# Patient Record
Sex: MALE | Race: WHITE | NOT HISPANIC OR LATINO | Employment: OTHER | URBAN - METROPOLITAN AREA
[De-identification: names, ages, dates, MRNs, and addresses within clinical notes are randomized per-mention and may not be internally consistent; named-entity substitution may affect disease eponyms.]

---

## 2021-12-09 ENCOUNTER — TRANSFERRED RECORDS (OUTPATIENT)
Dept: HEALTH INFORMATION MANAGEMENT | Facility: CLINIC | Age: 61
End: 2021-12-09

## 2022-01-28 ENCOUNTER — TRANSFERRED RECORDS (OUTPATIENT)
Dept: HEALTH INFORMATION MANAGEMENT | Facility: CLINIC | Age: 62
End: 2022-01-28
Payer: COMMERCIAL

## 2022-02-04 ENCOUNTER — TRANSFERRED RECORDS (OUTPATIENT)
Dept: HEALTH INFORMATION MANAGEMENT | Facility: OTHER | Age: 62
End: 2022-02-04

## 2022-02-15 ENCOUNTER — TRANSFERRED RECORDS (OUTPATIENT)
Dept: HEALTH INFORMATION MANAGEMENT | Facility: CLINIC | Age: 62
End: 2022-02-15
Payer: COMMERCIAL

## 2022-02-23 ENCOUNTER — MEDICAL CORRESPONDENCE (OUTPATIENT)
Dept: HEALTH INFORMATION MANAGEMENT | Facility: CLINIC | Age: 62
End: 2022-02-23
Payer: COMMERCIAL

## 2022-02-25 ENCOUNTER — TRANSCRIBE ORDERS (OUTPATIENT)
Dept: OTHER | Age: 62
End: 2022-02-25
Payer: COMMERCIAL

## 2022-02-25 DIAGNOSIS — C44.42 SQUAMOUS CELL CARCINOMA OF NECK: Primary | ICD-10-CM

## 2022-03-02 NOTE — TELEPHONE ENCOUNTER
FUTURE VISIT INFORMATION      FUTURE VISIT INFORMATION:    Date: 3/9/22    Time: 1:30PM    Location: [facility]  REFERRAL INFORMATION:    Referring provider:  Richie Rivera MD      Referring providers clinic:  [facility] Surgery      Reason for visit/diagnosis  Squamous cell carcinoma of neck. referred by Richie Rivera MD, recds in epic per pt    RECORDS REQUESTED FROM:       Clinic name Comments Records Status Imaging Status   [facility] Surgery   22 note from Richie Rivera MD   Care everywhere     [facility] FAMILY MEDICINE 21 note from Beni Gallardo MD   Care everywhere     [facility] URGENT CARE 21 note from Dunphy, Tyler J, MD    Care everywhere     [facility] imaging  [place]   2/15/22 US FNAreq 3/2/22  2/4/22 US Head Neck   21 CT NECK    Care everywhere PACS   [facility] CLINICAL LABORATORY  [address] [facility] [id number] [phone] 2/15/22 Lymph Node (Case: [id number])    *Trackin Care everywhere     Path req 3/2/22    Received 3/3/22            3/2/22 2:32PM sent a fax to [facility] for path send out - Amay   3/3/22 3:23PM received path from [facility]

## 2022-03-04 ENCOUNTER — TRANSFERRED RECORDS (OUTPATIENT)
Dept: HEALTH INFORMATION MANAGEMENT | Facility: OTHER | Age: 62
End: 2022-03-04

## 2022-03-07 ENCOUNTER — LAB (OUTPATIENT)
Dept: LAB | Facility: CLINIC | Age: 62
End: 2022-03-07
Payer: COMMERCIAL

## 2022-03-07 DIAGNOSIS — C44.42 SQUAMOUS CELL CARCINOMA, SCALP/NECK: Primary | ICD-10-CM

## 2022-03-07 PROCEDURE — 88321 CONSLTJ&REPRT SLD PREP ELSWR: CPT | Performed by: PATHOLOGY

## 2022-03-07 PROCEDURE — 88342 IMHCHEM/IMCYTCHM 1ST ANTB: CPT | Mod: 26 | Performed by: PATHOLOGY

## 2022-03-07 PROCEDURE — 88360 TUMOR IMMUNOHISTOCHEM/MANUAL: CPT | Mod: 26 | Performed by: PATHOLOGY

## 2022-03-07 PROCEDURE — 88342 IMHCHEM/IMCYTCHM 1ST ANTB: CPT | Mod: TC,XU | Performed by: OTOLARYNGOLOGY

## 2022-03-08 ENCOUNTER — TRANSFERRED RECORDS (OUTPATIENT)
Dept: HEALTH INFORMATION MANAGEMENT | Facility: OTHER | Age: 62
End: 2022-03-08

## 2022-03-09 ENCOUNTER — PRE VISIT (OUTPATIENT)
Dept: OTOLARYNGOLOGY | Facility: CLINIC | Age: 62
End: 2022-03-09

## 2022-03-09 ENCOUNTER — ALLIED HEALTH/NURSE VISIT (OUTPATIENT)
Dept: SPEECH THERAPY | Facility: CLINIC | Age: 62
End: 2022-03-09

## 2022-03-09 ENCOUNTER — OFFICE VISIT (OUTPATIENT)
Dept: OTOLARYNGOLOGY | Facility: CLINIC | Age: 62
End: 2022-03-09
Payer: COMMERCIAL

## 2022-03-09 VITALS
HEIGHT: 70 IN | WEIGHT: 299 LBS | SYSTOLIC BLOOD PRESSURE: 118 MMHG | DIASTOLIC BLOOD PRESSURE: 79 MMHG | TEMPERATURE: 97.2 F | BODY MASS INDEX: 42.8 KG/M2 | HEART RATE: 73 BPM

## 2022-03-09 DIAGNOSIS — C11.9 NASOPHARYNGEAL CANCER (H): Primary | ICD-10-CM

## 2022-03-09 DIAGNOSIS — C44.42 SQUAMOUS CELL CARCINOMA OF NECK: ICD-10-CM

## 2022-03-09 PROCEDURE — 31575 DIAGNOSTIC LARYNGOSCOPY: CPT | Mod: GC | Performed by: OTOLARYNGOLOGY

## 2022-03-09 PROCEDURE — 99203 OFFICE O/P NEW LOW 30 MIN: CPT | Mod: 25 | Performed by: OTOLARYNGOLOGY

## 2022-03-09 RX ORDER — FUROSEMIDE 20 MG
20 TABLET ORAL DAILY
Status: ON HOLD | COMMUNITY
Start: 2021-09-22 | End: 2023-11-07

## 2022-03-09 RX ORDER — ATORVASTATIN CALCIUM 20 MG/1
20 TABLET, FILM COATED ORAL DAILY
COMMUNITY
Start: 2021-09-24

## 2022-03-09 ASSESSMENT — PAIN SCALES - GENERAL: PAINLEVEL: NO PAIN (0)

## 2022-03-09 NOTE — NURSING NOTE
"Chief Complaint   Patient presents with     Consult     squamous cell carcinoma      Blood pressure 118/79, pulse 73, temperature 97.2  F (36.2  C), height 1.778 m (5' 10\"), weight 135.6 kg (299 lb).    Robert Meier LPN    "

## 2022-03-09 NOTE — LETTER
3/9/2022       RE: Jareth Gallardo  [address] [place] [address]     Dear Colleague,    Thank you for referring your patient, Jareth Gallardo, to the [facility] EAR NOSE AND THROAT CLINIC [place] at [facility]. Please see a copy of my visit note below.    Otolaryngology Consult Note  March 9, 2022      CC: Nasopharyngeal Squamous Cell Carcinoma   Referring Provider: Dr. Richard Rivera    HPI: Jareth Gallardo is a 61 year old male with a past medical history of Type II DM. Around thanksgiving he felt bumps in his left neck. A few weeks went by and the mass enlarged.  There was not associated pain but that mass was was firm and associated with facial and neck swelling on the left side.  They went to urgent care and got blood work; which he states was positive for cat scratch fever. He did a course of multi-abx regiment. This did not resolve the issue. He went in to see his PCP where a CT was preformed. He was recommended to see and ENT Clinic in [facility] but his insurance did not cover this so he was unable to go. He was then referred to a general surgeon, Dr. Rivera. Dr. Rivera was able to get the ultrasound guided FNA. Ultrasound guided FNA was performed 2/15/2022 demonstrating p16- SCC.    Nasal congestion mostly on left side. His left nose stays blocked. He has some issues chewing and breathing at same time. 3mo ago began having left sided epistaxis. Bleeding always on left side. Nose bleeds are typically first thing in he morning. No issues with swalloing, no pain with swallowing. No weight loss. No neck or ear pain. He has baseline bilateral tinnitus. His left hearing is normal but has riht hearing loss. No hemoptysis. No changes in voice. No skin cancer. No decrease facial sensation. No changes in facial motion. No pain with eye motion, decreased vision, or restricted vision. No skin lesion or personal history for H&N cancer.     He  works in a [employer] company ([employer] 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     Ultrasound guided FNA was performed 2/15/2022 demonstrating p16- SCC.    PMH: Type II DM, COPD, HLD  Medications: metformin, atorvastin, brollia   Allergies:   PSH: tonsillectomy (6years of age)   SH:  - Manager   FH:  - Father passed from leukemia  - 30yrs 1-4ppd. Quit 10yrs ago  - Social drinker; 4 beers or 2 margittas a month     ROS: 12 point review of systems is negative unless noted in HPI.    PHYSICAL EXAM:  General: sitting in chair, no acute distress  HEAD: normocephalic, atraumatic  Face: symmetrical, CN VII intact bilaterally (HB 1), no swelling, edema, or erythema. Sensation V1-V3 intact and equal bilaterally.   Eyes: EOMI without spontaneous or gaze evoked nystagmus, no pain with EOM, PERRL, clear sclera  Ears: no tragal tenderness, external ear canal open and clear bilaterally, TMs clear bilaterally  Nose: no anterior drainage, bilateral edematous mucosa  Mouth: moist, no ulcers, no jaw or tooth tenderness, tongue midline and symmetric  Oropharynx: tonsils within normal limits, uvula midline, no oropharyngeal erythema  Neck: left sided 2 palpable lymph nodes level II approx 2 x 1cm and level III 1.5 x 0.5 , both nodes firm and non-mobile, trachea midline  Neuro: cranial nerves 2-12 grossly intact  Respiratory: breathing non-labored on RA, no stridor  Skin: no rashes or skin lesions of the face/neck  Psych: pleasant affect  Cardio: extremities warm and well perfused     FIBEROPTIC ENDOSCOPY:  Due to nasopharynx scc, fiberoptic laryngoscopy was indicated. After obtaining verbal consent, the nose was topically decongested and anesthetized. The fiberoptic laryngoscope was passed under endoscopic vision through the right nasal passage. The turbinates and nasal mucosa was inflamed and diffusely edematous. It was very tight to pass the  scope. Middle meatus with polypoid like change. Nasopharynx with diffuse secretions and thickening that does not block right eustachian tube orifice. Scope of left nasal cavity was attempted but nasal mucosa was so edematous it was challenging to pass the scope through therefore we aborted. Poor visualization of structures below nasopharynx due to patient heavy secretion burden.    Imagin2021  CT NECK SOFT TISSUE W IV CONTRAST     HISTORY: Neck mass, nonpulsatile;     COMPARISON: None     FINDINGS: Extensive left-sided neck adenopathy lymph nodes measuring up to 2.4 cm short axis medial to the left sternocleidomastoid muscle just above the level of the hyoid bone. Additional lymph node with stranding around it likely inflamed image 74 series 2 measuring 1.8 cm short axis. There are also some scattered slightly prominent lymph nodes in the right neck as well as the right and left supraclavicular regions.     Probable small cyst in the right thyroid gland measuring 8 mm.     Vascular structures appear normal. The epiglottis is normal no subglottic narrowing. Slight prominence to the adenoids and left palatine tonsil. This is likely reactive.     In the inferior aspects of the maxillary sinuses there is either layering debris, small polyps or retention cysts measuring up to 2 cm. The left mastoid air cells appear clear. There is some mild right mastoid air cell disease.     IMPRESSION:   1. Extensive adenopathy in the left neck just medial to the left sternocleidomastoid muscle. One of the enlarged lymph nodes has stranding around it compatible with inflammation. There are also some slightly prominent lymph nodes in the right neck. Prominence to the adenoids and left palatine tonsil as well.     No abscess or fluid collection seen.     2. Mild right mastoid air cell disease this may be chronic.       3. Mild maxillary sinus debris versus small polyps or retention cysts.     Electronically Signed: Jorge Hammonds  MD 12/9/2021 9:52 AM      3/4/2022  PET CT SKULL BASE TO MID THIGH INITIAL     INDICATION: Squamous cell carcinoma, staging; Region of Interest and Additional Information->Lymph node, cervical, needle core biopsies: metastatic moderately differentiated squamous cell carcinoma.     COMPARISON: CT neck 12/09/2021.     TECHNIQUE: PET CT images obtained from the calvarium to the upper thighs.     INJECTED DOSE: 13.43 millicuries.     BLOOD GLUCOSE LEVEL: 115 mg/dL.     FINDINGS: There is soft tissue exuberant thickening at the nasopharyngeal mucosa and oropharyngeal mucosa with uptake. Nodular soft tissue uptake within the prevertebral soft tissues at the level of the epiglottis as well. Multiple right upper neck lymph node uptake at jugular digastric lymph nodes and within the posterior cervical space. More numerous are lymph node uptake within the left posterior cervical space and at jugular digastric nodes. There is pharyngeal soft tissue uptake eccentric to the left as well just below the epiglottis eccentric to the left and also centrally at the piriform sinuses. Parotid glands demonstrate physiologic uptake. Submandibular glands demonstrate physiologic uptake. No evidence for axillary uptake or mediastinal uptake. Nodular uptake within the left upper lobe with hazy ground-glass density about it is SUV avid. This confluent nodule measures up to 2.6 cm. Additionally, there is some nodular uptake within the posterior aspect of the lingula as well    to a lesser degree. Confluent nodule measures up to 2.3 cm. There is a small fracture line within a right 6th rib anteriorly with focal uptake. No evidence for lung parenchymal uptake on the right.     Liver demonstrates physiologic uptake. Gallstones. Adrenal glands demonstrate physiologic uptake. Spleen demonstrates physiologic uptake. Remainder of the abdomen and pelvis demonstrates physiologic uptake. Definitive pathological osseous uptake is not evident.      IMPRESSION:   1.  Exuberant soft tissue thickening within the nasopharyngeal and oropharyngeal mucosa concerning for nasopharyngeal carcinoma. The soft tissue nodular uptake is evident within the prevertebral soft tissues and about the pharyngeal mucosa at the epiglottis and with extensive neck lymph node uptake.   2.  Uptake within the lung parenchyma concerning for metastatic uptake or primary malignancy. Infectious or inflammatory not excluded.   3.  Uptake within the right 6th rib is likely posttraumatic but could be followed with CT.        Assessment and Plan  Jareth Gallardo is a 61 year old male with a past medical history of HLD, , and type II DM. He is here with newly diagnosed nasopharynx p16- SCC. Unfortunately his PET scan is not uploaded but we have the read that shows mucosal thickening of nasopharynx down to the oropharynx with bilateral avid neck lymph nodes, prevertebral and pharyngeal enchancement. There was also a left upper lung lymph node that was suspicious for metasisis.  This has not been biopsied. He likely has [id number]. We discussed with him primary treatment would be chemoradiation. He already has a radiation consult in [facility]. We discussed with him the option to have his radiation and chemotherapy done here with stay at the [facility]. He is open to the option. We will discuss his case at tumor board Friday and call him with the final recommendations.    - Presentation at tumor board and finalization POC       I, Jesus Boston MD, saw this patient with the resident/fellow and agree with the resident's findings and plan of care as documented in the resident's/fellow's note. I was present with the patient for the entire viewing portion of the endoscopy procedure (including scope insertion and withdrawal) and agree with the interpretation and report as documented by the resident.          Again, thank you for allowing me to participate in the care of your patient.       Sincerely,    Jesus Botson MD

## 2022-03-09 NOTE — PROGRESS NOTES
Otolaryngology Consult Note  March 9, 2022      CC: Nasopharyngeal Squamous Cell Carcinoma   Referring Provider: Dr. Richard Rivera    HPI: Jareth Gallardo is a 61 year old male with a past medical history of Type II DM. Around thanksgiving he felt bumps in his left neck. A few weeks went by and the mass enlarged.  There was not associated pain but that mass was was firm and associated with facial and neck swelling on the left side.  They went to urgent care and got blood work; which he states was positive for cat scratch fever. He did a course of multi-abx regiment. This did not resolve the issue. He went in to see his PCP where a CT was preformed. He was recommended to see and ENT Clinic in [facility] but his insurance did not cover this so he was unable to go. He was then referred to a general surgeon, Dr. Rivera. Dr. Rivera was able to get the ultrasound guided FNA. Ultrasound guided FNA was performed 2/15/2022 demonstrating p16- SCC.    Nasal congestion mostly on left side. His left nose stays blocked. He has some issues chewing and breathing at same time. 3mo ago began having left sided epistaxis. Bleeding always on left side. Nose bleeds are typically first thing in he morning. No issues with swalloing, no pain with swallowing. No weight loss. No neck or ear pain. He has baseline bilateral tinnitus. His left hearing is normal but has riht hearing loss. No hemoptysis. No changes in voice. No skin cancer. No decrease facial sensation. No changes in facial motion. No pain with eye motion, decreased vision, or restricted vision. No skin lesion or personal history for H&N cancer.     He works in a [employer] company ([employer] 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     Ultrasound guided FNA was performed 2/15/2022 demonstrating p16- SCC.    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 Quit 10yrs ago  - Social drinker; 4 beers or 2 margittas a month     ROS: 12 point review of systems is negative unless noted in HPI.    PHYSICAL EXAM:  General: sitting in chair, no acute distress  HEAD: normocephalic, atraumatic  Face: symmetrical, CN VII intact bilaterally (HB 1), no swelling, edema, or erythema. Sensation V1-V3 intact and equal bilaterally.   Eyes: EOMI without spontaneous or gaze evoked nystagmus, no pain with EOM, PERRL, clear sclera  Ears: no tragal tenderness, external ear canal open and clear bilaterally, TMs clear bilaterally  Nose: no anterior drainage, bilateral edematous mucosa  Mouth: moist, no ulcers, no jaw or tooth tenderness, tongue midline and symmetric  Oropharynx: tonsils within normal limits, uvula midline, no oropharyngeal erythema  Neck: left sided 2 palpable lymph nodes level II approx 2 x 1cm and level III 1.5 x 0.5 , both nodes firm and non-mobile, trachea midline  Neuro: cranial nerves 2-12 grossly intact  Respiratory: breathing non-labored on RA, no stridor  Skin: no rashes or skin lesions of the face/neck  Psych: pleasant affect  Cardio: extremities warm and well perfused     FIBEROPTIC ENDOSCOPY:  Due to nasopharynx scc, fiberoptic laryngoscopy was indicated. After obtaining verbal consent, the nose was topically decongested and anesthetized. The fiberoptic laryngoscope was passed under endoscopic vision through the right nasal passage. The turbinates and nasal mucosa was inflamed and diffusely edematous. It was very tight to pass the scope. Middle meatus with polypoid like change. Nasopharynx with diffuse secretions and thickening that does not block right eustachian tube orifice. Scope of left nasal cavity was attempted but nasal mucosa was so edematous it was challenging to pass the scope through therefore we aborted. Poor visualization of  structures below nasopharynx due to patient heavy secretion burden.    Imagin2021  CT NECK SOFT TISSUE W IV CONTRAST     HISTORY: Neck mass, nonpulsatile;     COMPARISON: None     FINDINGS: Extensive left-sided neck adenopathy lymph nodes measuring up to 2.4 cm short axis medial to the left sternocleidomastoid muscle just above the level of the hyoid bone. Additional lymph node with stranding around it likely inflamed image 74 series 2 measuring 1.8 cm short axis. There are also some scattered slightly prominent lymph nodes in the right neck as well as the right and left supraclavicular regions.     Probable small cyst in the right thyroid gland measuring 8 mm.     Vascular structures appear normal. The epiglottis is normal no subglottic narrowing. Slight prominence to the adenoids and left palatine tonsil. This is likely reactive.     In the inferior aspects of the maxillary sinuses there is either layering debris, small polyps or retention cysts measuring up to 2 cm. The left mastoid air cells appear clear. There is some mild right mastoid air cell disease.     IMPRESSION:   1. Extensive adenopathy in the left neck just medial to the left sternocleidomastoid muscle. One of the enlarged lymph nodes has stranding around it compatible with inflammation. There are also some slightly prominent lymph nodes in the right neck. Prominence to the adenoids and left palatine tonsil as well.     No abscess or fluid collection seen.     2. Mild right mastoid air cell disease this may be chronic.       3. Mild maxillary sinus debris versus small polyps or retention cysts.     Electronically Signed: Jorge Hammonds MD 2021 9:52 AM      3/4/2022  PET CT SKULL BASE TO MID THIGH INITIAL     INDICATION: Squamous cell carcinoma, staging; Region of Interest and Additional Information->Lymph node, cervical, needle core biopsies: metastatic moderately differentiated squamous cell carcinoma.     COMPARISON: CT neck 2021.      TECHNIQUE: PET CT images obtained from the calvarium to the upper thighs.     INJECTED DOSE: 13.43 millicuries.     BLOOD GLUCOSE LEVEL: 115 mg/dL.     FINDINGS: There is soft tissue exuberant thickening at the nasopharyngeal mucosa and oropharyngeal mucosa with uptake. Nodular soft tissue uptake within the prevertebral soft tissues at the level of the epiglottis as well. Multiple right upper neck lymph node uptake at jugular digastric lymph nodes and within the posterior cervical space. More numerous are lymph node uptake within the left posterior cervical space and at jugular digastric nodes. There is pharyngeal soft tissue uptake eccentric to the left as well just below the epiglottis eccentric to the left and also centrally at the piriform sinuses. Parotid glands demonstrate physiologic uptake. Submandibular glands demonstrate physiologic uptake. No evidence for axillary uptake or mediastinal uptake. Nodular uptake within the left upper lobe with hazy ground-glass density about it is SUV avid. This confluent nodule measures up to 2.6 cm. Additionally, there is some nodular uptake within the posterior aspect of the lingula as well    to a lesser degree. Confluent nodule measures up to 2.3 cm. There is a small fracture line within a right 6th rib anteriorly with focal uptake. No evidence for lung parenchymal uptake on the right.     Liver demonstrates physiologic uptake. Gallstones. Adrenal glands demonstrate physiologic uptake. Spleen demonstrates physiologic uptake. Remainder of the abdomen and pelvis demonstrates physiologic uptake. Definitive pathological osseous uptake is not evident.     IMPRESSION:   1.  Exuberant soft tissue thickening within the nasopharyngeal and oropharyngeal mucosa concerning for nasopharyngeal carcinoma. The soft tissue nodular uptake is evident within the prevertebral soft tissues and about the pharyngeal mucosa at the epiglottis and with extensive neck lymph node uptake.   2.   Uptake within the lung parenchyma concerning for metastatic uptake or primary malignancy. Infectious or inflammatory not excluded.   3.  Uptake within the right 6th rib is likely posttraumatic but could be followed with CT.        Assessment and Plan  Jareth Gallardo is a 61 year old male with a past medical history of HLD, , and type II DM. He is here with newly diagnosed nasopharynx p16- SCC. Unfortunately his PET scan is not uploaded but we have the read that shows mucosal thickening of nasopharynx down to the oropharynx with bilateral avid neck lymph nodes, prevertebral and pharyngeal enchancement. There was also a left upper lung lymph node that was suspicious for metasisis.  This has not been biopsied. He likely has [id number]. We discussed with him primary treatment would be chemoradiation. He already has a radiation consult in [facility]. We discussed with him the option to have his radiation and chemotherapy done here with stay at the [facility]. He is open to the option. We will discuss his case at tumor board Friday and call him with the final recommendations.    - Presentation at tumor board and finalization POC       I, Jesus Boston MD, saw this patient with the resident/fellow and agree with the resident's findings and plan of care as documented in the resident's/fellow's note. I was present with the patient for the entire viewing portion of the endoscopy procedure (including scope insertion and withdrawal) and agree with the interpretation and report as documented by the resident.

## 2022-03-09 NOTE — PATIENT INSTRUCTIONS
1. We will review at tumor board on Friday and call you with recommendations.   2. Please call the ENT clinic with any questions,concerns, new or worsening symptoms.    -Clinic number is [phone]   - Nel's direct line (Dr. Boston's nurse) [phone]

## 2022-03-09 NOTE — LETTER
Date:March 15, 2022      Patient was self referred, no letter generated. Do not send.        [facility] Health Information

## 2022-03-09 NOTE — PROGRESS NOTES
Jareth Gallardo was seen for allied health care provider visit for introduction of self and SLP services. Provided information on trajectory of care and benefits of SLP services before, during and after radiation therapy. Formal baseline video swallow study evaluation indicated before beginning radiation.. All questions answered within scope of practice for pt and his wife. Provided SLP contact info and encouraged pt to reach out with any questions or concerns. Pt is currently deciding whether to proceed with treatment in [place] or here at the [facility]. Offered to potentially see pt via video visit (pending insurance coverage) for swallowing therapy throughout treatment. Time spent with patient 10 minutes.       DEEPAK Khalil MA (music), CCC-SLP   Speech Language Pathologist  [facility] Trained Vocologist   [facility] [place]  Dept. of Otolaryngology  Department of Rehabilitation Services  [address] [place] [id number]  Email: [email or web address]   Phone: [phone]  Pronouns: she/her/hers

## 2022-03-10 ENCOUNTER — TELEPHONE (OUTPATIENT)
Dept: OTOLARYNGOLOGY | Facility: CLINIC | Age: 62
End: 2022-03-10
Payer: COMMERCIAL

## 2022-03-10 NOTE — TELEPHONE ENCOUNTER
Records Requested  03/10/22    Facility  [facility]    [address] [address]    [phone]     Outcome 3/4/22 PET CT report in care everywhere, called facility, images will be pushed shortly - Amay     8:39AM received images - Amay

## 2022-03-11 ENCOUNTER — TUMOR CONFERENCE (OUTPATIENT)
Dept: ONCOLOGY | Facility: CLINIC | Age: 62
End: 2022-03-11
Payer: COMMERCIAL

## 2022-03-11 NOTE — PROGRESS NOTES
Called and spoke with patient regarding tumor board discussion and recommendations. Discussed with patient that the recommendation is to proceed with induction chemotherapy. Discussed with patient that recommendation to proceed with this treatment here if possible given  The multidisciplinary care needed. Patient indicates that he still has not decided on where he wishes to proceed with treatment as he is working with his insurance. He would like to consider options of here vs [facility] and update writer next week. Discussed the recommendation to proceed with MRI scan as well as additional testing for EBV on pathology. Patient will update writer next week and will will arrange according to where he wishes to proceed.     Patient was encouraged to call with further questions or concerns.     Nel Lau, RN, BSN

## 2022-03-11 NOTE — PROGRESS NOTES
Head & Neck Tumor Conference Note     Status: New   Staff: Dr. Jesus Boston    Tumor Site: Nasopharynx   Tumor Pathology: p16- SCC   Tumor Stage: [id number]  Tumor Treatment: Not established     Reason for Review: Review imaging, path, and POC    Brief History: Jareth Gallardo is a 61 year old male with a past medical history of Type II DM. Around thanksgiving he felt bumps in his left neck. A few weeks went by and the mass enlarged.  There was not associated pain but that mass was was firm and associated with facial and neck swelling on the left side.  They went to urgent care and got blood work; which he states was positive for cat scratch fever. He did a course of multi-abx regiment. This did not resolve the issue. He went in to see his PCP where a CT was preformed. He was recommended to see and ENT Clinic in [facility] but his insurance did not cover this so he was unable to go. He was then referred to a general surgeon, Dr. Rivera. Dr. Rivera was able to get the ultrasound guided FNA. Ultrasound guided FNA was performed 2/15/2022 demonstrating p16- SCC.     Nasal congestion mostly on left side. His left nose stays blocked. He has some issues chewing and breathing at same time. 3mo ago began having left sided epistaxis. Bleeding always on left side. Nose bleeds are typically first thing in he morning. No issues with swalloing, no pain with swallowing. No weight loss. No neck or ear pain. He has baseline bilateral tinnitus. His left hearing is normal but has riht hearing loss. No hemoptysis. No changes in voice. No skin cancer. No decrease facial sensation. No changes in facial motion. No pain with eye motion, decreased vision, or restricted vision. No skin lesion or personal history for H&N cancer.      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 Quit 10yrs ago  - Social drinker; 4 beers or 2 margittas a month     Imaging:   CT NECK SOFT TISSUE W IV CONTRAST  12/9/2021    HISTORY: Neck mass, nonpulsatile;     COMPARISON: None     FINDINGS: Extensive left-sided neck adenopathy lymph nodes measuring up to 2.4 cm short axis medial to the left sternocleidomastoid muscle just above the level of the hyoid bone. Additional lymph node with stranding around it likely inflamed image 74 series 2 measuring 1.8 cm short axis. There are also some scattered slightly prominent lymph nodes in the right neck as well as the right and left supraclavicular regions.     Probable small cyst in the right thyroid gland measuring 8 mm.     Vascular structures appear normal. The epiglottis is normal no subglottic narrowing. Slight prominence to the adenoids and left palatine tonsil. This is likely reactive.     In the inferior aspects of the maxillary sinuses there is either layering debris, small polyps or retention cysts measuring up to 2 cm. The left mastoid air cells appear clear. There is some mild right mastoid air cell disease.     IMPRESSION:   1. Extensive adenopathy in the left neck just medial to the left sternocleidomastoid muscle. One of the enlarged lymph nodes has stranding around it compatible with inflammation. There are also some slightly prominent lymph nodes in the right neck. Prominence to the adenoids and left palatine tonsil as well.     No abscess or fluid collection seen.     2. Mild right mastoid air cell disease this may be chronic.       3. Mild maxillary sinus debris versus small polyps or retention cysts.     Electronically Signed: Jorge Hammonds MD 12/9/2021 9:52 AM          PET CT SKULL BASE TO MID THIGH INITIAL 3/4/2022  COMPARISON: CT neck 12/09/2021.       FINDINGS: There is soft tissue exuberant thickening at the nasopharyngeal mucosa and oropharyngeal mucosa with uptake. Nodular soft tissue uptake within the prevertebral soft tissues at the level of the epiglottis as well. Multiple right upper neck lymph node uptake at jugular digastric lymph nodes and within the posterior cervical space. More numerous are lymph node uptake within the left posterior cervical space and at jugular digastric nodes. There is pharyngeal soft tissue uptake eccentric to the left as well just below the epiglottis eccentric to the left and also centrally at the piriform sinuses. Parotid glands demonstrate physiologic uptake. Submandibular glands demonstrate physiologic uptake. No evidence for axillary uptake or mediastinal uptake. Nodular uptake within the left upper lobe with hazy ground-glass density about it is SUV avid. This confluent nodule measures up to 2.6 cm. Additionally, there is some nodular uptake within the posterior aspect of the lingula as well    to a lesser degree. Confluent nodule measures up to 2.3 cm. There is a small fracture line within a right 6th rib anteriorly with focal uptake. No evidence for lung parenchymal uptake on the right.     Liver demonstrates physiologic uptake. Gallstones. Adrenal glands demonstrate physiologic uptake. Spleen demonstrates physiologic uptake. Remainder of the abdomen and pelvis demonstrates physiologic uptake. Definitive pathological osseous uptake is not evident.     IMPRESSION:   1.  Exuberant soft tissue thickening within the nasopharyngeal and oropharyngeal mucosa concerning for nasopharyngeal carcinoma. The soft tissue nodular uptake is evident within the prevertebral soft tissues and about the pharyngeal mucosa at the epiglottis and with extensive neck lymph node uptake.   2.  Uptake within the lung parenchyma concerning for metastatic uptake or primary malignancy. Infectious or inflammatory not excluded.   3.  Uptake within  the right 6th rib is likely posttraumatic but could be followed with CT.      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    Pathology was reviewed:  - p16- SCC no EBV analysis performed    Imaging was reviewed and demonstrates:  PET/CT 3/4/2022  - Left nasopharynx tumor extending to posterior midline with bilateral  retropharyngeal and cervical nodes, including left level 5 neck  - Right chest wall avidity possibly rib fracture that's healing   - Left upper lung lobe ill defined surrounding ground glass not FDG avid. Looks infectious in nature.      Discussion:   - Induction chemothery followed by chemoradiation. Patients with locoregionally advanced nasopharyngeal carcinoma  treatement with induction chemotherapy added to chemoradiotherapy  improves recurrence-free survival and overall survival, as compared with chemoradiotherapy alone.  - We should obtain an MRI to evaluate margins of mass and.  - Tumor pathology should be evaluated for EBV.       Plan:   - Obtain MRI and EBV analysis from biopsy  - Begin induction chemothery followed by chemoradiation    Anna Acosta MD, PGY-3  Otolaryngology- Head and Neck Surgery    Documentation / Disclaimer Cancer Tumor Board Note  Cancer tumor board recommendations do not override what is determined to be reasonable care and treatment, which is dependent on the circumstances of a patient's case;  the patient's medical, social, and personal concerns; and the clinical judgment of the oncologist [physician].

## 2022-03-14 ENCOUNTER — PATIENT OUTREACH (OUTPATIENT)
Dept: OTOLARYNGOLOGY | Facility: CLINIC | Age: 62
End: 2022-03-14
Payer: COMMERCIAL

## 2022-03-14 DIAGNOSIS — C11.9 NASOPHARYNGEAL CANCER (H): Primary | ICD-10-CM

## 2022-03-14 NOTE — TELEPHONE ENCOUNTER
Received a call back from patient indicating that he has decided to proceed with treatment here at the [facility]. He indicates that he would like to utilize the [facility] [place].     Writer placed referral for medical and radiation oncology as well as MRI. We will attempt to coordinate as many appointments as possible to minimize trips.     Discussed the need for dental evaluation. Patient will see local dentist. Dental information sheet emailed to patient for review and to take with him to his local dentist.     We will call patient with appointment information. Patient verbalized understanding and was encouraged to call with further questions or concerns.       Nel Lau, RN, BSN

## 2022-03-17 ENCOUNTER — PATIENT OUTREACH (OUTPATIENT)
Dept: ONCOLOGY | Facility: CLINIC | Age: 62
End: 2022-03-17
Payer: COMMERCIAL

## 2022-03-18 NOTE — PROGRESS NOTES
I spoke with Jareth on Thursday, 3/17 regarding his medical oncology appt.  Information given to him.  Also gave him my contact info in case he needs anything prior to that appt.    New Patient Oncology Nurse Navigator Note     Referring provider: Dr. Jesus Nowak     Referred to (specialty): med onc    Requested provider (if applicable): Armando Almeida or Olegario     Date Referral Received: 3/17/2022     Evaluation for : Tumor Site: Nasopharynx   Tumor Pathology: p16- SCC   Tumor Stage: [id number]     Clinical History (per Nurse review of records provided):    **BOOK MARKED**  NOTES:    3/9/2022:  Dr. Nowak note  1/28/2022:  gen surg note-[facility]    IMAGING: all done at [facility]--see reports [facility]      Clinical Assessment / Barriers to Care (Per Nurse):   lives near [place], [place]     Records Location (Care Everywhere, Media, etc.): Epic, CE     Records Needed: no     Additional testing needed prior to consult: no

## 2022-03-18 NOTE — TELEPHONE ENCOUNTER
RECORDS STATUS - ALL OTHER DIAGNOSIS      [facility]   DATE RECEIVED: 3/18   NOTES STATUS DETAILS   OFFICE NOTE from referring provider Justin Boston: 3/9/22   OFFICE NOTE from medical oncologist [facility] Dr. Soila Marie: 3/8/22   MEDICATION LIST [facility]    LABS     PATHOLOGY REPORTS [facility] 3/7/22: Path Consult   ANYTHING RELATED TO DIAGNOSIS Epic/ 3/8/22   GENONOMIC TESTING     TYPE: Requested 3/9/22: PDL-1   IMAGING (NEED IMAGES & REPORT)     CT SCANS PACS 12/9/21: [facility]   ULTRASOUND PACS 2/15/22, 2/4/22: [facility]   PET PACS 3/4/22: [facility]

## 2022-03-22 ENCOUNTER — PRE VISIT (OUTPATIENT)
Dept: ONCOLOGY | Facility: CLINIC | Age: 62
End: 2022-03-22
Payer: COMMERCIAL

## 2022-03-22 ENCOUNTER — PATIENT OUTREACH (OUTPATIENT)
Dept: ONCOLOGY | Facility: CLINIC | Age: 62
End: 2022-03-22

## 2022-03-22 ENCOUNTER — ONCOLOGY VISIT (OUTPATIENT)
Dept: ONCOLOGY | Facility: CLINIC | Age: 62
End: 2022-03-22
Attending: OTOLARYNGOLOGY
Payer: COMMERCIAL

## 2022-03-22 VITALS
WEIGHT: 298.6 LBS | DIASTOLIC BLOOD PRESSURE: 90 MMHG | TEMPERATURE: 98.2 F | OXYGEN SATURATION: 94 % | BODY MASS INDEX: 42.75 KG/M2 | HEIGHT: 70 IN | SYSTOLIC BLOOD PRESSURE: 141 MMHG | HEART RATE: 94 BPM

## 2022-03-22 DIAGNOSIS — R80.9 TYPE 2 DIABETES MELLITUS WITH MICROALBUMINURIA, WITHOUT LONG-TERM CURRENT USE OF INSULIN (H): Primary | ICD-10-CM

## 2022-03-22 DIAGNOSIS — H90.8 MIXED CONDUCTIVE AND SENSORINEURAL HEARING LOSS, UNSPECIFIED LATERALITY: ICD-10-CM

## 2022-03-22 DIAGNOSIS — E11.29 TYPE 2 DIABETES MELLITUS WITH MICROALBUMINURIA, WITHOUT LONG-TERM CURRENT USE OF INSULIN (H): Primary | ICD-10-CM

## 2022-03-22 DIAGNOSIS — C11.9 NASOPHARYNGEAL CANCER (H): ICD-10-CM

## 2022-03-22 LAB
CREAT UR-MCNC: 206 MG/DL
PROT UR-MCNC: 0.43 G/L
PROT/CREAT 24H UR: 0.21 G/G CR (ref 0–0.2)

## 2022-03-22 PROCEDURE — G0463 HOSPITAL OUTPT CLINIC VISIT: HCPCS

## 2022-03-22 PROCEDURE — 84156 ASSAY OF PROTEIN URINE: CPT | Performed by: INTERNAL MEDICINE

## 2022-03-22 PROCEDURE — 99417 PROLNG OP E/M EACH 15 MIN: CPT | Performed by: INTERNAL MEDICINE

## 2022-03-22 PROCEDURE — 99205 OFFICE O/P NEW HI 60 MIN: CPT | Performed by: INTERNAL MEDICINE

## 2022-03-22 ASSESSMENT — PAIN SCALES - GENERAL: PAINLEVEL: NO PAIN (0)

## 2022-03-22 NOTE — PROGRESS NOTES
"Met with Jareth and his wife to discuss chemotherapy and resources available at the [facility]. Provided patient with \"My Cancer Folder\".     Reviewed administration, side effects (including myelosuppression, nausea/vomiting, diarrhea/constipation,  mouth sores) and ongoing symptom management by APPs in clinic. Provided phone numbers for triage and after hours care. Highlighted steps to expect when getting chemotherapy (check in, labs, pre- meds, infusion), Discussed that chemo treatment may be delayed a day or two due to blood counts, infusion schedule or patient's need to modify.      Discussed and encouraged [facility] to [facility] to assist in managing appointments and asking future questions of health care team.    Presented the port book with visual of what a port looks like, provided descriptive explanation of placement, access, when to use and ongoing maintenance of port.             Answered all Jareth's questions to his stated satisfaction.         Huma Correa RN                                                                                                                                             "

## 2022-03-22 NOTE — PROGRESS NOTES
[facility]    PATIENT NAME: Jareth Gallardo  MRN # [id number]   DATE OF VISIT: March 22, 2022  YOB: 1960     Referring Provider: Dr. Jesus Boston  Radiation Oncology:  Primary Care Provider: Dr. Beni Gallardo at [facility] in [facility]    CANCER TYPE: Nasopharyngeal carcinoma  STAGE: [id number] (DENAE)  ECOG PS: 1    PD-L1: 20% using  clone llocally   NGS:     SUMMARY  12/11/21 UC for L neck mass, selling  12/9/21 CT neck ([place]). 2.4 cm L neck node medial to SCM just above the hyoid, additional LNs surround, scattered R neck and L supraclavicular LNs  2/15/22 US bx L cervical node. Path: SCC, moderately differentiated,   3/4/22 PET/CT. Nasopharyngeal and oropharyngeal mucosal thickening. 2.6 cm BERNARDO nodular GGO, SUV avid, some additional lingular uptake    ASSESSMENT AND PLAN  Nasopharyngeal carcinoma, EBV pending: We reviewed the diagnosis and imaging results, tumor board recommendation for induction chemo followed by chemoradiation. EBV status will be helpful, or at least knowing non keratinizing vs keratinizing, but either way, it's reasonable to consider cisplatin gemcitabine x 3 cycles followed by chemoradiation most likely with weekly cisplatin. We reviewed the schedule, logistics, and potential toxicities. I would like an audiology exam, TTE, and urine protein/creatinine ratio to help determine whether he's really a realistic candidate for cisplatin. If not, we'll switch to carboplatin. We discussed the differences between cisplatin and carboplatin. I don't think chemorads upfront followed by adjuvant chemo will be a good idea for multiple reasons. Needs a port due to poor venous access as it stands already. We might try to do day 8 gemcitabine in [facility], which is 1 1/2 hours away, whereas we're 3 1/2 hours away. But we'll see how that plays out. Anticipate restaging after 2-3 cycles of induction with CT neck and CT chest/abd vs PET/CT. MRI of the nasopharynx  is pending. Will have 5 cavities filled next week and get trays.     Lung nodules: On the left. I'm trying to get an old CT abdomen pelvis from 2010 to see if they're there. They don't look like mets. He also has a modestly sized, minimally FDG avid 4R node and other small mediastinal nodes. The lung lesions is not easily biopsied but 4R would be. I think it's reasonable to just watch it for now and re-evaluate after induction    Chronic LE swelling: On daily furosemide, which we'd have to hold while on cisplatin, but that could be problematic with the volume needed. Would be another reason to go with carboplatin instead. Await TTE    DM2: Hasn't seen an eye doc for a long time. I asked him to do so locally. Will check urine pr/cr today. A1c earlier this march was 7.2.     Mild peripheral neuropathy: R foot numbness, very mild, from DM2. Monitor carefully for worsening if we go the cisplatin route.     Review of the result(s) of each unique test - CMP, CBC pd, A1c  Independent interpretation of a test performed by another physician/other qualified health care professional (not separately reported) - PET and CT neck     90 minutes spent on the date of the encounter doing chart review, history and exam, documentation and further activities per the note     Dariela Melvin MD  Associate Professor of Medicine  Hematology, Oncology and Transplantation      SHAHANA Templeton is a 60 yo male who presents today for nasopharyngeal carcinoma  Feels good.  Appetite good, no weight loss  Having some nosebleeds, including bleeding from the R nostril, which is newer. Mostly from the L.  No trouble swallowing  Breathing ok  A little sore throat occasionally  Discomfort/sensation that he knows the tumors are there when turning. Notices it when trying to sleep and has to change positions frequently.  Eyes watery and occasionally blurry vision in the last 5 years. No eye exams  Twice daily BMs, soft from metformin  5 cavities - will  "be filled next week 12/30, will  trays  Has chronic leg swelling, intermittently worse, relieved with lasix, elevation, usually better in the AM, exacerbated by standing on concrete.  No upset stomach  Has chronic rhinorrhea when exposed to the wood dust at work    PAST MEDICAL HISTORY  Nasopharyngeal carcinoma as above  DM2  COPD??  Hiatal hernia. Sleeps with his head elevated  LVH on TTE 2009, EF50%, LA Dilation  Dyslipidemia  BARRETT in 2015. Not using CPAP due to developing tooth abscesses when he tried it in 2015  Venous insufficiency in both legs being in an accident involving a mower about 10 years ago, on chronic furosemide. LE US negative 12/15/2009  Cholelithiasis  Hyperplastic colon polyp, due 2020  Tonsillectomy  Baker cyst RLE US 2013    Neuropathy from DM - R foot numbness chronically    Exposed to lots of wood dust in his occupation    CURRENT OUTPATIENT MEDICATIONS  Current Outpatient Medications   Medication Sig Dispense Refill     atorvastatin (LIPITOR) 20 MG tablet Take 20 mg by mouth       fluticasone-vilanterol (BREO ELLIPTA) 100-25 MCG/INH inhaler INHALE 1 PUFF INTO THE LUNGS ONE TIME A DAY. RINSE MOUTH AFTER USE.       furosemide (LASIX) 20 MG tablet Take 40 mg by mouth       metFORMIN (GLUCOPHAGE) 1000 MG tablet TAKE 1 TABLET BY MOUTH ONE TIME A DAY. TAKE WITH FOOD.     aspirin on hold due to epistaxis    ALLERGIES  No Known Allergies     SOCIAL HISTORY: . Used to work as a . Bowls in a league. Former smoker, quit in 2009, about 28 PY    FAMILY HISTORY: Father had leukemia. Mother had CVA. Sister had melanoma. Stepdad had kidney failure    REVIEW OF SYSTEMS  As above in the HPI, o/w complete 12-point ROS was negative.    PHYSICAL EXAM  BP (!) 141/90 (BP Location: Right arm, Patient Position: Sitting, Cuff Size: Adult Large)   Pulse 94   Temp 98.2  F (36.8  C) (Oral)   Ht 1.778 m (5' 10\")   Wt 135.4 kg (298 lb 9.6 oz)   SpO2 94%   BMI 42.84 kg/m    GEN: " NAD  HEENT: EOMI, no icterus, injection or pallor  NECK: easily palpable L cervical adenopathy  LUNGS: clear bilaterally  CV: regular, no murmurs, rubs, or gallops  ABDOMEN: soft, non-tender, non-distended, normal bowel sounds  EXT: warm, trace edema vs tight skin, symmetric bilaterally. No venous stasis changes  NEURO: alert    LABORATORY AND IMAGING STUDIES    Labs were independently reviewed and interpreted by me - from [facility] system 3/8/22  A1c 7.2  Na 140, K 4, CO2 31, BUN 19, Cr 0.81, Ca 9.4, glucose 155, albumin 3.4, alk phos 84, ALT 38, AST 32, bili 0.6  WBC 7.5, hgb 13.4, MCV 83.5, plt 231     Imaging was personally reviewed and interpreted by me - outside PET 3/4/22 and CT neck 12/9/21

## 2022-03-22 NOTE — LETTER
3/22/2022         RE: Jareth Gallardo  [address] [facility] [address] [place] [address]        Dear Colleague,    Thank you for referring your patient, Jareth Gallardo, to the [facility]. Please see a copy of my visit note below.       [facility]    PATIENT NAME: Jareth Gallardo  MRN # [id number]   DATE OF VISIT: March 22, 2022  YOB: 1960     Referring Provider: Dr. Jesus Boston  Radiation Oncology:  Primary Care Provider: Dr. Beni Gallardo at [facility] in [facility]    CANCER TYPE: Nasopharyngeal carcinoma  STAGE: [id number] (DENAE)  ECOG PS: 1    PD-L1: 20% using  clone llocally   NGS:     SUMMARY  12/11/21 UC for L neck mass, selling  12/9/21 CT neck ([place]). 2.4 cm L neck node medial to SCM just above the hyoid, additional LNs surround, scattered R neck and L supraclavicular LNs  2/15/22 US bx L cervical node. Path: SCC, moderately differentiated,   3/4/22 PET/CT. Nasopharyngeal and oropharyngeal mucosal thickening. 2.6 cm BERNARDO nodular GGO, SUV avid, some additional lingular uptake    ASSESSMENT AND PLAN  Nasopharyngeal carcinoma, EBV pending: We reviewed the diagnosis and imaging results, tumor board recommendation for induction chemo followed by chemoradiation. EBV status will be helpful, or at least knowing non keratinizing vs keratinizing, but either way, it's reasonable to consider cisplatin gemcitabine x 3 cycles followed by chemoradiation most likely with weekly cisplatin. We reviewed the schedule, logistics, and potential toxicities. I would like an audiology exam, TTE, and urine protein/creatinine ratio to help determine whether he's really a realistic candidate for cisplatin. If not, we'll switch to carboplatin. We discussed the differences between cisplatin and carboplatin. I don't think chemorads upfront followed by adjuvant chemo will be a good idea for multiple reasons. Needs a port due to poor venous access as it stands already. We  might try to do day 8 gemcitabine in [place], which is 1 1/2 hours away, whereas we're 3 1/2 hours away. But we'll see how that plays out. Anticipate restaging after 2-3 cycles of induction with CT neck and CT chest/abd vs PET/CT. MRI of the nasopharynx is pending. Will have 5 cavities filled next week and get trays.     Lung nodules: On the left. I'm trying to get an old CT abdomen pelvis from 2010 to see if they're there. They don't look like mets. He also has a modestly sized, minimally FDG avid 4R node and other small mediastinal nodes. The lung lesions is not easily biopsied but 4R would be. I think it's reasonable to just watch it for now and re-evaluate after induction    Chronic LE swelling: On daily furosemide, which we'd have to hold while on cisplatin, but that could be problematic with the volume needed. Would be another reason to go with carboplatin instead. Await TTE    DM2: Hasn't seen an eye doc for a long time. I asked him to do so locally. Will check urine pr/cr today. A1c earlier this march was 7.2.     Mild peripheral neuropathy: R foot numbness, very mild, from DM2. Monitor carefully for worsening if we go the cisplatin route.     Review of the result(s) of each unique test - CMP, CBC pd, A1c  Independent interpretation of a test performed by another physician/other qualified health care professional (not separately reported) - PET and CT neck     90 minutes spent on the date of the encounter doing chart review, history and exam, documentation and further activities per the note     Dariela Melvin MD  Associate Professor of Medicine  Hematology, Oncology and Transplantation      SHAHANA Templeton is a 60 yo male who presents today for nasopharyngeal carcinoma  Feels good.  Appetite good, no weight loss  Having some nosebleeds, including bleeding from the R nostril, which is newer. Mostly from the L.  No trouble swallowing  Breathing ok  A little sore throat  occasionally  Discomfort/sensation that he knows the tumors are there when turning. Notices it when trying to sleep and has to change positions frequently.  Eyes watery and occasionally blurry vision in the last 5 years. No eye exams  Twice daily BMs, soft from metformin  5 cavities - will be filled next week 12/30, will  trays  Has chronic leg swelling, intermittently worse, relieved with lasix, elevation, usually better in the AM, exacerbated by standing on concrete.  No upset stomach  Has chronic rhinorrhea when exposed to the wood dust at work    PAST MEDICAL HISTORY  Nasopharyngeal carcinoma as above  DM2  COPD??  Hiatal hernia. Sleeps with his head elevated  LVH on TTE 2009, EF50%, LA Dilation  Dyslipidemia  BARRETT in 2015. Not using CPAP due to developing tooth abscesses when he tried it in 2015  Venous insufficiency in both legs being in an accident involving a mower about 10 years ago, on chronic furosemide. LE US negative 12/15/2009  Cholelithiasis  Hyperplastic colon polyp, due 2020  Tonsillectomy  Baker cyst RLE US 2013    Neuropathy from DM - R foot numbness chronically    Exposed to lots of wood dust in his occupation    CURRENT OUTPATIENT MEDICATIONS  Current Outpatient Medications   Medication Sig Dispense Refill     atorvastatin (LIPITOR) 20 MG tablet Take 20 mg by mouth       fluticasone-vilanterol (BREO ELLIPTA) 100-25 MCG/INH inhaler INHALE 1 PUFF INTO THE LUNGS ONE TIME A DAY. RINSE MOUTH AFTER USE.       furosemide (LASIX) 20 MG tablet Take 40 mg by mouth       metFORMIN (GLUCOPHAGE) 1000 MG tablet TAKE 1 TABLET BY MOUTH ONE TIME A DAY. TAKE WITH FOOD.     aspirin on hold due to epistaxis    ALLERGIES  No Known Allergies     SOCIAL HISTORY: . Used to work as a . Bowls in a league. Former smoker, quit in 2009, about 28 PY    FAMILY HISTORY: Father had leukemia. Mother had CVA. Sister had melanoma. Stepdad had kidney failure    REVIEW OF SYSTEMS  As above in the HPI,  "o/w complete 12-point ROS was negative.    PHYSICAL EXAM  BP (!) 141/90 (BP Location: Right arm, Patient Position: Sitting, Cuff Size: Adult Large)   Pulse 94   Temp 98.2  F (36.8  C) (Oral)   Ht 1.778 m (5' 10\")   Wt 135.4 kg (298 lb 9.6 oz)   SpO2 94%   BMI 42.84 kg/m    GEN: NAD  HEENT: EOMI, no icterus, injection or pallor  NECK: easily palpable L cervical adenopathy  LUNGS: clear bilaterally  CV: regular, no murmurs, rubs, or gallops  ABDOMEN: soft, non-tender, non-distended, normal bowel sounds  EXT: warm, trace edema vs tight skin, symmetric bilaterally. No venous stasis changes  NEURO: alert    LABORATORY AND IMAGING STUDIES    Labs were independently reviewed and interpreted by me - from [facility] system 3/8/22  A1c 7.2  Na 140, K 4, CO2 31, BUN 19, Cr 0.81, Ca 9.4, glucose 155, albumin 3.4, alk phos 84, ALT 38, AST 32, bili 0.6  WBC 7.5, hgb 13.4, MCV 83.5, plt 231     Imaging was personally reviewed and interpreted by me - outside PET 3/4/22 and CT neck 12/9/21         Again, thank you for allowing me to participate in the care of your patient.      Sincerely,    Dariela Melvin MD    "

## 2022-03-22 NOTE — NURSING NOTE
"Oncology Rooming Note    March 22, 2022 1:21 PM   Jareth Gallardo is a 61 year old male who presents for:    Chief Complaint   Patient presents with     Oncology Clinic Visit     Nasopharyngeal cancer      Initial Vitals: BP (!) 141/90 (BP Location: Right arm, Patient Position: Sitting, Cuff Size: Adult Large)   Pulse 94   Temp 98.2  F (36.8  C) (Oral)   Ht 1.778 m (5' 10\")   Wt 135.4 kg (298 lb 9.6 oz)   SpO2 94%   BMI 42.84 kg/m   Estimated body mass index is 42.84 kg/m  as calculated from the following:    Height as of this encounter: 1.778 m (5' 10\").    Weight as of this encounter: 135.4 kg (298 lb 9.6 oz). Body surface area is 2.59 meters squared.  No Pain (0) Comment: Data Unavailable   No LMP for male patient.  Allergies reviewed: Yes  Medications reviewed: Yes    Medications: Medication refills not needed today.  Pharmacy name entered into [facility]MAL[facility]: [id number] [place] [facility] [address] [facility]    Clinical concerns: None     Monty Cadena            "

## 2022-03-23 ENCOUNTER — TELEPHONE (OUTPATIENT)
Dept: ONCOLOGY | Facility: CLINIC | Age: 62
End: 2022-03-23
Payer: COMMERCIAL

## 2022-03-23 DIAGNOSIS — Z11.59 ENCOUNTER FOR SCREENING FOR OTHER VIRAL DISEASES: Primary | ICD-10-CM

## 2022-03-23 NOTE — TELEPHONE ENCOUNTER
FMLA forms received via pt hand off from [employer]      Forms to be completed and put in folder for provider to approve.    Fax #:  NO FAX Provided  Claim:     Florida Livingston MA

## 2022-03-24 LAB
PATH REPORT.ADDENDUM SPEC: ABNORMAL
PATH REPORT.COMMENTS IMP SPEC: ABNORMAL
PATH REPORT.COMMENTS IMP SPEC: ABNORMAL
PATH REPORT.COMMENTS IMP SPEC: YES
PATH REPORT.FINAL DX SPEC: ABNORMAL
PATH REPORT.GROSS SPEC: ABNORMAL
PATH REPORT.MICROSCOPIC SPEC OTHER STN: ABNORMAL
PATH REPORT.RELEVANT HX SPEC: ABNORMAL
PATH REPORT.RELEVANT HX SPEC: ABNORMAL
PATH REPORT.SITE OF ORIGIN SPEC: ABNORMAL

## 2022-03-24 PROCEDURE — 88365 INSITU HYBRIDIZATION (FISH): CPT | Mod: TC | Performed by: OTOLARYNGOLOGY

## 2022-03-24 PROCEDURE — 88365 INSITU HYBRIDIZATION (FISH): CPT | Mod: 26 | Performed by: PATHOLOGY

## 2022-03-24 NOTE — TELEPHONE ENCOUNTER
FMLA forms filled out and put in providers folder for review and signature.      Izabel Mcduffie CMA ([facility])

## 2022-03-25 NOTE — CONFIDENTIAL NOTE
YIN paperwork completed, checked for accuracy, signed and emailed to pt @ [email or web address]. A copy was made, sent to scanning and original mailed to patient at home address.      Izabel Mcduffie CMA

## 2022-03-28 ENCOUNTER — DOCUMENTATION ONLY (OUTPATIENT)
Dept: ONCOLOGY | Facility: CLINIC | Age: 62
End: 2022-03-28
Payer: COMMERCIAL

## 2022-03-28 NOTE — NURSING NOTE
There was a message in the TRIAGE EMAIL to have forms faxed to [phone] from Shelley.(wife)        I have faxed and have confirmation.     Florida Livingston MA

## 2022-03-29 NOTE — PROGRESS NOTES
Attending addendum:   I saw and examined the patient with the resident and agree with the documented plan of care.    Mr. Gallardo is a 61 year old gentleman with a newly diagnosed locally advanced nasopharyngeal cancer. He is scheduled for a MRI later today for completion of his staging workup. His prior PET study, though, does demonstrate fairly extensive adenopathy involving the bilateral retropharyngeals, left levels 2, 3 and 5 and right level 2 along with a small groundglass FDG avid lesion within the left lung. His case has previously been discussed at our interdisciplinary head and neck tumor board with the consensus recommendation to proceed with induction chemotherapy followed by definitive chemoradiation.    I briefly discussed a radiation treatment course consisting of 70 Gy/35 fractions to the primary and leny disease with elective coverage of the at risk mucosal subsites and lymphatic regions. I reviewed the anticipated acute and late-term toxicities associated with therapy of which Mr. Gallardo had several pertinent questions that were answered to his stated satisfaction. I will plan to see him back in clinic in coordination with his post-induction chemotherapy restaging imaging in order to set up his subsequent radiotherapy treatment course.     Mane Razo MD/PhD    Dept of Radiation Oncology  [facility]           Department of Radiation Oncology  [address] [address]  [phone]       Consultation Note    Name: Jareth Gallardo MRN: [id number]   : 1960   Date of Service: 2022 Referring: Dr. Jesus Boston / head and neck surgery     Reason for consultation: Nasopharyngeal carcinoma    History of Present Illness   Mr. Gallardo is a 61 year old man with recently diagnosed nasopharyngeal carcinoma, [id number] (DENAE), presenting for discussion of management options.  He initially was  worked up outside [facility].    He presented to an urgent care on 12/9/2021 with worsening left-sided neck pain and swelling. CT neck demonstrated extensive left-sided adenopathy and prominence of the adenoids and left palatine tonsil. 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 Subsequent EBV testing at [facility] by FISH was positive. Outside PET/CT on 3/4/2022 demonstrated soft tissue thickening within the nasopharyngeal and oropharyngeal mucosa concerning for nasopharyngeal carcinoma. There are was also nodular soft tissue uptake within the prevertebral soft tissues at the level of the epiglottis as well. He was also noted to have bilateral hypermetabolic cervical lymphadenopathy. Additionally, there was FDG avid nodular uptake within the left upper lobe measuring up to 2.6 cm and confluent nodularity measuring 2.3 cm in the lingula as well. There was a small fracture of the right sixth rib with focal FDG uptake.    He was referred to [facility] and was seen by Dr. Boston in head and neck surgery on 3/9/2022. On examination, he was noted to have 2 palpable left level 2 lymph nodes, a palpable left level 3 lymph node, and diffuse secretions and thickening in the nasopharynx. Visualization was poor due to heavy secretions. His case was discussed at multidisciplinary tumor board with the recommendation for induction chemotherapy followed by definitive chemoradiation. He was seen by Dr. Melvin in medical oncology on 3/22/2022, who recommended starting with cisplatin and gemcitabine.    He presents today for a discussion of radiotherapy. On interview, he reports having secretions for most of his life associated with exposures at his job. 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 However, in the last month he has had more  bloody secretions. Additionally, he has had about 4 months of left sided cervical lymphadenopathy, which has not changed recently. He has chronic tinnitus and decreased hearing on the right greater than left. He acute changes in vision, hearing, speech, swallowing, facial movements, or other neurologic deficits.    Past Medical History:    Nasopharyngeal carcinoma as per HPI    Hyperlipidemia    Hypertension    Diabetes mellitus    Chronic sinus issues as per HPI    Hiatal hernia    BARRETT    Cholelithiasis    Past Surgical History:    Removal of cyst near right knee    Tonsillectomy    Chemotherapy History:  None    Radiation History:  None    Pregnant: Not Applicable  Implanted Cardiac Devices: No    Medications:    Aspirin    Atorvastatin     Breo Ellipta inhaler    Furosemide    Metformin    Allergies:    No known drug allergies    Social History:  Tobacco: Former smoker, 20 pack years quit in 2009  Alcohol: Occasional alcohol use  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 Pertinent findings are noted in the HPI.    Physical Exam   ECOG Status: 0    BP (!) 151/114   Pulse 78   Wt 135.2 kg (298 lb)   BMI 42.76 kg/m      Gen: Alert, in NAD  Head: NC/AT  Eyes: PERRL, EOMI, sclera anicteric  Ears: No external auricular lesions  Nose/sinus: Rhinorrhea with some clear secretions  Oral cavity/oropharynx: MMM, no visible oral cavity lesions  Neck: Two firm and fixed 2-3 cm level 2 lymph nodes in the left neck  Neuro: A/Ox3, motor grossly intact, normal gait  Cranial Nerve Exam    I: Not tested  II: Not tested  III/IV/VI: PERRL. EOMI.   V: Mild dullness in left V2 distribution  VII: No facial weakness or asymmetry.   VIII: Hearing is grossly intact and symmetric.  IX/X: Palate elevates symmetrically. Normal  phonation.  XI: Strength is 5/5 in bilateral trapezius musculature.  XII: Tongue protrudes in the midline. No atrophy or fasciculations.      Flexible Fiberoptic Nasopharyngoscopy:  Consent for fiberoptic laryngoscopy was obtained and we confirmed correctness of procedure and identity of patient. Fiberoptic laryngoscopy was indicated due to staging and evaluation of disease. The nose was topically decongested and anesthetized. The fiberoptic laryngoscope was passed through the right naris under endoscopic vision. The turbinates were normal. The inferior and middle meati were clear without purulence, masses, or polyps. Exophytic tumor obscuring left fossa of Rosenmuller with fullness and apparent involvement of the posterior wall of the nasopharynx extending across midline and inferiorly into the soft palate. The epiglottis was sharp and the visualized portion of the vallecula was clear. The larynx was clear with mobile cords. The arytenoids were clear and there was no pooling in the hypopharynx. The scope was then removed and the procedure was terminated without incident.     Imaging/Path/Labs   Imaging:   Outside PET/CT 3/4/2022  IMPRESSION:   1.  Exuberant soft tissue thickening within the nasopharyngeal and oropharyngeal mucosa concerning for nasopharyngeal carcinoma. The soft tissue nodular uptake is evident within the prevertebral soft tissues and about the pharyngeal mucosa at the epiglottis and with extensive neck lymph node uptake.   2.  Uptake within the lung parenchyma concerning for metastatic uptake or primary malignancy. Infectious or inflammatory not excluded.   3.  Uptake within the right 6th rib is likely posttraumatic but could be followed with CT.    Review of outside CT CAP from 2/8/2010 does not appear to demonstrate the lung parenchymal abnormalities seen on recent PET/CT    Path:   Reviewed outside pathology from 2/15/2022 of left neck core needle biopsy demonstrating moderately differentiated  squamous cell carcinoma.  PD-L1 staining 20% positive    Labs:   Reviewed    Assessment    Mr. Gallardo is a 61 year old male with nasopharyngeal carcinoma, cT2 N3 Mx (DENAE), presenting for discussion of management options. PET/CT demonstrates hypermetabolic nodules in the lungs consistent with possible metastatic disease, primary lung cancer, or infectious/inflammatory. Per review and discussion, we will plan to observe these with short interval surveillance imaging given the difficulty in obtaining a biopsy of these lesions.    We discussed his diagnosis and the management of nasopharyngeal carcinoma. We recommended induction chemotherapy followed by definitive chemoradiotherapy. We reviewed the risks, benefits, and logistics of definitive radiotherapy to the nasopharynx and bilateral necks. His questions were answered to his stated satisfaction. Informed consent was obtained, though repeat consent may need to be provided depending on the timing of radiotherapy.    Plan   Induction chemotherapy as per medical oncology, likely 2-3 cycles depending on patient tolerance and response.  We will obtain restaging MRI and CT simulation for radiotherapy planning after induction chemotherapy, tentatively planning 7000 cGy in 35 fractions.    The patient was seen and assessed with staff, Dr. Razo.    Jhonatan Adame MD  PGY-5 Radiation Oncology  Clinic: [phone]  Pager: [phone]

## 2022-03-30 ENCOUNTER — ANESTHESIA EVENT (OUTPATIENT)
Dept: SURGERY | Facility: AMBULATORY SURGERY CENTER | Age: 62
End: 2022-03-30
Payer: COMMERCIAL

## 2022-03-31 ENCOUNTER — OFFICE VISIT (OUTPATIENT)
Dept: AUDIOLOGY | Facility: CLINIC | Age: 62
End: 2022-03-31
Payer: COMMERCIAL

## 2022-03-31 ENCOUNTER — ANESTHESIA (OUTPATIENT)
Dept: SURGERY | Facility: AMBULATORY SURGERY CENTER | Age: 62
End: 2022-03-31
Payer: COMMERCIAL

## 2022-03-31 ENCOUNTER — HOSPITAL ENCOUNTER (OUTPATIENT)
Facility: AMBULATORY SURGERY CENTER | Age: 62
Discharge: HOME | End: 2022-03-31
Attending: RADIOLOGY
Payer: COMMERCIAL

## 2022-03-31 ENCOUNTER — ANCILLARY PROCEDURE (OUTPATIENT)
Dept: RADIOLOGY | Facility: AMBULATORY SURGERY CENTER | Age: 62
End: 2022-03-31
Attending: INTERNAL MEDICINE
Payer: COMMERCIAL

## 2022-03-31 VITALS
WEIGHT: 298 LBS | HEART RATE: 91 BPM | BODY MASS INDEX: 42.66 KG/M2 | RESPIRATION RATE: 20 BRPM | HEIGHT: 70 IN | SYSTOLIC BLOOD PRESSURE: 110 MMHG | DIASTOLIC BLOOD PRESSURE: 75 MMHG | TEMPERATURE: 97 F | OXYGEN SATURATION: 95 %

## 2022-03-31 DIAGNOSIS — C11.9 NASOPHARYNGEAL CANCER (H): ICD-10-CM

## 2022-03-31 DIAGNOSIS — H90.3 SENSORINEURAL HEARING LOSS, BILATERAL: Primary | ICD-10-CM

## 2022-03-31 DIAGNOSIS — H90.8 MIXED CONDUCTIVE AND SENSORINEURAL HEARING LOSS, UNSPECIFIED LATERALITY: ICD-10-CM

## 2022-03-31 LAB
GLUCOSE BLDC GLUCOMTR-MCNC: 132 MG/DL (ref 70–99)
INR PPP: 1.1 (ref 0.85–1.15)
PLATELET # BLD AUTO: 246 10E3/UL (ref 150–450)

## 2022-03-31 PROCEDURE — 36561 INSERT TUNNELED CV CATH: CPT | Mod: RT | Performed by: RADIOLOGY

## 2022-03-31 PROCEDURE — 77001 FLUOROGUIDE FOR VEIN DEVICE: CPT | Mod: 26 | Performed by: RADIOLOGY

## 2022-03-31 PROCEDURE — 92557 COMPREHENSIVE HEARING TEST: CPT | Performed by: AUDIOLOGIST

## 2022-03-31 PROCEDURE — 76937 US GUIDE VASCULAR ACCESS: CPT | Mod: 26 | Performed by: RADIOLOGY

## 2022-03-31 PROCEDURE — 82962 GLUCOSE BLOOD TEST: CPT | Mod: 90 | Performed by: PATHOLOGY

## 2022-03-31 PROCEDURE — 92588 EVOKED AUDITORY TST COMPLETE: CPT | Performed by: AUDIOLOGIST

## 2022-03-31 PROCEDURE — 92550 TYMPANOMETRY & REFLEX THRESH: CPT | Performed by: AUDIOLOGIST

## 2022-03-31 PROCEDURE — 99000 SPECIMEN HANDLING OFFICE-LAB: CPT | Performed by: PATHOLOGY

## 2022-03-31 DEVICE — CATH PORT POWERPORT CLEARVUE SLIM 8FR 5618000: Type: IMPLANTABLE DEVICE | Site: CHEST | Status: FUNCTIONAL

## 2022-03-31 RX ORDER — NICOTINE POLACRILEX 4 MG
15-30 LOZENGE BUCCAL
Status: DISCONTINUED | OUTPATIENT
Start: 2022-03-31 | End: 2022-04-01 | Stop reason: HOSPADM

## 2022-03-31 RX ORDER — LIDOCAINE HYDROCHLORIDE 10 MG/ML
INJECTION, SOLUTION EPIDURAL; INFILTRATION; INTRACAUDAL; PERINEURAL PRN
Status: DISCONTINUED | OUTPATIENT
Start: 2022-03-31 | End: 2022-03-31 | Stop reason: HOSPADM

## 2022-03-31 RX ORDER — ACETAMINOPHEN 325 MG/1
975 TABLET ORAL ONCE
Status: COMPLETED | OUTPATIENT
Start: 2022-03-31 | End: 2022-03-31

## 2022-03-31 RX ORDER — OXYCODONE HYDROCHLORIDE 5 MG/1
5 TABLET ORAL EVERY 4 HOURS PRN
Status: DISCONTINUED | OUTPATIENT
Start: 2022-03-31 | End: 2022-04-01 | Stop reason: HOSPADM

## 2022-03-31 RX ORDER — SODIUM CHLORIDE, SODIUM LACTATE, POTASSIUM CHLORIDE, CALCIUM CHLORIDE 600; 310; 30; 20 MG/100ML; MG/100ML; MG/100ML; MG/100ML
INJECTION, SOLUTION INTRAVENOUS CONTINUOUS
Status: DISCONTINUED | OUTPATIENT
Start: 2022-03-31 | End: 2022-04-01 | Stop reason: HOSPADM

## 2022-03-31 RX ORDER — ONDANSETRON 4 MG/1
4 TABLET, ORALLY DISINTEGRATING ORAL EVERY 30 MIN PRN
Status: DISCONTINUED | OUTPATIENT
Start: 2022-03-31 | End: 2022-04-01 | Stop reason: HOSPADM

## 2022-03-31 RX ORDER — DEXTROSE MONOHYDRATE 25 G/50ML
25-50 INJECTION, SOLUTION INTRAVENOUS
Status: DISCONTINUED | OUTPATIENT
Start: 2022-03-31 | End: 2022-04-01 | Stop reason: HOSPADM

## 2022-03-31 RX ORDER — HEPARIN SODIUM,PORCINE 10 UNIT/ML
5-10 VIAL (ML) INTRAVENOUS EVERY 24 HOURS
Status: DISCONTINUED | OUTPATIENT
Start: 2022-03-31 | End: 2022-04-01 | Stop reason: HOSPADM

## 2022-03-31 RX ORDER — MEPERIDINE HYDROCHLORIDE 25 MG/ML
12.5 INJECTION INTRAMUSCULAR; INTRAVENOUS; SUBCUTANEOUS
Status: DISCONTINUED | OUTPATIENT
Start: 2022-03-31 | End: 2022-04-01 | Stop reason: HOSPADM

## 2022-03-31 RX ORDER — HEPARIN SODIUM,PORCINE 10 UNIT/ML
5-10 VIAL (ML) INTRAVENOUS
Status: DISCONTINUED | OUTPATIENT
Start: 2022-03-31 | End: 2022-04-01 | Stop reason: HOSPADM

## 2022-03-31 RX ORDER — LIDOCAINE HYDROCHLORIDE 20 MG/ML
INJECTION, SOLUTION INFILTRATION; PERINEURAL PRN
Status: DISCONTINUED | OUTPATIENT
Start: 2022-03-31 | End: 2022-03-31

## 2022-03-31 RX ORDER — FENTANYL CITRATE 50 UG/ML
25 INJECTION, SOLUTION INTRAMUSCULAR; INTRAVENOUS EVERY 5 MIN PRN
Status: DISCONTINUED | OUTPATIENT
Start: 2022-03-31 | End: 2022-04-01 | Stop reason: HOSPADM

## 2022-03-31 RX ORDER — PROPOFOL 10 MG/ML
INJECTION, EMULSION INTRAVENOUS CONTINUOUS PRN
Status: DISCONTINUED | OUTPATIENT
Start: 2022-03-31 | End: 2022-03-31

## 2022-03-31 RX ORDER — SODIUM CHLORIDE 9 MG/ML
INJECTION, SOLUTION INTRAVENOUS CONTINUOUS
Status: DISCONTINUED | OUTPATIENT
Start: 2022-03-31 | End: 2022-04-01 | Stop reason: HOSPADM

## 2022-03-31 RX ORDER — HYDROMORPHONE HYDROCHLORIDE 1 MG/ML
0.2 INJECTION, SOLUTION INTRAMUSCULAR; INTRAVENOUS; SUBCUTANEOUS EVERY 5 MIN PRN
Status: DISCONTINUED | OUTPATIENT
Start: 2022-03-31 | End: 2022-04-01 | Stop reason: HOSPADM

## 2022-03-31 RX ORDER — ONDANSETRON 2 MG/ML
4 INJECTION INTRAMUSCULAR; INTRAVENOUS EVERY 30 MIN PRN
Status: DISCONTINUED | OUTPATIENT
Start: 2022-03-31 | End: 2022-04-01 | Stop reason: HOSPADM

## 2022-03-31 RX ORDER — FENTANYL CITRATE 50 UG/ML
25 INJECTION, SOLUTION INTRAMUSCULAR; INTRAVENOUS
Status: DISCONTINUED | OUTPATIENT
Start: 2022-03-31 | End: 2022-04-01 | Stop reason: HOSPADM

## 2022-03-31 RX ORDER — LIDOCAINE 40 MG/G
CREAM TOPICAL
Status: DISCONTINUED | OUTPATIENT
Start: 2022-03-31 | End: 2022-04-01 | Stop reason: HOSPADM

## 2022-03-31 RX ORDER — HEPARIN SODIUM (PORCINE) LOCK FLUSH IV SOLN 100 UNIT/ML 100 UNIT/ML
5-10 SOLUTION INTRAVENOUS
Status: DISCONTINUED | OUTPATIENT
Start: 2022-03-31 | End: 2022-04-01 | Stop reason: HOSPADM

## 2022-03-31 RX ORDER — CEFAZOLIN SODIUM IN 0.9 % NACL 3 G/100 ML
3 INTRAVENOUS SOLUTION, PIGGYBACK (ML) INTRAVENOUS
Status: COMPLETED | OUTPATIENT
Start: 2022-03-31 | End: 2022-03-31

## 2022-03-31 RX ADMIN — PROPOFOL 150 MCG/KG/MIN: 10 INJECTION, EMULSION INTRAVENOUS at 09:01

## 2022-03-31 RX ADMIN — LIDOCAINE HYDROCHLORIDE 70 MG: 20 INJECTION, SOLUTION INFILTRATION; PERINEURAL at 09:00

## 2022-03-31 RX ADMIN — Medication 3 G: at 08:55

## 2022-03-31 RX ADMIN — ACETAMINOPHEN 975 MG: 325 TABLET ORAL at 08:01

## 2022-03-31 RX ADMIN — SODIUM CHLORIDE, SODIUM LACTATE, POTASSIUM CHLORIDE, CALCIUM CHLORIDE: 600; 310; 30; 20 INJECTION, SOLUTION INTRAVENOUS at 08:18

## 2022-03-31 ASSESSMENT — COPD QUESTIONNAIRES
CAT_SEVERITY: MILD
COPD: 1

## 2022-03-31 NOTE — ANESTHESIA CARE TRANSFER NOTE
Patient: Jareth Gallardo    Procedure: Procedure(s):  SINGLE LUMEN POWER PORT INSERTION, VASCULAR ACCESS       Diagnosis: Malignant neoplasm of nasopharyngeal wall (H) [C11.9]  Diagnosis Additional Information: No value filed.    Anesthesia Type:   MAC     Note:    Oropharynx: spontaneously breathing  Level of Consciousness: awake  Oxygen Supplementation: room air    Independent Airway: airway patency satisfactory and stable  Dentition: dentition unchanged  Vital Signs Stable: post-procedure vital signs reviewed and stable    Patient transferred to: Phase II    Handoff Report: Identifed the Patient, Identified the Reponsible Provider, Reviewed the pertinent medical history, Discussed the surgical course, Reviewed Intra-OP anesthesia mangement and issues during anesthesia, Set expectations for post-procedure period and Allowed opportunity for questions and acknowledgement of understanding      Vitals:  Vitals Value Taken Time   BP     Temp 36.4  C (97.6  F) 03/31/22 0941   Pulse     Resp 20 03/31/22 0941   SpO2 96 % 03/31/22 0941       Electronically Signed By: SILVIANO Ceron CRNA  March 31, 2022  9:47 AM

## 2022-03-31 NOTE — ANESTHESIA POSTPROCEDURE EVALUATION
Patient: Jareth Gallardo    Procedure: Procedure(s):  SINGLE LUMEN POWER PORT INSERTION, VASCULAR ACCESS       Anesthesia Type:  MAC    Note:  Disposition: Outpatient   Postop Pain Control: Uneventful            Sign Out: Well controlled pain   PONV: No   Neuro/Psych: Uneventful            Sign Out: Acceptable/Baseline neuro status   Airway/Respiratory: Uneventful            Sign Out: Acceptable/Baseline resp. status   CV/Hemodynamics: Uneventful            Sign Out: Acceptable CV status; No obvious hypovolemia; No obvious fluid overload   Other NRE: NONE   DID A NON-ROUTINE EVENT OCCUR? No           Last vitals:  Vitals Value Taken Time   /75 03/31/22 1005   Temp 36.1  C (97  F) 03/31/22 1005   Pulse     Resp 20 03/31/22 1005   SpO2 95 % 03/31/22 1005       Electronically Signed By: HALEIGH TRAN MD  March 31, 2022  11:29 AM

## 2022-03-31 NOTE — PROGRESS NOTES
AUDIOLOGY REPORT    SUBJECTIVE: Jareth Gallardo is a 61 year old male who was seen in the Audiology Clinic at [facility] for audiologic evaluation, referred by Dariela Melvin M.D. History is significant for nasopharyngeal carcinoma. The patient is scheduled to start chemotherapy treatment, including cisplatin, next week. Today's audiologic evaluation will serve as a baseline. The patient reports bilateral hearing loss and bilateral tinnitus. 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 He denies bilateral pain, bilateral drainage, dizziness, or a history of ear surgeries.     OBJECTIVE:  Abuse Screening:  Do you feel unsafe at home or work/school? No  Do you feel threatened by someone? No  Does anyone try to keep you from having contact with others, or doing things outside of your home? No  Physical signs of abuse present? No     Fall Risk Screening:  Have you fallen two or more times in the past year? No  Have you fallen and had an injury in the past year? No    Otoscopic exam indicated ears are clear of cerumen bilaterally     Pure Tone Thresholds assessed using conventional audiometry with good reliability from 250-8,000 Hz bilaterally using insert earphones and circumaural headphones     RIGHT:  Normal hearing sloping to moderately severe rising to moderate primarily sensorineural hearing loss    LEFT:   Normal hearing sloping to moderate primarily sensorineural hearing loss    High frequency audiometry from 9,000-20,000 Hz was performed and thresholds were poorer than age-matched normative data at all frequencies bilaterally    Distortion product otoacoustic emissions were performed from 1,500-10,000 Hz and were absent bilaterally    Tympanogram:    RIGHT: Normal eardrum mobility    LEFT:   Normal eardrum mobility    Reflexes (reported by stimulus ear):    RIGHT: Ipsilateral is present at normal  levels    RIGHT: Contralateral is present at normal levels    LEFT:   Ipsilateral is present at normal levels    LEFT:   Contralateral is present at normal levels    Speech Reception Threshold:    RIGHT: 15 dB HL    LEFT:   15 dB HL    Word Recognition Score:     RIGHT: 76% at 80 dB HL using NU-6 recorded word list                 96% at 90 dB HL using NU-6 recorded word list    LEFT:   96% at 80 dB HL using NU-6 recorded word list    ASSESSMENT: Bilateral sensorineural hearing loss. Today's audiologic evaluation will serve as a baseline prior to chemotherapy treatment. Today s results were discussed with the patient in detail.     PLAN: The patient was counseled regarding hearing loss and impact on communication. He may consider a trial of hearing aids in the future. A Pocket Talker may be beneficial, especially during medical appointments/treatments. The patient will return for audiologic evaluation based on physician protocol and recommendation.  Please call this clinic with questions regarding these results or recommendations.      Luis Dawson, [facility]  Licensed Audiologist  [place] #[id number]      CC: Dariela Melvin M.D.

## 2022-03-31 NOTE — BRIEF OP NOTE
[facility]    Brief Operative Note    Pre-operative diagnosis: Malignant neoplasm of nasopharyngeal wall (H) [C11.9]  Post-operative diagnosis Same as pre-operative diagnosis    Procedure: Procedure(s):  SINGLE LUMEN POWER PORT INSERTION, VASCULAR ACCESS  Surgeon: Surgeon(s) and Role:     * Evens Aponte MD - Primary  Anesthesia: Monitor Anesthesia Care   Estimated Blood Loss: Minimal    Drains: None  Specimens: * No specimens in log *  Findings:   8F x 25 cm single lumen right internal jugular vein port, with tin at atriocaval junction. Hep locked with 500 units heparin, ready for use..  Complications: None.  Implants:   Implant Name Type Inv. Item Serial No.  Lot No. LRB No. Used Action   CATH PORT POWERPORT CLEARVUE SLIM 8FR [id number] - [id number] Port CATH PORT POWERPORT CLEARVUE SLIM 8FR [id number]   [employer] [id number] Right 1 Implanted

## 2022-03-31 NOTE — ANESTHESIA PREPROCEDURE EVALUATION
Anesthesia Pre-Procedure Evaluation    Patient: Jareth Gallardo   MRN: [id number] : 1960        Procedure : Procedure(s):  SINGLE LUMEN POWER PORT INSERTION, VASCULAR ACCESS          History reviewed. No pertinent past medical history.   History reviewed. No pertinent surgical history.   No Known Allergies   Social History     Tobacco Use     Smoking status: Former Smoker     Packs/day: 4.00     Years: 30.00     Pack years: 120.00     Types: Cigarettes     Quit date:      Years since quitting: 10.2     Smokeless tobacco: Never Used     Tobacco comment: quit smoking 10 years ago   Substance Use Topics     Alcohol use: Never      Wt Readings from Last 1 Encounters:   22 135.2 kg (298 lb)        Anesthesia Evaluation            ROS/MED HX  ENT/Pulmonary:     (+) sleep apnea, mild,  COPD (used his inhaler this morning and feels better than usual),     Neurologic:       Cardiovascular:     (+) Dyslipidemia -----    METS/Exercise Tolerance:     Hematologic:       Musculoskeletal:       GI/Hepatic:     (+) hiatal hernia,     Renal/Genitourinary:       Endo:     (+) type II DM,     Psychiatric/Substance Use:       Infectious Disease:       Malignancy: Comment: Nasopharyngeal cancer  (+) Malignancy,     Other:            Physical Exam    Airway        Mallampati: II   TM distance: > 3 FB   Neck ROM: full   Mouth opening: > 3 cm    Respiratory Devices and Support         Dental         B=Bridge, C=Chipped, L=Loose, M=Missing    Cardiovascular   cardiovascular exam normal          Pulmonary           (+) wheezes           OUTSIDE LABS:  CBC: No results found for: WBC, HGB, HCT, PLT  BMP: No results found for: NA, POTASSIUM, CHLORIDE, CO2, BUN, CR, GLC  COAGS: No results found for: PTT, INR, FIBR  POC: No results found for: BGM, HCG, HCGS  HEPATIC: No results found for: ALBUMIN, PROTTOTAL, ALT, AST, GGT, ALKPHOS, BILITOTAL, BILIDIRECT, KIAH  OTHER: No results found for: PH, LACT, A1C, LAI, PHOS, MAG,  LIPASE, AMYLASE, TSH, T4, T3, CRP, SED    Anesthesia Plan    ASA Status:  3   NPO Status:  NPO Appropriate    Anesthesia Type: MAC.     - Reason for MAC: immobility needed, straight local not clinically adequate, chronic cardiopulmonary disease   Induction: Intravenous.   Maintenance: TIVA.        Consents    Anesthesia Plan(s) and associated risks, benefits, and realistic alternatives discussed. Questions answered and patient/representative(s) expressed understanding.    - Discussed:     - Discussed with:  Patient         Postoperative Care    Pain management: IV analgesics, Multi-modal analgesia.   PONV prophylaxis: Background Propofol Infusion, Dexamethasone or Solumedrol, Ondansetron (or other 5HT-3)     Comments:                HALEIGH TRAN MD

## 2022-03-31 NOTE — DISCHARGE INSTRUCTIONS
A collaboration between [facility] and [facility]  Experts in minimally invasive, targeted treatments performed using imaging guidance    Venous Access Device,  Port Catheter or Tunneled or Non-Tunneled Central Line Placement    Today you had a procedure today to install a venous access device; either a tunneled central vein catheter or a subcutaneous port catheter.    After you go home:  - Drink plenty of fluids.  Generally 6-8 (8 ounce) glasses a day is recommended.  - Resume your regular diet unless otherwise ordered by a medical provider.  - Keep any applied tape/gauze dressings clean and dry.  Change tape/gauze dressings if they get wet or soiled.  - You may shower the following day after procedure, however cover and protect from moisture any tape/gauze dressings.  You may let water hit and run over dried skin glue, but do not scrub.  Pat the area dry after showering.  - Port placement incisions are closed with absorbable suture, meaning they do not need to be removed at a later date, and a topical skin adhesive (skin glue).  This glue will wear off in 7-14 days.  Do not remove before this time.  If 14 days have passed and residual glue is present, you may gently remove it.  - Do not apply gels, lotions, or ointments to the glue site for the first 10 days as this may cause the glue to prematurely soften and fail.  - Do not perform strenuous activities or lift greater than 10 pounds for the next three days.  - If there is bleeding or oozing from the procedure site, apply firm pressure to the area for 5-10 minutes.  If the bleeding continues seek medical advice at the numbers below.  - Mild procedure site discomfort can be treated with an ice pack and over-the-counter pain relievers.        For 24 hours after any sedation used:  - Relax and take it easy.  No strenuous activities.  - Do not drive or operate machines at home or at work.  - No alcohol  consumption.  - Do not make any important or legal decisions.    Call our Interventional Radiology (IR) service if:  - If you start bleeding from the procedure site.  If you do start to bleed from the site, lie down and hold some pressure on the site.  Our radiology provider can help you decide if you need to return to the hospital.  - If you have new or worsening pain related to the procedure.  - If you have concerning swelling at the procedure site.  - If you develop persistent nausea or vomiting.  - If you develop hives or a rash or any unexplained itching.  - If you have a fever of greater than 100.5  F and chills in the first 5 days after procedure.  - Any other concerns related to your procedure.      [facility]  Interventional Radiology (IR)  [address]  [address] [facility] [address]    Contact Number:  [phone]  (IR control desk)  - Monday - Friday 8:00 am - 4:30 pm    After hours for urgent concerns:  [phone]  - After 4:30 pm Monday - Friday, Weekends and Holidays.   - Ask for Interventional Radiology on-call.  Someone is available 24 hours a day.  - [facility] toll free number:  [phone]    Tips for taking pain medications  To get the best pain relief possible, remember these points:    Take pain medications as directed, before pain becomes severe.    Pain medication can upset your stomach: taking it with food may help.    Constipation is a common side effect of pain medication. Drink plenty of  fluids.    Eat foods high in fiber. Take a stool softener if recommended by your doctor or pharmacist.    Do not drink alcohol, drive or operate machinery while taking pain medications.    Ask about other ways to control pain, such as with heat, ice or relaxation.    Tylenol/Acetaminophen Consumption  To help encourage the safe use of acetaminophen, the makers of TYLENOL  have lowered the maximum daily dose for single-ingredient Extra Strength  TYLENOL  (acetaminophen) products sold in the [place] from 8 pills per day (4,000 mg) to 6 pills per day (3,000 mg). The dosing interval has also changed from 2 pills every 4-6 hours to 2 pills every 6 hours.    If you feel your pain relief is insufficient, you may take Tylenol/Acetaminophen in addition to your narcotic pain medication.     Be careful not to exceed 3,000 mg of Tylenol/Acetaminophen in a 24 hour period from all sources.    If you are taking extra strength Tylenol/acetaminophen (500 mg), the maximum dose is 6 tablets in 24 hours.    If you are taking regular strength acetaminophen (325 mg), the maximum dose is 9 tablets in 24 hours.    You last took Tylenol 975mg at 8am. Next available dose at 2pm, then follow bottle instructions.

## 2022-04-01 ENCOUNTER — HOSPITAL ENCOUNTER (OUTPATIENT)
Dept: MRI IMAGING | Facility: CLINIC | Age: 62
Discharge: HOME | End: 2022-04-01
Attending: OTOLARYNGOLOGY | Admitting: OTOLARYNGOLOGY
Payer: COMMERCIAL

## 2022-04-01 ENCOUNTER — HOSPITAL ENCOUNTER (OUTPATIENT)
Dept: CARDIOLOGY | Facility: CLINIC | Age: 62
Discharge: HOME | End: 2022-04-01
Attending: INTERNAL MEDICINE | Admitting: INTERNAL MEDICINE
Payer: COMMERCIAL

## 2022-04-01 ENCOUNTER — OFFICE VISIT (OUTPATIENT)
Dept: RADIATION ONCOLOGY | Facility: CLINIC | Age: 62
End: 2022-04-01
Attending: OTOLARYNGOLOGY
Payer: COMMERCIAL

## 2022-04-01 VITALS
BODY MASS INDEX: 42.76 KG/M2 | DIASTOLIC BLOOD PRESSURE: 114 MMHG | HEART RATE: 78 BPM | WEIGHT: 298 LBS | SYSTOLIC BLOOD PRESSURE: 151 MMHG

## 2022-04-01 DIAGNOSIS — C11.9 NASOPHARYNGEAL CANCER (H): Primary | ICD-10-CM

## 2022-04-01 DIAGNOSIS — C11.9 NASOPHARYNGEAL CANCER (H): ICD-10-CM

## 2022-04-01 LAB — BI-PLANE LVEF ECHO: NORMAL

## 2022-04-01 PROCEDURE — 31231 NASAL ENDOSCOPY DX: CPT | Performed by: RADIOLOGY

## 2022-04-01 PROCEDURE — 31575 DIAGNOSTIC LARYNGOSCOPY: CPT | Performed by: RADIOLOGY

## 2022-04-01 PROCEDURE — G0463 HOSPITAL OUTPT CLINIC VISIT: HCPCS | Mod: 25 | Performed by: RADIOLOGY

## 2022-04-01 PROCEDURE — 70543 MRI ORBT/FAC/NCK W/O &W/DYE: CPT

## 2022-04-01 PROCEDURE — 70543 MRI ORBT/FAC/NCK W/O &W/DYE: CPT | Mod: 26 | Performed by: RADIOLOGY

## 2022-04-01 PROCEDURE — 255N000002 HC RX 255 OP 636: Performed by: INTERNAL MEDICINE

## 2022-04-01 PROCEDURE — 93306 TTE W/DOPPLER COMPLETE: CPT | Mod: 26 | Performed by: INTERNAL MEDICINE

## 2022-04-01 PROCEDURE — 999N000208 ECHOCARDIOGRAM COMPLETE

## 2022-04-01 PROCEDURE — A9585 GADOBUTROL INJECTION: HCPCS | Performed by: OTOLARYNGOLOGY

## 2022-04-01 PROCEDURE — 255N000002 HC RX 255 OP 636: Performed by: OTOLARYNGOLOGY

## 2022-04-01 RX ORDER — ALBUTEROL SULFATE 90 UG/1
1-2 AEROSOL, METERED RESPIRATORY (INHALATION)
Status: CANCELLED
Start: 2022-04-04

## 2022-04-01 RX ORDER — METHYLPREDNISOLONE SODIUM SUCCINATE 125 MG/2ML
125 INJECTION, POWDER, LYOPHILIZED, FOR SOLUTION INTRAMUSCULAR; INTRAVENOUS
Status: CANCELLED
Start: 2022-04-11

## 2022-04-01 RX ORDER — DIPHENHYDRAMINE HYDROCHLORIDE 50 MG/ML
50 INJECTION INTRAMUSCULAR; INTRAVENOUS
Status: CANCELLED
Start: 2022-04-04

## 2022-04-01 RX ORDER — NALOXONE HYDROCHLORIDE 0.4 MG/ML
0.2 INJECTION, SOLUTION INTRAMUSCULAR; INTRAVENOUS; SUBCUTANEOUS
Status: CANCELLED | OUTPATIENT
Start: 2022-04-04

## 2022-04-01 RX ORDER — HEPARIN SODIUM (PORCINE) LOCK FLUSH IV SOLN 100 UNIT/ML 100 UNIT/ML
5 SOLUTION INTRAVENOUS
Status: CANCELLED | OUTPATIENT
Start: 2022-04-11

## 2022-04-01 RX ORDER — LORAZEPAM 2 MG/ML
0.5 INJECTION INTRAMUSCULAR EVERY 4 HOURS PRN
Status: CANCELLED | OUTPATIENT
Start: 2022-04-11

## 2022-04-01 RX ORDER — DIPHENHYDRAMINE HYDROCHLORIDE 50 MG/ML
50 INJECTION INTRAMUSCULAR; INTRAVENOUS
Status: CANCELLED
Start: 2022-04-11

## 2022-04-01 RX ORDER — ALBUTEROL SULFATE 0.83 MG/ML
2.5 SOLUTION RESPIRATORY (INHALATION)
Status: CANCELLED | OUTPATIENT
Start: 2022-04-04

## 2022-04-01 RX ORDER — LORAZEPAM 2 MG/ML
0.5 INJECTION INTRAMUSCULAR EVERY 4 HOURS PRN
Status: CANCELLED | OUTPATIENT
Start: 2022-04-04

## 2022-04-01 RX ORDER — ALBUTEROL SULFATE 90 UG/1
1-2 AEROSOL, METERED RESPIRATORY (INHALATION)
Status: CANCELLED
Start: 2022-04-11

## 2022-04-01 RX ORDER — GADOBUTROL 604.72 MG/ML
10 INJECTION INTRAVENOUS ONCE
Status: COMPLETED | OUTPATIENT
Start: 2022-04-01 | End: 2022-04-01

## 2022-04-01 RX ORDER — MEPERIDINE HYDROCHLORIDE 25 MG/ML
25 INJECTION INTRAMUSCULAR; INTRAVENOUS; SUBCUTANEOUS EVERY 30 MIN PRN
Status: CANCELLED | OUTPATIENT
Start: 2022-04-04

## 2022-04-01 RX ORDER — METHYLPREDNISOLONE SODIUM SUCCINATE 125 MG/2ML
125 INJECTION, POWDER, LYOPHILIZED, FOR SOLUTION INTRAMUSCULAR; INTRAVENOUS
Status: CANCELLED
Start: 2022-04-04

## 2022-04-01 RX ORDER — EPINEPHRINE 1 MG/ML
0.3 INJECTION, SOLUTION INTRAMUSCULAR; SUBCUTANEOUS EVERY 5 MIN PRN
Status: CANCELLED | OUTPATIENT
Start: 2022-04-04

## 2022-04-01 RX ORDER — ONDANSETRON 8 MG/1
8 TABLET, FILM COATED ORAL EVERY 8 HOURS PRN
Qty: 30 TABLET | Refills: 11 | Status: SHIPPED | OUTPATIENT
Start: 2022-04-04 | End: 2022-11-30

## 2022-04-01 RX ORDER — PROCHLORPERAZINE MALEATE 10 MG
10 TABLET ORAL EVERY 6 HOURS PRN
Qty: 30 TABLET | Refills: 5 | Status: SHIPPED | OUTPATIENT
Start: 2022-04-04 | End: 2022-04-26

## 2022-04-01 RX ORDER — HEPARIN SODIUM (PORCINE) LOCK FLUSH IV SOLN 100 UNIT/ML 100 UNIT/ML
5 SOLUTION INTRAVENOUS
Status: CANCELLED | OUTPATIENT
Start: 2022-04-04

## 2022-04-01 RX ORDER — HEPARIN SODIUM,PORCINE 10 UNIT/ML
5 VIAL (ML) INTRAVENOUS
Status: CANCELLED | OUTPATIENT
Start: 2022-04-11

## 2022-04-01 RX ORDER — HEPARIN SODIUM,PORCINE 10 UNIT/ML
5 VIAL (ML) INTRAVENOUS
Status: CANCELLED | OUTPATIENT
Start: 2022-04-04

## 2022-04-01 RX ORDER — EPINEPHRINE 1 MG/ML
0.3 INJECTION, SOLUTION INTRAMUSCULAR; SUBCUTANEOUS EVERY 5 MIN PRN
Status: CANCELLED | OUTPATIENT
Start: 2022-04-11

## 2022-04-01 RX ORDER — ALBUTEROL SULFATE 0.83 MG/ML
2.5 SOLUTION RESPIRATORY (INHALATION)
Status: CANCELLED | OUTPATIENT
Start: 2022-04-11

## 2022-04-01 RX ORDER — NALOXONE HYDROCHLORIDE 0.4 MG/ML
0.2 INJECTION, SOLUTION INTRAMUSCULAR; INTRAVENOUS; SUBCUTANEOUS
Status: CANCELLED | OUTPATIENT
Start: 2022-04-11

## 2022-04-01 RX ORDER — MEPERIDINE HYDROCHLORIDE 25 MG/ML
25 INJECTION INTRAMUSCULAR; INTRAVENOUS; SUBCUTANEOUS EVERY 30 MIN PRN
Status: CANCELLED | OUTPATIENT
Start: 2022-04-11

## 2022-04-01 RX ORDER — DEXAMETHASONE 4 MG/1
4 TABLET ORAL DAILY
Qty: 3 TABLET | Refills: 3 | Status: SHIPPED | OUTPATIENT
Start: 2022-04-06 | End: 2022-04-04

## 2022-04-01 RX ORDER — PALONOSETRON 0.05 MG/ML
0.25 INJECTION, SOLUTION INTRAVENOUS ONCE
Status: CANCELLED
Start: 2022-04-04

## 2022-04-01 RX ADMIN — GADOBUTROL 10 ML: 604.72 INJECTION INTRAVENOUS at 15:51

## 2022-04-01 RX ADMIN — HUMAN ALBUMIN MICROSPHERES AND PERFLUTREN 6 ML: 10; .22 INJECTION, SOLUTION INTRAVENOUS at 09:50

## 2022-04-01 ASSESSMENT — ENCOUNTER SYMPTOMS
COUGH: 1
GASTROINTESTINAL NEGATIVE: 1
NERVOUS/ANXIOUS: 1
EYES NEGATIVE: 1
CARDIOVASCULAR NEGATIVE: 1
HEADACHES: 1
BACK PAIN: 1
NECK PAIN: 1
DEPRESSION: 1

## 2022-04-01 NOTE — LETTER
2022     RE: Jareth Gallardo  [address] [address] [facility] [id number]    Attending addendum:   I saw and examined the patient with the resident and agree with the documented plan of care.    Mr. Gallardo is a 61 year old gentleman with a newly diagnosed locally advanced nasopharyngeal cancer. He is scheduled for a MRI later today for completion of his staging workup. His prior PET study, though, does demonstrate fairly extensive adenopathy involving the bilateral retropharyngeals, left levels 2, 3 and 5 and right level 2 along with a small groundglass FDG avid lesion within the left lung. His case has previously been discussed at our interdisciplinary head and neck tumor board with the consensus recommendation to proceed with induction chemotherapy followed by definitive chemoradiation.    I briefly discussed a radiation treatment course consisting of 70 Gy/35 fractions to the primary and leny disease with elective coverage of the at risk mucosal subsites and lymphatic regions. I reviewed the anticipated acute and late-term toxicities associated with therapy of which Mr. Gallardo had several pertinent questions that were answered to his stated satisfaction. I will plan to see him back in clinic in coordination with his post-induction chemotherapy restaging imaging in order to set up his subsequent radiotherapy treatment course.     Mane Razo MD/PhD    Dept of Radiation Oncology  [facility]           Department of Radiation Oncology  [address] [address] [phone]       Consultation Note    Name: Jareth Gallardo MRN: [id number]   : 1960   Date of Service: 2022 Referring: Dr. Jesus Boston / head and neck surgery     Reason for consultation: Nasopharyngeal carcinoma    History of Present Illness   Mr. Gallardo is a 61 year old man with recently diagnosed nasopharyngeal carcinoma, [id number]  (DENAE), presenting for discussion of management options.  He initially was worked up outside [facility].    He presented to an urgent care on 12/9/2021 with worsening left-sided neck pain and swelling. CT neck demonstrated extensive left-sided adenopathy and prominence of the adenoids and left palatine tonsil. 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 Subsequent EBV testing at [facility] by FISH was positive. Outside PET/CT on 3/4/2022 demonstrated soft tissue thickening within the nasopharyngeal and oropharyngeal mucosa concerning for nasopharyngeal carcinoma. There are was also nodular soft tissue uptake within the prevertebral soft tissues at the level of the epiglottis as well. He was also noted to have bilateral hypermetabolic cervical lymphadenopathy. Additionally, there was FDG avid nodular uptake within the left upper lobe measuring up to 2.6 cm and confluent nodularity measuring 2.3 cm in the lingula as well. There was a small fracture of the right sixth rib with focal FDG uptake.    He was referred to [facility] and was seen by Dr. Boston in head and neck surgery on 3/9/2022. On examination, he was noted to have 2 palpable left level 2 lymph nodes, a palpable left level 3 lymph node, and diffuse secretions and thickening in the nasopharynx. Visualization was poor due to heavy secretions. His case was discussed at multidisciplinary tumor board with the recommendation for induction chemotherapy followed by definitive chemoradiation. He was seen by Dr. Melvin in medical oncology on 3/22/2022, who recommended starting with cisplatin and gemcitabine.    He presents today for a discussion of radiotherapy. On interview, he reports having secretions for most of his life associated with exposures at his job. 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 However, in the last month he has had more bloody secretions. Additionally, he has had about 4 months of left sided cervical lymphadenopathy, which has not changed recently. He has chronic tinnitus and decreased hearing on the right greater than left. He acute changes in vision, hearing, speech, swallowing, facial movements, or other neurologic deficits.    Past Medical History:    Nasopharyngeal carcinoma as per HPI    Hyperlipidemia    Hypertension    Diabetes mellitus    Chronic sinus issues as per HPI    Hiatal hernia    BARRETT    Cholelithiasis    Past Surgical History:    Removal of cyst near right knee    Tonsillectomy    Chemotherapy History:  None    Radiation History:  None    Pregnant: Not Applicable  Implanted Cardiac Devices: No    Medications:    Aspirin    Atorvastatin     Breo Ellipta inhaler    Furosemide    Metformin    Allergies:    No known drug allergies    Social History:  Tobacco: Former smoker, 20 pack years quit in 2009  Alcohol: Occasional alcohol use  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 Pertinent findings are noted in the HPI.    Physical Exam   ECOG Status: 0    BP (!) 151/114   Pulse 78   Wt 135.2 kg (298 lb)   BMI 42.76 kg/m      Gen: Alert, in NAD  Head: NC/AT  Eyes: PERRL, EOMI, sclera anicteric  Ears: No external auricular lesions  Nose/sinus: Rhinorrhea with some clear secretions  Oral cavity/oropharynx: MMM, no visible oral cavity lesions  Neck: Two firm and fixed 2-3 cm level 2 lymph nodes in the left neck  Neuro: A/Ox3, motor grossly intact, normal gait  Cranial Nerve Exam    I: Not tested  II: Not tested  III/IV/VI: PERRL. EOMI.   V: Mild dullness in left V2 distribution  VII: No facial weakness or asymmetry.   VIII: Hearing is grossly intact  and symmetric.  IX/X: Palate elevates symmetrically. Normal phonation.  XI: Strength is 5/5 in bilateral trapezius musculature.  XII: Tongue protrudes in the midline. No atrophy or fasciculations.      Flexible Fiberoptic Nasopharyngoscopy:  Consent for fiberoptic laryngoscopy was obtained and we confirmed correctness of procedure and identity of patient. Fiberoptic laryngoscopy was indicated due to staging and evaluation of disease. The nose was topically decongested and anesthetized. The fiberoptic laryngoscope was passed through the right naris under endoscopic vision. The turbinates were normal. The inferior and middle meati were clear without purulence, masses, or polyps. Exophytic tumor obscuring left fossa of Rosenmuller with fullness and apparent involvement of the posterior wall of the nasopharynx extending across midline and inferiorly into the soft palate. The epiglottis was sharp and the visualized portion of the vallecula was clear. The larynx was clear with mobile cords. The arytenoids were clear and there was no pooling in the hypopharynx. The scope was then removed and the procedure was terminated without incident.     Imaging/Path/Labs   Imaging:   Outside PET/CT 3/4/2022  IMPRESSION:   1.  Exuberant soft tissue thickening within the nasopharyngeal and oropharyngeal mucosa concerning for nasopharyngeal carcinoma. The soft tissue nodular uptake is evident within the prevertebral soft tissues and about the pharyngeal mucosa at the epiglottis and with extensive neck lymph node uptake.   2.  Uptake within the lung parenchyma concerning for metastatic uptake or primary malignancy. Infectious or inflammatory not excluded.   3.  Uptake within the right 6th rib is likely posttraumatic but could be followed with CT.    Review of outside CT CAP from 2/8/2010 does not appear to demonstrate the lung parenchymal abnormalities seen on recent PET/CT    Path:   Reviewed outside pathology from 2/15/2022 of left neck  core needle biopsy demonstrating moderately differentiated squamous cell carcinoma.  PD-L1 staining 20% positive    Labs:   Reviewed    Assessment    Mr. Gallardo is a 61 year old male with nasopharyngeal carcinoma, cT2 N3 Mx (DENAE), presenting for discussion of management options. PET/CT demonstrates hypermetabolic nodules in the lungs consistent with possible metastatic disease, primary lung cancer, or infectious/inflammatory. Per review and discussion, we will plan to observe these with short interval surveillance imaging given the difficulty in obtaining a biopsy of these lesions.    We discussed his diagnosis and the management of nasopharyngeal carcinoma. We recommended induction chemotherapy followed by definitive chemoradiotherapy. We reviewed the risks, benefits, and logistics of definitive radiotherapy to the nasopharynx and bilateral necks. His questions were answered to his stated satisfaction. Informed consent was obtained, though repeat consent may need to be provided depending on the timing of radiotherapy.    Plan   Induction chemotherapy as per medical oncology, likely 2-3 cycles depending on patient tolerance and response.  We will obtain restaging MRI and CT simulation for radiotherapy planning after induction chemotherapy, tentatively planning 7000 cGy in 35 fractions.    The patient was seen and assessed with staff, Dr. Razo.    Jhonatan Adame MD  PGY-5 Radiation Oncology  Clinic: [phone]  Pager: [phone]          INITIAL PATIENT ASSESSMENT    Diagnosis: Cancer    Prior radiation therapy: None    Prior chemotherapy: None    Prior hormonal therapy:No    Pain Eval:  Denies    Psychosocial  Living arrangements: Lives with family  Fall Risk: independent   referral needs: Not needed    Advanced Directive: No  Implantable Cardiac Device? No      Nurse face-to-face time: Level 5:  over 15 min face to face time  HPI      Review of Systems   Constitutional: Positive for  malaise/fatigue.   HENT: Positive for congestion.    Eyes: Negative.    Respiratory: Positive for cough.    Cardiovascular: Negative.    Gastrointestinal: Negative.    Genitourinary: Negative.    Musculoskeletal: Positive for back pain and neck pain.   Skin: Negative.    Neurological: Positive for headaches.   Endo/Heme/Allergies: Negative.    Psychiatric/Behavioral: Positive for depression. The patient is nervous/anxious.        Mane Razo MD

## 2022-04-01 NOTE — PROGRESS NOTES
INITIAL PATIENT ASSESSMENT    Diagnosis: Cancer    Prior radiation therapy: None    Prior chemotherapy: None    Prior hormonal therapy:No    Pain Eval:  Denies    Psychosocial  Living arrangements: Lives with family  Fall Risk: independent   referral needs: Not needed    Advanced Directive: No  Implantable Cardiac Device? No      Nurse face-to-face time: Level 5:  over 15 min face to face time  HPI      Review of Systems   Constitutional: Positive for malaise/fatigue.   HENT: Positive for congestion.    Eyes: Negative.    Respiratory: Positive for cough.    Cardiovascular: Negative.    Gastrointestinal: Negative.    Genitourinary: Negative.    Musculoskeletal: Positive for back pain and neck pain.   Skin: Negative.    Neurological: Positive for headaches.   Endo/Heme/Allergies: Negative.    Psychiatric/Behavioral: Positive for depression. The patient is nervous/anxious.

## 2022-04-01 NOTE — PROGRESS NOTES
[facility]    PATIENT NAME: Jareth Gallardo  MRN # [id number]   DATE OF VISIT: April 4, 2022  YOB: 1960     Referring Provider: Dr. Jesus Boston  Radiation Oncology:  Primary Care Provider: Dr. Beni Gallardo at [facility] in [facility]    CANCER TYPE: Nasopharyngeal carcinoma  STAGE: [id number] (DENAE)  ECOG PS: 1    PD-L1: 20% using  clone llocally   NGS:     SUMMARY  12/11/21 UC for L neck mass, selling  12/9/21 CT neck ([place]). 2.4 cm L neck node medial to SCM just above the hyoid, additional LNs surround, scattered R neck and L supraclavicular LNs  2/15/22 US bx L cervical node. Path: SCC, moderately differentiated,   3/4/22 PET/CT. Nasopharyngeal and oropharyngeal mucosal thickening. 2.6 cm BERNARDO nodular GGO, SUV avid, some additional lingular uptake    SUBJECTIVE  Jareth is a 62 yo male who presents today for nasopharyngeal carcinoma  Anxious to start chemo  Less nasal congestion/rhinnorhea/nose bleeds since stopping work in the last 2 weeks  Mouth breaths, no new breathing concerns  Baseline LION  Stress eating, drinking pepsi again. Also trying to do 64 oz  No swallowing issues  No pain from tumor  Does not routinely check BS at home, wife has glucometer  BID bowel movement  Chronic leg swelling, on lasix.     10 point review of systems otherwise negative    PAST MEDICAL HISTORY  Nasopharyngeal carcinoma as above  DM2  COPD??  Hiatal hernia. Sleeps with his head elevated  LVH on TTE 2009, EF50%, LA Dilation  Dyslipidemia  BARRETT in 2015. Not using CPAP due to developing tooth abscesses when he tried it in 2015  Venous insufficiency in both legs being in an accident involving a mower about 10 years ago, on chronic furosemide. LE US negative 12/15/2009  Cholelithiasis  Hyperplastic colon polyp, due 2020  Tonsillectomy  Baker cyst RLE US 2013    Neuropathy from DM - R foot numbness chronically    Exposed to lots of wood dust in his occupation    CURRENT OUTPATIENT  MEDICATIONS  Current Outpatient Medications   Medication Sig Dispense Refill     aspirin (ASA) 81 MG EC tablet Take 1 tablet (81 mg) by mouth daily       atorvastatin (LIPITOR) 20 MG tablet Take 20 mg by mouth       [START ON 4/6/2022] dexamethasone (DECADRON) 4 MG tablet Take 1 tablet (4 mg) by mouth daily Take for 3 days, starting the day after cisplatin (Day 2). 3 tablet 3     fluticasone-vilanterol (BREO ELLIPTA) 100-25 MCG/INH inhaler INHALE 1 PUFF INTO THE LUNGS ONE TIME A DAY. RINSE MOUTH AFTER USE.       furosemide (LASIX) 20 MG tablet Take 40 mg by mouth       metFORMIN (GLUCOPHAGE) 1000 MG tablet TAKE 1 TABLET BY MOUTH ONE TIME A DAY. TAKE WITH FOOD.       [START ON 4/4/2022] ondansetron (ZOFRAN) 8 MG tablet Take 1 tablet (8 mg) by mouth every 8 hours as needed for nausea 30 tablet 11     [START ON 4/4/2022] prochlorperazine (COMPAZINE) 10 MG tablet Take 1 tablet (10 mg) by mouth every 6 hours as needed (Nausea/Vomiting) 30 tablet 5   aspirin on hold due to epistaxis    ALLERGIES  No Known Allergies     SOCIAL HISTORY: . Used to work as a . Bowls in a league. Former smoker, quit in 2009, about 28 PY    FAMILY HISTORY: Father had leukemia. Mother had CVA. Sister had melanoma. Stepdad had kidney failure    REVIEW OF SYSTEMS  As above in the HPI, o/w complete 12-point ROS was negative.    PHYSICAL EXAM  There were no vitals taken for this visit.  GEN: NAD  HEENT: EOMI, no icterus, injection or pallor  NECK: easily palpable L cervical adenopathy  LUNGS: clear bilaterally  CV: regular, no murmurs, rubs, or gallops  ABDOMEN: soft, non-tender, non-distended, normal bowel sounds  EXT: warm, trace edema vs tight skin, symmetric bilaterally. No venous stasis changes  NEURO: alert    LABORATORY AND IMAGING STUDIES    Most Recent 3 CBC's:Recent Labs   Lab Test 04/04/22  0813 03/31/22  0818   WBC 6.4  --    HGB 13.8  --    MCV 87  --     246     Most Recent 3 BMP's:  Recent Labs   Lab  Test 04/04/22  0813 03/31/22  0813     --    POTASSIUM 4.1  --    CHLORIDE 104  --    CO2 33*  --    BUN 16  --    CR 0.78  --    ANIONGAP 3  --    LAI 8.4*  --    * 132*    Most Recent 2 LFT's:  Recent Labs   Lab Test 04/04/22 0813   AST 28   ALT 36   ALKPHOS 71   BILITOTAL 0.7    Most Recent TSH and T4:No lab results found.  I reviewed the above labs today.       MR SOFT TISSUE NECK W/O & W CONTRAST 4/1/2022 3:55 PM                                                            Impression:    1. No significant change in the asymmetric bilateral nasopharyngeal  mass, greatest on the left, consistent with nasopharyngeal carcinoma.  2. Bilateral retropharyngeal, bilateral level 2, left level 3, and  left level 5 lymph node metastases. Extracapsular leny extension of  disease on the left.     ASSESSMENT AND PLAN  Nasopharyngeal carcinoma, EBV positive: Plan for induction chemo with carbo + gemzar, 2-3 cycles, followed by chemoradiation. Changed from original plan of cisplatin to carboplatin due to hearing loss on audiogram. Reviewed potential SE today including nausea, fatigue, myelosuppression, kidney injury, neuropathy, and LE edema. Encouraged him to reach out with uncontrolled SE.   --RTC next week for day 8 gemzar  --RTC with Jany or AKBAR prior to cycle 2  --Anticipate restaging after 2-3 cycles of induction with CT neck and CT chest/abd vs PET/CT. Need to clarify exact timing/imaging with Dr. Brown pending tolerance of cycle 1.     Lung nodules, left: Re-evaluate after induction    Chronic LE swelling: On daily furosemide. TTE ok. Monitor kidney function while on chemotherapy in light of diuretics     DM2: Eyes exam recently locally, no acute findings. A1c in March was 7.2. asked he set up PCP appt this week to make plan about blood sugar monitoring in light of upcoming chemotherapy with steroids as part of regimen.     Mild peripheral neuropathy: R foot numbness, very mild, from DM2.     50 minutes  spent on the date of the encounter doing chart review, review of test results, interpretation of tests, patient visit, documentation, discussion with other provider(s) and discussion with family     Maryjo Polanco CNP on 4/4/2022 at 10:31 AM

## 2022-04-03 ENCOUNTER — HEALTH MAINTENANCE LETTER (OUTPATIENT)
Age: 62
End: 2022-04-03

## 2022-04-04 ENCOUNTER — ONCOLOGY VISIT (OUTPATIENT)
Dept: ONCOLOGY | Facility: CLINIC | Age: 62
End: 2022-04-04
Attending: NURSE PRACTITIONER
Payer: COMMERCIAL

## 2022-04-04 ENCOUNTER — APPOINTMENT (OUTPATIENT)
Dept: LAB | Facility: CLINIC | Age: 62
End: 2022-04-04
Attending: INTERNAL MEDICINE
Payer: COMMERCIAL

## 2022-04-04 VITALS
TEMPERATURE: 98.1 F | SYSTOLIC BLOOD PRESSURE: 141 MMHG | DIASTOLIC BLOOD PRESSURE: 84 MMHG | OXYGEN SATURATION: 94 % | HEART RATE: 92 BPM | BODY MASS INDEX: 43.55 KG/M2 | RESPIRATION RATE: 18 BRPM | WEIGHT: 303.5 LBS

## 2022-04-04 DIAGNOSIS — C11.9 NASOPHARYNGEAL CANCER (H): Primary | ICD-10-CM

## 2022-04-04 LAB
ALBUMIN SERPL-MCNC: 3.1 G/DL (ref 3.4–5)
ALP SERPL-CCNC: 71 U/L (ref 40–150)
ALT SERPL W P-5'-P-CCNC: 36 U/L (ref 0–70)
ANION GAP SERPL CALCULATED.3IONS-SCNC: 3 MMOL/L (ref 3–14)
AST SERPL W P-5'-P-CCNC: 28 U/L (ref 0–45)
BASOPHILS # BLD AUTO: 0 10E3/UL (ref 0–0.2)
BASOPHILS NFR BLD AUTO: 1 %
BILIRUB SERPL-MCNC: 0.7 MG/DL (ref 0.2–1.3)
BUN SERPL-MCNC: 16 MG/DL (ref 7–30)
CALCIUM SERPL-MCNC: 8.4 MG/DL (ref 8.5–10.1)
CHLORIDE BLD-SCNC: 104 MMOL/L (ref 94–109)
CO2 SERPL-SCNC: 33 MMOL/L (ref 20–32)
CREAT SERPL-MCNC: 0.78 MG/DL (ref 0.66–1.25)
EOSINOPHIL # BLD AUTO: 0.2 10E3/UL (ref 0–0.7)
EOSINOPHIL NFR BLD AUTO: 3 %
ERYTHROCYTE [DISTWIDTH] IN BLOOD BY AUTOMATED COUNT: 14.3 % (ref 10–15)
GFR SERPL CREATININE-BSD FRML MDRD: >90 ML/MIN/1.73M2
GLUCOSE BLD-MCNC: 173 MG/DL (ref 70–99)
HCT VFR BLD AUTO: 45.3 % (ref 40–53)
HGB BLD-MCNC: 13.8 G/DL (ref 13.3–17.7)
IMM GRANULOCYTES # BLD: 0 10E3/UL
IMM GRANULOCYTES NFR BLD: 0 %
LYMPHOCYTES # BLD AUTO: 1.3 10E3/UL (ref 0.8–5.3)
LYMPHOCYTES NFR BLD AUTO: 20 %
MAGNESIUM SERPL-MCNC: 2 MG/DL (ref 1.6–2.3)
MCH RBC QN AUTO: 26.3 PG (ref 26.5–33)
MCHC RBC AUTO-ENTMCNC: 30.5 G/DL (ref 31.5–36.5)
MCV RBC AUTO: 87 FL (ref 78–100)
MONOCYTES # BLD AUTO: 0.8 10E3/UL (ref 0–1.3)
MONOCYTES NFR BLD AUTO: 12 %
NEUTROPHILS # BLD AUTO: 4.2 10E3/UL (ref 1.6–8.3)
NEUTROPHILS NFR BLD AUTO: 64 %
NRBC # BLD AUTO: 0 10E3/UL
NRBC BLD AUTO-RTO: 0 /100
PLATELET # BLD AUTO: 208 10E3/UL (ref 150–450)
POTASSIUM BLD-SCNC: 4.1 MMOL/L (ref 3.4–5.3)
PROT SERPL-MCNC: 7.6 G/DL (ref 6.8–8.8)
RBC # BLD AUTO: 5.24 10E6/UL (ref 4.4–5.9)
SODIUM SERPL-SCNC: 140 MMOL/L (ref 133–144)
WBC # BLD AUTO: 6.4 10E3/UL (ref 4–11)

## 2022-04-04 PROCEDURE — 80053 COMPREHEN METABOLIC PANEL: CPT | Performed by: NURSE PRACTITIONER

## 2022-04-04 PROCEDURE — 258N000003 HC RX IP 258 OP 636: Performed by: INTERNAL MEDICINE

## 2022-04-04 PROCEDURE — 96413 CHEMO IV INFUSION 1 HR: CPT

## 2022-04-04 PROCEDURE — 96367 TX/PROPH/DG ADDL SEQ IV INF: CPT

## 2022-04-04 PROCEDURE — 250N000011 HC RX IP 250 OP 636: Performed by: INTERNAL MEDICINE

## 2022-04-04 PROCEDURE — G0463 HOSPITAL OUTPT CLINIC VISIT: HCPCS

## 2022-04-04 PROCEDURE — 85004 AUTOMATED DIFF WBC COUNT: CPT | Performed by: NURSE PRACTITIONER

## 2022-04-04 PROCEDURE — 99215 OFFICE O/P EST HI 40 MIN: CPT | Performed by: NURSE PRACTITIONER

## 2022-04-04 PROCEDURE — 83735 ASSAY OF MAGNESIUM: CPT | Performed by: NURSE PRACTITIONER

## 2022-04-04 PROCEDURE — 250N000011 HC RX IP 250 OP 636: Performed by: NURSE PRACTITIONER

## 2022-04-04 PROCEDURE — 96417 CHEMO IV INFUS EACH ADDL SEQ: CPT

## 2022-04-04 RX ORDER — HEPARIN SODIUM (PORCINE) LOCK FLUSH IV SOLN 100 UNIT/ML 100 UNIT/ML
5 SOLUTION INTRAVENOUS
Status: DISCONTINUED | OUTPATIENT
Start: 2022-04-04 | End: 2022-04-04 | Stop reason: HOSPADM

## 2022-04-04 RX ORDER — HEPARIN SODIUM (PORCINE) LOCK FLUSH IV SOLN 100 UNIT/ML 100 UNIT/ML
5 SOLUTION INTRAVENOUS ONCE
Status: COMPLETED | OUTPATIENT
Start: 2022-04-04 | End: 2022-04-04

## 2022-04-04 RX ADMIN — GEMCITABINE 2600 MG: 38 INJECTION, SOLUTION INTRAVENOUS at 11:01

## 2022-04-04 RX ADMIN — CARBOPLATIN 750 MG: 10 INJECTION, SOLUTION INTRAVENOUS at 11:36

## 2022-04-04 RX ADMIN — Medication 5 ML: at 08:05

## 2022-04-04 RX ADMIN — Medication 5 ML: at 12:26

## 2022-04-04 RX ADMIN — SODIUM CHLORIDE 250 ML: 9 INJECTION, SOLUTION INTRAVENOUS at 10:09

## 2022-04-04 RX ADMIN — DEXAMETHASONE SODIUM PHOSPHATE: 10 INJECTION, SOLUTION INTRAMUSCULAR; INTRAVENOUS at 10:33

## 2022-04-04 ASSESSMENT — PAIN SCALES - GENERAL: PAINLEVEL: NO PAIN (0)

## 2022-04-04 NOTE — NURSING NOTE
"Oncology Rooming Note    April 4, 2022 8:46 AM   Jareth Gallardo is a 61 year old male who presents for:    Chief Complaint   Patient presents with     Port Draw     Labs drawn via port by RN in lab. VS taken.      Oncology Clinic Visit     Nasopharyngeal cancer (H)     Initial Vitals: BP (!) 141/84 (BP Location: Right arm, Patient Position: Sitting, Cuff Size: Adult Large)   Pulse 92   Temp 98.1  F (36.7  C) (Oral)   Resp 18   Wt 137.7 kg (303 lb 8 oz)   SpO2 94%   BMI 43.55 kg/m   Estimated body mass index is 43.55 kg/m  as calculated from the following:    Height as of 3/31/22: 1.778 m (5' 10\").    Weight as of this encounter: 137.7 kg (303 lb 8 oz). Body surface area is 2.61 meters squared.  No Pain (0) Comment: Data Unavailable   No LMP for male patient.  Allergies reviewed: Yes  Medications reviewed: Yes    Medications: Medication refills not needed today.  Pharmacy name entered into "[employer]:    [id number] [place], [place] - [address] [facility] [place], [place] - [address] [facility] [phone]    Clinical concerns: No major changes reported today, patient is feeling a bit anxious about his next steps in his plan of care-please address.        Martha Butts LPN April 4, 2022 8:47 AM                "

## 2022-04-04 NOTE — PATIENT INSTRUCTIONS
Contact Numbers  [facility]: [phone]    After Hours:  [phone]  Triage: [phone]    Please call the [facility] Triage line if you experience a temperature greater than or equal to 100.5, shaking chills, have uncontrolled nausea, vomiting and/or diarrhea, dizziness, shortness of breath, chest pain, bleeding, unexplained bruising, or if you have any other new/concerning symptoms, questions or concerns.     If it is after hours, weekends, or holidays, please call the main hospital  at  [phone] and ask to speak to the Oncology doctor on call.     If you are having any concerning symptoms or wish to speak to a provider before your next infusion visit, please call your care coordinator or triage to notify them so we can adequately serve you.     If you need a refill on a narcotic prescription or other medication, please call triage before your infusion appointment.         April 2022 Sunday Monday Tuesday Wednesday Thursday Friday Saturday                            1    ECHO COMPLETE   9:15 AM   (60 min.)   [email or web address]   [facility]    NEW  10:00 AM   (90 min.)   Mane Razo MD   [facility] Radiation Oncology    MR SOFT TISSUE NECK WWO   2:45 PM   (45 min.)   [email or web address]   [facility] Imaging 2       3     4    LAB CENTRAL   7:45 AM   (15 min.)   [facility]   [facility]    RETURN   9:15 AM   (45 min.)   Maryjo Polanco CNP   [facility]    ONC INFUSION 4 HR (240 MIN)  10:30 AM   (240 min.)    ONC INFUSION NURSE   [facility] 5     6     7     8     9       10     11     12    VIDEO VISIT RETURN   4:45 PM   (45 min.)   Jany Mtz CNP   [facility] 13     14     15     16       17     18     19     20     21     22     23       24     25     26     27     28     29     30                  May 2022      [facility] Monday Tuesday Wednesday Thursday Friday Saturday   1     2     3     4     5     6     7       8     9     10     11     12     13     14       15     16     17     18     19     20     21       22     23     24     25     26     27     28       29     30     31                                         Lab Results:  Recent Results (from the past 12 hour(s))   Comprehensive metabolic panel    Collection Time: 04/04/22  8:13 AM   Result Value Ref Range    Sodium 140 133 - 144 mmol/L    Potassium 4.1 3.4 - 5.3 mmol/L    Chloride 104 94 - 109 mmol/L    Carbon Dioxide (CO2) 33 (H) 20 - 32 mmol/L    Anion Gap 3 3 - 14 mmol/L    Urea Nitrogen 16 7 - 30 mg/dL    Creatinine 0.78 0.66 - 1.25 mg/dL    Calcium 8.4 (L) 8.5 - 10.1 mg/dL    Glucose 173 (H) 70 - 99 mg/dL    Alkaline Phosphatase 71 40 - 150 U/L    AST 28 0 - 45 U/L    ALT 36 0 - 70 U/L    Protein Total 7.6 6.8 - 8.8 g/dL    Albumin 3.1 (L) 3.4 - 5.0 g/dL    Bilirubin Total 0.7 0.2 - 1.3 mg/dL    GFR Estimate >90 >60 mL/min/1.73m2   Magnesium    Collection Time: 04/04/22  8:13 AM   Result Value Ref Range    Magnesium 2.0 1.6 - 2.3 mg/dL   CBC with platelets and differential    Collection Time: 04/04/22  8:13 AM   Result Value Ref Range    WBC Count 6.4 4.0 - 11.0 10e3/uL    RBC Count 5.24 4.40 - 5.90 10e6/uL    Hemoglobin 13.8 13.3 - 17.7 g/dL    Hematocrit 45.3 40.0 - 53.0 %    MCV 87 78 - 100 fL    MCH 26.3 (L) 26.5 - 33.0 pg    MCHC 30.5 (L) 31.5 - 36.5 g/dL    RDW 14.3 10.0 - 15.0 %    Platelet Count 208 150 - 450 10e3/uL    % Neutrophils 64 %    % Lymphocytes 20 %    % Monocytes 12 %    % Eosinophils 3 %    % Basophils 1 %    % Immature Granulocytes 0 %    NRBCs per 100 WBC 0 <1 /100    Absolute Neutrophils 4.2 1.6 - 8.3 10e3/uL    Absolute Lymphocytes 1.3 0.8 - 5.3 10e3/uL    Absolute Monocytes 0.8 0.0 - 1.3 10e3/uL    Absolute Eosinophils 0.2 0.0 - 0.7 10e3/uL    Absolute Basophils 0.0 0.0 - 0.2 10e3/uL    Absolute Immature  Granulocytes 0.0 <=0.4 10e3/uL    Absolute NRBCs 0.0 10e3/uL

## 2022-04-04 NOTE — NURSING NOTE
Chief Complaint   Patient presents with     Port Draw     Labs drawn via port by RN in lab. VS taken.      Port accessed with 20 gauge 3/4 inch flat needle and labs drawn by RN.  Port flushed with saline and heparin.  Pt tolerated well.  VS taken.  Pt checked in for next appt.    Lisandra Lacy RN

## 2022-04-04 NOTE — PROGRESS NOTES
Infusion Nursing Note:  Jareth Gallardo presents today for C1D1 Gemzar/Carboplatin  Patient seen by provider today: Yes: Maryjo EVANS   present during visit today: Not Applicable.    Note: First time getting chemotherapy today.Chemotherapy teaching, side effects, and schedule reviewed with patient. Pt instructed to call triage (or MD on call if after hours/weekends) with chills/temp >=100.5. Pt stated understanding of plan.      Instruction given to patient as per provider. Verbalized understanding. Appointment is not made yet by the time patient left the facility. Instructed patient if unable to see next few days to call . Verbalized understanding.       Intravenous Access:  Implanted Port.    Treatment Conditions:  Lab Results   Component Value Date    HGB 13.8 04/04/2022    WBC 6.4 04/04/2022    ANEUTAUTO 4.2 04/04/2022     04/04/2022      Lab Results   Component Value Date     04/04/2022    POTASSIUM 4.1 04/04/2022    MAG 2.0 04/04/2022    CR 0.78 04/04/2022    LAI 8.4 (L) 04/04/2022    BILITOTAL 0.7 04/04/2022    ALBUMIN 3.1 (L) 04/04/2022    ALT 36 04/04/2022    AST 28 04/04/2022     Results reviewed, labs MET treatment parameters, ok to proceed with treatment.    TORB: 4/4/21/Dariela Melvin MD/Rand Nicolas RN/ We do not need additional dex on days 2-4. I also modified the fluids     Post Infusion Assessment:  Patient tolerated infusion without incident.  Blood return noted pre and post infusion.  Site patent and intact, free from redness, edema or discomfort.  No evidence of extravasations.  Access discontinued per protocol.       Discharge Plan:   Patient declined prescription refills.  Discharge instructions reviewed with: Patient.  Patient and/or family verbalized understanding of discharge instructions and all questions answered.  Copy of AVS reviewed with patient and/or family.  Patient will return in one week for next appointment.  Patient discharged in stable  condition accompanied by: self.  Departure Mode: Ambulatory.      CAROL VAN, RN

## 2022-04-05 ENCOUNTER — PATIENT OUTREACH (OUTPATIENT)
Dept: CARE COORDINATION | Facility: CLINIC | Age: 62
End: 2022-04-05

## 2022-04-05 NOTE — PROGRESS NOTES
"Oncology Distress Screening Follow-up  Clinical Social Work  [facility]    Identified Concern and Score From Distress Screenin. How concerned are you about feeling anxious or very scared?  8 Abnormal      Date of Distress Screenin22    Data: Jareth is a 61 year old gentleman diagnosed  with Nasopharyngeal cancer. Jareth is followed by Dr. Melvin at the [facility]. Jareth had a provider visit with [facility], where he expressed concern re: feeling anxious/scared.        Intervention: LUIS M reached otu to Jareth today via phone to discuss his concerns further. Jareth shares that he is doing \"okay\" today and reports that he woke up \"sweating\" this morning. He states that he lives at home with his spouse and adult son. He feels they are managing well.     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 He feels much better after leaving work and states he doesn't have any \"bloody noses\" since he stopped working. He is int he process of applying for SSDI. He reports that finances are getting tight and that they are going through his savings \"pretty quickly\". SW discussed applying for the [employer] to help alleviate some of the financial burden he is experiencing. Jareth is agreeable and appreciative. LUIS M completed both apps online. Jareth feels this will be helpful since they travel over 300 miles (round trip) to get to the clinic.     Jareth shares that he will be staying at the [facility] when he does radiation therapy, although this is not scheduled yet. SW encouraged him to reach out to the clinic SW to help with that process when a plan is confirmed.     Jareth reports that he was feeling a bit anxious at his last appointment because he isn't sure what to expect with his appointments and chemotherapy. He also has some anxiety about his cancer getting worse. SW validated these concerns and " "informed him that it is very \"normal\" to have some anxiety throughout this journey. Jareth will update care team if he feels the anxiety becomes overwhelming.    At this time, Jareth denies any additional needs. SW encouraged him to reach out if any additional needs arise.     Education Provided: oncology Clinic SW role/contact info    Follow-up Required: SW will remain available for ongoing resource/support needs.       NURA Grady, [facility]  Adult Oncology Clinic  Phone: [phone]    "

## 2022-04-11 ENCOUNTER — INFUSION THERAPY VISIT (OUTPATIENT)
Dept: ONCOLOGY | Facility: CLINIC | Age: 62
End: 2022-04-11
Attending: NURSE PRACTITIONER
Payer: COMMERCIAL

## 2022-04-11 ENCOUNTER — APPOINTMENT (OUTPATIENT)
Dept: LAB | Facility: CLINIC | Age: 62
End: 2022-04-11
Attending: INTERNAL MEDICINE
Payer: COMMERCIAL

## 2022-04-11 VITALS
WEIGHT: 296 LBS | HEART RATE: 100 BPM | SYSTOLIC BLOOD PRESSURE: 131 MMHG | RESPIRATION RATE: 18 BRPM | DIASTOLIC BLOOD PRESSURE: 86 MMHG | BODY MASS INDEX: 42.47 KG/M2 | TEMPERATURE: 98.2 F | OXYGEN SATURATION: 93 %

## 2022-04-11 DIAGNOSIS — C11.9 NASOPHARYNGEAL CANCER (H): Primary | ICD-10-CM

## 2022-04-11 LAB
ALBUMIN SERPL-MCNC: 3.2 G/DL (ref 3.4–5)
ALP SERPL-CCNC: 78 U/L (ref 40–150)
ALT SERPL W P-5'-P-CCNC: 75 U/L (ref 0–70)
ANION GAP SERPL CALCULATED.3IONS-SCNC: 7 MMOL/L (ref 3–14)
AST SERPL W P-5'-P-CCNC: 49 U/L (ref 0–45)
BASOPHILS # BLD AUTO: 0 10E3/UL (ref 0–0.2)
BASOPHILS NFR BLD AUTO: 0 %
BILIRUB SERPL-MCNC: 0.5 MG/DL (ref 0.2–1.3)
BUN SERPL-MCNC: 14 MG/DL (ref 7–30)
CALCIUM SERPL-MCNC: 8.8 MG/DL (ref 8.5–10.1)
CHLORIDE BLD-SCNC: 100 MMOL/L (ref 94–109)
CO2 SERPL-SCNC: 31 MMOL/L (ref 20–32)
CREAT SERPL-MCNC: 0.62 MG/DL (ref 0.66–1.25)
EOSINOPHIL # BLD AUTO: 0.1 10E3/UL (ref 0–0.7)
EOSINOPHIL NFR BLD AUTO: 1 %
ERYTHROCYTE [DISTWIDTH] IN BLOOD BY AUTOMATED COUNT: 13.6 % (ref 10–15)
GFR SERPL CREATININE-BSD FRML MDRD: >90 ML/MIN/1.73M2
GLUCOSE BLD-MCNC: 130 MG/DL (ref 70–99)
HCT VFR BLD AUTO: 44.2 % (ref 40–53)
HGB BLD-MCNC: 13.8 G/DL (ref 13.3–17.7)
IMM GRANULOCYTES # BLD: 0 10E3/UL
IMM GRANULOCYTES NFR BLD: 0 %
LYMPHOCYTES # BLD AUTO: 1.5 10E3/UL (ref 0.8–5.3)
LYMPHOCYTES NFR BLD AUTO: 33 %
MCH RBC QN AUTO: 26 PG (ref 26.5–33)
MCHC RBC AUTO-ENTMCNC: 31.2 G/DL (ref 31.5–36.5)
MCV RBC AUTO: 83 FL (ref 78–100)
MONOCYTES # BLD AUTO: 0.2 10E3/UL (ref 0–1.3)
MONOCYTES NFR BLD AUTO: 5 %
NEUTROPHILS # BLD AUTO: 2.9 10E3/UL (ref 1.6–8.3)
NEUTROPHILS NFR BLD AUTO: 61 %
NRBC # BLD AUTO: 0 10E3/UL
NRBC BLD AUTO-RTO: 0 /100
PLATELET # BLD AUTO: 163 10E3/UL (ref 150–450)
POTASSIUM BLD-SCNC: 3.5 MMOL/L (ref 3.4–5.3)
PROT SERPL-MCNC: 7.8 G/DL (ref 6.8–8.8)
RBC # BLD AUTO: 5.3 10E6/UL (ref 4.4–5.9)
SODIUM SERPL-SCNC: 138 MMOL/L (ref 133–144)
WBC # BLD AUTO: 4.7 10E3/UL (ref 4–11)

## 2022-04-11 PROCEDURE — 250N000011 HC RX IP 250 OP 636: Performed by: INTERNAL MEDICINE

## 2022-04-11 PROCEDURE — 80053 COMPREHEN METABOLIC PANEL: CPT

## 2022-04-11 PROCEDURE — 96413 CHEMO IV INFUSION 1 HR: CPT

## 2022-04-11 PROCEDURE — 36591 DRAW BLOOD OFF VENOUS DEVICE: CPT

## 2022-04-11 PROCEDURE — 85025 COMPLETE CBC W/AUTO DIFF WBC: CPT | Performed by: INTERNAL MEDICINE

## 2022-04-11 PROCEDURE — 258N000003 HC RX IP 258 OP 636: Performed by: INTERNAL MEDICINE

## 2022-04-11 PROCEDURE — 96367 TX/PROPH/DG ADDL SEQ IV INF: CPT

## 2022-04-11 RX ADMIN — DEXAMETHASONE SODIUM PHOSPHATE 12 MG: 10 INJECTION, SOLUTION INTRAMUSCULAR; INTRAVENOUS at 16:43

## 2022-04-11 RX ADMIN — GEMCITABINE 2600 MG: 38 INJECTION, SOLUTION INTRAVENOUS at 17:10

## 2022-04-11 RX ADMIN — SODIUM CHLORIDE 250 ML: 9 INJECTION, SOLUTION INTRAVENOUS at 16:42

## 2022-04-11 ASSESSMENT — PAIN SCALES - GENERAL: PAINLEVEL: NO PAIN (0)

## 2022-04-11 NOTE — PATIENT INSTRUCTIONS
[facility] Triage and after hours / weekends / holidays:  [phone]    Please call the triage or after hours line if you experience a temperature greater than or equal to 100.4, shaking chills, have uncontrolled nausea, vomiting and/or diarrhea, dizziness, shortness of breath, chest pain, bleeding, unexplained bruising, or if you have any other new/concerning symptoms, questions or concerns.      If you are having any concerning symptoms or wish to speak to a provider before your next infusion visit, please call your care coordinator or triage to notify them so we can adequately serve you.     If you need a refill on a narcotic prescription or other medication, please call before your infusion appointment.                April 2022 Sunday Monday Tuesday Wednesday Thursday Friday Saturday                            1    ECHO COMPLETE   9:15 AM   (60 min.)   [email or web address]   [facility]    NEW  10:00 AM   (90 min.)   Mane Razo MD   [facility] Radiation Oncology    MR SOFT TISSUE NECK WWO   2:45 PM   (45 min.)   [email or web address]   [facility] Imaging 2       3     4    LAB CENTRAL   7:45 AM   (15 min.)   [facility]   [facility]    RETURN   9:15 AM   (45 min.)   Maryjo Polanco CNP   [facility]    ONC INFUSION 4 HR (240 MIN)  10:30 AM   (240 min.)    ONC INFUSION NURSE   [facility] 5     6     7     8     9       10     11    LAB CENTRAL   3:00 PM   (15 min.)    [facility]   [facility]    ONC INFUSION 1 HR (60 MIN)   3:30 PM   (60 min.)    ONC INFUSION NURSE   [facility] 12     13     14     15     16       17     18     19     20     21     22     23       24     25     26    LAB CENTRAL   8:15 AM   (15 min.)   [facility]   [facility]  Clinic    RETURN   8:45 AM   (45 min.)   Maryjo Polanco CNP   [facility]    ONC INFUSION 4 HR (240 MIN)  10:30 AM   (240 min.)   UC ONC INFUSION NURSE   [facility] 27     28     29     30                   May 2022      [facility] Monday Tuesday Wednesday Thursday Friday Saturday   1     2    LAB CENTRAL   9:30 AM   (15 min.)   [facility]   [facility]    ONC INFUSION 1 HR (60 MIN)  10:00 AM   (60 min.)   UC ONC INFUSION NURSE   [facility] 3     4     5     6     7       8     9     10     11     12    VIDEO VISIT RETURN   8:55 AM   (30 min.)   Dariela Melvin MD   [facility] 13     14       15     16    LAB CENTRAL   7:00 AM   (15 min.)   [facility]   [facility]    ONC INFUSION 4 HR (240 MIN)   7:30 AM   (240 min.)   UC ONC INFUSION NURSE   [facility] 17     18     19     20     21       22     23    LAB CENTRAL   1:00 PM   (15 min.)   [facility]   [facility]    ONC INFUSION 1 HR (60 MIN)   1:30 PM   (60 min.)   UC ONC INFUSION NURSE   [facility] 24     25     26     27     28       29     30     31                                            Recent Results (from the past 24 hour(s))   CBC with platelets and differential    Collection Time: 04/11/22  3:08 PM   Result Value Ref Range    WBC Count 4.7 4.0 - 11.0 10e3/uL    RBC Count 5.30 4.40 - 5.90 10e6/uL    Hemoglobin 13.8 13.3 - 17.7 g/dL    Hematocrit 44.2 40.0 - 53.0 %    MCV 83 78 - 100 fL    MCH 26.0 (L) 26.5 - 33.0 pg    MCHC 31.2 (L) 31.5 - 36.5 g/dL    RDW 13.6 10.0 - 15.0 %    Platelet Count 163 150 - 450 10e3/uL    % Neutrophils 61 %    % Lymphocytes 33 %    % Monocytes 5 %    % Eosinophils 1 %    % Basophils 0 %    % Immature Granulocytes 0 %    NRBCs per 100 WBC 0 <1 /100     Absolute Neutrophils 2.9 1.6 - 8.3 10e3/uL    Absolute Lymphocytes 1.5 0.8 - 5.3 10e3/uL    Absolute Monocytes 0.2 0.0 - 1.3 10e3/uL    Absolute Eosinophils 0.1 0.0 - 0.7 10e3/uL    Absolute Basophils 0.0 0.0 - 0.2 10e3/uL    Absolute Immature Granulocytes 0.0 <=0.4 10e3/uL    Absolute NRBCs 0.0 10e3/uL   Comprehensive metabolic panel    Collection Time: 04/11/22  4:01 PM   Result Value Ref Range    Sodium 138 133 - 144 mmol/L    Potassium 3.5 3.4 - 5.3 mmol/L    Chloride 100 94 - 109 mmol/L    Carbon Dioxide (CO2) 31 20 - 32 mmol/L    Anion Gap 7 3 - 14 mmol/L    Urea Nitrogen 14 7 - 30 mg/dL    Creatinine 0.62 (L) 0.66 - 1.25 mg/dL    Calcium 8.8 8.5 - 10.1 mg/dL    Glucose 130 (H) 70 - 99 mg/dL    Alkaline Phosphatase 78 40 - 150 U/L    AST 49 (H) 0 - 45 U/L    ALT 75 (H) 0 - 70 U/L    Protein Total 7.8 6.8 - 8.8 g/dL    Albumin 3.2 (L) 3.4 - 5.0 g/dL    Bilirubin Total 0.5 0.2 - 1.3 mg/dL    GFR Estimate >90 >60 mL/min/1.73m2

## 2022-04-11 NOTE — NURSING NOTE
Chief Complaint   Patient presents with     Labs Only     Venipuncture, vitals checked       Alley Alvarez RN on 4/11/2022 at 2:58 PM

## 2022-04-26 ENCOUNTER — ONCOLOGY VISIT (OUTPATIENT)
Dept: ONCOLOGY | Facility: CLINIC | Age: 62
End: 2022-04-26
Attending: NURSE PRACTITIONER
Payer: COMMERCIAL

## 2022-04-26 ENCOUNTER — APPOINTMENT (OUTPATIENT)
Dept: LAB | Facility: CLINIC | Age: 62
End: 2022-04-26
Attending: INTERNAL MEDICINE
Payer: COMMERCIAL

## 2022-04-26 ENCOUNTER — INFUSION THERAPY VISIT (OUTPATIENT)
Dept: ONCOLOGY | Facility: CLINIC | Age: 62
End: 2022-04-26
Attending: REGISTERED NURSE
Payer: COMMERCIAL

## 2022-04-26 VITALS
OXYGEN SATURATION: 95 % | WEIGHT: 306.2 LBS | DIASTOLIC BLOOD PRESSURE: 91 MMHG | BODY MASS INDEX: 43.94 KG/M2 | TEMPERATURE: 98.3 F | RESPIRATION RATE: 16 BRPM | HEART RATE: 100 BPM | SYSTOLIC BLOOD PRESSURE: 140 MMHG

## 2022-04-26 DIAGNOSIS — C11.9 NASOPHARYNGEAL CANCER (H): Primary | ICD-10-CM

## 2022-04-26 DIAGNOSIS — Z11.59 SCREENING FOR VIRAL DISEASE: ICD-10-CM

## 2022-04-26 DIAGNOSIS — E11.29 TYPE 2 DIABETES MELLITUS WITH MICROALBUMINURIA, WITHOUT LONG-TERM CURRENT USE OF INSULIN (H): ICD-10-CM

## 2022-04-26 DIAGNOSIS — R74.01 TRANSAMINITIS: ICD-10-CM

## 2022-04-26 DIAGNOSIS — R80.9 TYPE 2 DIABETES MELLITUS WITH MICROALBUMINURIA, WITHOUT LONG-TERM CURRENT USE OF INSULIN (H): ICD-10-CM

## 2022-04-26 LAB
ALBUMIN SERPL-MCNC: 3.3 G/DL (ref 3.4–5)
ALP SERPL-CCNC: 91 U/L (ref 40–150)
ALT SERPL W P-5'-P-CCNC: 116 U/L (ref 0–70)
ANION GAP SERPL CALCULATED.3IONS-SCNC: 5 MMOL/L (ref 3–14)
AST SERPL W P-5'-P-CCNC: 60 U/L (ref 0–45)
BASOPHILS # BLD AUTO: 0 10E3/UL (ref 0–0.2)
BASOPHILS NFR BLD AUTO: 0 %
BILIRUB SERPL-MCNC: 0.4 MG/DL (ref 0.2–1.3)
BUN SERPL-MCNC: 12 MG/DL (ref 7–30)
CALCIUM SERPL-MCNC: 9 MG/DL (ref 8.5–10.1)
CHLORIDE BLD-SCNC: 104 MMOL/L (ref 94–109)
CO2 SERPL-SCNC: 33 MMOL/L (ref 20–32)
CREAT SERPL-MCNC: 0.7 MG/DL (ref 0.66–1.25)
EOSINOPHIL # BLD AUTO: 0 10E3/UL (ref 0–0.7)
EOSINOPHIL NFR BLD AUTO: 1 %
ERYTHROCYTE [DISTWIDTH] IN BLOOD BY AUTOMATED COUNT: 14.5 % (ref 10–15)
GFR SERPL CREATININE-BSD FRML MDRD: >90 ML/MIN/1.73M2
GLUCOSE BLD-MCNC: 192 MG/DL (ref 70–99)
HBV CORE AB SERPL QL IA: NONREACTIVE
HBV SURFACE AB SERPL IA-ACNC: 0 M[IU]/ML
HBV SURFACE AG SERPL QL IA: NONREACTIVE
HCT VFR BLD AUTO: 42.3 % (ref 40–53)
HCV AB SERPL QL IA: NONREACTIVE
HGB BLD-MCNC: 13 G/DL (ref 13.3–17.7)
IMM GRANULOCYTES # BLD: 0 10E3/UL
IMM GRANULOCYTES NFR BLD: 1 %
LYMPHOCYTES # BLD AUTO: 1.3 10E3/UL (ref 0.8–5.3)
LYMPHOCYTES NFR BLD AUTO: 29 %
MAGNESIUM SERPL-MCNC: 1.7 MG/DL (ref 1.6–2.3)
MCH RBC QN AUTO: 26.3 PG (ref 26.5–33)
MCHC RBC AUTO-ENTMCNC: 30.7 G/DL (ref 31.5–36.5)
MCV RBC AUTO: 86 FL (ref 78–100)
MONOCYTES # BLD AUTO: 0.6 10E3/UL (ref 0–1.3)
MONOCYTES NFR BLD AUTO: 13 %
NEUTROPHILS # BLD AUTO: 2.6 10E3/UL (ref 1.6–8.3)
NEUTROPHILS NFR BLD AUTO: 56 %
NRBC # BLD AUTO: 0 10E3/UL
NRBC BLD AUTO-RTO: 0 /100
PLATELET # BLD AUTO: 329 10E3/UL (ref 150–450)
POTASSIUM BLD-SCNC: 4 MMOL/L (ref 3.4–5.3)
PROT SERPL-MCNC: 7.7 G/DL (ref 6.8–8.8)
RBC # BLD AUTO: 4.95 10E6/UL (ref 4.4–5.9)
SODIUM SERPL-SCNC: 142 MMOL/L (ref 133–144)
WBC # BLD AUTO: 4.5 10E3/UL (ref 4–11)

## 2022-04-26 PROCEDURE — 80053 COMPREHEN METABOLIC PANEL: CPT | Performed by: NURSE PRACTITIONER

## 2022-04-26 PROCEDURE — 96417 CHEMO IV INFUS EACH ADDL SEQ: CPT

## 2022-04-26 PROCEDURE — 258N000003 HC RX IP 258 OP 636: Performed by: NURSE PRACTITIONER

## 2022-04-26 PROCEDURE — 87340 HEPATITIS B SURFACE AG IA: CPT

## 2022-04-26 PROCEDURE — 250N000011 HC RX IP 250 OP 636: Performed by: NURSE PRACTITIONER

## 2022-04-26 PROCEDURE — 85004 AUTOMATED DIFF WBC COUNT: CPT | Performed by: NURSE PRACTITIONER

## 2022-04-26 PROCEDURE — 86803 HEPATITIS C AB TEST: CPT

## 2022-04-26 PROCEDURE — 86706 HEP B SURFACE ANTIBODY: CPT

## 2022-04-26 PROCEDURE — 96413 CHEMO IV INFUSION 1 HR: CPT

## 2022-04-26 PROCEDURE — 99215 OFFICE O/P EST HI 40 MIN: CPT | Performed by: NURSE PRACTITIONER

## 2022-04-26 PROCEDURE — G0463 HOSPITAL OUTPT CLINIC VISIT: HCPCS

## 2022-04-26 PROCEDURE — 36591 DRAW BLOOD OFF VENOUS DEVICE: CPT | Performed by: NURSE PRACTITIONER

## 2022-04-26 PROCEDURE — 86704 HEP B CORE ANTIBODY TOTAL: CPT

## 2022-04-26 PROCEDURE — 96367 TX/PROPH/DG ADDL SEQ IV INF: CPT

## 2022-04-26 PROCEDURE — 83735 ASSAY OF MAGNESIUM: CPT | Performed by: NURSE PRACTITIONER

## 2022-04-26 RX ORDER — NALOXONE HYDROCHLORIDE 0.4 MG/ML
0.2 INJECTION, SOLUTION INTRAMUSCULAR; INTRAVENOUS; SUBCUTANEOUS
Status: CANCELLED | OUTPATIENT
Start: 2022-05-03

## 2022-04-26 RX ORDER — ALBUTEROL SULFATE 90 UG/1
1-2 AEROSOL, METERED RESPIRATORY (INHALATION)
Status: CANCELLED
Start: 2022-04-26

## 2022-04-26 RX ORDER — ALBUTEROL SULFATE 90 UG/1
1-2 AEROSOL, METERED RESPIRATORY (INHALATION)
Status: CANCELLED
Start: 2022-05-03

## 2022-04-26 RX ORDER — EPINEPHRINE 1 MG/ML
0.3 INJECTION, SOLUTION INTRAMUSCULAR; SUBCUTANEOUS EVERY 5 MIN PRN
Status: CANCELLED | OUTPATIENT
Start: 2022-05-03

## 2022-04-26 RX ORDER — DIPHENHYDRAMINE HYDROCHLORIDE 50 MG/ML
50 INJECTION INTRAMUSCULAR; INTRAVENOUS
Status: CANCELLED
Start: 2022-05-03

## 2022-04-26 RX ORDER — MEPERIDINE HYDROCHLORIDE 25 MG/ML
25 INJECTION INTRAMUSCULAR; INTRAVENOUS; SUBCUTANEOUS EVERY 30 MIN PRN
Status: CANCELLED | OUTPATIENT
Start: 2022-04-26

## 2022-04-26 RX ORDER — ALBUTEROL SULFATE 0.83 MG/ML
2.5 SOLUTION RESPIRATORY (INHALATION)
Status: CANCELLED | OUTPATIENT
Start: 2022-04-26

## 2022-04-26 RX ORDER — HEPARIN SODIUM,PORCINE 10 UNIT/ML
5 VIAL (ML) INTRAVENOUS
Status: CANCELLED | OUTPATIENT
Start: 2022-04-26

## 2022-04-26 RX ORDER — EPINEPHRINE 1 MG/ML
0.3 INJECTION, SOLUTION INTRAMUSCULAR; SUBCUTANEOUS EVERY 5 MIN PRN
Status: CANCELLED | OUTPATIENT
Start: 2022-04-26

## 2022-04-26 RX ORDER — LORAZEPAM 2 MG/ML
0.5 INJECTION INTRAMUSCULAR EVERY 4 HOURS PRN
Status: CANCELLED | OUTPATIENT
Start: 2022-05-03

## 2022-04-26 RX ORDER — MEPERIDINE HYDROCHLORIDE 25 MG/ML
25 INJECTION INTRAMUSCULAR; INTRAVENOUS; SUBCUTANEOUS EVERY 30 MIN PRN
Status: CANCELLED | OUTPATIENT
Start: 2022-05-03

## 2022-04-26 RX ORDER — ALBUTEROL SULFATE 0.83 MG/ML
2.5 SOLUTION RESPIRATORY (INHALATION)
Status: CANCELLED | OUTPATIENT
Start: 2022-05-03

## 2022-04-26 RX ORDER — DIPHENHYDRAMINE HYDROCHLORIDE 50 MG/ML
50 INJECTION INTRAMUSCULAR; INTRAVENOUS
Status: CANCELLED
Start: 2022-04-26

## 2022-04-26 RX ORDER — LORAZEPAM 2 MG/ML
0.5 INJECTION INTRAMUSCULAR EVERY 4 HOURS PRN
Status: CANCELLED | OUTPATIENT
Start: 2022-04-26

## 2022-04-26 RX ORDER — METHYLPREDNISOLONE SODIUM SUCCINATE 125 MG/2ML
125 INJECTION, POWDER, LYOPHILIZED, FOR SOLUTION INTRAMUSCULAR; INTRAVENOUS
Status: CANCELLED
Start: 2022-04-26

## 2022-04-26 RX ORDER — NALOXONE HYDROCHLORIDE 0.4 MG/ML
0.2 INJECTION, SOLUTION INTRAMUSCULAR; INTRAVENOUS; SUBCUTANEOUS
Status: CANCELLED | OUTPATIENT
Start: 2022-04-26

## 2022-04-26 RX ORDER — HEPARIN SODIUM (PORCINE) LOCK FLUSH IV SOLN 100 UNIT/ML 100 UNIT/ML
5 SOLUTION INTRAVENOUS
Status: DISCONTINUED | OUTPATIENT
Start: 2022-04-26 | End: 2022-04-26 | Stop reason: HOSPADM

## 2022-04-26 RX ORDER — HEPARIN SODIUM,PORCINE 10 UNIT/ML
5 VIAL (ML) INTRAVENOUS
Status: CANCELLED | OUTPATIENT
Start: 2022-05-03

## 2022-04-26 RX ORDER — HEPARIN SODIUM (PORCINE) LOCK FLUSH IV SOLN 100 UNIT/ML 100 UNIT/ML
5 SOLUTION INTRAVENOUS
Status: CANCELLED | OUTPATIENT
Start: 2022-04-26

## 2022-04-26 RX ORDER — HEPARIN SODIUM (PORCINE) LOCK FLUSH IV SOLN 100 UNIT/ML 100 UNIT/ML
5 SOLUTION INTRAVENOUS DAILY PRN
Status: DISCONTINUED | OUTPATIENT
Start: 2022-04-26 | End: 2022-04-26 | Stop reason: HOSPADM

## 2022-04-26 RX ORDER — METHYLPREDNISOLONE SODIUM SUCCINATE 125 MG/2ML
125 INJECTION, POWDER, LYOPHILIZED, FOR SOLUTION INTRAMUSCULAR; INTRAVENOUS
Status: CANCELLED
Start: 2022-05-03

## 2022-04-26 RX ORDER — PROCHLORPERAZINE MALEATE 10 MG
10 TABLET ORAL EVERY 6 HOURS PRN
Qty: 30 TABLET | Refills: 5 | Status: SHIPPED | OUTPATIENT
Start: 2022-04-26 | End: 2022-07-25

## 2022-04-26 RX ORDER — HEPARIN SODIUM (PORCINE) LOCK FLUSH IV SOLN 100 UNIT/ML 100 UNIT/ML
5 SOLUTION INTRAVENOUS
Status: CANCELLED | OUTPATIENT
Start: 2022-05-03

## 2022-04-26 RX ORDER — BLOOD-GLUCOSE METER
EACH MISCELLANEOUS
Qty: 1 KIT | Refills: 0 | Status: SHIPPED | OUTPATIENT
Start: 2022-04-26 | End: 2024-05-14

## 2022-04-26 RX ORDER — HEPARIN SODIUM,PORCINE 10 UNIT/ML
5 VIAL (ML) INTRAVENOUS
Status: DISCONTINUED | OUTPATIENT
Start: 2022-04-26 | End: 2022-04-26 | Stop reason: HOSPADM

## 2022-04-26 RX ADMIN — SODIUM CHLORIDE 750 MG: 900 INJECTION, SOLUTION INTRAVENOUS at 12:02

## 2022-04-26 RX ADMIN — Medication 5 ML: at 12:53

## 2022-04-26 RX ADMIN — GEMCITABINE 2600 MG: 38 INJECTION, SOLUTION INTRAVENOUS at 11:29

## 2022-04-26 RX ADMIN — SODIUM CHLORIDE 250 ML: 9 INJECTION, SOLUTION INTRAVENOUS at 10:34

## 2022-04-26 RX ADMIN — DEXAMETHASONE SODIUM PHOSPHATE: 10 INJECTION, SOLUTION INTRAMUSCULAR; INTRAVENOUS at 10:50

## 2022-04-26 RX ADMIN — Medication 5 ML: at 08:19

## 2022-04-26 ASSESSMENT — PAIN SCALES - GENERAL: PAINLEVEL: NO PAIN (0)

## 2022-04-26 NOTE — PROGRESS NOTES
Infusion Nursing Note:  Jareth Gallardo presents today for D1 C2 Gemzar and Carboplatin.    Patient seen by provider today: Yes: Maryjo EVANS   present during visit today: Not Applicable.    Note: CHYNA EVANS / Awa Renee RN: May proceed with tx with increased LFTs      Intravenous Access:  Implanted Port.    Treatment Conditions:  Lab Results   Component Value Date    HGB 13.0 (L) 04/26/2022    WBC 4.5 04/26/2022    ANEUTAUTO 2.6 04/26/2022     04/26/2022      Lab Results   Component Value Date     04/26/2022    POTASSIUM 4.0 04/26/2022    MAG 1.7 04/26/2022    CR 0.70 04/26/2022    LAI 9.0 04/26/2022    BILITOTAL 0.4 04/26/2022    ALBUMIN 3.3 (L) 04/26/2022     (H) 04/26/2022    AST 60 (H) 04/26/2022     Results reviewed, labs MET treatment parameters, ok to proceed with treatment.      Post Infusion Assessment:  Patient tolerated infusion without incident.  Blood return noted pre and post infusion.  Site patent and intact, free from redness, edema or discomfort.  No evidence of extravasations.  Access discontinued per protocol.       Discharge Plan:   Prescription refills given for Compazine and BG Monitor Kit.  Discharge instructions reviewed with: Patient.  Patient and/or family verbalized understanding of discharge instructions and all questions answered.  Copy of AVS reviewed with patient and/or family.  Patient will return 5/2 for next infusion and 5/12 for next MD appointment.  Patient discharged in stable condition accompanied by: self.  Departure Mode: Ambulatory.      Hannah Renee, RN                  HPI      ROS      Physical Exam

## 2022-04-26 NOTE — PATIENT INSTRUCTIONS
[facility] Triage and after hours / weekends / holidays:  [phone]    Please call the triage or after hours line if you experience a temperature greater than or equal to 100.4, shaking chills, have uncontrolled nausea, vomiting and/or diarrhea, dizziness, shortness of breath, chest pain, bleeding, unexplained bruising, or if you have any other new/concerning symptoms, questions or concerns.      If you are having any concerning symptoms or wish to speak to a provider before your next infusion visit, please call your care coordinator or triage to notify them so we can adequately serve you.     If you need a refill on a narcotic prescription or other medication, please call before your infusion appointment.                April 2022 Sunday Monday Tuesday Wednesday Thursday Friday Saturday                            1    ECHO COMPLETE   9:15 AM   (60 min.)   [email or web address]   [facility]    NEW  10:00 AM   (90 min.)   Mane Razo MD   [facility] Radiation Oncology    MR SOFT TISSUE NECK WWO   2:45 PM   (45 min.)   [email or web address]   [facility] Imaging 2       3     4    LAB CENTRAL   7:45 AM   (15 min.)   [facility]   [facility]    RETURN   9:15 AM   (45 min.)   Maryjo Polanco CNP   [facility]    ONC INFUSION 4 HR (240 MIN)  10:30 AM   (240 min.)    ONC INFUSION NURSE   [facility] 5     6     7     8     9       10     11    LAB CENTRAL   3:00 PM   (15 min.)    [facility]   [facility]    ONC INFUSION 1 HR (60 MIN)   3:30 PM   (60 min.)    ONC INFUSION NURSE   [facility] 12     13     14     15     16       17     18     19     20     21     22     23       24     25     26    LAB CENTRAL   8:15 AM   (15 min.)   [facility]   [facility]    RETURN   8:45 AM   (45 min.)   Maryjo Polanco CNP   [facility]    ONC INFUSION 4 HR (240 MIN)  10:30 AM   (240 min.)   [facility] ONC INFUSION NURSE   [facility] 27     28     29     30                   May 2022      [place] Monday Tuesday Wednesday Thursday Friday Saturday   1     2    LAB CENTRAL   9:30 AM   (15 min.)   [facility]   [facility]    ONC INFUSION 1 HR (60 MIN)  10:00 AM   (60 min.)   [facility] ONC INFUSION NURSE   [facility] 3     4     5     6     7       8     9     10     11     12    VIDEO VISIT RETURN   8:55 AM   (30 min.)   Dariela Melvin MD   [facility] 13     14       15     16    LAB CENTRAL   7:00 AM   (15 min.)   [facility]   [facility]    ONC INFUSION 4 HR (240 MIN)   7:30 AM   (240 min.)   [facility] ONC INFUSION NURSE   [facility] 17     18     19     20     21       22     23    LAB CENTRAL   1:00 PM   (15 min.)   [facility]   [facility]    ONC INFUSION 1 HR (60 MIN)   1:30 PM   (60 min.)   [facility] ONC INFUSION NURSE   [facility] 24     25     26     27     28       29     30     31                                           Lab Results:  Recent Results (from the past 12 hour(s))   Comprehensive metabolic panel    Collection Time: 04/26/22  8:29 AM   Result Value Ref Range    Sodium 142 133 - 144 mmol/L    Potassium 4.0 3.4 - 5.3 mmol/L    Chloride 104 94 - 109 mmol/L    Carbon Dioxide (CO2) 33 (H) 20 - 32 mmol/L    Anion Gap 5 3 - 14 mmol/L    Urea Nitrogen 12 7 - 30 mg/dL    Creatinine 0.70 0.66 - 1.25 mg/dL    Calcium 9.0 8.5 - 10.1 mg/dL    Glucose 192 (H) 70 - 99 mg/dL    Alkaline Phosphatase 91 40 - 150 U/L    AST 60 (H) 0 - 45 U/L     (H) 0 - 70 U/L    Protein Total 7.7 6.8 - 8.8 g/dL    Albumin 3.3 (L)  3.4 - 5.0 g/dL    Bilirubin Total 0.4 0.2 - 1.3 mg/dL    GFR Estimate >90 >60 mL/min/1.73m2   Magnesium    Collection Time: 04/26/22  8:29 AM   Result Value Ref Range    Magnesium 1.7 1.6 - 2.3 mg/dL   CBC with platelets and differential    Collection Time: 04/26/22  8:29 AM   Result Value Ref Range    WBC Count 4.5 4.0 - 11.0 10e3/uL    RBC Count 4.95 4.40 - 5.90 10e6/uL    Hemoglobin 13.0 (L) 13.3 - 17.7 g/dL    Hematocrit 42.3 40.0 - 53.0 %    MCV 86 78 - 100 fL    MCH 26.3 (L) 26.5 - 33.0 pg    MCHC 30.7 (L) 31.5 - 36.5 g/dL    RDW 14.5 10.0 - 15.0 %    Platelet Count 329 150 - 450 10e3/uL    % Neutrophils 56 %    % Lymphocytes 29 %    % Monocytes 13 %    % Eosinophils 1 %    % Basophils 0 %    % Immature Granulocytes 1 %    NRBCs per 100 WBC 0 <1 /100    Absolute Neutrophils 2.6 1.6 - 8.3 10e3/uL    Absolute Lymphocytes 1.3 0.8 - 5.3 10e3/uL    Absolute Monocytes 0.6 0.0 - 1.3 10e3/uL    Absolute Eosinophils 0.0 0.0 - 0.7 10e3/uL    Absolute Basophils 0.0 0.0 - 0.2 10e3/uL    Absolute Immature Granulocytes 0.0 <=0.4 10e3/uL    Absolute NRBCs 0.0 10e3/uL

## 2022-04-26 NOTE — NURSING NOTE
Chief Complaint   Patient presents with     Port Draw     Labs drawn via port by RN in lab. VS taken      Port accessed with 20 gauge 3/4 in gripper needle by RN, labs collected, line flushed with saline and heparin.  Vitals taken. Pt checked in for next appointment.    Marianna Wong RN

## 2022-04-26 NOTE — PROGRESS NOTES
"   [facility] CANCER [facility]    PATIENT NAME: Jareth Gallardo  MRN # [id number]   DATE OF VISIT: April 26, 2022  YOB: 1960     Referring Provider: Dr. Jesus Boston  Radiation Oncology:  Primary Care Provider: Dr. Beni Gallardo at [facility] in [facility]    CANCER TYPE: Nasopharyngeal carcinoma  STAGE: [id number] (DENAE)  ECOG PS: 1    PD-L1: 20% using  clone llocally   NGS:     SUMMARY  12/11/21 UC for L neck mass, selling  12/9/21 CT neck ([place]). 2.4 cm L neck node medial to SCM just above the hyoid, additional LNs surround, scattered R neck and L supraclavicular LNs  2/15/22 US bx L cervical node. Path: SCC, moderately differentiated,   3/4/22 PET/CT. Nasopharyngeal and oropharyngeal mucosal thickening. 2.6 cm BERNARDO nodular GGO, SUV avid, some additional lingular uptake  4/4/22 C1D1 carbo/gem    SUBJECTIVE  Jareth is a 60 yo male who presents today for nasopharyngeal carcinoma, follow up after cycle 1  Had some nausea after, aofran gave headache, compazine daily helpful   Having vivid dreams  Had fatigue first week, now better  Less nasal congestion/rhinnorhea/nose bleeds since last visit even but largely since stopping work, continues to improve  Baseline LION, breathing \"feel better than it ever has\"  Eating and drinking well, down to one pepsi per day. Also trying to do 64 oz water  No swallowing issues  Feels like L neck LAD has decreased in size and firmness  Blood sugars BID--> 100's in morning, 200's in evening after dinner  BID bowel movement  Chronic leg swelling, on lasix. Stable    10 point review of systems otherwise negative    PAST MEDICAL HISTORY  Nasopharyngeal carcinoma as above  DM2  COPD??  Hiatal hernia. Sleeps with his head elevated  LVH on TTE 2009, EF50%, LA Dilation  Dyslipidemia  BARRETT in 2015. Not using CPAP due to developing tooth abscesses when he tried it in 2015  Venous insufficiency in both legs being in an accident involving a mower about 10 years ago, on " chronic furosemide. LE US negative 12/15/2009  Cholelithiasis  Hyperplastic colon polyp, due 2020  Tonsillectomy  Baker cyst RLE US 2013    Neuropathy from DM - R foot numbness chronically    Exposed to lots of wood dust in his occupation    CURRENT OUTPATIENT MEDICATIONS  Current Outpatient Medications   Medication Sig Dispense Refill     atorvastatin (LIPITOR) 20 MG tablet Take 20 mg by mouth       fluticasone-vilanterol (BREO ELLIPTA) 100-25 MCG/INH inhaler INHALE 1 PUFF INTO THE LUNGS ONE TIME A DAY. RINSE MOUTH AFTER USE.       furosemide (LASIX) 20 MG tablet Take 40 mg by mouth       metFORMIN (GLUCOPHAGE) 1000 MG tablet TAKE 1 TABLET BY MOUTH ONE TIME A DAY. TAKE WITH FOOD.       ondansetron (ZOFRAN) 8 MG tablet Take 1 tablet (8 mg) by mouth every 8 hours as needed for nausea (Patient not taking: Reported on 4/11/2022) 30 tablet 11     prochlorperazine (COMPAZINE) 10 MG tablet Take 1 tablet (10 mg) by mouth every 6 hours as needed (Nausea/Vomiting) 30 tablet 5   aspirin on hold due to epistaxis    ALLERGIES  No Known Allergies     SOCIAL HISTORY: . Used to work as a . Bowls in a league. Former smoker, quit in 2009, about 28 PY    FAMILY HISTORY: Father had leukemia. Mother had CVA. Sister had melanoma. Stepdad had kidney failure    REVIEW OF SYSTEMS  As above in the HPI, o/w complete 12-point ROS was negative.    PHYSICAL EXAM  BP (!) 140/91   Pulse 100   Temp 98.3  F (36.8  C) (Oral)   Resp 16   Wt 138.9 kg (306 lb 3.2 oz)   SpO2 95%   BMI 43.94 kg/m    GEN: NAD  HEENT: EOMI, no icterus, injection or pallor  NECK: easily palpable L cervical adenopathy  LUNGS: clear bilaterally  CV: regular, no murmurs, rubs, or gallops  ABDOMEN: soft, non-tender, non-distended, normal bowel sounds  EXT: warm, trace edema vs tight skin, symmetric bilaterally. No venous stasis changes  NEURO: alert    LABORATORY AND IMAGING STUDIES    Most Recent 3 CBC's:  Recent Labs   Lab Test 04/26/22  0829  04/11/22  1508 04/04/22  0813   WBC 4.5 4.7 6.4   HGB 13.0* 13.8 13.8   MCV 86 83 87    163 208     Most Recent 3 BMP's:  Recent Labs   Lab Test 04/26/22  0829 04/11/22  1601 04/04/22  0813    138 140   POTASSIUM 4.0 3.5 4.1   CHLORIDE 104 100 104   CO2 33* 31 33*   BUN 12 14 16   CR 0.70 0.62* 0.78   ANIONGAP 5 7 3   LAI 9.0 8.8 8.4*   * 130* 173*    Most Recent 2 LFT's:  Recent Labs   Lab Test 04/26/22  0829 04/11/22  1601   AST 60* 49*   * 75*   ALKPHOS 91 78   BILITOTAL 0.4 0.5    Most Recent TSH and T4:No lab results found.  I reviewed the above labs today.       MR SOFT TISSUE NECK W/O & W CONTRAST 4/1/2022 3:55 PM                                                            Impression:    1. No significant change in the asymmetric bilateral nasopharyngeal  mass, greatest on the left, consistent with nasopharyngeal carcinoma.  2. Bilateral retropharyngeal, bilateral level 2, left level 3, and  left level 5 lymph node metastases. Extracapsular leny extension of  disease on the left.     ASSESSMENT AND PLAN  Nasopharyngeal carcinoma, EBV positive: Plan for induction chemo with carbo + gemzar, 2-3 cycles, followed by chemoradiation. Changed from original plan of cisplatin to carboplatin due to hearing loss on audiogram. Tolerated cycle 1 well, some fatigue and nausea. And correlating transaminitis Proceed with C2 today.   --RTC next week for day 8 gemzar  --Anticipate restaging after 2-3 cycles of induction with CT neck and CT chest/abd vs PET/CT. Need to clarify exact timing/imaging with Dr. Brown given she has visit with him prior to C3.    OJ: zofran gave him headache. Compazine effective    Lung nodules, left: Re-evaluate after induction    Chronic LE swelling: On daily furosemide. TTE ok. Monitor kidney function while on chemotherapy in light of diuretics--stable today    DM2: Eyes exam recently locally, no acute findings. A1c in March was 7.2. has not yet set up PCP appt to  discuss blood sugar monitoring in light of upcoming treatment which includes steroids. Reminded him to set up appt soon  -rx for glucose monitor    Mild peripheral neuropathy: R foot numbness, very mild, from DM2.     50 minutes spent on the date of the encounter doing chart review, review of test results, interpretation of tests, patient visit, documentation and discussion with other provider(s)     Maryjo Polanco CNP on 4/26/2022 at 9:55 AM

## 2022-04-26 NOTE — NURSING NOTE
"Oncology Rooming Note    April 26, 2022 8:43 AM   Jareth Gallardo is a 61 year old male who presents for:    Chief Complaint   Patient presents with     Port Draw     Labs drawn via port by RN in lab. VS taken      Oncology Clinic Visit     Nasopharyngeal cancer     Initial Vitals: BP (!) 140/91   Pulse 100   Temp 98.3  F (36.8  C) (Oral)   Resp 16   Wt 138.9 kg (306 lb 3.2 oz)   SpO2 95%   BMI 43.94 kg/m   Estimated body mass index is 43.94 kg/m  as calculated from the following:    Height as of 3/31/22: 1.778 m (5' 10\").    Weight as of this encounter: 138.9 kg (306 lb 3.2 oz). Body surface area is 2.62 meters squared.  No Pain (0) Comment: Data Unavailable   No LMP for male patient.  Allergies reviewed: Yes  Medications reviewed: Yes    Medications: MEDICATION REFILLS NEEDED TODAY. Provider was notified.       Compazine       Pharmacy name entered into [employer]:    [id number] - [place], [place] - [address] [facility] - [place], [place] - [phone]    Clinical concerns: blood sugar tracking sheet     Having very vivid dreams over last couple weeks after starting chemo      Juan Pablo Scales            "

## 2022-05-02 ENCOUNTER — INFUSION THERAPY VISIT (OUTPATIENT)
Dept: ONCOLOGY | Facility: CLINIC | Age: 62
End: 2022-05-02
Attending: INTERNAL MEDICINE
Payer: COMMERCIAL

## 2022-05-02 ENCOUNTER — APPOINTMENT (OUTPATIENT)
Dept: LAB | Facility: CLINIC | Age: 62
End: 2022-05-02
Attending: INTERNAL MEDICINE
Payer: COMMERCIAL

## 2022-05-02 VITALS
WEIGHT: 303.4 LBS | OXYGEN SATURATION: 92 % | HEART RATE: 94 BPM | SYSTOLIC BLOOD PRESSURE: 132 MMHG | DIASTOLIC BLOOD PRESSURE: 85 MMHG | BODY MASS INDEX: 43.53 KG/M2 | RESPIRATION RATE: 14 BRPM | TEMPERATURE: 98.7 F

## 2022-05-02 DIAGNOSIS — C11.9 NASOPHARYNGEAL CANCER (H): Primary | ICD-10-CM

## 2022-05-02 LAB
ALBUMIN SERPL-MCNC: 3.2 G/DL (ref 3.4–5)
ALP SERPL-CCNC: 91 U/L (ref 40–150)
ALT SERPL W P-5'-P-CCNC: 181 U/L (ref 0–70)
ANION GAP SERPL CALCULATED.3IONS-SCNC: 6 MMOL/L (ref 3–14)
AST SERPL W P-5'-P-CCNC: 111 U/L (ref 0–45)
BASOPHILS # BLD AUTO: 0 10E3/UL (ref 0–0.2)
BASOPHILS NFR BLD AUTO: 1 %
BILIRUB SERPL-MCNC: 0.6 MG/DL (ref 0.2–1.3)
BUN SERPL-MCNC: 16 MG/DL (ref 7–30)
CALCIUM SERPL-MCNC: 8.7 MG/DL (ref 8.5–10.1)
CHLORIDE BLD-SCNC: 102 MMOL/L (ref 94–109)
CO2 SERPL-SCNC: 32 MMOL/L (ref 20–32)
CREAT SERPL-MCNC: 0.63 MG/DL (ref 0.66–1.25)
EOSINOPHIL # BLD AUTO: 0 10E3/UL (ref 0–0.7)
EOSINOPHIL NFR BLD AUTO: 1 %
ERYTHROCYTE [DISTWIDTH] IN BLOOD BY AUTOMATED COUNT: 13.9 % (ref 10–15)
GFR SERPL CREATININE-BSD FRML MDRD: >90 ML/MIN/1.73M2
GLUCOSE BLD-MCNC: 153 MG/DL (ref 70–99)
HCT VFR BLD AUTO: 38.5 % (ref 40–53)
HGB BLD-MCNC: 12.1 G/DL (ref 13.3–17.7)
IMM GRANULOCYTES # BLD: 0 10E3/UL
IMM GRANULOCYTES NFR BLD: 1 %
LYMPHOCYTES # BLD AUTO: 1 10E3/UL (ref 0.8–5.3)
LYMPHOCYTES NFR BLD AUTO: 45 %
MCH RBC QN AUTO: 26.5 PG (ref 26.5–33)
MCHC RBC AUTO-ENTMCNC: 31.4 G/DL (ref 31.5–36.5)
MCV RBC AUTO: 84 FL (ref 78–100)
MONOCYTES # BLD AUTO: 0.2 10E3/UL (ref 0–1.3)
MONOCYTES NFR BLD AUTO: 7 %
NEUTROPHILS # BLD AUTO: 1 10E3/UL (ref 1.6–8.3)
NEUTROPHILS NFR BLD AUTO: 45 %
NRBC # BLD AUTO: 0 10E3/UL
NRBC BLD AUTO-RTO: 0 /100
PLATELET # BLD AUTO: 299 10E3/UL (ref 150–450)
POTASSIUM BLD-SCNC: 4 MMOL/L (ref 3.4–5.3)
PROT SERPL-MCNC: 7.2 G/DL (ref 6.8–8.8)
RBC # BLD AUTO: 4.57 10E6/UL (ref 4.4–5.9)
SODIUM SERPL-SCNC: 140 MMOL/L (ref 133–144)
WBC # BLD AUTO: 2.2 10E3/UL (ref 4–11)

## 2022-05-02 PROCEDURE — 96413 CHEMO IV INFUSION 1 HR: CPT

## 2022-05-02 PROCEDURE — 250N000011 HC RX IP 250 OP 636: Performed by: INTERNAL MEDICINE

## 2022-05-02 PROCEDURE — 96375 TX/PRO/DX INJ NEW DRUG ADDON: CPT

## 2022-05-02 PROCEDURE — 96377 APPLICATON ON-BODY INJECTOR: CPT | Mod: 59

## 2022-05-02 PROCEDURE — 85025 COMPLETE CBC W/AUTO DIFF WBC: CPT | Performed by: NURSE PRACTITIONER

## 2022-05-02 PROCEDURE — 250N000011 HC RX IP 250 OP 636: Performed by: NURSE PRACTITIONER

## 2022-05-02 PROCEDURE — 258N000003 HC RX IP 258 OP 636: Performed by: NURSE PRACTITIONER

## 2022-05-02 PROCEDURE — 80053 COMPREHEN METABOLIC PANEL: CPT

## 2022-05-02 PROCEDURE — 36591 DRAW BLOOD OFF VENOUS DEVICE: CPT

## 2022-05-02 RX ORDER — HEPARIN SODIUM (PORCINE) LOCK FLUSH IV SOLN 100 UNIT/ML 100 UNIT/ML
5 SOLUTION INTRAVENOUS
Status: DISCONTINUED | OUTPATIENT
Start: 2022-05-02 | End: 2022-05-02 | Stop reason: HOSPADM

## 2022-05-02 RX ORDER — HEPARIN SODIUM (PORCINE) LOCK FLUSH IV SOLN 100 UNIT/ML 100 UNIT/ML
5 SOLUTION INTRAVENOUS DAILY PRN
Status: DISCONTINUED | OUTPATIENT
Start: 2022-05-02 | End: 2022-05-02 | Stop reason: HOSPADM

## 2022-05-02 RX ADMIN — SODIUM CHLORIDE 250 ML: 9 INJECTION, SOLUTION INTRAVENOUS at 11:13

## 2022-05-02 RX ADMIN — GEMCITABINE 2600 MG: 38 INJECTION, SOLUTION INTRAVENOUS at 12:26

## 2022-05-02 RX ADMIN — Medication 5 ML: at 10:01

## 2022-05-02 RX ADMIN — Medication 5 ML: at 13:13

## 2022-05-02 RX ADMIN — DEXAMETHASONE SODIUM PHOSPHATE 12 MG: 10 INJECTION INTRAMUSCULAR; INTRAVENOUS at 11:37

## 2022-05-02 ASSESSMENT — PAIN SCALES - GENERAL: PAINLEVEL: NO PAIN (0)

## 2022-05-02 NOTE — PATIENT INSTRUCTIONS
[facility] Triage Nurse Line: [phone]    Call triage nurse with chills and/or temperature greater than or equal to 100.4, uncontrolled nausea/vomiting, diarrhea, constipation, dizziness, shortness of breath, chest pain, bleeding, unexplained bruising, or any new/concerning symptoms, questions/concerns.     If you are having any concerning symptoms or wish to speak to a provider before your next infusion visit, please call your care coordinator or triage to notify them so we can adequately serve you.        Lab Results:  Recent Results (from the past 12 hour(s))   Comprehensive metabolic panel    Collection Time: 05/02/22 10:07 AM   Result Value Ref Range    Sodium 140 133 - 144 mmol/L    Potassium 4.0 3.4 - 5.3 mmol/L    Chloride 102 94 - 109 mmol/L    Carbon Dioxide (CO2) 32 20 - 32 mmol/L    Anion Gap 6 3 - 14 mmol/L    Urea Nitrogen 16 7 - 30 mg/dL    Creatinine 0.63 (L) 0.66 - 1.25 mg/dL    Calcium 8.7 8.5 - 10.1 mg/dL    Glucose 153 (H) 70 - 99 mg/dL    Alkaline Phosphatase 91 40 - 150 U/L     (H) 0 - 45 U/L     (H) 0 - 70 U/L    Protein Total 7.2 6.8 - 8.8 g/dL    Albumin 3.2 (L) 3.4 - 5.0 g/dL    Bilirubin Total 0.6 0.2 - 1.3 mg/dL    GFR Estimate >90 >60 mL/min/1.73m2   CBC with platelets and differential    Collection Time: 05/02/22 10:07 AM   Result Value Ref Range    WBC Count 2.2 (L) 4.0 - 11.0 10e3/uL    RBC Count 4.57 4.40 - 5.90 10e6/uL    Hemoglobin 12.1 (L) 13.3 - 17.7 g/dL    Hematocrit 38.5 (L) 40.0 - 53.0 %    MCV 84 78 - 100 fL    MCH 26.5 26.5 - 33.0 pg    MCHC 31.4 (L) 31.5 - 36.5 g/dL    RDW 13.9 10.0 - 15.0 %    Platelet Count 299 150 - 450 10e3/uL    % Neutrophils 45 %    % Lymphocytes 45 %    % Monocytes 7 %    % Eosinophils 1 %    % Basophils 1 %    % Immature Granulocytes 1 %    NRBCs per 100 WBC 0 <1 /100    Absolute Neutrophils 1.0 (L) 1.6 - 8.3 10e3/uL    Absolute Lymphocytes 1.0 0.8 - 5.3 10e3/uL    Absolute Monocytes 0.2 0.0 - 1.3 10e3/uL    Absolute  Eosinophils 0.0 0.0 - 0.7 10e3/uL    Absolute Basophils 0.0 0.0 - 0.2 10e3/uL    Absolute Immature Granulocytes 0.0 <=0.4 10e3/uL    Absolute NRBCs 0.0 10e3/uL

## 2022-05-02 NOTE — PROGRESS NOTES
Infusion Nursing Note:  Jareth Gallardo presents today for Cycle 2 Day 8 gemcitabine.    Patient seen by provider today: No   present during visit today: Not Applicable.    Note: Cristobal presents today feeling well. Denies pain or nausea/vomiting. Denies fevers/chills. Denies SOB, cough, chest pain, or dizziness/lightheadedness. Denies constipation/diarrhea. Denies urinary issues. Denies neuropathy. Offers no new concerns at this appointment.    CHYNA Polanco CNP/Negrita Green, ZULEIKA 1100  Ok to proceed with ANC and LFTs today  Add on neulasta-onpro (not approved by insurance today; Maryjo to add on neupogen injection orders for 05/03)      Intravenous Access:  Implanted Port.    Treatment Conditions:     Latest Reference Range & Units 05/02/22 10:07   Sodium 133 - 144 mmol/L 140   Potassium 3.4 - 5.3 mmol/L 4.0   Chloride 94 - 109 mmol/L 102   Carbon Dioxide 20 - 32 mmol/L 32   Urea Nitrogen 7 - 30 mg/dL 16   Creatinine 0.66 - 1.25 mg/dL 0.63 (L)   GFR Estimate >60 mL/min/1.73m2 >90 [1]   Calcium 8.5 - 10.1 mg/dL 8.7   Anion Gap 3 - 14 mmol/L 6   Albumin 3.4 - 5.0 g/dL 3.2 (L)   Protein Total 6.8 - 8.8 g/dL 7.2   Bilirubin Total 0.2 - 1.3 mg/dL 0.6   Alkaline Phosphatase 40 - 150 U/L 91   ALT 0 - 70 U/L 181 (H)   AST 0 - 45 U/L 111 (H)   Glucose 70 - 99 mg/dL 153 (H)   WBC 4.0 - 11.0 10e3/uL 2.2 (L)   Hemoglobin 13.3 - 17.7 g/dL 12.1 (L)   Hematocrit 40.0 - 53.0 % 38.5 (L)   Platelet Count 150 - 450 10e3/uL 299   RBC Count 4.40 - 5.90 10e6/uL 4.57   MCV 78 - 100 fL 84   MCH 26.5 - 33.0 pg 26.5   MCHC 31.5 - 36.5 g/dL 31.4 (L)   RDW 10.0 - 15.0 % 13.9   % Neutrophils % 45   % Lymphocytes % 45   % Monocytes % 7   % Eosinophils % 1   % Basophils % 1   Absolute Basophils 0.0 - 0.2 10e3/uL 0.0   Absolute Eosinophils 0.0 - 0.7 10e3/uL 0.0   Absolute Immature Granulocytes <=0.4 10e3/uL 0.0   Absolute Lymphocytes 0.8 - 5.3 10e3/uL 1.0   Absolute Monocytes 0.0 - 1.3 10e3/uL 0.2   % Immature Granulocytes % 1    Absolute Neutrophils 1.6 - 8.3 10e3/uL 1.0 (L)   Absolute NRBCs 10e3/uL 0.0   NRBCs per 100 WBC <1 /100 0     Results reviewed, labs did NOT meet treatment parameters: ANC <1.2. See TORB above.      Post Infusion Assessment:  Patient tolerated infusion without incident.  Blood return noted pre and post infusion.  Site patent and intact, free from redness, edema or discomfort.  No evidence of extravasations.  Access discontinued per protocol.       Discharge Plan:   Patient declined prescription refills.  Discharge instructions reviewed with: Patient.  Patient and/or family verbalized understanding of discharge instructions and all questions answered.  AVS to patient via [employer].  Patient will return tomorrow for next infusion appointment.   Patient discharged in stable condition accompanied by: self.  Departure Mode: Ambulatory.      Negrita Green RN

## 2022-05-02 NOTE — NURSING NOTE
Chief Complaint   Patient presents with     Blood Draw     Labs drawn via port by RN in lab. VS taken.      Labs drawn via Port accessed using 20g flat needle. Line flushed and Heparin locked. Vital signs taken. Checked into next appointment.     Maryjo Pang RN

## 2022-05-02 NOTE — ADDENDUM NOTE
Addended by: KATHY NEWBY on: 5/2/2022 11:27 AM     Modules accepted: Orders     Spoke to her at length.  Recommended to take Benadryl p.r.n. for symptoms of insomnia.  Recommended for liquid to soft diet.  Recommended take multivitamin with B complex.

## 2022-05-03 ENCOUNTER — INFUSION THERAPY VISIT (OUTPATIENT)
Dept: ONCOLOGY | Facility: CLINIC | Age: 62
End: 2022-05-03
Attending: INTERNAL MEDICINE
Payer: COMMERCIAL

## 2022-05-03 VITALS
HEART RATE: 90 BPM | SYSTOLIC BLOOD PRESSURE: 153 MMHG | RESPIRATION RATE: 16 BRPM | TEMPERATURE: 98.2 F | DIASTOLIC BLOOD PRESSURE: 96 MMHG | OXYGEN SATURATION: 98 %

## 2022-05-03 DIAGNOSIS — C11.9 NASOPHARYNGEAL CANCER (H): Primary | ICD-10-CM

## 2022-05-03 PROCEDURE — 96372 THER/PROPH/DIAG INJ SC/IM: CPT | Performed by: NURSE PRACTITIONER

## 2022-05-03 PROCEDURE — 250N000011 HC RX IP 250 OP 636: Performed by: NURSE PRACTITIONER

## 2022-05-03 RX ADMIN — PEGFILGRASTIM 6 MG: 6 INJECTION SUBCUTANEOUS at 14:56

## 2022-05-03 NOTE — PATIENT INSTRUCTIONS
[facility] Triage and after hours / weekends / holidays:  [phone]    Please call the triage or after hours line if you experience a temperature greater than or equal to 100.4, shaking chills, have uncontrolled nausea, vomiting and/or diarrhea, dizziness, shortness of breath, chest pain, bleeding, unexplained bruising, or if you have any other new/concerning symptoms, questions or concerns.      If you are having any concerning symptoms or wish to speak to a provider before your next infusion visit, please call your care coordinator or triage to notify them so we can adequately serve you.     If you need a refill on a narcotic prescription or other medication, please call before your infusion appointment.             May 2022      [facility] Monday Tuesday Wednesday Thursday Friday Saturday   1     2    LAB CENTRAL   9:30 AM   (15 min.)   [facility]   [facility]    ONC INFUSION 1 HR (60 MIN)  10:00 AM   (60 min.)    ONC INFUSION NURSE   [facility] 3    ONC INFUSION 0.5 HR (30 MIN)   3:00 PM   (30 min.)    ONC INFUSION NURSE   [facility] 4     5     6     7       8     9     10     11     12    VIDEO VISIT RETURN   8:55 AM   (30 min.)   Dariela Melvin MD   [facility] 13     14       15     16    LAB CENTRAL   7:00 AM   (15 min.)   [facility]   [facility]    ONC INFUSION 4 HR (240 MIN)   7:30 AM   (240 min.)   [facility] ONC INFUSION NURSE   [facility] 17     18     19     20     21       22     23    LAB CENTRAL   1:00 PM   (15 min.)   [facility]   [facility]    ONC INFUSION 1 HR (60 MIN)   1:30 PM   (60 min.)    ONC INFUSION NURSE   [facility] 24     25     26     27     28       29     30     31                                       June 2022 Sunday Monday  Tuesday Wednesday Thursday Friday Saturday                  1     2     3     4       5     6     7     8     9     10     11       12     13     14     15     16     17     18       19     20     21     22     23     24     25       26     27     28     29     30                                 Lab Results:  No results found for this or any previous visit (from the past 12 hour(s)).

## 2022-05-03 NOTE — PROGRESS NOTES
Confirmed the plan with Maryjo Polanco CNP as per note from 5/3/2022, pt is to receive Neupogen today.  TORB. 5/3/2022. 1134. Maryjo Polanco CNP. Luis Correa RN. Proceed with Neulasta as ordered today. To be determined whether pt receives medication next cycle. Therefore no future Neulasta ordered yet.     Infusion Nursing Note:  Jareth Gallardo presents today for Neulasta.    Patient seen by provider today: No   present during visit today: Not Applicable.    Note: Patient presents to infusion feeling ok. Patient states characteristic nausea with no vomiting that usually lasts 3 days after chemo. Patient states nausea is manageable, declining further interventions while in infusion. Patient denies acute discomfort and states no acute complaints or concerns needing to be addressed today. Specifically, patient denies s/s of infection such as fever, sore throat, cough, chest pain, shortness of breath, or changes in taste/smell. Neulasta mechanism of action and possible side effects reviewed. Pt voices understanding to take 10mg Claritin starting today and continue for 5-6 days post injection to help decrease bone/joint discomfort.       Intravenous Access:  No Intravenous access/labs at this visit.    Treatment Conditions:  Not Applicable.      Post Infusion Assessment:  Patient tolerated 1 subcutaneous injection of Neulasta via RLQ of abdomen without incident.       Discharge Plan:   Patient declined prescription refills.  Discharge instructions reviewed with: Patient.  Patient and/or family verbalized understanding of discharge instructions and all questions answered.  AVS to patient via [employer].  Patient will return 5/16/2022 for next appointment.   Patient discharged in stable condition accompanied by: self.  Departure Mode: Ambulatory.  Face to Face time: 0 minutes.      Lusi Correa RN

## 2022-05-11 NOTE — PROGRESS NOTES
[facility] CANCER [facility]    PATIENT NAME: Jareth Gallardo  MRN # [id number]   DATE OF VISIT: May 12, 2022  YOB: 1960     Referring Provider: Dr. Jesus Boston  Radiation Oncology: Dr. Mane Razo  Primary Care Provider: Dr. Beni Gallardo at [facility] in [facility]    CANCER TYPE: Nasopharyngeal carcinoma, p16 negative, SAM positive  STAGE: [id number] (DENAE)  ECOG PS: 1    PD-L1: 20% using  clone locally; TPS 70% at [facility]   NGS:     SUMMARY  12/11/21 UC for L neck mass, selling  12/9/21 CT neck ([place]). 2.4 cm L neck node medial to SCM just above the hyoid, additional LNs surround, scattered R neck and L supraclavicular LNs  2/15/22 US bx L cervical node. Path: SCC, moderately differentiated,   3/4/22 PET/CT. Nasopharyngeal and oropharyngeal mucosal thickening. 2.6 cm BERNARDO nodular GGO, SUV avid, some additional lingular uptake  4/4/22 C1 carboplatin gemcitabine. Not a candidate for cisplatin due to hearing loss on audiogram.  4/26/22 C2 carboplatin gemcitabine. Neulasta 5/3 after C2D8, ANC 1000     ASSESSMENT AND PLAN  Nasopharyngeal carcinoma, SAM +julia: Tolerating chemo acceptably well. Nodes are smaller subjectively. Will complete 3 cycles and restage with MRI and PET/CT. Briefly outlined chemoradiation. I'd like to consider weekly cisplatin with that if possible rather than carboplatin based therapy, but will discuss with Jareth one more time prior to finalizing a decision.    Chemo-nausea: 3 days after day 1 chemo. Compazine once daily - asked him to up to TID scheduled. Ondansetron causing HA so will avoid aloxi.     Chemo-induced neutropenia: Will add provider visit prior to D8 gemcitabine to determine if he really needs neulasta this next cycle, considering it's the last induction cycle.     Transaminitis: G1. Possibly from gemcitabine with underlying radiographic evidence of hepatosteatosis.No other offending meds. Continue to monitor, may need to hold gemcitabine  if worsens further. Hepatitis serologies negative.     Lung nodules: On the left, didn't look like metes, also had modestly sized, minimally FDG avid 4R node and other small mediastinal nodes on staging PET. Decided to monitor for the time being during induction and re-evaluate on the upcoming PET/CT.    Chronic LE swelling: On daily furosemide, no changes with chemo, TTE 4/1/22 technically difficult with limited information but EF 53%, mild RVD, inferolateral/posterior akinesis which was not noted on 2009 TTE in Care Everywhere.     DM2, steroid-induced hyperglycemia: Discussed that it's worthwhile getting the BGs under better control potentially with insulin for the short term given the prolonged treatment course required. Has an appt with his PCP tomorrow. Eye exam pending but blurry vision resolved.     Mild peripheral neuropathy: R foot numbness, very mild, from DM2. Monitor carefully for worsening if we go the cisplatin route.     Work: Estimate that he'll be recovered enough from treatment overall to work in the middle of Sept at the earliest to the middle of Oct more reasonably. Needs letter for employer    40 minutes spent on the date of the encounter doing chart review, history and exam, documentation and further activities per the note     Dariela Melvin MD  Associate Professor of Medicine  Hematology, Oncology and Transplantation      SUBJECTIVE  Jareth returns for follow up of nasopharyngeal carcinoma prior to C3 carboplatin gemcitabine.     Neulasta - had significant joint soreness, had to go back to bed, lasted a day, chills  Retaining fluid a little bit in the arms, some blotchiness after chemo, wonders if it's due to steroids. Improved after a couple of days   No leg swelling  Energy ok  Appetite ok  No nosebleeds. Stuffiness and runniness also stopped. Can breath through his nose now at night.   Can feel the LNs in his neck getting smaller.   Ringing in the ears intermittently worse.   Some nausea  around chemo, some food aversion, keeping diet pretty bland  BGs as high as 300-400 with dexamethasone pre-med  Will see PCP tomorrow, will plan to discuss BGs  Employer wondering when he can go back to work  No other new issues    PAST MEDICAL HISTORY  Nasopharyngeal carcinoma as above  DM2  COPD??  Hiatal hernia. Sleeps with his head elevated  LVH on TTE 2009, EF50%, LA Dilation  Dyslipidemia  BARRETT in 2015. Not using CPAP due to developing tooth abscesses when he tried it in 2015  Venous insufficiency in both legs being in an accident involving a mower about 10 years ago, on chronic furosemide. LE US negative 12/15/2009  Cholelithiasis  Hyperplastic colon polyp, due 2020  Tonsillectomy  Baker cyst RLE US 2013    Neuropathy from DM - R foot numbness chronically    Exposed to lots of wood dust in his occupation    CURRENT OUTPATIENT MEDICATIONS  Current Outpatient Medications   Medication Sig Dispense Refill     atorvastatin (LIPITOR) 20 MG tablet Take 20 mg by mouth       blood glucose (NO BRAND SPECIFIED) lancets standard Use to test blood sugar 2 times daily or as directed. 90 lancet 1     blood glucose (NO BRAND SPECIFIED) test strip Use to test blood sugar 3 times daily or as directed. 90 strip 0     blood glucose monitoring (ACCU-CHEK RANDELL PLUS) meter device kit Use to test blood sugar 2 times daily or as directed. 1 kit 0     fluticasone-vilanterol (BREO ELLIPTA) 100-25 MCG/INH inhaler INHALE 1 PUFF INTO THE LUNGS ONE TIME A DAY. RINSE MOUTH AFTER USE.       furosemide (LASIX) 20 MG tablet Take 40 mg by mouth       metFORMIN (GLUCOPHAGE) 1000 MG tablet TAKE 1 TABLET BY MOUTH ONE TIME A DAY. TAKE WITH FOOD.       ondansetron (ZOFRAN) 8 MG tablet Take 1 tablet (8 mg) by mouth every 8 hours as needed for nausea (Patient not taking: No sig reported) 30 tablet 11     prochlorperazine (COMPAZINE) 10 MG tablet Take 1 tablet (10 mg) by mouth every 6 hours as needed (Nausea/Vomiting) 30 tablet 5     ALLERGIES  No  Known Allergies     REVIEW OF SYSTEMS  As above in the HPI, o/w complete 12-point ROS was negative.    PHYSICAL EXAM  There were no vitals taken for this visit.    GEN: NAD  HEENT: EOMI, no icterus, injection or pallor. Oropharynx is clear.  NECK: no cervical or supraclavicular lymphadenopathy  LUNGS: clear bilaterally  CV: regular, no murmurs, rubs, or gallops  ABDOMEN: soft, non-tender, non-distended, normal bowel sounds  EXT: warm, well perfused, no edema  NEURO: alert  SKIN: no rashes    Remainder of physical exam deferred due to public health emergency and limitations of video visit.    LABORATORY AND IMAGING STUDIES   04/26/22 08:29 05/02/22 10:07   Sodium 142 140   Potassium 4.0 4.0   Chloride 104 102   Carbon Dioxide 33 (H) 32   Urea Nitrogen 12 16   Creatinine 0.70 0.63 (L)   GFR Estimate >90 [1] >90 [2]   Calcium 9.0 8.7   Anion Gap 5 6   Magnesium 1.7    Albumin 3.3 (L) 3.2 (L)   Protein Total 7.7 7.2   Bilirubin Total 0.4 0.6   Alkaline Phosphatase 91 91    (H) 181 (H)   AST 60 (H) 111 (H)   Glucose 192 (H) 153 (H)   WBC 4.5 2.2 (L)   Hemoglobin 13.0 (L) 12.1 (L)   Hematocrit 42.3 38.5 (L)   Platelet Count 329 299   RBC Count 4.95 4.57   MCV 86 84   MCH 26.3 (L) 26.5   MCHC 30.7 (L) 31.4 (L)   RDW 14.5 13.9   % Neutrophils 56 45   % Lymphocytes 29 45   % Monocytes 13 7   % Eosinophils 1 1   % Basophils 0 1   Absolute Basophils 0.0 0.0   Absolute Eosinophils 0.0 0.0   Absolute Immature Granulocytes 0.0 0.0   Absolute Lymphocytes 1.3 1.0   Absolute Monocytes 0.6 0.2   % Immature Granulocytes 1 1   Absolute Neutrophils 2.6 1.0 (L)   Absolute NRBCs 0.0 0.0   NRBCs per 100 WBC 0 0   Hep B Surface Agn Nonreactive    Hepatitis B Core Soila Nonreactive    Hepatitis B Surface Antibody 0.00 [3]    Hepatitis C Antibody Nonreactive      Labs were independently reviewed and interpreted by me       Jareth is a 61 year old who is being evaluated via a billable video visit.      How would you like to obtain your  RAYNA? Brian  If the video visit is dropped, the invitation should be resent by: Send to e-mail at: [email or web address]  Will anyone else be joining your video visit? Janny Gusman     Video Start Time: 9:17 AM    Video-Visit Details  Type of service:  Video Visit  Video End Time: 9:39 PM  Originating Location (pt. Location): Home  Distant Location (provider location):  [facility] CANCER [facility]   Platform used for Video Visit: Ilana

## 2022-05-12 ENCOUNTER — VIRTUAL VISIT (OUTPATIENT)
Dept: ONCOLOGY | Facility: CLINIC | Age: 62
End: 2022-05-12
Attending: INTERNAL MEDICINE
Payer: COMMERCIAL

## 2022-05-12 DIAGNOSIS — C11.9 NASOPHARYNGEAL CANCER (H): Primary | ICD-10-CM

## 2022-05-12 PROCEDURE — 99215 OFFICE O/P EST HI 40 MIN: CPT | Mod: 95 | Performed by: INTERNAL MEDICINE

## 2022-05-12 RX ORDER — LORAZEPAM 2 MG/ML
0.5 INJECTION INTRAMUSCULAR EVERY 4 HOURS PRN
Status: CANCELLED | OUTPATIENT
Start: 2022-05-17

## 2022-05-12 RX ORDER — DIPHENHYDRAMINE HYDROCHLORIDE 50 MG/ML
50 INJECTION INTRAMUSCULAR; INTRAVENOUS
Status: CANCELLED
Start: 2022-05-17

## 2022-05-12 RX ORDER — MEPERIDINE HYDROCHLORIDE 25 MG/ML
25 INJECTION INTRAMUSCULAR; INTRAVENOUS; SUBCUTANEOUS EVERY 30 MIN PRN
Status: CANCELLED | OUTPATIENT
Start: 2022-05-17

## 2022-05-12 RX ORDER — ALBUTEROL SULFATE 0.83 MG/ML
2.5 SOLUTION RESPIRATORY (INHALATION)
Status: CANCELLED | OUTPATIENT
Start: 2022-05-17

## 2022-05-12 RX ORDER — NALOXONE HYDROCHLORIDE 0.4 MG/ML
0.2 INJECTION, SOLUTION INTRAMUSCULAR; INTRAVENOUS; SUBCUTANEOUS
Status: CANCELLED | OUTPATIENT
Start: 2022-05-17

## 2022-05-12 RX ORDER — EPINEPHRINE 1 MG/ML
0.3 INJECTION, SOLUTION INTRAMUSCULAR; SUBCUTANEOUS EVERY 5 MIN PRN
Status: CANCELLED | OUTPATIENT
Start: 2022-05-17

## 2022-05-12 RX ORDER — HEPARIN SODIUM (PORCINE) LOCK FLUSH IV SOLN 100 UNIT/ML 100 UNIT/ML
5 SOLUTION INTRAVENOUS
Status: CANCELLED | OUTPATIENT
Start: 2022-05-17

## 2022-05-12 RX ORDER — METHYLPREDNISOLONE SODIUM SUCCINATE 125 MG/2ML
125 INJECTION, POWDER, LYOPHILIZED, FOR SOLUTION INTRAMUSCULAR; INTRAVENOUS
Status: CANCELLED
Start: 2022-05-17

## 2022-05-12 RX ORDER — HEPARIN SODIUM,PORCINE 10 UNIT/ML
5 VIAL (ML) INTRAVENOUS
Status: CANCELLED | OUTPATIENT
Start: 2022-05-17

## 2022-05-12 RX ORDER — ALBUTEROL SULFATE 90 UG/1
1-2 AEROSOL, METERED RESPIRATORY (INHALATION)
Status: CANCELLED
Start: 2022-05-17

## 2022-05-12 NOTE — LETTER
5/12/2022         RE: Jareth Gallardo  [id number] [place] [address]        Dear Colleague,    Thank you for referring your patient, Jareth Gallardo, to the [facility] CANCER [facility]. Please see a copy of my visit note below.       [facility] CANCER [facility]    PATIENT NAME: Jareth Gallardo  MRN # [id number]   DATE OF VISIT: May 12, 2022  YOB: 1960     Referring Provider: Dr. Jesus Boston  Radiation Oncology: Dr. Mane Razo  Primary Care Provider: Dr. Beni Gallardo at [facility] in [facility]    CANCER TYPE: Nasopharyngeal carcinoma, p16 negative, SAM positive  STAGE: [id number] (DENAE)  ECOG PS: 1    PD-L1: 20% using  clone locally; TPS 70% at [facility]   NGS:     SUMMARY  12/11/21 UC for L neck mass, selling  12/9/21 CT neck ([place]). 2.4 cm L neck node medial to SCM just above the hyoid, additional LNs surround, scattered R neck and L supraclavicular LNs  2/15/22 US bx L cervical node. Path: SCC, moderately differentiated,   3/4/22 PET/CT. Nasopharyngeal and oropharyngeal mucosal thickening. 2.6 cm BERNARDO nodular GGO, SUV avid, some additional lingular uptake  4/4/22 C1 carboplatin gemcitabine. Not a candidate for cisplatin due to hearing loss on audiogram.  4/26/22 C2 carboplatin gemcitabine. Neulasta 5/3 after C2D8, ANC 1000     ASSESSMENT AND PLAN  Nasopharyngeal carcinoma, SAM +julia: Tolerating chemo acceptably well. Nodes are smaller subjectively. Will complete 3 cycles and restage with MRI and PET/CT. Briefly outlined chemoradiation. I'd like to consider weekly cisplatin with that if possible rather than carboplatin based therapy, but will discuss with Jareth one more time prior to finalizing a decision.    Chemo-nausea: 3 days after day 1 chemo. Compazine once daily - asked him to up to TID scheduled. Ondansetron causing HA so will avoid aloxi.     Chemo-induced neutropenia: Will add provider visit prior to D8 gemcitabine to determine if he  really needs neulasta this next cycle, considering it's the last induction cycle.     Transaminitis: G1. Possibly from gemcitabine with underlying radiographic evidence of hepatosteatosis.No other offending meds. Continue to monitor, may need to hold gemcitabine if worsens further. Hepatitis serologies negative.     Lung nodules: On the left, didn't look like metes, also had modestly sized, minimally FDG avid 4R node and other small mediastinal nodes on staging PET. Decided to monitor for the time being during induction and re-evaluate on the upcoming PET/CT.    Chronic LE swelling: On daily furosemide, no changes with chemo, TTE 4/1/22 technically difficult with limited information but EF 53%, mild RVD, inferolateral/posterior akinesis which was not noted on 2009 TTE in Care Everywhere.     DM2, steroid-induced hyperglycemia: Discussed that it's worthwhile getting the BGs under better control potentially with insulin for the short term given the prolonged treatment course required. Has an appt with his PCP tomorrow. Eye exam pending but blurry vision resolved.     Mild peripheral neuropathy: R foot numbness, very mild, from DM2. Monitor carefully for worsening if we go the cisplatin route.     Work: Estimate that he'll be recovered enough from treatment overall to work in the middle of Sept at the earliest to the middle of Oct more reasonably. Needs letter for employer    40 minutes spent on the date of the encounter doing chart review, history and exam, documentation and further activities per the note     Dariela Melvin MD  Associate Professor of Medicine  Hematology, Oncology and Transplantation      SHAHANA Templeton returns for follow up of nasopharyngeal carcinoma prior to C3 carboplatin gemcitabine.     Neulasta - had significant joint soreness, had to go back to bed, lasted a day, chills  Retaining fluid a little bit in the arms, some blotchiness after chemo, wonders if it's due to steroids. Improved  after a couple of days   No leg swelling  Energy ok  Appetite ok  No nosebleeds. Stuffiness and runniness also stopped. Can breath through his nose now at night.   Can feel the LNs in his neck getting smaller.   Ringing in the ears intermittently worse.   Some nausea around chemo, some food aversion, keeping diet pretty bland  BGs as high as 300-400 with dexamethasone pre-med  Will see PCP tomorrow, will plan to discuss BGs  Employer wondering when he can go back to work  No other new issues    PAST MEDICAL HISTORY  Nasopharyngeal carcinoma as above  DM2  COPD??  Hiatal hernia. Sleeps with his head elevated  LVH on TTE 2009, EF50%, LA Dilation  Dyslipidemia  BARRETT in 2015. Not using CPAP due to developing tooth abscesses when he tried it in 2015  Venous insufficiency in both legs being in an accident involving a mower about 10 years ago, on chronic furosemide. LE US negative 12/15/2009  Cholelithiasis  Hyperplastic colon polyp, due 2020  Tonsillectomy  Baker cyst RLE US 2013    Neuropathy from DM - R foot numbness chronically    Exposed to lots of wood dust in his occupation    CURRENT OUTPATIENT MEDICATIONS  Current Outpatient Medications   Medication Sig Dispense Refill     atorvastatin (LIPITOR) 20 MG tablet Take 20 mg by mouth       blood glucose (NO BRAND SPECIFIED) lancets standard Use to test blood sugar 2 times daily or as directed. 90 lancet 1     blood glucose (NO BRAND SPECIFIED) test strip Use to test blood sugar 3 times daily or as directed. 90 strip 0     blood glucose monitoring (ACCU-CHEK RANDELL PLUS) meter device kit Use to test blood sugar 2 times daily or as directed. 1 kit 0     fluticasone-vilanterol (BREO ELLIPTA) 100-25 MCG/INH inhaler INHALE 1 PUFF INTO THE LUNGS ONE TIME A DAY. RINSE MOUTH AFTER USE.       furosemide (LASIX) 20 MG tablet Take 40 mg by mouth       metFORMIN (GLUCOPHAGE) 1000 MG tablet TAKE 1 TABLET BY MOUTH ONE TIME A DAY. TAKE WITH FOOD.       ondansetron (ZOFRAN) 8 MG tablet  Take 1 tablet (8 mg) by mouth every 8 hours as needed for nausea (Patient not taking: No sig reported) 30 tablet 11     prochlorperazine (COMPAZINE) 10 MG tablet Take 1 tablet (10 mg) by mouth every 6 hours as needed (Nausea/Vomiting) 30 tablet 5     ALLERGIES  No Known Allergies     REVIEW OF SYSTEMS  As above in the HPI, o/w complete 12-point ROS was negative.    PHYSICAL EXAM  There were no vitals taken for this visit.    GEN: NAD  HEENT: EOMI, no icterus, injection or pallor. Oropharynx is clear.  NECK: no cervical or supraclavicular lymphadenopathy  LUNGS: clear bilaterally  CV: regular, no murmurs, rubs, or gallops  ABDOMEN: soft, non-tender, non-distended, normal bowel sounds  EXT: warm, well perfused, no edema  NEURO: alert  SKIN: no rashes    Remainder of physical exam deferred due to public health emergency and limitations of video visit.    LABORATORY AND IMAGING STUDIES   04/26/22 08:29 05/02/22 10:07   Sodium 142 140   Potassium 4.0 4.0   Chloride 104 102   Carbon Dioxide 33 (H) 32   Urea Nitrogen 12 16   Creatinine 0.70 0.63 (L)   GFR Estimate >90 [1] >90 [2]   Calcium 9.0 8.7   Anion Gap 5 6   Magnesium 1.7    Albumin 3.3 (L) 3.2 (L)   Protein Total 7.7 7.2   Bilirubin Total 0.4 0.6   Alkaline Phosphatase 91 91    (H) 181 (H)   AST 60 (H) 111 (H)   Glucose 192 (H) 153 (H)   WBC 4.5 2.2 (L)   Hemoglobin 13.0 (L) 12.1 (L)   Hematocrit 42.3 38.5 (L)   Platelet Count 329 299   RBC Count 4.95 4.57   MCV 86 84   MCH 26.3 (L) 26.5   MCHC 30.7 (L) 31.4 (L)   RDW 14.5 13.9   % Neutrophils 56 45   % Lymphocytes 29 45   % Monocytes 13 7   % Eosinophils 1 1   % Basophils 0 1   Absolute Basophils 0.0 0.0   Absolute Eosinophils 0.0 0.0   Absolute Immature Granulocytes 0.0 0.0   Absolute Lymphocytes 1.3 1.0   Absolute Monocytes 0.6 0.2   % Immature Granulocytes 1 1   Absolute Neutrophils 2.6 1.0 (L)   Absolute NRBCs 0.0 0.0   NRBCs per 100 WBC 0 0   Hep B Surface Agn Nonreactive    Hepatitis B Core Soila  Nonreactive    Hepatitis B Surface Antibody 0.00 [3]    Hepatitis C Antibody Nonreactive      Labs were independently reviewed and interpreted by me       Sincerely,    Dariela Melvin MD

## 2022-05-12 NOTE — NURSING NOTE
Patient confirms medications and allergies are accurate via patients echeck in completion, and or denies any changes since last reviewed/verified.     Justin Gusman, Virtual Facilitator

## 2022-05-12 NOTE — LETTER
May 12, 2022      RE: Jareth Gallardo  [id number]  [place] [address]     To Whom It May Concern,     Jareth is undergoing cancer treatment that will continue through Aug 2022. I anticipate that he will not be ready for work until at least mid September at the earliest, more likely mid-October 2022. We will continue to refine this estimate as time goes on - it may be slightly longer or shorter depending on recovery. Please feel free to let me know if you have additional questions. My nurse, Herminia Wu, can be reached at the number above.       Sincerely,          Dariela Melvin M.D.  Associate Professor of Medicine  [facility]

## 2022-05-16 ENCOUNTER — APPOINTMENT (OUTPATIENT)
Dept: LAB | Facility: CLINIC | Age: 62
End: 2022-05-16
Attending: INTERNAL MEDICINE
Payer: COMMERCIAL

## 2022-05-16 ENCOUNTER — INFUSION THERAPY VISIT (OUTPATIENT)
Dept: ONCOLOGY | Facility: CLINIC | Age: 62
End: 2022-05-16
Attending: NURSE PRACTITIONER
Payer: COMMERCIAL

## 2022-05-16 VITALS
HEART RATE: 104 BPM | SYSTOLIC BLOOD PRESSURE: 139 MMHG | DIASTOLIC BLOOD PRESSURE: 85 MMHG | OXYGEN SATURATION: 99 % | TEMPERATURE: 97.9 F | RESPIRATION RATE: 16 BRPM | BODY MASS INDEX: 44.29 KG/M2 | WEIGHT: 308.7 LBS

## 2022-05-16 DIAGNOSIS — C11.9 NASOPHARYNGEAL CANCER (H): Primary | ICD-10-CM

## 2022-05-16 LAB
ALBUMIN SERPL-MCNC: 3.2 G/DL (ref 3.4–5)
ALP SERPL-CCNC: 122 U/L (ref 40–150)
ALT SERPL W P-5'-P-CCNC: 113 U/L (ref 0–70)
ANION GAP SERPL CALCULATED.3IONS-SCNC: 7 MMOL/L (ref 3–14)
AST SERPL W P-5'-P-CCNC: 81 U/L (ref 0–45)
BASOPHILS # BLD MANUAL: 0 10E3/UL (ref 0–0.2)
BASOPHILS NFR BLD MANUAL: 0 %
BILIRUB SERPL-MCNC: 0.4 MG/DL (ref 0.2–1.3)
BUN SERPL-MCNC: 17 MG/DL (ref 7–30)
CALCIUM SERPL-MCNC: 8.9 MG/DL (ref 8.5–10.1)
CHLORIDE BLD-SCNC: 103 MMOL/L (ref 94–109)
CO2 SERPL-SCNC: 32 MMOL/L (ref 20–32)
CREAT SERPL-MCNC: 0.76 MG/DL (ref 0.66–1.25)
EOSINOPHIL # BLD MANUAL: 0 10E3/UL (ref 0–0.7)
EOSINOPHIL NFR BLD MANUAL: 0 %
ERYTHROCYTE [DISTWIDTH] IN BLOOD BY AUTOMATED COUNT: 16.2 % (ref 10–15)
GFR SERPL CREATININE-BSD FRML MDRD: >90 ML/MIN/1.73M2
GLUCOSE BLD-MCNC: 248 MG/DL (ref 70–99)
HCT VFR BLD AUTO: 33.8 % (ref 40–53)
HGB BLD-MCNC: 10.4 G/DL (ref 13.3–17.7)
LYMPHOCYTES # BLD MANUAL: 0.9 10E3/UL (ref 0.8–5.3)
LYMPHOCYTES NFR BLD MANUAL: 10 %
MAGNESIUM SERPL-MCNC: 1.5 MG/DL (ref 1.6–2.3)
MCH RBC QN AUTO: 26.7 PG (ref 26.5–33)
MCHC RBC AUTO-ENTMCNC: 30.8 G/DL (ref 31.5–36.5)
MCV RBC AUTO: 87 FL (ref 78–100)
MONOCYTES # BLD MANUAL: 0.7 10E3/UL (ref 0–1.3)
MONOCYTES NFR BLD MANUAL: 8 %
MYELOCYTES # BLD MANUAL: 0.3 10E3/UL
MYELOCYTES NFR BLD MANUAL: 3 %
NEUTROPHILS # BLD MANUAL: 7 10E3/UL (ref 1.6–8.3)
NEUTROPHILS NFR BLD MANUAL: 79 %
PLAT MORPH BLD: ABNORMAL
PLATELET # BLD AUTO: 244 10E3/UL (ref 150–450)
POTASSIUM BLD-SCNC: 3.9 MMOL/L (ref 3.4–5.3)
PROT SERPL-MCNC: 7.3 G/DL (ref 6.8–8.8)
RBC # BLD AUTO: 3.9 10E6/UL (ref 4.4–5.9)
RBC MORPH BLD: ABNORMAL
SODIUM SERPL-SCNC: 142 MMOL/L (ref 133–144)
WBC # BLD AUTO: 8.8 10E3/UL (ref 4–11)

## 2022-05-16 PROCEDURE — 96367 TX/PROPH/DG ADDL SEQ IV INF: CPT

## 2022-05-16 PROCEDURE — 258N000003 HC RX IP 258 OP 636: Performed by: INTERNAL MEDICINE

## 2022-05-16 PROCEDURE — 96417 CHEMO IV INFUS EACH ADDL SEQ: CPT

## 2022-05-16 PROCEDURE — 96413 CHEMO IV INFUSION 1 HR: CPT

## 2022-05-16 PROCEDURE — 250N000011 HC RX IP 250 OP 636: Performed by: NURSE PRACTITIONER

## 2022-05-16 PROCEDURE — 250N000011 HC RX IP 250 OP 636: Performed by: INTERNAL MEDICINE

## 2022-05-16 PROCEDURE — 85027 COMPLETE CBC AUTOMATED: CPT | Performed by: INTERNAL MEDICINE

## 2022-05-16 PROCEDURE — 80053 COMPREHEN METABOLIC PANEL: CPT | Performed by: INTERNAL MEDICINE

## 2022-05-16 PROCEDURE — 85007 BL SMEAR W/DIFF WBC COUNT: CPT | Performed by: INTERNAL MEDICINE

## 2022-05-16 PROCEDURE — 83735 ASSAY OF MAGNESIUM: CPT | Performed by: INTERNAL MEDICINE

## 2022-05-16 RX ORDER — HEPARIN SODIUM,PORCINE 10 UNIT/ML
5 VIAL (ML) INTRAVENOUS
Status: DISCONTINUED | OUTPATIENT
Start: 2022-05-16 | End: 2022-05-16 | Stop reason: HOSPADM

## 2022-05-16 RX ORDER — HEPARIN SODIUM (PORCINE) LOCK FLUSH IV SOLN 100 UNIT/ML 100 UNIT/ML
5 SOLUTION INTRAVENOUS
Status: DISCONTINUED | OUTPATIENT
Start: 2022-05-16 | End: 2022-05-16 | Stop reason: HOSPADM

## 2022-05-16 RX ORDER — MAGNESIUM SULFATE HEPTAHYDRATE 40 MG/ML
2 INJECTION, SOLUTION INTRAVENOUS ONCE
Status: COMPLETED | OUTPATIENT
Start: 2022-05-16 | End: 2022-05-16

## 2022-05-16 RX ORDER — HEPARIN SODIUM (PORCINE) LOCK FLUSH IV SOLN 100 UNIT/ML 100 UNIT/ML
500 SOLUTION INTRAVENOUS ONCE
Status: COMPLETED | OUTPATIENT
Start: 2022-05-16 | End: 2022-05-16

## 2022-05-16 RX ADMIN — Medication 500 UNITS: at 07:15

## 2022-05-16 RX ADMIN — DEXAMETHASONE SODIUM PHOSPHATE: 10 INJECTION, SOLUTION INTRAMUSCULAR; INTRAVENOUS at 08:29

## 2022-05-16 RX ADMIN — GEMCITABINE 2600 MG: 38 INJECTION, SOLUTION INTRAVENOUS at 09:03

## 2022-05-16 RX ADMIN — MAGNESIUM SULFATE HEPTAHYDRATE 2 G: 40 INJECTION, SOLUTION INTRAVENOUS at 08:58

## 2022-05-16 RX ADMIN — CARBOPLATIN 750 MG: 10 INJECTION, SOLUTION INTRAVENOUS at 09:42

## 2022-05-16 RX ADMIN — Medication 5 ML: at 10:31

## 2022-05-16 RX ADMIN — SODIUM CHLORIDE 250 ML: 9 INJECTION, SOLUTION INTRAVENOUS at 08:14

## 2022-05-16 ASSESSMENT — PAIN SCALES - GENERAL: PAINLEVEL: NO PAIN (0)

## 2022-05-16 NOTE — NURSING NOTE
"Chief Complaint   Patient presents with     Port Draw     Labs drawn via port by RN in lab.  VS taken       Port accessed with 20 gauge 3/4\" gripper neelde by RN, labs collected, line flushed with saline and heparin.  Vitals taken. Pt checked in for appointment(s).    Tona Grossman RN    "

## 2022-05-16 NOTE — PROGRESS NOTES
Infusion Nursing Note:  Jareth Gallardo presents today for Cycle 3 Day 1 Gemzar and Carboplatin.    Patient seen by provider today: No   present during visit today: Not Applicable.    Note: Patient presents to infusion today doing well. Denies any fevers, chills, chest pains, nausea, diarrhea or constipation. Ongoing SOB with exertion that is unchanged. Offers no new changes or concerns since visit with Dr. Melvin on 5/12.    ALT and AST slightly elevated today. Magnesium low at 1.5. Glucose also elevated at 248 today, patient states he saw PCP regarding this last Friday and is planning on picking up/starting injections today. Dr. Melvin notified of the above.    TORB @ 0855 Dr. Melvin/ Eugenia Russo RN:  - OK to proceed with treatment today with elevated ALT/AST  - Replace Magnesium per protocol    2g IV Magnesium given per electrolyte replacement protocol.     Intravenous Access:  Implanted Port.    Treatment Conditions:  Lab Results   Component Value Date    HGB 10.4 (L) 05/16/2022    WBC 8.8 05/16/2022    ANEU 7.0 05/16/2022    ANEUTAUTO 1.0 (L) 05/02/2022     05/16/2022      Lab Results   Component Value Date     05/16/2022    POTASSIUM 3.9 05/16/2022    MAG 1.5 (L) 05/16/2022    CR 0.76 05/16/2022    LAI 8.9 05/16/2022    BILITOTAL 0.4 05/16/2022    ALBUMIN 3.2 (L) 05/16/2022     (H) 05/16/2022    AST 81 (H) 05/16/2022     Results reviewed, labs MET treatment parameters, ok to proceed with treatment.    Post Infusion Assessment:  Patient tolerated infusion without incident.  Blood return noted pre and post infusion.  Site patent and intact, free from redness, edema or discomfort.  No evidence of extravasations.  Access discontinued per protocol.     Discharge Plan:   Patient declined prescription refills.  Discharge instructions reviewed with: Patient.  Patient and/or family verbalized understanding of discharge instructions and all questions answered.  Copy of AVS  reviewed with patient and/or family.  Patient will return 5/23 for next appointment.  Patient discharged in stable condition accompanied by: self.  Departure Mode: Ambulatory.      Eugenia Russo RN

## 2022-05-16 NOTE — PATIENT INSTRUCTIONS
Contact Numbers  [facility]: [phone] (for symptom and scheduling needs)    Please call the [facility] Triage line if you experience a temperature greater than or equal to 100.4, shaking chills, have uncontrolled nausea, vomiting and/or diarrhea, dizziness, shortness of breath, chest pain, bleeding, unexplained bruising, or if you have any other new/concerning symptoms, questions or concerns.     If you are having any concerning symptoms or wish to speak to a provider before your next infusion visit, please call your care coordinator or triage to notify them so we can adequately serve you.     If you need a refill on a narcotic prescription or other medication, please call triage before your infusion appointment.           May 2022      Albert Monday Tuesday Wednesday Thursday Friday Saturday   1     2    LAB CENTRAL   9:30 AM   (15 min.)   [facility] LAB DRAW   [facility]    ONC INFUSION 1 HR (60 MIN)  10:00 AM   (60 min.)    ONC INFUSION NURSE   [facility] 3    ONC INFUSION 0.5 HR (30 MIN)   3:00 PM   (30 min.)    ONC INFUSION NURSE   [facility] 4     5     6     7       8     9     10     11     12    VIDEO VISIT RETURN   8:55 AM   (30 min.)   Dariela Melvin MD   [facility] 13     14       15     16    LAB CENTRAL   7:00 AM   (15 min.)   [facility] LAB DRAW   [facility]    ONC INFUSION 4 HR (240 MIN)   7:30 AM   (240 min.)    ONC INFUSION NURSE   [facility] 17     18     19     20     21       22     23    LAB CENTRAL   2:00 PM   (15 min.)   [facility] LAB DRAW   [facility]    RETURN   2:30 PM   (45 min.)   Jany Mtz CNP   [facility]    ONC INFUSION 1 HR (60 MIN)   3:30 PM   (60 min.)    ONC INFUSION NURSE   [facility] 24     25      26     27     28       29     30     31 June 2022 Sunday Monday Tuesday Wednesday Thursday Friday Saturday                  1     2     3     4       5     6     7     8     9     10     11       12     13     14     15     16     17     18       19     20     21     22     23     24     25       26     27     28     29     30                                 Lab Results:  Recent Results (from the past 12 hour(s))   Comprehensive metabolic panel    Collection Time: 05/16/22  7:14 AM   Result Value Ref Range    Sodium 142 133 - 144 mmol/L    Potassium 3.9 3.4 - 5.3 mmol/L    Chloride 103 94 - 109 mmol/L    Carbon Dioxide (CO2) 32 20 - 32 mmol/L    Anion Gap 7 3 - 14 mmol/L    Urea Nitrogen 17 7 - 30 mg/dL    Creatinine 0.76 0.66 - 1.25 mg/dL    Calcium 8.9 8.5 - 10.1 mg/dL    Glucose 248 (H) 70 - 99 mg/dL    Alkaline Phosphatase 122 40 - 150 U/L    AST 81 (H) 0 - 45 U/L     (H) 0 - 70 U/L    Protein Total 7.3 6.8 - 8.8 g/dL    Albumin 3.2 (L) 3.4 - 5.0 g/dL    Bilirubin Total 0.4 0.2 - 1.3 mg/dL    GFR Estimate >90 >60 mL/min/1.73m2   Magnesium    Collection Time: 05/16/22  7:14 AM   Result Value Ref Range    Magnesium 1.5 (L) 1.6 - 2.3 mg/dL   CBC with platelets and differential    Collection Time: 05/16/22  7:14 AM   Result Value Ref Range    WBC Count 8.8 4.0 - 11.0 10e3/uL    RBC Count 3.90 (L) 4.40 - 5.90 10e6/uL    Hemoglobin 10.4 (L) 13.3 - 17.7 g/dL    Hematocrit 33.8 (L) 40.0 - 53.0 %    MCV 87 78 - 100 fL    MCH 26.7 26.5 - 33.0 pg    MCHC 30.8 (L) 31.5 - 36.5 g/dL    RDW 16.2 (H) 10.0 - 15.0 %    Platelet Count 244 150 - 450 10e3/uL   Manual Differential    Collection Time: 05/16/22  7:14 AM   Result Value Ref Range    % Neutrophils 79 %    % Lymphocytes 10 %    % Monocytes 8 %    % Eosinophils 0 %    % Basophils 0 %    % Myelocytes 3 %    Absolute Neutrophils 7.0 1.6 - 8.3 10e3/uL    Absolute Lymphocytes 0.9 0.8 - 5.3 10e3/uL    Absolute Monocytes 0.7 0.0 -  1.3 10e3/uL    Absolute Eosinophils 0.0 0.0 - 0.7 10e3/uL    Absolute Basophils 0.0 0.0 - 0.2 10e3/uL    Absolute Myelocytes 0.3 (H) <=0.0 10e3/uL    RBC Morphology Confirmed RBC Indices     Platelet Assessment  Automated Count Confirmed. Platelet morphology is normal.     Automated Count Confirmed. Platelet morphology is normal.

## 2022-05-18 DIAGNOSIS — C11.9 NASOPHARYNGEAL CANCER (H): Primary | ICD-10-CM

## 2022-05-20 NOTE — PROGRESS NOTES
[facility] CANCER [facility]    PATIENT NAME: Jareth Gallardo  MRN # [id number]   DATE OF VISIT: May 23, 2022  YOB: 1960     Referring Provider: Dr. Jesus Boston  Radiation Oncology: Dr. Mane Razo  Primary Care Provider: Dr. Beni Gallardo at [facility] in [facility]    CANCER TYPE: Nasopharyngeal carcinoma, p16 negative, SAM positive  STAGE: [id number] (DENAE)  ECOG PS: 1    PD-L1: 20% using  clone locally; TPS 70% at [facility]   NGS:     SUMMARY  12/11/21 UC for L neck mass, selling  12/9/21 CT neck (Protection). 2.4 cm L neck node medial to SCM just above the hyoid, additional LNs surround, scattered R neck and L supraclavicular LNs  2/15/22 US bx L cervical node. Path: SCC, moderately differentiated,   3/4/22 PET/CT. Nasopharyngeal and oropharyngeal mucosal thickening. 2.6 cm BERNARDO nodular GGO, SUV avid, some additional lingular uptake  4/4/22 C1 carboplatin gemcitabine. Not a candidate for cisplatin due to hearing loss on audiogram.  4/26/22 C2 carboplatin gemcitabine. Neulasta 5/3 after C2D8, ANC 1000     ASSESSMENT AND PLAN  Nasopharyngeal carcinoma, SAM +julia: Tolerating chemo acceptably well. Will complete 3 cycles and restage with MRI and PET/CT. He has discussed chemoradiation with Dr. Melvin and Dr. Razo, exact plan will be discussed in further detail next week following PET. Proceed with cycle 3 day gemzar today.    Chemo-nausea: 3 days after day 1 chemo. Continue compazine TID schedule. Ondansetron causing HA so will avoid aloxi.     Chemo-induced neutropenia: WBC 3.4 today, neutrophil WNL. Will proceed with gemzar without additional neulasta.    Transaminitis: Up since last week but still grade 1, will proceed with final gemcitabine dose. Possibly from gemcitabine with underlying radiographic evidence of hepatosteatosis. No other offending meds. Hepatitis serologies negative.     Lung nodules: On the left, didn't look like mets, also had modestly sized, minimally FDG  avid 4R node and other small mediastinal nodes on staging PET. Have been monitoring during induction with plan to  re-evaluate on the upcoming PET/CT.    Chronic LE swelling: On daily furosemide, no changes with chemo, TTE 4/1/22 technically difficult with limited information but EF 53%, mild RVD, inferolateral/posterior akinesis which was not noted on 2009 TTE in Care Everywhere.     DM2, steroid-induced hyperglycemia: Met with diabetic educator and PCP recently, started on Victoza last week. Is waiting for insurance approval for sliding scale insulin as well as other diabetic supplies and ER metformin. Fasting sugars have improved and he reports stable levels in the lower 100s at home for the past week.     Mild peripheral neuropathy: R foot numbness, very mild, from DM2. Monitor carefully for worsening if he proceed with cisplatin moving forward.    Work: Estimate that he'll be recovered enough from treatment overall to work in the middle of Sept at the earliest to the middle of Oct more reasonably. Needs letter for employer    60 minutes spent on the date of the encounter doing chart review, review of test results, interpretation of tests, patient visit, documentation and discussion with family     Jany Mtz CNP  [facility]  [address] [place] [id number]  [phone]    SUBJECTIVE  Started Victoza last week. Diabetic educator after chemo.Sliding scale insulin. Waiting for insurance approval. Wanted to switch metformin to extended release.    Jareth is seen today for consideration of C3D8 Gemzar.  He started Victoza last week following a meeting with the diabetic educator.  He is awaiting insurance approval so he can start sliding scale insulin as well as extended release metformin.  He continues to tolerate treatment rather well with some increased fatigue.  He experiences nausea primarily during the 3 days following his treatments but feels this is well controlled with his  antiemetics.  He and his wife are interested in receiving information on the [employer] for when he begins chemoradiation.    PAST MEDICAL HISTORY  Nasopharyngeal carcinoma as above  DM2  COPD??  Hiatal hernia. Sleeps with his head elevated  LVH on TTE 2009, EF50%, LA Dilation  Dyslipidemia  BARRETT in 2015. Not using CPAP due to developing tooth abscesses when he tried it in 2015  Venous insufficiency in both legs being in an accident involving a mower about 10 years ago, on chronic furosemide. 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 90 lancet 1     blood glucose (NO BRAND SPECIFIED) test strip Use to test blood sugar 3 times daily or as directed. 90 strip 0     blood glucose monitoring (ACCU-CHEK RANDELL PLUS) meter device kit Use to test blood sugar 2 times daily or as directed. 1 kit 0     fluticasone-vilanterol (BREO ELLIPTA) 100-25 MCG/INH inhaler INHALE 1 PUFF INTO THE LUNGS ONE TIME A DAY. RINSE MOUTH AFTER USE.       furosemide (LASIX) 20 MG tablet Take 40 mg by mouth       metFORMIN (GLUCOPHAGE XR) 500 MG 24 hr tablet Take 2 tabs daily. Swallow tablet whole; do not crush, divide or chew.       metFORMIN (GLUCOPHAGE) 1000 MG tablet TAKE 1 TABLET BY MOUTH ONE TIME A DAY. TAKE WITH FOOD.       ondansetron (ZOFRAN) 8 MG tablet Take 1 tablet (8 mg) by mouth every 8 hours as needed for nausea 30 tablet 11     prochlorperazine (COMPAZINE) 10 MG tablet Take 1 tablet (10 mg) by mouth every 6 hours as needed  (Nausea/Vomiting) 30 tablet 5     VICTOZA PEN 18 MG/3ML soln Inject 1.2 mg Subcutaneous daily       ALLERGIES  No Known Allergies     REVIEW OF SYSTEMS  As above in the HPI, o/w complete 12-point ROS was negative.    PHYSICAL EXAM  /89   Pulse 94   Temp 97.9  F (36.6  C) (Oral)   Resp 16   Wt 137 kg (302 lb)   SpO2 96%   BMI 43.33 kg/m      General: Well-appearing male in no acute distress.  Eyes: EOMI, PERRL. No scleral icterus.  Cardiovascular: RRR No murmurs, gallops, or rubs. No peripheral edema.  Respiratory: CTA bilaterally. No wheezes or crackles.  Gastrointestinal: BS +. Abdomen soft, non-tender.   Neurologic: Cranial nerves II through XII are grossly intact.  Skin: No rashes, petechiae, or bruising noted on exposed skin.      LABORATORY AND IMAGING STUDIES   Latest Reference Range & Units 05/23/22 14:04   Sodium 133 - 144 mmol/L 140   Potassium 3.4 - 5.3 mmol/L 3.6   Chloride 94 - 109 mmol/L 102   Carbon Dioxide 20 - 32 mmol/L 32   Urea Nitrogen 7 - 30 mg/dL 16   Creatinine 0.66 - 1.25 mg/dL 0.70   GFR Estimate >60 mL/min/1.73m2 >90 [1]   Calcium 8.5 - 10.1 mg/dL 8.9   Anion Gap 3 - 14 mmol/L 6   Albumin 3.4 - 5.0 g/dL 3.4   Protein Total 6.8 - 8.8 g/dL 7.5   Bilirubin Total 0.2 - 1.3 mg/dL 0.6   Alkaline Phosphatase 40 - 150 U/L 96   ALT 0 - 70 U/L 197 (H)   AST 0 - 45 U/L 117 (H)   Glucose 70 - 99 mg/dL 129 (H)   WBC 4.0 - 11.0 10e3/uL 3.4 (L)   Hemoglobin 13.3 - 17.7 g/dL 9.6 (L)   Hematocrit 40.0 - 53.0 % 30.6 (L)   Platelet Count 150 - 450 10e3/uL 253   RBC Count 4.40 - 5.90 10e6/uL 3.59 (L)   MCV 78 - 100 fL 85   MCH 26.5 - 33.0 pg 26.7   MCHC 31.5 - 36.5 g/dL 31.4 (L)   RDW 10.0 - 15.0 % 15.7 (H)   % Neutrophils % 62   % Lymphocytes % 26   % Monocytes % 11   % Eosinophils % 0   % Basophils % 1   Absolute Basophils 0.0 - 0.2 10e3/uL 0.0   Absolute Eosinophils 0.0 - 0.7 10e3/uL 0.0   Absolute Immature Granulocytes <=0.4 10e3/uL 0.0   Absolute Lymphocytes 0.8 - 5.3 10e3/uL 0.9   Absolute  Monocytes 0.0 - 1.3 10e3/uL 0.4   % Immature Granulocytes % 0   Absolute Neutrophils 1.6 - 8.3 10e3/uL 2.1   Absolute NRBCs 10e3/uL 0.0   NRBCs per 100 WBC <1 /100 0     Labs were independently reviewed and interpreted by me

## 2022-05-23 ENCOUNTER — INFUSION THERAPY VISIT (OUTPATIENT)
Dept: ONCOLOGY | Facility: CLINIC | Age: 62
End: 2022-05-23
Attending: INTERNAL MEDICINE
Payer: COMMERCIAL

## 2022-05-23 ENCOUNTER — APPOINTMENT (OUTPATIENT)
Dept: LAB | Facility: CLINIC | Age: 62
End: 2022-05-23
Attending: INTERNAL MEDICINE
Payer: COMMERCIAL

## 2022-05-23 ENCOUNTER — ONCOLOGY VISIT (OUTPATIENT)
Dept: ONCOLOGY | Facility: CLINIC | Age: 62
End: 2022-05-23
Attending: REGISTERED NURSE
Payer: COMMERCIAL

## 2022-05-23 VITALS
WEIGHT: 302 LBS | RESPIRATION RATE: 16 BRPM | TEMPERATURE: 97.9 F | OXYGEN SATURATION: 96 % | HEART RATE: 94 BPM | DIASTOLIC BLOOD PRESSURE: 89 MMHG | BODY MASS INDEX: 43.33 KG/M2 | SYSTOLIC BLOOD PRESSURE: 137 MMHG

## 2022-05-23 VITALS
HEART RATE: 98 BPM | DIASTOLIC BLOOD PRESSURE: 104 MMHG | RESPIRATION RATE: 24 BRPM | OXYGEN SATURATION: 94 % | SYSTOLIC BLOOD PRESSURE: 135 MMHG

## 2022-05-23 DIAGNOSIS — R74.01 TRANSAMINITIS: ICD-10-CM

## 2022-05-23 DIAGNOSIS — C11.9 NASOPHARYNGEAL CANCER (H): Primary | ICD-10-CM

## 2022-05-23 DIAGNOSIS — T45.1X5A CHEMOTHERAPY-INDUCED NAUSEA: ICD-10-CM

## 2022-05-23 DIAGNOSIS — R11.0 CHEMOTHERAPY-INDUCED NAUSEA: ICD-10-CM

## 2022-05-23 LAB
ALBUMIN SERPL-MCNC: 3.4 G/DL (ref 3.4–5)
ALP SERPL-CCNC: 96 U/L (ref 40–150)
ALT SERPL W P-5'-P-CCNC: 197 U/L (ref 0–70)
ANION GAP SERPL CALCULATED.3IONS-SCNC: 6 MMOL/L (ref 3–14)
AST SERPL W P-5'-P-CCNC: 117 U/L (ref 0–45)
BASOPHILS # BLD AUTO: 0 10E3/UL (ref 0–0.2)
BASOPHILS NFR BLD AUTO: 1 %
BILIRUB SERPL-MCNC: 0.6 MG/DL (ref 0.2–1.3)
BUN SERPL-MCNC: 16 MG/DL (ref 7–30)
CALCIUM SERPL-MCNC: 8.9 MG/DL (ref 8.5–10.1)
CHLORIDE BLD-SCNC: 102 MMOL/L (ref 94–109)
CO2 SERPL-SCNC: 32 MMOL/L (ref 20–32)
CREAT SERPL-MCNC: 0.7 MG/DL (ref 0.66–1.25)
EOSINOPHIL # BLD AUTO: 0 10E3/UL (ref 0–0.7)
EOSINOPHIL NFR BLD AUTO: 0 %
ERYTHROCYTE [DISTWIDTH] IN BLOOD BY AUTOMATED COUNT: 15.7 % (ref 10–15)
GFR SERPL CREATININE-BSD FRML MDRD: >90 ML/MIN/1.73M2
GLUCOSE BLD-MCNC: 129 MG/DL (ref 70–99)
HCT VFR BLD AUTO: 30.6 % (ref 40–53)
HGB BLD-MCNC: 9.6 G/DL (ref 13.3–17.7)
IMM GRANULOCYTES # BLD: 0 10E3/UL
IMM GRANULOCYTES NFR BLD: 0 %
LYMPHOCYTES # BLD AUTO: 0.9 10E3/UL (ref 0.8–5.3)
LYMPHOCYTES NFR BLD AUTO: 26 %
MCH RBC QN AUTO: 26.7 PG (ref 26.5–33)
MCHC RBC AUTO-ENTMCNC: 31.4 G/DL (ref 31.5–36.5)
MCV RBC AUTO: 85 FL (ref 78–100)
MONOCYTES # BLD AUTO: 0.4 10E3/UL (ref 0–1.3)
MONOCYTES NFR BLD AUTO: 11 %
NEUTROPHILS # BLD AUTO: 2.1 10E3/UL (ref 1.6–8.3)
NEUTROPHILS NFR BLD AUTO: 62 %
NRBC # BLD AUTO: 0 10E3/UL
NRBC BLD AUTO-RTO: 0 /100
PLATELET # BLD AUTO: 253 10E3/UL (ref 150–450)
POTASSIUM BLD-SCNC: 3.6 MMOL/L (ref 3.4–5.3)
PROT SERPL-MCNC: 7.5 G/DL (ref 6.8–8.8)
RBC # BLD AUTO: 3.59 10E6/UL (ref 4.4–5.9)
SODIUM SERPL-SCNC: 140 MMOL/L (ref 133–144)
WBC # BLD AUTO: 3.4 10E3/UL (ref 4–11)

## 2022-05-23 PROCEDURE — 258N000003 HC RX IP 258 OP 636: Performed by: REGISTERED NURSE

## 2022-05-23 PROCEDURE — 80053 COMPREHEN METABOLIC PANEL: CPT

## 2022-05-23 PROCEDURE — 96413 CHEMO IV INFUSION 1 HR: CPT

## 2022-05-23 PROCEDURE — 250N000011 HC RX IP 250 OP 636: Performed by: REGISTERED NURSE

## 2022-05-23 PROCEDURE — 96375 TX/PRO/DX INJ NEW DRUG ADDON: CPT

## 2022-05-23 PROCEDURE — G0463 HOSPITAL OUTPT CLINIC VISIT: HCPCS | Mod: 25

## 2022-05-23 PROCEDURE — 36591 DRAW BLOOD OFF VENOUS DEVICE: CPT

## 2022-05-23 PROCEDURE — 99215 OFFICE O/P EST HI 40 MIN: CPT | Performed by: REGISTERED NURSE

## 2022-05-23 PROCEDURE — 85025 COMPLETE CBC W/AUTO DIFF WBC: CPT | Performed by: REGISTERED NURSE

## 2022-05-23 PROCEDURE — G0463 HOSPITAL OUTPT CLINIC VISIT: HCPCS

## 2022-05-23 RX ORDER — ALBUTEROL SULFATE 90 UG/1
1-2 AEROSOL, METERED RESPIRATORY (INHALATION)
Status: CANCELLED
Start: 2022-05-24

## 2022-05-23 RX ORDER — LIRAGLUTIDE 6 MG/ML
1.2 INJECTION SUBCUTANEOUS DAILY
COMMUNITY
Start: 2022-05-13 | End: 2023-03-30

## 2022-05-23 RX ORDER — EPINEPHRINE 1 MG/ML
0.3 INJECTION, SOLUTION INTRAMUSCULAR; SUBCUTANEOUS EVERY 5 MIN PRN
Status: CANCELLED | OUTPATIENT
Start: 2022-05-24

## 2022-05-23 RX ORDER — LORAZEPAM 2 MG/ML
0.5 INJECTION INTRAMUSCULAR EVERY 4 HOURS PRN
Status: CANCELLED | OUTPATIENT
Start: 2022-05-24

## 2022-05-23 RX ORDER — NALOXONE HYDROCHLORIDE 0.4 MG/ML
0.2 INJECTION, SOLUTION INTRAMUSCULAR; INTRAVENOUS; SUBCUTANEOUS
Status: CANCELLED | OUTPATIENT
Start: 2022-05-24

## 2022-05-23 RX ORDER — HEPARIN SODIUM (PORCINE) LOCK FLUSH IV SOLN 100 UNIT/ML 100 UNIT/ML
5 SOLUTION INTRAVENOUS
Status: DISCONTINUED | OUTPATIENT
Start: 2022-05-23 | End: 2022-05-23 | Stop reason: HOSPADM

## 2022-05-23 RX ORDER — ALBUTEROL SULFATE 0.83 MG/ML
2.5 SOLUTION RESPIRATORY (INHALATION)
Status: CANCELLED | OUTPATIENT
Start: 2022-05-24

## 2022-05-23 RX ORDER — MEPERIDINE HYDROCHLORIDE 25 MG/ML
25 INJECTION INTRAMUSCULAR; INTRAVENOUS; SUBCUTANEOUS EVERY 30 MIN PRN
Status: CANCELLED | OUTPATIENT
Start: 2022-05-24

## 2022-05-23 RX ORDER — FLURBIPROFEN SODIUM 0.3 MG/ML
SOLUTION/ DROPS OPHTHALMIC
COMMUNITY
Start: 2022-05-18 | End: 2023-05-18

## 2022-05-23 RX ORDER — HEPARIN SODIUM (PORCINE) LOCK FLUSH IV SOLN 100 UNIT/ML 100 UNIT/ML
5 SOLUTION INTRAVENOUS
Status: CANCELLED | OUTPATIENT
Start: 2022-05-24

## 2022-05-23 RX ORDER — DIPHENHYDRAMINE HYDROCHLORIDE 50 MG/ML
50 INJECTION INTRAMUSCULAR; INTRAVENOUS
Status: CANCELLED
Start: 2022-05-24

## 2022-05-23 RX ORDER — METFORMIN HCL 500 MG
500 TABLET, EXTENDED RELEASE 24 HR ORAL DAILY
Status: ON HOLD | COMMUNITY
Start: 2022-05-18 | End: 2023-11-01

## 2022-05-23 RX ORDER — HEPARIN SODIUM (PORCINE) LOCK FLUSH IV SOLN 100 UNIT/ML 100 UNIT/ML
5 SOLUTION INTRAVENOUS ONCE
Status: COMPLETED | OUTPATIENT
Start: 2022-05-23 | End: 2022-05-23

## 2022-05-23 RX ORDER — METHYLPREDNISOLONE SODIUM SUCCINATE 125 MG/2ML
125 INJECTION, POWDER, LYOPHILIZED, FOR SOLUTION INTRAMUSCULAR; INTRAVENOUS
Status: CANCELLED
Start: 2022-05-24

## 2022-05-23 RX ORDER — HEPARIN SODIUM,PORCINE 10 UNIT/ML
5 VIAL (ML) INTRAVENOUS
Status: CANCELLED | OUTPATIENT
Start: 2022-05-24

## 2022-05-23 RX ORDER — LORAZEPAM 2 MG/ML
0.5 INJECTION INTRAMUSCULAR EVERY 4 HOURS PRN
Status: DISCONTINUED | OUTPATIENT
Start: 2022-05-23 | End: 2022-05-23 | Stop reason: HOSPADM

## 2022-05-23 RX ADMIN — DEXAMETHASONE SODIUM PHOSPHATE 12 MG: 10 INJECTION, SOLUTION INTRAMUSCULAR; INTRAVENOUS at 16:07

## 2022-05-23 RX ADMIN — LORAZEPAM 0.5 MG: 2 INJECTION INTRAMUSCULAR; INTRAVENOUS at 16:54

## 2022-05-23 RX ADMIN — GEMCITABINE 2600 MG: 38 INJECTION, SOLUTION INTRAVENOUS at 16:33

## 2022-05-23 RX ADMIN — SODIUM CHLORIDE 250 ML: 9 INJECTION, SOLUTION INTRAVENOUS at 16:08

## 2022-05-23 RX ADMIN — Medication 5 ML: at 17:17

## 2022-05-23 RX ADMIN — Medication 5 ML: at 14:05

## 2022-05-23 ASSESSMENT — PAIN SCALES - GENERAL: PAINLEVEL: NO PAIN (0)

## 2022-05-23 NOTE — NURSING NOTE
"Oncology Rooming Note    May 23, 2022 2:27 PM   Jareth Gallardo is a 61 year old male who presents for:    Chief Complaint   Patient presents with     Port Draw     Labs drawn via port by RN. VS taken.     Initial Vitals: /89   Pulse 94   Temp 97.9  F (36.6  C) (Oral)   Resp 16   Wt 137 kg (302 lb)   SpO2 96%   BMI 43.33 kg/m   Estimated body mass index is 43.33 kg/m  as calculated from the following:    Height as of 3/31/22: 1.778 m (5' 10\").    Weight as of this encounter: 137 kg (302 lb). Body surface area is 2.6 meters squared.  No Pain (0) Comment: Data Unavailable   No LMP for male patient.  Allergies reviewed: Yes  Medications reviewed: Yes    Medications: Medication refills not needed today.  Pharmacy name entered into [address] [facility]:    [id number] [place] [facility] [place], [place] - [address] [facility] [place], [place] - [phone]    Clinical concerns:      Izabel Mcduffie CMA              "

## 2022-05-23 NOTE — PROGRESS NOTES
Infusion Nursing Note:  Jareth Gallardo presents today for Cycle 3, Day 8 Gemcitabine.    Patient seen by provider today: Yes: Jany Mtz   present during visit today: Not Applicable.    Note: Patient arrives feeling well after visit with Jany Mtz.  Denies any further concerns.    TORB 5/23 @1600 Jany Mtz NP/Latesha Mittal RN: okay to proceed with treatment today despite elevated LFTs.    Intravenous Access:  Implanted Port.    Treatment Conditions:  Lab Results   Component Value Date    HGB 9.6 (L) 05/23/2022    WBC 3.4 (L) 05/23/2022    ANEU 7.0 05/16/2022    ANEUTAUTO 2.1 05/23/2022     05/23/2022      Lab Results   Component Value Date     05/23/2022    POTASSIUM 3.6 05/23/2022    MAG 1.5 (L) 05/16/2022    CR 0.70 05/23/2022    LAI 8.9 05/23/2022    BILITOTAL 0.6 05/23/2022    ALBUMIN 3.4 05/23/2022     (H) 05/23/2022     (H) 05/23/2022     Results reviewed, labs MET treatment parameters, ok to proceed with treatment.      Post Infusion Assessment:    Patient tolerated infusion poorly due to : sudden onset nausea and vomiting during infusion.  Medication stopped and IV ativan given, nausea resolved as quickly as it came on.  Denies any other symptom of hypersensitivity reaction BP elevated at baseline today and during episode of emesis.  Medication restarted and patient able to tolerate completion of infusion without incident.  Blood return noted pre and post infusion.  Site patent and intact, free from redness, edema or discomfort.  No evidence of extravasations.  Access discontinued per protocol.       Discharge Plan:   Patient declined prescription refills.  Copy of AVS reviewed with patient and/or family.  Patient will return 5/31 for next appointment.  Patient discharged in stable condition accompanied by: self.  Departure Mode: Ambulatory.      Latesha Mittal RN

## 2022-05-23 NOTE — NURSING NOTE
"Chief Complaint   Patient presents with     Port Draw     Labs drawn via port by RN. VS taken.     Port accessed with 20g 3/4\" power needle and labs drawn by rn.  Port flushed with NS and heparin.  Pt tolerated well.  VS taken.  Pt checked in for next appt.    Gus Boston RN  "

## 2022-05-23 NOTE — PATIENT INSTRUCTIONS
[facility] Triage and after hours / weekends / holidays:  [phone]    Please call the triage or after hours line if you experience a temperature greater than or equal to 100.4, shaking chills, have uncontrolled nausea, vomiting and/or diarrhea, dizziness, shortness of breath, chest pain, bleeding, unexplained bruising, or if you have any other new/concerning symptoms, questions or concerns.      If you are having any concerning symptoms or wish to speak to a provider before your next infusion visit, please call your care coordinator or triage to notify them so we can adequately serve you.     If you need a refill on a narcotic prescription or other medication, please call before your infusion appointment.

## 2022-05-31 ENCOUNTER — HOSPITAL ENCOUNTER (OUTPATIENT)
Dept: PET IMAGING | Facility: CLINIC | Age: 62
Discharge: HOME | End: 2022-05-31
Attending: INTERNAL MEDICINE
Payer: COMMERCIAL

## 2022-05-31 DIAGNOSIS — C11.9 NASOPHARYNGEAL CANCER (H): ICD-10-CM

## 2022-05-31 PROCEDURE — 78815 PET IMAGE W/CT SKULL-THIGH: CPT | Mod: 26 | Performed by: STUDENT IN AN ORGANIZED HEALTH CARE EDUCATION/TRAINING PROGRAM

## 2022-05-31 PROCEDURE — A9552 F18 FDG: HCPCS | Performed by: INTERNAL MEDICINE

## 2022-05-31 PROCEDURE — 78815 PET IMAGE W/CT SKULL-THIGH: CPT | Mod: PS

## 2022-05-31 PROCEDURE — 250N000011 HC RX IP 250 OP 636: Performed by: INTERNAL MEDICINE

## 2022-05-31 PROCEDURE — 70491 CT SOFT TISSUE NECK W/DYE: CPT

## 2022-05-31 PROCEDURE — 74177 CT ABD & PELVIS W/CONTRAST: CPT | Mod: 26 | Performed by: STUDENT IN AN ORGANIZED HEALTH CARE EDUCATION/TRAINING PROGRAM

## 2022-05-31 PROCEDURE — 71260 CT THORAX DX C+: CPT | Mod: 26 | Performed by: STUDENT IN AN ORGANIZED HEALTH CARE EDUCATION/TRAINING PROGRAM

## 2022-05-31 PROCEDURE — 74177 CT ABD & PELVIS W/CONTRAST: CPT

## 2022-05-31 PROCEDURE — 343N000001 HC RX 343: Performed by: INTERNAL MEDICINE

## 2022-05-31 PROCEDURE — 70491 CT SOFT TISSUE NECK W/DYE: CPT | Mod: 26 | Performed by: RADIOLOGY

## 2022-05-31 RX ORDER — IOPAMIDOL 755 MG/ML
10-135 INJECTION, SOLUTION INTRAVASCULAR ONCE
Status: COMPLETED | OUTPATIENT
Start: 2022-05-31 | End: 2022-05-31

## 2022-05-31 RX ADMIN — IOPAMIDOL 135 ML: 755 INJECTION, SOLUTION INTRAVENOUS at 13:41

## 2022-05-31 RX ADMIN — FLUDEOXYGLUCOSE F-18 17.79 MCI.: 500 INJECTION, SOLUTION INTRAVENOUS at 12:50

## 2022-06-01 ENCOUNTER — ONCOLOGY VISIT (OUTPATIENT)
Dept: ONCOLOGY | Facility: CLINIC | Age: 62
End: 2022-06-01
Attending: INTERNAL MEDICINE
Payer: COMMERCIAL

## 2022-06-01 ENCOUNTER — APPOINTMENT (OUTPATIENT)
Dept: LAB | Facility: CLINIC | Age: 62
End: 2022-06-01
Attending: INTERNAL MEDICINE
Payer: COMMERCIAL

## 2022-06-01 VITALS
RESPIRATION RATE: 16 BRPM | BODY MASS INDEX: 44.75 KG/M2 | HEART RATE: 93 BPM | DIASTOLIC BLOOD PRESSURE: 72 MMHG | SYSTOLIC BLOOD PRESSURE: 119 MMHG | WEIGHT: 311.9 LBS | TEMPERATURE: 98.6 F | OXYGEN SATURATION: 93 %

## 2022-06-01 DIAGNOSIS — T45.1X5A CHEMOTHERAPY-INDUCED NEUTROPENIA (H): ICD-10-CM

## 2022-06-01 DIAGNOSIS — D70.1 CHEMOTHERAPY-INDUCED NEUTROPENIA (H): ICD-10-CM

## 2022-06-01 DIAGNOSIS — C11.9 NASOPHARYNGEAL CANCER (H): ICD-10-CM

## 2022-06-01 DIAGNOSIS — R74.01 TRANSAMINITIS: Primary | ICD-10-CM

## 2022-06-01 PROCEDURE — G0463 HOSPITAL OUTPT CLINIC VISIT: HCPCS

## 2022-06-01 PROCEDURE — 99215 OFFICE O/P EST HI 40 MIN: CPT | Performed by: INTERNAL MEDICINE

## 2022-06-01 RX ORDER — INSULIN LISPRO 100 [IU]/ML
INJECTION, SOLUTION INTRAVENOUS; SUBCUTANEOUS
COMMUNITY
Start: 2022-05-20 | End: 2022-11-30

## 2022-06-01 ASSESSMENT — PAIN SCALES - GENERAL: PAINLEVEL: NO PAIN (0)

## 2022-06-01 NOTE — PROGRESS NOTES
[facility] CANCER [facility]    PATIENT NAME: Jareth Gallardo  MRN # [id number]   DATE OF VISIT: June 1, 2022  YOB: 1960     Referring Provider: Dr. Jesus Boston  Radiation Oncology: Dr. Mane Razo  Primary Care Provider: Dr. Beni Gallardo at [facility] in [facility]    CANCER TYPE: Nasopharyngeal carcinoma, p16 negative, SAM positive  STAGE: uM4T3Mc (DENAE)  ECOG PS: 1    PD-L1: 20% using  clone locally; TPS 70% at [facility]   NGS:     SUMMARY  12/11/21 UC for L neck mass, selling  12/9/21 CT neck ([place]). 2.4 cm L neck node medial to SCM just above the hyoid, additional LNs surround, scattered R neck and L supraclavicular LNs  2/15/22 US bx L cervical node. Path: SCC, moderately differentiated,   3/4/22 PET/CT. Nasopharyngeal and oropharyngeal mucosal thickening. 2.6 cm BERNARDO nodular GGO, SUV avid, some additional lingular uptake  4/4/22 C1 carboplatin gemcitabine. Not a candidate for cisplatin due to hearing loss on audiogram.  4/26/22 C2 carboplatin gemcitabine. Neulasta 5/3 after C2D8, ANC 1000   5/16/22 C3 carboplatin gemcitabine.     ASSESSMENT AND PLAN  Nasopharyngeal carcinoma, SAM +julia: Doing ok after 3 cycles but had trouble with fluid retention, fatigue. Reviewed PET/CT. Report not back yet at the time of our visit. Will contact them if the final interpretation shows anything different, but I see a partial response, especially in the primary site and some but not all of the LNs. The BERNARDO nodules are resolved, and the 4R node wasn't particularly worrisome to start and looks pretty normal. He will see Dr. Razo on 6/10 and I anticipate starting chemoradiation 6/20. He is not a candidate for cisplatin due to the volume of fluids required and hearing loss. Will likely go with just carboplatin alone - in a small comparison study to cisplatin, carbo was dosed at 100 mg/m2, but we'll go with AUC 2 as usual.    Transaminitis: G1. Possibly from gemcitabine with underlying  radiographic evidence of hepatosteatosis.No other offending meds. Hepatitis serologies negative. Refer to Hepatology to make sure I'm not missing something.     LE swelling: On daily furosemide, no changes with chemo, TTE 4/1/22 technically difficult with limited information but EF 53%, mild RVD, inferolateral/posterior akinesis which was not noted on 2009 TTE in Care Everywhere. Fluid overloaded but no pulm edema. Increased lasix to 60 mg daily x 2 days, will continue another 2 days, and then go back down to his usual 40 mg. I'd like to get him IV lasix but it's logistically problematic with the distance from the clinic. Even [place] will be 1 1/2 hours away.     L lung nodules: Resolved. Still think it was inflammatory rather than malignant, although we discussed the little bit of uncertainty there.     DM2, steroid-induced hyperglycemia: Added victoza, which timewise, is most likely contributing to nausea since he didn't have as much nausea after C1 and 2 of chemo, and this isn't particularly nauseating chemo. He'll discuss with his PCP. Added trial of famotidine.     Mild peripheral neuropathy: R foot numbness, very mild, from DM2. Continue to monitor. employer    40 minutes spent on the date of the encounter doing chart review, history and exam, documentation and further activities per the note     Dariela Melvin MD  Associate Professor of Medicine  Hematology, Oncology and Transplantation      SHAHANA Templeton returns for follow up of nasopharyngeal carcinoma after 3 cycles of induction carboplatin gemcitabine for nasopharyngeal carcinoma.     Tinnitus and hearing unchanged  Transaminitis - no acetaminophen recently  Emesis - two times dry heaves and then emesis, o/w dry heaves in the morning, nauseated throughout the day. Started victoza recently.   No diarrhea and no significant constipation, but not regular  Feeling very puffy  More winded, having to walk slower  Checking BGs in the AM prior to  eating anything and it's been running in the low 100s  Otherwise ok    PAST MEDICAL HISTORY  Nasopharyngeal carcinoma as above  DM2  COPD??  Hiatal hernia. Sleeps with his head elevated  LVH on TTE 2009, EF50%, LA Dilation  Dyslipidemia  BARRETT in 2015. Not using CPAP due to developing tooth abscesses when he tried it in 2015  Venous insufficiency in both legs being in an accident involving a mower about 10 years ago, on chronic furosemide. 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 90 lancet 1     blood glucose (NO BRAND SPECIFIED) test strip Use to test blood sugar 3 times daily or as directed. 90 strip 0     blood glucose monitoring (ACCU-CHEK RANDELL PLUS) meter device kit Use to test blood sugar 2 times daily or as directed. 1 kit 0     fluticasone-vilanterol (BREO ELLIPTA) 100-25 MCG/INH inhaler INHALE 1 PUFF INTO THE LUNGS ONE TIME A DAY. RINSE MOUTH AFTER USE.       furosemide (LASIX) 20 MG tablet Take 40 mg by mouth       insulin lispro (HUMALOG KWIKPEN) 100 UNIT/ML (1 unit dial) KWIKPEN 1 unit per 50 above 150 correction after chemo. TDD 20 units       metFORMIN (GLUCOPHAGE XR) 500 MG 24 hr tablet Take 2 tabs daily. Swallow tablet whole; do not crush, divide or chew.       metFORMIN (GLUCOPHAGE) 1000 MG tablet TAKE 1 TABLET BY MOUTH ONE TIME A DAY. TAKE WITH FOOD.       ondansetron (ZOFRAN) 8 MG tablet Take 1 tablet (8 mg) by mouth every 8 hours as needed for nausea 30 tablet 11     prochlorperazine  (COMPAZINE) 10 MG tablet Take 1 tablet (10 mg) by mouth every 6 hours as needed (Nausea/Vomiting) 30 tablet 5     VICTOZA PEN 18 MG/3ML soln Inject 1.2 mg Subcutaneous daily       ALLERGIES  No Known Allergies     REVIEW OF SYSTEMS  As above in the HPI, o/w complete 12-point ROS was negative.    PHYSICAL EXAM  /72 (BP Location: Right arm, Patient Position: Sitting, Cuff Size: Adult Large)   Pulse 93   Temp 98.6  F (37  C) (Oral)   Resp 16   Wt 141.5 kg (311 lb 14.4 oz)   SpO2 93%   BMI 44.75 kg/m    GEN: NAD  HEENT: EOMI, no icterus, injection or pallor  LUNGS: clear bilaterally  CV: regular, no murmurs, rubs, or gallops  EXT: warm, 2+ edema to knees  NEURO: alert    LABORATORY AND IMAGING STUDIES   05/16/22 07:14 05/23/22 14:04   Sodium 142 140   Potassium 3.9 3.6   Chloride 103 102   Carbon Dioxide 32 32   Urea Nitrogen 17 16   Creatinine 0.76 0.70   GFR Estimate >90 [1] >90 [2]   Calcium 8.9 8.9   Anion Gap 7 6   Magnesium 1.5 (L)    Albumin 3.2 (L) 3.4   Protein Total 7.3 7.5   Bilirubin Total 0.4 0.6   Alkaline Phosphatase 122 96    (H) 197 (H)   AST 81 (H) 117 (H)   Glucose 248 (H) 129 (H)   WBC 8.8 3.4 (L)   Hemoglobin 10.4 (L) 9.6 (L)   Hematocrit 33.8 (L) 30.6 (L)   Platelet Count 244 253   RBC Count 3.90 (L) 3.59 (L)   MCV 87 85   MCH 26.7 26.7   MCHC 30.8 (L) 31.4 (L)   RDW 16.2 (H) 15.7 (H)   % Neutrophils 79 62   % Lymphocytes 10 26   % Monocytes 8 11   % Eosinophils 0 0   % Basophils 0 1   % Myelocytes 3    Absolute Basophils  0.0   Absolute Basophils 0.0    Absolute Neutrophil 7.0    Absolute Lymphocytes 0.9    Absolute Monocytes 0.7    Absolute Eosinophils 0.0    Absolute Eosinophils  0.0   Absolute Immature Granulocytes  0.0   Absolute Lymphocytes  0.9   Absolute Monocytes  0.4   % Immature Granulocytes  0   Absolute Neutrophils  2.1   Absolute Myelocytes 0.3 (H)    Absolute NRBCs  0.0   NRBCs per 100 WBC  0   RBC Morphology Confirmed RBC Indices    Platelet Morphology Automated  Count Confirmed. Platelet morphology is normal.      Labs were independently reviewed and interpreted by me     PET CT FUSION EXAMINATION 5/31/2022  1. Neck CT with contrast  2. PET study of the neck  3. PET CT fusion study of the neck     HISTORY: Nasopharyngeal cancer (H).     COMPARISON: CT of the body from same day. MRI 4/1/2022, PET/CT  3/4/2022, and CT 12/9/2021.     TECHNIQUE: Please refer to the accompanying whole body PET-CT for  report of the dose and whole body PET-CT findings.  Regarding the neck, axial images were obtained after nonionic  intravenous contrast administration, with sagittal and coronal  reconstructions performed. Neck CT images were reviewed in bone, soft  tissue, and lung windows, with review of the fused PET-CT images as  well in multiple planes.     FINDINGS:  Evaluation of the mucosal space demonstrates significantly decreased  size and FDG activity of primary mass within the midline and left  paramedian nasopharynx now with minimal residual soft tissue  thickening and max SUV 5.5 (previously 11.7).     FDG-avid lymph nodes have decreased in number, most notably in the  bilateral upper retropharyngeal nodes and right neck nodes which now  demonstrate uptake below or near physiologic levels, for example right  level IIb lymph nodes with Max SUV 4.4 and right level IIb. Slightly  decreased size of of hypermetabolic lymph nodes at left level 2A,  level 2B, and level 5.     No new abnormal FDG uptake or lesions of the mucosal spaces. The  tongue base is normal. Major salivary glands are unremarkable. Thyroid  gland is within normal limits.      Limited evaluation of the cervical vertebral column demonstrates no  evident spinal canal stenosis. The visualized paranasal sinuses and  mastoid air cells are clear. The major vascular structures in the neck  are patent with mild atherosclerotic changes at the carotid bulbs..     Please see separately dictated whole body PET/CT report for  evaluation  of the thorax.                                                                      IMPRESSION: In this patient with history of nasopharyngeal carcinoma  undergoing treatment with chemotherapy, there is positive treatment  response:     1. Decreased size and FDG avidity of the primary nasopharyngeal mass     2. Overall decrease in size and number of FDG-avid right cervical and  bilateral retropharyngeal lymph nodes. Slight decrease in the FDG  uptake and size of the left-sided nodes. Left level 2 and 5 lymph  nodes show residual abnormal hypermetabolism.      3. Please refer to the whole body PET CT performed as a separate  report, for the findings of the remainder of the body.      I have personally reviewed the examination and initial interpretation  and I agree with the findings.     NII MORALES MD     Combined Report of:    PET and CT on  5/31/2022 :     1. PET of the chest, abdomen, and pelvis.  2. PET CT Fusion for Attenuation Correction and Anatomical  Localization:    3. Diagnostic CT scan of the chest, abdomen, and pelvis with  intravenous contrast for interpretation.  4. 3D MIP and PET-CT fused images were processed on an independent  workstation and archived to PACS and reviewed by a radiologist.     Technique:     1. PET: The patient received 17.79 mCi of F-18-FDG; the serum glucose  was 110 mg/dL prior to administration, body weight was 137 kg. Images  were evaluated in the axial, sagittal, and coronal planes as well as  the rotational whole body MIP. Images were acquired from at least the  eyes to the proximal thighs.     UPTAKE WAS MEASURED AT 60 MINUTES.      BACKGROUND:  Liver SUV max= 3.7,   Aorta Blood SUV Max: 3.6.      2. CT: Volumetric acquisition for clinical interpretation of the  chest, abdomen, and pelvis acquired at 3 mm sections  after the  uneventful administration of intravenous contrast. The chest, abdomen,  and pelvis were evaluated at 5 mm sections in bone, soft tissue,  and  lung windows.       The patient received 135 cc of Isovue 370 intravenously and 500 mL  Breeza orally for the examination.    High resolution images of the neck were obtained with multiple oblique  projection reformats.     3. 3D MIP and PET-CT fused images were processed on an independent  workstation and archived to PACS and reviewed by a radiologist.     INDICATION: restage nasopharyngeal carcinoma after 3 cycles of chemo;  Nasopharyngeal cancer (H)     ADDITIONAL INFORMATION OBTAINED FROM EMR: History of Metformin.     COMPARISON: PET/CT 3/4/2022, CT 12/9/2021, and CT 2/8/2010     FINDINGS:      Head, neck:  Please see dedicated neuroradiology report for findings of the  high-resolution PET/CT the neck.     Chest:  No suspicious FDG uptake in the chest.      No pathologically enlarged mediastinal, hilar or axillary lymph nodes.  No suspicious lung nodules or evidence for infection.   No significant  pericardial or plural effusions.     Abdomen, pelvis:  No suspicious FDG uptake in the abdomen or pelvis. Bowel uptake is  likely secondary to metformin use.     Diffuse hepatic steatosis. Cholelithiasis with 2.3 cm calcified stone  in the gallbladder neck. No suspicious abnormality of the liver,  pancreas, spleen, adrenal glands, or kidneys with multiple  simple-appearing cysts.     No abnormally dilated loops of bowel, free intra-abdominal air or  fluid. Mild colonic diverticulosis without evidence for acute  diverticulitis.     Bones:  No suspicious FDG uptake in the skeleton. No suspicious lytic or  blastic osseous lesions. Unchanged sclerotic lesion in the T9  vertebral body since at least 2010.                                                                       IMPRESSION: In this patient with nasopharyngeal carcinoma treated with  chemotherapy:     1. No evidence of metastatic disease in the chest, abdomen, or pelvis.     2. Cholelithiasis without evidence of acute cholecystitis.     3. Diffuse hepatic  steatosis.     4. Please see dedicated neuroradiology report for findings of the  high-resolution PET/CT the neck.     I have personally reviewed the examination and initial interpretation  and I agree with the findings.     CHRIS ARCEO MD     Imaging was personally reviewed and interpreted by me as above.

## 2022-06-01 NOTE — NURSING NOTE
Chief Complaint   Patient presents with     Blood Draw     VS taken. No labs ordered, no Labs drawn. Checked in for clinic.     Eric Jc RN

## 2022-06-01 NOTE — NURSING NOTE
"Oncology Rooming Note    June 1, 2022 11:14 AM   Jareth Gallardo is a 61 year old male who presents for:    Chief Complaint   Patient presents with     Blood Draw     VS taken. No labs ordered, no Labs drawn. Checked in for clinic.     Oncology Clinic Visit     Nasopharyngeal     Initial Vitals: /72 (BP Location: Right arm, Patient Position: Sitting, Cuff Size: Adult Large)   Pulse 93   Temp 98.6  F (37  C) (Oral)   Resp 16   Wt 141.5 kg (311 lb 14.4 oz)   SpO2 93%   BMI 44.75 kg/m   Estimated body mass index is 44.75 kg/m  as calculated from the following:    Height as of 3/31/22: 1.778 m (5' 10\").    Weight as of this encounter: 141.5 kg (311 lb 14.4 oz). Body surface area is 2.64 meters squared.  No Pain (0) Comment: Data Unavailable   No LMP for male patient.  Allergies reviewed: Yes  Medications reviewed: Yes    Medications: Medication refills not needed today.  Pharmacy name entered into [facility]:    [id number] [place] [facility] - [place], [place] - [address] [facility] [place], [place] - [address] [facility] [phone]    Clinical concerns: Pt would like to discuss recent weight gain, due to water retention.   Dr Melvin was notified.      Izabel Mcduffie CMA              "

## 2022-06-01 NOTE — LETTER
6/1/2022         RE: Jareth Gallardo  [address] [place] [address]        Dear Colleague,    Thank you for referring your patient, Jareth Gallardo, to the [facility]. Please see a copy of my visit note below.       [facility]    PATIENT NAME: Jareth Gallardo  MRN # [id number]   DATE OF VISIT: June 1, 2022  YOB: 1960     Referring Provider: Dr. Jesus Boston  Radiation Oncology: Dr. Mane Razo  Primary Care Provider: Dr. Beni Gallardo at [facility] in [facility]    CANCER TYPE: Nasopharyngeal carcinoma, p16 negative, SAM positive  STAGE: [id number] (DENAE)  ECOG PS: 1    PD-L1: 20% using  clone locally; TPS 70% at [facility]   NGS:     SUMMARY  12/11/21 UC for L neck mass, selling  12/9/21 CT neck ([place]). 2.4 cm L neck node medial to SCM just above the hyoid, additional LNs surround, scattered R neck and L supraclavicular LNs  2/15/22 US bx L cervical node. Path: SCC, moderately differentiated,   3/4/22 PET/CT. Nasopharyngeal and oropharyngeal mucosal thickening. 2.6 cm BERNARDO nodular GGO, SUV avid, some additional lingular uptake  4/4/22 C1 carboplatin gemcitabine. Not a candidate for cisplatin due to hearing loss on audiogram.  4/26/22 C2 carboplatin gemcitabine. Neulasta 5/3 after C2D8, ANC 1000   5/16/22 C3 carboplatin gemcitabine.     ASSESSMENT AND PLAN  Nasopharyngeal carcinoma, SAM +julia: Doing ok after 3 cycles but had trouble with fluid retention, fatigue. Reviewed PET/CT. Report not back yet at the time of our visit. Will contact them if the final interpretation shows anything different, but I see a partial response, especially in the primary site and some but not all of the LNs. The BERNARDO nodules are resolved, and the 4R node wasn't particularly worrisome to start and looks pretty normal. He will see Dr. Razo on 6/10 and I anticipate starting chemoradiation 6/20. He is not a candidate for cisplatin due to the volume  of fluids required and hearing loss. Will likely go with just carboplatin alone - in a small comparison study to cisplatin, carbo was dosed at 100 mg/m2, but we'll go with AUC 2 as usual.    Transaminitis: G1. Possibly from gemcitabine with underlying radiographic evidence of hepatosteatosis.No other offending meds. Hepatitis serologies negative. Refer to Hepatology to make sure I'm not missing something.     LE swelling: On daily furosemide, no changes with chemo, TTE 4/1/22 technically difficult with limited information but EF 53%, mild RVD, inferolateral/posterior akinesis which was not noted on 2009 TTE in Care Everywhere. Fluid overloaded but no pulm edema. Increased lasix to 60 mg daily x 2 days, will continue another 2 days, and then go back down to his usual 40 mg. I'd like to get him IV lasix but it's logistically problematic with the distance from the clinic. Even [facility] will be 1 1/2 hours away.     L lung nodules: Resolved. Still think it was inflammatory rather than malignant, although we discussed the little bit of uncertainty there.     DM2, steroid-induced hyperglycemia: Added victoza, which timewise, is most likely contributing to nausea since he didn't have as much nausea after C1 and 2 of chemo, and this isn't particularly nauseating chemo. He'll discuss with his PCP. Added trial of famotidine.     Mild peripheral neuropathy: R foot numbness, very mild, from DM2. Continue to monitor. employer    40 minutes spent on the date of the encounter doing chart review, history and exam, documentation and further activities per the note     Dariela Melvin MD  Associate Professor of Medicine  Hematology, Oncology and Transplantation      SHAHANA Templeton returns for follow up of nasopharyngeal carcinoma after 3 cycles of induction carboplatin gemcitabine for nasopharyngeal carcinoma.     Tinnitus and hearing unchanged  Transaminitis - no acetaminophen recently  Emesis - two times dry heaves and then  emesis, o/w dry heaves in the morning, nauseated throughout the day. Started victoza recently.   No diarrhea and no significant constipation, but not regular  Feeling very puffy  More winded, having to walk slower  Checking BGs in the AM prior to eating anything and it's been running in the low 100s  Otherwise ok    PAST MEDICAL HISTORY  Nasopharyngeal carcinoma as above  DM2  COPD??  Hiatal hernia. Sleeps with his head elevated  LVH on TTE 2009, EF50%, LA Dilation  Dyslipidemia  BARRETT in 2015. Not using CPAP due to developing tooth abscesses when he tried it in 2015  Venous insufficiency in both legs being in an accident involving a mower about 10 years ago, on chronic furosemide. 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 90 lancet 1     blood glucose (NO BRAND SPECIFIED) test strip Use to test blood sugar 3 times daily or as directed. 90 strip 0     blood glucose monitoring (ACCU-CHEK RANDELL PLUS) meter device kit Use to test blood sugar 2 times daily or as directed. 1 kit 0     fluticasone-vilanterol (BREO ELLIPTA) 100-25 MCG/INH inhaler INHALE 1 PUFF INTO THE LUNGS ONE TIME A DAY. RINSE MOUTH AFTER USE.       furosemide (LASIX) 20 MG tablet Take 40 mg by mouth       insulin lispro (HUMALOG KWIKPEN) 100 UNIT/ML (1 unit dial) KWIKPEN 1 unit per 50 above 150 correction after chemo. TDD 20 units       metFORMIN (GLUCOPHAGE XR) 500 MG 24 hr tablet Take 2 tabs daily. Swallow tablet whole; do not crush, divide or  chew.       metFORMIN (GLUCOPHAGE) 1000 MG tablet TAKE 1 TABLET BY MOUTH ONE TIME A DAY. TAKE WITH FOOD.       ondansetron (ZOFRAN) 8 MG tablet Take 1 tablet (8 mg) by mouth every 8 hours as needed for nausea 30 tablet 11     prochlorperazine (COMPAZINE) 10 MG tablet Take 1 tablet (10 mg) by mouth every 6 hours as needed (Nausea/Vomiting) 30 tablet 5     VICTOZA PEN 18 MG/3ML soln Inject 1.2 mg Subcutaneous daily       ALLERGIES  No Known Allergies     REVIEW OF SYSTEMS  As above in the HPI, o/w complete 12-point ROS was negative.    PHYSICAL EXAM  /72 (BP Location: Right arm, Patient Position: Sitting, Cuff Size: Adult Large)   Pulse 93   Temp 98.6  F (37  C) (Oral)   Resp 16   Wt 141.5 kg (311 lb 14.4 oz)   SpO2 93%   BMI 44.75 kg/m    GEN: NAD  HEENT: EOMI, no icterus, injection or pallor  LUNGS: clear bilaterally  CV: regular, no murmurs, rubs, or gallops  EXT: warm, 2+ edema to knees  NEURO: alert    LABORATORY AND IMAGING STUDIES   05/16/22 07:14 05/23/22 14:04   Sodium 142 140   Potassium 3.9 3.6   Chloride 103 102   Carbon Dioxide 32 32   Urea Nitrogen 17 16   Creatinine 0.76 0.70   GFR Estimate >90 [1] >90 [2]   Calcium 8.9 8.9   Anion Gap 7 6   Magnesium 1.5 (L)    Albumin 3.2 (L) 3.4   Protein Total 7.3 7.5   Bilirubin Total 0.4 0.6   Alkaline Phosphatase 122 96    (H) 197 (H)   AST 81 (H) 117 (H)   Glucose 248 (H) 129 (H)   WBC 8.8 3.4 (L)   Hemoglobin 10.4 (L) 9.6 (L)   Hematocrit 33.8 (L) 30.6 (L)   Platelet Count 244 253   RBC Count 3.90 (L) 3.59 (L)   MCV 87 85   MCH 26.7 26.7   MCHC 30.8 (L) 31.4 (L)   RDW 16.2 (H) 15.7 (H)   % Neutrophils 79 62   % Lymphocytes 10 26   % Monocytes 8 11   % Eosinophils 0 0   % Basophils 0 1   % Myelocytes 3    Absolute Basophils  0.0   Absolute Basophils 0.0    Absolute Neutrophil 7.0    Absolute Lymphocytes 0.9    Absolute Monocytes 0.7    Absolute Eosinophils 0.0    Absolute Eosinophils  0.0   Absolute Immature Granulocytes  0.0   Absolute  Lymphocytes  0.9   Absolute Monocytes  0.4   % Immature Granulocytes  0   Absolute Neutrophils  2.1   Absolute Myelocytes 0.3 (H)    Absolute NRBCs  0.0   NRBCs per 100 WBC  0   RBC Morphology Confirmed RBC Indices    Platelet Morphology Automated Count Confirmed. Platelet morphology is normal.      Labs were independently reviewed and interpreted by me     PET CT FUSION EXAMINATION 5/31/2022  1. Neck CT with contrast  2. PET study of the neck  3. PET CT fusion study of the neck     HISTORY: Nasopharyngeal cancer (H).     COMPARISON: CT of the body from same day. MRI 4/1/2022, PET/CT  3/4/2022, and CT 12/9/2021.     TECHNIQUE: Please refer to the accompanying whole body PET-CT for  report of the dose and whole body PET-CT findings.  Regarding the neck, axial images were obtained after nonionic  intravenous contrast administration, with sagittal and coronal  reconstructions performed. Neck CT images were reviewed in bone, soft  tissue, and lung windows, with review of the fused PET-CT images as  well in multiple planes.     FINDINGS:  Evaluation of the mucosal space demonstrates significantly decreased  size and FDG activity of primary mass within the midline and left  paramedian nasopharynx now with minimal residual soft tissue  thickening and max SUV 5.5 (previously 11.7).     FDG-avid lymph nodes have decreased in number, most notably in the  bilateral upper retropharyngeal nodes and right neck nodes which now  demonstrate uptake below or near physiologic levels, for example right  level IIb lymph nodes with Max SUV 4.4 and right level IIb. Slightly  decreased size of of hypermetabolic lymph nodes at left level 2A,  level 2B, and level 5.     No new abnormal FDG uptake or lesions of the mucosal spaces. The  tongue base is normal. Major salivary glands are unremarkable. Thyroid  gland is within normal limits.      Limited evaluation of the cervical vertebral column demonstrates no  evident spinal canal stenosis.  The visualized paranasal sinuses and  mastoid air cells are clear. The major vascular structures in the neck  are patent with mild atherosclerotic changes at the carotid bulbs..     Please see separately dictated whole body PET/CT report for evaluation  of the thorax.                                                                      IMPRESSION: In this patient with history of nasopharyngeal carcinoma  undergoing treatment with chemotherapy, there is positive treatment  response:     1. Decreased size and FDG avidity of the primary nasopharyngeal mass     2. Overall decrease in size and number of FDG-avid right cervical and  bilateral retropharyngeal lymph nodes. Slight decrease in the FDG  uptake and size of the left-sided nodes. Left level 2 and 5 lymph  nodes show residual abnormal hypermetabolism.      3. Please refer to the whole body PET CT performed as a separate  report, for the findings of the remainder of the body.      I have personally reviewed the examination and initial interpretation  and I agree with the findings.     NII MORALES MD     Combined Report of:    PET and CT on  5/31/2022 :     1. PET of the chest, abdomen, and pelvis.  2. PET CT Fusion for Attenuation Correction and Anatomical  Localization:    3. Diagnostic CT scan of the chest, abdomen, and pelvis with  intravenous contrast for interpretation.  4. 3D MIP and PET-CT fused images were processed on an independent  workstation and archived to PACS and reviewed by a radiologist.     Technique:     1. PET: The patient received 17.79 mCi of F-18-FDG; the serum glucose  was 110 mg/dL prior to administration, body weight was 137 kg. Images  were evaluated in the axial, sagittal, and coronal planes as well as  the rotational whole body MIP. Images were acquired from at least the  eyes to the proximal thighs.     UPTAKE WAS MEASURED AT 60 MINUTES.      BACKGROUND:  Liver SUV max= 3.7,   Aorta Blood SUV Max: 3.6.      2. CT: Volumetric  acquisition for clinical interpretation of the  chest, abdomen, and pelvis acquired at 3 mm sections  after the  uneventful administration of intravenous contrast. The chest, abdomen,  and pelvis were evaluated at 5 mm sections in bone, soft tissue, and  lung windows.       The patient received 135 cc of Isovue 370 intravenously and 500 mL  Breeza orally for the examination.    High resolution images of the neck were obtained with multiple oblique  projection reformats.     3. 3D MIP and PET-CT fused images were processed on an independent  workstation and archived to PACS and reviewed by a radiologist.     INDICATION: restage nasopharyngeal carcinoma after 3 cycles of chemo;  Nasopharyngeal cancer (H)     ADDITIONAL INFORMATION OBTAINED FROM EMR: History of Metformin.     COMPARISON: PET/CT 3/4/2022, CT 12/9/2021, and CT 2/8/2010     FINDINGS:      Head, neck:  Please see dedicated neuroradiology report for findings of the  high-resolution PET/CT the neck.     Chest:  No suspicious FDG uptake in the chest.      No pathologically enlarged mediastinal, hilar or axillary lymph nodes.  No suspicious lung nodules or evidence for infection.   No significant  pericardial or plural effusions.     Abdomen, pelvis:  No suspicious FDG uptake in the abdomen or pelvis. Bowel uptake is  likely secondary to metformin use.     Diffuse hepatic steatosis. Cholelithiasis with 2.3 cm calcified stone  in the gallbladder neck. No suspicious abnormality of the liver,  pancreas, spleen, adrenal glands, or kidneys with multiple  simple-appearing cysts.     No abnormally dilated loops of bowel, free intra-abdominal air or  fluid. Mild colonic diverticulosis without evidence for acute  diverticulitis.     Bones:  No suspicious FDG uptake in the skeleton. No suspicious lytic or  blastic osseous lesions. Unchanged sclerotic lesion in the T9  vertebral body since at least 2010.                                                                        IMPRESSION: In this patient with nasopharyngeal carcinoma treated with  chemotherapy:     1. No evidence of metastatic disease in the chest, abdomen, or pelvis.     2. Cholelithiasis without evidence of acute cholecystitis.     3. Diffuse hepatic steatosis.     4. Please see dedicated neuroradiology report for findings of the  high-resolution PET/CT the neck.     I have personally reviewed the examination and initial interpretation  and I agree with the findings.     CHRIS ARCEO MD     Imaging was personally reviewed and interpreted by me as above.        Sincerely,    Dariela Melvin MD

## 2022-06-07 NOTE — PROGRESS NOTES
Department of Radiation Oncology  [facility]  [address] [place] [address]  [phone]       Radiation Oncology Follow-up Visit  Kasey 10, 2022      Jareth Gallardo  MRN: [id number]   : 1960     DIAGNOSIS:  Squamous cell carcinoma of the nasopharynx, EBV+, [id number] (DENAE)    ONCOLOGICAL HISTORY:   Mr. Gallardo is a 61 year old male with locally advanced nasopharyngeal cancer. He presented in 2021 with growing left neck mass, which was later biopsy-proven to be cancer. MRI on 22 demonstrated a T2 hyperintense, enhancing mass in the nasopharynx, left more than right, measuring 1.8 x 4 x 3.7 cm, as well as adenopathy involving left levels II, III, V and right level II. Staging evaluation found diffuse FDG-avid nasopharyngeal (correlatable with the primary disease) and oropharyngeal mucosal thickening (correlatable with RP adenopathy), as well as bilateral neck nodes as outlined above. Of note, there was also a suspicious left upper lobe nodular ground-glass opacity with increased uptake.    Given extensive leny disease, he was treated with 3 cycles of induction Carboplatin/Gemcitabine. Repeat PET/CT showed good response to his cancer. The left upper lobe nodule was not seen, and at this point is favored to be reactive/inflammatory. He was recommended then to proceed with concurrent chemoradiation with carboplatin.        SUBJECTIVE:  ***    PHYSICAL EXAM:  Weight: *** kg  BP: ***  Pulse: ***  Temp: ***     Physical Exam     LABS AND IMAGING:  ***    IMPRESSION:   Mr. Gallardo is a 61 year old male with a ***    PLAN:   ***

## 2022-06-10 ENCOUNTER — OFFICE VISIT (OUTPATIENT)
Dept: RADIATION ONCOLOGY | Facility: CLINIC | Age: 62
End: 2022-06-10
Attending: RADIOLOGY
Payer: COMMERCIAL

## 2022-06-10 ENCOUNTER — HOSPITAL ENCOUNTER (OUTPATIENT)
Dept: MRI IMAGING | Facility: CLINIC | Age: 62
Discharge: HOME | End: 2022-06-10
Attending: RADIOLOGY | Admitting: RADIOLOGY
Payer: COMMERCIAL

## 2022-06-10 DIAGNOSIS — R13.10 DYSPHAGIA: ICD-10-CM

## 2022-06-10 DIAGNOSIS — C11.9 NASOPHARYNGEAL CANCER (H): Primary | ICD-10-CM

## 2022-06-10 DIAGNOSIS — C11.9 NASOPHARYNGEAL CANCER (H): ICD-10-CM

## 2022-06-10 PROCEDURE — 255N000002 HC RX 255 OP 636: Performed by: RADIOLOGY

## 2022-06-10 PROCEDURE — 77334 RADIATION TREATMENT AID(S): CPT | Performed by: RADIOLOGY

## 2022-06-10 PROCEDURE — A9585 GADOBUTROL INJECTION: HCPCS | Performed by: RADIOLOGY

## 2022-06-10 PROCEDURE — 77334 RADIATION TREATMENT AID(S): CPT | Mod: 26 | Performed by: RADIOLOGY

## 2022-06-10 PROCEDURE — 70543 MRI ORBT/FAC/NCK W/O &W/DYE: CPT | Mod: 26 | Performed by: RADIOLOGY

## 2022-06-10 PROCEDURE — 77263 THER RADIOLOGY TX PLNG CPLX: CPT | Performed by: RADIOLOGY

## 2022-06-10 PROCEDURE — 70543 MRI ORBT/FAC/NCK W/O &W/DYE: CPT

## 2022-06-10 RX ORDER — GADOBUTROL 604.72 MG/ML
10 INJECTION INTRAVENOUS ONCE
Status: COMPLETED | OUTPATIENT
Start: 2022-06-10 | End: 2022-06-10

## 2022-06-10 RX ADMIN — GADOBUTROL 10 ML: 604.72 INJECTION INTRAVENOUS at 16:15

## 2022-06-10 NOTE — Clinical Note
6/10/2022         RE: Jareth Gallardo  [address] [facility] [address]        Dear Colleague,    Thank you for referring your patient, Jareth Gallardo, to the [facility] RADIATION ONCOLOGY. Please see a copy of my visit note below.       Department of Radiation Oncology  [facility]  [address] [place] [address]  [phone]       Radiation Oncology Follow-up Visit  Kasey 10, 2022      Jareth Gallardo  MRN: [id number]   : 1960     DIAGNOSIS:  Squamous cell carcinoma of the nasopharynx, EBV+, [id number] (DENAE)    ONCOLOGICAL HISTORY:   Mr. Gallardo is a 61 year old male with locally advanced nasopharyngeal cancer. He presented in 2021 with growing left neck mass, which was later biopsy-proven to be cancer. MRI on 22 demonstrated a T2 hyperintense, enhancing mass in the nasopharynx, left more than right, measuring 1.8 x 4 x 3.7 cm, as well as adenopathy involving left levels II, III, V and right level II. Staging evaluation found diffuse FDG-avid nasopharyngeal (correlatable with the primary disease) and oropharyngeal mucosal thickening (correlatable with RP adenopathy), as well as bilateral neck nodes as outlined above. Of note, there was also a suspicious left upper lobe nodular ground-glass opacity with increased uptake.    Given extensive leny disease, he was treated with 3 cycles of induction Carboplatin/Gemcitabine. Repeat PET/CT showed good response to his cancer. The left upper lobe nodule was not seen, and at this point is favored to be reactive/inflammatory. He was recommended then to proceed with concurrent chemoradiation with carboplatin.        SUBJECTIVE:  ***    PHYSICAL EXAM:  Weight: *** kg  BP: ***  Pulse: ***  Temp: ***     Physical Exam     LABS AND IMAGING:  ***    IMPRESSION:   Mr. Gallardo is a 61 year old male with a ***    PLAN:   ***      Radiation Therapy Patient Education    Person involved with teaching:  Patient and Wife    Patient educational needs for self management of treatment-related side effects assessment completed.  EPIC Patient Ed tab contains Patient Learning Assessment    Education Materials Given  Radiation Therapy for Head and Neck    Educational Topics Discussed  Side effects expected, Pain management, Skin care, Nutrition and weight loss and When to call MD/RN    Response To Teaching  Verbalizes understanding    GYN Only  Vaginal Dilator-given and educated: N/A    Referrals sent: Dental, Speech and Swallowing and Nutrition    Chemotherapy?  Yes: Notified medical oncology of 06/22/22 start date            Again, thank you for allowing me to participate in the care of your patient.        Sincerely,        Mane Razo MD

## 2022-06-10 NOTE — LETTER
6/10/2022         RE: Jareth Gallardo  [address] [address] [facility] [address]      Radiation Therapy Patient Education    Person involved with teaching: Patient and Wife    Patient educational needs for self management of treatment-related side effects assessment completed.  [facility] Patient Ed tab contains Patient Learning Assessment    Education Materials Given  Radiation Therapy for Head and Neck    Educational Topics Discussed  Side effects expected, Pain management, Skin care, Nutrition and weight loss and When to call MD/RN    Response To Teaching  Verbalizes understanding    GYN Only  Vaginal Dilator-given and educated: N/A    Referrals sent: Dental, Speech and Swallowing and Nutrition    Chemotherapy?  Yes: Notified medical oncology of 06/22/22 start date          Mr. 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 For complete details on his initial presentation and diagnosis, please see my consultation note from 4/1/2022. Since that visit, he underwent neoadjuvant chemotherapy with 3 cycles of carboplatin/gemcitabine from 4/4/2022 - 5/16/2022. He is now referred to radiation oncology clinic for consideration of definitive chemoradiotherapy.    Mr. Gallardo had a restaging PET performed on 5/31/2022 which demonstrated decreased size of the nasopharyngeal tumor with mild FDG uptake (SUV max 5.5, previously 11.7) along with a decrease in size and number of the involved cervical lymphatics. There continued to be no evidence of distant metastatic disease.    The risk/benefits of head and neck radiotherapy were discussed with Mr. Gallardo at length. I specifically proposed a treatment course consisting of 70 Gy/35 fractions delivered to the primary tumor and involved cervical lymphatics with elective coverage of the bilateral low neck. Mr. Gallardo was in agreement with this plan and signed informed consent to that effect.     Mr. Gallardo underwent a CT  simulation session for radiotherapy planning purposes. I will tentatively have him return to clinic the week of 6/20/2022 to commence therapy. In the interim, we will have him follow-up with our oncology dietitian and speech-language pathology team for ongoing cares during treatment. He has already undergone pre-radiotherapy dental clearance.     Mane Razo MD/PhD    Dept of Radiation Oncology  [facility]

## 2022-06-10 NOTE — PROGRESS NOTES
Radiation Therapy Patient Education    Person involved with teaching: Patient and Wife    Patient educational needs for self management of treatment-related side effects assessment completed.  [facility] Patient Ed tab contains Patient Learning Assessment    Education Materials Given  Radiation Therapy for Head and Neck    Educational Topics Discussed  Side effects expected, Pain management, Skin care, Nutrition and weight loss and When to call MD/RN    Response To Teaching  Verbalizes understanding    GYN Only  Vaginal Dilator-given and educated: N/A    Referrals sent: Dental, Speech and Swallowing and Nutrition    Chemotherapy?  Yes: Notified medical oncology of 06/22/22 start date

## 2022-06-12 NOTE — PROGRESS NOTES
A CT simulation was performed under my direction. Please see the [employer] documentation for complete details regarding this procedure.    Mane Razo MD/PhD    Dept of Radiation Oncology  [facility]

## 2022-06-13 NOTE — PROGRESS NOTES
Mr. 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 For complete details on his initial presentation and diagnosis, please see my consultation note from 4/1/2022. Since that visit, he underwent neoadjuvant chemotherapy with 3 cycles of carboplatin/gemcitabine from 4/4/2022 - 5/16/2022. He is now referred to radiation oncology clinic for consideration of definitive chemoradiotherapy.    Mr. Gallardo had a restaging PET performed on 5/31/2022 which demonstrated decreased size of the nasopharyngeal tumor with mild FDG uptake (SUV max 5.5, previously 11.7) along with a decrease in size and number of the involved cervical lymphatics. There continued to be no evidence of distant metastatic disease.    The risk/benefits of head and neck radiotherapy were discussed with Mr. Gallardo at length. I specifically proposed a treatment course consisting of 70 Gy/35 fractions delivered to the primary tumor and involved cervical lymphatics with elective coverage of the bilateral low neck. Mr. Gallardo was in agreement with this plan and signed informed consent to that effect.     Mr. Gallardo underwent a CT simulation session for radiotherapy planning purposes. I will tentatively have him return to clinic the week of 6/20/2022 to commence therapy. In the interim, we will have him follow-up with our oncology dietitian and speech-language pathology team for ongoing cares during treatment. He has already undergone pre-radiotherapy dental clearance.     Mane Razo MD/PhD    Dept of Radiation Oncology  [facility]

## 2022-06-14 ENCOUNTER — PATIENT OUTREACH (OUTPATIENT)
Dept: CARE COORDINATION | Facility: CLINIC | Age: 62
End: 2022-06-14
Payer: COMMERCIAL

## 2022-06-14 ENCOUNTER — PATIENT OUTREACH (OUTPATIENT)
Dept: ONCOLOGY | Facility: CLINIC | Age: 62
End: 2022-06-14
Payer: COMMERCIAL

## 2022-06-14 NOTE — PROGRESS NOTES
[facility]: Cancer Care Short Note                                    Discussion with Patient:                                                      Incoming Call:           Outgoing Call:     Sent patient educational materials on Carboplatin via [email or web address].  Called patient to discuss.  Teaching set up for Monday 6/21 at 9 am.  Reviewed schedule with patient.  Also requested [employer] accommodations for 7 weeks.      Patient to follow up as scheduled at next appt      Herminia Wu MBA, MSN, RN, ONC  RN Care Coordinator  [facility]

## 2022-06-14 NOTE — PROGRESS NOTES
Per Patient's request,  completed and securely emailed [employer] request for lodging dates 6/21/2022 - 8/12/2022. [employer] will contact Patient for confirmation of reservation.  will continue to provide support as needed.    Terri Cameron [facility]  Outpatient Specialty Clinics  Direct Phone: [phone]

## 2022-06-20 ENCOUNTER — PATIENT OUTREACH (OUTPATIENT)
Dept: ONCOLOGY | Facility: CLINIC | Age: 62
End: 2022-06-20
Payer: COMMERCIAL

## 2022-06-21 ENCOUNTER — APPOINTMENT (OUTPATIENT)
Dept: LAB | Facility: CLINIC | Age: 62
End: 2022-06-21
Attending: NURSE PRACTITIONER
Payer: OTHER MISCELLANEOUS

## 2022-06-21 ENCOUNTER — ONCOLOGY VISIT (OUTPATIENT)
Dept: ONCOLOGY | Facility: CLINIC | Age: 62
End: 2022-06-21
Attending: NURSE PRACTITIONER
Payer: OTHER MISCELLANEOUS

## 2022-06-21 VITALS
HEIGHT: 70 IN | DIASTOLIC BLOOD PRESSURE: 78 MMHG | HEART RATE: 95 BPM | BODY MASS INDEX: 43.78 KG/M2 | RESPIRATION RATE: 20 BRPM | OXYGEN SATURATION: 95 % | WEIGHT: 305.8 LBS | TEMPERATURE: 98 F | SYSTOLIC BLOOD PRESSURE: 127 MMHG

## 2022-06-21 DIAGNOSIS — C11.9 NASOPHARYNGEAL CANCER (H): Primary | ICD-10-CM

## 2022-06-21 LAB
ALBUMIN SERPL-MCNC: 3.2 G/DL (ref 3.4–5)
ALP SERPL-CCNC: 89 U/L (ref 40–150)
ALT SERPL W P-5'-P-CCNC: 60 U/L (ref 0–70)
ANION GAP SERPL CALCULATED.3IONS-SCNC: 7 MMOL/L (ref 3–14)
AST SERPL W P-5'-P-CCNC: 52 U/L (ref 0–45)
BASOPHILS # BLD AUTO: 0 10E3/UL (ref 0–0.2)
BASOPHILS NFR BLD AUTO: 1 %
BILIRUB SERPL-MCNC: 0.6 MG/DL (ref 0.2–1.3)
BUN SERPL-MCNC: 10 MG/DL (ref 7–30)
CALCIUM SERPL-MCNC: 8.6 MG/DL (ref 8.5–10.1)
CHLORIDE BLD-SCNC: 104 MMOL/L (ref 94–109)
CO2 SERPL-SCNC: 32 MMOL/L (ref 20–32)
CREAT SERPL-MCNC: 0.78 MG/DL (ref 0.66–1.25)
EOSINOPHIL # BLD AUTO: 0.1 10E3/UL (ref 0–0.7)
EOSINOPHIL NFR BLD AUTO: 2 %
ERYTHROCYTE [DISTWIDTH] IN BLOOD BY AUTOMATED COUNT: 17.1 % (ref 10–15)
GFR SERPL CREATININE-BSD FRML MDRD: >90 ML/MIN/1.73M2
GLUCOSE BLD-MCNC: 181 MG/DL (ref 70–99)
HCT VFR BLD AUTO: 36.4 % (ref 40–53)
HGB BLD-MCNC: 11.1 G/DL (ref 13.3–17.7)
IMM GRANULOCYTES # BLD: 0 10E3/UL
IMM GRANULOCYTES NFR BLD: 0 %
LYMPHOCYTES # BLD AUTO: 1 10E3/UL (ref 0.8–5.3)
LYMPHOCYTES NFR BLD AUTO: 18 %
MAGNESIUM SERPL-MCNC: 1.8 MG/DL (ref 1.6–2.3)
MCH RBC QN AUTO: 27.1 PG (ref 26.5–33)
MCHC RBC AUTO-ENTMCNC: 30.5 G/DL (ref 31.5–36.5)
MCV RBC AUTO: 89 FL (ref 78–100)
MONOCYTES # BLD AUTO: 0.8 10E3/UL (ref 0–1.3)
MONOCYTES NFR BLD AUTO: 13 %
NEUTROPHILS # BLD AUTO: 3.8 10E3/UL (ref 1.6–8.3)
NEUTROPHILS NFR BLD AUTO: 66 %
NRBC # BLD AUTO: 0 10E3/UL
NRBC BLD AUTO-RTO: 0 /100
PLATELET # BLD AUTO: 233 10E3/UL (ref 150–450)
POTASSIUM BLD-SCNC: 3.4 MMOL/L (ref 3.4–5.3)
PROT SERPL-MCNC: 7.4 G/DL (ref 6.8–8.8)
RBC # BLD AUTO: 4.09 10E6/UL (ref 4.4–5.9)
SODIUM SERPL-SCNC: 143 MMOL/L (ref 133–144)
WBC # BLD AUTO: 5.7 10E3/UL (ref 4–11)

## 2022-06-21 PROCEDURE — 83735 ASSAY OF MAGNESIUM: CPT | Performed by: NURSE PRACTITIONER

## 2022-06-21 PROCEDURE — 36591 DRAW BLOOD OFF VENOUS DEVICE: CPT | Performed by: NURSE PRACTITIONER

## 2022-06-21 PROCEDURE — 250N000011 HC RX IP 250 OP 636: Performed by: NURSE PRACTITIONER

## 2022-06-21 PROCEDURE — 85025 COMPLETE CBC W/AUTO DIFF WBC: CPT | Performed by: NURSE PRACTITIONER

## 2022-06-21 PROCEDURE — G0463 HOSPITAL OUTPT CLINIC VISIT: HCPCS

## 2022-06-21 PROCEDURE — 80053 COMPREHEN METABOLIC PANEL: CPT | Performed by: NURSE PRACTITIONER

## 2022-06-21 PROCEDURE — 99215 OFFICE O/P EST HI 40 MIN: CPT | Performed by: NURSE PRACTITIONER

## 2022-06-21 RX ORDER — HEPARIN SODIUM (PORCINE) LOCK FLUSH IV SOLN 100 UNIT/ML 100 UNIT/ML
5 SOLUTION INTRAVENOUS ONCE
Status: COMPLETED | OUTPATIENT
Start: 2022-06-21 | End: 2022-06-21

## 2022-06-21 RX ADMIN — SODIUM CHLORIDE, PRESERVATIVE FREE 5 ML: 5 INJECTION INTRAVENOUS at 16:45

## 2022-06-21 ASSESSMENT — PAIN SCALES - GENERAL: PAINLEVEL: NO PAIN (0)

## 2022-06-21 NOTE — NURSING NOTE
"Oncology Rooming Note    June 21, 2022 4:55 PM   Jareth Gallardo is a 61 year old male who presents for:    Chief Complaint   Patient presents with     Port Draw     Labs drawn by RN via port vitals taken.     Oncology Clinic Visit     [facility]URN - NASOPHARYNGEAL CANCER     Initial Vitals: /78   Pulse 95   Temp 98  F (36.7  C)   Resp 20   Ht 1.778 m (5' 10\")   Wt 138.7 kg (305 lb 12.8 oz)   SpO2 95%   BMI 43.88 kg/m   Estimated body mass index is 43.88 kg/m  as calculated from the following:    Height as of this encounter: 1.778 m (5' 10\").    Weight as of this encounter: 138.7 kg (305 lb 12.8 oz). Body surface area is 2.62 meters squared.  No Pain (0) Comment: Data Unavailable   No LMP for male patient.  Allergies reviewed: Yes  Medications reviewed: Yes    Medications: Medication refills not needed today.  Pharmacy name entered into [employer]:    [id number] - [place], [place] - [address] [facility] [place], [place] -  [facility] [id number]    Clinical concerns: No new concerns. Thomas was notified.      Damir Dasilva LPN            "

## 2022-06-21 NOTE — PROGRESS NOTES
Reason for Visit: seen in f/u of nasopharyngeal carcinoma    Oncology HPI:     Mr. Gallardo is a 61 year old man who presents for evaluation prior to starting chemoradiation. He was previously treated with 3 cycles of carboplatin/gemcitabine with a NJ on PET/CT.  Cisplatin was not used due to hearing loss on audiogram.  He is to start concurrent chemoradiation with weekly carboplatin tomorrow.    SUMMARY  12/11/21                   UC for L neck mass, selling  12/9/21                     CT neck ([place]). 2.4 cm L neck node medial to SCM just above the hyoid, additional LNs surround, scattered R neck and L supraclavicular LNs  2/15/22                     US bx L cervical node. Path: SCC, moderately differentiated,   3/4/22                       PET/CT. Nasopharyngeal and oropharyngeal mucosal thickening. 2.6 cm BERNARDO nodular GGO, SUV avid, some additional lingular uptake  4/4/22                       C1 carboplatin gemcitabine. Not a candidate for cisplatin due to hearing loss on audiogram.  4/26/22                     C2 carboplatin gemcitabine. Neulasta 5/3 after C2D8, ANC 1000   5/16/22                     C3 carboplatin gemcitabine.   5/31/22: PET with decreased size of nasopharyngeal tumor w/ decreased FDG uptake and decrease in size and number of involved cervical lymph nodes  6/22/22: initiate definitive chemoradiation with weekly carboplatin    Interval history: Cristobal is here with his wife. Feels ready to start chemoradiation tomorrow. Neck mass has shrunk considerably since he started chemo. No neck/throat pain. Swallowing ok. No fevers/chills. No cough or congestion or chest pain. Bowels are intermittently loose then constipated. Urination wnl. Has a hx of leg edema from a prior compression injury in the thighs. No change to baseline edema. Has diabetes, glucoses in the low 100 or below range. He is staying at the [facility]. Walking back and forth to his appointments at the clinic and hospital.    PAST  MEDICAL HISTORY  Nasopharyngeal carcinoma as above  DM2  COPD??  Hiatal hernia. Sleeps with his head elevated  LVH on TTE 2009, EF50%, LA Dilation  Dyslipidemia  BARRETT in 2015. Not using CPAP due to developing tooth abscesses when he tried it in 2015  Venous insufficiency in both legs being in an accident involving a mower about 10 years ago, on chronic furosemide. LE US negative 12/15/2009  Cholelithiasis  Hyperplastic colon polyp, due 2020  Tonsillectomy  Baker cyst RLE US 2013     Neuropathy from DM - R foot numbness chronically     Exposed to lots of wood dust in his occupation       Current Outpatient Medications   Medication Sig Dispense Refill     atorvastatin (LIPITOR) 20 MG tablet Take 20 mg by mouth       B-D U/F insulin pen needle USE ONE TIME A DAY TO INJECT VICTOZA       blood glucose (NO BRAND SPECIFIED) lancets standard Use to test blood sugar 2 times daily or as directed. 90 lancet 1     blood glucose (NO BRAND SPECIFIED) test strip Use to test blood sugar 3 times daily or as directed. 90 strip 0     blood glucose monitoring (ACCU-CHEK RANDELL PLUS) meter device kit Use to test blood sugar 2 times daily or as directed. 1 kit 0     fluticasone-vilanterol (BREO ELLIPTA) 100-25 MCG/INH inhaler INHALE 1 PUFF INTO THE LUNGS ONE TIME A DAY. RINSE MOUTH AFTER USE.       furosemide (LASIX) 20 MG tablet Take 40 mg by mouth       insulin lispro (HUMALOG KWIKPEN) 100 UNIT/ML (1 unit dial) KWIKPEN 1 unit per 50 above 150 correction after chemo. TDD 20 units       metFORMIN (GLUCOPHAGE XR) 500 MG 24 hr tablet Take 2 tabs daily. Swallow tablet whole; do not crush, divide or chew.       metFORMIN (GLUCOPHAGE) 1000 MG tablet TAKE 1 TABLET BY MOUTH ONE TIME A DAY. TAKE WITH FOOD.       ondansetron (ZOFRAN) 8 MG tablet Take 1 tablet (8 mg) by mouth every 8 hours as needed for nausea 30 tablet 11     prochlorperazine (COMPAZINE) 10 MG tablet Take 1 tablet (10 mg) by mouth every 6 hours as needed (Nausea/Vomiting) 30 tablet  "5     VICTOZA PEN 18 MG/3ML soln Inject 1.2 mg Subcutaneous daily          No Known Allergies      Exam: alert, appears in NAD.  Blood pressure 127/78, pulse 95, temperature 98  F (36.7  C), resp. rate 20, height 1.778 m (5' 10\"), weight 138.7 kg (305 lb 12.8 oz), SpO2 95 %.  Wt Readings from Last 4 Encounters:   06/01/22 141.5 kg (311 lb 14.4 oz)   05/23/22 137 kg (302 lb)   05/16/22 140 kg (308 lb 11.2 oz)   05/02/22 137.6 kg (303 lb 6.4 oz)   Oropharynx is moist and without lesion. L neck cervical nodes are firm and measure  Lungs:CTA. Heart: RRR, no murmur or rub. Abdomen: soft, nontender, BS active, no masses or organomegaly.  Extremities: warm, trace to 1+ BLE edema bilaterally.  Speech is clear. CN wnl. Gait/station wnl.       Labs:    Latest Reference Range & Units 06/21/22 16:43   Sodium 133 - 144 mmol/L 143   Potassium 3.4 - 5.3 mmol/L 3.4   Chloride 94 - 109 mmol/L 104   Carbon Dioxide 20 - 32 mmol/L 32   Urea Nitrogen 7 - 30 mg/dL 10   Creatinine 0.66 - 1.25 mg/dL 0.78   GFR Estimate >60 mL/min/1.73m2 >90   Calcium 8.5 - 10.1 mg/dL 8.6   Anion Gap 3 - 14 mmol/L 7   Magnesium 1.6 - 2.3 mg/dL 1.8   Albumin 3.4 - 5.0 g/dL 3.2 (L)   Protein Total 6.8 - 8.8 g/dL 7.4   Bilirubin Total 0.2 - 1.3 mg/dL 0.6   Alkaline Phosphatase 40 - 150 U/L 89   ALT 0 - 70 U/L 60   AST 0 - 45 U/L 52 (H)   Glucose 70 - 99 mg/dL 181 (H)   WBC 4.0 - 11.0 10e3/uL 5.7   Hemoglobin 13.3 - 17.7 g/dL 11.1 (L)   Hematocrit 40.0 - 53.0 % 36.4 (L)   Platelet Count 150 - 450 10e3/uL 233   RBC Count 4.40 - 5.90 10e6/uL 4.09 (L)   MCV 78 - 100 fL 89   MCH 26.5 - 33.0 pg 27.1   MCHC 31.5 - 36.5 g/dL 30.5 (L)   RDW 10.0 - 15.0 % 17.1 (H)   % Neutrophils % 66   % Lymphocytes % 18   % Monocytes % 13   % Eosinophils % 2   % Basophils % 1   Absolute Basophils 0.0 - 0.2 10e3/uL 0.0   Absolute Eosinophils 0.0 - 0.7 10e3/uL 0.1   Absolute Immature Granulocytes <=0.4 10e3/uL 0.0   Absolute Lymphocytes 0.8 - 5.3 10e3/uL 1.0   Absolute Monocytes 0.0 - " 1.3 10e3/uL 0.8   % Immature Granulocytes % 0   Absolute Neutrophils 1.6 - 8.3 10e3/uL 3.8   Absolute NRBCs 10e3/uL 0.0   NRBCs per 100 WBC <1 /100 0   (L): Data is abnormally low  (H): Data is abnormally high        Impression/plan:   Nasopharyngeal carcinoma, SAM +julia:   With MT to 3 cycles of Carbo/Baker  -reviewed plan to initiate chemoradiation with weekly carboplatin  -reviewed anticipated side effects including potential for nausea, vomiting, fatigue, cytopenias. Reviewed anticipated progression of radiation side effects.   -anticipate he should tolerate the carboplatin well as he did tolerate the combination treatment well.  -will need to monitor for fluid overload as he had issues with that before  -will have weekly LEONEL visits and f/u with Dr. Melvin mid-treatment, post treatment PET/CT anticipated at 12 weeks      Transaminitis: G1.  -possibly from gemcitabine, likely some decrease of steatosis contributing  -LFTs improving today. Monitor. Has a visit with hepatology pending.     BLE edema: reports a hx of compression injury to the thighs in the past that contributed to chronic edema  -On daily furosemide, no changes with chemo, TTE 4/1/22 technically difficult with limited information but EF 53%, mild RVD, inferolateral/posterior akinesis which was not noted on 2009 TTE in Care Everywhere. Fluid overloaded but no pulm edema.   -monitor fluid overload . Continue furosemide 40 mg daily.      L lung nodules: Resolved.   -thought to be inflammatory. Will pay attention to this on follow-up imaging.     DM2, steroid-induced hyperglycemia:  -managing ok right now. Forgot his lispro at home. Reviewed potential for hyperglycemia after his weekly chemo dose as he is given dexamethasone.  -remains on metformin, victoza    Bowel irregularity  vacillates between diarrhea and constipation  -likely metforming and victoza are contributing. monitor     Mild peripheral neuropathy: R foot numbness, very mild, from DM2.    -stable    45 minutes spent on the date of the encounter doing chart review, review of test results, interpretation of tests, patient visit, documentation and discussion with family

## 2022-06-21 NOTE — PROGRESS NOTES
[facility]: Cancer Care Plan of Care Education Note                                    Discussion with Patient:                                                    Chem[place]ach done today.  Patient is starting new regimen of Carbo today.  Patient reviewed educational materials sent via [employer].  Reviewed all side effects, discussed neutropenia/infection prevention in detail.  Patient knows when to call [facility]CC for symptom management/toxicities.  Patient trying to be very positive, but states he did not expect this diagnosis.  We talked a fair amount about coping.  Patient and wife will be staying at [facility] during chemorads.             Assessment:                                                      Assessment completed with:: Patient    Current living arrangement  I live in a private home with spouse    Plan of Care Education   Yearly learning assessment completed?: Yes (see Education tab)  Diagnosis:: Head and neck cancer  Does patient understand diagnosis?: Yes  Tx plan/regimen:: Carboplatin  Does patient understand treatment plan/regimen?: Yes  Preparing for treatment:: Reviewed treatment preparation information with patient (vascular access, day of chemo, visitor policy, what to bring, etc.)  Vascular access:: Port  Side effect education:: Diarrhea/Constipation;Endocrine therapy (myalgias, arthralgias, hot flashes, vaginal dryness, mood disorder, and thinning of the bones);Fatigue;Hair loss;Infection;Lab value monitoring (anemia, neutropenia, thrombocytopenia);Mouth sores;Nausea/Vomiting;Neuropathy  Supportive services:: [facility]  Transportation means:: Accessible car  Safety/self care at home reviewed with patient:: Yes  Informal Support system:: Family  Coping - concerns/fears reviewed with patient:: Yes  Plan of Care:: Treatment schedule  When to call provider:: Bleeding;Increased shortness of breath;New/worsening pain;Shaking chills;Temperature >100.4F;Uncontrolled diarrhea/constipation;Uncontrolled  nausea/vomiting  Reasons for deferring treatment reviewed with patient:: Yes    Evaluation of Learning  Patient Education Provided: Yes  Readiness:: Acceptance  Method:: Booklet/Handout  Response:: Verbalizes understanding      Intervention/Education provided during outreach:                                                           Patient to follow up as scheduled at next appt    Signature:  Herminia Wu RN

## 2022-06-21 NOTE — NURSING NOTE
Chief Complaint   Patient presents with     Port Draw     Labs drawn by RN via port vitals taken.     Port accessed with 20 gauge 3/4 gripper needle by RN, labs collected, line flushed with saline and heparin.  Vitals taken. Pt checked in for appointment(s).    Mary Cole RN

## 2022-06-21 NOTE — LETTER
6/21/2022         RE: Jareth Gallardo  [address] [place] [address]        Dear Colleague,    Thank you for referring your patient, Jareth Gallardo, to the [facility]. Please see a copy of my visit note below.    Reason for Visit: seen in f/u of nasopharyngeal carcinoma    Oncology HPI:     Mr. Gallardo is a 61 year old man who presents for evaluation prior to starting chemoradiation. He was previously treated with 3 cycles of carboplatin/gemcitabine with a [place] on PET/CT.  Cisplatin was not used due to hearing loss on audiogram.  He is to start concurrent chemoradiation with weekly carboplatin tomorrow.    SUMMARY  12/11/21                   UC for L neck mass, selling  12/9/21                     CT neck ([place]). 2.4 cm L neck node medial to SCM just above the hyoid, additional LNs surround, scattered R neck and L supraclavicular LNs  2/15/22                     US bx L cervical node. Path: SCC, moderately differentiated,   3/4/22                       PET/CT. Nasopharyngeal and oropharyngeal mucosal thickening. 2.6 cm BERNARDO nodular GGO, SUV avid, some additional lingular uptake  4/4/22                       C1 carboplatin gemcitabine. Not a candidate for cisplatin due to hearing loss on audiogram.  4/26/22                     C2 carboplatin gemcitabine. Neulasta 5/3 after C2D8, ANC 1000   5/16/22                     C3 carboplatin gemcitabine.   5/31/22: PET with decreased size of nasopharyngeal tumor w/ decreased FDG uptake and decrease in size and number of involved cervical lymph nodes  6/22/22: initiate definitive chemoradiation with weekly carboplatin    Interval history: Cristobal is here with his wife. Feels ready to start chemoradiation tomorrow. Neck mass has shrunk considerably since he started chemo. No neck/throat pain. Swallowing ok. No fevers/chills. No cough or congestion or chest pain. Bowels are intermittently loose then constipated. Urination wnl. Has a hx  of leg edema from a prior compression injury in the thighs. No change to baseline edema. Has diabetes, glucoses in the low 100 or below range. He is staying at the [facility]. Walking back and forth to his appointments at the clinic and hospital.    PAST MEDICAL HISTORY  Nasopharyngeal carcinoma as above  DM2  COPD??  Hiatal hernia. Sleeps with his head elevated  LVH on TTE 2009, EF50%, LA Dilation  Dyslipidemia  BARRETT in 2015. Not using CPAP due to developing tooth abscesses when he tried it in 2015  Venous insufficiency in both legs being in an accident involving a mower about 10 years ago, on chronic furosemide. LE US negative 12/15/2009  Cholelithiasis  Hyperplastic colon polyp, due 2020  Tonsillectomy  Baker cyst RLE US 2013     Neuropathy from DM - R foot numbness chronically     Exposed to lots of wood dust in his occupation       Current Outpatient Medications   Medication Sig Dispense Refill     atorvastatin (LIPITOR) 20 MG tablet Take 20 mg by mouth       B-D U/F insulin pen needle USE ONE TIME A DAY TO INJECT VICTOZA       blood glucose (NO BRAND SPECIFIED) lancets standard Use to test blood sugar 2 times daily or as directed. 90 lancet 1     blood glucose (NO BRAND SPECIFIED) test strip Use to test blood sugar 3 times daily or as directed. 90 strip 0     blood glucose monitoring (ACCU-CHEK RANDELL PLUS) meter device kit Use to test blood sugar 2 times daily or as directed. 1 kit 0     fluticasone-vilanterol (BREO ELLIPTA) 100-25 MCG/INH inhaler INHALE 1 PUFF INTO THE LUNGS ONE TIME A DAY. RINSE MOUTH AFTER USE.       furosemide (LASIX) 20 MG tablet Take 40 mg by mouth       insulin lispro (HUMALOG KWIKPEN) 100 UNIT/ML (1 unit dial) KWIKPEN 1 unit per 50 above 150 correction after chemo. TDD 20 units       metFORMIN (GLUCOPHAGE XR) 500 MG 24 hr tablet Take 2 tabs daily. Swallow tablet whole; do not crush, divide or chew.       metFORMIN (GLUCOPHAGE) 1000 MG tablet TAKE 1 TABLET BY MOUTH ONE TIME A DAY. TAKE  "WITH FOOD.       ondansetron (ZOFRAN) 8 MG tablet Take 1 tablet (8 mg) by mouth every 8 hours as needed for nausea 30 tablet 11     prochlorperazine (COMPAZINE) 10 MG tablet Take 1 tablet (10 mg) by mouth every 6 hours as needed (Nausea/Vomiting) 30 tablet 5     VICTOZA PEN 18 MG/3ML soln Inject 1.2 mg Subcutaneous daily          No Known Allergies      Exam: alert, appears in NAD.  Blood pressure 127/78, pulse 95, temperature 98  F (36.7  C), resp. rate 20, height 1.778 m (5' 10\"), weight 138.7 kg (305 lb 12.8 oz), SpO2 95 %.  Wt Readings from Last 4 Encounters:   06/01/22 141.5 kg (311 lb 14.4 oz)   05/23/22 137 kg (302 lb)   05/16/22 140 kg (308 lb 11.2 oz)   05/02/22 137.6 kg (303 lb 6.4 oz)   Oropharynx is moist and without lesion. L neck cervical nodes are firm and measure  Lungs:CTA. Heart: RRR, no murmur or rub. Abdomen: soft, nontender, BS active, no masses or organomegaly.  Extremities: warm, trace to 1+ BLE edema bilaterally.  Speech is clear. CN wnl. Gait/station wnl.       Labs:    Latest Reference Range & Units 06/21/22 16:43   Sodium 133 - 144 mmol/L 143   Potassium 3.4 - 5.3 mmol/L 3.4   Chloride 94 - 109 mmol/L 104   Carbon Dioxide 20 - 32 mmol/L 32   Urea Nitrogen 7 - 30 mg/dL 10   Creatinine 0.66 - 1.25 mg/dL 0.78   GFR Estimate >60 mL/min/1.73m2 >90   Calcium 8.5 - 10.1 mg/dL 8.6   Anion Gap 3 - 14 mmol/L 7   Magnesium 1.6 - 2.3 mg/dL 1.8   Albumin 3.4 - 5.0 g/dL 3.2 (L)   Protein Total 6.8 - 8.8 g/dL 7.4   Bilirubin Total 0.2 - 1.3 mg/dL 0.6   Alkaline Phosphatase 40 - 150 U/L 89   ALT 0 - 70 U/L 60   AST 0 - 45 U/L 52 (H)   Glucose 70 - 99 mg/dL 181 (H)   WBC 4.0 - 11.0 10e3/uL 5.7   Hemoglobin 13.3 - 17.7 g/dL 11.1 (L)   Hematocrit 40.0 - 53.0 % 36.4 (L)   Platelet Count 150 - 450 10e3/uL 233   RBC Count 4.40 - 5.90 10e6/uL 4.09 (L)   MCV 78 - 100 fL 89   MCH 26.5 - 33.0 pg 27.1   MCHC 31.5 - 36.5 g/dL 30.5 (L)   RDW 10.0 - 15.0 % 17.1 (H)   % Neutrophils % 66   % Lymphocytes % 18   % " Monocytes % 13   % Eosinophils % 2   % Basophils % 1   Absolute Basophils 0.0 - 0.2 10e3/uL 0.0   Absolute Eosinophils 0.0 - 0.7 10e3/uL 0.1   Absolute Immature Granulocytes <=0.4 10e3/uL 0.0   Absolute Lymphocytes 0.8 - 5.3 10e3/uL 1.0   Absolute Monocytes 0.0 - 1.3 10e3/uL 0.8   % Immature Granulocytes % 0   Absolute Neutrophils 1.6 - 8.3 10e3/uL 3.8   Absolute NRBCs 10e3/uL 0.0   NRBCs per 100 WBC <1 /100 0   (L): Data is abnormally low  (H): Data is abnormally high        Impression/plan:   Nasopharyngeal carcinoma, SAM +julia:   With MT to 3 cycles of Carbo/[place]  -reviewed plan to initiate chemoradiation with weekly carboplatin  -reviewed anticipated side effects including potential for nausea, vomiting, fatigue, cytopenias. Reviewed anticipated progression of radiation side effects.   -anticipate he should tolerate the carboplatin well as he did tolerate the combination treatment well.  -will need to monitor for fluid overload as he had issues with that before  -will have weekly LEONEL visits and f/u with Dr. Melvin mid-treatment, post treatment PET/CT anticipated at 12 weeks      Transaminitis: G1.  -possibly from gemcitabine, likely some decrease of steatosis contributing  -LFTs improving today. Monitor. Has a visit with hepatology pending.     BLE edema: reports a hx of compression injury to the thighs in the past that contributed to chronic edema  -On daily furosemide, no changes with chemo, TTE 4/1/22 technically difficult with limited information but EF 53%, mild RVD, inferolateral/posterior akinesis which was not noted on 2009 TTE in Care Everywhere. Fluid overloaded but no pulm edema.   -monitor fluid overload . Continue furosemide 40 mg daily.      L lung nodules: Resolved.   -thought to be inflammatory. Will pay attention to this on follow-up imaging.     DM2, steroid-induced hyperglycemia:  -managing ok right now. Forgot his lispro at home. Reviewed potential for hyperglycemia after his weekly chemo  dose as he is given dexamethasone.  -remains on metformin, victoza    Bowel irregularity  vacillates between diarrhea and constipation  -likely metforming and victoza are contributing. monitor     Mild peripheral neuropathy: R foot numbness, very mild, from DM2.   -stable    45 minutes spent on the date of the encounter doing chart review, review of test results, interpretation of tests, patient visit, documentation and discussion with family       Sincerely,    SILVIANO Lewis CNP

## 2022-06-22 ENCOUNTER — APPOINTMENT (OUTPATIENT)
Dept: RADIATION ONCOLOGY | Facility: CLINIC | Age: 62
End: 2022-06-22
Attending: RADIOLOGY
Payer: COMMERCIAL

## 2022-06-22 ENCOUNTER — INFUSION THERAPY VISIT (OUTPATIENT)
Dept: ONCOLOGY | Facility: CLINIC | Age: 62
End: 2022-06-22
Attending: INTERNAL MEDICINE
Payer: COMMERCIAL

## 2022-06-22 ENCOUNTER — THERAPY VISIT (OUTPATIENT)
Dept: SPEECH THERAPY | Facility: CLINIC | Age: 62
End: 2022-06-22
Attending: RADIOLOGY
Payer: COMMERCIAL

## 2022-06-22 ENCOUNTER — ANCILLARY PROCEDURE (OUTPATIENT)
Dept: GENERAL RADIOLOGY | Facility: CLINIC | Age: 62
End: 2022-06-22
Attending: RADIOLOGY
Payer: COMMERCIAL

## 2022-06-22 VITALS
HEART RATE: 97 BPM | OXYGEN SATURATION: 96 % | RESPIRATION RATE: 12 BRPM | SYSTOLIC BLOOD PRESSURE: 107 MMHG | TEMPERATURE: 98.5 F | DIASTOLIC BLOOD PRESSURE: 74 MMHG

## 2022-06-22 DIAGNOSIS — T45.1X5A CHEMOTHERAPY-INDUCED NEUTROPENIA (H): Primary | ICD-10-CM

## 2022-06-22 DIAGNOSIS — C11.9 NASOPHARYNGEAL CANCER (H): ICD-10-CM

## 2022-06-22 DIAGNOSIS — R13.12 OROPHARYNGEAL DYSPHAGIA: Primary | ICD-10-CM

## 2022-06-22 DIAGNOSIS — D70.1 CHEMOTHERAPY-INDUCED NEUTROPENIA (H): Primary | ICD-10-CM

## 2022-06-22 PROCEDURE — 92526 ORAL FUNCTION THERAPY: CPT | Mod: GN | Performed by: SPEECH-LANGUAGE PATHOLOGIST

## 2022-06-22 PROCEDURE — 77014 PR CT GUIDE FOR PLACEMENT RADIATION THERAPY FIELDS: CPT | Mod: 26 | Performed by: RADIOLOGY

## 2022-06-22 PROCEDURE — 258N000003 HC RX IP 258 OP 636: Performed by: INTERNAL MEDICINE

## 2022-06-22 PROCEDURE — 77333 RADIATION TREATMENT AID(S): CPT | Mod: 26 | Performed by: RADIOLOGY

## 2022-06-22 PROCEDURE — 92611 MOTION FLUOROSCOPY/SWALLOW: CPT | Mod: GN | Performed by: SPEECH-LANGUAGE PATHOLOGIST

## 2022-06-22 PROCEDURE — 77386 HC IMRT TREATMENT DELIVERY, COMPLEX: CPT | Performed by: RADIOLOGY

## 2022-06-22 PROCEDURE — 74230 X-RAY XM SWLNG FUNCJ C+: CPT | Mod: GC | Performed by: RADIOLOGY

## 2022-06-22 PROCEDURE — 96413 CHEMO IV INFUSION 1 HR: CPT

## 2022-06-22 PROCEDURE — 92610 EVALUATE SWALLOWING FUNCTION: CPT | Mod: GN | Performed by: SPEECH-LANGUAGE PATHOLOGIST

## 2022-06-22 PROCEDURE — 250N000011 HC RX IP 250 OP 636: Performed by: INTERNAL MEDICINE

## 2022-06-22 PROCEDURE — 96375 TX/PRO/DX INJ NEW DRUG ADDON: CPT

## 2022-06-22 PROCEDURE — 77333 RADIATION TREATMENT AID(S): CPT | Performed by: RADIOLOGY

## 2022-06-22 RX ORDER — MEPERIDINE HYDROCHLORIDE 25 MG/ML
25 INJECTION INTRAMUSCULAR; INTRAVENOUS; SUBCUTANEOUS EVERY 30 MIN PRN
Status: CANCELLED | OUTPATIENT
Start: 2022-06-29

## 2022-06-22 RX ORDER — EPINEPHRINE 1 MG/ML
0.3 INJECTION, SOLUTION INTRAMUSCULAR; SUBCUTANEOUS EVERY 5 MIN PRN
Status: CANCELLED | OUTPATIENT
Start: 2022-07-20

## 2022-06-22 RX ORDER — ALBUTEROL SULFATE 0.83 MG/ML
2.5 SOLUTION RESPIRATORY (INHALATION)
Status: CANCELLED | OUTPATIENT
Start: 2022-06-29

## 2022-06-22 RX ORDER — HEPARIN SODIUM (PORCINE) LOCK FLUSH IV SOLN 100 UNIT/ML 100 UNIT/ML
5 SOLUTION INTRAVENOUS
Status: CANCELLED | OUTPATIENT
Start: 2022-07-27

## 2022-06-22 RX ORDER — MEPERIDINE HYDROCHLORIDE 25 MG/ML
25 INJECTION INTRAMUSCULAR; INTRAVENOUS; SUBCUTANEOUS EVERY 30 MIN PRN
Status: CANCELLED | OUTPATIENT
Start: 2022-07-27

## 2022-06-22 RX ORDER — ALBUTEROL SULFATE 90 UG/1
1-2 AEROSOL, METERED RESPIRATORY (INHALATION)
Status: CANCELLED
Start: 2022-06-29

## 2022-06-22 RX ORDER — NALOXONE HYDROCHLORIDE 0.4 MG/ML
0.2 INJECTION, SOLUTION INTRAMUSCULAR; INTRAVENOUS; SUBCUTANEOUS
Status: CANCELLED | OUTPATIENT
Start: 2022-06-29

## 2022-06-22 RX ORDER — HEPARIN SODIUM,PORCINE 10 UNIT/ML
5 VIAL (ML) INTRAVENOUS
Status: CANCELLED | OUTPATIENT
Start: 2022-06-22

## 2022-06-22 RX ORDER — HEPARIN SODIUM (PORCINE) LOCK FLUSH IV SOLN 100 UNIT/ML 100 UNIT/ML
5 SOLUTION INTRAVENOUS
Status: CANCELLED | OUTPATIENT
Start: 2022-06-22

## 2022-06-22 RX ORDER — LORAZEPAM 2 MG/ML
0.5 INJECTION INTRAMUSCULAR EVERY 4 HOURS PRN
Status: CANCELLED | OUTPATIENT
Start: 2022-06-22

## 2022-06-22 RX ORDER — EPINEPHRINE 1 MG/ML
0.3 INJECTION, SOLUTION INTRAMUSCULAR; SUBCUTANEOUS EVERY 5 MIN PRN
Status: CANCELLED | OUTPATIENT
Start: 2022-07-06

## 2022-06-22 RX ORDER — HEPARIN SODIUM (PORCINE) LOCK FLUSH IV SOLN 100 UNIT/ML 100 UNIT/ML
5 SOLUTION INTRAVENOUS
Status: CANCELLED | OUTPATIENT
Start: 2022-07-20

## 2022-06-22 RX ORDER — ALBUTEROL SULFATE 90 UG/1
1-2 AEROSOL, METERED RESPIRATORY (INHALATION)
Status: CANCELLED
Start: 2022-07-13

## 2022-06-22 RX ORDER — DIPHENHYDRAMINE HYDROCHLORIDE 50 MG/ML
50 INJECTION INTRAMUSCULAR; INTRAVENOUS
Status: CANCELLED
Start: 2022-07-27

## 2022-06-22 RX ORDER — ALBUTEROL SULFATE 0.83 MG/ML
2.5 SOLUTION RESPIRATORY (INHALATION)
Status: CANCELLED | OUTPATIENT
Start: 2022-07-20

## 2022-06-22 RX ORDER — ALBUTEROL SULFATE 90 UG/1
1-2 AEROSOL, METERED RESPIRATORY (INHALATION)
Status: CANCELLED
Start: 2022-06-22

## 2022-06-22 RX ORDER — DIPHENHYDRAMINE HYDROCHLORIDE 50 MG/ML
50 INJECTION INTRAMUSCULAR; INTRAVENOUS
Status: CANCELLED
Start: 2022-07-20

## 2022-06-22 RX ORDER — DIPHENHYDRAMINE HYDROCHLORIDE 50 MG/ML
50 INJECTION INTRAMUSCULAR; INTRAVENOUS
Status: CANCELLED
Start: 2022-06-22

## 2022-06-22 RX ORDER — ALBUTEROL SULFATE 90 UG/1
1-2 AEROSOL, METERED RESPIRATORY (INHALATION)
Status: CANCELLED
Start: 2022-07-20

## 2022-06-22 RX ORDER — DIPHENHYDRAMINE HYDROCHLORIDE 50 MG/ML
50 INJECTION INTRAMUSCULAR; INTRAVENOUS
Status: CANCELLED
Start: 2022-07-13

## 2022-06-22 RX ORDER — MEPERIDINE HYDROCHLORIDE 25 MG/ML
25 INJECTION INTRAMUSCULAR; INTRAVENOUS; SUBCUTANEOUS EVERY 30 MIN PRN
Status: CANCELLED | OUTPATIENT
Start: 2022-07-06

## 2022-06-22 RX ORDER — HEPARIN SODIUM,PORCINE 10 UNIT/ML
5 VIAL (ML) INTRAVENOUS
Status: DISCONTINUED | OUTPATIENT
Start: 2022-06-22 | End: 2022-06-22 | Stop reason: HOSPADM

## 2022-06-22 RX ORDER — EPINEPHRINE 1 MG/ML
0.3 INJECTION, SOLUTION INTRAMUSCULAR; SUBCUTANEOUS EVERY 5 MIN PRN
Status: CANCELLED | OUTPATIENT
Start: 2022-07-13

## 2022-06-22 RX ORDER — NALOXONE HYDROCHLORIDE 0.4 MG/ML
0.2 INJECTION, SOLUTION INTRAMUSCULAR; INTRAVENOUS; SUBCUTANEOUS
Status: CANCELLED | OUTPATIENT
Start: 2022-07-20

## 2022-06-22 RX ORDER — EPINEPHRINE 1 MG/ML
0.3 INJECTION, SOLUTION INTRAMUSCULAR; SUBCUTANEOUS EVERY 5 MIN PRN
Status: CANCELLED | OUTPATIENT
Start: 2022-06-29

## 2022-06-22 RX ORDER — ALBUTEROL SULFATE 0.83 MG/ML
2.5 SOLUTION RESPIRATORY (INHALATION)
Status: CANCELLED | OUTPATIENT
Start: 2022-07-27

## 2022-06-22 RX ORDER — ALBUTEROL SULFATE 90 UG/1
1-2 AEROSOL, METERED RESPIRATORY (INHALATION)
Status: CANCELLED
Start: 2022-07-27

## 2022-06-22 RX ORDER — DIPHENHYDRAMINE HYDROCHLORIDE 50 MG/ML
50 INJECTION INTRAMUSCULAR; INTRAVENOUS
Status: CANCELLED
Start: 2022-07-06

## 2022-06-22 RX ORDER — HEPARIN SODIUM (PORCINE) LOCK FLUSH IV SOLN 100 UNIT/ML 100 UNIT/ML
5 SOLUTION INTRAVENOUS
Status: CANCELLED | OUTPATIENT
Start: 2022-07-13

## 2022-06-22 RX ORDER — NALOXONE HYDROCHLORIDE 0.4 MG/ML
0.2 INJECTION, SOLUTION INTRAMUSCULAR; INTRAVENOUS; SUBCUTANEOUS
Status: CANCELLED | OUTPATIENT
Start: 2022-06-22

## 2022-06-22 RX ORDER — EPINEPHRINE 1 MG/ML
0.3 INJECTION, SOLUTION INTRAMUSCULAR; SUBCUTANEOUS EVERY 5 MIN PRN
Status: CANCELLED | OUTPATIENT
Start: 2022-07-27

## 2022-06-22 RX ORDER — METHYLPREDNISOLONE SODIUM SUCCINATE 125 MG/2ML
125 INJECTION, POWDER, LYOPHILIZED, FOR SOLUTION INTRAMUSCULAR; INTRAVENOUS
Status: CANCELLED
Start: 2022-07-06

## 2022-06-22 RX ORDER — DIPHENHYDRAMINE HYDROCHLORIDE 50 MG/ML
50 INJECTION INTRAMUSCULAR; INTRAVENOUS
Status: CANCELLED
Start: 2022-06-29

## 2022-06-22 RX ORDER — MEPERIDINE HYDROCHLORIDE 25 MG/ML
25 INJECTION INTRAMUSCULAR; INTRAVENOUS; SUBCUTANEOUS EVERY 30 MIN PRN
Status: CANCELLED | OUTPATIENT
Start: 2022-06-22

## 2022-06-22 RX ORDER — HEPARIN SODIUM (PORCINE) LOCK FLUSH IV SOLN 100 UNIT/ML 100 UNIT/ML
5 SOLUTION INTRAVENOUS
Status: DISCONTINUED | OUTPATIENT
Start: 2022-06-22 | End: 2022-06-22 | Stop reason: HOSPADM

## 2022-06-22 RX ORDER — LORAZEPAM 2 MG/ML
0.5 INJECTION INTRAMUSCULAR EVERY 4 HOURS PRN
Status: CANCELLED | OUTPATIENT
Start: 2022-06-29

## 2022-06-22 RX ORDER — BARIUM SULFATE 400 MG/ML
25 SUSPENSION ORAL ONCE
Status: COMPLETED | OUTPATIENT
Start: 2022-06-22 | End: 2022-06-22

## 2022-06-22 RX ORDER — HEPARIN SODIUM,PORCINE 10 UNIT/ML
5 VIAL (ML) INTRAVENOUS
Status: CANCELLED | OUTPATIENT
Start: 2022-07-13

## 2022-06-22 RX ORDER — ALBUTEROL SULFATE 90 UG/1
1-2 AEROSOL, METERED RESPIRATORY (INHALATION)
Status: CANCELLED
Start: 2022-07-06

## 2022-06-22 RX ORDER — METHYLPREDNISOLONE SODIUM SUCCINATE 125 MG/2ML
125 INJECTION, POWDER, LYOPHILIZED, FOR SOLUTION INTRAMUSCULAR; INTRAVENOUS
Status: CANCELLED
Start: 2022-07-27

## 2022-06-22 RX ORDER — HEPARIN SODIUM,PORCINE 10 UNIT/ML
5 VIAL (ML) INTRAVENOUS
Status: CANCELLED | OUTPATIENT
Start: 2022-07-27

## 2022-06-22 RX ORDER — MEPERIDINE HYDROCHLORIDE 25 MG/ML
25 INJECTION INTRAMUSCULAR; INTRAVENOUS; SUBCUTANEOUS EVERY 30 MIN PRN
Status: CANCELLED | OUTPATIENT
Start: 2022-07-20

## 2022-06-22 RX ORDER — EPINEPHRINE 1 MG/ML
0.3 INJECTION, SOLUTION INTRAMUSCULAR; SUBCUTANEOUS EVERY 5 MIN PRN
Status: CANCELLED | OUTPATIENT
Start: 2022-06-22

## 2022-06-22 RX ORDER — NALOXONE HYDROCHLORIDE 0.4 MG/ML
0.2 INJECTION, SOLUTION INTRAMUSCULAR; INTRAVENOUS; SUBCUTANEOUS
Status: CANCELLED | OUTPATIENT
Start: 2022-07-13

## 2022-06-22 RX ORDER — HEPARIN SODIUM,PORCINE 10 UNIT/ML
5 VIAL (ML) INTRAVENOUS
Status: CANCELLED | OUTPATIENT
Start: 2022-07-20

## 2022-06-22 RX ORDER — LORAZEPAM 2 MG/ML
0.5 INJECTION INTRAMUSCULAR EVERY 4 HOURS PRN
Status: CANCELLED | OUTPATIENT
Start: 2022-07-27

## 2022-06-22 RX ORDER — METHYLPREDNISOLONE SODIUM SUCCINATE 125 MG/2ML
125 INJECTION, POWDER, LYOPHILIZED, FOR SOLUTION INTRAMUSCULAR; INTRAVENOUS
Status: CANCELLED
Start: 2022-07-20

## 2022-06-22 RX ORDER — HEPARIN SODIUM,PORCINE 10 UNIT/ML
5 VIAL (ML) INTRAVENOUS
Status: CANCELLED | OUTPATIENT
Start: 2022-06-29

## 2022-06-22 RX ORDER — NALOXONE HYDROCHLORIDE 0.4 MG/ML
0.2 INJECTION, SOLUTION INTRAMUSCULAR; INTRAVENOUS; SUBCUTANEOUS
Status: CANCELLED | OUTPATIENT
Start: 2022-07-06

## 2022-06-22 RX ORDER — METHYLPREDNISOLONE SODIUM SUCCINATE 125 MG/2ML
125 INJECTION, POWDER, LYOPHILIZED, FOR SOLUTION INTRAMUSCULAR; INTRAVENOUS
Status: CANCELLED
Start: 2022-06-22

## 2022-06-22 RX ORDER — HEPARIN SODIUM (PORCINE) LOCK FLUSH IV SOLN 100 UNIT/ML 100 UNIT/ML
5 SOLUTION INTRAVENOUS
Status: CANCELLED | OUTPATIENT
Start: 2022-07-06

## 2022-06-22 RX ORDER — LORAZEPAM 2 MG/ML
0.5 INJECTION INTRAMUSCULAR EVERY 4 HOURS PRN
Status: CANCELLED | OUTPATIENT
Start: 2022-07-20

## 2022-06-22 RX ORDER — ALBUTEROL SULFATE 0.83 MG/ML
2.5 SOLUTION RESPIRATORY (INHALATION)
Status: CANCELLED | OUTPATIENT
Start: 2022-06-22

## 2022-06-22 RX ORDER — NALOXONE HYDROCHLORIDE 0.4 MG/ML
0.2 INJECTION, SOLUTION INTRAMUSCULAR; INTRAVENOUS; SUBCUTANEOUS
Status: CANCELLED | OUTPATIENT
Start: 2022-07-27

## 2022-06-22 RX ORDER — HEPARIN SODIUM (PORCINE) LOCK FLUSH IV SOLN 100 UNIT/ML 100 UNIT/ML
5 SOLUTION INTRAVENOUS
Status: CANCELLED | OUTPATIENT
Start: 2022-06-29

## 2022-06-22 RX ORDER — ALBUTEROL SULFATE 0.83 MG/ML
2.5 SOLUTION RESPIRATORY (INHALATION)
Status: CANCELLED | OUTPATIENT
Start: 2022-07-13

## 2022-06-22 RX ORDER — LORAZEPAM 2 MG/ML
0.5 INJECTION INTRAMUSCULAR EVERY 4 HOURS PRN
Status: CANCELLED | OUTPATIENT
Start: 2022-07-13

## 2022-06-22 RX ORDER — METHYLPREDNISOLONE SODIUM SUCCINATE 125 MG/2ML
125 INJECTION, POWDER, LYOPHILIZED, FOR SOLUTION INTRAMUSCULAR; INTRAVENOUS
Status: CANCELLED
Start: 2022-07-13

## 2022-06-22 RX ORDER — ALBUTEROL SULFATE 0.83 MG/ML
2.5 SOLUTION RESPIRATORY (INHALATION)
Status: CANCELLED | OUTPATIENT
Start: 2022-07-06

## 2022-06-22 RX ORDER — HEPARIN SODIUM,PORCINE 10 UNIT/ML
5 VIAL (ML) INTRAVENOUS
Status: CANCELLED | OUTPATIENT
Start: 2022-07-06

## 2022-06-22 RX ORDER — LORAZEPAM 2 MG/ML
0.5 INJECTION INTRAMUSCULAR EVERY 4 HOURS PRN
Status: CANCELLED | OUTPATIENT
Start: 2022-07-06

## 2022-06-22 RX ORDER — MEPERIDINE HYDROCHLORIDE 25 MG/ML
25 INJECTION INTRAMUSCULAR; INTRAVENOUS; SUBCUTANEOUS EVERY 30 MIN PRN
Status: CANCELLED | OUTPATIENT
Start: 2022-07-13

## 2022-06-22 RX ORDER — METHYLPREDNISOLONE SODIUM SUCCINATE 125 MG/2ML
125 INJECTION, POWDER, LYOPHILIZED, FOR SOLUTION INTRAMUSCULAR; INTRAVENOUS
Status: CANCELLED
Start: 2022-06-29

## 2022-06-22 RX ADMIN — FAMOTIDINE 20 MG: 10 INJECTION, SOLUTION INTRAVENOUS at 07:59

## 2022-06-22 RX ADMIN — DEXAMETHASONE SODIUM PHOSPHATE: 10 INJECTION, SOLUTION INTRAMUSCULAR; INTRAVENOUS at 07:37

## 2022-06-22 RX ADMIN — SODIUM CHLORIDE 250 ML: 9 INJECTION, SOLUTION INTRAVENOUS at 07:29

## 2022-06-22 RX ADMIN — CARBOPLATIN 300 MG: 10 INJECTION, SOLUTION INTRAVENOUS at 08:01

## 2022-06-22 RX ADMIN — BARIUM SULFATE 25 ML: 400 SUSPENSION ORAL at 14:56

## 2022-06-22 RX ADMIN — Medication 5 ML: at 08:04

## 2022-06-22 ASSESSMENT — PAIN SCALES - GENERAL: PAINLEVEL: NO PAIN (0)

## 2022-06-22 NOTE — PROGRESS NOTES
Speech-Language Pathology Department   EVALUATION  [facility]  VIDEO SWALLOW STUDY RESULTS      06/22/22 1700       Present No   General Information   Type Of Visit Initial   Start Of Care Date 06/22/22   Referring Physician Mane Razo MD   Orders Evaluate And Treat   Orders Comment Video swallow study   Medical Diagnosis Dysphagia; nasopharyngeal carcinoma   Onset Of Illness/injury Or Date Of Surgery 06/01/22   Precautions/limitations No Known Precautions/limitations   Hearing Previously documented as a loss   Pertinent History of Current Problem/OT: Additional Occupational Profile Info Mr. 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 He underwent neoadjuvant chemotherapy with 3 cycles of carboplatin/gemcitabine from 4/4/2022 - 5/16/2022. Pt started xRT this date and presents for baseline video swallow study.  Pt initially denied difficulty with swallow and is eating a regular texture diet and thin liquids; pt then later reported that sometimes items such as peas or corn will get stuck in his throat.  Pt denies coughing/through clearing with intake.  Pt reported that he has a hernia and has his bed tilted upward and sleeps on 3 pillows to promp himself up.  He notes that he does fall off his pillows at times and does have backflow through the esophagus that exits both his mouth and nose.  He also notes that he has gotten food in his lungs due to this backflow/hernia.   Respiratory Status Room air   Prior Level Of Function Swallowing   Prior Level Of Function Comment Regular texutres and thin liquids   Patient Role/employment History Employed   Living Environment Other, Comments  (Staying at [facility])   General Observations Pt highly pleasant and cooperative.   Patient/family Goals Goals not formally stated at the time of evaluation but well informed about rationale for procedure.    Clinical Swallow Evaluation   Oral Musculature generally intact  (nasopharyngeal cancer)   Structural Abnormalities none present   Dentition present and adequate   Mucosal Quality good   Mandibular Strength and Mobility intact   Oral Labial Strength and Mobility WFL   Lingual Strength and Mobility WFL   Velar Elevation intact   Laryngeal Function Cough;Throat clear;Swallow;Voicing initiated   Oral Musculature Comments WFL   Additional Documentation Yes   Additional evaluation(s) completed today Yes   Rationale for completing additional evaluation View pharyngeal phase, r/o aspiration   Clinical Swallow Eval: Thin Liquid Texture Trial   Mode of Presentation, Thin Liquids cup;self-fed   Volume of Liquid or Food Presented single sips x3   Oral Phase of Swallow WFL   Pharyngeal Phase of Swallow intact   Diagnostic Statement No overt s/s of aspiration.   VFSS Evaluation   VFSS Additional Documentation Yes   VFSS Eval: Radiology   Radiologist [facility] Radiology Resident   Views Taken left lateral;A/P   Physical Location of Procedure [facility] Radiology #2   VFSS Eval: Thin Liquid Texture Trial   Mode of Presentation, Thin Liquid cup;self-fed   Order of Presentation 1, 2, 9, 10   Preparatory Phase WFL   Oral Phase, Thin Liquid Premature pharyngeal entry   Pharyngeal Phase, Thin Liquid Delayed swallow reflex   Rosenbek's Penetration Aspiration Scale: Thin Liquid Trial Results 2 - contrast enters airway, remains above the vocal cords, no residue remains (penetration)   Successful Strategies Trialed During Procedure, Thin Liquid chin tuck   Diagnostic Statement Premature pharyngeal entry with delayed pharyngeal swallow response.  Persistent penetration, deep with increased amounts.  No aspiration.  Chin tuck eliminates penetration.   VFSS Eval: Mildly Thick Liquids    Mode of Presentation cup;self-fed   Order of Presentation 3,4, 12 (AP)   Preparatory Phase WFL   Oral Phase Premature pharyngeal  entry   Pharyngeal Phase Delayed swallow reflex   Rosenbek's Penetration Aspiration Scale 2 - contrast enters airway, remains above the vocal cords, no residue remains (penetration)   Diagnostic Statement Swallow symmetrical in AP view.   VFSS Eval: Puree Solid Texture Trial   Mode of Presentation, Puree spoon;self-fed   Order of Presentation 5, 6, 13 (AP)   Preparatory Phase WFL   Oral Phase, Puree Residue in oral cavity;other (see comments)  (Minimal)   Pharyngeal Phase, Puree WFL   Rosenbek's Penetration Aspiration Scale: Puree Food Trial Results 1 - no aspiration, contrast does not enter airway   Diagnostic Statement No penetration or aspiration.  Trace amounts of oral residue remaining.   VFSS Eval: Regular Texture Trial (Solid)   Mode of Presentation self-fed   Order of Presentation 7, 8, 11 (barium tablet)   Preparatory Phase WFL   Oral Phase WFL   Pharyngeal Phase WFL   Rosenbek's Penetration Aspiration Scale 1 - no aspiration, contrast does not enter airway   Diagnostic Statement No penetration or aspiration.   Swallow Compensations   Swallow Compensations Reduce amounts;Pacing;Chin tuck   Educational Assessment   Barriers to Learning No barriers   Esophageal Phase of Swallow   Patient reports or presents with symptoms of esophageal dysphagia Yes   Esophageal sweep performed during today s vidofluoroscopic exam  Please refer to radiologist's report for details   General Therapy Interventions   Planned Therapy Interventions Dysphagia Treatment   Dysphagia treatment Oropharyngeal exercise training;Instruction of safe swallow strategies;Compensatory strategies for swallowing   Swallow Eval: Clinical Impressions   Skilled Criteria for Therapy Intervention Skilled criteria met.  Treatment indicated.   Dysphagia Outcome Severity Scale (CATE) Level 5 - CATE   Treatment Diagnosis Mild oropharyngeal dysphagia   Diet texture recommendations Regular diet;Thin liquids (level 0)   Recommended Feeding/Eating Techniques  small sips/bites;maintain upright posture during/after eating for 30 mins;alternate between small bites and sips of food/liquid;tuck chin during every swallow   Rehab Potential good, to achieve stated therapy goals   Therapy Frequency other (see comments)  (Every other week)   Predicted Duration of Therapy Intervention (days/wks) Up to 12 weeks   Anticipated Discharge Disposition home w/ outpatient services   Risks and Benefits of Treatment have been explained. Yes   Patient, family and/or staff in agreement with Plan of Care Yes   Clinical Impression Comments Pt presents with mild oropharyngeal dysphagia.  Oral mech exam was unremarkable.  Oral phase marked for premature pharyngeal entry and trace oral residue.  Pharyngeal phase delayed with persistent penetration on thin and nectar thick liquids.  There was no aspiration.  Chin tuck strategy eliminated the penetration.  There was no residue.  At this time, pt remains appropriate for regular textures and thin liquids.  Pt should take small bites/sips, pace self, and tuck chin with liquid consistencies.  Dysfunction expected to increase in the setting of xRT.  Pt will participate in skilled dysphagia therapy while undergoing xRT.  Pt was educated on results, recommendations, and impact of xRT on swallowing.  He was also trained on oropharyngeal strengthening exercises.   Swallow Goals   SLP Swallow Goals 1;2   Swallow Goal 1   Goal Identifier Eat   Goal Description With independent use of compensatory strategies, pt will tolerate soft solids and thin liquids without overt s/s of aspiration or adverse lung events.   Target Date 09/20/22   Swallow Goal 2   Goal Identifier Exercise   Goal Description Pt will independently complete oropharyngeal strengthen exercises 3-5x/day as recommended by treating therapist.   Target Date 09/20/22   Total Session Time   SLP Eval: oral/pharyngeal swallow function, clinical minutes (49364) 15   SLP Eval: VideoFluoroscopic Swallow  function Minutes (86392) 20   Total Evaluation Time 35   Therapy Certification   Certification date from 06/22/22   Certification date to 09/20/22   Medical Diagnosis Dysphagia   Certification I certify the need for these services furnished under this plan of treatment and while under my care.  (Physician co-signature of this document indicates review and certification of the therapy plan).     Thank you for the referral of Jareth Gallardo.  If you have any questions about this report, please contact me using the information below.        Renée Mims MS CCC-SLP  Speech Language Pathologist   Clinical Specialist Level 3    [facility] [place]  Dept. of Otolaryngology  Department of Rehabilitation services  [address] [place] [id number]    Email: [email or web address]  Voicemail: [phone]  Gender Pronouns: she/her  Employed by [employer]

## 2022-06-22 NOTE — PROGRESS NOTES
Infusion Nursing Note:  Jareth Gallardo presents today for C1D1 Carboplatin.    Patient seen by provider 6/21: Yes: Laura Guzman DNP   present during visit today: Not Applicable.    Note: Pt saw provider 6/21, no changes overnight.      Intravenous Access:  Peripheral IV placed.    Treatment Conditions:  Lab Results   Component Value Date    HGB 11.1 (L) 06/21/2022    WBC 5.7 06/21/2022    ANEU 7.0 05/16/2022    ANEUTAUTO 3.8 06/21/2022     06/21/2022      Lab Results   Component Value Date     06/21/2022    POTASSIUM 3.4 06/21/2022    MAG 1.8 06/21/2022    CR 0.78 06/21/2022    LAI 8.6 06/21/2022    BILITOTAL 0.6 06/21/2022    ALBUMIN 3.2 (L) 06/21/2022    ALT 60 06/21/2022    AST 52 (H) 06/21/2022     Results reviewed, labs MET treatment parameters, ok to proceed with treatment.      Post Infusion Assessment:  Patient tolerated infusion without incident.  Blood return noted pre and post infusion.  Site patent and intact, free from redness, edema or discomfort.  No evidence of extravasations.  Access discontinued per protocol.       Discharge Plan:   Patient declined prescription refills.  Discharge instructions reviewed with: Patient.  Patient and/or family verbalized understanding of discharge instructions and all questions answered.  AVS to patient via [employer].  Patient will return 6/28 for next appointment.   Patient discharged in stable condition accompanied by: self.  Departure Mode: Ambulatory.    Shelley Lauren RN

## 2022-06-22 NOTE — PROGRESS NOTES
June 28, 2022    Reason for Visit: seen in f/u of nasopharyngeal carcinoma    Oncology HPI:     Mr. Gallardo is a 61 year old man who presents for evaluation prior to starting chemoradiation. He was previously treated with 3 cycles of carboplatin/gemcitabine with a DE on PET/CT.  Cisplatin was not used due to hearing loss on audiogram.  He is to start concurrent chemoradiation with weekly carboplatin tomorrow.    SUMMARY  12/11/21                   UC for L neck mass, selling  12/9/21                     CT neck ([place]). 2.4 cm L neck node medial to SCM just above the hyoid, additional LNs surround, scattered R neck and L supraclavicular LNs  2/15/22                     US bx L cervical node. Path: SCC, moderately differentiated,   3/4/22                       PET/CT. Nasopharyngeal and oropharyngeal mucosal thickening. 2.6 cm BERNARDO nodular GGO, SUV avid, some additional lingular uptake  4/4/22                       C1 carboplatin gemcitabine. Not a candidate for cisplatin due to hearing loss on audiogram.  4/26/22                     C2 carboplatin gemcitabine. Neulasta 5/3 after C2D8, ANC 1000   5/16/22                     C3 carboplatin gemcitabine.   5/31/22: PET with decreased size of nasopharyngeal tumor w/ decreased FDG uptake and decrease in size and number of involved cervical lymph nodes  6/22/22: initiate definitive chemoradiation with weekly carboplatin    Interval history:   Jareth is feeling pretty good today  Has been walking more (to and from radiation)  The increase of lasix last week helped his LE edema  He continues to have sporadic nausea/emesis--3x this last week  Did add famotidine   Lost some weight this last week, he feels primarily from lasix  No pain or limitations with swallowing  Dry mouth and thick secretions  Blood tinged secretions the last couple days  Breathing feels good--better with more activity  Sugars stable, 100-120's---168 highest  Urine dark at times  Constipation,  increases water intake to help  No worsening neuropathy     10 point review of systems otherwise negative    PAST MEDICAL HISTORY  Nasopharyngeal carcinoma as above  DM2  COPD??  Hiatal hernia. Sleeps with his head elevated  LVH on TTE 2009, EF50%, LA Dilation  Dyslipidemia  BARRETT in 2015. Not using CPAP due to developing tooth abscesses when he tried it in 2015  Venous insufficiency in both legs being in an accident involving a mower about 10 years ago, on chronic furosemide. LE US negative 12/15/2009  Cholelithiasis  Hyperplastic colon polyp, due 2020  Tonsillectomy  Baker cyst RLE US 2013     Neuropathy from DM - R foot numbness chronically     Exposed to lots of wood dust in his occupation       Current Outpatient Medications   Medication Sig Dispense Refill     atorvastatin (LIPITOR) 20 MG tablet Take 20 mg by mouth       B-D U/F insulin pen needle USE ONE TIME A DAY TO INJECT VICTOZA       blood glucose (NO BRAND SPECIFIED) lancets standard Use to test blood sugar 2 times daily or as directed. 90 lancet 1     blood glucose (NO BRAND SPECIFIED) test strip Use to test blood sugar 3 times daily or as directed. 90 strip 0     blood glucose monitoring (ACCU-CHEK RANDELL PLUS) meter device kit Use to test blood sugar 2 times daily or as directed. 1 kit 0     fluticasone-vilanterol (BREO ELLIPTA) 100-25 MCG/INH inhaler INHALE 1 PUFF INTO THE LUNGS ONE TIME A DAY. RINSE MOUTH AFTER USE.       furosemide (LASIX) 20 MG tablet Take 40 mg by mouth       insulin lispro (HUMALOG KWIKPEN) 100 UNIT/ML (1 unit dial) KWIKPEN 1 unit per 50 above 150 correction after chemo. TDD 20 units       metFORMIN (GLUCOPHAGE XR) 500 MG 24 hr tablet Take 2 tabs daily. Swallow tablet whole; do not crush, divide or chew.       metFORMIN (GLUCOPHAGE) 1000 MG tablet TAKE 1 TABLET BY MOUTH ONE TIME A DAY. TAKE WITH FOOD.       ondansetron (ZOFRAN) 8 MG tablet Take 1 tablet (8 mg) by mouth every 8 hours as needed for nausea 30 tablet 11      prochlorperazine (COMPAZINE) 10 MG tablet Take 1 tablet (10 mg) by mouth every 6 hours as needed (Nausea/Vomiting) 30 tablet 5     VICTOZA PEN 18 MG/3ML soln Inject 1.2 mg Subcutaneous daily          No Known Allergies    Exam:  Blood pressure (!) 146/84, pulse 111, temperature 98.4  F (36.9  C), temperature source Oral, resp. rate 16, weight 134.6 kg (296 lb 12.8 oz), SpO2 95 %.  Wt Readings from Last 4 Encounters:   06/21/22 138.7 kg (305 lb 12.8 oz)   06/01/22 141.5 kg (311 lb 14.4 oz)   05/23/22 137 kg (302 lb)   05/16/22 140 kg (308 lb 11.2 oz)     General: No acute distress  HEENT: Sclera anicteric. Oral mucosa pink and moist.  No mucositis or thrush. Thick secreitions  Heart: Regular, rate, and rhythm  Lungs: Clear to ascultation bilaterally  Abdomen: Positive bowel sounds. Soft, non-distended, non-tender.   Extremities: trace extremity edema  Neuro: Cranial nerves grossly intct  Rash: none  Vascular access: port    Labs:   Most Recent 3 CBC's:  Recent Labs   Lab Test 06/28/22  0642 06/21/22  1643 05/23/22  1404   WBC 6.6 5.7 3.4*   HGB 12.0* 11.1* 9.6*   MCV 90 89 85    233 253   ANEUTAUTO 4.8 3.8 2.1     Most Recent 3 BMP's:  Recent Labs   Lab Test 06/28/22  0642 06/21/22  1643 05/23/22  1404    143 140   POTASSIUM 3.6 3.4 3.6   CHLORIDE 102 104 102   CO2 33* 32 32   BUN 16 10 16   CR 0.69 0.78 0.70   ANIONGAP 5 7 6   LAI 8.7 8.6 8.9   * 181* 129*   PROTTOTAL 7.5 7.4 7.5   ALBUMIN 3.2* 3.2* 3.4    Most Recent 3 LFT's:  Recent Labs   Lab Test 06/28/22  0642 06/21/22  1643 05/23/22  1404   AST 52* 52* 117*   ALT 60 60 197*   ALKPHOS 88 89 96   BILITOTAL 1.2 0.6 0.6    Most Recent 2 TSH and T4:No lab results found.    I reviewed the above labs today.    Imaging: n/a    Impression/plan:   Nasopharyngeal carcinoma, SAM +julia:   With AK to 3 cycles of Carbo/[place]  -began chemoradiation with weekly carboplatin 6/22/22  -tolerating carbo well, labs and assessment ok for week 2  -will need to  monitor for fluid overload as he had issues with that before  -will have weekly LEONEL visits and f/u with Dr. Melvin mid-treatment, post treatment PET/CT anticipated at 12 weeks     Transaminitis: G1.  -possibly from gemcitabine, likely some decrease of steatosis contributing  -LFTs improving today. Monitor. Has a visit with hepatology in a couple weeks     BLE edema: reports a hx of compression injury to the thighs in the past that contributed to chronic edema  -On daily furosemide, no changes with chemo, TTE 4/1/22 technically difficult with limited information but EF 53%, mild RVD, inferolateral/posterior akinesis which was not noted on 2009 TTE in Care Everywhere. Fluid overloaded but no pulm edema.   -monitor fluid overload . Continue furosemide 40 mg daily.     Nausea/vomiting  Pattern not consistent with chemo induced. Added famotidine, discussed increasing as needed     L lung nodules: Resolved.   -thought to be inflammatory. Will pay attention to this on follow-up imaging.     DM2, steroid-induced hyperglycemia:  -managing ok right now. Sugars stable  -remains on metformin, victoza     Bowel irregularity  vacillates between diarrhea and constipation  -likely metforming and victoza are contributing. monitor     Mild peripheral neuropathy: R foot numbness, very mild, from DM2.   -stable    50 minutes spent on the date of the encounter doing chart review, review of test results, interpretation of tests, patient visit, documentation and discussion with other provider(s)     Maryjo Polanco CNP on 6/28/2022 at 7:33 AM

## 2022-06-23 ENCOUNTER — APPOINTMENT (OUTPATIENT)
Dept: RADIATION ONCOLOGY | Facility: CLINIC | Age: 62
End: 2022-06-23
Attending: RADIOLOGY
Payer: COMMERCIAL

## 2022-06-23 DIAGNOSIS — C11.9 NASOPHARYNGEAL CANCER (H): Primary | ICD-10-CM

## 2022-06-23 PROCEDURE — 77386 HC IMRT TREATMENT DELIVERY, COMPLEX: CPT | Performed by: RADIOLOGY

## 2022-06-23 NOTE — PROGRESS NOTES
RADIATION ONCOLOGY WEEKLY ON TREATMENT VISIT   Encounter Date: 2022    Patient Name: Jareth Gallardo  MRN: [id number]  : 1960     Disease and Stage: cT2 N2 M0 nasopharyngeal carcinoma  Treatment Site: Nasopharynx and bilateral neck  Current Dose/Planned Total Dose: 400 / 7000 cGy  Daily Fraction Size: 200 cGy/day, 5 times/week  Concurrent Chemotherapy: Yes  Drug and Frequency: Carboplatin weekly    Treatment Summary:    22: Initiation of radiotherapy. First infusion of weekly carboplatin.    22: Weekly RT visit. Current dose of 400 cGy. Tolerating treatment well.     ED visits/Hospitalizations:  None    Missed Treatments:  None    Subjective: Mr. Gallardo presents to clinic today for his weekly on-treatment visit. He has tolerated initiation of radiotherapy very well and has no pressing concerns or complaints on examination. He is eating regular diet without difficulty and specifically denies any odynophagia or dysphagia. His remaining ROS are likewise negative.    ROS:   Constitutional  Pain (0-10): 1 (mouth), 1 (throat), 1 (skin)   Fatigue: None    CNS  Headaches: None    ENT  Mucositis: None    Cardiopulmonary  Dyspnea: None    GI  Nausea/vomiting: None    Nutrition  PEG: No  Diet: Regular    Integumentary  Dermatitis: None    Objective:   Current weight: 138.8 kg    BP: 119/73 (sitting), 119/76 (standing)  Pulse: 100 (sitting), 108 (standing)    General: Healthy-appearing 61-year-old gentleman seated comfortably in a chair in no acute distress  HEENT: NC/AT. EOMI. No rhinorrhea or epistaxis. Moist mucous membranes. No mucositis or thrush.  Pulmonary: No wheezing, stridor or respiratory distress  Skin: Normal color and turgor. No erythema.    Treatment-related toxicities (CTCAE v5.0):  None    Assessment:    Mr. Gallardo is a 61 year old male with a cT2 N2 M0 nasopharyngeal carcinoma. He is status post 3 cycles of induction carboplatin/gemcitabine with a good partial response to  therapy. He is now receiving concurrent chemoradiotherapy with curative intent. He is tolerating treatment well with no acute radiation-induced toxicities.    Plan:   cT2 N2 M0 nasopharyngeal carcinoma:    Continue chemoradiotherapy    Pain management:    Acetaminophen as needed for mild to moderate pain    Fluids/Electrolytes/Nutrition:    Continue diet as tolerated    Dermatitis:    Twice daily Aquaphor application to the bilateral neck    Mosaiq chart and setup information reviewed  IGRT images reviewed    Medication list reviewed    Mane Razo MD/PhD    Dept of Radiation Oncology  [facility]

## 2022-06-24 ENCOUNTER — APPOINTMENT (OUTPATIENT)
Dept: RADIATION ONCOLOGY | Facility: CLINIC | Age: 62
End: 2022-06-24
Attending: RADIOLOGY
Payer: COMMERCIAL

## 2022-06-24 PROCEDURE — 77386 HC IMRT TREATMENT DELIVERY, COMPLEX: CPT | Performed by: RADIOLOGY

## 2022-06-24 PROCEDURE — 77014 PR CT GUIDE FOR PLACEMENT RADIATION THERAPY FIELDS: CPT | Mod: 26 | Performed by: RADIOLOGY

## 2022-06-27 ENCOUNTER — APPOINTMENT (OUTPATIENT)
Dept: RADIATION ONCOLOGY | Facility: CLINIC | Age: 62
End: 2022-06-27
Attending: RADIOLOGY
Payer: COMMERCIAL

## 2022-06-27 PROCEDURE — 77014 PR CT GUIDE FOR PLACEMENT RADIATION THERAPY FIELDS: CPT | Mod: 26 | Performed by: RADIOLOGY

## 2022-06-27 PROCEDURE — 77386 HC IMRT TREATMENT DELIVERY, COMPLEX: CPT | Performed by: RADIOLOGY

## 2022-06-28 ENCOUNTER — ONCOLOGY VISIT (OUTPATIENT)
Dept: ONCOLOGY | Facility: CLINIC | Age: 62
End: 2022-06-28
Attending: NURSE PRACTITIONER
Payer: COMMERCIAL

## 2022-06-28 ENCOUNTER — THERAPY VISIT (OUTPATIENT)
Dept: SPEECH THERAPY | Facility: CLINIC | Age: 62
End: 2022-06-28
Payer: COMMERCIAL

## 2022-06-28 ENCOUNTER — APPOINTMENT (OUTPATIENT)
Dept: RADIATION ONCOLOGY | Facility: CLINIC | Age: 62
End: 2022-06-28
Attending: RADIOLOGY
Payer: COMMERCIAL

## 2022-06-28 ENCOUNTER — APPOINTMENT (OUTPATIENT)
Dept: LAB | Facility: CLINIC | Age: 62
End: 2022-06-28
Attending: NURSE PRACTITIONER
Payer: COMMERCIAL

## 2022-06-28 VITALS
DIASTOLIC BLOOD PRESSURE: 84 MMHG | SYSTOLIC BLOOD PRESSURE: 146 MMHG | OXYGEN SATURATION: 95 % | BODY MASS INDEX: 42.59 KG/M2 | WEIGHT: 296.8 LBS | RESPIRATION RATE: 16 BRPM | TEMPERATURE: 98.4 F | HEART RATE: 111 BPM

## 2022-06-28 DIAGNOSIS — T45.1X5A CHEMOTHERAPY-INDUCED NEUTROPENIA (H): ICD-10-CM

## 2022-06-28 DIAGNOSIS — C11.9 NASOPHARYNGEAL CANCER (H): Primary | ICD-10-CM

## 2022-06-28 DIAGNOSIS — R79.89 ELEVATED LFTS: Primary | ICD-10-CM

## 2022-06-28 DIAGNOSIS — D70.1 CHEMOTHERAPY-INDUCED NEUTROPENIA (H): ICD-10-CM

## 2022-06-28 DIAGNOSIS — R13.12 OROPHARYNGEAL DYSPHAGIA: Primary | ICD-10-CM

## 2022-06-28 DIAGNOSIS — C11.9 NASOPHARYNGEAL CANCER (H): ICD-10-CM

## 2022-06-28 LAB
ALBUMIN SERPL-MCNC: 3.2 G/DL (ref 3.4–5)
ALP SERPL-CCNC: 88 U/L (ref 40–150)
ALT SERPL W P-5'-P-CCNC: 60 U/L (ref 0–70)
ANION GAP SERPL CALCULATED.3IONS-SCNC: 5 MMOL/L (ref 3–14)
AST SERPL W P-5'-P-CCNC: 52 U/L (ref 0–45)
BASOPHILS # BLD AUTO: 0 10E3/UL (ref 0–0.2)
BASOPHILS NFR BLD AUTO: 0 %
BILIRUB SERPL-MCNC: 1.2 MG/DL (ref 0.2–1.3)
BUN SERPL-MCNC: 16 MG/DL (ref 7–30)
CALCIUM SERPL-MCNC: 8.7 MG/DL (ref 8.5–10.1)
CHLORIDE BLD-SCNC: 102 MMOL/L (ref 94–109)
CO2 SERPL-SCNC: 33 MMOL/L (ref 20–32)
CREAT SERPL-MCNC: 0.69 MG/DL (ref 0.66–1.25)
EOSINOPHIL # BLD AUTO: 0.2 10E3/UL (ref 0–0.7)
EOSINOPHIL NFR BLD AUTO: 3 %
ERYTHROCYTE [DISTWIDTH] IN BLOOD BY AUTOMATED COUNT: 16.5 % (ref 10–15)
GFR SERPL CREATININE-BSD FRML MDRD: >90 ML/MIN/1.73M2
GLUCOSE BLD-MCNC: 120 MG/DL (ref 70–99)
HCT VFR BLD AUTO: 39 % (ref 40–53)
HGB BLD-MCNC: 12 G/DL (ref 13.3–17.7)
IMM GRANULOCYTES # BLD: 0 10E3/UL
IMM GRANULOCYTES NFR BLD: 0 %
LYMPHOCYTES # BLD AUTO: 0.9 10E3/UL (ref 0.8–5.3)
LYMPHOCYTES NFR BLD AUTO: 13 %
MAGNESIUM SERPL-MCNC: 1.8 MG/DL (ref 1.6–2.3)
MCH RBC QN AUTO: 27.6 PG (ref 26.5–33)
MCHC RBC AUTO-ENTMCNC: 30.8 G/DL (ref 31.5–36.5)
MCV RBC AUTO: 90 FL (ref 78–100)
MONOCYTES # BLD AUTO: 0.7 10E3/UL (ref 0–1.3)
MONOCYTES NFR BLD AUTO: 11 %
NEUTROPHILS # BLD AUTO: 4.8 10E3/UL (ref 1.6–8.3)
NEUTROPHILS NFR BLD AUTO: 73 %
NRBC # BLD AUTO: 0 10E3/UL
NRBC BLD AUTO-RTO: 0 /100
PLATELET # BLD AUTO: 172 10E3/UL (ref 150–450)
POTASSIUM BLD-SCNC: 3.6 MMOL/L (ref 3.4–5.3)
PROT SERPL-MCNC: 7.5 G/DL (ref 6.8–8.8)
RBC # BLD AUTO: 4.35 10E6/UL (ref 4.4–5.9)
SODIUM SERPL-SCNC: 140 MMOL/L (ref 133–144)
WBC # BLD AUTO: 6.6 10E3/UL (ref 4–11)

## 2022-06-28 PROCEDURE — 82040 ASSAY OF SERUM ALBUMIN: CPT

## 2022-06-28 PROCEDURE — 92526 ORAL FUNCTION THERAPY: CPT | Mod: GN | Performed by: SPEECH-LANGUAGE PATHOLOGIST

## 2022-06-28 PROCEDURE — 250N000011 HC RX IP 250 OP 636: Performed by: INTERNAL MEDICINE

## 2022-06-28 PROCEDURE — 258N000003 HC RX IP 258 OP 636: Performed by: INTERNAL MEDICINE

## 2022-06-28 PROCEDURE — 80053 COMPREHEN METABOLIC PANEL: CPT

## 2022-06-28 PROCEDURE — 77386 HC IMRT TREATMENT DELIVERY, COMPLEX: CPT | Performed by: RADIOLOGY

## 2022-06-28 PROCEDURE — 77336 RADIATION PHYSICS CONSULT: CPT | Performed by: RADIOLOGY

## 2022-06-28 PROCEDURE — 83735 ASSAY OF MAGNESIUM: CPT

## 2022-06-28 PROCEDURE — 250N000011 HC RX IP 250 OP 636: Performed by: NURSE PRACTITIONER

## 2022-06-28 PROCEDURE — 96413 CHEMO IV INFUSION 1 HR: CPT

## 2022-06-28 PROCEDURE — G0463 HOSPITAL OUTPT CLINIC VISIT: HCPCS

## 2022-06-28 PROCEDURE — 77427 RADIATION TX MANAGEMENT X5: CPT | Performed by: RADIOLOGY

## 2022-06-28 PROCEDURE — 96367 TX/PROPH/DG ADDL SEQ IV INF: CPT

## 2022-06-28 PROCEDURE — 85025 COMPLETE CBC W/AUTO DIFF WBC: CPT | Performed by: INTERNAL MEDICINE

## 2022-06-28 PROCEDURE — 36591 DRAW BLOOD OFF VENOUS DEVICE: CPT

## 2022-06-28 PROCEDURE — 99215 OFFICE O/P EST HI 40 MIN: CPT | Performed by: NURSE PRACTITIONER

## 2022-06-28 RX ORDER — HEPARIN SODIUM (PORCINE) LOCK FLUSH IV SOLN 100 UNIT/ML 100 UNIT/ML
500 SOLUTION INTRAVENOUS ONCE
Status: COMPLETED | OUTPATIENT
Start: 2022-06-28 | End: 2022-06-28

## 2022-06-28 RX ORDER — HEPARIN SODIUM (PORCINE) LOCK FLUSH IV SOLN 100 UNIT/ML 100 UNIT/ML
5 SOLUTION INTRAVENOUS
Status: DISCONTINUED | OUTPATIENT
Start: 2022-06-28 | End: 2022-06-28 | Stop reason: HOSPADM

## 2022-06-28 RX ADMIN — Medication 5 ML: at 09:23

## 2022-06-28 RX ADMIN — SODIUM CHLORIDE 250 ML: 9 INJECTION, SOLUTION INTRAVENOUS at 07:43

## 2022-06-28 RX ADMIN — DEXAMETHASONE SODIUM PHOSPHATE: 10 INJECTION, SOLUTION INTRAMUSCULAR; INTRAVENOUS at 08:09

## 2022-06-28 RX ADMIN — CARBOPLATIN 300 MG: 10 INJECTION, SOLUTION INTRAVENOUS at 08:49

## 2022-06-28 RX ADMIN — Medication 500 UNITS: at 06:45

## 2022-06-28 ASSESSMENT — PAIN SCALES - GENERAL: PAINLEVEL: NO PAIN (0)

## 2022-06-28 NOTE — NURSING NOTE
"Chief Complaint   Patient presents with     Port Draw     Labs drawn via port by RN in lab.  VS taken       Port accessed with 20 gauge 3/4\" gripper needle by RN, labs collected, line flushed with saline and heparin.  Vitals taken. Pt checked in for appointment(s).    Tona Grossman RN    "

## 2022-06-28 NOTE — PROGRESS NOTES
Infusion Nursing Note:  Jareth Gallardo presents today for cycle 1 day 8 carboplatin.    Patient seen by provider today: Yes: Maryjo Polanco CNP   present during visit today: Not Applicable.    Note:   Patient has no questions or concerns after his appointment with Maryjo Polanco.    Intravenous Access:  Implanted Port.    Treatment Conditions:  Lab Results   Component Value Date    HGB 12.0 (L) 06/28/2022    WBC 6.6 06/28/2022    ANEU 7.0 05/16/2022    ANEUTAUTO 4.8 06/28/2022     06/28/2022      Lab Results   Component Value Date     06/28/2022    POTASSIUM 3.6 06/28/2022    MAG 1.8 06/28/2022    CR 0.69 06/28/2022    LAI 8.7 06/28/2022    BILITOTAL 1.2 06/28/2022    ALBUMIN 3.2 (L) 06/28/2022    ALT 60 06/28/2022    AST 52 (H) 06/28/2022     Results reviewed, labs MET treatment parameters, ok to proceed with treatment.    Post Infusion Assessment:  Patient tolerated infusion without incident.  Blood return noted pre and post infusion.  Site patent and intact, free from redness, edema or discomfort.  No evidence of extravasations.  Access discontinued per protocol.     Discharge Plan:   Patient declined prescription refills.  Discharge instructions reviewed with: Patient.  Patient and/or family verbalized understanding of discharge instructions and all questions answered.  AVS to patient via [employer].  Patient will return 7/5 for next appointment.   Patient discharged in stable condition accompanied by: self.  Departure Mode: Ambulatory.      Carmel Quintana RN

## 2022-06-28 NOTE — PATIENT INSTRUCTIONS
[facility] Triage and after hours / weekends / holidays:  [phone]    Please call the triage or after hours line if you experience a temperature greater than or equal to 100.5, shaking chills, have uncontrolled nausea, vomiting and/or diarrhea, dizziness, shortness of breath, chest pain, bleeding, unexplained bruising, or if you have any other new/concerning symptoms, questions or concerns.      If you are having any concerning symptoms or wish to speak to a provider before your next infusion visit, please call your care coordinator or triage to notify them so we can adequately serve you.     If you need a refill on a narcotic prescription or other medication, please call before your infusion appointment.        June 2022 Sunday Monday Tuesday Wednesday Thursday Friday Saturday                  1    LAB CENTRAL  10:45 AM   (15 min.)   [facility] LAB DRAW   [facility]    RETURN  11:15 AM   (30 min.)   Dariela Melvin MD   [facility] 2     3     4       5     6     7     8     9     10    RETURN   1:30 PM   (15 min.)   Mane Razo MD   [facility]    CT SIM   2:00 PM   (60 min.)   Mane Razo MD   [facility]    MR SINUS FACE WWO CONTRAST   2:30 PM   (45 min.)   [email or web address]   [facility] 11       12     13     14    [place] PLANNING BILLING ONLY   7:00 AM   (30 min.)   Mane Razo MD   [facility] 15     16     17     18       19     20     21    [place] PLANNING BILLING ONLY   1:30 PM   (30 min.)   Mane Razo MD   [facility]    LAB CENTRAL   4:30 PM   (15 min.)   [facility]   [facility]    RETURN   4:45 PM   (45 min.)   Laura Guzman APRN CNP   [facility] 22    ONC INFUSION 2 HR (120 MIN)   7:00 AM    (120 min.)   [facility] ONC INFUSION NURSE   [facility]    TREATMENT  12:30 PM   (30 min.)   [facility] RAD ONC [facility]    VIDEO SWALLOW STUDY   2:45 PM   (60 min.)   Renée Mims SLP   [facility]    XR VIDEO SWALLOW W SLP OR OT   2:45 PM   (60 min.)   [id number]   [facility] [place] 23    TREATMENT  11:00 AM   (15 min.)   [facility] RAD ONC [facility]    OTV  11:15 AM   (15 min.)   Mane Razo MD   [facility] 24    TREATMENT   9:15 AM   (15 min.)   [facility] RAD ONC [facility] 25       26     27    TREATMENT  12:45 PM   (15 min.)   [facility] RAD ONC [facility] 28    LAB CENTRAL   6:15 AM   (15 min.)   [facility] LAB DRAW   [facility]    RETURN   6:45 AM   (45 min.)   Maryjo Polanco CNP   [facility]    ONC INFUSION 2 HR (120 MIN)   8:00 AM   (120 min.)   [facility] ONC INFUSION NURSE   [facility]    SWALLOW TREATMENT   8:30 AM   (30 min.)   Haily Hare SLP   [facility]    TREATMENT   1:45 PM   (15 min.)   [facility] RAD ONC [facility] 29    TREATMENT   1:45 PM   (15 min.)   [facility] RAD ONC [facility] 30    TREATMENT   1:45 PM   (15 min.)   [facility] RAD ONC [facility]    UMP NUTRITION VISIT   2:00 PM   (30 min.)   Deb Schultz RD   [facility]    OTV   2:00 PM   (15 min.)   Cooper Shankar MD   [facility]                         July 2022 Sunday Monday Tuesday Wednesday Thursday Friday Saturday                             1    LAB CENTRAL  10:15 AM   (15 min.)   [facility]RAW   [facility]    RETURN  10:30 AM   (45 min.)   Jany Mtz CNP   [facility]    TREATMENT   1:45 PM   (15 min.)   [facility] RAD ONC VARIAN   [facility] 2       3     4     5    LAB CENTRAL  10:15 AM   (15 min.)   [facility] DRAW   [facility]    ONC INFUSION 2 HR (120 MIN)  10:30 AM   (120 min.)   [facility] ONC INFUSION NURSE   [facility]    SWALLOW TREATMENT  11:00 AM   (30 min.)   Trinity Winters, SLP   [facility]    TREATMENT   1:45 PM   (15 min.)   [facility] RAD ONC [facility] 6    TREATMENT   1:45 PM   (15 min.)   [facility] RAD ONC [facility] 7    LAB  10:00 AM   (15 min.)   [facility] LAB   [facility] [place]    NEW LIVER  10:45 AM   (30 min.)   Marianna Cm MD   [facility] [place]    TREATMENT   1:45 PM   (15 min.)   [facility] RAD ONC [facility]    OTV   2:00 PM   (15 min.)   Cooper Shankar MD   [facility] 8    TREATMENT   1:45 PM   (15 min.)   [facility] RAD ONC VARIAN   [facility] 9       10     11    TREATMENT   1:45 PM   (15 min.)   [facility] RAD ONC [facility] 12    LAB CENTRAL   8:30 AM   (15 min.)   [facility]RAW   [facility]    RETURN   8:45 AM   (45 min.)   Maryjo Polanco CNP   [facility]    ONC INFUSION 2 HR (120 MIN)  10:30 AM   (120 min.)   [facility] ONC INFUSION NURSE   [facility]    SWALLOW TREATMENT  11:30 AM   (30 min.)   Trinity Winters, SLP   [facility]    TREATMENT   1:45 PM   (15 min.)   [facility] RAD ONC VARIAN   [facility] 13    RETURN  12:30 PM   (30 min.)   Dariela Melvin MD   [facility]    TREATMENT   1:45 PM   (15 min.)   [facility] RAD ONC [facility] 14    TREATMENT   1:45 PM   (15 min.)   [facility] RAD ONC [facility]    OTV   2:00 PM   (15 min.)   Mane Razo MD   [facility] 15    TREATMENT   1:45 PM   (15 min.)   [facility] RAD ONC [facility] 16       17     18    LAB CENTRAL   8:45 AM   (15 min.)   [facility] DRAW   [facility]    RETURN   9:00 AM   (45 min.)   Jany Mtz CNP   [facility]    ONC INFUSION 2 HR (120 MIN)  10:00 AM   (120 min.)   [facility] ONC INFUSION NURSE   [facility]    SWALLOW TREATMENT  11:00 AM   (30 min.)   Haily Hare SLP   [facility]    TREATMENT   1:45 PM   (15 min.)   [facility] RAD ONC [facility] 19    TREATMENT   1:45 PM   (15 min.)   [facility] RAD ONC [facility] 20    TREATMENT   1:45 PM   (15 min.)   [facility] RAD ONC [facility] 21    TREATMENT   1:45 PM   (15 min.)   [facility] RAD ONC [facility]    OTV   2:00 PM   (15 min.)   Cooper Shankar MD   [facility] 22    TREATMENT   1:45 PM   (15 min.)   [facility] RAD ONC [facility] 23       24     25    LAB CENTRAL   6:45 AM   (15 min.)   [facility] DRAW   [facility]    RETURN   7:00 AM   (45 min.)   Jany Mtz CNP   [facility]    ONC INFUSION 2 HR  (120 MIN)   8:00 AM   (120 min.)   [facility] ONC INFUSION NURSE   [facility]    SWALLOW TREATMENT   9:00 AM   (30 min.)   Haily Hare SLP   [facility]    TREATMENT   1:45 PM   (15 min.)   [facility] RAD ONC VARIAN   [facility] 26    TREATMENT   1:45 PM   (15 min.)   [facility] RAD ONC [facility] 27    TREATMENT   1:45 PM   (15 min.)   [facility] RAD ONC [facility]    RETURN   2:15 PM   (30 min.)   Dariela Melvin MD   [facility] 28    TREATMENT   1:45 PM   (15 min.)   [facility] ON[facility]    OTV   2:00 PM   (15 min.)   Mane Razo MD   [facility] 29    TREATMENT   1:45 PM   (15 min.)   [facility] RAD ONC [facility] 30       31                                                      Recent Results (from the past 24 hour(s))   Comprehensive metabolic panel    Collection Time: 06/28/22  6:42 AM   Result Value Ref Range    Sodium 140 133 - 144 mmol/L    Potassium 3.6 3.4 - 5.3 mmol/L    Chloride 102 94 - 109 mmol/L    Carbon Dioxide (CO2) 33 (H) 20 - 32 mmol/L    Anion Gap 5 3 - 14 mmol/L    Urea Nitrogen 16 7 - 30 mg/dL    Creatinine 0.69 0.66 - 1.25 mg/dL    Calcium 8.7 8.5 - 10.1 mg/dL    Glucose 120 (H) 70 - 99 mg/dL    Alkaline Phosphatase 88 40 - 150 U/L    AST 52 (H) 0 - 45 U/L    ALT 60 0 - 70 U/L    Protein Total 7.5 6.8 - 8.8 g/dL    Albumin 3.2 (L) 3.4 - 5.0 g/dL    Bilirubin Total 1.2 0.2 - 1.3 mg/dL    GFR Estimate >90 >60 mL/min/1.73m2   CBC with platelets and differential    Collection Time: 06/28/22  6:42 AM   Result Value Ref Range    WBC Count 6.6 4.0 - 11.0 10e3/uL    RBC Count 4.35 (L) 4.40 - 5.90 10e6/uL    Hemoglobin 12.0 (L) 13.3 - 17.7 g/dL    Hematocrit 39.0 (L) 40.0 - 53.0 %    MCV  90 78 - 100 fL    MCH 27.6 26.5 - 33.0 pg    MCHC 30.8 (L) 31.5 - 36.5 g/dL    RDW 16.5 (H) 10.0 - 15.0 %    Platelet Count 172 150 - 450 10e3/uL    % Neutrophils 73 %    % Lymphocytes 13 %    % Monocytes 11 %    % Eosinophils 3 %    % Basophils 0 %    % Immature Granulocytes 0 %    NRBCs per 100 WBC 0 <1 /100    Absolute Neutrophils 4.8 1.6 - 8.3 10e3/uL    Absolute Lymphocytes 0.9 0.8 - 5.3 10e3/uL    Absolute Monocytes 0.7 0.0 - 1.3 10e3/uL    Absolute Eosinophils 0.2 0.0 - 0.7 10e3/uL    Absolute Basophils 0.0 0.0 - 0.2 10e3/uL    Absolute Immature Granulocytes 0.0 <=0.4 10e3/uL    Absolute NRBCs 0.0 10e3/uL   Magnesium    Collection Time: 06/28/22  6:42 AM   Result Value Ref Range    Magnesium 1.8 1.6 - 2.3 mg/dL

## 2022-06-28 NOTE — NURSING NOTE
"Oncology Rooming Note    June 28, 2022 6:49 AM   Jareth Gallardo is a 61 year old male who presents for:    Chief Complaint   Patient presents with     Port Draw     Labs drawn via port by RN in lab.  VS taken     Oncology Clinic Visit     Nasopharyngeal cancer      Initial Vitals: BP (!) 146/84   Pulse 111   Temp 98.4  F (36.9  C) (Oral)   Resp 16   Wt 134.6 kg (296 lb 12.8 oz)   SpO2 95%   BMI 42.59 kg/m   Estimated body mass index is 42.59 kg/m  as calculated from the following:    Height as of 6/21/22: 1.778 m (5' 10\").    Weight as of this encounter: 134.6 kg (296 lb 12.8 oz). Body surface area is 2.58 meters squared.  No Pain (0) Comment: Data Unavailable   No LMP for male patient.  Allergies reviewed: Yes  Medications reviewed: Yes    Medications: Medication refills not needed today.  Pharmacy name entered into [facility]:    [id number] [place] [facility] [place] [place] - [address] [address] [facility] [place], [place] - [id number]    Clinical concerns: Pt has noticed blood in saliva since Saturday. Maryjo BATES was notified.       Marilynn Saravia, [facility]            "

## 2022-06-29 ENCOUNTER — APPOINTMENT (OUTPATIENT)
Dept: RADIATION ONCOLOGY | Facility: CLINIC | Age: 62
End: 2022-06-29
Attending: RADIOLOGY
Payer: COMMERCIAL

## 2022-06-29 PROCEDURE — 77386 HC IMRT TREATMENT DELIVERY, COMPLEX: CPT | Performed by: RADIOLOGY

## 2022-06-29 PROCEDURE — 77014 PR CT GUIDE FOR PLACEMENT RADIATION THERAPY FIELDS: CPT | Mod: 26 | Performed by: RADIOLOGY

## 2022-06-30 ENCOUNTER — ALLIED HEALTH/NURSE VISIT (OUTPATIENT)
Dept: RADIATION ONCOLOGY | Facility: CLINIC | Age: 62
End: 2022-06-30
Attending: RADIOLOGY
Payer: COMMERCIAL

## 2022-06-30 VITALS
DIASTOLIC BLOOD PRESSURE: 76 MMHG | HEART RATE: 102 BPM | BODY MASS INDEX: 42.47 KG/M2 | SYSTOLIC BLOOD PRESSURE: 142 MMHG | WEIGHT: 296 LBS

## 2022-06-30 DIAGNOSIS — C11.9 NASOPHARYNGEAL CANCER (H): Primary | ICD-10-CM

## 2022-06-30 DIAGNOSIS — D70.1 CHEMOTHERAPY-INDUCED NEUTROPENIA (H): ICD-10-CM

## 2022-06-30 DIAGNOSIS — T45.1X5A CHEMOTHERAPY-INDUCED NEUTROPENIA (H): ICD-10-CM

## 2022-06-30 DIAGNOSIS — E11.9 DIABETES MELLITUS (H): ICD-10-CM

## 2022-06-30 DIAGNOSIS — C11.9 NASOPHARYNGEAL CANCER (H): ICD-10-CM

## 2022-06-30 DIAGNOSIS — E66.01 MORBID OBESITY (H): ICD-10-CM

## 2022-06-30 PROCEDURE — 77386 HC IMRT TREATMENT DELIVERY, COMPLEX: CPT | Performed by: RADIOLOGY

## 2022-06-30 PROCEDURE — 97802 MEDICAL NUTRITION INDIV IN: CPT | Performed by: DIETITIAN, REGISTERED

## 2022-06-30 NOTE — PROGRESS NOTES
Oncology Progress Note  June 28, 2022    Reason for Visit: seen in f/u of nasopharyngeal carcinoma    Oncology HPI:     Mr. Gallardo is a 61 year old man who presents for evaluation prior to starting chemoradiation. He was previously treated with 3 cycles of carboplatin/gemcitabine with a [place] on PET/CT.  Cisplatin was not used due to hearing loss on audiogram. He began concurrent chemoradiation with weekly carboplatin 6/22/22.    SUMMARY  12/11/21                   [facility] for L neck mass, selling  12/9/21                     CT neck ([place]). 2.4 cm L neck node medial to SCM just above the hyoid, additional LNs surround, scattered R neck and L supraclavicular LNs  2/15/22                     US bx L cervical node. Path: SCC, moderately differentiated,   3/4/22                       PET/CT. Nasopharyngeal and oropharyngeal mucosal thickening. 2.6 cm BERNARDO nodular GGO, SUV avid, some additional lingular uptake  4/4/22                       C1 carboplatin gemcitabine. Not a candidate for cisplatin due to hearing loss on audiogram.  4/26/22                     C2 carboplatin gemcitabine. Neulasta 5/3 after C2D8, ANC 1000   5/16/22                     C3 carboplatin gemcitabine.   5/31/22: PET with decreased size of nasopharyngeal tumor w/ decreased FDG uptake and decrease in size and number of involved cervical lymph nodes  6/22/22: initiate definitive chemoradiation with weekly carboplatin    Interval history:   Jareth continues to experience daily acid reflux which triggers morning vomiting most days. He is taking famotidine daily. He has tried to increase this to twice a day a few times but did not feel it made a difference. He is taking antiemetics, sometimes in the evening and sometimes first thing in the morning, in an effort to reduce his reflux and vomiting which doesn't seem to be helping. After episodes of emesis he does feel relief. He is able to eat and drink regularly. He notes a metallic taste with foods. His  bowels continue to be rather sporadic, more toward the direction of constipation. He does not with to take stool softeners. His edema has improved and he feels that he is at his baseline. His mouth is dry at night. He plans to start using a humidifier. Mouth and throat pain are minimal. Routinely uses salt/soda rinses and biotene.      10 point review of systems otherwise negative    PAST MEDICAL HISTORY  Nasopharyngeal carcinoma as above  DM2  COPD??  Hiatal hernia. Sleeps with his head elevated  LVH on TTE 2009, EF50%, LA Dilation  Dyslipidemia  BARRETT in 2015. Not using CPAP due to developing tooth abscesses when he tried it in 2015  Venous insufficiency in both legs being in an accident involving a mower about 10 years ago, on chronic furosemide. LE US negative 12/15/2009  Cholelithiasis  Hyperplastic colon polyp, due 2020  Tonsillectomy  Baker cyst RLE US 2013     Neuropathy from DM - R foot numbness chronically     Exposed to lots of wood dust in his occupation       Current Outpatient Medications   Medication Sig Dispense Refill     omeprazole (PRILOSEC) 20 MG DR capsule Take 1 capsule (20 mg) by mouth daily 30 capsule 1     atorvastatin (LIPITOR) 20 MG tablet Take 20 mg by mouth       B-D U/F insulin pen needle USE ONE TIME A DAY TO INJECT VICTOZA       blood glucose (NO BRAND SPECIFIED) lancets standard Use to test blood sugar 2 times daily or as directed. 90 lancet 1     blood glucose (NO BRAND SPECIFIED) test strip Use to test blood sugar 3 times daily or as directed. 90 strip 0     blood glucose monitoring (ACCU-CHEK RANDELL PLUS) meter device kit Use to test blood sugar 2 times daily or as directed. 1 kit 0     fluticasone-vilanterol (BREO ELLIPTA) 100-25 MCG/INH inhaler INHALE 1 PUFF INTO THE LUNGS ONE TIME A DAY. RINSE MOUTH AFTER USE.       furosemide (LASIX) 20 MG tablet Take 40 mg by mouth       insulin lispro (HUMALOG KWIKPEN) 100 UNIT/ML (1 unit dial) KWIKPEN 1 unit per 50 above 150 correction after  chemo. TDD 20 units (Patient not taking: No sig reported)       metFORMIN (GLUCOPHAGE XR) 500 MG 24 hr tablet Take 2 tabs daily. Swallow tablet whole; do not crush, divide or chew.       metFORMIN (GLUCOPHAGE) 1000 MG tablet TAKE 1 TABLET BY MOUTH ONE TIME A DAY. TAKE WITH FOOD. (Patient not taking: No sig reported)       ondansetron (ZOFRAN) 8 MG tablet Take 1 tablet (8 mg) by mouth every 8 hours as needed for nausea 30 tablet 11     prochlorperazine (COMPAZINE) 10 MG tablet Take 1 tablet (10 mg) by mouth every 6 hours as needed (Nausea/Vomiting) 30 tablet 5     VICTOZA PEN 18 MG/3ML soln Inject 1.2 mg Subcutaneous daily          No Known Allergies    Exam:    Blood pressure 119/78, pulse 98, temperature 98.6  F (37  C), temperature source Oral, resp. rate 18, weight 133.2 kg (293 lb 9.6 oz), SpO2 95 %.  Wt Readings from Last 4 Encounters:   07/01/22 133.2 kg (293 lb 9.6 oz)   06/30/22 134.3 kg (296 lb)   06/28/22 134.6 kg (296 lb 12.8 oz)   06/21/22 138.7 kg (305 lb 12.8 oz)     General: Pleasant male, NAD. Accompanied by wife Shelley.  HEENT: Sclera anicteric. Oral mucosa pink and dry.  No mucositis or thrush. Thick secreitions  Heart: Regular, rate, and rhythm  Lungs: Clear to ascultation bilaterally. Audible gastric regurgitation with belching.  Abdomen: Positive bowel sounds. Soft, non-distended, non-tender.   Extremities: trace bilateral extremity edema  Neuro: Cranial nerves grossly intact  Rash: none  Vascular access: port    Labs:   Most Recent 3 CBC's:  Recent Labs   Lab Test 07/01/22  1037 06/28/22  0642 06/21/22  1643   WBC 7.0 6.6 5.7   HGB 12.1* 12.0* 11.1*   MCV 89 90 89    172 233   ANEUTAUTO 5.4 4.8 3.8     Most Recent 3 BMP's:  Recent Labs   Lab Test 07/01/22  1037 06/28/22  0642 06/21/22  1643    140 143   POTASSIUM 3.6 3.6 3.4   CHLORIDE 100 102 104   CO2 33* 33* 32   BUN 14 16 10   CR 0.69 0.69 0.78   ANIONGAP 7 5 7   LAI 8.8 8.7 8.6   * 120* 181*   PROTTOTAL 7.7 7.5 7.4    ALBUMIN 3.2* 3.2* 3.2*    Most Recent 3 LFT's:  Recent Labs   Lab Test 07/01/22  1037 06/28/22  0642 06/21/22  1643   AST 56* 52* 52*   ALT 69 60 60   ALKPHOS 83 88 89   BILITOTAL 0.9 1.2 0.6    Most Recent 2 TSH and T4:No lab results found.    I reviewed the above labs today.    Imaging: n/a    Impression/plan:   Nasopharyngeal carcinoma, SAM +julia:   With [place] to 3 cycles of Carbo/Warri[place]ine  -began chemoradiation with weekly carboplatin 6/22/22  -tolerating carbo well, labs and assessment ok for week 3 on 7/5/22 as scheduled  -will need to monitor for fluid overload as he had issues with that before  -will have weekly LEONEL visits and f/u with Dr. Melvin mid-treatment, post treatment PET/CT anticipated at 12 weeks     Transaminitis: G1.  -possibly from gemcitabine, likely some decrease of steatosis contributing  -LFTs improving/stable. Monitor. Has a visit with hepatology on 7/7/22.     BLE edema: reports a hx of compression injury to the thighs in the past that contributed to chronic edema  -On daily furosemide, no changes with chemo, TTE 4/1/22 technically difficult with limited information but EF 53%, mild RVD, inferolateral/posterior akinesis which was not noted on 2009 TTE in Care Everywhere. Fluid overloaded but no pulm edema.   -Currently stable. Continue furosemide 40 mg daily.     Nausea/vomiting, acid reflux  Primary concern at this time. Pattern not consistent with OJ. Suspect hiatal hernia playing a role. Symptoms began at the end of induction treatment and persisting despite famotine use and dose increase. Will add prilosec 20 mg daily. Advised he take this in the evening.       L lung nodules: Resolved.   -thought to be inflammatory. Will pay attention to this on follow-up imaging.     DM2, steroid-induced hyperglycemia:  -managing ok right now. Sugars stable in the 120-130 range  -remains on metformin, victoza     Bowel irregularity  -vacillates between diarrhea and constipation  -likely metforming and  victoza are contributing. monitor     Mild peripheral neuropathy: R foot numbness, very mild, from DM2.   -stable    40 minutes spent on the date of the encounter doing chart review, review of test results, interpretation of tests, patient visit, documentation and discussion with family     Jany Mtz CNP  [facility] [address] [id number] [phone]

## 2022-06-30 NOTE — TELEPHONE ENCOUNTER
RECORDS RECEIVED FROM: Internal   Appt Date: 07.07.2022   NOTES STATUS DETAILS   OFFICE NOTE from referring provider Internal 06.01.2022 Dariela Melvin MD   OFFICE NOTES from other specialists     DISCHARGE SUMMARY from hospital     MEDICATION LIST Internal    LIVER BIOSPY (IF APPLICABLE)      PATHOLOGY REPORTS      IMAGING     ENDOSCOPY (IF AVAILABLE)     COLONOSCOPY (IF AVAILABLE)     ULTRASOUND LIVER     CT OF ABDOMEN     MRI OF LIVER     FIBROSCAN, US ELASTOGRAPHY, FIBROSIS SCAN, MR ELASTOGRAPHY     LABS     HEPATIC PANEL (LIVER PANEL)     BASIC METABOLIC PANEL     COMPLETE METABOLIC PANEL Internal 06.28.2022   COMPLETE BLOOD COUNT (CBC) Internal 06.28.2022   INTERNATIONAL NORMALIZED RATIO (INR) Internal 03.31.2022   HEPATITIS C ANTIBODY Internal 04.26.2022   HEPATITIS C VIRAL LOAD/PCR     HEPATITIS C GENOTYPE     HEPATITIS B SURFACE ANTIGEN Internal 04.26.2022   HEPATITIS B SURFACE ANTIBODY Internal 04.26.2022   HEPATITIS B DNA QUANT LEVEL     HEPATITIS B CORE ANTIBODY Internal 04.26.2022

## 2022-06-30 NOTE — PROGRESS NOTES
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    Current Medications    Current Outpatient Medications:      atorvastatin (LIPITOR) 20 MG tablet, Take 20 mg by mouth, Disp: , Rfl:      B-D U/F insulin pen needle, USE ONE TIME A DAY TO INJECT VICTOZA, Disp: , Rfl:      blood glucose (NO BRAND SPECIFIED) lancets standard, Use to test blood sugar 2 times daily or as directed., Disp: 90 lancet, Rfl: 1     blood glucose (NO BRAND SPECIFIED) test strip, Use to test blood sugar 3 times daily or as directed., Disp: 90 strip, Rfl: 0     blood glucose monitoring (ACCU-CHEK RANDELL PLUS) meter device kit, Use to test blood sugar 2 times daily or as directed., Disp: 1 kit, Rfl: 0     fluticasone-vilanterol (BREO ELLIPTA) 100-25 MCG/INH inhaler, INHALE 1 PUFF INTO THE LUNGS ONE TIME A DAY. RINSE MOUTH AFTER USE., Disp: , Rfl:      furosemide (LASIX) 20 MG tablet, Take 40 mg by mouth, Disp: , Rfl:      insulin lispro (HUMALOG KWIKPEN) 100 UNIT/ML (1 unit dial) KWIKPEN, 1 unit per 50 above 150 correction after chemo. TDD 20 units (Patient not taking: Reported on 6/28/2022), Disp: , Rfl:      metFORMIN (GLUCOPHAGE XR) 500 MG 24 hr tablet, Take 2 tabs daily. Swallow tablet whole; do not crush, divide or chew., Disp: , Rfl:      metFORMIN (GLUCOPHAGE) 1000 MG tablet, TAKE 1 TABLET BY MOUTH ONE TIME A DAY. TAKE WITH FOOD. (Patient not taking: Reported on 6/28/2022), Disp: , Rfl:      ondansetron (ZOFRAN) 8 MG tablet, Take 1 tablet (8 mg) by mouth every 8 hours as needed for nausea, Disp: 30 tablet, Rfl: 11     prochlorperazine  "(COMPAZINE) 10 MG tablet, Take 1 tablet (10 mg) by mouth every 6 hours as needed (Nausea/Vomiting), Disp: 30 tablet, Rfl: 5     VICTOZA PEN 18 MG/3ML soln, Inject 1.2 mg Subcutaneous daily, Disp: , Rfl:     Medications have been reviewed.    Pertinent lab results:  No results found for: CHOLESTEROL, TRIGLYCERIDE, HDL, LDL, HGBA1C, PREALB  Urea Nitrogen   Date Value Ref Range Status   06/28/2022 16 7 - 30 mg/dL Final     Potassium   Date Value Ref Range Status   06/28/2022 3.6 3.4 - 5.3 mmol/L Final         Labs have been reviewed and noted.    Treatment Plan:  Oncology History   Nasopharyngeal cancer (H)   3/22/2022 Initial Diagnosis    Nasopharyngeal cancer (H)     4/4/2022 - 5/23/2022 Chemotherapy    OP ONC  nasopharyngeal carcinoma - Gemcitabine / CARBOplatin induction  Plan Provider: Dariela Melvin MD  Treatment goal: Curative  Line of treatment: [No plan line of treatment]     6/22/2022 -  Chemotherapy    OP ONC Head and Neck Cancer - CARBOplatin WEEKLY + Radiation  Plan Provider: Dariela Melvin MD  Treatment goal: Curative  Line of treatment: [No plan line of treatment]       Treatment plan has been reviewed.    Food and Nutrition Related History  Oncology treatment side effects: None at this time; starting to get xerostomia in throat  Factors effecting nutritional intake: Intermittent N/V ~3 x/week    Cristobal denies barriers to eating at this time.   He has been focused on getting a lot of hydration due to dry mouth/throat.    He tends to eat 2 larger meals/day, brunch and dinner.      Diet Recall  Breakfast/Lunch Sausage, cheese, egg croissant, boiled egg, coffee cake or berries, 16 oz Gatorade zero   Dinner 2 pulled pork sandwiches w/ cheese, potato salad   Snacks Brownie, cookie, fruit   Beverages >10-12 cups water/Gatorade daily        ANTHROPOMETRICS  Height: 70\"  Weight: 296 lbs/134kg  BMI: 42  Weight Status:  Obesity Grade III BMI >40  IBW: 166 lbs (178%)  Weight History: Cristobal reports that some of his " weight loss is due to water weight( from venous insufficiency - LL edema) from previous accident  Wt Readings from Last 8 Encounters:   06/28/22 134.6 kg (296 lb 12.8 oz)   06/21/22 138.7 kg (305 lb 12.8 oz)   06/01/22 141.5 kg (311 lb 14.4 oz)   05/23/22 137 kg (302 lb)   05/16/22 140 kg (308 lb 11.2 oz)   05/02/22 137.6 kg (303 lb 6.4 oz)   04/26/22 138.9 kg (306 lb 3.2 oz)   04/11/22 134.3 kg (296 lb)     Dosing Weight: 103kg    MALNUTRITION:  % Weight Loss:  > 5% in 1 month (severe malnutrition)  % Intake:  Decreased intake does not meet criteria for malnutrition   Subcutaneous Fat Loss:  None observed  Muscle Loss:  None observed  Fluid Retention:  None noted    Malnutrition Diagnosis: Patient does not meet two of the above criteria necessary for diagnosing malnutrition    ASSESSED NUTRITION NEEDS:  Estimated Energy Needs: 1063-4902 kcals (25-30 Kcal/Kg)  Justification: maintenance  Estimated Protein Needs: 125 grams protein (1.2 g pro/Kg)  Justification: increased needs with cancer therapy  Estimated Fluid Needs: >3100  mL   Justification: increased needs with chemotherapy      Nutrition Diagnosis:  Predicted suboptimal nutrient intake related to cancer treatment to head/neck region     Intervention/Recommendations:  Provided written & verbal education:     - Reviewed nutrition and hydration needs.   Advised pt to aim for at least 3100kcal and 125g protein daily.   Advised pt to aim for 12 cups non-caffeine containing beverages (water/electrolytes) daily.    - Encouraged to focus on 5-6 small, frequent meals.   - Discussed strategies to help fortify meals and snacks with calories and protein.   - Encouraged to have a protein source with each meal and snack.    - Reviewed oral nutrition supplement options ([employer] milk etc).   - Encouraged utilizing these ONS in home made shakes/smoothies to prevent flavor fatigue.      Provided pt with corresponding education materials/handouts on:  High  Calorie/High Protein Recipe booklet, Tips to Increase the Calories in Your Diet and Sources of Protein    Goals:  1. Aim for at least 3000 calories, 125g protein and 12 cups water/electrolyte fluids daily  2. Weight stability    Monitor and Evaluation:  Patient has RD contact information for further nutrition questions/concerns. Follow-up scheduled for 3 weeks.     Deb Maloney RD, LD  [facility]  [phone]

## 2022-06-30 NOTE — LETTER
2022         RE: Jareth Gallardo  [address] [place] [address]        Dear Colleague,    Thank you for referring your patient, Jareth Gallardo, to the [facility] RADIATION ONCOLOGY. Please see a copy of my visit note below.    RADIATION ONCOLOGY WEEKLY ON TREATMENT VISIT   Encounter Date: 2022    Patient Name: Jareth Gallardo  MRN: [id number]  : 1960     Disease and Stage: cT2 N2 M0 nasopharyngeal carcinoma  Treatment Site: Nasopharynx and bilateral neck  Current Dose/Planned Total Dose: 1400 / 7000 cGy  Daily Fraction Size: 200 cGy/day, 5 times/week  Concurrent Chemotherapy: Yes  Drug and Frequency: Carboplatin weekly    Treatment Summary:    22: Initiation of radiotherapy. First infusion of weekly carboplatin.    22: Weekly RT visit. Current dose of 400 cGy. Tolerating treatment well.    22: Second infusion of weekly carboplatin.     22: Weekly RT visit. Current dose of 1400 cGy. Tolerating treatment well.    ED visits/Hospitalizations:  None    Missed Treatments:  None    Subjective: Mr. Gallardo presents to clinic today for his weekly on-treatment visit. He has tolerated initiation of radiotherapy very well and has no pressing concerns or complaints on examination. He is eating regular diet without difficulty and specifically denies any odynophagia or dysphagia. His remaining ROS are likewise negative.    ROS:   Constitutional  Pain (0-10): 1 (mouth), 1 (throat), 1 (skin)   Fatigue: None    CNS  Headaches: None    ENT  Mucositis: None    Cardiopulmonary  Dyspnea: None    GI  Nausea/vomiting: None    Nutrition  PEG: No  Diet: Regular    Integumentary  Dermatitis: None    Objective:   Current weight: 138.8 kg    BP: 142/76 (sitting), 138/80 (standing)  Pulse: 102 (sitting), 99 (standing)    General: Healthy-appearing 61-year-old gentleman seated comfortably in a chair in no acute distress  HEENT: NC/AT. EOMI. No rhinorrhea or epistaxis.  Moist mucous membranes. No mucositis or thrush.  Pulmonary: No wheezing, stridor or respiratory distress  Skin: Normal color and turgor. No erythema.    Treatment-related toxicities (CTCAE v5.0):  None    Assessment:    Mr. Gallardo is a 61 year old male with a cT2 N2 M0 nasopharyngeal carcinoma. He is status post 3 cycles of induction carboplatin/gemcitabine with a good partial response to therapy. He is now receiving concurrent chemoradiotherapy with curative intent. He is tolerating treatment well with no acute radiation-induced toxicities.    Plan:   cT2 N2 M0 nasopharyngeal carcinoma:    Continue chemoradiotherapy    Pain management:    Acetaminophen as needed for mild to moderate pain    Fluids/Electrolytes/Nutrition:    Continue diet as tolerated    Dermatitis:    Twice daily Aquaphor application to the bilateral neck    Mosaiq chart and setup information reviewed  IGRT images reviewed    Medication list reviewed    The patient was seen and discussed with staff, Dr. Shankar.    Fadumo Benjamin MD  Resident, PGY-4  Department of Radiation Oncology  [facility]    I saw the patient with the resident.  I agree with the resident's note and plan of care.      REGINALD Shankar M.D.  Department of Radiation Oncology  [facility]

## 2022-06-30 NOTE — PROGRESS NOTES
RADIATION ONCOLOGY WEEKLY ON TREATMENT VISIT   Encounter Date: 2022    Patient Name: Jareth Gallardo  MRN: [id number]  : 1960     Disease and Stage: cT2 N2 M0 nasopharyngeal carcinoma  Treatment Site: Nasopharynx and bilateral neck  Current Dose/Planned Total Dose: 1400 / 7000 cGy  Daily Fraction Size: 200 cGy/day, 5 times/week  Concurrent Chemotherapy: Yes  Drug and Frequency: Carboplatin weekly    Treatment Summary:    22: Initiation of radiotherapy. First infusion of weekly carboplatin.    22: Weekly RT visit. Current dose of 400 cGy. Tolerating treatment well.    22: Second infusion of weekly carboplatin.     22: Weekly RT visit. Current dose of 1400 cGy. Tolerating treatment well.    ED visits/Hospitalizations:  None    Missed Treatments:  None    Subjective: Mr. Gallardo presents to clinic today for his weekly on-treatment visit. He has tolerated initiation of radiotherapy very well and has no pressing concerns or complaints on examination. He is eating regular diet without difficulty and specifically denies any odynophagia or dysphagia. His remaining ROS are likewise negative.    ROS:   Constitutional  Pain (0-10): 1 (mouth), 1 (throat), 1 (skin)   Fatigue: None    CNS  Headaches: None    ENT  Mucositis: None    Cardiopulmonary  Dyspnea: None    GI  Nausea/vomiting: None    Nutrition  PEG: No  Diet: Regular    Integumentary  Dermatitis: None    Objective:   Current weight: 138.8 kg    BP: 142/76 (sitting), 138/80 (standing)  Pulse: 102 (sitting), 99 (standing)    General: Healthy-appearing 61-year-old gentleman seated comfortably in a chair in no acute distress  HEENT: NC/AT. EOMI. No rhinorrhea or epistaxis. Moist mucous membranes. No mucositis or thrush.  Pulmonary: No wheezing, stridor or respiratory distress  Skin: Normal color and turgor. No erythema.    Treatment-related toxicities (CTCAE v5.0):  None    Assessment:    Mr. Gallardo is a 61 year old male with a  cT2 N2 M0 nasopharyngeal carcinoma. He is status post 3 cycles of induction carboplatin/gemcitabine with a good partial response to therapy. He is now receiving concurrent chemoradiotherapy with curative intent. He is tolerating treatment well with no acute radiation-induced toxicities.    Plan:   cT2 N2 M0 nasopharyngeal carcinoma:    Continue chemoradiotherapy    Pain management:    Acetaminophen as needed for mild to moderate pain    Fluids/Electrolytes/Nutrition:    Continue diet as tolerated    Dermatitis:    Twice daily Aquaphor application to the bilateral neck    Mosaiq chart and setup information reviewed  IGRT images reviewed    Medication list reviewed    The patient was seen and discussed with staff, Dr. Shankar.    Fadumo Benjamin MD  Resident, PGY-4  Department of Radiation Oncology  [facility]    I saw the patient with the resident.  I agree with the resident's note and plan of care.      REGINALD Shankar M.D.  Department of Radiation Oncology  [facility]

## 2022-07-01 ENCOUNTER — ONCOLOGY VISIT (OUTPATIENT)
Dept: ONCOLOGY | Facility: CLINIC | Age: 62
End: 2022-07-01
Attending: REGISTERED NURSE
Payer: COMMERCIAL

## 2022-07-01 ENCOUNTER — APPOINTMENT (OUTPATIENT)
Dept: LAB | Facility: CLINIC | Age: 62
End: 2022-07-01
Attending: REGISTERED NURSE
Payer: COMMERCIAL

## 2022-07-01 ENCOUNTER — APPOINTMENT (OUTPATIENT)
Dept: RADIATION ONCOLOGY | Facility: CLINIC | Age: 62
End: 2022-07-01
Attending: RADIOLOGY
Payer: COMMERCIAL

## 2022-07-01 VITALS
RESPIRATION RATE: 18 BRPM | DIASTOLIC BLOOD PRESSURE: 78 MMHG | SYSTOLIC BLOOD PRESSURE: 119 MMHG | TEMPERATURE: 98.6 F | BODY MASS INDEX: 42.13 KG/M2 | HEART RATE: 98 BPM | WEIGHT: 293.6 LBS | OXYGEN SATURATION: 95 %

## 2022-07-01 DIAGNOSIS — K21.9 GASTROESOPHAGEAL REFLUX DISEASE WITHOUT ESOPHAGITIS: ICD-10-CM

## 2022-07-01 DIAGNOSIS — R11.2 NAUSEA AND VOMITING, INTRACTABILITY OF VOMITING NOT SPECIFIED, UNSPECIFIED VOMITING TYPE: ICD-10-CM

## 2022-07-01 DIAGNOSIS — R60.0 BILATERAL LOWER EXTREMITY EDEMA: ICD-10-CM

## 2022-07-01 DIAGNOSIS — C11.9 NASOPHARYNGEAL CANCER (H): Primary | ICD-10-CM

## 2022-07-01 DIAGNOSIS — R79.89 ELEVATED LFTS: ICD-10-CM

## 2022-07-01 LAB
ALBUMIN SERPL-MCNC: 3.2 G/DL (ref 3.4–5)
ALP SERPL-CCNC: 83 U/L (ref 40–150)
ALT SERPL W P-5'-P-CCNC: 69 U/L (ref 0–70)
ANION GAP SERPL CALCULATED.3IONS-SCNC: 7 MMOL/L (ref 3–14)
AST SERPL W P-5'-P-CCNC: 56 U/L (ref 0–45)
BASOPHILS # BLD AUTO: 0 10E3/UL (ref 0–0.2)
BASOPHILS NFR BLD AUTO: 0 %
BILIRUB DIRECT SERPL-MCNC: 0.2 MG/DL (ref 0–0.2)
BILIRUB SERPL-MCNC: 0.9 MG/DL (ref 0.2–1.3)
BUN SERPL-MCNC: 14 MG/DL (ref 7–30)
CALCIUM SERPL-MCNC: 8.8 MG/DL (ref 8.5–10.1)
CHLORIDE BLD-SCNC: 100 MMOL/L (ref 94–109)
CO2 SERPL-SCNC: 33 MMOL/L (ref 20–32)
CREAT SERPL-MCNC: 0.69 MG/DL (ref 0.66–1.25)
EOSINOPHIL # BLD AUTO: 0.1 10E3/UL (ref 0–0.7)
EOSINOPHIL NFR BLD AUTO: 1 %
ERYTHROCYTE [DISTWIDTH] IN BLOOD BY AUTOMATED COUNT: 15.9 % (ref 10–15)
GFR SERPL CREATININE-BSD FRML MDRD: >90 ML/MIN/1.73M2
GLUCOSE BLD-MCNC: 142 MG/DL (ref 70–99)
HCT VFR BLD AUTO: 39.3 % (ref 40–53)
HGB BLD-MCNC: 12.1 G/DL (ref 13.3–17.7)
IMM GRANULOCYTES # BLD: 0 10E3/UL
IMM GRANULOCYTES NFR BLD: 0 %
INR PPP: 1.07 (ref 0.85–1.15)
LYMPHOCYTES # BLD AUTO: 0.7 10E3/UL (ref 0.8–5.3)
LYMPHOCYTES NFR BLD AUTO: 10 %
MCH RBC QN AUTO: 27.5 PG (ref 26.5–33)
MCHC RBC AUTO-ENTMCNC: 30.8 G/DL (ref 31.5–36.5)
MCV RBC AUTO: 89 FL (ref 78–100)
MONOCYTES # BLD AUTO: 0.8 10E3/UL (ref 0–1.3)
MONOCYTES NFR BLD AUTO: 11 %
NEUTROPHILS # BLD AUTO: 5.4 10E3/UL (ref 1.6–8.3)
NEUTROPHILS NFR BLD AUTO: 78 %
NRBC # BLD AUTO: 0 10E3/UL
NRBC BLD AUTO-RTO: 0 /100
PLATELET # BLD AUTO: 182 10E3/UL (ref 150–450)
POTASSIUM BLD-SCNC: 3.6 MMOL/L (ref 3.4–5.3)
PROT SERPL-MCNC: 7.7 G/DL (ref 6.8–8.8)
RBC # BLD AUTO: 4.4 10E6/UL (ref 4.4–5.9)
SODIUM SERPL-SCNC: 140 MMOL/L (ref 133–144)
WBC # BLD AUTO: 7 10E3/UL (ref 4–11)

## 2022-07-01 PROCEDURE — 36591 DRAW BLOOD OFF VENOUS DEVICE: CPT | Performed by: REGISTERED NURSE

## 2022-07-01 PROCEDURE — 99215 OFFICE O/P EST HI 40 MIN: CPT | Performed by: REGISTERED NURSE

## 2022-07-01 PROCEDURE — 250N000011 HC RX IP 250 OP 636: Performed by: REGISTERED NURSE

## 2022-07-01 PROCEDURE — G0463 HOSPITAL OUTPT CLINIC VISIT: HCPCS

## 2022-07-01 PROCEDURE — 82248 BILIRUBIN DIRECT: CPT | Performed by: REGISTERED NURSE

## 2022-07-01 PROCEDURE — 77386 HC IMRT TREATMENT DELIVERY, COMPLEX: CPT | Performed by: RADIOLOGY

## 2022-07-01 PROCEDURE — 85610 PROTHROMBIN TIME: CPT | Performed by: REGISTERED NURSE

## 2022-07-01 PROCEDURE — 80053 COMPREHEN METABOLIC PANEL: CPT

## 2022-07-01 PROCEDURE — 85025 COMPLETE CBC W/AUTO DIFF WBC: CPT | Performed by: INTERNAL MEDICINE

## 2022-07-01 RX ORDER — HEPARIN SODIUM (PORCINE) LOCK FLUSH IV SOLN 100 UNIT/ML 100 UNIT/ML
5 SOLUTION INTRAVENOUS ONCE
Status: COMPLETED | OUTPATIENT
Start: 2022-07-01 | End: 2022-07-01

## 2022-07-01 RX ADMIN — Medication 5 ML: at 10:31

## 2022-07-01 ASSESSMENT — PAIN SCALES - GENERAL: PAINLEVEL: NO PAIN (0)

## 2022-07-01 NOTE — NURSING NOTE
"Oncology Rooming Note    July 1, 2022 10:44 AM   Jareth Gallardo is a 61 year old male who presents for:    Chief Complaint   Patient presents with     Port Draw     Labs drawn via port by RN in lab. VS taken.      Oncology Clinic Visit     Nasopharyngeal Cancer     Initial Vitals: /78 (BP Location: Right arm, Patient Position: Sitting, Cuff Size: Adult Large)   Pulse 98   Temp 98.6  F (37  C) (Oral)   Resp 18   Wt 133.2 kg (293 lb 9.6 oz)   SpO2 95%   BMI 42.13 kg/m   Estimated body mass index is 42.13 kg/m  as calculated from the following:    Height as of 6/21/22: 1.778 m (5' 10\").    Weight as of this encounter: 133.2 kg (293 lb 9.6 oz). Body surface area is 2.56 meters squared.  No Pain (0) Comment: Data Unavailable   No LMP for male patient.  Allergies reviewed: Yes  Medications reviewed: Yes    Medications: Medication refills not needed today.  Pharmacy name entered into [employer]:    [id number] - [place], [place] - [address] [facility] [place], [place] - [phone]    Clinical concerns: acid reflux and pretty constant for past two days.       Juan Pablo Scales            "

## 2022-07-01 NOTE — LETTER
7/1/2022         RE: Jareth Gallardo  [address] [place] [address]        Dear Colleague,    Thank you for referring your patient, Jareth Gallardo, to the [facility]. Please see a copy of my visit note below.    Oncology Progress Note  June 28, 2022    Reason for Visit: seen in f/u of nasopharyngeal carcinoma    Oncology HPI:     Mr. Gallardo is a 61 year old man who presents for evaluation prior to starting chemoradiation. He was previously treated with 3 cycles of carboplatin/gemcitabine with a CA on PET/CT.  Cisplatin was not used due to hearing loss on audiogram. He began concurrent chemoradiation with weekly carboplatin 6/22/22.    SUMMARY  12/11/21                   UC for L neck mass, selling  12/9/21                     CT neck ([place]). 2.4 cm L neck node medial to SCM just above the hyoid, additional LNs surround, scattered R neck and L supraclavicular LNs  2/15/22                     US bx L cervical node. Path: SCC, moderately differentiated,   3/4/22                       PET/CT. Nasopharyngeal and oropharyngeal mucosal thickening. 2.6 cm BERNARDO nodular GGO, SUV avid, some additional lingular uptake  4/4/22                       C1 carboplatin gemcitabine. Not a candidate for cisplatin due to hearing loss on audiogram.  4/26/22                     C2 carboplatin gemcitabine. Neulasta 5/3 after C2D8, ANC 1000   5/16/22                     C3 carboplatin gemcitabine.   5/31/22: PET with decreased size of nasopharyngeal tumor w/ decreased FDG uptake and decrease in size and number of involved cervical lymph nodes  6/22/22: initiate definitive chemoradiation with weekly carboplatin    Interval history:   Jareth continues to experience daily acid reflux which triggers morning vomiting most days. He is taking famotidine daily. He has tried to increase this to twice a day a few times but did not feel it made a difference. He is taking antiemetics, sometimes in the  evening and sometimes first thing in the morning, in an effort to reduce his reflux and vomiting which doesn't seem to be helping. After episodes of emesis he does feel relief. He is able to eat and drink regularly. He notes a metallic taste with foods. His bowels continue to be rather sporadic, more toward the direction of constipation. He does not with to take stool softeners. His edema has improved and he feels that he is at his baseline. His mouth is dry at night. He plans to start using a humidifier. Mouth and throat pain are minimal. Routinely uses salt/soda rinses and biotene.      10 point review of systems otherwise negative    PAST MEDICAL HISTORY  Nasopharyngeal carcinoma as above  DM2  COPD??  Hiatal hernia. Sleeps with his head elevated  LVH on TTE 2009, EF50%, LA Dilation  Dyslipidemia  BARRETT in 2015. Not using CPAP due to developing tooth abscesses when he tried it in 2015  Venous insufficiency in both legs being in an accident involving a mower about 10 years ago, on chronic furosemide. LE US negative 12/15/2009  Cholelithiasis  Hyperplastic colon polyp, due 2020  Tonsillectomy  Baker cyst RLE US 2013     Neuropathy from DM - R foot numbness chronically     Exposed to lots of wood dust in his occupation       Current Outpatient Medications   Medication Sig Dispense Refill     omeprazole (PRILOSEC) 20 MG DR capsule Take 1 capsule (20 mg) by mouth daily 30 capsule 1     atorvastatin (LIPITOR) 20 MG tablet Take 20 mg by mouth       B-D U/F insulin pen needle USE ONE TIME A DAY TO INJECT VICTOZA       blood glucose (NO BRAND SPECIFIED) lancets standard Use to test blood sugar 2 times daily or as directed. 90 lancet 1     blood glucose (NO BRAND SPECIFIED) test strip Use to test blood sugar 3 times daily or as directed. 90 strip 0     blood glucose monitoring (ACCU-CHEK RANDELL PLUS) meter device kit Use to test blood sugar 2 times daily or as directed. 1 kit 0     fluticasone-vilanterol (BREO ELLIPTA) 100-25  MCG/INH inhaler INHALE 1 PUFF INTO THE LUNGS ONE TIME A DAY. RINSE MOUTH AFTER USE.       furosemide (LASIX) 20 MG tablet Take 40 mg by mouth       insulin lispro (HUMALOG KWIKPEN) 100 UNIT/ML (1 unit dial) KWIKPEN 1 unit per 50 above 150 correction after chemo. TDD 20 units (Patient not taking: No sig reported)       metFORMIN (GLUCOPHAGE XR) 500 MG 24 hr tablet Take 2 tabs daily. Swallow tablet whole; do not crush, divide or chew.       metFORMIN (GLUCOPHAGE) 1000 MG tablet TAKE 1 TABLET BY MOUTH ONE TIME A DAY. TAKE WITH FOOD. (Patient not taking: No sig reported)       ondansetron (ZOFRAN) 8 MG tablet Take 1 tablet (8 mg) by mouth every 8 hours as needed for nausea 30 tablet 11     prochlorperazine (COMPAZINE) 10 MG tablet Take 1 tablet (10 mg) by mouth every 6 hours as needed (Nausea/Vomiting) 30 tablet 5     VICTOZA PEN 18 MG/3ML soln Inject 1.2 mg Subcutaneous daily          No Known Allergies    Exam:    Blood pressure 119/78, pulse 98, temperature 98.6  F (37  C), temperature source Oral, resp. rate 18, weight 133.2 kg (293 lb 9.6 oz), SpO2 95 %.  Wt Readings from Last 4 Encounters:   07/01/22 133.2 kg (293 lb 9.6 oz)   06/30/22 134.3 kg (296 lb)   06/28/22 134.6 kg (296 lb 12.8 oz)   06/21/22 138.7 kg (305 lb 12.8 oz)     General: Pleasant male, NAD. Accompanied by wife Shelley.  HEENT: Sclera anicteric. Oral mucosa pink and dry.  No mucositis or thrush. Thick secreitions  Heart: Regular, rate, and rhythm  Lungs: Clear to ascultation bilaterally. Audible gastric regurgitation with belching.  Abdomen: Positive bowel sounds. Soft, non-distended, non-tender.   Extremities: trace bilateral extremity edema  Neuro: Cranial nerves grossly intact  Rash: none  Vascular access: port    Labs:   Most Recent 3 CBC's:  Recent Labs   Lab Test 07/01/22  1037 06/28/22  0642 06/21/22  1643   WBC 7.0 6.6 5.7   HGB 12.1* 12.0* 11.1*   MCV 89 90 89    172 233   ANEUTAUTO 5.4 4.8 3.8     Most Recent 3 BMP's:  Recent Labs    Lab Test 07/01/22  1037 06/28/22  0642 06/21/22  1643    140 143   POTASSIUM 3.6 3.6 3.4   CHLORIDE 100 102 104   CO2 33* 33* 32   BUN 14 16 10   CR 0.69 0.69 0.78   ANIONGAP 7 5 7   LAI 8.8 8.7 8.6   * 120* 181*   PROTTOTAL 7.7 7.5 7.4   ALBUMIN 3.2* 3.2* 3.2*    Most Recent 3 LFT's:  Recent Labs   Lab Test 07/01/22  1037 06/28/22  0642 06/21/22  1643   AST 56* 52* 52*   ALT 69 60 60   ALKPHOS 83 88 89   BILITOTAL 0.9 1.2 0.6    Most Recent 2 TSH and T4:No lab results found.    I reviewed the above labs today.    Imaging: n/a    Impression/plan:   Nasopharyngeal carcinoma, SAM +julia:   With IN to 3 cycles of Carbo/[place]  -began chemoradiation with weekly carboplatin 6/22/22  -tolerating carbo well, labs and assessment ok for week 3 on 7/5/22 as scheduled  -will need to monitor for fluid overload as he had issues with that before  -will have weekly LEONEL visits and f/u with Dr. Melvin mid-treatment, post treatment PET/CT anticipated at 12 weeks     Transaminitis: G1.  -possibly from gemcitabine, likely some decrease of steatosis contributing  -LFTs improving/stable. Monitor. Has a visit with hepatology on 7/7/22.     BLE edema: reports a hx of compression injury to the thighs in the past that contributed to chronic edema  -On daily furosemide, no changes with chemo, TTE 4/1/22 technically difficult with limited information but EF 53%, mild RVD, inferolateral/posterior akinesis which was not noted on 2009 TTE in Care Everywhere. Fluid overloaded but no pulm edema.   -Currently stable. Continue furosemide 40 mg daily.     Nausea/vomiting, acid reflux  Primary concern at this time. Pattern not consistent with OJ. Suspect hiatal hernia playing a role. Symptoms began at the end of induction treatment and persisting despite famotine use and dose increase. Will add prilosec 20 mg daily. Advised he take this in the evening.       L lung nodules: Resolved.   -thought to be inflammatory. Will pay attention to  this on follow-up imaging.     DM2, steroid-induced hyperglycemia:  -managing ok right now. Sugars stable in the 120-130 range  -remains on metformin, victoza     Bowel irregularity  -vacillates between diarrhea and constipation  -likely metforming and victoza are contributing. monitor     Mild peripheral neuropathy: R foot numbness, very mild, from DM2.   -stable    40 minutes spent on the date of the encounter doing chart review, review of test results, interpretation of tests, patient visit, documentation and discussion with family       Jany Mtz CNP  [facility] [address] [address]  [phone]

## 2022-07-01 NOTE — NURSING NOTE
Chief Complaint   Patient presents with     Port Draw     Labs drawn via port by RN in lab. VS taken.      Port accessed with 20 gauge 3/4 inch flat needle and labs drawn by RN.  Port flushed with saline and heparin. Port then de-accessed.  Pt tolerated well.  VS taken.  Pt checked in for next appt.    Lisandra Lacy RN

## 2022-07-05 ENCOUNTER — APPOINTMENT (OUTPATIENT)
Dept: RADIATION ONCOLOGY | Facility: CLINIC | Age: 62
End: 2022-07-05
Attending: RADIOLOGY
Payer: COMMERCIAL

## 2022-07-05 ENCOUNTER — INFUSION THERAPY VISIT (OUTPATIENT)
Dept: ONCOLOGY | Facility: CLINIC | Age: 62
End: 2022-07-05
Attending: INTERNAL MEDICINE
Payer: COMMERCIAL

## 2022-07-05 ENCOUNTER — APPOINTMENT (OUTPATIENT)
Dept: LAB | Facility: CLINIC | Age: 62
End: 2022-07-05
Attending: INTERNAL MEDICINE
Payer: COMMERCIAL

## 2022-07-05 ENCOUNTER — THERAPY VISIT (OUTPATIENT)
Dept: SPEECH THERAPY | Facility: CLINIC | Age: 62
End: 2022-07-05
Payer: COMMERCIAL

## 2022-07-05 VITALS
SYSTOLIC BLOOD PRESSURE: 123 MMHG | OXYGEN SATURATION: 95 % | TEMPERATURE: 98.7 F | WEIGHT: 294.2 LBS | DIASTOLIC BLOOD PRESSURE: 80 MMHG | RESPIRATION RATE: 16 BRPM | HEART RATE: 107 BPM | BODY MASS INDEX: 42.21 KG/M2

## 2022-07-05 DIAGNOSIS — R13.12 OROPHARYNGEAL DYSPHAGIA: Primary | ICD-10-CM

## 2022-07-05 DIAGNOSIS — D70.1 CHEMOTHERAPY-INDUCED NEUTROPENIA (H): ICD-10-CM

## 2022-07-05 DIAGNOSIS — C11.9 NASOPHARYNGEAL CANCER (H): ICD-10-CM

## 2022-07-05 DIAGNOSIS — C11.9 NASOPHARYNGEAL CANCER (H): Primary | ICD-10-CM

## 2022-07-05 DIAGNOSIS — T45.1X5A CHEMOTHERAPY-INDUCED NEUTROPENIA (H): ICD-10-CM

## 2022-07-05 LAB
ALBUMIN SERPL-MCNC: 3.1 G/DL (ref 3.4–5)
ALP SERPL-CCNC: 85 U/L (ref 40–150)
ALT SERPL W P-5'-P-CCNC: 43 U/L (ref 0–70)
ANION GAP SERPL CALCULATED.3IONS-SCNC: 9 MMOL/L (ref 3–14)
AST SERPL W P-5'-P-CCNC: 31 U/L (ref 0–45)
BASOPHILS # BLD AUTO: 0 10E3/UL (ref 0–0.2)
BASOPHILS NFR BLD AUTO: 0 %
BILIRUB SERPL-MCNC: 0.7 MG/DL (ref 0.2–1.3)
BUN SERPL-MCNC: 16 MG/DL (ref 7–30)
CALCIUM SERPL-MCNC: 8.6 MG/DL (ref 8.5–10.1)
CHLORIDE BLD-SCNC: 101 MMOL/L (ref 94–109)
CO2 SERPL-SCNC: 32 MMOL/L (ref 20–32)
CREAT SERPL-MCNC: 0.68 MG/DL (ref 0.66–1.25)
EOSINOPHIL # BLD AUTO: 0.1 10E3/UL (ref 0–0.7)
EOSINOPHIL NFR BLD AUTO: 1 %
ERYTHROCYTE [DISTWIDTH] IN BLOOD BY AUTOMATED COUNT: 16.3 % (ref 10–15)
GFR SERPL CREATININE-BSD FRML MDRD: >90 ML/MIN/1.73M2
GLUCOSE BLD-MCNC: 113 MG/DL (ref 70–99)
HCT VFR BLD AUTO: 38.2 % (ref 40–53)
HGB BLD-MCNC: 12 G/DL (ref 13.3–17.7)
HOLD SPECIMEN: NORMAL
IMM GRANULOCYTES # BLD: 0 10E3/UL
IMM GRANULOCYTES NFR BLD: 0 %
LYMPHOCYTES # BLD AUTO: 0.8 10E3/UL (ref 0.8–5.3)
LYMPHOCYTES NFR BLD AUTO: 8 %
MCH RBC QN AUTO: 28.2 PG (ref 26.5–33)
MCHC RBC AUTO-ENTMCNC: 31.4 G/DL (ref 31.5–36.5)
MCV RBC AUTO: 90 FL (ref 78–100)
MONOCYTES # BLD AUTO: 0.9 10E3/UL (ref 0–1.3)
MONOCYTES NFR BLD AUTO: 10 %
NEUTROPHILS # BLD AUTO: 8 10E3/UL (ref 1.6–8.3)
NEUTROPHILS NFR BLD AUTO: 81 %
NRBC # BLD AUTO: 0 10E3/UL
NRBC BLD AUTO-RTO: 0 /100
PLATELET # BLD AUTO: 173 10E3/UL (ref 150–450)
POTASSIUM BLD-SCNC: 3.8 MMOL/L (ref 3.4–5.3)
PROT SERPL-MCNC: 7.7 G/DL (ref 6.8–8.8)
RBC # BLD AUTO: 4.26 10E6/UL (ref 4.4–5.9)
SODIUM SERPL-SCNC: 142 MMOL/L (ref 133–144)
WBC # BLD AUTO: 9.8 10E3/UL (ref 4–11)

## 2022-07-05 PROCEDURE — 258N000003 HC RX IP 258 OP 636: Performed by: INTERNAL MEDICINE

## 2022-07-05 PROCEDURE — 85025 COMPLETE CBC W/AUTO DIFF WBC: CPT

## 2022-07-05 PROCEDURE — 92526 ORAL FUNCTION THERAPY: CPT | Mod: GN | Performed by: SPEECH-LANGUAGE PATHOLOGIST

## 2022-07-05 PROCEDURE — 96367 TX/PROPH/DG ADDL SEQ IV INF: CPT

## 2022-07-05 PROCEDURE — 36591 DRAW BLOOD OFF VENOUS DEVICE: CPT

## 2022-07-05 PROCEDURE — 77386 HC IMRT TREATMENT DELIVERY, COMPLEX: CPT | Performed by: RADIOLOGY

## 2022-07-05 PROCEDURE — 250N000011 HC RX IP 250 OP 636: Performed by: INTERNAL MEDICINE

## 2022-07-05 PROCEDURE — 96413 CHEMO IV INFUSION 1 HR: CPT

## 2022-07-05 PROCEDURE — 82435 ASSAY OF BLOOD CHLORIDE: CPT

## 2022-07-05 RX ORDER — HEPARIN SODIUM (PORCINE) LOCK FLUSH IV SOLN 100 UNIT/ML 100 UNIT/ML
500 SOLUTION INTRAVENOUS ONCE
Status: COMPLETED | OUTPATIENT
Start: 2022-07-05 | End: 2022-07-05

## 2022-07-05 RX ORDER — HEPARIN SODIUM (PORCINE) LOCK FLUSH IV SOLN 100 UNIT/ML 100 UNIT/ML
5 SOLUTION INTRAVENOUS
Status: DISCONTINUED | OUTPATIENT
Start: 2022-07-05 | End: 2022-07-05 | Stop reason: HOSPADM

## 2022-07-05 RX ADMIN — Medication 5 ML: at 12:43

## 2022-07-05 RX ADMIN — CARBOPLATIN 300 MG: 10 INJECTION, SOLUTION INTRAVENOUS at 12:09

## 2022-07-05 RX ADMIN — DEXAMETHASONE SODIUM PHOSPHATE: 10 INJECTION, SOLUTION INTRAMUSCULAR; INTRAVENOUS at 11:42

## 2022-07-05 RX ADMIN — SODIUM CHLORIDE 250 ML: 9 INJECTION, SOLUTION INTRAVENOUS at 11:42

## 2022-07-05 RX ADMIN — Medication 500 UNITS: at 10:38

## 2022-07-05 ASSESSMENT — PAIN SCALES - GENERAL: PAINLEVEL: NO PAIN (0)

## 2022-07-05 NOTE — PATIENT INSTRUCTIONS
[facility] Triage and after hours / weekends / holidays:  [phone]    Please call the triage or after hours line if you experience a temperature greater than or equal to 100.4, shaking chills, have uncontrolled nausea, vomiting and/or diarrhea, dizziness, shortness of breath, chest pain, bleeding, unexplained bruising, or if you have any other new/concerning symptoms, questions or concerns.      If you are having any concerning symptoms or wish to speak to a provider before your next infusion visit, please call your care coordinator or triage to notify them so we can adequately serve you.     If you need a refill on a narcotic prescription or other medication, please call before your infusion appointment.                July 2022 Sunday Monday Tuesday Wednesday Thursday Friday Saturday                            1    LAB CENTRAL  10:15 AM   (15 min.)   [facility] LAB DRAW   [facility]    RETURN  10:30 AM   (45 min.)   Jany Mtz CNP   [facility]    TREATMENT   1:45 PM   (15 min.)   [facility] RAD ONC [facility] 2       3     4     5    LAB CENTRAL  10:15 AM   (15 min.)   [facility] LAB DRAW   [facility]    ONC INFUSION 2 HR (120 MIN)  10:30 AM   (120 min.)    ONC INFUSION NURSE   [facility]    SWALLOW TREATMENT  11:00 AM   (30 min.)   Trinity Winters, SLP   [facility]    TREATMENT   1:45 PM   (15 min.)   [facility] RAD ONC [facility] 6    TREATMENT   1:45 PM   (15 min.)   [facility] RAD ONC [facility] 7    LAB  10:00 AM   (15 min.)    LAB   [facility]    NEW LIVER  10:45 AM   (30 min.)   Marianna Cm MD   [facility] [place]    TREATMENT   1:45 PM   (15 min.)   [facility]  RAD ONC VARIAN   [facility]    OTV   2:00 PM   (15 min.)   Aniya Astorga MD   [facility] Radiation Oncology 8    TREATMENT   9:15 AM   (15 min.)   [facility] RAD ONC VARIAN   [facility] 9       10     11    TREATMENT   1:45 PM   (15 min.)   [facility] RAD ONC VARIAN   [facility] 12    LAB CENTRAL   8:30 AM   (15 min.)   [facility] LAB DRAW   [facility]    RETURN   8:45 AM   (45 min.)   Maryjo Polanco CNP   [facility]    ONC INFUSION 2 HR (120 MIN)  10:30 AM   (120 min.)   [facility] ONC INFUSION NURSE   [facility]    SWALLOW TREATMENT  11:30 AM   (30 min.)   Trinity Winters, SLP   [facility]    TREATMENT   1:45 PM   (15 min.)   [facility] RAD ONC [facility] 13    RETURN  12:30 PM   (30 min.)   Dariela Melvin MD   [facility]    TREATMENT   1:45 PM   (15 min.)   [facility] RAD ONC [facility] 14    TREATMENT   1:45 PM   (15 min.)   [facility] RAD ONC VARIAN   [facility]    OTV   2:00 PM   (15 min.)   Mane Razo MD   [facility] 15    TREATMENT   9:30 AM   (15 min.)   [facility] RAD ONC [facility] 16       17     18    LAB CENTRAL   8:45 AM   (15 min.)   [facility] LAB DRAW   [facility]    RETURN   9:00 AM   (45 min.)   Jany Mtz CNP   [facility]    ONC INFUSION 2 HR (120 MIN)  10:00 AM   (120 min.)   [facility] ONC INFUSION NURSE   [facility]    SWALLOW TREATMENT  11:00 AM   (30 min.)   Haily Hare, SLP   [facility]    TREATMENT   1:45 PM   (15 min.)   [facility] RAD  ONC VARIAN   [facility] Radiation Oncology 19    TREATMENT   1:45 PM   (15 min.)   [facility] RAD ONC VARIAN   [facility] Radiation Oncology 20    TREATMENT   1:45 PM   (15 min.)   [facility] RAD ONC [facility] 21    TREATMENT   1:45 PM   (15 min.)   [facility] RAD ONC [facility]    [facility] NUTRITION VISIT   2:00 PM   (15 min.)   Deb Schultz RD   [facility]    OTV   2:00 PM   (15 min.)   Cooper Shankar MD   [facility] Radiation Oncology 22    TREATMENT   1:45 PM   (15 min.)   [facility] RAD ONC [facility] 23       24     25    LAB CENTRAL   6:45 AM   (15 min.)    [facility] LAB DRAW   [facility]    RETURN   7:00 AM   (45 min.)   Jany Mtz CNP   [facility]    ONC INFUSION 2 HR (120 MIN)   8:00 AM   (120 min.)   [facility] ONC INFUSION NURSE   [facility]    SWALLOW TREATMENT   9:00 AM   (30 min.)   Haily Hare SLP   [facility]    TREATMENT   1:45 PM   (15 min.)   [facility] RAD ONC [facility] 26    TREATMENT   1:45 PM   (15 min.)   [facility] RAD ONC [facility] 27    TREATMENT   1:45 PM   (15 min.)   [facility] RAD ONC [facility]    RETURN   2:15 PM   (30 min.)   Dariela Melvin MD   [facility] 28    TREATMENT   1:45 PM   (15 min.)   [facility] RAD ONC [facility]    OTV   2:00 PM   (15 min.)   Mane Razo MD   [facility] Radiation Oncology 29    TREATMENT   1:45 PM   (15 min.)   [facility] RAD ONC [facility] 30       31 August 2022       Sunday Monday Tuesday Wednesday Thursday Friday Saturday        1    TREATMENT   1:45 PM   (15 min.)   [facility] RAD ONC VARIAN   [facility] 2    TREATMENT   1:45 PM   (15 min.)   [facility] RAD ONC VARIAN   [facility] 3    TREATMENT   1:45 PM   (15 min.)   [facility] RAD ONC VARIAN   [facility] Rad[facility] On[facility] 4  Happy Birthday!    TREATMENT   1:45 PM   (15 min.)   [facility] RAD ONC [facility] Rad[facility] On[facility]    OTV   2:00 PM   (15 min.)   Mane Razo MD   [facility] Radiation On[facility] 5    TREATMENT   1:45 PM   (15 min.)   [facility] RAD ONC [facility] Radiation [facility] 6       7     8    TREATMENT   1:45 PM   (15 min.)   [facility] RAD ONC [facility] Radiation Oncology 9    TREATMENT   1:45 PM   (15 min.)   [facility] RAD ONC [facility] Radiation [facility] 10    TREATMENT   1:45 PM   (15 min.)   [facility] RAD ONC [facility] Radiation Oncology 11     12     13       14     15     16     17     18     19     20       21     22     23     24     25     26     27       28     29     30     31                                       Recent Results (from the past 24 hour(s))   Comprehensive metabolic panel    Collection Time: 07/05/22 10:36 AM   Result Value Ref Range    Sodium 142 133 - 144 mmol/L    Potassium 3.8 3.4 - 5.3 mmol/L    Chloride 101 94 - 109 mmol/L    Carbon Dioxide (CO2) 32 20 - 32 mmol/L    Anion Gap 9 3 - 14 mmol/L    Urea Nitrogen 16 7 - 30 mg/dL    Creatinine 0.68 0.66 - 1.25 mg/dL    Calcium 8.6 8.5 - 10.1 mg/dL    Glucose 113 (H) 70 - 99 mg/dL    Alkaline Phosphatase 85 40 - 150 U/L    AST 31 0 - 45 U/L    ALT 43 0 - 70 U/L    Protein Total 7.7 6.8 - 8.8 g/dL    Albumin 3.1 (L) 3.4 - 5.0 g/dL    Bilirubin Total 0.7 0.2 - 1.3 mg/dL    GFR Estimate >90 >60 mL/min/1.73m2   Extra Purple Top Tube    Collection Time: 07/05/22 10:36 AM   Result  Value Ref Range    Hold Specimen [facility]    CBC with platelets and differential    Collection Time: 07/05/22 10:36 AM   Result Value Ref Range    WBC Count 9.8 4.0 - 11.0 10e3/uL    RBC Count 4.26 (L) 4.40 - 5.90 10e6/uL    Hemoglobin 12.0 (L) 13.3 - 17.7 g/dL    Hematocrit 38.2 (L) 40.0 - 53.0 %    MCV 90 78 - 100 fL    MCH 28.2 26.5 - 33.0 pg    MCHC 31.4 (L) 31.5 - 36.5 g/dL    RDW 16.3 (H) 10.0 - 15.0 %    Platelet Count 173 150 - 450 10e3/uL    % Neutrophils 81 %    % Lymphocytes 8 %    % Monocytes 10 %    % Eosinophils 1 %    % Basophils 0 %    % Immature Granulocytes 0 %    NRBCs per 100 WBC 0 <1 /100    Absolute Neutrophils 8.0 1.6 - 8.3 10e3/uL    Absolute Lymphocytes 0.8 0.8 - 5.3 10e3/uL    Absolute Monocytes 0.9 0.0 - 1.3 10e3/uL    Absolute Eosinophils 0.1 0.0 - 0.7 10e3/uL    Absolute Basophils 0.0 0.0 - 0.2 10e3/uL    Absolute Immature Granulocytes 0.0 <=0.4 10e3/uL    Absolute NRBCs 0.0 10e3/uL

## 2022-07-05 NOTE — PROGRESS NOTES
Hepatology CLINIC VISIT    CC/REFERRING MD:  Dariela Melvin  REASON FOR CONSULTATION: Abnormal Liver Studies    ASSESSMENT/PLAN:  60 y/o M with PMHx of nasopharyngeal carcinoma, type II diabetes c/b neuropathy, COPD, dyslipidemia and BARRETT (not using CPAP) who presents for abnormal liver studies in the setting of carboplatin/gemcitabine treatment.    #. Abnormal Liver Studies, RESOLVED  #. Hepatic Steatosis, NAFLD  Patient developed a hepatocellular pattern of liver injury first on 4/11/22 following his first cycle of carboplatin/gemcitabine + he has been receiving steroids. His liver studies have normalized.     CT Abdomen 5/2022 with diffuse steatosis in the setting of steroids along with BMI > 40m, suggestive of underlying NAFLD/metabolic syndrome (type II diabetes, dyslipdiemia, etc). Normal platelets, suggests against portal HTN. INR wnl - good synthetic function. Gemcitabine is associated with liver injury in 30-90% of individuals on cyclic therapy, generally mild to moderate injury; suspect this was the culprit. 4/26/22: Hep B s Ab 0, Hep B s Ag non-reactive, Hep B c Ab non-reactive; Hep C Ab negative. Denies any OTC medications.    PLAN:  -- Given liver studies have normalized, no further workup indicated.  -- Recommend weight loss, goal 5-10% weight reduction for improvement in hepatic steatosis   * Continue statin given metabolic syndrome, type II diabetes and NAFLD   * Continue diabetes regimen, including metformin   -- Recommend abstaining from alcohol in the setting of diffuse hepatic steatosis  -- Hep B non-immune, would benefit from vaccination per PCP.     Colon Cancer Screening:  - Colonoscopy 2010: Only with hyperplastic polyps. Biopsies without microscopic colitis. Repeat CRC screening due when appropriate per heme/onc.     Return to clinic: PRN    Discussed with hepatology attending, Dr. Louis who agrees with the above assessment and plan.    Marianna Cm MD  Gastroenterology and Hepatology  Fellow  Pager: [phone]      HPI:   Currently,  - He is doing well. He is currently undergoing radiation therapy for his nasopharyngeal carcinoma, which has caused throat soreness, dry eyes, issues with phlegm and a metallic taste. Certain foods have been bothersome since starting radiation therapy. He is on a 3k calorie diet per day. He has been loosing a bit of weight lately; down 7 lbs in the last 1-2 weeks  - His weight was 212 lbs when he graduated [employer], 224 when he finished the [employer] in 1986 and he has been in the mid 270-280 for many years. His max weight is mid 320s.   - Denies any jaundice, confusion, hematochezia or melena. Issues with venous stasis and trace leg swelling in the setting of a bilateral leg injury  - Denies fevers, chills, chest pain or shortness of breath    PERTINENT PAST MEDICAL HISTORY:  Past Medical History:   Diagnosis Date     COPD (chronic obstructive pulmonary disease) (H)      Dyslipidemia      Nasopharyngeal carcinoma (H)      BARRETT (obstructive sleep apnea)      Type 2 diabetes mellitus with diabetic nephropathy (H)        PREVIOUS SURGERIES:  Past Surgical History:   Procedure Laterality Date     INSERT PORT VASCULAR ACCESS Right 03/31/2022    Procedure: SINGLE LUMEN POWER PORT INSERTION, VASCULAR ACCESS;  Surgeon: Evens Aponte MD;  Location: [facility] OR     IR CHEST PORT PLACEMENT > 5 YRS OF AGE  03/31/2022     TONSILLECTOMY     - No abdominal surgeries    ROS:  10 point ROS was conducted. Pertinent positives and negatives as above.    ALLERGIES:   No Known Allergies    PERTINENT MEDICATIONS:  Outpatient Encounter Medications as of 7/7/2022   Medication Sig Dispense Refill     atorvastatin (LIPITOR) 20 MG tablet Take 20 mg by mouth       B-D U/F insulin pen needle USE ONE TIME A DAY TO INJECT VICTOZA       blood glucose (NO BRAND SPECIFIED) lancets standard Use to test blood sugar 2 times daily or as directed. 90 lancet 1     blood glucose (NO BRAND SPECIFIED) test  strip Use to test blood sugar 3 times daily or as directed. 90 strip 0     blood glucose monitoring (ACCU-CHEK RANDELL PLUS) meter device kit Use to test blood sugar 2 times daily or as directed. 1 kit 0     fluticasone-vilanterol (BREO ELLIPTA) 100-25 MCG/INH inhaler INHALE 1 PUFF INTO THE LUNGS ONE TIME A DAY. RINSE MOUTH AFTER USE.       furosemide (LASIX) 20 MG tablet Take 40 mg by mouth       metFORMIN (GLUCOPHAGE XR) 500 MG 24 hr tablet Take 2 tabs daily. Swallow tablet whole; do not crush, divide or chew.       omeprazole (PRILOSEC) 20 MG DR capsule Take 1 capsule (20 mg) by mouth daily 30 capsule 1     ondansetron (ZOFRAN) 8 MG tablet Take 1 tablet (8 mg) by mouth every 8 hours as needed for nausea 30 tablet 11     prochlorperazine (COMPAZINE) 10 MG tablet Take 1 tablet (10 mg) by mouth every 6 hours as needed (Nausea/Vomiting) 30 tablet 5     VICTOZA PEN 18 MG/3ML soln Inject 1.2 mg Subcutaneous daily       insulin lispro (HUMALOG KWIKPEN) 100 UNIT/ML (1 unit dial) KWIKPEN 1 unit per 50 above 150 correction after chemo. TDD 20 units (Patient not taking: No sig reported)       metFORMIN (GLUCOPHAGE) 1000 MG tablet TAKE 1 TABLET BY MOUTH ONE TIME A DAY. TAKE WITH FOOD. (Patient not taking: No sig reported)       [] 0.9% sodium chloride BOLUS        [] CARBOplatin 300 mg in sodium chloride 0.9 % 305 mL infusion        [] heparin 100 UNIT/ML injection 500 Units        [] ondansetron (ZOFRAN) 8 mg, dexamethasone (DECADRON) 12 mg in sodium chloride 0.9 % 60 mL intermittent infusion        [] saline flush for lab use ONLY        No facility-administered encounter medications on file as of 2022.   - Denies OTC meds    SOCIAL HISTORY:  Social History     Socioeconomic History     Marital status:      Spouse name: Not on file     Number of children: Not on file     Years of education: Not on file     Highest education level: Not on file   Occupational History     Not on  file   Tobacco Use     Smoking status: Former Smoker     Packs/day: 4.00     Years: 30.00     Pack years: 120.00     Types: Cigarettes     Quit date: 2012     Years since quitting: 10.5     Smokeless tobacco: Never Used     Tobacco comment: quit smoking 10 years ago   Substance and Sexual Activity     Alcohol use: Never     Drug use: Never     Sexual activity: Not on file   Other Topics Concern     Not on file   Social History Narrative     Not on file     Social Determinants of Health     Financial Resource Strain: Not on file   Food Insecurity: Not on file   Transportation Needs: Not on file   Physical Activity: Not on file   Stress: Not on file   Social Connections: Not on file   Intimate Partner Violence: Not on file   Housing Stability: Not on file   - Tobacco: Former use, 30 years of 1-4ppd, quit > 10 years ago  - Alcohol: rare alcohol, quit for several months  -     FAMILY HISTORY:  Family History   Problem Relation Age of Onset     Cerebrovascular Disease Mother      Leukemia Father      Melanoma Sister    - No family history of colon cancer    PHYSICAL EXAMINATION:  Vitals reviewed: /84   Pulse 97   Temp 99  F (37.2  C) (Oral)   Wt 131.4 kg (289 lb 11.2 oz)   SpO2 91%   BMI 41.57 kg/m    Wt:   Wt Readings from Last 2 Encounters:   07/07/22 131.4 kg (289 lb 11.2 oz)   07/05/22 133.4 kg (294 lb 3.2 oz)      Constitutional: Cooperative, pleasant, no acute distress  Eyes: Conjunctiva anicteric  HEENT: Oropharynx dry with erythema in posterior OP.   CV: No edema  Respiratory: Unlabored breathing  Lymph: No axillary, submandibular, supraclavicular or inguinal lymphadenopathy  Abd: Obese. Nontender. Abdominal striae present   Skin: No jaundice. No spider angiomata.   Psych: Normal affect  Neurologic: A&Ox3.    PERTINENT STUDIES:  BMP  Recent Labs   Lab Test 07/05/22  1036 07/01/22  1037 06/28/22  0642 06/21/22  1643    140 140 143   POTASSIUM 3.8 3.6 3.6 3.4   CHLORIDE 101 100 102 104   LAI  8.6 8.8 8.7 8.6   CO2 32 33* 33* 32   BUN 16 14 16 10   CR 0.68 0.69 0.69 0.78   * 142* 120* 181*     CBC  Recent Labs   Lab Test 07/05/22  1036 07/01/22  1037 06/28/22  0642 06/21/22  1643   WBC 9.8 7.0 6.6 5.7   RBC 4.26* 4.40 4.35* 4.09*   HGB 12.0* 12.1* 12.0* 11.1*   HCT 38.2* 39.3* 39.0* 36.4*   MCV 90 89 90 89   MCH 28.2 27.5 27.6 27.1   MCHC 31.4* 30.8* 30.8* 30.5*   RDW 16.3* 15.9* 16.5* 17.1*    182 172 233     Liver Enzymes   Recent Labs   Lab Test 07/05/22  1036   PROTTOTAL 7.7   ALBUMIN 3.1*   BILITOTAL 0.7   ALKPHOS 85   AST 31   ALT 43      INR   INR   Date Value Ref Range Status   07/01/2022 1.07 0.85 - 1.15 Final      ENDOSCOPY:  Colonoscopy 2010:  The random right colon biopsies showed normal colonic mucosa without evidence for microscopic colitis as the cause for his loose stools.  Rectal polyps were hyperplastic only.   10 year   surveillance interval would be appropriate.  The patient will be notified of these results via letter.     IMAGING/STUDIES:  CT 5/31/22  Head, neck:  Please see dedicated neuroradiology report for findings of the  high-resolution PET/CT the neck.     Chest:  No suspicious FDG uptake in the chest.      No pathologically enlarged mediastinal, hilar or axillary lymph nodes.  No suspicious lung nodules or evidence for infection.   No significant  pericardial or plural effusions.     Abdomen, pelvis:  No suspicious FDG uptake in the abdomen or pelvis. Bowel uptake is  likely secondary to metformin use.     Diffuse hepatic steatosis. Cholelithiasis with 2.3 cm calcified stone  in the gallbladder neck. No suspicious abnormality of the liver,  pancreas, spleen, adrenal glands, or kidneys with multiple  simple-appearing cysts.     No abnormally dilated loops of bowel, free intra-abdominal air or  fluid. Mild colonic diverticulosis without evidence for acute  diverticulitis.     Bones:  No suspicious FDG uptake in the skeleton. No suspicious lytic or  blastic osseous  lesions. Unchanged sclerotic lesion in the T9  vertebral body since at least 2010.                                                                       IMPRESSION: In this patient with nasopharyngeal carcinoma treated with  chemotherapy:     1. No evidence of metastatic disease in the chest, abdomen, or pelvis.     2. Cholelithiasis without evidence of acute cholecystitis.     3. Diffuse hepatic steatosis.     4. Please see dedicated neuroradiology report for findings of the  high-resolution PET/CT the neck.

## 2022-07-05 NOTE — PROGRESS NOTES
Infusion Nursing Note:  Jareth Gallardo presents today for Cycle 1 Day 15 Carboplatin (dose #6).    Patient seen by provider today: No    Note: Patient presents to the infusion center today denying any new symptoms or concerns. Ongoing throat pain which he denies the need for any further interventions for. Appetite is improving as he is learning how to swallow again. No fevers, chills or chest pain.     Intravenous Access:  Implanted Port.    Treatment Conditions:   Latest Reference Range & Units 07/05/22 10:36   Sodium 133 - 144 mmol/L 142   Potassium 3.4 - 5.3 mmol/L 3.8   Chloride 94 - 109 mmol/L 101   Carbon Dioxide 20 - 32 mmol/L 32   Urea Nitrogen 7 - 30 mg/dL 16   Creatinine 0.66 - 1.25 mg/dL 0.68   GFR Estimate >60 mL/min/1.73m2 >90   Calcium 8.5 - 10.1 mg/dL 8.6   Anion Gap 3 - 14 mmol/L 9   Albumin 3.4 - 5.0 g/dL 3.1 (L)   Protein Total 6.8 - 8.8 g/dL 7.7   Alkaline Phosphatase 40 - 150 U/L 85   ALT 0 - 70 U/L 43   AST 0 - 45 U/L 31   Bilirubin Total 0.2 - 1.3 mg/dL 0.7   Glucose 70 - 99 mg/dL 113 (H)   WBC 4.0 - 11.0 10e3/uL 9.8   Hemoglobin 13.3 - 17.7 g/dL 12.0 (L)   Hematocrit 40.0 - 53.0 % 38.2 (L)   Platelet Count 150 - 450 10e3/uL 173   RBC Count 4.40 - 5.90 10e6/uL 4.26 (L)   MCV 78 - 100 fL 90   MCH 26.5 - 33.0 pg 28.2   MCHC 31.5 - 36.5 g/dL 31.4 (L)   RDW 10.0 - 15.0 % 16.3 (H)   % Neutrophils % 81   % Lymphocytes % 8   % Monocytes % 10   % Eosinophils % 1   % Basophils % 0   Absolute Basophils 0.0 - 0.2 10e3/uL 0.0   Absolute Eosinophils 0.0 - 0.7 10e3/uL 0.1   Absolute Immature Granulocytes <=0.4 10e3/uL 0.0   Absolute Lymphocytes 0.8 - 5.3 10e3/uL 0.8   Absolute Monocytes 0.0 - 1.3 10e3/uL 0.9   % Immature Granulocytes % 0   Absolute Neutrophils 1.6 - 8.3 10e3/uL 8.0   Absolute NRBCs 10e3/uL 0.0   NRBCs per 100 WBC <1 /100 0     Results reviewed, labs MET treatment parameters, ok to proceed with treatment.    Post Infusion Assessment:  Patient tolerated infusion without incident.  Blood  return noted pre and post infusion.  No evidence of extravasations.  Access discontinued per protocol.     Discharge Plan:   Patient declined prescription refills.  Discharge instructions reviewed with: Patient.  Patient and/or family verbalized understanding of discharge instructions and all questions answered.  AVS to patient via [employer].  Patient will return 7/12 for next appointment.   Patient discharged in stable condition accompanied by: self.  Departure Mode: Ambulatory.      Snow Lowry RN

## 2022-07-06 ENCOUNTER — APPOINTMENT (OUTPATIENT)
Dept: RADIATION ONCOLOGY | Facility: CLINIC | Age: 62
End: 2022-07-06
Attending: RADIOLOGY
Payer: COMMERCIAL

## 2022-07-06 PROCEDURE — 77386 HC IMRT TREATMENT DELIVERY, COMPLEX: CPT | Performed by: RADIOLOGY

## 2022-07-06 PROCEDURE — 77336 RADIATION PHYSICS CONSULT: CPT | Performed by: RADIOLOGY

## 2022-07-06 PROCEDURE — 77014 PR CT GUIDE FOR PLACEMENT RADIATION THERAPY FIELDS: CPT | Mod: 26 | Performed by: RADIOLOGY

## 2022-07-06 PROCEDURE — 77427 RADIATION TX MANAGEMENT X5: CPT | Mod: GC | Performed by: RADIOLOGY

## 2022-07-07 ENCOUNTER — OFFICE VISIT (OUTPATIENT)
Dept: GASTROENTEROLOGY | Facility: CLINIC | Age: 62
End: 2022-07-07
Attending: INTERNAL MEDICINE
Payer: COMMERCIAL

## 2022-07-07 ENCOUNTER — PRE VISIT (OUTPATIENT)
Dept: GASTROENTEROLOGY | Facility: CLINIC | Age: 62
End: 2022-07-07

## 2022-07-07 ENCOUNTER — APPOINTMENT (OUTPATIENT)
Dept: RADIATION ONCOLOGY | Facility: CLINIC | Age: 62
End: 2022-07-07
Attending: RADIOLOGY
Payer: COMMERCIAL

## 2022-07-07 VITALS
DIASTOLIC BLOOD PRESSURE: 84 MMHG | BODY MASS INDEX: 41.57 KG/M2 | OXYGEN SATURATION: 91 % | SYSTOLIC BLOOD PRESSURE: 126 MMHG | WEIGHT: 289.7 LBS | TEMPERATURE: 99 F | HEART RATE: 97 BPM

## 2022-07-07 VITALS
SYSTOLIC BLOOD PRESSURE: 119 MMHG | WEIGHT: 288 LBS | BODY MASS INDEX: 41.32 KG/M2 | HEART RATE: 88 BPM | DIASTOLIC BLOOD PRESSURE: 81 MMHG

## 2022-07-07 DIAGNOSIS — R13.10 ODYNOPHAGIA: ICD-10-CM

## 2022-07-07 DIAGNOSIS — R79.89 ABNORMAL LIVER FUNCTION TESTS: Primary | ICD-10-CM

## 2022-07-07 DIAGNOSIS — C11.9 NASOPHARYNGEAL CANCER (H): Primary | ICD-10-CM

## 2022-07-07 DIAGNOSIS — C11.9 NASOPHARYNGEAL CANCER (H): ICD-10-CM

## 2022-07-07 PROCEDURE — 99204 OFFICE O/P NEW MOD 45 MIN: CPT | Mod: GC | Performed by: INTERNAL MEDICINE

## 2022-07-07 PROCEDURE — 77386 HC IMRT TREATMENT DELIVERY, COMPLEX: CPT | Performed by: STUDENT IN AN ORGANIZED HEALTH CARE EDUCATION/TRAINING PROGRAM

## 2022-07-07 RX ORDER — LIDOCAINE HYDROCHLORIDE 20 MG/ML
15 SOLUTION OROPHARYNGEAL EVERY 6 HOURS PRN
Qty: 400 ML | Refills: 1 | Status: SHIPPED | OUTPATIENT
Start: 2022-07-07 | End: 2022-08-06

## 2022-07-07 ASSESSMENT — PAIN SCALES - GENERAL: PAINLEVEL: NO PAIN (0)

## 2022-07-07 NOTE — LETTER
7/7/2022         RE: Jareth Gallardo  [id number] [facility] [address] [place] [address]        Dear Colleague,    Thank you for referring your patient, Jareth Gallardo, to the [facility] HEPATOLOGY CLINIC [place]. Please see a copy of my visit note below.    Hepatology CLINIC VISIT    CC/REFERRING MD:  Dariela Melvin  REASON FOR CONSULTATION: Abnormal Liver Studies    ASSESSMENT/PLAN:  62 y/o M with PMHx of nasopharyngeal carcinoma, type II diabetes c/b neuropathy, COPD, dyslipidemia and BARRETT (not using CPAP) who presents for abnormal liver studies in the setting of carboplatin/gemcitabine treatment.    #. Abnormal Liver Studies, RESOLVED  #. Hepatic Steatosis, NAFLD  Patient developed a hepatocellular pattern of liver injury first on 4/11/22 following his first cycle of carboplatin/gemcitabine + he has been receiving steroids. His liver studies have normalized.     CT Abdomen 5/2022 with diffuse steatosis in the setting of steroids along with BMI > 40m, suggestive of underlying NAFLD/metabolic syndrome (type II diabetes, dyslipdiemia, etc). Normal platelets, suggests against portal HTN. INR wnl - good synthetic function. Gemcitabine is associated with liver injury in 30-90% of individuals on cyclic therapy, generally mild to moderate injury; suspect this was the culprit. 4/26/22: Hep B s Ab 0, Hep B s Ag non-reactive, Hep B c Ab non-reactive; Hep C Ab negative. Denies any OTC medications.    PLAN:  -- Given liver studies have normalized, no further workup indicated.  -- Recommend weight loss, goal 5-10% weight reduction for improvement in hepatic steatosis   * Continue statin given metabolic syndrome, type II diabetes and NAFLD   * Continue diabetes regimen, including metformin   -- Recommend abstaining from alcohol in the setting of diffuse hepatic steatosis  -- Hep B non-immune, would benefit from vaccination per PCP.     Colon Cancer Screening:  - Colonoscopy 2010: Only with hyperplastic polyps. Biopsies  without microscopic colitis. Repeat CRC screening due when appropriate per heme/onc.     Return to clinic: PRN    Discussed with hepatology attending, Dr. Louis who agrees with the above assessment and plan.    Marianna Cm MD  Gastroenterology and Hepatology Fellow  Pager: [phone]      HPI:   Currently,  - He is doing well. He is currently undergoing radiation therapy for his nasopharyngeal carcinoma, which has caused throat soreness, dry eyes, issues with phlegm and a metallic taste. Certain foods have been bothersome since starting radiation therapy. He is on a 3k calorie diet per day. 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 His max weight is mid 320s.   - Denies any jaundice, confusion, hematochezia or melena. Issues with venous stasis and trace leg swelling in the setting of a bilateral leg injury  - Denies fevers, chills, chest pain or shortness of breath    PERTINENT PAST MEDICAL HISTORY:  Past Medical History:   Diagnosis Date     COPD (chronic obstructive pulmonary disease) (H)      Dyslipidemia      Nasopharyngeal carcinoma (H)      BARRETT (obstructive sleep apnea)      Type 2 diabetes mellitus with diabetic nephropathy (H)        PREVIOUS SURGERIES:  Past Surgical History:   Procedure Laterality Date     INSERT PORT VASCULAR ACCESS Right 03/31/2022    Procedure: SINGLE LUMEN POWER PORT INSERTION, VASCULAR ACCESS;  Surgeon: Evens Aponte MD;  Location: [facility] OR     IR CHEST PORT PLACEMENT > 5 YRS OF AGE  03/31/2022     TONSILLECTOMY     - No abdominal surgeries    ROS:  10 point ROS was conducted. Pertinent positives and negatives as above.    ALLERGIES:   No Known Allergies    PERTINENT MEDICATIONS:  Outpatient Encounter Medications as of 7/7/2022   Medication Sig Dispense Refill     atorvastatin (LIPITOR) 20 MG tablet  Take 20 mg by mouth       B-D U/F insulin pen needle USE ONE TIME A DAY TO INJECT VICTOZA       blood glucose (NO BRAND SPECIFIED) lancets standard Use to test blood sugar 2 times daily or as directed. 90 lancet 1     blood glucose (NO BRAND SPECIFIED) test strip Use to test blood sugar 3 times daily or as directed. 90 strip 0     blood glucose monitoring (ACCU-CHEK RANDELL PLUS) meter device kit Use to test blood sugar 2 times daily or as directed. 1 kit 0     fluticasone-vilanterol (BREO ELLIPTA) 100-25 MCG/INH inhaler INHALE 1 PUFF INTO THE LUNGS ONE TIME A DAY. RINSE MOUTH AFTER USE.       furosemide (LASIX) 20 MG tablet Take 40 mg by mouth       metFORMIN (GLUCOPHAGE XR) 500 MG 24 hr tablet Take 2 tabs daily. Swallow tablet whole; do not crush, divide or chew.       omeprazole (PRILOSEC) 20 MG DR capsule Take 1 capsule (20 mg) by mouth daily 30 capsule 1     ondansetron (ZOFRAN) 8 MG tablet Take 1 tablet (8 mg) by mouth every 8 hours as needed for nausea 30 tablet 11     prochlorperazine (COMPAZINE) 10 MG tablet Take 1 tablet (10 mg) by mouth every 6 hours as needed (Nausea/Vomiting) 30 tablet 5     VICTOZA PEN 18 MG/3ML soln Inject 1.2 mg Subcutaneous daily       insulin lispro (HUMALOG KWIKPEN) 100 UNIT/ML (1 unit dial) KWIKPEN 1 unit per 50 above 150 correction after chemo. TDD 20 units (Patient not taking: No sig reported)       metFORMIN (GLUCOPHAGE) 1000 MG tablet TAKE 1 TABLET BY MOUTH ONE TIME A DAY. TAKE WITH FOOD. (Patient not taking: No sig reported)       [] 0.9% sodium chloride BOLUS        [] CARBOplatin 300 mg in sodium chloride 0.9 % 305 mL infusion        [] heparin 100 UNIT/ML injection 500 Units        [] ondansetron (ZOFRAN) 8 mg, dexamethasone (DECADRON) 12 mg in sodium chloride 0.9 % 60 mL intermittent infusion        [] saline flush for lab use ONLY        No facility-administered encounter medications on file as of 2022.   - Denies OTC meds    SOCIAL  HISTORY:  Social History     Socioeconomic History     Marital status:      Spouse name: Not on file     Number of children: Not on file     Years of education: Not on file     Highest education level: Not on file   Occupational History     Not on file   Tobacco Use     Smoking status: Former Smoker     Packs/day: 4.00     Years: 30.00     Pack years: 120.00     Types: Cigarettes     Quit date: 2012     Years since quitting: 10.5     Smokeless tobacco: Never Used     Tobacco comment: quit smoking 10 years ago   Substance and Sexual Activity     Alcohol use: Never     Drug use: Never     Sexual activity: Not on file   Other Topics Concern     Not on file   Social History Narrative     Not on file     Social Determinants of Health     Financial Resource Strain: Not on file   Food Insecurity: Not on file   Transportation Needs: Not on file   Physical Activity: Not on file   Stress: Not on file   Social Connections: Not on file   Intimate Partner Violence: Not on file   Housing Stability: Not on file   - Tobacco: Former use, 30 years of 1-4ppd, quit > 10 years ago  - Alcohol: rare alcohol, quit for several months  -     FAMILY HISTORY:  Family History   Problem Relation Age of Onset     Cerebrovascular Disease Mother      Leukemia Father      Melanoma Sister    - No family history of colon cancer    PHYSICAL EXAMINATION:  Vitals reviewed: /84   Pulse 97   Temp 99  F (37.2  C) (Oral)   Wt 131.4 kg (289 lb 11.2 oz)   SpO2 91%   BMI 41.57 kg/m    Wt:   Wt Readings from Last 2 Encounters:   07/07/22 131.4 kg (289 lb 11.2 oz)   07/05/22 133.4 kg (294 lb 3.2 oz)      Constitutional: Cooperative, pleasant, no acute distress  Eyes: Conjunctiva anicteric  HEENT: Oropharynx dry with erythema in posterior OP.   CV: No edema  Respiratory: Unlabored breathing  Lymph: No axillary, submandibular, supraclavicular or inguinal lymphadenopathy  Abd: Obese. Nontender. Abdominal striae present   Skin: No jaundice.  No spider angiomata.   Psych: Normal affect  Neurologic: A&Ox3.    PERTINENT STUDIES:  BMP  Recent Labs   Lab Test 07/05/22  1036 07/01/22  1037 06/28/22  0642 06/21/22  1643    140 140 143   POTASSIUM 3.8 3.6 3.6 3.4   CHLORIDE 101 100 102 104   LAI 8.6 8.8 8.7 8.6   CO2 32 33* 33* 32   BUN 16 14 16 10   CR 0.68 0.69 0.69 0.78   * 142* 120* 181*     CBC  Recent Labs   Lab Test 07/05/22  1036 07/01/22  1037 06/28/22  0642 06/21/22  1643   WBC 9.8 7.0 6.6 5.7   RBC 4.26* 4.40 4.35* 4.09*   HGB 12.0* 12.1* 12.0* 11.1*   HCT 38.2* 39.3* 39.0* 36.4*   MCV 90 89 90 89   MCH 28.2 27.5 27.6 27.1   MCHC 31.4* 30.8* 30.8* 30.5*   RDW 16.3* 15.9* 16.5* 17.1*    182 172 233     Liver Enzymes   Recent Labs   Lab Test 07/05/22  1036   PROTTOTAL 7.7   ALBUMIN 3.1*   BILITOTAL 0.7   ALKPHOS 85   AST 31   ALT 43      INR   INR   Date Value Ref Range Status   07/01/2022 1.07 0.85 - 1.15 Final      ENDOSCOPY:  Colonoscopy 2010:  The random right colon biopsies showed normal colonic mucosa without evidence for microscopic colitis as the cause for his loose stools.  Rectal polyps were hyperplastic only.   10 year   surveillance interval would be appropriate.  The patient will be notified of these results via letter.     IMAGING/STUDIES:  CT 5/31/22  Head, neck:  Please see dedicated neuroradiology report for findings of the  high-resolution PET/CT the neck.     Chest:  No suspicious FDG uptake in the chest.      No pathologically enlarged mediastinal, hilar or axillary lymph nodes.  No suspicious lung nodules or evidence for infection.   No significant  pericardial or plural effusions.     Abdomen, pelvis:  No suspicious FDG uptake in the abdomen or pelvis. Bowel uptake is  likely secondary to metformin use.     Diffuse hepatic steatosis. Cholelithiasis with 2.3 cm calcified stone  in the gallbladder neck. No suspicious abnormality of the liver,  pancreas, spleen, adrenal glands, or kidneys with  multiple  simple-appearing cysts.     No abnormally dilated loops of bowel, free intra-abdominal air or  fluid. Mild colonic diverticulosis without evidence for acute  diverticulitis.     Bones:  No suspicious FDG uptake in the skeleton. No suspicious lytic or  blastic osseous lesions. Unchanged sclerotic lesion in the T9  vertebral body since at least 2010.                                                                       IMPRESSION: In this patient with nasopharyngeal carcinoma treated with  chemotherapy:     1. No evidence of metastatic disease in the chest, abdomen, or pelvis.     2. Cholelithiasis without evidence of acute cholecystitis.     3. Diffuse hepatic steatosis.     4. Please see dedicated neuroradiology report for findings of the  high-resolution PET/CT the neck.    Attestation signed by Erci Louis MD at 7/27/2022  2:24 PM:  The patient was seen and examined.  The above assessment and plan was developed jointly with the fellow.      Thank you very much for allowing me to participate in the care of this patient.  If you have any questions regarding my recommendations, please do not hesitate to contact me.         Eric Louis MD      Professor of Medicine  [employer]      Executive Medical Director, Solid Organ Transplant Program  [facility]

## 2022-07-07 NOTE — LETTER
2022         RE: Jareth Gallardo  [address] [facility] [place] [address]        Dear Colleague,    Thank you for referring your patient, Jareth Gallardo, to the [facility] RADIATION ONCOLOGY. Please see a copy of my visit note below.    RADIATION ONCOLOGY WEEKLY ON TREATMENT VISIT   Encounter Date: 2022    Patient Name: Jareth Gallardo  MRN: [id number]  : 1960     Disease and Stage: cT2 N2 M0 nasopharyngeal carcinoma  Treatment Site: Nasopharynx and bilateral neck  Current Dose/Planned Total Dose: 2200 / 7000 cGy  Daily Fraction Size: 200 cGy/day, 5 times/week  Concurrent Chemotherapy: Yes  Drug and Frequency: Carboplatin weekly    Treatment Summary:    22: Initiation of radiotherapy. First infusion of weekly carboplatin.    22: Weekly RT visit. Current dose of 400 cGy. Tolerating treatment well.    22: Second infusion of weekly carboplatin.     22: Weekly RT visit. Current dose of 1400 cGy. Tolerating treatment well.    22: Weekly RT visit.  Current dose of 2200 cGy.  Tolerating treatment well with mild to moderate odynophagia. Weight loss of 3.6 kg in the last week.     ED visits/Hospitalizations:  None    Missed Treatments:  2022: Between fractions 8 and 9 due to the  holiday.    Subjective: Mr. Gallardo presents to clinic today for his weekly on-treatment visit. He has tolerated initiation of radiotherapy very well and has no pressing concerns or complaints on examination. He is eating regular diet now noting that bread is troublesome. Acid reflux is now doing better on the famotidine daily and is not experiencing vomiting. He has noted increased thickened secretions and dry mouth. He has been using salt and soda rinses approximately once daily. He does have biotene mouthwash that he has been using. He does note that his oral liquid intake has been lower.  His weight is down 3.6 kg from the previous week. He attributes this to  increased activity and being out in the heat. His remaining ROS are likewise negative.    ROS:   Constitutional  Pain (0-10): 1 (mouth), 1 (throat), 1 (skin)   Fatigue: None    CNS  Headaches: None    ENT  Mucositis: None    Cardiopulmonary  Dyspnea: None    GI  Nausea/vomiting: None    Nutrition  PEG: No  Diet: Regular    Integumentary  Dermatitis: None    Objective:   Current weight: 130.9 kg  Last week's weight: 134.5 kg  Starting weight: 138.8 kg    BP: 142/76 (sitting), 138/80 (standing)  Pulse: 102 (sitting), 99 (standing)    General: Healthy-appearing 61-year-old gentleman seated comfortably in a chair in no acute distress  HEENT: NC/AT. EOMI. No rhinorrhea or epistaxis. Moist mucous membranes. No mucositis or thrush.  Pulmonary: No wheezing, stridor or respiratory distress  Skin: Normal color and turgor. No erythema.    Treatment-related toxicities (CTCAE v5.0):  Dermatitis: Grade 1  Mucositis: Grade 1  Xerostomia: Grade 1    Assessment:    Mr. Gallardo is a 61 year old male with a cT2 N2 M0 nasopharyngeal carcinoma. He is status post 3 cycles of induction carboplatin/gemcitabine with a good partial response to therapy. He is now receiving concurrent chemoradiotherapy with curative intent. He is tolerating treatment now noting increased thickened secretions.     Plan:   cT2 N2 M0 nasopharyngeal carcinoma:    Continue chemoradiotherapy    Pain management:    Acetaminophen as needed for mild to moderate pain    Start viscous lidocaine as needed for odynophagia    Fluids/Electrolytes/Nutrition:    Continue diet as tolerated    Increase oral hydration    Dermatitis:    Twice daily Aquaphor application to the bilateral neck    Mosaiq chart and setup information reviewed  IGRT images reviewed    Medication list reviewed    The patient was seen with my attending, Dr. Astorga, who agrees with the above assessment and plan.    Bertha Giron MD PGY4  Department of Radiation Oncology  [phone] [facility]  [phone]  Pager    A medical resident participated in the care of this patient and in the preparation of this note. I have verified and edited this note. I personally performed key elements of the physical exam and medical decision making for this patient.  I agree with the assessment and plan of care as documented in the note above.    Aniya Astorga MD, PhD     Department of Radiation Oncology  [facility]         Again, thank you for allowing me to participate in the care of your patient.        Sincerely,        Aniya Astorga MD

## 2022-07-07 NOTE — PROGRESS NOTES
RADIATION ONCOLOGY WEEKLY ON TREATMENT VISIT   Encounter Date: 2022    Patient Name: Jareth Gallardo  MRN: [id number]  : 1960     Disease and Stage: cT2 N2 M0 nasopharyngeal carcinoma  Treatment Site: Nasopharynx and bilateral neck  Current Dose/Planned Total Dose: 2200 / 7000 cGy  Daily Fraction Size: 200 cGy/day, 5 times/week  Concurrent Chemotherapy: Yes  Drug and Frequency: Carboplatin weekly    Treatment Summary:    22: Initiation of radiotherapy. First infusion of weekly carboplatin.    22: Weekly RT visit. Current dose of 400 cGy. Tolerating treatment well.    22: Second infusion of weekly carboplatin.     22: Weekly RT visit. Current dose of 1400 cGy. Tolerating treatment well.    22: Weekly RT visit.  Current dose of 2200 cGy.  Tolerating treatment well with mild to moderate odynophagia. Weight loss of 3.6 kg in the last week.     ED visits/Hospitalizations:  None    Missed Treatments:  2022: Between fractions 8 and 9 due to the  holiday.    Subjective: Mr. Gallardo presents to clinic today for his weekly on-treatment visit. He has tolerated initiation of radiotherapy very well and has no pressing concerns or complaints on examination. He is eating regular diet now noting that bread is troublesome. Acid reflux is now doing better on the famotidine daily and is not experiencing vomiting. He has noted increased thickened secretions and dry mouth. He has been using salt and soda rinses approximately once daily. He does have biotene mouthwash that he has been using. He does note that his oral liquid intake has been lower.  His weight is down 3.6 kg from the previous week. He attributes this to increased activity and being out in the heat. His remaining ROS are likewise negative.    ROS:   Constitutional  Pain (0-10): 1 (mouth), 1 (throat), 1 (skin)   Fatigue: None    CNS  Headaches: None    ENT  Mucositis: None    Cardiopulmonary  Dyspnea:  None    GI  Nausea/vomiting: None    Nutrition  PEG: No  Diet: Regular    Integumentary  Dermatitis: None    Objective:   Current weight: 130.9 kg  Last week's weight: 134.5 kg  Starting weight: 138.8 kg    BP: 142/76 (sitting), 138/80 (standing)  Pulse: 102 (sitting), 99 (standing)    General: Healthy-appearing 61-year-old gentleman seated comfortably in a chair in no acute distress  HEENT: NC/AT. EOMI. No rhinorrhea or epistaxis. Moist mucous membranes. No mucositis or thrush.  Pulmonary: No wheezing, stridor or respiratory distress  Skin: Normal color and turgor. No erythema.    Treatment-related toxicities (CTCAE v5.0):  Dermatitis: Grade 1  Mucositis: Grade 1  Xerostomia: Grade 1    Assessment:    Mr. Gallardo is a 61 year old male with a cT2 N2 M0 nasopharyngeal carcinoma. He is status post 3 cycles of induction carboplatin/gemcitabine with a good partial response to therapy. He is now receiving concurrent chemoradiotherapy with curative intent. He is tolerating treatment now noting increased thickened secretions.     Plan:   cT2 N2 M0 nasopharyngeal carcinoma:    Continue chemoradiotherapy    Pain management:    Acetaminophen as needed for mild to moderate pain    Start viscous lidocaine as needed for odynophagia    Fluids/Electrolytes/Nutrition:    Continue diet as tolerated    Increase oral hydration    Dermatitis:    Twice daily Aquaphor application to the bilateral neck    Mosaiq chart and setup information reviewed  IGRT images reviewed    Medication list reviewed    The patient was seen with my attending, Dr. Astorga, who agrees with the above assessment and plan.    Bertha Giron MD PGY4  Department of Radiation Oncology  [phone] Clinic  [phone] Pager    A medical resident participated in the care of this patient and in the preparation of this note. I have verified and edited this note. I personally performed key elements of the physical exam and medical decision making for this patient.  I  agree with the assessment and plan of care as documented in the note above.    Aniya Astorga MD, PhD     Department of Radiation Oncology  [facility]

## 2022-07-07 NOTE — LETTER
Date:July 11, 2022      Patient was self referred, no letter generated. Do not send.        [facility] Health Information

## 2022-07-08 ENCOUNTER — APPOINTMENT (OUTPATIENT)
Dept: RADIATION ONCOLOGY | Facility: CLINIC | Age: 62
End: 2022-07-08
Attending: RADIOLOGY
Payer: COMMERCIAL

## 2022-07-08 PROCEDURE — 77386 HC IMRT TREATMENT DELIVERY, COMPLEX: CPT | Performed by: RADIOLOGY

## 2022-07-08 PROCEDURE — 77014 PR CT GUIDE FOR PLACEMENT RADIATION THERAPY FIELDS: CPT | Mod: 26 | Performed by: RADIOLOGY

## 2022-07-11 ENCOUNTER — APPOINTMENT (OUTPATIENT)
Dept: RADIATION ONCOLOGY | Facility: CLINIC | Age: 62
End: 2022-07-11
Attending: RADIOLOGY
Payer: COMMERCIAL

## 2022-07-11 PROCEDURE — 77014 PR CT GUIDE FOR PLACEMENT RADIATION THERAPY FIELDS: CPT | Mod: 26 | Performed by: RADIOLOGY

## 2022-07-11 PROCEDURE — 77386 HC IMRT TREATMENT DELIVERY, COMPLEX: CPT | Performed by: RADIOLOGY

## 2022-07-12 ENCOUNTER — THERAPY VISIT (OUTPATIENT)
Dept: SPEECH THERAPY | Facility: CLINIC | Age: 62
End: 2022-07-12
Payer: COMMERCIAL

## 2022-07-12 ENCOUNTER — APPOINTMENT (OUTPATIENT)
Dept: RADIATION ONCOLOGY | Facility: CLINIC | Age: 62
End: 2022-07-12
Attending: RADIOLOGY
Payer: COMMERCIAL

## 2022-07-12 ENCOUNTER — INFUSION THERAPY VISIT (OUTPATIENT)
Dept: ONCOLOGY | Facility: CLINIC | Age: 62
End: 2022-07-12
Attending: NURSE PRACTITIONER
Payer: COMMERCIAL

## 2022-07-12 ENCOUNTER — APPOINTMENT (OUTPATIENT)
Dept: LAB | Facility: CLINIC | Age: 62
End: 2022-07-12
Attending: NURSE PRACTITIONER
Payer: COMMERCIAL

## 2022-07-12 VITALS
DIASTOLIC BLOOD PRESSURE: 79 MMHG | WEIGHT: 290.5 LBS | RESPIRATION RATE: 16 BRPM | OXYGEN SATURATION: 96 % | BODY MASS INDEX: 41.68 KG/M2 | HEART RATE: 100 BPM | SYSTOLIC BLOOD PRESSURE: 120 MMHG | TEMPERATURE: 98.7 F

## 2022-07-12 DIAGNOSIS — C11.9 NASOPHARYNGEAL CANCER (H): ICD-10-CM

## 2022-07-12 DIAGNOSIS — T45.1X5A CHEMOTHERAPY-INDUCED NEUTROPENIA (H): Primary | ICD-10-CM

## 2022-07-12 DIAGNOSIS — D70.1 CHEMOTHERAPY-INDUCED NEUTROPENIA (H): Primary | ICD-10-CM

## 2022-07-12 DIAGNOSIS — C11.9 NASOPHARYNGEAL CANCER (H): Primary | ICD-10-CM

## 2022-07-12 DIAGNOSIS — R13.12 OROPHARYNGEAL DYSPHAGIA: Primary | ICD-10-CM

## 2022-07-12 LAB
ALBUMIN SERPL-MCNC: 3.1 G/DL (ref 3.4–5)
ALP SERPL-CCNC: 78 U/L (ref 40–150)
ALT SERPL W P-5'-P-CCNC: 51 U/L (ref 0–70)
ANION GAP SERPL CALCULATED.3IONS-SCNC: 7 MMOL/L (ref 3–14)
AST SERPL W P-5'-P-CCNC: 42 U/L (ref 0–45)
BASOPHILS # BLD AUTO: 0 10E3/UL (ref 0–0.2)
BASOPHILS NFR BLD AUTO: 0 %
BILIRUB SERPL-MCNC: 0.6 MG/DL (ref 0.2–1.3)
BUN SERPL-MCNC: 13 MG/DL (ref 7–30)
CALCIUM SERPL-MCNC: 8.8 MG/DL (ref 8.5–10.1)
CHLORIDE BLD-SCNC: 102 MMOL/L (ref 94–109)
CO2 SERPL-SCNC: 32 MMOL/L (ref 20–32)
CREAT SERPL-MCNC: 0.7 MG/DL (ref 0.66–1.25)
EOSINOPHIL # BLD AUTO: 0 10E3/UL (ref 0–0.7)
EOSINOPHIL NFR BLD AUTO: 1 %
ERYTHROCYTE [DISTWIDTH] IN BLOOD BY AUTOMATED COUNT: 15.9 % (ref 10–15)
GFR SERPL CREATININE-BSD FRML MDRD: >90 ML/MIN/1.73M2
GLUCOSE BLD-MCNC: 147 MG/DL (ref 70–99)
HCT VFR BLD AUTO: 39.2 % (ref 40–53)
HGB BLD-MCNC: 12 G/DL (ref 13.3–17.7)
IMM GRANULOCYTES # BLD: 0 10E3/UL
IMM GRANULOCYTES NFR BLD: 1 %
LYMPHOCYTES # BLD AUTO: 0.5 10E3/UL (ref 0.8–5.3)
LYMPHOCYTES NFR BLD AUTO: 14 %
MCH RBC QN AUTO: 27.7 PG (ref 26.5–33)
MCHC RBC AUTO-ENTMCNC: 30.6 G/DL (ref 31.5–36.5)
MCV RBC AUTO: 91 FL (ref 78–100)
MONOCYTES # BLD AUTO: 0.5 10E3/UL (ref 0–1.3)
MONOCYTES NFR BLD AUTO: 12 %
NEUTROPHILS # BLD AUTO: 2.8 10E3/UL (ref 1.6–8.3)
NEUTROPHILS NFR BLD AUTO: 72 %
NRBC # BLD AUTO: 0 10E3/UL
NRBC BLD AUTO-RTO: 0 /100
PLATELET # BLD AUTO: 179 10E3/UL (ref 150–450)
POTASSIUM BLD-SCNC: 3.4 MMOL/L (ref 3.4–5.3)
PROT SERPL-MCNC: 7.6 G/DL (ref 6.8–8.8)
RBC # BLD AUTO: 4.33 10E6/UL (ref 4.4–5.9)
SODIUM SERPL-SCNC: 141 MMOL/L (ref 133–144)
WBC # BLD AUTO: 3.9 10E3/UL (ref 4–11)

## 2022-07-12 PROCEDURE — G0463 HOSPITAL OUTPT CLINIC VISIT: HCPCS

## 2022-07-12 PROCEDURE — 80053 COMPREHEN METABOLIC PANEL: CPT

## 2022-07-12 PROCEDURE — 36591 DRAW BLOOD OFF VENOUS DEVICE: CPT

## 2022-07-12 PROCEDURE — 85048 AUTOMATED LEUKOCYTE COUNT: CPT | Performed by: INTERNAL MEDICINE

## 2022-07-12 PROCEDURE — 96367 TX/PROPH/DG ADDL SEQ IV INF: CPT

## 2022-07-12 PROCEDURE — 96413 CHEMO IV INFUSION 1 HR: CPT

## 2022-07-12 PROCEDURE — 258N000003 HC RX IP 258 OP 636: Performed by: INTERNAL MEDICINE

## 2022-07-12 PROCEDURE — 99215 OFFICE O/P EST HI 40 MIN: CPT | Performed by: NURSE PRACTITIONER

## 2022-07-12 PROCEDURE — 250N000011 HC RX IP 250 OP 636: Performed by: NURSE PRACTITIONER

## 2022-07-12 PROCEDURE — 92526 ORAL FUNCTION THERAPY: CPT | Mod: GN | Performed by: SPEECH-LANGUAGE PATHOLOGIST

## 2022-07-12 PROCEDURE — 250N000011 HC RX IP 250 OP 636: Performed by: INTERNAL MEDICINE

## 2022-07-12 PROCEDURE — 77386 HC IMRT TREATMENT DELIVERY, COMPLEX: CPT | Performed by: RADIOLOGY

## 2022-07-12 RX ORDER — HEPARIN SODIUM (PORCINE) LOCK FLUSH IV SOLN 100 UNIT/ML 100 UNIT/ML
5 SOLUTION INTRAVENOUS
Status: DISCONTINUED | OUTPATIENT
Start: 2022-07-12 | End: 2022-07-12 | Stop reason: HOSPADM

## 2022-07-12 RX ORDER — HEPARIN SODIUM (PORCINE) LOCK FLUSH IV SOLN 100 UNIT/ML 100 UNIT/ML
5 SOLUTION INTRAVENOUS ONCE
Status: COMPLETED | OUTPATIENT
Start: 2022-07-12 | End: 2022-07-12

## 2022-07-12 RX ADMIN — Medication 5 ML: at 08:51

## 2022-07-12 RX ADMIN — Medication 5 ML: at 11:27

## 2022-07-12 RX ADMIN — DEXAMETHASONE SODIUM PHOSPHATE: 10 INJECTION, SOLUTION INTRAMUSCULAR; INTRAVENOUS at 09:47

## 2022-07-12 RX ADMIN — CARBOPLATIN 300 MG: 10 INJECTION, SOLUTION INTRAVENOUS at 10:53

## 2022-07-12 RX ADMIN — SODIUM CHLORIDE 250 ML: 9 INJECTION, SOLUTION INTRAVENOUS at 09:47

## 2022-07-12 ASSESSMENT — PAIN SCALES - GENERAL: PAINLEVEL: NO PAIN (0)

## 2022-07-12 NOTE — PATIENT INSTRUCTIONS
[facility] Triage and after hours / weekends / holidays:  [phone]    Please call the triage or after hours line if you experience a temperature greater than or equal to 100.4, shaking chills, have uncontrolled nausea, vomiting and/or diarrhea, dizziness, shortness of breath, chest pain, bleeding, unexplained bruising, or if you have any other new/concerning symptoms, questions or concerns.      If you are having any concerning symptoms or wish to speak to a provider before your next infusion visit, please call your care coordinator or triage to notify them so we can adequately serve you.     If you need a refill on a narcotic prescription or other medication, please call before your infusion appointment.

## 2022-07-12 NOTE — NURSING NOTE
"Chief Complaint   Patient presents with     Port Draw     Labs drawn via port by RN. VS taken.     Oncology Clinic Visit     Nasopharyngeal cancer     Port accessed with 20g 3/4\" power needle and labs drawn by rn.  Port flushed with NS and heparin.  Pt tolerated well.  VS taken.  Pt checked in for next appt.    Gus Boston RN  "

## 2022-07-12 NOTE — NURSING NOTE
"Oncology Rooming Note    July 12, 2022 8:55 AM   Jareth Gallardo is a 61 year old male who presents for:    Chief Complaint   Patient presents with     Port Draw     Labs drawn via port by RN. VS taken.     Oncology Clinic Visit     Nasopharyngeal cancer     Initial Vitals: /79   Pulse 100   Temp 98.7  F (37.1  C) (Oral)   Resp 16   Wt 131.8 kg (290 lb 8 oz)   SpO2 96%   BMI 41.68 kg/m   Estimated body mass index is 41.68 kg/m  as calculated from the following:    Height as of 6/21/22: 1.778 m (5' 10\").    Weight as of this encounter: 131.8 kg (290 lb 8 oz). Body surface area is 2.55 meters squared.  No Pain (0) Comment: Data Unavailable   No LMP for male patient.  Allergies reviewed: Yes  Medications reviewed: Yes    Medications: Medication refills not needed today.  Pharmacy name entered into [employer]:    [id number] [place] [facility] - [place], [place] - [address] [facility] - [place], [place] - [id number]    Clinical concerns: none       Juan Pablo Scales            "

## 2022-07-12 NOTE — PROGRESS NOTES
Oncology Progress Note  July 12, 2022    Reason for Visit: seen in f/u of nasopharyngeal carcinoma    Oncology HPI:     Mr. Gallardo is a 61 year old man who presents for evaluation prior to starting chemoradiation. He was previously treated with 3 cycles of carboplatin/gemcitabine with a NH on PET/CT.  Cisplatin was not used due to hearing loss on audiogram. He began concurrent chemoradiation with weekly carboplatin 6/22/22.    SUMMARY  12/11/21                   [facility] for L neck mass, selling  12/9/21                     CT neck ([place]). 2.4 cm L neck node medial to SCM just above the hyoid, additional LNs surround, scattered R neck and L supraclavicular LNs  2/15/22                     US bx L cervical node. Path: SCC, moderately differentiated,   3/4/22                       PET/CT. Nasopharyngeal and oropharyngeal mucosal thickening. 2.6 cm BERNARDO nodular GGO, SUV avid, some additional lingular uptake  4/4/22                       C1 carboplatin gemcitabine. Not a candidate for cisplatin due to hearing loss on audiogram.  4/26/22                     C2 carboplatin gemcitabine. Neulasta 5/3 after C2D8, ANC 1000   5/16/22                     C3 carboplatin gemcitabine.   5/31/22: PET with decreased size of nasopharyngeal tumor w/ decreased FDG uptake and decrease in size and number of involved cervical lymph nodes  6/22/22: initiate definitive chemoradiation with weekly carboplatin    Interval history:   Jareth is feeling OK this week. Addition of omeprazole was helpful and has eliminated morning reflux/vomiting and hiccups. He had a good weekend and ate well without difficulty. Not frequently doing s/s rinses, uses biotene. Has taste changes, nothing tastes good. Blood sugars have been 120-130 in the morning. No LE edema. No dizziness/LH. Some mouth/throat pain, tried 650 mg tylenol and did not find this helpful. Has lidocaine but has not used yet. Bowels continue to be sporadic but overall are stable.      10  point review of systems otherwise negative    PAST MEDICAL HISTORY  Nasopharyngeal carcinoma as above  DM2  COPD??  Hiatal hernia. Sleeps with his head elevated  LVH on TTE 2009, EF50%, LA Dilation  Dyslipidemia  BARRETT in 2015. Not using CPAP due to developing tooth abscesses when he tried it in 2015  Venous insufficiency in both legs being in an accident involving a mower about 10 years ago, on chronic furosemide. LE US negative 12/15/2009  Cholelithiasis  Hyperplastic colon polyp, due 2020  Tonsillectomy  Baker cyst RLE US 2013     Neuropathy from DM - R foot numbness chronically     Exposed to lots of wood dust in his occupation       Current Outpatient Medications   Medication Sig Dispense Refill     atorvastatin (LIPITOR) 20 MG tablet Take 20 mg by mouth       B-D U/F insulin pen needle USE ONE TIME A DAY TO INJECT VICTOZA       fluticasone-vilanterol (BREO ELLIPTA) 100-25 MCG/INH inhaler INHALE 1 PUFF INTO THE LUNGS ONE TIME A DAY. RINSE MOUTH AFTER USE.       furosemide (LASIX) 20 MG tablet Take 40 mg by mouth       insulin lispro (HUMALOG KWIKPEN) 100 UNIT/ML (1 unit dial) KWIKPEN 1 unit per 50 above 150 correction after chemo. TDD 20 units       lidocaine, viscous, (XYLOCAINE) 2 % solution Swish and swallow 15 mLs in mouth every 6 hours as needed for moderate pain ; Max 8 doses/24 hour period. 400 mL 1     metFORMIN (GLUCOPHAGE XR) 500 MG 24 hr tablet Take 2 tabs daily. Swallow tablet whole; do not crush, divide or chew.       metFORMIN (GLUCOPHAGE) 1000 MG tablet TAKE 1 TABLET BY MOUTH ONE TIME A DAY. TAKE WITH FOOD.       omeprazole (PRILOSEC) 20 MG DR capsule Take 1 capsule (20 mg) by mouth daily 30 capsule 1     ondansetron (ZOFRAN) 8 MG tablet Take 1 tablet (8 mg) by mouth every 8 hours as needed for nausea 30 tablet 11     prochlorperazine (COMPAZINE) 10 MG tablet Take 1 tablet (10 mg) by mouth every 6 hours as needed (Nausea/Vomiting) 30 tablet 5     VICTOZA PEN 18 MG/3ML soln Inject 1.2 mg  Subcutaneous daily       blood glucose (NO BRAND SPECIFIED) lancets standard Use to test blood sugar 2 times daily or as directed. 90 lancet 1     blood glucose (NO BRAND SPECIFIED) test strip Use to test blood sugar 3 times daily or as directed. 90 strip 0     blood glucose monitoring (ACCU-CHEK RANDELL PLUS) meter device kit Use to test blood sugar 2 times daily or as directed. 1 kit 0        No Known Allergies    Exam:    Blood pressure 120/79, pulse 100, temperature 98.7  F (37.1  C), temperature source Oral, resp. rate 16, weight 131.8 kg (290 lb 8 oz), SpO2 96 %.  Wt Readings from Last 4 Encounters:   07/12/22 131.8 kg (290 lb 8 oz)   07/07/22 130.6 kg (288 lb)   07/07/22 131.4 kg (289 lb 11.2 oz)   07/05/22 133.4 kg (294 lb 3.2 oz)     General: Pleasant male, NAD. Accompanied by wife Shelley.  HEENT: Sclera anicteric. Oral mucosa pink and dry.  No mucositis or thrush. Thick secreitions  Heart: Regular, rate, and rhythm  Lungs: Clear to ascultation bilaterally.   Abdomen: Positive bowel sounds. Soft, non-distended, non-tender.   Extremities: trace bilateral extremity edema  Neuro: Cranial nerves grossly intact  Rash: none  Vascular access: port    Labs:   Most Recent 3 CBC's:  Recent Labs   Lab Test 07/12/22  0849 07/05/22  1036 07/01/22  1037   WBC 3.9* 9.8 7.0   HGB 12.0* 12.0* 12.1*   MCV 91 90 89    173 182   ANEUTAUTO 2.8 8.0 5.4     Most Recent 3 BMP's:  Recent Labs   Lab Test 07/05/22  1036 07/01/22  1037 06/28/22  0642    140 140   POTASSIUM 3.8 3.6 3.6   CHLORIDE 101 100 102   CO2 32 33* 33*   BUN 16 14 16   CR 0.68 0.69 0.69   ANIONGAP 9 7 5   LAI 8.6 8.8 8.7   * 142* 120*   PROTTOTAL 7.7 7.7 7.5   ALBUMIN 3.1* 3.2* 3.2*    Most Recent 3 LFT's:  Recent Labs   Lab Test 07/05/22  1036 07/01/22  1037 06/28/22  0642   AST 31 56* 52*   ALT 43 69 60   ALKPHOS 85 83 88   BILITOTAL 0.7 0.9 1.2    Most Recent 2 TSH and T4:No lab results found.    I reviewed the above labs today.    Imaging:  n/a    Impression/plan:   Nasopharyngeal carcinoma, SAM +julia:   With [facility] to 3 cycles of Carbo/[place]  -began chemoradiation with weekly carboplatin 6/22/22  -tolerating carbo well, labs and assessment ok for week 4   -will need to monitor for fluid overload as he had issues with that before  -will have weekly LEONEL visits and f/u with Dr. Melvin mid-treatment, post treatment PET/CT anticipated at 12 weeks     Transaminitis: G1.  -possibly from gemcitabine, likely some decrease of steatosis contributing  -LFTs normalized. Monitor. reviewed hepatology from 7/7/22.     BLE edema: reports a hx of compression injury to the thighs in the past that contributed to chronic edema  - TTE 4/1/22 technically difficult with limited information but EF 53%, mild RVD, inferolateral/posterior akinesis which was not noted on 2009 TTE in Care Everywhere.   -Currently stable. Continue furosemide 40 mg daily.     Nausea/vomiting, acid reflux  Much better with addition of omeprazole 20 mg, continue      L lung nodules: Resolved.   -thought to be inflammatory. Will pay attention to this on follow-up imaging.     DM2, steroid-induced hyperglycemia:  -managing ok right now. Sugars stable in the 120-130 range  -remains on metformin, victoza     Bowel irregularity  -fluctuates between diarrhea and constipation  -likely metforming and victoza are contributing. monitor     Mild peripheral neuropathy: R foot numbness, very mild, from DM2.   -stable    50 minutes spent on the date of the encounter doing chart review, review of test results, interpretation of tests, patient visit, documentation and discussion with other provider(s)     Maryjo Polanco CNP on 7/12/2022 at 9:22 AM

## 2022-07-12 NOTE — PROGRESS NOTES
Infusion Nursing Note:  Jareth Gallardo presents today for C1D22 Carboplatin (dose #7).    Patient seen by provider today: Yes: Maryjo Polanco CNP prior to infusion   present during visit today: Not Applicable.    Note: Jareth denied any questions or concerns following his visit with the provider prior to infusion.    Pt has follow up appointment with Dr. Melvin on 7/27 that he is unable to attend. IB message sent to scheduling and RNCC to reschedule appt for him. Pt aware to check MYCHART for changes in appointment schedule.    Intravenous Access:  Implanted Port.    Treatment Conditions:   Latest Reference Range & Units 07/12/22 08:49   Sodium 133 - 144 mmol/L 141   Potassium 3.4 - 5.3 mmol/L 3.4   Chloride 94 - 109 mmol/L 102   Carbon Dioxide 20 - 32 mmol/L 32   Urea Nitrogen 7 - 30 mg/dL 13   Creatinine 0.66 - 1.25 mg/dL 0.70   GFR Estimate >60 mL/min/1.73m2 >90   Calcium 8.5 - 10.1 mg/dL 8.8   Anion Gap 3 - 14 mmol/L 7   Albumin 3.4 - 5.0 g/dL 3.1 (L)   Protein Total 6.8 - 8.8 g/dL 7.6   Alkaline Phosphatase 40 - 150 U/L 78   ALT 0 - 70 U/L 51   AST 0 - 45 U/L 42   Bilirubin Total 0.2 - 1.3 mg/dL 0.6   Glucose 70 - 99 mg/dL 147 (H)   WBC 4.0 - 11.0 10e3/uL 3.9 (L)   Hemoglobin 13.3 - 17.7 g/dL 12.0 (L)   Hematocrit 40.0 - 53.0 % 39.2 (L)   Platelet Count 150 - 450 10e3/uL 179   RBC Count 4.40 - 5.90 10e6/uL 4.33 (L)   MCV 78 - 100 fL 91   MCH 26.5 - 33.0 pg 27.7   MCHC 31.5 - 36.5 g/dL 30.6 (L)   RDW 10.0 - 15.0 % 15.9 (H)   % Neutrophils % 72   % Lymphocytes % 14   % Monocytes % 12   % Eosinophils % 1   % Basophils % 0   Absolute Basophils 0.0 - 0.2 10e3/uL 0.0   Absolute Eosinophils 0.0 - 0.7 10e3/uL 0.0   Absolute Immature Granulocytes <=0.4 10e3/uL 0.0   Absolute Lymphocytes 0.8 - 5.3 10e3/uL 0.5 (L)   Absolute Monocytes 0.0 - 1.3 10e3/uL 0.5   % Immature Granulocytes % 1   Absolute Neutrophils 1.6 - 8.3 10e3/uL 2.8   Absolute NRBCs 10e3/uL 0.0   NRBCs per 100 WBC <1 /100 0     Results  reviewed, labs MET treatment parameters, ok to proceed with treatment.    Post Infusion Assessment:  Patient tolerated infusion without incident.  Blood return noted pre and post infusion.  Site patent and intact, free from redness, edema or discomfort.  No evidence of extravasations.  Access discontinued per protocol.     Discharge Plan:   Patient declined prescription refills.  Discharge instructions reviewed with: Patient.  Patient and/or family verbalized understanding of discharge instructions and all questions answered.  AVS to patient via [employer].  Patient will return 7/18 for next appointment.   Patient discharged in stable condition accompanied by: self.  Departure Mode: Ambulatory.  Face to Face time: 0.      Marianna Wong RN

## 2022-07-13 ENCOUNTER — APPOINTMENT (OUTPATIENT)
Dept: RADIATION ONCOLOGY | Facility: CLINIC | Age: 62
End: 2022-07-13
Attending: RADIOLOGY
Payer: COMMERCIAL

## 2022-07-13 PROCEDURE — 77427 RADIATION TX MANAGEMENT X5: CPT | Mod: GC | Performed by: STUDENT IN AN ORGANIZED HEALTH CARE EDUCATION/TRAINING PROGRAM

## 2022-07-13 PROCEDURE — 77014 PR CT GUIDE FOR PLACEMENT RADIATION THERAPY FIELDS: CPT | Mod: 26 | Performed by: RADIOLOGY

## 2022-07-13 PROCEDURE — 77336 RADIATION PHYSICS CONSULT: CPT | Performed by: RADIOLOGY

## 2022-07-13 PROCEDURE — 77386 HC IMRT TREATMENT DELIVERY, COMPLEX: CPT | Performed by: RADIOLOGY

## 2022-07-13 NOTE — PROGRESS NOTES
RADIATION ONCOLOGY WEEKLY ON TREATMENT VISIT   Encounter Date: 2022    Patient Name: Jareth Gallardo  MRN: [id number]  : 1960     Disease and Stage: cT2 N2 M0 nasopharyngeal carcinoma  Treatment Site: Nasopharynx and bilateral neck  Current Dose/Planned Total Dose: 3200 / 7000 cGy  Daily Fraction Size: 200 cGy/day, 5 times/week  Concurrent Chemotherapy: Yes  Drug and Frequency: Carboplatin weekly    Treatment Summary:    22: Initiation of radiotherapy. First infusion of weekly carboplatin.    22: Weekly RT visit. Current dose of 400 cGy. Tolerating treatment well.     22: Second infusion of weekly carboplatin    22: Weekly RT visit. Current dose of 1400 cGy. Tolerating treatment well.    22: Third infusion of weekly carboplatin    22: Weekly RT visit. Current dose of 2200 cGy. Moderate odynophagia. 3.5 kg weight loss over the past week.    22: Fourth infusion of weekly carboplatin    22: Weekly RT visit. Current dose of 3200 cGy. Mild odynophagia. Stable weight.    ED visits/Hospitalizations:  None    Missed Treatments:  1. 2022: 1-day break between fractions 8-9 secondary to the  holiday    Subjective: Mr. Gallardo presents to clinic today for his weekly on-treatment visit. He describes mild oropharyngeal pain that he rates as a 3/10 in severity. This is not particularly bothersome to him and he is not currently requiring any pain medications for his symptoms. He is eating a regular diet without difficulty and his weight has remained stable over the past week. His remaining ROS are positive for mild nausea without vomiting following chemotherapy which self-resolves along with mild to moderate dermatitis of the bilateral neck.    ROS:   Constitutional  Pain (0-10): 3 (mouth), 3 (throat), 1 (skin)   Fatigue: Mild    CNS  Headaches: None    ENT  Mucositis: Mild    Cardiopulmonary  Dyspnea: None    GI  Nausea/vomiting: Mild nausea without  vomiting    Nutrition  PEG: No  Diet: Soft    Integumentary  Dermatitis: Mild to moderate    Objective:   Current weight: 130.9 kg  Last week's weight: 130.9 kg  Starting weight: 138.8 kg    BP: 128/91 (sitting), 118/78 (standing)  Pulse: 93 (sitting), 90 (standing)     General: Healthy-appearing 61-year-old gentleman seated comfortably in an examination chair in no acute distress  HEENT: NC/AT. Mild conjunctival injection of the right eye. No tearing. No rhinorrhea or epistaxis. Mildly dry mucous membranes with mildly thickened secretions. Mild erythema of the posterior soft palate and oropharyngeal wall. No thrush.  Pulmonary: No wheezing, stridor or respiratory distress  Skin: Mild to moderate erythema of the bilateral neck. No desquamation.    Treatment-related toxicities (CTCAE v5.0):  Mucositis: Grade 1  Dermatitis: Grade 1  Dry eye: Grade 1    Assessment:    Mr. Gallardo is a 61 year old male with a cT2 N2 M0 nasopharyngeal carcinoma. He is status post 3 cycles of induction carboplatin/gemcitabine with a good partial response to therapy. He is now receiving concurrent chemoradiotherapy with curative intent. He is tolerating treatment reasonably well with fairly mild radiation-induced mucositis and dermatitis.    Plan:   cT2 N2 M0 nasopharyngeal carcinoma:    Continue chemoradiotherapy    Pain management:    Continue acetaminophen and viscous lidocaine as needed for mild to moderate pain    Fluids/Electrolytes/Nutrition:    Continue diet as tolerated    Dermatitis:    Twice daily Aquaphor application to the bilateral neck    Mosaiq chart and setup information reviewed  IGRT images reviewed    Medication list reviewed    Mane Razo MD/PhD    Dept of Radiation Oncology  [facility]

## 2022-07-14 ENCOUNTER — OFFICE VISIT (OUTPATIENT)
Dept: RADIATION ONCOLOGY | Facility: CLINIC | Age: 62
End: 2022-07-14
Attending: RADIOLOGY
Payer: COMMERCIAL

## 2022-07-14 VITALS
WEIGHT: 288.6 LBS | DIASTOLIC BLOOD PRESSURE: 91 MMHG | HEART RATE: 93 BPM | SYSTOLIC BLOOD PRESSURE: 128 MMHG | BODY MASS INDEX: 41.41 KG/M2

## 2022-07-14 DIAGNOSIS — C11.9 NASOPHARYNGEAL CANCER (H): Primary | ICD-10-CM

## 2022-07-14 PROCEDURE — 77386 HC IMRT TREATMENT DELIVERY, COMPLEX: CPT | Performed by: RADIOLOGY

## 2022-07-15 ENCOUNTER — APPOINTMENT (OUTPATIENT)
Dept: RADIATION ONCOLOGY | Facility: CLINIC | Age: 62
End: 2022-07-15
Attending: RADIOLOGY
Payer: COMMERCIAL

## 2022-07-15 PROCEDURE — 77014 PR CT GUIDE FOR PLACEMENT RADIATION THERAPY FIELDS: CPT | Mod: 26 | Performed by: RADIOLOGY

## 2022-07-15 PROCEDURE — 77386 HC IMRT TREATMENT DELIVERY, COMPLEX: CPT | Performed by: RADIOLOGY

## 2022-07-18 ENCOUNTER — THERAPY VISIT (OUTPATIENT)
Dept: SPEECH THERAPY | Facility: CLINIC | Age: 62
End: 2022-07-18
Payer: COMMERCIAL

## 2022-07-18 ENCOUNTER — INFUSION THERAPY VISIT (OUTPATIENT)
Dept: ONCOLOGY | Facility: CLINIC | Age: 62
End: 2022-07-18
Attending: REGISTERED NURSE
Payer: COMMERCIAL

## 2022-07-18 ENCOUNTER — APPOINTMENT (OUTPATIENT)
Dept: RADIATION ONCOLOGY | Facility: CLINIC | Age: 62
End: 2022-07-18
Attending: RADIOLOGY
Payer: COMMERCIAL

## 2022-07-18 ENCOUNTER — APPOINTMENT (OUTPATIENT)
Dept: LAB | Facility: CLINIC | Age: 62
End: 2022-07-18
Attending: REGISTERED NURSE
Payer: COMMERCIAL

## 2022-07-18 VITALS
OXYGEN SATURATION: 94 % | DIASTOLIC BLOOD PRESSURE: 79 MMHG | WEIGHT: 290.1 LBS | HEART RATE: 88 BPM | SYSTOLIC BLOOD PRESSURE: 117 MMHG | TEMPERATURE: 98 F | BODY MASS INDEX: 41.63 KG/M2 | RESPIRATION RATE: 16 BRPM

## 2022-07-18 DIAGNOSIS — R11.0 CHEMOTHERAPY-INDUCED NAUSEA: ICD-10-CM

## 2022-07-18 DIAGNOSIS — R60.0 BILATERAL LOWER EXTREMITY EDEMA: ICD-10-CM

## 2022-07-18 DIAGNOSIS — T45.1X5A CHEMOTHERAPY-INDUCED NAUSEA: ICD-10-CM

## 2022-07-18 DIAGNOSIS — T45.1X5A CHEMOTHERAPY-INDUCED NEUTROPENIA (H): ICD-10-CM

## 2022-07-18 DIAGNOSIS — D70.1 CHEMOTHERAPY-INDUCED NEUTROPENIA (H): ICD-10-CM

## 2022-07-18 DIAGNOSIS — C11.9 NASOPHARYNGEAL CANCER (H): Primary | ICD-10-CM

## 2022-07-18 DIAGNOSIS — R13.12 OROPHARYNGEAL DYSPHAGIA: Primary | ICD-10-CM

## 2022-07-18 DIAGNOSIS — R79.89 ELEVATED LFTS: ICD-10-CM

## 2022-07-18 DIAGNOSIS — C11.9 NASOPHARYNGEAL CANCER (H): ICD-10-CM

## 2022-07-18 DIAGNOSIS — K21.9 GASTROESOPHAGEAL REFLUX DISEASE WITHOUT ESOPHAGITIS: ICD-10-CM

## 2022-07-18 LAB
ALBUMIN SERPL-MCNC: 3 G/DL (ref 3.4–5)
ALP SERPL-CCNC: 78 U/L (ref 40–150)
ALT SERPL W P-5'-P-CCNC: 57 U/L (ref 0–70)
ANION GAP SERPL CALCULATED.3IONS-SCNC: 2 MMOL/L (ref 3–14)
AST SERPL W P-5'-P-CCNC: 50 U/L (ref 0–45)
BASOPHILS # BLD AUTO: 0 10E3/UL (ref 0–0.2)
BASOPHILS NFR BLD AUTO: 0 %
BILIRUB SERPL-MCNC: 0.5 MG/DL (ref 0.2–1.3)
BUN SERPL-MCNC: 13 MG/DL (ref 7–30)
CALCIUM SERPL-MCNC: 8.8 MG/DL (ref 8.5–10.1)
CHLORIDE BLD-SCNC: 107 MMOL/L (ref 94–109)
CO2 SERPL-SCNC: 30 MMOL/L (ref 20–32)
CREAT SERPL-MCNC: 0.64 MG/DL (ref 0.66–1.25)
EOSINOPHIL # BLD AUTO: 0 10E3/UL (ref 0–0.7)
EOSINOPHIL NFR BLD AUTO: 1 %
ERYTHROCYTE [DISTWIDTH] IN BLOOD BY AUTOMATED COUNT: 15.9 % (ref 10–15)
GFR SERPL CREATININE-BSD FRML MDRD: >90 ML/MIN/1.73M2
GLUCOSE BLD-MCNC: 143 MG/DL (ref 70–99)
HCT VFR BLD AUTO: 38.7 % (ref 40–53)
HGB BLD-MCNC: 12.1 G/DL (ref 13.3–17.7)
IMM GRANULOCYTES # BLD: 0 10E3/UL
IMM GRANULOCYTES NFR BLD: 0 %
LYMPHOCYTES # BLD AUTO: 0.5 10E3/UL (ref 0.8–5.3)
LYMPHOCYTES NFR BLD AUTO: 10 %
MCH RBC QN AUTO: 28 PG (ref 26.5–33)
MCHC RBC AUTO-ENTMCNC: 31.3 G/DL (ref 31.5–36.5)
MCV RBC AUTO: 90 FL (ref 78–100)
MONOCYTES # BLD AUTO: 0.5 10E3/UL (ref 0–1.3)
MONOCYTES NFR BLD AUTO: 12 %
NEUTROPHILS # BLD AUTO: 3.4 10E3/UL (ref 1.6–8.3)
NEUTROPHILS NFR BLD AUTO: 77 %
NRBC # BLD AUTO: 0 10E3/UL
NRBC BLD AUTO-RTO: 0 /100
PLATELET # BLD AUTO: 125 10E3/UL (ref 150–450)
POTASSIUM BLD-SCNC: 3.9 MMOL/L (ref 3.4–5.3)
PROT SERPL-MCNC: 7.3 G/DL (ref 6.8–8.8)
RBC # BLD AUTO: 4.32 10E6/UL (ref 4.4–5.9)
SODIUM SERPL-SCNC: 139 MMOL/L (ref 133–144)
WBC # BLD AUTO: 4.4 10E3/UL (ref 4–11)

## 2022-07-18 PROCEDURE — 250N000011 HC RX IP 250 OP 636: Performed by: INTERNAL MEDICINE

## 2022-07-18 PROCEDURE — 92526 ORAL FUNCTION THERAPY: CPT | Mod: GN | Performed by: SPEECH-LANGUAGE PATHOLOGIST

## 2022-07-18 PROCEDURE — 258N000003 HC RX IP 258 OP 636: Performed by: INTERNAL MEDICINE

## 2022-07-18 PROCEDURE — G0463 HOSPITAL OUTPT CLINIC VISIT: HCPCS

## 2022-07-18 PROCEDURE — 77386 HC IMRT TREATMENT DELIVERY, COMPLEX: CPT | Performed by: RADIOLOGY

## 2022-07-18 PROCEDURE — 36591 DRAW BLOOD OFF VENOUS DEVICE: CPT

## 2022-07-18 PROCEDURE — 99214 OFFICE O/P EST MOD 30 MIN: CPT | Performed by: REGISTERED NURSE

## 2022-07-18 PROCEDURE — 96367 TX/PROPH/DG ADDL SEQ IV INF: CPT

## 2022-07-18 PROCEDURE — 96413 CHEMO IV INFUSION 1 HR: CPT

## 2022-07-18 PROCEDURE — 80053 COMPREHEN METABOLIC PANEL: CPT

## 2022-07-18 PROCEDURE — 250N000011 HC RX IP 250 OP 636: Performed by: REGISTERED NURSE

## 2022-07-18 PROCEDURE — 85025 COMPLETE CBC W/AUTO DIFF WBC: CPT | Performed by: INTERNAL MEDICINE

## 2022-07-18 RX ORDER — HEPARIN SODIUM (PORCINE) LOCK FLUSH IV SOLN 100 UNIT/ML 100 UNIT/ML
5 SOLUTION INTRAVENOUS ONCE
Status: COMPLETED | OUTPATIENT
Start: 2022-07-18 | End: 2022-07-18

## 2022-07-18 RX ORDER — HEPARIN SODIUM (PORCINE) LOCK FLUSH IV SOLN 100 UNIT/ML 100 UNIT/ML
5 SOLUTION INTRAVENOUS
Status: DISCONTINUED | OUTPATIENT
Start: 2022-07-18 | End: 2022-07-18 | Stop reason: HOSPADM

## 2022-07-18 RX ADMIN — Medication 5 ML: at 09:07

## 2022-07-18 RX ADMIN — SODIUM CHLORIDE 250 ML: 9 INJECTION, SOLUTION INTRAVENOUS at 10:31

## 2022-07-18 RX ADMIN — CARBOPLATIN 300 MG: 10 INJECTION, SOLUTION INTRAVENOUS at 11:01

## 2022-07-18 RX ADMIN — DEXAMETHASONE SODIUM PHOSPHATE: 10 INJECTION, SOLUTION INTRAMUSCULAR; INTRAVENOUS at 10:31

## 2022-07-18 RX ADMIN — Medication 5 ML: at 11:32

## 2022-07-18 ASSESSMENT — PAIN SCALES - GENERAL: PAINLEVEL: NO PAIN (0)

## 2022-07-18 NOTE — PATIENT INSTRUCTIONS
[facility] Triage and after hours / weekends / holidays:  [phone]    Please call the triage or after hours line if you experience a temperature greater than or equal to 100.4, shaking chills, have uncontrolled nausea, vomiting and/or diarrhea, dizziness, shortness of breath, chest pain, bleeding, unexplained bruising, or if you have any other new/concerning symptoms, questions or concerns.      If you are having any concerning symptoms or wish to speak to a provider before your next infusion visit, please call your care coordinator or triage to notify them so we can adequately serve you.     If you need a refill on a narcotic prescription or other medication, please call before your infusion appointment.                July 2022 Sunday Monday Tuesday Wednesday Thursday Friday Saturday                            1    LAB CENTRAL  10:15 AM   (15 min.)   [facility] LAB DRAW   [facility]    RETURN  10:30 AM   (45 min.)   Jany Mtz CNP   [facility]    TREATMENT   1:45 PM   (15 min.)   [facility] ONC [facility] Radiation Oncology 2       3     4     5    LAB CENTRAL  10:15 AM   (15 min.)   [facility] DRAW   [facility]    ONC INFUSION 2 HR (120 MIN)  10:30 AM   (120 min.)    ONC INFUSION NURSE   [facility]    SWALLOW TREATMENT  11:00 AM   (30 min.)   Trintiy Winters, SLP   [facility]    TREATMENT   1:45 PM   (15 min.)   [facility] RAD ONC [facility] Radiation Oncology 6    TREATMENT   1:45 PM   (15 min.)   [facility] ONC [facility] Radiation Oncology 7    NEW LIVER  10:45 AM   (30 min.)   Marianna Cm MD   [facility] [place]    TREATMENT   1:45 PM   (15 min.)   [facility] ONC [facility] Radiation Oncology    OTV   2:00  PM   (15 min.)   Aniya Astorga MD   [facility] Radiation Oncology 8    TREATMENT   9:15 AM   (15 min.)   [facility] RAD ONC [facility] Radiation Oncology 9       10     11    TREATMENT   1:45 PM   (15 min.)   [facility] ONC [facility] Radiation Oncology 12    LAB CENTRAL   8:30 AM   (15 min.)   [facility] LAB DRAW   [facility]    RETURN   8:45 AM   (45 min.)   Maryjo Polanco CNP   [facility]    ONC INFUSION 2 HR (120 MIN)  10:30 AM   (120 min.)   [facility] INFUSION NURSE   [facility]    SWALLOW TREATMENT  11:30 AM   (30 min.)   Trinity Winters, SLP   [facility]    TREATMENT   1:45 PM   (15 min.)   [facility] RAD ONC [facility] Radiation Oncology 13    TREATMENT   1:45 PM   (15 min.)   [facility] ONC [facility] Radiation Oncology 14    TREATMENT   1:45 PM   (15 min.)   [facility] ONC [facility] Radiation On[facility]    OTV   2:00 PM   (15 min.)   Mane Razo MD   [facility] Radiation Oncology 15    TREATMENT   9:30 AM   (15 min.)   [facility] ONC [facility] Radiation Oncology 16       17     18    LAB CENTRAL   8:45 AM   (15 min.)   [facility] DRAW   [facility]    RETURN   9:00 AM   (45 min.)   Jany Mtz CNP   [facility]    ONC INFUSION 2 HR (120 MIN)  10:00 AM   (120 min.)   [facility] ONC INFUSION NURSE   [facility]    SWALLOW TREATMENT  11:00 AM   (30 min.)   Haily Hare, SLP   [facility]    TREATMENT   1:45 PM   (15 min.)   [facility] ONC [facility] Radiation Oncology 19    TREATMENT   1:45 PM   (15 min.)   [facility] ONC [facility] Radiation Oncology  20    TREATMENT   1:45 PM   (15 min.)   [facility] RAD ONC [facility] Radiation Oncology 21    TREATMENT   1:45 PM   (15 min.)   [facility] RAD ONC [facility] Radiation Oncology    [facility] NUTRITION VISIT   2:00 PM   (15 min.)   Deb Schultz RD   [facility]iation On[facility]    OTV   2:00 PM   (15 min.)   Cooper Shankar MD   [facility] Radiation Oncology 22    TREATMENT   1:45 PM   (15 min.)   [facility] RAD ONC [facility] Radiation Oncology 23       24     25    LAB CENTRAL   6:45 AM   (15 min.)    [facility] DRAW   [facility]    RETURN   7:00 AM   (45 min.)   Jany Mtz CNP   [facility]    ONC INFUSION 2 HR (120 MIN)   8:00 AM   (120 min.)   [facility] ONC INFUSION NURSE   [facility]    SWALLOW TREATMENT   9:00 AM   (30 min.)   Haily Hare SLP   [facility]    TREATMENT   1:45 PM   (15 min.)   [facility] RAD ONC [facility] Radiation Oncology 26    TREATMENT   1:45 PM   (15 min.)   [facility] RAD ONC [facility] Radiation Oncology 27     28    VIDEO VISIT RETURN   8:55 AM   (30 min.)   Dariela Melvin MD   [facility]    TREATMENT   1:45 PM   (15 min.)   [facility] RAD ONC [facility] Radiation Oncology    OTV   2:00 PM   (15 min.)   Mane Razo MD   [facility] Radiation Oncology 29    TREATMENT   1:45 PM   (15 min.)   [facility] RAD ONC [facility] Radiation Oncology 30       31 August 2022 Sunday Monday Tuesday Wednesday Thursday Friday Saturday        1    TREATMENT   1:45 PM   (15 min.)   [facility] RAD ONC [facility] Radiation Oncology 2    TREATMENT   1:45 PM   (15 min.)   [facility] RAD ONC [facility]  [facility] Radiation Oncology 3    TREATMENT   1:45 PM   (15 min.)   [facility] RAD ONC VARIAN   [facility] Radiation Oncology 4  Happy Birth[facility]!    TREATMENT   1:45 PM   (15 min.)   [facility] RAD ONC VARIAN   [facility] Radiation Oncology    OTV   2:00 PM   (15 min.)   Mane Razo MD   [facility] Radiation Oncology 5    TREATMENT   1:45 PM   (15 min.)   [facility]C [facility] Rad[facility] 6       7     8    TREATMENT   1:45 PM   (15 min.)   [facility] RAD ONC VARIAN   [facility] Radiation Oncology 9    TREATMENT   1:45 PM   (15 min.)   [facility] RAD ONC VARIAN   [facility] Radiation Oncology 10    TREATMENT   1:45 PM   (15 min.)   [facility] Rad[facility] 11    TREATMENT   1:45 PM   (15 min.)   [facility] Rad[facility] Oncology 12     13       14     15     16     17     18     19     20       21     22     23     24     25     26     27       28     29     30     31                                       Recent Results (from the past 24 hour(s))   Comprehensive metabolic panel    Collection Time: 07/18/22  9:13 AM   Result Value Ref Range    Sodium 139 133 - 144 mmol/L    Potassium 3.9 3.4 - 5.3 mmol/L    Chloride 107 94 - 109 mmol/L    Carbon Dioxide (CO2) 30 20 - 32 mmol/L    Anion Gap 2 (L) 3 - 14 mmol/L    Urea Nitrogen 13 7 - 30 mg/dL    Creatinine 0.64 (L) 0.66 - 1.25 mg/dL    Calcium 8.8 8.5 - 10.1 mg/dL    Glucose 143 (H) 70 - 99 mg/dL    Alkaline Phosphatase 78 40 - 150 U/L    AST 50 (H) 0 - 45 U/L    ALT 57 0 - 70 U/L    Protein Total 7.3 6.8 - 8.8 g/dL    Albumin 3.0 (L) 3.4 - 5.0 g/dL    Bilirubin Total 0.5 0.2 - 1.3 mg/dL    GFR Estimate >90 >60 mL/min/1.73m2   CBC with platelets and differential    Collection Time: 07/18/22  9:13 AM   Result Value Ref Range    WBC Count 4.4 4.0 - 11.0 10e3/uL    RBC Count 4.32 (L) 4.40 - 5.90 10e6/uL    Hemoglobin 12.1 (L) 13.3 -  17.7 g/dL    Hematocrit 38.7 (L) 40.0 - 53.0 %    MCV 90 78 - 100 fL    MCH 28.0 26.5 - 33.0 pg    MCHC 31.3 (L) 31.5 - 36.5 g/dL    RDW 15.9 (H) 10.0 - 15.0 %    Platelet Count 125 (L) 150 - 450 10e3/uL    % Neutrophils 77 %    % Lymphocytes 10 %    % Monocytes 12 %    % Eosinophils 1 %    % Basophils 0 %    % Immature Granulocytes 0 %    NRBCs per 100 WBC 0 <1 /100    Absolute Neutrophils 3.4 1.6 - 8.3 10e3/uL    Absolute Lymphocytes 0.5 (L) 0.8 - 5.3 10e3/uL    Absolute Monocytes 0.5 0.0 - 1.3 10e3/uL    Absolute Eosinophils 0.0 0.0 - 0.7 10e3/uL    Absolute Basophils 0.0 0.0 - 0.2 10e3/uL    Absolute Immature Granulocytes 0.0 <=0.4 10e3/uL    Absolute NRBCs 0.0 10e3/uL

## 2022-07-18 NOTE — PROGRESS NOTES
Oncology Progress Note  July 18, 2022    Reason for Visit: seen in f/u of nasopharyngeal carcinoma    Oncology HPI:     Mr. Gallardo is a 61 year old man who presents for evaluation prior to starting chemoradiation. He was previously treated with 3 cycles of carboplatin/gemcitabine with a GA on PET/CT.  Cisplatin was not used due to hearing loss on audiogram. He began concurrent chemoradiation with weekly carboplatin 6/22/22.    SUMMARY  12/11/21                   UC for L neck mass, selling  12/9/21                     CT neck ([facility]). 2.4 cm L neck node medial to SCM just above the hyoid, additional LNs surround, scattered R neck and L supraclavicular LNs  2/15/22                     US bx L cervical node. Path: SCC, moderately differentiated,   3/4/22                       PET/CT. Nasopharyngeal and oropharyngeal mucosal thickening. 2.6 cm BERNARDO nodular GGO, SUV avid, some additional lingular uptake  4/4/22                       C1 carboplatin gemcitabine. Not a candidate for cisplatin due to hearing loss on audiogram.  4/26/22                     C2 carboplatin gemcitabine. Neulasta 5/3 after C2D8, ANC 1000   5/16/22                     C3 carboplatin gemcitabine.   5/31/22: PET with decreased size of nasopharyngeal tumor w/ decreased FDG uptake and decrease in size and number of involved cervical lymph nodes  6/22/22: initiate definitive chemoradiation with weekly carboplatin    Interval history:   Jareth continues to tolerate chemoradiation well. He has minimal pain. Does not require Tylenol at this point. Continues to use a combination of Biotene, salt/soda rinses and cold water rinses. He is eating well. All foods are tasting the same although he had a cheeseburger this weekend and was able to taste everything. He continues to drink water and Gatorade frequently which is helping his thick secretions. His reflux symptoms have improved with omeprazole. He does usually have an episode of nausea and vomiting  in the morning only 2 days after each carboplatin infusion which is the only time he requires antiemetics.      10 point review of systems otherwise negative    PAST MEDICAL HISTORY  Nasopharyngeal carcinoma as above  DM2  COPD??  Hiatal hernia. Sleeps with his head elevated  LVH on TTE 2009, EF50%, LA Dilation  Dyslipidemia  BARRETT in 2015. Not using CPAP due to developing tooth abscesses when he tried it in 2015  Venous insufficiency in both legs being in an accident involving a mower about 10 years ago, on chronic furosemide. LE US negative 12/15/2009  Cholelithiasis  Hyperplastic colon polyp, due 2020  Tonsillectomy  Baker cyst RLE US 2013     Neuropathy from DM - R foot numbness chronically     Exposed to lots of wood dust in his occupation       Current Outpatient Medications   Medication Sig Dispense Refill     atorvastatin (LIPITOR) 20 MG tablet Take 20 mg by mouth       B-D U/F insulin pen needle USE ONE TIME A DAY TO INJECT VICTOZA       blood glucose (NO BRAND SPECIFIED) lancets standard Use to test blood sugar 2 times daily or as directed. 90 lancet 1     blood glucose (NO BRAND SPECIFIED) test strip Use to test blood sugar 3 times daily or as directed. 90 strip 0     blood glucose monitoring (ACCU-CHEK RANDELL PLUS) meter device kit Use to test blood sugar 2 times daily or as directed. 1 kit 0     fluticasone-vilanterol (BREO ELLIPTA) 100-25 MCG/INH inhaler INHALE 1 PUFF INTO THE LUNGS ONE TIME A DAY. RINSE MOUTH AFTER USE.       furosemide (LASIX) 20 MG tablet Take 40 mg by mouth       insulin lispro (HUMALOG KWIKPEN) 100 UNIT/ML (1 unit dial) KWIKPEN 1 unit per 50 above 150 correction after chemo. TDD 20 units       lidocaine, viscous, (XYLOCAINE) 2 % solution Swish and swallow 15 mLs in mouth every 6 hours as needed for moderate pain ; Max 8 doses/24 hour period. 400 mL 1     metFORMIN (GLUCOPHAGE XR) 500 MG 24 hr tablet Take 2 tabs daily. Swallow tablet whole; do not crush, divide or chew.        metFORMIN (GLUCOPHAGE) 1000 MG tablet TAKE 1 TABLET BY MOUTH ONE TIME A DAY. TAKE WITH FOOD.       omeprazole (PRILOSEC) 20 MG DR capsule Take 1 capsule (20 mg) by mouth daily 30 capsule 1     ondansetron (ZOFRAN) 8 MG tablet Take 1 tablet (8 mg) by mouth every 8 hours as needed for nausea 30 tablet 11     prochlorperazine (COMPAZINE) 10 MG tablet Take 1 tablet (10 mg) by mouth every 6 hours as needed (Nausea/Vomiting) 30 tablet 5     VICTOZA PEN 18 MG/3ML soln Inject 1.2 mg Subcutaneous daily          No Known Allergies    Exam:    Blood pressure 117/79, pulse 88, temperature 98  F (36.7  C), temperature source Oral, resp. rate 16, weight 131.6 kg (290 lb 1.6 oz), SpO2 94 %.  Wt Readings from Last 4 Encounters:   07/18/22 131.6 kg (290 lb 1.6 oz)   07/14/22 130.9 kg (288 lb 9.6 oz)   07/12/22 131.8 kg (290 lb 8 oz)   07/07/22 130.6 kg (288 lb)     General: Pleasant male, NAD.   HEENT: Sclera anicteric. Oral mucosa dry. Confluent erythema to posterior oropharynx.  Heart: Regular, rate, and rhythm. No murmur.  Lungs: Clear to ascultation bilaterally.   Abdomen: Positive bowel sounds. Soft, non-distended, non-tender.   Extremities: trace bilateral extremity edema  Neuro: Cranial nerves grossly intact  Skin: mild radiation dermatitis to bilateral neck  Vascular access: port    Labs:   Most Recent 3 CBC's:  Recent Labs   Lab Test 07/18/22  0913 07/12/22  0849 07/05/22  1036   WBC 4.4 3.9* 9.8   HGB 12.1* 12.0* 12.0*   MCV 90 91 90   * 179 173   ANEUTAUTO 3.4 2.8 8.0     Most Recent 3 BMP's:  Recent Labs   Lab Test 07/18/22  0913 07/12/22  0849 07/05/22  1036    141 142   POTASSIUM 3.9 3.4 3.8   CHLORIDE 107 102 101   CO2 30 32 32   BUN 13 13 16   CR 0.64* 0.70 0.68   ANIONGAP 2* 7 9   LAI 8.8 8.8 8.6   * 147* 113*   PROTTOTAL 7.3 7.6 7.7   ALBUMIN 3.0* 3.1* 3.1*    Most Recent 3 LFT's:  Recent Labs   Lab Test 07/18/22  0913 07/12/22  0849 07/05/22  1036   AST 50* 42 31   ALT 57 51 43   ALKPHOS 78 78  85   BILITOTAL 0.5 0.6 0.7    Most Recent 2 TSH and T4:No lab results found.    I reviewed the above labs today.    Imaging: n/a    Impression/plan:   Nasopharyngeal carcinoma, SAM +julia:   With DE to 3 cycles of Carbo/[place]  -began chemoradiation with weekly carboplatin 6/22/22  -tolerating carbo well, labs and assessment ok for week 5  -will need to monitor for fluid overload as he had issues with that before  -will have weekly LEONEL visits and f/u with Dr. Melvin mid-treatment, post treatment PET/CT anticipated at 12 weeks     Transaminitis: G1.  -possibly from gemcitabine, likely some decrease of steatosis contributing  -Mild increase in AST today. Monitor. reviewed hepatology from 7/7/22.     BLE edema: reports a hx of compression injury to the thighs in the past that contributed to chronic edema  - TTE 4/1/22 technically difficult with limited information but EF 53%, mild RVD, inferolateral/posterior akinesis which was not noted on 2009 TTE in Care Everywhere.   -Currently stable. Continue furosemide 40 mg daily.     Nausea/vomiting, acid reflux  Much better with addition of omeprazole 20 mg, continue      L lung nodules: Resolved.   -thought to be inflammatory. Will pay attention to this on follow-up imaging.     DM2, steroid-induced hyperglycemia:  -managing ok right now. Sugars stable in the 120-130 range  -remains on metformin, victoza     Bowel irregularity  -fluctuates between diarrhea and constipation  -able to control constipation by increasing fluid intake  -likely metforming and victoza are contributing. monitor     Mild peripheral neuropathy: R foot numbness, very mild, from DM2.   -stable    31 minutes spent on the date of the encounter doing chart review, review of test results, interpretation of tests, patient visit and documentation     Jany Mtz, CNP  [facility]  [address] [place] [id number] [phone]

## 2022-07-18 NOTE — PROGRESS NOTES
Infusion Nursing Note:  Jareth Gallardo presents today for Cycle 1 Day 29 Carboplatin (dose #8).    Patient seen by provider today: Yes: Jany Mtz CNP    Note: Patient presents to the infusion center today after his provider appt.    Intravenous Access:  Implanted Port.    Treatment Conditions:   Latest Reference Range & Units 07/18/22 09:13   Sodium 133 - 144 mmol/L 139   Potassium 3.4 - 5.3 mmol/L 3.9   Chloride 94 - 109 mmol/L 107   Carbon Dioxide 20 - 32 mmol/L 30   Urea Nitrogen 7 - 30 mg/dL 13   Creatinine 0.66 - 1.25 mg/dL 0.64 (L)   GFR Estimate >60 mL/min/1.73m2 >90   Calcium 8.5 - 10.1 mg/dL 8.8   Anion Gap 3 - 14 mmol/L 2 (L)   Albumin 3.4 - 5.0 g/dL 3.0 (L)   Protein Total 6.8 - 8.8 g/dL 7.3   Alkaline Phosphatase 40 - 150 U/L 78   ALT 0 - 70 U/L 57   AST 0 - 45 U/L 50 (H)   Bilirubin Total 0.2 - 1.3 mg/dL 0.5   Glucose 70 - 99 mg/dL 143 (H)   WBC 4.0 - 11.0 10e3/uL 4.4   Hemoglobin 13.3 - 17.7 g/dL 12.1 (L)   Hematocrit 40.0 - 53.0 % 38.7 (L)   Platelet Count 150 - 450 10e3/uL 125 (L)   RBC Count 4.40 - 5.90 10e6/uL 4.32 (L)   MCV 78 - 100 fL 90   MCH 26.5 - 33.0 pg 28.0   MCHC 31.5 - 36.5 g/dL 31.3 (L)   RDW 10.0 - 15.0 % 15.9 (H)   % Neutrophils % 77   % Lymphocytes % 10   % Monocytes % 12   % Eosinophils % 1   % Basophils % 0   Absolute Basophils 0.0 - 0.2 10e3/uL 0.0   Absolute Eosinophils 0.0 - 0.7 10e3/uL 0.0   Absolute Immature Granulocytes <=0.4 10e3/uL 0.0   Absolute Lymphocytes 0.8 - 5.3 10e3/uL 0.5 (L)   Absolute Monocytes 0.0 - 1.3 10e3/uL 0.5   % Immature Granulocytes % 0   Absolute Neutrophils 1.6 - 8.3 10e3/uL 3.4   Absolute NRBCs 10e3/uL 0.0   NRBCs per 100 WBC <1 /100 0     Results reviewed, labs MET treatment parameters, ok to proceed with treatment.    Post Infusion Assessment:  Patient tolerated infusion without incident.  Blood return noted pre and post infusion.  No evidence of extravasations.  Access discontinued per protocol.     Discharge Plan:   Patient declined  prescription refills.  Discharge instructions reviewed with: Patient.  Patient and/or family verbalized understanding of discharge instructions and all questions answered.  AVS to patient via [employer].  Patient will return 7/25 for next appointment.   Patient discharged in stable condition accompanied by: self.  Departure Mode: Ambulatory.      Snow Lowry RN

## 2022-07-18 NOTE — NURSING NOTE
"Oncology Rooming Note    July 18, 2022 9:18 AM   Jareth Gallardo is a 61 year old male who presents for:    Chief Complaint   Patient presents with     Port Draw     Labs drawn via port by RN. Vitals taken.     Oncology Clinic Visit     Rtn for nasopharyngeal cancer     Initial Vitals: /79 (BP Location: Right arm, Patient Position: Sitting, Cuff Size: Adult Large)   Pulse 88   Temp 98  F (36.7  C) (Oral)   Resp 16   Wt 131.6 kg (290 lb 1.6 oz)   SpO2 94%   BMI 41.63 kg/m   Estimated body mass index is 41.63 kg/m  as calculated from the following:    Height as of 6/21/22: 1.778 m (5' 10\").    Weight as of this encounter: 131.6 kg (290 lb 1.6 oz). Body surface area is 2.55 meters squared.  No Pain (0) Comment: Data Unavailable   No LMP for male patient.  Allergies reviewed: Yes  Medications reviewed: Yes    Medications: Medication refills not needed today.  Pharmacy name entered into [facility]:    [id number] [place] [facility] [place], [place] - [address] [facility] [place], [place] - [phone]    Clinical concerns: none       Anahy Siu, EMT            "

## 2022-07-18 NOTE — NURSING NOTE
"Chief Complaint   Patient presents with     Port Draw     Labs drawn via port by RN. Vitals taken.     Labs drawn via port by RN. Port accessed with 20G 3/4\" gripper needle. Flushed with NS and heparin. Pt tolerated well. Vitals taken. Pt checked in for next appointment.    Marlena Morgan RN  "

## 2022-07-19 ENCOUNTER — APPOINTMENT (OUTPATIENT)
Dept: RADIATION ONCOLOGY | Facility: CLINIC | Age: 62
End: 2022-07-19
Attending: RADIOLOGY
Payer: COMMERCIAL

## 2022-07-19 PROCEDURE — 77386 HC IMRT TREATMENT DELIVERY, COMPLEX: CPT | Performed by: RADIOLOGY

## 2022-07-19 PROCEDURE — 77014 PR CT GUIDE FOR PLACEMENT RADIATION THERAPY FIELDS: CPT | Mod: 26 | Performed by: RADIOLOGY

## 2022-07-20 ENCOUNTER — APPOINTMENT (OUTPATIENT)
Dept: RADIATION ONCOLOGY | Facility: CLINIC | Age: 62
End: 2022-07-20
Attending: RADIOLOGY
Payer: COMMERCIAL

## 2022-07-20 PROCEDURE — 77427 RADIATION TX MANAGEMENT X5: CPT | Performed by: RADIOLOGY

## 2022-07-20 PROCEDURE — 77014 PR CT GUIDE FOR PLACEMENT RADIATION THERAPY FIELDS: CPT | Mod: 26 | Performed by: RADIOLOGY

## 2022-07-20 PROCEDURE — 77386 HC IMRT TREATMENT DELIVERY, COMPLEX: CPT | Performed by: RADIOLOGY

## 2022-07-20 PROCEDURE — 77336 RADIATION PHYSICS CONSULT: CPT | Performed by: RADIOLOGY

## 2022-07-21 ENCOUNTER — OFFICE VISIT (OUTPATIENT)
Dept: RADIATION ONCOLOGY | Facility: CLINIC | Age: 62
End: 2022-07-21
Attending: RADIOLOGY
Payer: COMMERCIAL

## 2022-07-21 VITALS
HEART RATE: 78 BPM | WEIGHT: 284.6 LBS | BODY MASS INDEX: 40.84 KG/M2 | SYSTOLIC BLOOD PRESSURE: 122 MMHG | DIASTOLIC BLOOD PRESSURE: 78 MMHG

## 2022-07-21 DIAGNOSIS — C11.9 NASOPHARYNGEAL CANCER (H): Primary | ICD-10-CM

## 2022-07-21 PROCEDURE — 97803 MED NUTRITION INDIV SUBSEQ: CPT | Mod: XU | Performed by: DIETITIAN, REGISTERED

## 2022-07-21 PROCEDURE — 77386 HC IMRT TREATMENT DELIVERY, COMPLEX: CPT | Performed by: RADIOLOGY

## 2022-07-21 NOTE — LETTER
Date:July 22, 2022      Patient was self referred, no letter generated. Do not send.        [facility] Health Information

## 2022-07-21 NOTE — LETTER
2022         RE: Jareth Gallardo  [id number] [place] [address]        Dear Colleague,    Thank you for referring your patient, Jareth Gallardo, to the [facility] RADIATION ONCOLOGY. Please see a copy of my visit note below.    RADIATION ONCOLOGY WEEKLY ON TREATMENT VISIT   Encounter Date: 2022    Patient Name: Jareth Gallardo  MRN: [id number]  : 1960     Disease and Stage: cT2 N2 M0 nasopharyngeal carcinoma  Treatment Site: Nasopharynx and bilateral neck  Current Dose/Planned Total Dose: 4200 / 7000 cGy  Daily Fraction Size: 200 cGy/day, 5 times/week  Concurrent Chemotherapy: Yes  Drug and Frequency: Carboplatin weekly    Treatment Summary:    22: Initiation of radiotherapy. First infusion of weekly carboplatin.    22: Weekly RT visit. Current dose of 400 cGy. Tolerating treatment well.     22: Second infusion of weekly carboplatin    22: Weekly RT visit. Current dose of 1400 cGy. Tolerating treatment well.    22: Third infusion of weekly carboplatin    22: Weekly RT visit. Current dose of 2200 cGy. Moderate odynophagia. 3.5 kg weight loss over the past week.    22: Fourth infusion of weekly carboplatin    22: Weekly RT visit. Current dose of 3200 cGy. Mild odynophagia. Stable weight.    22: Fifth infusion of weekly carboplatin    22: Weekly RT visit. Current dose of 4200 cGy. Mild odynophagia. 1kg weight loss.    ED visits/Hospitalizations:  None    Missed Treatments:  1. 2022: 1-day break between fractions 8-9 secondary to the  holiday    Subjective: Mr. Gallardo presents to clinic today for his weekly on-treatment visit. He describes mild oropharyngeal pain that he rates as a 2/10 in severity. This is not particularly bothersome to him and he is not currently requiring any pain medications for his symptoms. He is eating a regular diet without difficulty and his weight is down by 1 kg over  the past week. His remaining ROS are positive for mild nausea without vomiting following chemotherapy which self-resolves along with mild to moderate dermatitis of the bilateral neck.    ROS:   Constitutional  Pain (0-10): 2 (mouth), 2 (throat), 1 (skin)   Fatigue: Mild    CNS  Headaches: None    ENT  Mucositis: Mild    Cardiopulmonary  Dyspnea: None    GI  Nausea/vomiting: Mild nausea without vomiting    Nutrition  PEG: No  Diet: Soft    Integumentary  Dermatitis: Mild to moderate    Objective:   Current weight: 129.1 kg  Last week's weight: 130.9 kg  Starting weight: 138.8 kg    BP: 122/78 (sitting), 118/76 (standing)  Pulse: 78 (sitting), 76 (standing)     General: Healthy-appearing 61-year-old gentleman seated comfortably in an examination chair in no acute distress  HEENT: NC/AT. Mild conjunctival injection of the right eye. No tearing. No rhinorrhea or epistaxis. Mildly dry mucous membranes with mildly thickened secretions. Mild erythema of the posterior soft palate and oropharyngeal wall with patchy mucositis change. No thrush.  Pulmonary: No wheezing, stridor or respiratory distress  Skin: Mild to moderate erythema of the bilateral neck. No desquamation.    Treatment-related toxicities (CTCAE v5.0):  Mucositis: Grade 2  Dermatitis: Grade 1  Dry eye: Grade 1    Assessment:    Mr. Gallardo is a 61 year old male with a cT2 N2 M0 nasopharyngeal carcinoma. He is status post 3 cycles of induction carboplatin/gemcitabine with a good partial response to therapy. He is now receiving concurrent chemoradiotherapy with curative intent. He is tolerating treatment reasonably well with fairly mild radiation-induced mucositis and dermatitis.    Plan:   cT2 N2 M0 nasopharyngeal carcinoma:    Continue chemoradiotherapy    Pain management:    Continue acetaminophen and viscous lidocaine as needed for mild to moderate pain    Fluids/Electrolytes/Nutrition:    Continue diet as tolerated    Dermatitis:    Twice daily Aquaphor  application to the bilateral neck    Mosaiq chart and setup information reviewed  IGRT images reviewed    Medication list reviewed    The patient was seen and discussed with staff, Dr. Shankar.    Fadumo Benjamin MD  Resident, PGY-5  Department of Radiation Oncology  [facility]    I saw the patient with the resident.  I agree with the resident's note and plan of care.      REGINALD Shankar M.D.  Department of Radiation Oncology  [facility]        Again, thank you for allowing me to participate in the care of your patient.        Sincerely,        Cooper Shankar MD

## 2022-07-21 NOTE — PROGRESS NOTES
RADIATION ONCOLOGY WEEKLY ON TREATMENT VISIT   Encounter Date: 2022    Patient Name: Jareth Gallardo  MRN: [id number]  : 1960     Disease and Stage: cT2 N2 M0 nasopharyngeal carcinoma  Treatment Site: Nasopharynx and bilateral neck  Current Dose/Planned Total Dose: 4200 / 7000 cGy  Daily Fraction Size: 200 cGy/day, 5 times/week  Concurrent Chemotherapy: Yes  Drug and Frequency: Carboplatin weekly    Treatment Summary:    22: Initiation of radiotherapy. First infusion of weekly carboplatin.    22: Weekly RT visit. Current dose of 400 cGy. Tolerating treatment well.     22: Second infusion of weekly carboplatin    22: Weekly RT visit. Current dose of 1400 cGy. Tolerating treatment well.    22: Third infusion of weekly carboplatin    22: Weekly RT visit. Current dose of 2200 cGy. Moderate odynophagia. 3.5 kg weight loss over the past week.    22: Fourth infusion of weekly carboplatin    22: Weekly RT visit. Current dose of 3200 cGy. Mild odynophagia. Stable weight.    22: Fifth infusion of weekly carboplatin    22: Weekly RT visit. Current dose of 4200 cGy. Mild odynophagia. 1kg weight loss.    ED visits/Hospitalizations:  None    Missed Treatments:  1. 2022: 1-day break between fractions 8-9 secondary to the  holiday    Subjective: Mr. Gallardo presents to clinic today for his weekly on-treatment visit. He describes mild oropharyngeal pain that he rates as a 2/10 in severity. This is not particularly bothersome to him and he is not currently requiring any pain medications for his symptoms. He is eating a regular diet without difficulty and his weight is down by 1 kg over the past week. His remaining ROS are positive for mild nausea without vomiting following chemotherapy which self-resolves along with mild to moderate dermatitis of the bilateral neck.    ROS:   Constitutional  Pain (0-10): 2 (mouth), 2 (throat), 1 (skin)    Fatigue: Mild    CNS  Headaches: None    ENT  Mucositis: Mild    Cardiopulmonary  Dyspnea: None    GI  Nausea/vomiting: Mild nausea without vomiting    Nutrition  PEG: No  Diet: Soft    Integumentary  Dermatitis: Mild to moderate    Objective:   Current weight: 129.1 kg  Last week's weight: 130.9 kg  Starting weight: 138.8 kg    BP: 122/78 (sitting), 118/76 (standing)  Pulse: 78 (sitting), 76 (standing)     General: Healthy-appearing 61-year-old gentleman seated comfortably in an examination chair in no acute distress  HEENT: NC/AT. Mild conjunctival injection of the right eye. No tearing. No rhinorrhea or epistaxis. Mildly dry mucous membranes with mildly thickened secretions. Mild erythema of the posterior soft palate and oropharyngeal wall with patchy mucositis change. No thrush.  Pulmonary: No wheezing, stridor or respiratory distress  Skin: Mild to moderate erythema of the bilateral neck. No desquamation.    Treatment-related toxicities (CTCAE v5.0):  Mucositis: Grade 2  Dermatitis: Grade 1  Dry eye: Grade 1    Assessment:    Mr. Gallardo is a 61 year old male with a cT2 N2 M0 nasopharyngeal carcinoma. He is status post 3 cycles of induction carboplatin/gemcitabine with a good partial response to therapy. He is now receiving concurrent chemoradiotherapy with curative intent. He is tolerating treatment reasonably well with fairly mild radiation-induced mucositis and dermatitis.    Plan:   cT2 N2 M0 nasopharyngeal carcinoma:    Continue chemoradiotherapy    Pain management:    Continue acetaminophen and viscous lidocaine as needed for mild to moderate pain    Fluids/Electrolytes/Nutrition:    Continue diet as tolerated    Dermatitis:    Twice daily Aquaphor application to the bilateral neck    Mosaiq chart and setup information reviewed  IGRT images reviewed    Medication list reviewed    The patient was seen and discussed with staff, Dr. Shankar.    Fadumo Benjamin MD  Resident, PGY-5  Department of Radiation  Oncology  [facility]    I saw the patient with the resident.  I agree with the resident's note and plan of care.      REGINALD Shankar M.D.  Department of Radiation Oncology  [facility]

## 2022-07-21 NOTE — PROGRESS NOTES
CLINICAL NUTRITION SERVICES - REASSESSMENT NOTE   EVALUATION OF PREVIOUS PLAN OF CARE:   Referring Physician: Danni  Time spent with patient: 15 minutes.    Current diet: regular diet  Current appetite/intake: Good appetite/intake  PEG Tube: No  Chemotherapy: Weekly carboplatin - started 6/22/22 - ~3 week remaining  Radiation: started 6/22/22      Monitoring from previous assessment:   -Food intake - Rich tells me that he has been eating all food types and textures.  He denies decrease in volumes despite notable weight loss.  He tells me that he was able to eat a full Whopper sandwich from [employer] yesterday without difficulty and it also tasted well.  His brother admits that patient has been eating very well.   He admits to feeling crumby for 2-3 days on day 3 after chemo.    -Fluid/beverage intake - He has been drinking at least 8-10 cups water/gatorade daily.    -Liquid meal replacement or supplement - NA  -Weight trends - down 10 lb (4%) x past 2 weeks  Wt Readings from Last 7 Encounters:   07/21/22 129.1 kg (284 lb 9.6 oz)   07/18/22 131.6 kg (290 lb 1.6 oz)   07/14/22 130.9 kg (288 lb 9.6 oz)   07/12/22 131.8 kg (290 lb 8 oz)   07/07/22 130.6 kg (288 lb)   07/07/22 131.4 kg (289 lb 11.2 oz)   07/05/22 133.4 kg (294 lb 3.2 oz)     Previous Goals:   1. Aim for at least 3000 calories, 125g protein and 12 cups water/electrolyte fluids daily  2. Weight stability    Evaluation: Not met   Previous Nutrition Diagnosis:   Predicted suboptimal nutrient intake related to cancer treatment to head/neck region   Evaluation: No change   NEW FINDINGS:   --Weight loss   CURRENT NUTRITION DIAGNOSIS   Predicted suboptimal nutrient intake related to cancer treatment to head/neck region   INTERVENTIONS   Recommendations / Nutrition Prescription   1. Start drinking one ONS shake/day     Implementation  Medical Food Supplement - reviewed ONS to try.  Encouraged to start drinking one per day due to notable weight loss despite  denial of decreased intake.  Provided high carrillo/high protein suggestions.  Reviewed total calorie, protein and hydration goals with CRT.   Goals:  1. Aim for at least 3000 calories, 125g protein and 12 cups water/electrolyte fluids daily  2. Weight stability     Monitor and Evaluation:  Patient has RD contact information for further nutrition questions/concerns. Scheduled for 3 week follow-up 8/11 at 1:45     Deb Maloney RD, LD  [facility]

## 2022-07-22 ENCOUNTER — APPOINTMENT (OUTPATIENT)
Dept: RADIATION ONCOLOGY | Facility: CLINIC | Age: 62
End: 2022-07-22
Attending: RADIOLOGY
Payer: COMMERCIAL

## 2022-07-22 PROCEDURE — 77386 HC IMRT TREATMENT DELIVERY, COMPLEX: CPT | Performed by: RADIOLOGY

## 2022-07-22 PROCEDURE — 77014 PR CT GUIDE FOR PLACEMENT RADIATION THERAPY FIELDS: CPT | Mod: 26 | Performed by: RADIOLOGY

## 2022-07-22 NOTE — PROGRESS NOTES
Oncology Progress Note  July 25, 2022    Reason for Visit: seen in f/u of nasopharyngeal carcinoma    Oncology HPI:     Mr. Gallardo is a 61 year old man who presents for evaluation prior to starting chemoradiation. He was previously treated with 3 cycles of carboplatin/gemcitabine with a UT on PET/CT.  Cisplatin was not used due to hearing loss on audiogram. He began concurrent chemoradiation with weekly carboplatin 6/22/22.    SUMMARY  12/11/21                   [facility] for L neck mass, selling  12/9/21                     CT neck ([facility]). 2.4 cm L neck node medial to SCM just above the hyoid, additional LNs surround, scattered R neck and L supraclavicular LNs  2/15/22                     US bx L cervical node. Path: SCC, moderately differentiated,   3/4/22                       PET/CT. Nasopharyngeal and oropharyngeal mucosal thickening. 2.6 cm BERNARDO nodular GGO, SUV avid, some additional lingular uptake  4/4/22                       C1 carboplatin gemcitabine. Not a candidate for cisplatin due to hearing loss on audiogram.  4/26/22                     C2 carboplatin gemcitabine. Neulasta 5/3 after C2D8, ANC 1000   5/16/22                     C3 carboplatin gemcitabine.   5/31/22: PET with decreased size of nasopharyngeal tumor w/ decreased FDG uptake and decrease in size and number of involved cervical lymph nodes  6/22/22: initiate definitive chemoradiation with weekly carboplatin    Interval history:   Jareth is seen today for consideration of week 6 carboplatin. Continues to tolerate chemotherapy well with no acute effects. Throat pain has increased somewhat although still not requiring OTC medications for pain. Drinks water and cold fluids frequently to numb the throat which is helpful for discomfort. Primary issue with eating is loss of taste. Weight down a few pounds this week. Had diarrhea all day yesterday. Nausea is intermittent with occasional vomiting a few times a week first thing in the morning when  taking pills or drinking water.      10 point review of systems otherwise negative    PAST MEDICAL HISTORY  Nasopharyngeal carcinoma as above  DM2  COPD??  Hiatal hernia. Sleeps with his head elevated  LVH on TTE 2009, EF50%, LA Dilation  Dyslipidemia  BARRETT in 2015. Not using CPAP due to developing tooth abscesses when he tried it in 2015  Venous insufficiency in both legs being in an accident involving a mower about 10 years ago, on chronic furosemide. LE US negative 12/15/2009  Cholelithiasis  Hyperplastic colon polyp, due 2020  Tonsillectomy  Baker cyst RLE US 2013     Neuropathy from DM - R foot numbness chronically     Exposed to lots of wood dust in his occupation       Current Outpatient Medications   Medication Sig Dispense Refill     prochlorperazine (COMPAZINE) 10 MG tablet Take 1 tablet (10 mg) by mouth every 6 hours as needed (Nausea/Vomiting) 30 tablet 5     atorvastatin (LIPITOR) 20 MG tablet Take 20 mg by mouth       B-D U/F insulin pen needle USE ONE TIME A DAY TO INJECT VICTOZA       blood glucose (NO BRAND SPECIFIED) lancets standard Use to test blood sugar 2 times daily or as directed. 90 lancet 1     blood glucose (NO BRAND SPECIFIED) test strip Use to test blood sugar 3 times daily or as directed. 90 strip 0     blood glucose monitoring (ACCU-CHEK RANDELL PLUS) meter device kit Use to test blood sugar 2 times daily or as directed. 1 kit 0     fluticasone-vilanterol (BREO ELLIPTA) 100-25 MCG/INH inhaler INHALE 1 PUFF INTO THE LUNGS ONE TIME A DAY. RINSE MOUTH AFTER USE.       furosemide (LASIX) 20 MG tablet Take 40 mg by mouth       insulin lispro (HUMALOG KWIKPEN) 100 UNIT/ML (1 unit dial) KWIKPEN 1 unit per 50 above 150 correction after chemo. TDD 20 units       lidocaine, viscous, (XYLOCAINE) 2 % solution Swish and swallow 15 mLs in mouth every 6 hours as needed for moderate pain ; Max 8 doses/24 hour period. 400 mL 1     metFORMIN (GLUCOPHAGE XR) 500 MG 24 hr tablet Take 2 tabs daily. Swallow  tablet whole; do not crush, divide or chew.       metFORMIN (GLUCOPHAGE) 1000 MG tablet TAKE 1 TABLET BY MOUTH ONE TIME A DAY. TAKE WITH FOOD.       omeprazole (PRILOSEC) 20 MG DR capsule Take 1 capsule (20 mg) by mouth daily 30 capsule 1     ondansetron (ZOFRAN) 8 MG tablet Take 1 tablet (8 mg) by mouth every 8 hours as needed for nausea 30 tablet 11     VICTOZA PEN 18 MG/3ML soln Inject 1.2 mg Subcutaneous daily          No Known Allergies    Exam:    Blood pressure 115/66, pulse 100, temperature 98.4  F (36.9  C), temperature source Oral, resp. rate 16, weight 127.5 kg (281 lb 1.6 oz), SpO2 95 %.  Wt Readings from Last 4 Encounters:   07/25/22 127.5 kg (281 lb 1.6 oz)   07/21/22 129.1 kg (284 lb 9.6 oz)   07/18/22 131.6 kg (290 lb 1.6 oz)   07/14/22 130.9 kg (288 lb 9.6 oz)     General: Pleasant male, NAD.   HEENT: Sclera anicteric. Oral mucosa dry. Confluent erythema to posterior oropharynx.  Heart: Regular, rate, and rhythm. No murmur.  Lungs: Clear to ascultation bilaterally.   Extremities: trace bilateral extremity edema  Neuro: Cranial nerves grossly intact  Skin: mild radiation dermatitis to bilateral neck  Vascular access: port    Labs:   Most Recent 3 CBC's:  Recent Labs   Lab Test 07/25/22  0710 07/18/22  0913 07/12/22  0849   WBC 4.5 4.4 3.9*   HGB 12.2* 12.1* 12.0*   MCV 88 90 91   PLT 89* 125* 179   ANEUTAUTO 3.5 3.4 2.8     Most Recent 3 BMP's:  Recent Labs   Lab Test 07/25/22  0710 07/18/22  0913 07/12/22  0849    139 141   POTASSIUM 3.1* 3.9 3.4   CHLORIDE 102 107 102   CO2 31 30 32   BUN 13 13 13   CR 0.73 0.64* 0.70   ANIONGAP 9 2* 7   LAI 8.9 8.8 8.8   * 143* 147*   PROTTOTAL 7.6 7.3 7.6   ALBUMIN 3.2* 3.0* 3.1*    Most Recent 3 LFT's:  Recent Labs   Lab Test 07/25/22  0710 07/18/22  0913 07/12/22  0849   AST 47* 50* 42   ALT 53 57 51   ALKPHOS 82 78 78   BILITOTAL 1.0 0.5 0.6    Most Recent 2 TSH and T4:No lab results found.    I reviewed the above labs today.    Imaging:  n/a    Impression/plan:   Nasopharyngeal carcinoma, SAM +julia:   With LA to 3 cycles of Carbo/[place]  -began chemoradiation with weekly carboplatin 6/22/22  -tolerating carbo well, labs and assessment ok for week 6  -virtual visit with Dr. Melvin later this week which he prefers to keep  -per discussion with Dr. Melvin, will plan for tentative week 7 carboplatin next week if he is feeling wll as radiation is scheduled through 8/11/22. With downtrending platelets today, may not meet chemo parameters next week but will still plan on labs/LEONEL visit at that time.  -continue fluid overload as he had issues with that before  -will have weekly LEONEL visits and f/u with Dr. Melvin mid-treatment, post treatment PET/CT anticipated at 12 weeks    Hypokalemia: replace PP today     Transaminitis: G1.  -possibly from gemcitabine, likely some decrease of steatosis contributing  -Mild increase in AST today. Monitor. Reviewed hepatology from 7/7/22.     BLE edema: reports a hx of compression injury to the thighs in the past that contributed to chronic edema  - TTE 4/1/22 technically difficult with limited information but EF 53%, mild RVD, inferolateral/posterior akinesis which was not noted on 2009 TTE in Care Everywhere.   -Currently stable. Continue furosemide 40 mg daily.     Nausea/vomiting, acid reflux  Much better with addition of omeprazole 20 mg, continue      L lung nodules: Resolved.   -thought to be inflammatory. Will pay attention to this on follow-up imaging.     DM2, steroid-induced hyperglycemia:  -managing ok right now. Sugars stable in the 120-130 range  -remains on metformin, victoza     Bowel irregularity  -fluctuates between diarrhea and constipation  -able to control constipation by increasing fluid intake  -likely metforming and victoza are contributing. monitor      Mild peripheral neuropathy: R foot numbness, very mild, from DM2.   -stable    50 minutes spent on the date of the encounter doing chart review,  review of test results, interpretation of tests, patient visit, documentation and discussion with family     Jany Mtz CNP  [facility] [address] [id number] [phone]

## 2022-07-24 ENCOUNTER — HEALTH MAINTENANCE LETTER (OUTPATIENT)
Age: 62
End: 2022-07-24

## 2022-07-25 ENCOUNTER — ONCOLOGY VISIT (OUTPATIENT)
Dept: ONCOLOGY | Facility: CLINIC | Age: 62
End: 2022-07-25
Attending: REGISTERED NURSE
Payer: COMMERCIAL

## 2022-07-25 ENCOUNTER — APPOINTMENT (OUTPATIENT)
Dept: LAB | Facility: CLINIC | Age: 62
End: 2022-07-25
Attending: REGISTERED NURSE
Payer: COMMERCIAL

## 2022-07-25 ENCOUNTER — THERAPY VISIT (OUTPATIENT)
Dept: SPEECH THERAPY | Facility: CLINIC | Age: 62
End: 2022-07-25
Payer: COMMERCIAL

## 2022-07-25 ENCOUNTER — APPOINTMENT (OUTPATIENT)
Dept: RADIATION ONCOLOGY | Facility: CLINIC | Age: 62
End: 2022-07-25
Attending: RADIOLOGY
Payer: COMMERCIAL

## 2022-07-25 VITALS
BODY MASS INDEX: 40.33 KG/M2 | OXYGEN SATURATION: 95 % | HEART RATE: 100 BPM | DIASTOLIC BLOOD PRESSURE: 66 MMHG | WEIGHT: 281.1 LBS | SYSTOLIC BLOOD PRESSURE: 115 MMHG | RESPIRATION RATE: 16 BRPM | TEMPERATURE: 98.4 F

## 2022-07-25 DIAGNOSIS — K21.9 GASTROESOPHAGEAL REFLUX DISEASE WITHOUT ESOPHAGITIS: ICD-10-CM

## 2022-07-25 DIAGNOSIS — R11.2 NAUSEA AND VOMITING, INTRACTABILITY OF VOMITING NOT SPECIFIED, UNSPECIFIED VOMITING TYPE: ICD-10-CM

## 2022-07-25 DIAGNOSIS — E11.29 TYPE 2 DIABETES MELLITUS WITH MICROALBUMINURIA, WITHOUT LONG-TERM CURRENT USE OF INSULIN (H): ICD-10-CM

## 2022-07-25 DIAGNOSIS — C11.9 NASOPHARYNGEAL CANCER (H): Primary | ICD-10-CM

## 2022-07-25 DIAGNOSIS — R80.9 TYPE 2 DIABETES MELLITUS WITH MICROALBUMINURIA, WITHOUT LONG-TERM CURRENT USE OF INSULIN (H): ICD-10-CM

## 2022-07-25 DIAGNOSIS — E87.6 HYPOKALEMIA: ICD-10-CM

## 2022-07-25 DIAGNOSIS — D70.1 CHEMOTHERAPY-INDUCED NEUTROPENIA (H): ICD-10-CM

## 2022-07-25 DIAGNOSIS — R79.89 ELEVATED LFTS: ICD-10-CM

## 2022-07-25 DIAGNOSIS — C11.9 NASOPHARYNGEAL CANCER (H): ICD-10-CM

## 2022-07-25 DIAGNOSIS — R13.12 OROPHARYNGEAL DYSPHAGIA: Primary | ICD-10-CM

## 2022-07-25 DIAGNOSIS — T45.1X5A CHEMOTHERAPY-INDUCED NEUTROPENIA (H): ICD-10-CM

## 2022-07-25 DIAGNOSIS — D69.6 THROMBOCYTOPENIA (H): ICD-10-CM

## 2022-07-25 LAB
ALBUMIN SERPL-MCNC: 3.2 G/DL (ref 3.4–5)
ALP SERPL-CCNC: 82 U/L (ref 40–150)
ALT SERPL W P-5'-P-CCNC: 53 U/L (ref 0–70)
ANION GAP SERPL CALCULATED.3IONS-SCNC: 9 MMOL/L (ref 3–14)
AST SERPL W P-5'-P-CCNC: 47 U/L (ref 0–45)
BASOPHILS # BLD AUTO: 0 10E3/UL (ref 0–0.2)
BASOPHILS NFR BLD AUTO: 0 %
BILIRUB SERPL-MCNC: 1 MG/DL (ref 0.2–1.3)
BUN SERPL-MCNC: 13 MG/DL (ref 7–30)
CALCIUM SERPL-MCNC: 8.9 MG/DL (ref 8.5–10.1)
CHLORIDE BLD-SCNC: 102 MMOL/L (ref 94–109)
CO2 SERPL-SCNC: 31 MMOL/L (ref 20–32)
CREAT SERPL-MCNC: 0.73 MG/DL (ref 0.66–1.25)
EOSINOPHIL # BLD AUTO: 0 10E3/UL (ref 0–0.7)
EOSINOPHIL NFR BLD AUTO: 0 %
ERYTHROCYTE [DISTWIDTH] IN BLOOD BY AUTOMATED COUNT: 15.9 % (ref 10–15)
GFR SERPL CREATININE-BSD FRML MDRD: >90 ML/MIN/1.73M2
GLUCOSE BLD-MCNC: 140 MG/DL (ref 70–99)
HCT VFR BLD AUTO: 38.7 % (ref 40–53)
HGB BLD-MCNC: 12.2 G/DL (ref 13.3–17.7)
IMM GRANULOCYTES # BLD: 0 10E3/UL
IMM GRANULOCYTES NFR BLD: 0 %
LYMPHOCYTES # BLD AUTO: 0.5 10E3/UL (ref 0.8–5.3)
LYMPHOCYTES NFR BLD AUTO: 10 %
MCH RBC QN AUTO: 27.9 PG (ref 26.5–33)
MCHC RBC AUTO-ENTMCNC: 31.5 G/DL (ref 31.5–36.5)
MCV RBC AUTO: 88 FL (ref 78–100)
MONOCYTES # BLD AUTO: 0.5 10E3/UL (ref 0–1.3)
MONOCYTES NFR BLD AUTO: 10 %
NEUTROPHILS # BLD AUTO: 3.5 10E3/UL (ref 1.6–8.3)
NEUTROPHILS NFR BLD AUTO: 80 %
NRBC # BLD AUTO: 0 10E3/UL
NRBC BLD AUTO-RTO: 0 /100
PLATELET # BLD AUTO: 89 10E3/UL (ref 150–450)
POTASSIUM BLD-SCNC: 3.1 MMOL/L (ref 3.4–5.3)
PROT SERPL-MCNC: 7.6 G/DL (ref 6.8–8.8)
RBC # BLD AUTO: 4.38 10E6/UL (ref 4.4–5.9)
SODIUM SERPL-SCNC: 142 MMOL/L (ref 133–144)
WBC # BLD AUTO: 4.5 10E3/UL (ref 4–11)

## 2022-07-25 PROCEDURE — 92526 ORAL FUNCTION THERAPY: CPT | Mod: GN | Performed by: SPEECH-LANGUAGE PATHOLOGIST

## 2022-07-25 PROCEDURE — 250N000013 HC RX MED GY IP 250 OP 250 PS 637: Performed by: REGISTERED NURSE

## 2022-07-25 PROCEDURE — 99215 OFFICE O/P EST HI 40 MIN: CPT | Performed by: REGISTERED NURSE

## 2022-07-25 PROCEDURE — 77386 HC IMRT TREATMENT DELIVERY, COMPLEX: CPT | Performed by: RADIOLOGY

## 2022-07-25 PROCEDURE — G0463 HOSPITAL OUTPT CLINIC VISIT: HCPCS

## 2022-07-25 PROCEDURE — 250N000011 HC RX IP 250 OP 636: Performed by: REGISTERED NURSE

## 2022-07-25 PROCEDURE — 96413 CHEMO IV INFUSION 1 HR: CPT

## 2022-07-25 PROCEDURE — 36591 DRAW BLOOD OFF VENOUS DEVICE: CPT

## 2022-07-25 PROCEDURE — 96375 TX/PRO/DX INJ NEW DRUG ADDON: CPT

## 2022-07-25 PROCEDURE — 258N000003 HC RX IP 258 OP 636: Performed by: INTERNAL MEDICINE

## 2022-07-25 PROCEDURE — 250N000011 HC RX IP 250 OP 636: Performed by: INTERNAL MEDICINE

## 2022-07-25 PROCEDURE — 80053 COMPREHEN METABOLIC PANEL: CPT

## 2022-07-25 PROCEDURE — 85004 AUTOMATED DIFF WBC COUNT: CPT | Performed by: INTERNAL MEDICINE

## 2022-07-25 RX ORDER — POTASSIUM CHLORIDE 1.5 G/1.58G
40 POWDER, FOR SOLUTION ORAL ONCE
Status: COMPLETED | OUTPATIENT
Start: 2022-07-25 | End: 2022-07-25

## 2022-07-25 RX ORDER — HEPARIN SODIUM (PORCINE) LOCK FLUSH IV SOLN 100 UNIT/ML 100 UNIT/ML
5 SOLUTION INTRAVENOUS
Status: DISCONTINUED | OUTPATIENT
Start: 2022-07-25 | End: 2022-07-25 | Stop reason: HOSPADM

## 2022-07-25 RX ORDER — HEPARIN SODIUM (PORCINE) LOCK FLUSH IV SOLN 100 UNIT/ML 100 UNIT/ML
5 SOLUTION INTRAVENOUS ONCE
Status: COMPLETED | OUTPATIENT
Start: 2022-07-25 | End: 2022-07-25

## 2022-07-25 RX ORDER — PROCHLORPERAZINE MALEATE 10 MG
10 TABLET ORAL EVERY 6 HOURS PRN
Qty: 30 TABLET | Refills: 5 | Status: SHIPPED | OUTPATIENT
Start: 2022-07-25 | End: 2022-11-30

## 2022-07-25 RX ORDER — HEPARIN SODIUM,PORCINE 10 UNIT/ML
5 VIAL (ML) INTRAVENOUS
Status: DISCONTINUED | OUTPATIENT
Start: 2022-07-25 | End: 2022-07-25 | Stop reason: HOSPADM

## 2022-07-25 RX ADMIN — Medication 5 ML: at 07:03

## 2022-07-25 RX ADMIN — SODIUM CHLORIDE 250 ML: 9 INJECTION, SOLUTION INTRAVENOUS at 08:47

## 2022-07-25 RX ADMIN — CARBOPLATIN 300 MG: 10 INJECTION, SOLUTION INTRAVENOUS at 09:30

## 2022-07-25 RX ADMIN — POTASSIUM CHLORIDE 40 MEQ: 1.5 POWDER, FOR SOLUTION ORAL at 10:07

## 2022-07-25 RX ADMIN — DEXAMETHASONE SODIUM PHOSPHATE: 10 INJECTION, SOLUTION INTRAMUSCULAR; INTRAVENOUS at 08:48

## 2022-07-25 RX ADMIN — Medication 5 ML: at 10:04

## 2022-07-25 ASSESSMENT — PAIN SCALES - GENERAL: PAINLEVEL: NO PAIN (0)

## 2022-07-25 NOTE — PROGRESS NOTES
Infusion Nursing Note:  Jareth Gallardo presents today for Day 36 Cycle 1 Carboplatin (#9 total)   Patient seen by provider today: Yes: Jany Mtz CNP   present during visit today: Not Applicable.    Note: Patient presents to infusion feeling well. Patient denies acute discomfort and states no acute complaints or concerns not addressed during provider visit today.    Intravenous Access:  Implanted Port.    Treatment Conditions:  Lab Results   Component Value Date    HGB 12.2 (L) 07/25/2022    WBC 4.5 07/25/2022    ANEU 7.0 05/16/2022    ANEUTAUTO 3.5 07/25/2022    PLT 89 (L) 07/25/2022      Lab Results   Component Value Date     07/25/2022    POTASSIUM 3.1 (L) 07/25/2022    MAG 1.8 06/28/2022    CR 0.73 07/25/2022    LAI 8.9 07/25/2022    BILITOTAL 1.0 07/25/2022    ALBUMIN 3.2 (L) 07/25/2022    ALT 53 07/25/2022    AST 47 (H) 07/25/2022     Results reviewed, labs MET treatment parameters, ok to proceed with treatment.    Post Infusion Assessment:  Patient tolerated infusion without incident.  Blood return noted pre and post infusion.  Site patent and intact, free from redness, edema or discomfort.  No evidence of extravasations.  Access discontinued per protocol.     Discharge Plan:   Prescription refills given for Compazine.  Discharge instructions reviewed with: Patient.  Patient and/or family verbalized understanding of discharge instructions and all questions answered.  AVS to patient via [employer].  Patient will return possible next week for next appointment since he has 2 more weeks of radiation left per Jany Mtz CNP.   Patient discharged in stable condition accompanied by: self.  Departure Mode: Ambulatory.  Face to Face time: 0 minutes.      Luis Correa RN

## 2022-07-25 NOTE — PATIENT INSTRUCTIONS
[facility] Triage and after hours / weekends / holidays:  [phone]    Please call the triage or after hours line if you experience a temperature greater than or equal to 100.4, shaking chills, have uncontrolled nausea, vomiting and/or diarrhea, dizziness, shortness of breath, chest pain, bleeding, unexplained bruising, or if you have any other new/concerning symptoms, questions or concerns.      If you are having any concerning symptoms or wish to speak to a provider before your next infusion visit, please call your care coordinator or triage to notify them so we can adequately serve you.     If you need a refill on a narcotic prescription or other medication, please call before your infusion appointment.                 July 2022 Sunday Monday Tuesday Wednesday Thursday Friday Saturday                            1    LAB CENTRAL  10:15 AM   (15 min.)   [facility] LAB DRAW   [facility]    RETURN  10:30 AM   (45 min.)   Jany Mtz CNP   [facility]    TREATMENT   1:45 PM   (15 min.)   [facility] ONC [facility] 2       3     4     5    LAB CENTRAL  10:15 AM   (15 min.)   [facility]RAW   [facility]    ONC INFUSION 2 HR (120 MIN)  10:30 AM   (120 min.)    ONC INFUSION NURSE   [facility]    SWALLOW TREATMENT  11:00 AM   (30 min.)   Trinity Winters, SLP   [facility]    TREATMENT   1:45 PM   (15 min.)   [facility] ONC [facility] 6    TREATMENT   1:45 PM   (15 min.)   [facility] 7    NEW LIVER  10:45 AM   (30 min.)   Marianna Cm MD   [facility]    TREATMENT   1:45 PM   (15 min.)   [facility]    OTV   2:00  PM   (15 min.)   Aniya Astorga MD   [facility] Radiation [facility] 8    TREATMENT   9:15 AM   (15 min.)   [facility] ONC [facility]iation On[facility] 9       10     11    TREATMENT   1:45 PM   (15 min.)   [facility] 12    LAB CENTRAL   8:30 AM   (15 min.)   [facility]RAW   [facility]    RETURN   8:45 AM   (45 min.)   Maryjo Polanco CNP   [facility]    ONC INFUSION 2 HR (120 MIN)  10:30 AM   (120 min.)   [facility] INFUSION NURSE   [facility]    SWALLOW TREATMENT  11:30 AM   (30 min.)   Trinity Winters, SLP   [facility]    TREATMENT   1:45 PM   (15 min.)   [facility] ON[facility] 13    TREATMENT   1:45 PM   (15 min.)   [facility] 14    TREATMENT   1:45 PM   (15 min.)   [facility]    OTV   2:00 PM   (15 min.)   Mane Razo MD   [facility] 15    TREATMENT   9:30 AM   (15 min.)   [facility] 16       17     18    LAB CENTRAL   8:45 AM   (15 min.)   [facility]   [facility]    RETURN   9:00 AM   (45 min.)   Jany Mtz CNP   [facility]    ONC INFUSION 2 HR (120 MIN)  10:00 AM   (120 min.)   [facility] ON[facility] INFUSION NURSE   [facility]    SWALLOW TREATMENT  11:00 AM   (30 min.)   Haily Hare, SLP   [facility]    TREATMENT   1:45 PM   (15 min.)   [facility]iation [facility] 19    TREATMENT   1:45 PM   (15 min.)   [facility]  20    TREATMENT   1:45 PM   (15 min.)   [facility] RAD ONC [facility] Radiation Oncology 21    TREATMENT   1:45 PM   (15 min.)   [facility] ONC [facility]    UMP NUTRITION VISIT   2:00 PM   (15 min.)   Deb Schultz RD   [facility]    OTV   2:00 PM   (15 min.)   Cooper Shankar MD   [facility]iation [facility] 22    TREATMENT   1:45 PM   (15 min.)   [facility] ON[facility] Radiation Oncology 23       24     25    LAB CENTRAL   6:45 AM   (15 min.)   [facility]   [facility]    RETURN   7:00 AM   (45 min.)   aJny Mtz CNP   [facility]    ONC INFUSION 2 HR (120 MIN)   8:00 AM   (120 min.)   [facility] ONC INFUSION NURSE   [facility]    SWALLOW TREATMENT   9:00 AM   (30 min.)   Haily Hare SLP   [facility]    TREATMENT   1:45 PM   (15 min.)   [facility] ONC [facility]iation Oncology 26    TREATMENT   1:45 PM   (15 min.)   [facility] ONC [facility]iation On[facility] 27     28    VIDEO VISIT RETURN   8:55 AM   (30 min.)   Dariela Melvin MD   [facility]    TREATMENT   1:45 PM   (15 min.)   [facility] On[facility]    OTV   2:00 PM   (15 min.)   Mane Razo MD   [facility] Radiation Oncology 29    TREATMENT   1:45 PM   (15 min.)   [facility] ONC [facility]iation Oncology 30       31 August 2022 Sunday Monday Tuesday Wednesday Thursday Friday Saturday        1    RETURN  12:30 PM   (45 min.)   Maryjo Polanco CNP   [facility]    TREATMENT   1:45 PM   (15 min.)   [facility] ONC [facility]  Radiation Oncology 2    TREATMENT   1:45 PM   (15 min.)   [facility]C [facility] 3    TREATMENT   1:45 PM   (15 min.)   [facility]C [facility] On[facility] 4  Happy Birthday!    TREATMENT   1:45 PM   (15 min.)   [facility]    OTV   2:00 PM   (15 min.)   Mane Razo MD   [facility] Radiation Oncology 5    TREATMENT   1:45 PM   (15 min.)   [facility] 6       7     8    TREATMENT   1:45 PM   (15 min.)   [facility] ON[facility] On[facility] 9    TREATMENT   1:45 PM   (15 min.)   [facility] 10    TREATMENT   1:45 PM   (15 min.)   [facility]iation On[facility] 11    UM NUTRITION VISIT   1:45 PM   (30 min.)   Deb Schultz RD   [facility] Radiation Oncology    TREATMENT   1:45 PM   (15 min.)   [facility] On[facility] 12     13       14     15     16     17     18     19     20       21     22     23     24     25     26     27       28     29     30     31                                      Lab Results:  Recent Results (from the past 12 hour(s))   Comprehensive metabolic panel    Collection Time: 07/25/22  7:10 AM   Result Value Ref Range    Sodium 142 133 - 144 mmol/L    Potassium 3.1 (L) 3.4 - 5.3 mmol/L    Chloride 102 94 - 109 mmol/L    Carbon Dioxide (CO2) 31 20 - 32 mmol/L    Anion Gap 9 3 - 14 mmol/L    Urea Nitrogen 13 7 - 30 mg/dL    Creatinine 0.73 0.66 - 1.25 mg/dL    Calcium 8.9 8.5 - 10.1 mg/dL    Glucose 140 (H) 70 - 99 mg/dL    Alkaline Phosphatase 82 40 - 150 U/L    AST 47 (H) 0 - 45 U/L    ALT 53 0 - 70 U/L    Protein Total 7.6 6.8 - 8.8 g/dL    Albumin 3.2 (L) 3.4 - 5.0 g/dL    Bilirubin Total 1.0 0.2 - 1.3 mg/dL    GFR Estimate >90  >60 mL/min/1.73m2   CBC with platelets and differential    Collection Time: 07/25/22  7:10 AM   Result Value Ref Range    WBC Count 4.5 4.0 - 11.0 10e3/uL    RBC Count 4.38 (L) 4.40 - 5.90 10e6/uL    Hemoglobin 12.2 (L) 13.3 - 17.7 g/dL    Hematocrit 38.7 (L) 40.0 - 53.0 %    MCV 88 78 - 100 fL    MCH 27.9 26.5 - 33.0 pg    MCHC 31.5 31.5 - 36.5 g/dL    RDW 15.9 (H) 10.0 - 15.0 %    Platelet Count 89 (L) 150 - 450 10e3/uL    % Neutrophils 80 %    % Lymphocytes 10 %    % Monocytes 10 %    % Eosinophils 0 %    % Basophils 0 %    % Immature Granulocytes 0 %    NRBCs per 100 WBC 0 <1 /100    Absolute Neutrophils 3.5 1.6 - 8.3 10e3/uL    Absolute Lymphocytes 0.5 (L) 0.8 - 5.3 10e3/uL    Absolute Monocytes 0.5 0.0 - 1.3 10e3/uL    Absolute Eosinophils 0.0 0.0 - 0.7 10e3/uL    Absolute Basophils 0.0 0.0 - 0.2 10e3/uL    Absolute Immature Granulocytes 0.0 <=0.4 10e3/uL    Absolute NRBCs 0.0 10e3/uL

## 2022-07-25 NOTE — NURSING NOTE
"Oncology Rooming Note    July 25, 2022 7:39 AM   Jareth Gallardo is a 61 year old male who presents for:    Chief Complaint   Patient presents with     Port Draw     Labs drawn via port by RN. Vitals taken.     Oncology Clinic Visit     Rtn for nasopharyngeal cancer     Initial Vitals: /66 (BP Location: Right arm, Patient Position: Sitting, Cuff Size: Adult Regular)   Pulse 100   Temp 98.4  F (36.9  C) (Oral)   Resp 16   Wt 127.5 kg (281 lb 1.6 oz)   SpO2 95%   BMI 40.33 kg/m   Estimated body mass index is 40.33 kg/m  as calculated from the following:    Height as of 6/21/22: 1.778 m (5' 10\").    Weight as of this encounter: 127.5 kg (281 lb 1.6 oz). Body surface area is 2.51 meters squared.  No Pain (0) Comment: Data Unavailable   No LMP for male patient.  Allergies reviewed: Yes  Medications reviewed: Yes    Medications: MEDICATION REFILLS NEEDED TODAY. Provider was notified.     Compazine  1st floor pharmacy    Pharmacy name entered into [employer]:    [id number] [place] [place] - [address] [facility] [place], [place] - [phone]    Clinical concerns: none       Anahy Siu, EMT            "

## 2022-07-26 ENCOUNTER — APPOINTMENT (OUTPATIENT)
Dept: RADIATION ONCOLOGY | Facility: CLINIC | Age: 62
End: 2022-07-26
Attending: RADIOLOGY
Payer: COMMERCIAL

## 2022-07-26 PROCEDURE — 77014 PR CT GUIDE FOR PLACEMENT RADIATION THERAPY FIELDS: CPT | Mod: 26 | Performed by: RADIOLOGY

## 2022-07-26 PROCEDURE — 77386 HC IMRT TREATMENT DELIVERY, COMPLEX: CPT | Performed by: RADIOLOGY

## 2022-07-27 NOTE — PROGRESS NOTES
RADIATION ONCOLOGY WEEKLY ON TREATMENT VISIT   Encounter Date: 2022    Patient Name: Jareth Gallardo  MRN: [id number]  : 1960     Disease and Stage: cT2 N2 M0 nasopharyngeal carcinoma  Treatment Site: Nasopharynx and bilateral neck  Current Dose/Planned Total Dose: 5000 / 7000 cGy  Daily Fraction Size: 200 cGy/day, 5 times/week  Concurrent Chemotherapy: Yes  Drug and Frequency: Carboplatin weekly    Treatment Summary:    22: Initiation of radiotherapy. First infusion of weekly carboplatin.    22: Weekly RT visit. Current dose of 400 cGy. Tolerating treatment well.     22: Second infusion of weekly carboplatin    22: Weekly RT visit. Current dose of 1400 cGy. Tolerating treatment well.    22: Third infusion of weekly carboplatin    22: Weekly RT visit. Current dose of 2200 cGy. Moderate odynophagia. 3.5 kg weight loss over the past week.    22: Fourth infusion of weekly carboplatin    22: Weekly RT visit. Current dose of 3200 cGy. Mild odynophagia. Stable weight.    22: Fifth infusion of weekly carboplatin    22: Weekly RT visit. Current dose of 4200 cGy. Mild odynophagia. 1 kg weight loss over the past week.     22: Sixth infusion of weekly carboplatin    22: Weekly RT visit. Current dose of 5000 cGy. Mild odynophagia. 4.5 kg weight loss over the past week.    ED visits/Hospitalizations:  None    Missed Treatments:  1. 2022: 1-day break between fractions 8-9 secondary to the  holiday  2. 2022: 1-day break between fractions 24-25    Subjective: Mr. Gallardo presents to clinic today for his weekly on-treatment visit. He reports mild odynophagia, rating his symptoms as a 3/10 in severity. He is currently not requiring any pain medications. He continues to have moderate treatment-induced dysgeusia. As result, his p.o. intake has declined over this past week and his weight is down approximately 4.5 kg. His  remaining ROS are otherwise negative aside from mild nausea related to his thickened oral cavity secretions.    ROS:   Constitutional  Pain (0-10): 3 (mouth), 3 (throat), 1 (skin)   Fatigue: Moderate    CNS  Headaches: None    ENT  Mucositis: Moderate    Cardiopulmonary  Dyspnea: None    GI  Nausea/vomiting: Mild    Nutrition  PEG: No  Diet: Soft    Integumentary  Dermatitis: Moderate    Objective:   Current weight: 124.7 kg  Last week's weight: 129.1 kg  Starting weight: 138.8 kg    BP: 115/74 (sitting), 125/81 (standing)  Pulse: 80 (sitting), 78 (standing)     General: Healthy-appearing 61-year-old gentleman seated comfortably in an examination chair no acute distress  HEENT: NC/AT. EOMI. No rhinorrhea or epistaxis. Moderately dry mucous membranes with thickened oral cavity secretions. Confluent mucositis involving the posterior soft palate and posterior oropharyngeal wall. No thrush.  Pulmonary: No wheezing, stridor or respiratory distress  Skin: Moderate erythema of the bilateral neck. No desquamation.    Treatment-related toxicities (CTCAE v5.0):  Mucositis: Grade 2   Dermatitis: Grade 2   Dry eye: Grade 1     Assessment:    Mr. Gallardo is a 61 year old male with a cT2 N2 M0 nasopharyngeal carcinoma. He is status post 3 cycles of induction carboplatin/gemcitabine with a good partial response to therapy. He is now receiving concurrent chemoradiation with curative intent. He is tolerating treatment well with fairly well-controlled treatment-induced toxicities. His weight is down further this week secondary to decreased oral intake from his treatment-induced dysgeusia.    Plan:   cT2 N2 M0 nasopharyngeal carcinoma:    Continue chemoradiotherapy    Pain management:    Continue acetaminophen and viscous lidocaine as needed for mild to moderate pain    Fluids/Electrolytes/Nutrition:    Continue diet as tolerated    Dermatitis:    Twice daily Aquaphor application to the bilateral neck    Mosaiq chart and setup  information reviewed  IGRT images reviewed    Medication list reviewed    Mane Razo MD/PhD    Dept of Radiation Oncology  [facility]

## 2022-07-28 ENCOUNTER — OFFICE VISIT (OUTPATIENT)
Dept: RADIATION ONCOLOGY | Facility: CLINIC | Age: 62
End: 2022-07-28
Attending: RADIOLOGY
Payer: COMMERCIAL

## 2022-07-28 ENCOUNTER — VIRTUAL VISIT (OUTPATIENT)
Dept: ONCOLOGY | Facility: CLINIC | Age: 62
End: 2022-07-28
Attending: INTERNAL MEDICINE
Payer: COMMERCIAL

## 2022-07-28 VITALS
SYSTOLIC BLOOD PRESSURE: 115 MMHG | HEART RATE: 80 BPM | DIASTOLIC BLOOD PRESSURE: 74 MMHG | WEIGHT: 275 LBS | BODY MASS INDEX: 39.46 KG/M2

## 2022-07-28 DIAGNOSIS — C11.9 NASOPHARYNGEAL CANCER (H): Primary | ICD-10-CM

## 2022-07-28 DIAGNOSIS — Z13.29 SCREENING FOR HYPOTHYROIDISM: ICD-10-CM

## 2022-07-28 DIAGNOSIS — E83.42 HYPOMAGNESEMIA: ICD-10-CM

## 2022-07-28 PROCEDURE — 99215 OFFICE O/P EST HI 40 MIN: CPT | Mod: 95 | Performed by: INTERNAL MEDICINE

## 2022-07-28 PROCEDURE — 77427 RADIATION TX MANAGEMENT X5: CPT | Performed by: RADIOLOGY

## 2022-07-28 PROCEDURE — G0463 HOSPITAL OUTPT CLINIC VISIT: HCPCS | Mod: PN,RTG | Performed by: INTERNAL MEDICINE

## 2022-07-28 PROCEDURE — 77386 HC IMRT TREATMENT DELIVERY, COMPLEX: CPT | Performed by: RADIOLOGY

## 2022-07-28 PROCEDURE — 77336 RADIATION PHYSICS CONSULT: CPT | Performed by: RADIOLOGY

## 2022-07-28 NOTE — LETTER
7/28/2022         RE: Jareth Gallardo  [address] [place] [address]        Dear Colleague,    Thank you for referring your patient, Jareth Gallardo, to the [facility] CANCER [facility]. Please see a copy of my visit note below.         [facility]    PATIENT NAME: Jareth Gallardo  MRN # [id number]   DATE OF VISIT: July 28, 2022  YOB: 1960     Referring Provider: Dr. Jesus Boston  Radiation Oncology: Dr. Mane Razo  Primary Care Provider: Dr. Beni Gallardo at [facility] in [facility]    CANCER TYPE: Nasopharyngeal carcinoma, p16 negative, SAM positive  STAGE: [id number] (DENAE)  ECOG PS: 1    PD-L1: 20% using  clone locally; TPS 70% at [facility]   NGS:     SUMMARY  12/11/21 UC for L neck mass, selling  12/9/21 CT neck ([place]). 2.4 cm L neck node medial to SCM just above the hyoid, additional LNs surround, scattered R neck and L supraclavicular LNs  2/15/22 US bx L cervical node. Path: SCC, moderately differentiated,   3/4/22 PET/CT. Nasopharyngeal and oropharyngeal mucosal thickening. 2.6 cm BERNARDO nodular GGO, SUV avid, some additional lingular uptake  4/4/22 C1 carboplatin gemcitabine. Not a candidate for cisplatin due to hearing loss on audiogram.  4/26/22 C2 carboplatin gemcitabine. Neulasta 5/3 after C2D8, ANC 1000   5/16/22 C3 carboplatin gemcitabine  5/31/22 PET/CT. MS  6/22~8/10/22 Chemoradiation with weekly carboplatin    ASSESSMENT AND PLAN  Nasopharyngeal carcinoma, SAM +julia: Completes chemorads on 8/11 so 11 more fractions. We'll try to do one last dose of carboplatin Monday 8/1. If we end up skipping 8/1 due to thrombocytopenia or other reason, I'll try to make it up 8/8 or 8/9. Otherwise he's had 6 doses to date. Discussed expectations for recovery. Most people continue to feel pretty poorly with odynophagia, pain, secretions, fatigue, etc., that's pretty significant for another 1-2 weeks after treatment is done, followed by a  more noticeable improvement 3-4 weeks out. Discussed his nose and sinuses will continue to feel stuffed up and congested, etc., for quite a while longer. He'll see Dr. Razo with a neck CT and Dr. Boston around 6-7 weeks post treatment. He'll need 1-2 visits with APPs in the weeks following completion of chemoradiation and maybe 4 weeks out, but otherwise most do not require additional visits to follow up on acute toxicities. I will see him after the 3 month post-treatment PET/CT, which will be set up through Dr. Boston's office. We'll get labs too. I can see him virtually if needed, but Dr. Boston and Dr. Razo will of course see him in person. Will discsuss survivorship visit after that 3 month post treatment evaluation. He should not work for at least a month or two after chemoradiation due to the risk of exposure and infection risk. Letter written for him today    Mucositis: Manageable    Nausea/vomiting: More from gagging on secretions. Can try anti-emetic in the AM prior to trying to get all of that cleared out.     Thrombocytopenia: Chemo induced. Monitor. Not on asa but should double check next week     Weight loss, malnutrition: Continue to encourage    BERNARDO/lung nodules: On staging PET/CT, resolved as of restaging PET/CT after induction and prior to chemoradiation. 4R wasn't particularly worrisome to start. Re-evaluate on 3 month post-treatment PET/CT as above.     Diarrhea: Minimal since Tues visit    Transaminitis: Probably from prior gemcitabine. Better.     LE swelling: Monitor     DM2, steroid-induced hyperglycemia: Per PCP     Mild peripheral neuropathy: R foot numbness, very mild, from DM2. Continue to monitor.    30 minutes spent on the date of the encounter doing chart review, history and exam, documentation and further activities per the note     Dariela Melvin MD  Associate Professor of Medicine  Hematology, Oncology and Transplantation      SHAHANA Templeton returns for follow up of  nasopharyngeal carcinoma after 3 cycles of induction carboplatin gemcitabine for nasopharyngeal carcinoma and now completing week 6 of chemoradiation.     Secretions - gags in the morning, N/V  Yesterday didn't eat much - was stressed as he had to go to a deposition in [place]  Tired  Mild diarrhea, minimal since yesterday  Doing ok otherwise     PAST MEDICAL HISTORY  Nasopharyngeal carcinoma as above  DM2  COPD??  Hiatal hernia. Sleeps with his head elevated  LVH on TTE 2009, EF50%, LA Dilation  Dyslipidemia  BARRETT in 2015. Not using CPAP due to developing tooth abscesses when he tried it in 2015  Venous insufficiency in both legs being in an accident involving a mower about 10 years ago, on chronic furosemide. LE US negative 12/15/2009  Cholelithiasis  Hyperplastic colon polyp, due 2020  Tonsillectomy  Baker cyst RLE US 2013    Neuropathy from DM - R foot numbness chronically    Exposed to lots of wood dust in his occupation    CURRENT OUTPATIENT MEDICATIONS    ALLERGIES  No Known Allergies     REVIEW OF SYSTEMS  As above in the HPI, o/w complete 12-point ROS was negative.    PHYSICAL EXAM  There were no vitals taken for this visit.   Vitals since starting chemorads reviewed.   ~20# weight loss  GEN: NAD  HEENT: EOMI, no icterus, injection or pallor  NEURO: alert    Remainder of physical exam deferred due to public health emergency and limitations of video visit.    LABORATORY AND IMAGING STUDIES    All labs since starting chemoradiation were reviewed.            Sincerely,    Dariela Melvin MD   not applicable (Male)

## 2022-07-28 NOTE — PROGRESS NOTES
Jareth is a 61 year old who is being evaluated via a billable video visit.      Jareth Gallardo stated he is in the state [facility] for the visit today.    How would you like to obtain your AVS? MyChart  If the video visit is dropped, the invitation should be resent by: Send to e-mail at: [email or web address]  Will anyone else be joining your video visit? Janny Bran, Virtual Visit Facilitator    Video-Visit Details  Video Start Time: 9:17 AM  Type of service:  Video Visit  Video End Time:9:40 AM  Originating Location (pt. Location): \[facility]\"" in [place]  Distant Location (provider location):  [facility] CANCER [facility]   Platform used for Video Visit: [facility]    PATIENT NAME: Jareth Gallardo  MRN # [id number]   DATE OF VISIT: July 28, 2022  YOB: 1960     Referring Provider: Dr. Jesus Boston  Radiation Oncology: Dr. Mane Razo  Primary Care Provider: Dr. Beni Gallardo at [facility] in [facility]    CANCER TYPE: Nasopharyngeal carcinoma, p16 negative, SAM positive  STAGE: [id number] (DENAE)  ECOG PS: 1    PD-L1: 20% using  clone locally; TPS 70% at [facility]   NGS:     SUMMARY  12/11/21 UC for L neck mass, selling  12/9/21 CT neck ([place]). 2.4 cm L neck node medial to SCM just above the hyoid, additional LNs surround, scattered R neck and L supraclavicular LNs  2/15/22 US bx L cervical node. Path: SCC, moderately differentiated,   3/4/22 PET/CT. Nasopharyngeal and oropharyngeal mucosal thickening. 2.6 cm BERNARDO nodular GGO, SUV avid, some additional lingular uptake  4/4/22 C1 carboplatin gemcitabine. Not a candidate for cisplatin due to hearing loss on audiogram.  4/26/22 C2 carboplatin gemcitabine. Neulasta 5/3 after C2D8, ANC 1000   5/16/22 C3 carboplatin gemcitabine  5/31/22 PET/CT. CO  6/22~8/10/22 Chemoradiation with weekly carboplatin    ASSESSMENT AND PLAN  Nasopharyngeal carcinoma, SAM +julia: Completes chemorads on 8/11 so 11 more  fractions. We'll try to do one last dose of carboplatin Monday 8/1. If we end up skipping 8/1 due to thrombocytopenia or other reason, I'll try to make it up 8/8 or 8/9. Otherwise he's had 6 doses to date. Discussed expectations for recovery. Most people continue to feel pretty poorly with odynophagia, pain, secretions, fatigue, etc., that's pretty significant for another 1-2 weeks after treatment is done, followed by a more noticeable improvement 3-4 weeks out. Discussed his nose and sinuses will continue to feel stuffed up and congested, etc., for quite a while longer. He'll see Dr. Razo with a neck CT and Dr. Boston around 6-7 weeks post treatment. He'll need 1-2 visits with APPs in the weeks following completion of chemoradiation and maybe 4 weeks out, but otherwise most do not require additional visits to follow up on acute toxicities. I will see him after the 3 month post-treatment PET/CT, which will be set up through Dr. Boston's office. We'll get labs too. I can see him virtually if needed, but Dr. Boston and Dr. Razo will of course see him in person. Will discsuss survivorship visit after that 3 month post treatment evaluation. He should not work for at least a month or two after chemoradiation due to the risk of exposure and infection risk. Letter written for him today    Mucositis: Manageable    Nausea/vomiting: More from gagging on secretions. Can try anti-emetic in the AM prior to trying to get all of that cleared out.     Thrombocytopenia: Chemo induced. Monitor. Not on asa but should double check next week     Weight loss, malnutrition: Continue to encourage    BERNARDO/lung nodules: On staging PET/CT, resolved as of restaging PET/CT after induction and prior to chemoradiation. 4R wasn't particularly worrisome to start. Re-evaluate on 3 month post-treatment PET/CT as above.     Diarrhea: Minimal since Tues visit    Transaminitis: Probably from prior gemcitabine. Better.     LE swelling: Monitor      DM2, steroid-induced hyperglycemia: Per PCP     Mild peripheral neuropathy: R foot numbness, very mild, from DM2. Continue to monitor.    30 minutes spent on the date of the encounter doing chart review, history and exam, documentation and further activities per the note     Dariela Melvin MD  Associate Professor of Medicine  Hematology, Oncology and Transplantation      SHAHANA Templeton returns for follow up of nasopharyngeal carcinoma after 3 cycles of induction carboplatin gemcitabine for nasopharyngeal carcinoma and now completing week 6 of chemoradiation.     Secretions - gags in the morning, N/V  Yesterday didn't eat much - was stressed as he had to go to a deposition in [place]  Tired  Mild diarrhea, minimal since yesterday  Doing ok otherwise     PAST MEDICAL HISTORY  Nasopharyngeal carcinoma as above  DM2  COPD??  Hiatal hernia. Sleeps with his head elevated  LVH on TTE 2009, EF50%, LA Dilation  Dyslipidemia  BARRETT in 2015. Not using CPAP due to developing tooth abscesses when he tried it in 2015  Venous insufficiency in both legs being in an accident involving a mower about 10 years ago, on chronic furosemide. LE US negative 12/15/2009  Cholelithiasis  Hyperplastic colon polyp, due 2020  Tonsillectomy  Baker cyst RLE US 2013    Neuropathy from DM - R foot numbness chronically    Exposed to lots of wood dust in his occupation    CURRENT OUTPATIENT MEDICATIONS    ALLERGIES  No Known Allergies     REVIEW OF SYSTEMS  As above in the HPI, o/w complete 12-point ROS was negative.    PHYSICAL EXAM  There were no vitals taken for this visit.   Vitals since starting chemorads reviewed.   ~20# weight loss  GEN: NAD  HEENT: EOMI, no icterus, injection or pallor  NEURO: alert    Remainder of physical exam deferred due to public health emergency and limitations of video visit.    LABORATORY AND IMAGING STUDIES    All labs since starting chemoradiation were reviewed.

## 2022-07-28 NOTE — NURSING NOTE
Jareth Gallardo verified meds and allergies are correct via patients eCheck-in. Patient confirms no changes at this time.    Katalina Bran, Virtual Facilitator

## 2022-07-28 NOTE — LETTER
July 28, 2022      RE: Jareth Gallardo  [address] [place] [address]       To Whom It May Concern,     Jareth is undergoing cancer treatment that will continue through Aug 11, 2022. He should not be asked to work during treatment. Additionally, I anticipate that he will not be ready for work until at least mid September at the earliest, more likely mid-October to mid-November 2022. Please feel free to let me know if you have additional questions. My nurse, Herminia Wu, can be reached at the number above.       Sincerely,          Dariela Melvin M.D.  Associate Professor of Medicine  [facility]

## 2022-07-29 ENCOUNTER — APPOINTMENT (OUTPATIENT)
Dept: RADIATION ONCOLOGY | Facility: CLINIC | Age: 62
End: 2022-07-29
Attending: RADIOLOGY
Payer: COMMERCIAL

## 2022-07-29 PROCEDURE — 77014 PR CT GUIDE FOR PLACEMENT RADIATION THERAPY FIELDS: CPT | Mod: 26 | Performed by: RADIOLOGY

## 2022-07-29 PROCEDURE — 77386 HC IMRT TREATMENT DELIVERY, COMPLEX: CPT | Performed by: RADIOLOGY

## 2022-08-01 ENCOUNTER — APPOINTMENT (OUTPATIENT)
Dept: LAB | Facility: CLINIC | Age: 62
End: 2022-08-01
Attending: INTERNAL MEDICINE
Payer: COMMERCIAL

## 2022-08-01 ENCOUNTER — APPOINTMENT (OUTPATIENT)
Dept: RADIATION ONCOLOGY | Facility: CLINIC | Age: 62
End: 2022-08-01
Attending: RADIOLOGY
Payer: COMMERCIAL

## 2022-08-01 ENCOUNTER — INFUSION THERAPY VISIT (OUTPATIENT)
Dept: ONCOLOGY | Facility: CLINIC | Age: 62
End: 2022-08-01
Attending: INTERNAL MEDICINE
Payer: COMMERCIAL

## 2022-08-01 ENCOUNTER — ONCOLOGY VISIT (OUTPATIENT)
Dept: ONCOLOGY | Facility: CLINIC | Age: 62
End: 2022-08-01
Attending: NURSE PRACTITIONER
Payer: COMMERCIAL

## 2022-08-01 VITALS
HEART RATE: 91 BPM | BODY MASS INDEX: 39.77 KG/M2 | SYSTOLIC BLOOD PRESSURE: 96 MMHG | WEIGHT: 277.2 LBS | TEMPERATURE: 98 F | RESPIRATION RATE: 16 BRPM | OXYGEN SATURATION: 96 % | DIASTOLIC BLOOD PRESSURE: 67 MMHG

## 2022-08-01 DIAGNOSIS — T45.1X5A CHEMOTHERAPY-INDUCED NEUTROPENIA (H): Primary | ICD-10-CM

## 2022-08-01 DIAGNOSIS — C11.9 NASOPHARYNGEAL CANCER (H): ICD-10-CM

## 2022-08-01 DIAGNOSIS — D70.1 CHEMOTHERAPY-INDUCED NEUTROPENIA (H): Primary | ICD-10-CM

## 2022-08-01 LAB
ALBUMIN SERPL-MCNC: 3 G/DL (ref 3.4–5)
ALP SERPL-CCNC: 74 U/L (ref 40–150)
ALT SERPL W P-5'-P-CCNC: 33 U/L (ref 0–70)
ANION GAP SERPL CALCULATED.3IONS-SCNC: 6 MMOL/L (ref 3–14)
AST SERPL W P-5'-P-CCNC: 30 U/L (ref 0–45)
BASOPHILS # BLD AUTO: 0 10E3/UL (ref 0–0.2)
BASOPHILS NFR BLD AUTO: 0 %
BILIRUB SERPL-MCNC: 0.7 MG/DL (ref 0.2–1.3)
BUN SERPL-MCNC: 13 MG/DL (ref 7–30)
CALCIUM SERPL-MCNC: 8.6 MG/DL (ref 8.5–10.1)
CHLORIDE BLD-SCNC: 100 MMOL/L (ref 94–109)
CO2 SERPL-SCNC: 34 MMOL/L (ref 20–32)
CREAT SERPL-MCNC: 0.74 MG/DL (ref 0.66–1.25)
EOSINOPHIL # BLD AUTO: 0 10E3/UL (ref 0–0.7)
EOSINOPHIL NFR BLD AUTO: 1 %
ERYTHROCYTE [DISTWIDTH] IN BLOOD BY AUTOMATED COUNT: 15.5 % (ref 10–15)
GFR SERPL CREATININE-BSD FRML MDRD: >90 ML/MIN/1.73M2
GLUCOSE BLD-MCNC: 95 MG/DL (ref 70–99)
HCT VFR BLD AUTO: 36.6 % (ref 40–53)
HGB BLD-MCNC: 11.8 G/DL (ref 13.3–17.7)
IMM GRANULOCYTES # BLD: 0 10E3/UL
IMM GRANULOCYTES NFR BLD: 0 %
LYMPHOCYTES # BLD AUTO: 0.5 10E3/UL (ref 0.8–5.3)
LYMPHOCYTES NFR BLD AUTO: 20 %
MAGNESIUM SERPL-MCNC: 1.5 MG/DL (ref 1.6–2.3)
MCH RBC QN AUTO: 28.2 PG (ref 26.5–33)
MCHC RBC AUTO-ENTMCNC: 32.2 G/DL (ref 31.5–36.5)
MCV RBC AUTO: 88 FL (ref 78–100)
MONOCYTES # BLD AUTO: 0.3 10E3/UL (ref 0–1.3)
MONOCYTES NFR BLD AUTO: 13 %
NEUTROPHILS # BLD AUTO: 1.5 10E3/UL (ref 1.6–8.3)
NEUTROPHILS NFR BLD AUTO: 66 %
NRBC # BLD AUTO: 0 10E3/UL
NRBC BLD AUTO-RTO: 0 /100
PLATELET # BLD AUTO: 85 10E3/UL (ref 150–450)
POTASSIUM BLD-SCNC: 3.2 MMOL/L (ref 3.4–5.3)
PROT SERPL-MCNC: 7.4 G/DL (ref 6.8–8.8)
RBC # BLD AUTO: 4.18 10E6/UL (ref 4.4–5.9)
SODIUM SERPL-SCNC: 140 MMOL/L (ref 133–144)
WBC # BLD AUTO: 2.3 10E3/UL (ref 4–11)

## 2022-08-01 PROCEDURE — 82040 ASSAY OF SERUM ALBUMIN: CPT

## 2022-08-01 PROCEDURE — 99207 PR NO BILLABLE SERVICE THIS VISIT: CPT

## 2022-08-01 PROCEDURE — 80053 COMPREHEN METABOLIC PANEL: CPT

## 2022-08-01 PROCEDURE — 96367 TX/PROPH/DG ADDL SEQ IV INF: CPT

## 2022-08-01 PROCEDURE — 85004 AUTOMATED DIFF WBC COUNT: CPT | Performed by: INTERNAL MEDICINE

## 2022-08-01 PROCEDURE — G0463 HOSPITAL OUTPT CLINIC VISIT: HCPCS

## 2022-08-01 PROCEDURE — 250N000013 HC RX MED GY IP 250 OP 250 PS 637: Performed by: NURSE PRACTITIONER

## 2022-08-01 PROCEDURE — 258N000003 HC RX IP 258 OP 636: Performed by: NURSE PRACTITIONER

## 2022-08-01 PROCEDURE — 83735 ASSAY OF MAGNESIUM: CPT

## 2022-08-01 PROCEDURE — 250N000011 HC RX IP 250 OP 636: Performed by: NURSE PRACTITIONER

## 2022-08-01 PROCEDURE — 96413 CHEMO IV INFUSION 1 HR: CPT

## 2022-08-01 PROCEDURE — 77386 HC IMRT TREATMENT DELIVERY, COMPLEX: CPT | Performed by: RADIOLOGY

## 2022-08-01 PROCEDURE — 96361 HYDRATE IV INFUSION ADD-ON: CPT

## 2022-08-01 PROCEDURE — 99215 OFFICE O/P EST HI 40 MIN: CPT | Performed by: NURSE PRACTITIONER

## 2022-08-01 PROCEDURE — 36591 DRAW BLOOD OFF VENOUS DEVICE: CPT

## 2022-08-01 RX ORDER — METHYLPREDNISOLONE SODIUM SUCCINATE 125 MG/2ML
125 INJECTION, POWDER, LYOPHILIZED, FOR SOLUTION INTRAMUSCULAR; INTRAVENOUS
Status: CANCELLED
Start: 2022-08-01

## 2022-08-01 RX ORDER — HEPARIN SODIUM (PORCINE) LOCK FLUSH IV SOLN 100 UNIT/ML 100 UNIT/ML
500 SOLUTION INTRAVENOUS ONCE
Status: COMPLETED | OUTPATIENT
Start: 2022-08-01 | End: 2022-08-01

## 2022-08-01 RX ORDER — MAGNESIUM SULFATE HEPTAHYDRATE 40 MG/ML
2 INJECTION, SOLUTION INTRAVENOUS ONCE
Status: COMPLETED | OUTPATIENT
Start: 2022-08-01 | End: 2022-08-01

## 2022-08-01 RX ORDER — HEPARIN SODIUM,PORCINE 10 UNIT/ML
5 VIAL (ML) INTRAVENOUS
Status: CANCELLED | OUTPATIENT
Start: 2022-08-01

## 2022-08-01 RX ORDER — HEPARIN SODIUM (PORCINE) LOCK FLUSH IV SOLN 100 UNIT/ML 100 UNIT/ML
5 SOLUTION INTRAVENOUS
Status: DISCONTINUED | OUTPATIENT
Start: 2022-08-01 | End: 2022-08-01 | Stop reason: HOSPADM

## 2022-08-01 RX ORDER — ALBUTEROL SULFATE 0.83 MG/ML
2.5 SOLUTION RESPIRATORY (INHALATION)
Status: CANCELLED | OUTPATIENT
Start: 2022-08-01

## 2022-08-01 RX ORDER — EPINEPHRINE 1 MG/ML
0.3 INJECTION, SOLUTION INTRAMUSCULAR; SUBCUTANEOUS EVERY 5 MIN PRN
Status: CANCELLED | OUTPATIENT
Start: 2022-08-01

## 2022-08-01 RX ORDER — LORAZEPAM 2 MG/ML
0.5 INJECTION INTRAMUSCULAR EVERY 4 HOURS PRN
Status: CANCELLED | OUTPATIENT
Start: 2022-08-01

## 2022-08-01 RX ORDER — ALBUTEROL SULFATE 90 UG/1
1-2 AEROSOL, METERED RESPIRATORY (INHALATION)
Status: CANCELLED
Start: 2022-08-01

## 2022-08-01 RX ORDER — HEPARIN SODIUM (PORCINE) LOCK FLUSH IV SOLN 100 UNIT/ML 100 UNIT/ML
5 SOLUTION INTRAVENOUS
Status: CANCELLED | OUTPATIENT
Start: 2022-08-01

## 2022-08-01 RX ORDER — POTASSIUM CHLORIDE 1500 MG/1
40 TABLET, EXTENDED RELEASE ORAL ONCE
Status: COMPLETED | OUTPATIENT
Start: 2022-08-01 | End: 2022-08-01

## 2022-08-01 RX ORDER — NALOXONE HYDROCHLORIDE 0.4 MG/ML
0.2 INJECTION, SOLUTION INTRAMUSCULAR; INTRAVENOUS; SUBCUTANEOUS
Status: CANCELLED | OUTPATIENT
Start: 2022-08-01

## 2022-08-01 RX ORDER — MEPERIDINE HYDROCHLORIDE 25 MG/ML
25 INJECTION INTRAMUSCULAR; INTRAVENOUS; SUBCUTANEOUS EVERY 30 MIN PRN
Status: CANCELLED | OUTPATIENT
Start: 2022-08-01

## 2022-08-01 RX ORDER — DIPHENHYDRAMINE HYDROCHLORIDE 50 MG/ML
50 INJECTION INTRAMUSCULAR; INTRAVENOUS
Status: CANCELLED
Start: 2022-08-01

## 2022-08-01 RX ADMIN — POTASSIUM CHLORIDE 40 MEQ: 1500 TABLET, EXTENDED RELEASE ORAL at 11:54

## 2022-08-01 RX ADMIN — Medication 5 ML: at 13:03

## 2022-08-01 RX ADMIN — CARBOPLATIN 300 MG: 10 INJECTION, SOLUTION INTRAVENOUS at 11:51

## 2022-08-01 RX ADMIN — Medication 500 UNITS: at 10:18

## 2022-08-01 RX ADMIN — DEXAMETHASONE SODIUM PHOSPHATE: 10 INJECTION, SOLUTION INTRAMUSCULAR; INTRAVENOUS at 11:14

## 2022-08-01 RX ADMIN — MAGNESIUM SULFATE HEPTAHYDRATE 2 G: 40 INJECTION, SOLUTION INTRAVENOUS at 12:01

## 2022-08-01 RX ADMIN — SODIUM CHLORIDE 250 ML: 9 INJECTION, SOLUTION INTRAVENOUS at 11:12

## 2022-08-01 ASSESSMENT — PAIN SCALES - GENERAL: PAINLEVEL: NO PAIN (0)

## 2022-08-01 NOTE — NURSING NOTE
"Oncology Rooming Note    August 1, 2022 10:57 AM   Jareth Gallardo is a 61 year old male who presents for:    Chief Complaint   Patient presents with     Port Draw     Labs drawn via port by RN. Vitals taken.     Oncology Clinic Visit     Nasopharyngeal Cancer     Initial Vitals: BP 96/67 (BP Location: Right arm)   Pulse 91   Temp 98  F (36.7  C) (Oral)   Resp 16   Wt 125.7 kg (277 lb 3.2 oz)   SpO2 96%   BMI 39.77 kg/m   Estimated body mass index is 39.77 kg/m  as calculated from the following:    Height as of 6/21/22: 1.778 m (5' 10\").    Weight as of this encounter: 125.7 kg (277 lb 3.2 oz). Body surface area is 2.49 meters squared.  No Pain (0) Comment: Data Unavailable   No LMP for male patient.  Allergies reviewed: Yes  Medications reviewed: Yes    Medications: Medication refills not needed today.  Pharmacy name entered into [employer]:    [id number] [place] [facility] [address] [facility] [place], [place] - [phone]    Clinical concerns: Pt presents today for f/u.       Mckenna Khoury LPN  8/1/2022              "

## 2022-08-01 NOTE — PROGRESS NOTES
[facility] CANCER [facility]    PATIENT NAME: Jareth Gallardo  MRN # [id number]   DATE OF VISIT: August 1, 2022  YOB: 1960     Referring Provider: Dr. Jesus Boston  Radiation Oncology: Dr. Mane Razo  Primary Care Provider: Dr. Beni Gallardo at [facility] in [facility]    CANCER TYPE: Nasopharyngeal carcinoma, p16 negative, SAM positive  STAGE: [id number] (DENAE)  ECOG PS: 1    PD-L1: 20% using  clone locally; TPS 70% at [facility]   NGS:     SUMMARY  12/11/21 UC for L neck mass, selling  12/9/21 CT neck ([place]). 2.4 cm L neck node medial to SCM just above the hyoid, additional LNs surround, scattered R neck and L supraclavicular LNs  2/15/22 US bx L cervical node. Path: SCC, moderately differentiated,   3/4/22 PET/CT. Nasopharyngeal and oropharyngeal mucosal thickening. 2.6 cm BERNARDO nodular GGO, SUV avid, some additional lingular uptake  4/4/22 C1 carboplatin gemcitabine. Not a candidate for cisplatin due to hearing loss on audiogram.  4/26/22 C2 carboplatin gemcitabine. Neulasta 5/3 after C2D8, ANC 1000   5/16/22 C3 carboplatin gemcitabine  5/31/22 PET/CT. [place]  6/22~8/10/22 Chemoradiation with weekly carboplatin AUC 2    SHAHANA Templeton returns for follow up of nasopharyngeal carcinoma after 3 cycles of induction carboplatin gemcitabine for nasopharyngeal carcinoma and now completing weekly chemoradiation with carbo.     Had a good weekend. Was able to eat and drink a good amt. Not needing pain meds. Finds he does not need nausea meds/ant-acid meds over weekend either but restarts during the week. No edema. Denies dizziness or LH. Fatigue unchanged. No bleeding concerns. No fever.     10 point review of systems otherwise negative    PAST MEDICAL HISTORY  Nasopharyngeal carcinoma as above  DM2  COPD??  Hiatal hernia. Sleeps with his head elevated  LVH on TTE 2009, EF50%, LA Dilation  Dyslipidemia  BARRETT in 2015. Not using CPAP due to developing tooth abscesses when he tried it  in 2015  Venous insufficiency in both legs being in an accident involving a mower about 10 years ago, on chronic furosemide. LE US negative 12/15/2009  Cholelithiasis  Hyperplastic colon polyp, due 2020  Tonsillectomy  Baker cyst RLE US 2013    Neuropathy from DM - R foot numbness chronically    Exposed to lots of wood dust in his occupation    CURRENT OUTPATIENT MEDICATIONS    ALLERGIES  No Known Allergies     REVIEW OF SYSTEMS  As above in the HPI, o/w complete 12-point ROS was negative.    PHYSICAL EXAM  BP 96/67 (BP Location: Right arm)   Pulse 91   Temp 98  F (36.7  C) (Oral)   Resp 16   Wt 125.7 kg (277 lb 3.2 oz)   SpO2 96%   BMI 39.77 kg/m       GEN: NAD  HEENT: EOMI, no icterus, injection or pallor. Diffuse mucositis  RESP/HEART: RRR, CTA. No edema  NEURO: alert    LABORATORY AND IMAGING STUDIES    Most Recent 3 CBC's:  Recent Labs   Lab Test 08/01/22  1025 07/25/22  0710 07/18/22  0913   WBC 2.3* 4.5 4.4   HGB 11.8* 12.2* 12.1*   MCV 88 88 90   PLT 85* 89* 125*   ANEUTAUTO 1.5* 3.5 3.4     Most Recent 3 BMP's:  Recent Labs   Lab Test 07/25/22  0710 07/18/22  0913 07/12/22  0849    139 141   POTASSIUM 3.1* 3.9 3.4   CHLORIDE 102 107 102   CO2 31 30 32   BUN 13 13 13   CR 0.73 0.64* 0.70   ANIONGAP 9 2* 7   LAI 8.9 8.8 8.8   * 143* 147*   PROTTOTAL 7.6 7.3 7.6   ALBUMIN 3.2* 3.0* 3.1*    Most Recent 3 LFT's:  Recent Labs   Lab Test 07/25/22  0710 07/18/22  0913 07/12/22  0849   AST 47* 50* 42   ALT 53 57 51   ALKPHOS 82 78 78   BILITOTAL 1.0 0.5 0.6    Most Recent 2 TSH and T4:No lab results found.  I reviewed the above labs today.       ASSESSMENT AND PLAN  Nasopharyngeal carcinoma, SAM +julia: Completes chemorads on 8/11. Proceed with last dose carboplatin today for 7 doses total. Knows to let us know about fever or bleeding issues. Reinforced expectations for recovery.   -Jany or I will see him next week with labs, and then again in ~3-4 weeks thereafter.  -neck CT and Dr. Boston  around 6-7 weeks post treatment.   -3 month post-treatment PET/CT, which will be set up through Dr. Boston's office.   [facility] after that 3 month post treatment evaluation    Mucositis: Manageable, not needing prns. Continue baking soda rinses    Nausea/vomiting: compazine prn    Thrombocytopenia: Chemo induced. Monitor. Not on ASA.     Weight loss, malnutrition: Continue to encourage good PO intake. No further loss this last week.     BERNARDO/lung nodules: On staging PET/CT, resolved as of restaging PET/CT after induction and prior to chemoradiation. 4R wasn't particularly worrisome to start. Re-evaluate on 3 month post-treatment PET/CT as above.     Diarrhea: seems to have resolved    Transaminitis: Probably from prior gemcitabine. Better.     LE swelling: Monitor     DM2, steroid-induced hyperglycemia: Per PCP. Sugars stable     Mild peripheral neuropathy: R foot numbness, very mild, from DM2. Continue to monitor.    50 minutes spent on the date of the encounter doing chart review, review of test results, interpretation of tests, patient visit, documentation and discussion with other provider(s)     Maryjo Polanco CNP on 8/1/2022 at 11:15 AM

## 2022-08-01 NOTE — PATIENT INSTRUCTIONS
Contact Numbers  [facility] Cancer [facility]: [phone]    After Hours:  [phone]  Triage: [phone]    Please call the [facility] Triage line if you experience a temperature greater than or equal to 100.5, shaking chills, have uncontrolled nausea, vomiting and/or diarrhea, dizziness, shortness of breath, chest pain, bleeding, unexplained bruising, or if you have any other new/concerning symptoms, questions or concerns.     If it is after hours, weekends, or holidays, please call the main hospital  at  [phone] and ask to speak to the Oncology doctor on call.     If you are having any concerning symptoms or wish to speak to a provider before your next infusion visit, please call your care coordinator or triage to notify them so we can adequately serve you.     If you need a refill on a narcotic prescription or other medication, please call triage before your infusion appointment.       August 2022 Sunday Monday Tuesday Wednesday Thursday Friday Saturday        1    LAB CENTRAL  10:15 AM   (15 min.)   [facility] LAB DRAW   [facility]    ONC INFUSION 2 HR (120 MIN)  11:00 AM   (120 min.)    ONC INFUSION NURSE   [facility]    RETURN  12:30 PM   (45 min.)   Maryjo Polanco CNP   [facility]    TREATMENT   1:45 PM   (15 min.)   [facility] ONC [facility] Radiation Oncology 2    TREATMENT   1:45 PM   (15 min.)   [facility] ONC [facility] Radiation Oncology 3    TREATMENT   1:45 PM   (15 min.)   [facility] ONC [facility] Radiation Oncology 4  Happy Birthday!    TREATMENT   1:45 PM   (15 min.)   [facility] ONC [facility] Radiation Oncology    OTV   2:00 PM   (15 min.)   Mane Razo MD   [facility] Radiation Oncology 5    TREATMENT   1:45 PM   (15 min.)   [facility] ONC [facility] Radiation Oncology 6        7     8    TREATMENT   1:45 PM   (15 min.)   [facility] RAD ONC VARIAN   [facility] Radiation Oncology 9    TREATMENT   1:45 PM   (15 min.)   [facility] RAD ONC VARIAN   [facility] Radiation Oncology 10    TREATMENT   1:45 PM   (15 min.)   [facility] ONC [facility] Radiation Oncology    SWALLOW TREATMENT   2:30 PM   (30 min.)   [facility] SLP   [facility] 11    [facility] NUTRITION VISIT   1:45 PM   (30 min.)   Deb Schultz RD   [facility] Radiation Oncology    TREATMENT   1:45 PM   (15 min.)   P RAD ONC [facility] Radiation Oncology 12     13       14     15     16     17     18     19     20       21     22     23     24     25     26     27       28     29     30     31 September 2022 Sunday Monday Tuesday Wednesday Thursday Friday Saturday                       1     2     3       4     5     6     7     8     9     10       11     12     13     14     15     16     17       18     19     20     21     22     23     24       25     26     27     28     29     30                            Lab Results:  Recent Results (from the past 12 hour(s))   Comprehensive metabolic panel    Collection Time: 08/01/22 10:25 AM   Result Value Ref Range    Sodium 140 133 - 144 mmol/L    Potassium 3.2 (L) 3.4 - 5.3 mmol/L    Chloride 100 94 - 109 mmol/L    Carbon Dioxide (CO2) 34 (H) 20 - 32 mmol/L    Anion Gap 6 3 - 14 mmol/L    Urea Nitrogen 13 7 - 30 mg/dL    Creatinine 0.74 0.66 - 1.25 mg/dL    Calcium 8.6 8.5 - 10.1 mg/dL    Glucose 95 70 - 99 mg/dL    Alkaline Phosphatase 74 40 - 150 U/L    AST 30 0 - 45 U/L    ALT 33 0 - 70 U/L    Protein Total 7.4 6.8 - 8.8 g/dL    Albumin 3.0 (L) 3.4 - 5.0 g/dL    Bilirubin Total 0.7 0.2 - 1.3 mg/dL    GFR Estimate >90 >60 mL/min/1.73m2   CBC with platelets and differential    Collection Time: 08/01/22 10:25 AM    Result Value Ref Range    WBC Count 2.3 (L) 4.0 - 11.0 10e3/uL    RBC Count 4.18 (L) 4.40 - 5.90 10e6/uL    Hemoglobin 11.8 (L) 13.3 - 17.7 g/dL    Hematocrit 36.6 (L) 40.0 - 53.0 %    MCV 88 78 - 100 fL    MCH 28.2 26.5 - 33.0 pg    MCHC 32.2 31.5 - 36.5 g/dL    RDW 15.5 (H) 10.0 - 15.0 %    Platelet Count 85 (L) 150 - 450 10e3/uL    % Neutrophils 66 %    % Lymphocytes 20 %    % Monocytes 13 %    % Eosinophils 1 %    % Basophils 0 %    % Immature Granulocytes 0 %    NRBCs per 100 WBC 0 <1 /100    Absolute Neutrophils 1.5 (L) 1.6 - 8.3 10e3/uL    Absolute Lymphocytes 0.5 (L) 0.8 - 5.3 10e3/uL    Absolute Monocytes 0.3 0.0 - 1.3 10e3/uL    Absolute Eosinophils 0.0 0.0 - 0.7 10e3/uL    Absolute Basophils 0.0 0.0 - 0.2 10e3/uL    Absolute Immature Granulocytes 0.0 <=0.4 10e3/uL    Absolute NRBCs 0.0 10e3/uL   Magnesium    Collection Time: 08/01/22 10:25 AM   Result Value Ref Range    Magnesium 1.5 (L) 1.6 - 2.3 mg/dL

## 2022-08-01 NOTE — NURSING NOTE
"Chief Complaint   Patient presents with     Port Draw     Labs drawn via port by RN. Vitals taken.     Port accessed with 20G 3/4\" flat needle by RN, labs collected, line flushed with saline and heparin.  Vitals taken. Pt checked in for appointment(s).    Cintia Parra RN    "

## 2022-08-01 NOTE — PROGRESS NOTES
Infusion Nursing Note:  Jareth Gallardo presents today for C1D42 Carboplatin dose #12.    Patient seen by provider today: Yes: Maryjo EVANS   present during visit today: Not Applicable.    Note: Instruction given to patient as per provider. Verbalized understanding.     Appointment is not made yet by the time patient left the facility. instructed patient if unable to see next few days to call . Verbalized understanding.     Intravenous Access:  Implanted Port.    Treatment Conditions:  Lab Results   Component Value Date    HGB 11.8 (L) 08/01/2022    WBC 2.3 (L) 08/01/2022    ANEU 7.0 05/16/2022    ANEUTAUTO 1.5 (L) 08/01/2022    PLT 85 (L) 08/01/2022      Lab Results   Component Value Date     08/01/2022    POTASSIUM 3.2 (L) 08/01/2022    MAG 1.8 06/28/2022    CR 0.74 08/01/2022    LAI 8.6 08/01/2022    BILITOTAL 0.7 08/01/2022    ALBUMIN 3.0 (L) 08/01/2022    ALT 33 08/01/2022    AST 30 08/01/2022     Results reviewed, labs MET treatment parameters, ok to proceed with treatment.    TORB: 8/1/22/1115H/ Maryjo EVANS/Rand Nicolas RN/ K 3.2, to replace as per protocol, PO Potassium Chloride 40 meq once.    TORB: 8/1/22/1156H/ Maryjo EVANS/Rand Nicolas RN/ Magnesium 1.5, to replace as per protocol IV Magnesium Sulfate 2gm over one hour.        Post Infusion Assessment:  Patient tolerated infusion without incident.  Blood return noted pre and post infusion.  Site patent and intact, free from redness, edema or discomfort.  No evidence of extravasations.  Access discontinued per protocol.     Discharge Plan:   Patient declined prescription refills.  Discharge instructions reviewed with: Patient.  Patient and/or family verbalized understanding of discharge instructions and all questions answered.  AVS to patient via [employer].  Patient will return next week for next appointment. See notes above.  Patient discharged in stable condition accompanied by: self.  Departure Mode:  Ambulatory.      CAROL VAN, RN

## 2022-08-02 ENCOUNTER — APPOINTMENT (OUTPATIENT)
Dept: RADIATION ONCOLOGY | Facility: CLINIC | Age: 62
End: 2022-08-02
Attending: RADIOLOGY
Payer: COMMERCIAL

## 2022-08-02 PROCEDURE — 77386 HC IMRT TREATMENT DELIVERY, COMPLEX: CPT | Performed by: RADIOLOGY

## 2022-08-02 PROCEDURE — 77014 PR CT GUIDE FOR PLACEMENT RADIATION THERAPY FIELDS: CPT | Mod: 26 | Performed by: RADIOLOGY

## 2022-08-03 ENCOUNTER — APPOINTMENT (OUTPATIENT)
Dept: RADIATION ONCOLOGY | Facility: CLINIC | Age: 62
End: 2022-08-03
Attending: RADIOLOGY
Payer: COMMERCIAL

## 2022-08-03 PROCEDURE — 77386 HC IMRT TREATMENT DELIVERY, COMPLEX: CPT | Performed by: RADIOLOGY

## 2022-08-03 PROCEDURE — 77014 PR CT GUIDE FOR PLACEMENT RADIATION THERAPY FIELDS: CPT | Mod: 26 | Performed by: RADIOLOGY

## 2022-08-04 ENCOUNTER — OFFICE VISIT (OUTPATIENT)
Dept: RADIATION ONCOLOGY | Facility: CLINIC | Age: 62
End: 2022-08-04
Attending: RADIOLOGY
Payer: COMMERCIAL

## 2022-08-04 VITALS
SYSTOLIC BLOOD PRESSURE: 123 MMHG | DIASTOLIC BLOOD PRESSURE: 83 MMHG | WEIGHT: 271 LBS | BODY MASS INDEX: 38.88 KG/M2 | HEART RATE: 96 BPM

## 2022-08-04 DIAGNOSIS — C11.9 NASOPHARYNGEAL CANCER (H): Primary | ICD-10-CM

## 2022-08-04 DIAGNOSIS — B37.0 ORAL THRUSH: ICD-10-CM

## 2022-08-04 PROCEDURE — 77386 HC IMRT TREATMENT DELIVERY, COMPLEX: CPT | Performed by: RADIOLOGY

## 2022-08-04 PROCEDURE — 77427 RADIATION TX MANAGEMENT X5: CPT | Performed by: RADIOLOGY

## 2022-08-04 PROCEDURE — 77336 RADIATION PHYSICS CONSULT: CPT | Performed by: RADIOLOGY

## 2022-08-04 RX ORDER — NYSTATIN 100000/ML
500000 SUSPENSION, ORAL (FINAL DOSE FORM) ORAL 4 TIMES DAILY
Qty: 100 ML | Refills: 0 | Status: SHIPPED | OUTPATIENT
Start: 2022-08-04 | End: 2022-11-30

## 2022-08-04 NOTE — PROGRESS NOTES
RADIATION ONCOLOGY WEEKLY ON TREATMENT VISIT   Encounter Date: 2022    Patient Name: Jareth Gallardo  MRN: [id number]  : 1960     Disease and Stage: cT2 N2 M0 nasopharyngeal carcinoma  Treatment Site: Nasopharynx and bilateral neck  Current Dose/Planned Total Dose: 6000 / 7000 cGy  Daily Fraction Size: 200 cGy/day, 5 times/week  Concurrent Chemotherapy: Yes  Drug and Frequency: Carboplatin weekly    Treatment Summary:    22: Initiation of radiotherapy. First infusion of weekly carboplatin.    22: Weekly RT visit. Current dose of 400 cGy. Tolerating treatment well.     22: Second infusion of weekly carboplatin    22: Weekly RT visit. Current dose of 1400 cGy. Tolerating treatment well.    22: Third infusion of weekly carboplatin    22: Weekly RT visit. Current dose of 2200 cGy. Moderate odynophagia. 3.5 kg weight loss over the past week.    22: Fourth infusion of weekly carboplatin    22: Weekly RT visit. Current dose of 3200 cGy. Mild odynophagia. Stable weight.    22: Fifth infusion of weekly carboplatin    22: Weekly RT visit. Current dose of 4200 cGy. Mild odynophagia. 1 kg weight loss over the past week.     22: Sixth infusion of weekly carboplatin    22: Weekly RT visit. Current dose of 5000 cGy. Mild odynophagia. 4.5 kg weight loss over the past week.    22: Seventh infusion of weekly carboplatin    22: Weekly RT visit. Current dose of 6000 cGy. Mild odynophagia. 2 kg weight loss over the past week. Oral thrush.    ED visits/Hospitalizations:  None    Missed Treatments:  1. 2022: 1-day break between fractions 8-9 secondary to the  holiday  2. 2022: 1-day break between fractions 24-25    Subjective: Mr. Gallardo presents to clinic today for his weekly on-treatment visit. Overall, he reports he is doing fairly well with his biggest complaint related to moderate nausea/vomiting following his  latest chemotherapy infusion earlier this week. He reports that this has since improved and he is back to eating a regular diet without difficulty. He rates his pain as a 2-3/2010 in severity and is currently not on any medications for pain relief at this time. His weight continues to slowly drift down although the rate of decrease improved compared to the previous week.    ROS:   Constitutional  Pain (0-10): 3 (mouth), 2 (throat), 6 (skin)   Fatigue: Mild    CNS  Headaches: Mild    ENT  Mucositis: Moderate    Cardiopulmonary  Dyspnea: None    GI  Nausea/vomiting: Mild to moderate    Nutrition  PEG: No  Diet: Soft     Integumentary  Dermatitis: Moderate    Objective:   Current weight: 122.9 kg  Last week's weight: 124.7 kg  Starting weight: 138.8 kg    BP: 123/83 (sitting), 121/71 (standing)  Pulse: 90 (sitting), 98 (standing)     General: Healthy-appearing 62-year-old gentleman seated comfortably in a chair in no acute distress  HEENT: NC/AT. EOMI. No rhinorrhea or epistaxis. Moderately dry mucous membranes with thickened secretions. Patchy mucositis over the posterior oropharynx. Scattered thrush throughout the posterior oral cavity.  Pulmonary: No wheezing, stridor or respiratory distress  Skin: Brisk erythema of the bilateral neck. Patchy areas of desquamation confined to the skin folds.    Treatment-related toxicities (CTCAE v5.0):  Mucositis: Grade 2    Dermatitis: Grade 2    Dry eye: Grade 1      Assessment:    Mr. Gallardo is a 61 year old male with a cT2 N2 M0 nasopharyngeal carcinoma. He is status post 3 cycles of induction carboplatin/gemcitabine with a good partial response to therapy. He is now receiving concurrent chemoradiation with curative intent. He is tolerating treatment with fairly stable acute radiation-induced mucositis and dermatitis over the past week.    Plan:   cT2 N2 M0 nasopharyngeal carcinoma:    Continue chemoradiotherapy    Pain management:    Continue acetaminophen and viscous  lidocaine as needed for mild to moderate pain    Fluids/Electrolytes/Nutrition:    Continue diet as tolerated    Dermatitis:    Twice daily Aquaphor application to the bilateral neck    Thrush:    Start 4 times daily oral nystatin suspension    Mos[employer] chart and setup information reviewed  IGRT images reviewed    Medication list reviewed    Mane Razo MD/PhD    Dept of Radiation Oncology  [facility]

## 2022-08-05 ENCOUNTER — APPOINTMENT (OUTPATIENT)
Dept: RADIATION ONCOLOGY | Facility: CLINIC | Age: 62
End: 2022-08-05
Attending: RADIOLOGY
Payer: COMMERCIAL

## 2022-08-05 PROCEDURE — 77386 HC IMRT TREATMENT DELIVERY, COMPLEX: CPT | Performed by: RADIOLOGY

## 2022-08-05 PROCEDURE — 77014 PR CT GUIDE FOR PLACEMENT RADIATION THERAPY FIELDS: CPT | Mod: 26 | Performed by: RADIOLOGY

## 2022-08-09 ENCOUNTER — APPOINTMENT (OUTPATIENT)
Dept: RADIATION ONCOLOGY | Facility: CLINIC | Age: 62
End: 2022-08-09
Attending: RADIOLOGY
Payer: COMMERCIAL

## 2022-08-09 PROCEDURE — 77386 HC IMRT TREATMENT DELIVERY, COMPLEX: CPT | Performed by: RADIOLOGY

## 2022-08-09 PROCEDURE — 77014 PR CT GUIDE FOR PLACEMENT RADIATION THERAPY FIELDS: CPT | Mod: 26 | Performed by: RADIOLOGY

## 2022-08-10 ENCOUNTER — TELEPHONE (OUTPATIENT)
Dept: SPEECH THERAPY | Facility: CLINIC | Age: 62
End: 2022-08-10

## 2022-08-10 ENCOUNTER — APPOINTMENT (OUTPATIENT)
Dept: RADIATION ONCOLOGY | Facility: CLINIC | Age: 62
End: 2022-08-10
Attending: RADIOLOGY
Payer: COMMERCIAL

## 2022-08-10 PROCEDURE — 77014 PR CT GUIDE FOR PLACEMENT RADIATION THERAPY FIELDS: CPT | Mod: 26 | Performed by: RADIOLOGY

## 2022-08-10 PROCEDURE — 77386 HC IMRT TREATMENT DELIVERY, COMPLEX: CPT | Performed by: RADIOLOGY

## 2022-08-11 ENCOUNTER — OFFICE VISIT (OUTPATIENT)
Dept: RADIATION ONCOLOGY | Facility: CLINIC | Age: 62
End: 2022-08-11
Attending: RADIOLOGY
Payer: COMMERCIAL

## 2022-08-11 ENCOUNTER — ONCOLOGY VISIT (OUTPATIENT)
Dept: ONCOLOGY | Facility: CLINIC | Age: 62
End: 2022-08-11
Attending: NURSE PRACTITIONER
Payer: COMMERCIAL

## 2022-08-11 ENCOUNTER — APPOINTMENT (OUTPATIENT)
Dept: LAB | Facility: CLINIC | Age: 62
End: 2022-08-11
Attending: NURSE PRACTITIONER
Payer: COMMERCIAL

## 2022-08-11 VITALS
TEMPERATURE: 98 F | WEIGHT: 263.7 LBS | DIASTOLIC BLOOD PRESSURE: 80 MMHG | OXYGEN SATURATION: 96 % | BODY MASS INDEX: 37.84 KG/M2 | RESPIRATION RATE: 16 BRPM | SYSTOLIC BLOOD PRESSURE: 111 MMHG | HEART RATE: 92 BPM

## 2022-08-11 VITALS
BODY MASS INDEX: 37.88 KG/M2 | SYSTOLIC BLOOD PRESSURE: 124 MMHG | HEART RATE: 99 BPM | WEIGHT: 264 LBS | DIASTOLIC BLOOD PRESSURE: 87 MMHG

## 2022-08-11 DIAGNOSIS — C11.9 NASOPHARYNGEAL CANCER (H): Primary | ICD-10-CM

## 2022-08-11 DIAGNOSIS — E87.6 HYPOKALEMIA: ICD-10-CM

## 2022-08-11 DIAGNOSIS — T45.1X5A CHEMOTHERAPY-INDUCED NEUTROPENIA (H): ICD-10-CM

## 2022-08-11 DIAGNOSIS — E83.42 HYPOMAGNESEMIA: ICD-10-CM

## 2022-08-11 DIAGNOSIS — D70.1 CHEMOTHERAPY-INDUCED NEUTROPENIA (H): ICD-10-CM

## 2022-08-11 LAB
ALBUMIN SERPL-MCNC: 3.3 G/DL (ref 3.4–5)
ALP SERPL-CCNC: 86 U/L (ref 40–150)
ALT SERPL W P-5'-P-CCNC: 29 U/L (ref 0–70)
ANION GAP SERPL CALCULATED.3IONS-SCNC: 8 MMOL/L (ref 3–14)
AST SERPL W P-5'-P-CCNC: 32 U/L (ref 0–45)
BASOPHILS # BLD AUTO: 0 10E3/UL (ref 0–0.2)
BASOPHILS NFR BLD AUTO: 0 %
BILIRUB SERPL-MCNC: 1 MG/DL (ref 0.2–1.3)
BUN SERPL-MCNC: 13 MG/DL (ref 7–30)
CALCIUM SERPL-MCNC: 8.9 MG/DL (ref 8.5–10.1)
CHLORIDE BLD-SCNC: 99 MMOL/L (ref 94–109)
CO2 SERPL-SCNC: 33 MMOL/L (ref 20–32)
CREAT SERPL-MCNC: 0.82 MG/DL (ref 0.66–1.25)
EOSINOPHIL # BLD AUTO: 0 10E3/UL (ref 0–0.7)
EOSINOPHIL NFR BLD AUTO: 0 %
ERYTHROCYTE [DISTWIDTH] IN BLOOD BY AUTOMATED COUNT: 15.6 % (ref 10–15)
GFR SERPL CREATININE-BSD FRML MDRD: >90 ML/MIN/1.73M2
GLUCOSE BLD-MCNC: 98 MG/DL (ref 70–99)
HCT VFR BLD AUTO: 37.9 % (ref 40–53)
HGB BLD-MCNC: 12.6 G/DL (ref 13.3–17.7)
IMM GRANULOCYTES # BLD: 0 10E3/UL
IMM GRANULOCYTES NFR BLD: 0 %
LYMPHOCYTES # BLD AUTO: 0.3 10E3/UL (ref 0.8–5.3)
LYMPHOCYTES NFR BLD AUTO: 24 %
MAGNESIUM SERPL-MCNC: 1.5 MG/DL (ref 1.6–2.3)
MCH RBC QN AUTO: 28.5 PG (ref 26.5–33)
MCHC RBC AUTO-ENTMCNC: 33.2 G/DL (ref 31.5–36.5)
MCV RBC AUTO: 86 FL (ref 78–100)
MONOCYTES # BLD AUTO: 0.3 10E3/UL (ref 0–1.3)
MONOCYTES NFR BLD AUTO: 25 %
NEUTROPHILS # BLD AUTO: 0.7 10E3/UL (ref 1.6–8.3)
NEUTROPHILS NFR BLD AUTO: 51 %
NRBC # BLD AUTO: 0 10E3/UL
NRBC BLD AUTO-RTO: 0 /100
PLAT MORPH BLD: NORMAL
PLATELET # BLD AUTO: 130 10E3/UL (ref 150–450)
POTASSIUM BLD-SCNC: 3.3 MMOL/L (ref 3.4–5.3)
PROT SERPL-MCNC: 8.2 G/DL (ref 6.8–8.8)
RBC # BLD AUTO: 4.42 10E6/UL (ref 4.4–5.9)
RBC MORPH BLD: NORMAL
SODIUM SERPL-SCNC: 140 MMOL/L (ref 133–144)
WBC # BLD AUTO: 1.3 10E3/UL (ref 4–11)

## 2022-08-11 PROCEDURE — 99215 OFFICE O/P EST HI 40 MIN: CPT | Performed by: NURSE PRACTITIONER

## 2022-08-11 PROCEDURE — 77386 HC IMRT TREATMENT DELIVERY, COMPLEX: CPT | Performed by: RADIOLOGY

## 2022-08-11 PROCEDURE — 85025 COMPLETE CBC W/AUTO DIFF WBC: CPT | Performed by: NURSE PRACTITIONER

## 2022-08-11 PROCEDURE — 36591 DRAW BLOOD OFF VENOUS DEVICE: CPT | Performed by: NURSE PRACTITIONER

## 2022-08-11 PROCEDURE — G0463 HOSPITAL OUTPT CLINIC VISIT: HCPCS

## 2022-08-11 PROCEDURE — 80053 COMPREHEN METABOLIC PANEL: CPT | Performed by: NURSE PRACTITIONER

## 2022-08-11 PROCEDURE — 97803 MED NUTRITION INDIV SUBSEQ: CPT | Performed by: DIETITIAN, REGISTERED

## 2022-08-11 PROCEDURE — 87799 DETECT AGENT NOS DNA QUANT: CPT | Performed by: NURSE PRACTITIONER

## 2022-08-11 PROCEDURE — 250N000011 HC RX IP 250 OP 636: Performed by: NURSE PRACTITIONER

## 2022-08-11 PROCEDURE — 83735 ASSAY OF MAGNESIUM: CPT | Performed by: NURSE PRACTITIONER

## 2022-08-11 RX ORDER — HEPARIN SODIUM (PORCINE) LOCK FLUSH IV SOLN 100 UNIT/ML 100 UNIT/ML
5 SOLUTION INTRAVENOUS ONCE
Status: COMPLETED | OUTPATIENT
Start: 2022-08-11 | End: 2022-08-11

## 2022-08-11 RX ORDER — MAGNESIUM OXIDE 400 MG/1
400 TABLET ORAL DAILY
Qty: 30 TABLET | Refills: 1 | Status: SHIPPED | OUTPATIENT
Start: 2022-08-11 | End: 2022-08-23

## 2022-08-11 RX ORDER — POTASSIUM CHLORIDE 1500 MG/1
20 TABLET, EXTENDED RELEASE ORAL DAILY
Qty: 30 TABLET | Refills: 1 | Status: SHIPPED | OUTPATIENT
Start: 2022-08-11 | End: 2022-08-23

## 2022-08-11 RX ADMIN — Medication 5 ML: at 15:30

## 2022-08-11 ASSESSMENT — PAIN SCALES - GENERAL: PAINLEVEL: NO PAIN (0)

## 2022-08-11 NOTE — PROGRESS NOTES
RADIATION ONCOLOGY WEEKLY ON TREATMENT VISIT   Encounter Date: 2022    Patient Name: Jareth Gallardo  MRN: [id number]  : 1960     Disease and Stage: cT2 N2 M0 nasopharyngeal carcinoma  Treatment Site: Nasopharynx and bilateral neck  Current Dose/Planned Total Dose: 6800 / 7000 cGy  Daily Fraction Size: 200 cGy/day, 5 times/week  Concurrent Chemotherapy: Yes  Drug and Frequency: Carboplatin weekly    Treatment Summary:    22: Initiation of radiotherapy. First infusion of weekly carboplatin.    22: Weekly RT visit. Current dose of 400 cGy. Tolerating treatment well.     22: Second infusion of weekly carboplatin    22: Weekly RT visit. Current dose of 1400 cGy. Tolerating treatment well.    22: Third infusion of weekly carboplatin    22: Weekly RT visit. Current dose of 2200 cGy. Moderate odynophagia. 3.5 kg weight loss over the past week.    22: Fourth infusion of weekly carboplatin    22: Weekly RT visit. Current dose of 3200 cGy. Mild odynophagia. Stable weight.    22: Fifth infusion of weekly carboplatin    22: Weekly RT visit. Current dose of 4200 cGy. Mild odynophagia. 1 kg weight loss over the past week.     22: Sixth infusion of weekly carboplatin    22: Weekly RT visit. Current dose of 5000 cGy. Mild odynophagia. 4.5 kg weight loss over the past week.    22: Seventh infusion of weekly carboplatin    22: Weekly RT visit. Current dose of 6000 cGy. Mild odynophagia. 2 kg weight loss over the past week. Oral thrush.    22: Weekly RT visit. Current dose of 6800 cGy. Mild odynophagia. 3 kg weight loss over the past week.     ED visits/Hospitalizations:  None    Missed Treatments:  1. 2022: 1-day break between fractions 8-9 secondary to the  holiday  2. 2022: 1-day break between fractions 24-25  3. 2022: 1-day break between fractions 31-32 secondary to machine downtime    Subjective:   Sami presents to clinic today for his weekly on-treatment visit. Overall, he reports he is doing fairly well with minimal pain that he rates as a 2/10 in severity. He is not currently requiring any pain medications for relief of his symptoms. He was started on nystatin last week for oral thrush which has since resolved. His biggest complaint today is related to ongoing treatment-induced dysgeusia. He has been eating a regular diet without difficulty but has had less p.o. intake due his lack of desire in eating and lack of taste. His weight is down approximately 3 kg over the past week.    ROS:   Constitutional  Pain (0-10): 2   Fatigue: Moderate    CNS  Headaches: None    ENT  Mucositis: Moderate     Cardiopulmonary  Dyspnea: None    GI  Nausea/vomiting: None    Nutrition  PEG: No  Diet: Soft     Integumentary  Dermatitis: Moderate    Objective:   Current weight: 119.8 kg  Last week's weight: 122.9 kg  Starting weight: 138.8 kg    BP: 124/87 (sitting), 112/75 (standing)  Pulse: 99 (sitting), 102 (standing)     General: 62-year-old gentleman seated comfortably in a chair in no acute distress  HEENT: NC/AT. EOMI. No rhinorrhea or epistaxis. Mildly dry mucous membranes with thickened secretions. Confluent mucositis over the posterior oropharyngeal wall. No thrush.  Pulmonary: No wheezing, stridor or respiratory distress  Skin: Brisk erythema over the bilateral neck. Patchy areas of desquamation confined to the left neck skin folds.    Treatment-related toxicities (CTCAE v5.0):  Mucositis: Grade 2    Dermatitis: Grade 2    Dry eye: Grade 1     Assessment:    Mr. Gallardo is a 61 year old male with a cT2 N2 M0 nasopharyngeal carcinoma. He is status post 3 cycles of induction carboplatin/gemcitabine with a good partial response to therapy. He is now receiving concurrent chemoradiation with curative intent. He is tolerating treatment reasonably well with the anticipated acute radiation-induced toxicities.    Plan:    cT2 N2 M0 nasopharyngeal carcinoma:    Complete chemoradiotherapy tomorrow (8/12/2022)    Follow-up with radiation oncology clinic in 1-2 weeks    Follow-up in radiation oncology clinic with MD in 6 weeks    Pain management:    Continue acetaminophen and viscous lidocaine as needed for mild to moderate pain    Fluids/Electrolytes/Nutrition:    Continue diet as tolerated    Dermatitis:    Twice daily Aquaphor application to the bilateral neck    Thrush:    Resolved. Continue to monitor.    [employer] chart and setup information reviewed  IGRT images reviewed    Medication list reviewed    Mane Razo MD/PhD    Dept of Radiation Oncology  [facility]

## 2022-08-11 NOTE — PROGRESS NOTES
[facility]    PATIENT NAME: Jareth Gallardo  MRN # [id number]   DATE OF VISIT: August 11, 2022  YOB: 1960     Referring Provider: Dr. Jesus Boston  Radiation Oncology: Dr. Mane Razo  Primary Care Provider: Dr. Beni Gallardo at [facility] in [facility]    CANCER TYPE: Nasopharyngeal carcinoma, p16 negative, SAM positive  STAGE: [id number] (DENAE)  ECOG PS: 1    PD-L1: 20% using  clone locally; TPS 70% at [facility]   NGS:     SUMMARY  12/11/21 UC for L neck mass, selling  12/9/21 CT neck ([place]). 2.4 cm L neck node medial to SCM just above the hyoid, additional LNs surround, scattered R neck and L supraclavicular LNs  2/15/22 US bx L cervical node. Path: SCC, moderately differentiated,   3/4/22 PET/CT. Nasopharyngeal and oropharyngeal mucosal thickening. 2.6 cm BERNARDO nodular GGO, SUV avid, some additional lingular uptake  4/4/22 C1 carboplatin gemcitabine. Not a candidate for cisplatin due to hearing loss on audiogram.  4/26/22 C2 carboplatin gemcitabine. Neulasta 5/3 after C2D8, ANC 1000   5/16/22 C3 carboplatin gemcitabine  5/31/22 PET/CT. LA  6/22~8/12/22 Chemoradiation with weekly carboplatin AUC 2    SHAHANA Templeton returns for follow up of nasopharyngeal carcinoma after 3 cycles of induction carboplatin gemcitabine for nasopharyngeal carcinoma and now completing weekly chemoradiation with carbo.     Taste and saliva limiting PO intake but overall managing. Not needing pain medication. No fever or infectious concerns. No edema. Energy ok. Going home after last RT tomorrow.     PAST MEDICAL HISTORY  Nasopharyngeal carcinoma as above  DM2  COPD??  Hiatal hernia. Sleeps with his head elevated  LVH on TTE 2009, EF50%, LA Dilation  Dyslipidemia  BARRETT in 2015. Not using CPAP due to developing tooth abscesses when he tried it in 2015  Venous insufficiency in both legs being in an accident involving a mower about 10 years ago, on chronic furosemide. LE US  negative 12/15/2009  Cholelithiasis  Hyperplastic colon polyp, due 2020  Tonsillectomy  Baker cyst RLE US 2013    Neuropathy from DM - R foot numbness chronically    Exposed to lots of wood dust in his occupation    CURRENT OUTPATIENT MEDICATIONS    ALLERGIES  No Known Allergies     REVIEW OF SYSTEMS  As above in the HPI, o/w complete 12-point ROS was negative.    PHYSICAL EXAM  There were no vitals taken for this visit.     GEN: NAD  HEENT: EOMI, no icterus, injection or pallor. Diffuse mucositis  RESP/HEART: RRR, CTA. No edema  NEURO: alert    LABORATORY AND IMAGING STUDIES    Most Recent 3 CBC's:  Recent Labs   Lab Test 08/11/22  1527 08/01/22  1025 07/25/22  0710   WBC 1.3* 2.3* 4.5   HGB 12.6* 11.8* 12.2*   MCV 86 88 88   * 85* 89*   ANEUTAUTO 0.7* 1.5* 3.5     Most Recent 3 BMP's:  Recent Labs   Lab Test 08/11/22  1527 08/01/22  1025 07/25/22  0710    140 142   POTASSIUM 3.3* 3.2* 3.1*   CHLORIDE 99 100 102   CO2 33* 34* 31   BUN 13 13 13   CR 0.82 0.74 0.73   ANIONGAP 8 6 9   LAI 8.9 8.6 8.9   GLC 98 95 140*   PROTTOTAL 8.2 7.4 7.6   ALBUMIN 3.3* 3.0* 3.2*    Most Recent 3 LFT's:  Recent Labs   Lab Test 08/11/22  1527 08/01/22  1025 07/25/22  0710   AST 32 30 47*   ALT 29 33 53   ALKPHOS 86 74 82   BILITOTAL 1.0 0.7 1.0    Most Recent 2 TSH and T4:No lab results found.  I reviewed the above labs today.       ASSESSMENT AND PLAN  Nasopharyngeal carcinoma, SAM +julia: s/p induction and chemo rads with weekly carbo x7 doses. finishes RT 8/12. Overall tolerated well, discussed anticipated recovery.   -labs locally 8/22, follow up with me virtually in ~3 weeks  -neck CT and Dr. Boston around 6-7 weeks post treatment.   -3 month post-treatment PET/CT, which will be set up through Dr. Khariwala's office.   -discsuss survivorship visit after that 3 month post treatment evaluation    Mucositis: Manageable, not needing prns. Continue baking soda rinses    Thrombocytopenia: stable, up a bit from last  week    Neutropenia: reviewed precautions and importance of fever    Weight loss, malnutrition: Continue to encourage good PO intake. Following with RD    BERNARDO/lung nodules: On staging PET/CT, resolved as of restaging PET/CT after induction and prior to chemoradiation. 4R wasn't particularly worrisome to start. Re-evaluate on 3 month post-treatment PET/CT as above.     DM2, steroid-induced hyperglycemia: Per PCP. Sugars stable. Has local follow up Monday     Mild peripheral neuropathy: R foot numbness, very mild, from DM2. Continue to monitor.    Hypokalemia/hypomag: will start replacement and recheck 8/22    50 minutes spent on the date of the encounter doing chart review, review of test results, interpretation of tests, patient visit, documentation and discussion with other provider(s)     Maryjo Polanco CNP on 8/11/2022 at 4:20 PM

## 2022-08-11 NOTE — NURSING NOTE
"Oncology Rooming Note    August 11, 2022 3:34 PM   Jareth Gallardo is a 62 year old male who presents for:    Chief Complaint   Patient presents with     Port Draw     Labs drawn by RN via port, vitals taken.     Oncology Clinic Visit     Nasopharyngeal cancer     Initial Vitals: /80   Pulse 92   Temp 98  F (36.7  C)   Resp 16   Wt 119.6 kg (263 lb 11.2 oz)   SpO2 96%   BMI 37.84 kg/m   Estimated body mass index is 37.84 kg/m  as calculated from the following:    Height as of 6/21/22: 1.778 m (5' 10\").    Weight as of this encounter: 119.6 kg (263 lb 11.2 oz). Body surface area is 2.43 meters squared.  No Pain (0) Comment: Data Unavailable   No LMP for male patient.  Allergies reviewed: Yes  Medications reviewed: Yes    Medications: Medication refills not needed today.  Pharmacy name entered into [employer]:    [id number] - [place], [place] - [address] [facility] - [place], [place] - [id number]  [place] [facility] - [place], [place] - [address]    Clinical concerns: none       Lizeth Perdue CMA            "

## 2022-08-12 ENCOUNTER — APPOINTMENT (OUTPATIENT)
Dept: RADIATION ONCOLOGY | Facility: CLINIC | Age: 62
End: 2022-08-12
Attending: RADIOLOGY
Payer: COMMERCIAL

## 2022-08-12 PROCEDURE — 77386 HC IMRT TREATMENT DELIVERY, COMPLEX: CPT | Performed by: RADIOLOGY

## 2022-08-12 PROCEDURE — 77336 RADIATION PHYSICS CONSULT: CPT | Performed by: RADIOLOGY

## 2022-08-12 PROCEDURE — 77427 RADIATION TX MANAGEMENT X5: CPT | Performed by: RADIOLOGY

## 2022-08-12 PROCEDURE — 77014 PR CT GUIDE FOR PLACEMENT RADIATION THERAPY FIELDS: CPT | Mod: 26 | Performed by: RADIOLOGY

## 2022-08-12 NOTE — PROGRESS NOTES
CLINICAL NUTRITION SERVICES - REASSESSMENT NOTE   EVALUATION OF PREVIOUS PLAN OF CARE:   Referring Physician: Danni  Time spent with patient: 15 minutes.     Current diet: regular diet  Current appetite/intake: Good appetite/intake  PEG Tube: No  Chemotherapy: Weekly carboplatin - started 6/22/22   Radiation: started 6/22/22; complete 8/12 - one day remaining  Monitoring from previous assessment:   -Food intake - Rich admits to poor eating, taking only soft foods such as fried eggs, soup, jello, pudding and pasta salad.  He is not drinking nutrition shakes as he doesn't like them.    -Weight trends - 27 lb (10%) wt loss x past 3 weeks   Wt Readings from Last 8 Encounters:   08/11/22 119.6 kg (263 lb 11.2 oz)   08/11/22 119.7 kg (264 lb)   08/04/22 122.9 kg (271 lb)   08/01/22 125.7 kg (277 lb 3.2 oz)   07/28/22 124.7 kg (275 lb)   07/25/22 127.5 kg (281 lb 1.6 oz)   07/21/22 129.1 kg (284 lb 9.6 oz)   07/18/22 131.6 kg (290 lb 1.6 oz)     Previous Goals:   1. Aim for at least 3000 calories, 125g protein and 12 cups water/electrolyte fluids daily  2. Weight stability  Evaluation: Not met   Previous Nutrition Diagnosis:   Predicted suboptimal nutrient intake related to cancer treatment to head/neck region   Evaluation: Declining   NEW FINDINGS:   Weight loss of 10% x past 3 weeks    CURRENT NUTRITION DIAGNOSIS   Inadequate oral intake related to decreased ability to consume sufficient energy due to side effects of cancer therapy as evidenced by odynophagia, gag reflex and dysgeusia 10% wt loss x past 1 month, dietary intake 50% estimated needs.      INTERVENTIONS   Composition of Meals and Snacks and Medical Food Supplement - emphasized importance of adequate nutrition for healing post treatment.  Encouraged to try home made smoothies/shakes with ONS as this masks the taste and consistency. Encouraged to strive for at least 100 g protein for the next month for healing/rebuilding.   Goals:  3. Aim for at least 3000  calories, 125g protein and 12 cups water/electrolyte fluids daily  4. Weight stability     Monitor and Evaluation:  Patient has RD contact information for further nutrition questions/concerns.      Deb Maloney RD, LD  [facility]

## 2022-08-15 LAB
EBV DNA # SPEC NAA+PROBE: <500 COPIES/ML
EBV DNA SPEC NAA+PROBE-LOG#: <2.7 {LOG_COPIES}/ML

## 2022-08-16 ENCOUNTER — ONCOLOGY VISIT (OUTPATIENT)
Dept: RADIATION ONCOLOGY | Facility: CLINIC | Age: 62
End: 2022-08-16

## 2022-08-17 NOTE — PROCEDURES
Radiotherapy Treatment Summary          Date of Report: 2022     PATIENT: SUYAPA PARRA  MEDICAL RECORD NO: [id number]  : 1960     DIAGNOSIS: C11.1 Malignant neoplasm of posterior wall of nasopharynx  INTENT OF RADIOTHERAPY: Curative  PATHOLOGY: Squamous cell carcinoma  STAGE: cT2 N2 M0  CONCURRENT SYSTEMIC THERAPY: Carboplatin (weekly)     Details of the treatments summarized below are found in records kept in the Department of Radiation Oncology at [facility].     Treatment Summary:  Treatment Site  Dose  Modality From  To  Elapsed Days Fx.  Gross disease   7,000 cGy 06 X   6/22/2022 2022  51  35  Subclinical leny dz 6,650 cGy 06 X   6/22/2022 2022  51  35  High risk tissues 6,300 cGy 06 X  6/22/2022 2022  51  35  Elective lymphatics 5,700 cGy 06 X   6/22/2022 2022  51  35     Dose per Fraction:   Gross disease:  200 cGy  Subclinical leny dz:  190 cGy  High risk tissues:  180 cGy  Elective lymphatics:  163 cGy     COMMENTS:  Mr. Parra is a 62 year old gentleman who was diagnosed with a cT2 N2 M0 nasopharyngeal cancer after he presented in 2022 with a several month history of an enlarging left neck mass. Staging imaging revealed a 1.8 x 4 x 3.7 cm nasopharyngeal mass arising from the left fossa of Rosenmueller extending into the parapharyngeal space along with adenopathy involving the bilateral retropharyngeal regions, left levels 2, 3 and 5 along with right level 2. He received induction chemotherapy with 3 cycles of carboplatin/gemcitabine with restaging imaging demonstrating a significant decrease in the size of the primary tumor and involved lymph nodes.     The primary tumor and gross residual leny disease was treated to a dose of 70 Gy in 35 once-daily fractions using 6 MV photons delivered via a 3 non-coplanar arc volumetric modulated arc therapy technique. The previously positive nodes which had regressed and were not pathologically enlarged on restaging  imaging were treated to a dose of 66.5 Gy; the high risk tissues, defined as the remainder of the nasopharynx and surrounding mucosa along with the bilateral retropharyngeal regions, bilateral levels 2-3 and left level 5 was treated to 63 Gy; and the elective lymphatics, defined as the bilateral levels 1B and 4 along with right level 5, was treated to 57 Gy utilizing a simultaneous integrated boost technique. Radiotherapy was delivered with concurrent carboplatin.     Mr. Gallardo developed grade 2 mucositis and dermatitis by the completion of therapy along with grade 1 dry eye. His mucositis was managed with frequent salt/soda rinses for thickened secretions with acetaminophen and lidocaine rinses for the associated pain. His dermatitis was managed with twice-daily Aquaphor application to the bilateral face and neck. His dry eyes were treated with PRN saline eyedrops. He additionally developed an oral thrush infection during the last week of therapy which was successfully managed with oral nystatin suspension.     Mr. Gallardo remained on a soft PO diet through the completion of therapy. He was eating without difficulty at the conclusion of treatment but his oral intake was decreased secondary to treatment-induced dysgeusia. He lost approximately 19 kg over the course of therapy.      Mr. Gallardo had three separate 1-day breaks in therapy. He had a 1-day break between fractions 8-9 secondary to the July 4th holiday; a 1-day break between fractions 24-25; and a 1-day break between fractions 31-32 secondary to machine downtime. He completed the remainder of his fractions as scheduled without any additional treatment breaks. He received all seven planned infusions of weekly carboplatin.     Acute Toxicity Profile (CTCAE v5.0)  Mucositis: Grade 2  Dermatitis: Grade 2  Dry eye: Grade 1     PAIN MANAGEMENT:   Continue acetaminophen and viscous lidocaine as needed for mild to moderate pain     FOLLOW UP PLAN:   Follow-up  with radiation oncology NP in 1-2 weeks for symptom assessment  Follow-up with radiation oncology MD in 6 weeks     Staff Physician: Mane Razo MD, PhD  Physicist: Michael Jackson PhD     CC:   Jesus Melvin MD                                 Radiation Oncology:  [facility] [address] [place] [address]

## 2022-08-23 DIAGNOSIS — E87.6 HYPOKALEMIA: ICD-10-CM

## 2022-08-23 DIAGNOSIS — E83.42 HYPOMAGNESEMIA: ICD-10-CM

## 2022-08-23 DIAGNOSIS — C11.9 NASOPHARYNGEAL CANCER (H): ICD-10-CM

## 2022-08-23 RX ORDER — POTASSIUM CHLORIDE 1500 MG/1
20 TABLET, EXTENDED RELEASE ORAL 2 TIMES DAILY
Qty: 60 TABLET | Refills: 1 | Status: SHIPPED | OUTPATIENT
Start: 2022-08-23 | End: 2022-09-16

## 2022-08-23 RX ORDER — MAGNESIUM OXIDE 400 MG/1
400 TABLET ORAL 2 TIMES DAILY
Qty: 30 TABLET | Refills: 1 | Status: SHIPPED | OUTPATIENT
Start: 2022-08-23 | End: 2022-09-16

## 2022-08-29 ENCOUNTER — OFFICE VISIT (OUTPATIENT)
Dept: RADIATION ONCOLOGY | Facility: CLINIC | Age: 62
End: 2022-08-29
Attending: RADIOLOGY
Payer: COMMERCIAL

## 2022-08-29 ENCOUNTER — ONCOLOGY VISIT (OUTPATIENT)
Dept: ONCOLOGY | Facility: CLINIC | Age: 62
End: 2022-08-29
Attending: NURSE PRACTITIONER
Payer: COMMERCIAL

## 2022-08-29 ENCOUNTER — APPOINTMENT (OUTPATIENT)
Dept: LAB | Facility: CLINIC | Age: 62
End: 2022-08-29
Attending: INTERNAL MEDICINE
Payer: COMMERCIAL

## 2022-08-29 VITALS
SYSTOLIC BLOOD PRESSURE: 112 MMHG | HEART RATE: 82 BPM | BODY MASS INDEX: 36.16 KG/M2 | WEIGHT: 252 LBS | DIASTOLIC BLOOD PRESSURE: 60 MMHG

## 2022-08-29 VITALS
WEIGHT: 252 LBS | HEART RATE: 82 BPM | SYSTOLIC BLOOD PRESSURE: 112 MMHG | BODY MASS INDEX: 36.16 KG/M2 | DIASTOLIC BLOOD PRESSURE: 60 MMHG

## 2022-08-29 DIAGNOSIS — C11.9 NASOPHARYNGEAL CANCER (H): Primary | ICD-10-CM

## 2022-08-29 DIAGNOSIS — D70.1 CHEMOTHERAPY-INDUCED NEUTROPENIA (H): ICD-10-CM

## 2022-08-29 DIAGNOSIS — T45.1X5A CHEMOTHERAPY-INDUCED NEUTROPENIA (H): ICD-10-CM

## 2022-08-29 LAB
ALBUMIN SERPL-MCNC: 3.6 G/DL (ref 3.4–5)
ALP SERPL-CCNC: 93 U/L (ref 40–150)
ALT SERPL W P-5'-P-CCNC: 37 U/L (ref 0–70)
ANION GAP SERPL CALCULATED.3IONS-SCNC: 12 MMOL/L (ref 3–14)
AST SERPL W P-5'-P-CCNC: 53 U/L (ref 0–45)
BASOPHILS # BLD AUTO: 0 10E3/UL (ref 0–0.2)
BASOPHILS NFR BLD AUTO: 1 %
BILIRUB SERPL-MCNC: 1.3 MG/DL (ref 0.2–1.3)
BUN SERPL-MCNC: 15 MG/DL (ref 7–30)
CALCIUM SERPL-MCNC: 9.1 MG/DL (ref 8.5–10.1)
CHLORIDE BLD-SCNC: 95 MMOL/L (ref 94–109)
CO2 SERPL-SCNC: 30 MMOL/L (ref 20–32)
CREAT SERPL-MCNC: 1 MG/DL (ref 0.66–1.25)
EOSINOPHIL # BLD AUTO: 0 10E3/UL (ref 0–0.7)
EOSINOPHIL NFR BLD AUTO: 0 %
ERYTHROCYTE [DISTWIDTH] IN BLOOD BY AUTOMATED COUNT: 15.9 % (ref 10–15)
GFR SERPL CREATININE-BSD FRML MDRD: 86 ML/MIN/1.73M2
GLUCOSE BLD-MCNC: 107 MG/DL (ref 70–99)
HCT VFR BLD AUTO: 41.2 % (ref 40–53)
HGB BLD-MCNC: 13.1 G/DL (ref 13.3–17.7)
IMM GRANULOCYTES # BLD: 0 10E3/UL
IMM GRANULOCYTES NFR BLD: 0 %
LYMPHOCYTES # BLD AUTO: 0.5 10E3/UL (ref 0.8–5.3)
LYMPHOCYTES NFR BLD AUTO: 14 %
MAGNESIUM SERPL-MCNC: 1.6 MG/DL (ref 1.6–2.3)
MCH RBC QN AUTO: 27.6 PG (ref 26.5–33)
MCHC RBC AUTO-ENTMCNC: 31.8 G/DL (ref 31.5–36.5)
MCV RBC AUTO: 87 FL (ref 78–100)
MONOCYTES # BLD AUTO: 0.7 10E3/UL (ref 0–1.3)
MONOCYTES NFR BLD AUTO: 18 %
NEUTROPHILS # BLD AUTO: 2.5 10E3/UL (ref 1.6–8.3)
NEUTROPHILS NFR BLD AUTO: 67 %
NRBC # BLD AUTO: 0 10E3/UL
NRBC BLD AUTO-RTO: 0 /100
PLATELET # BLD AUTO: 190 10E3/UL (ref 150–450)
POTASSIUM BLD-SCNC: 3.2 MMOL/L (ref 3.4–5.3)
PROT SERPL-MCNC: 8.5 G/DL (ref 6.8–8.8)
RBC # BLD AUTO: 4.75 10E6/UL (ref 4.4–5.9)
SODIUM SERPL-SCNC: 137 MMOL/L (ref 133–144)
WBC # BLD AUTO: 3.7 10E3/UL (ref 4–11)

## 2022-08-29 PROCEDURE — G0463 HOSPITAL OUTPT CLINIC VISIT: HCPCS

## 2022-08-29 PROCEDURE — 82040 ASSAY OF SERUM ALBUMIN: CPT | Performed by: NURSE PRACTITIONER

## 2022-08-29 PROCEDURE — 99215 OFFICE O/P EST HI 40 MIN: CPT | Performed by: NURSE PRACTITIONER

## 2022-08-29 PROCEDURE — 80053 COMPREHEN METABOLIC PANEL: CPT | Performed by: NURSE PRACTITIONER

## 2022-08-29 PROCEDURE — 85025 COMPLETE CBC W/AUTO DIFF WBC: CPT | Performed by: NURSE PRACTITIONER

## 2022-08-29 PROCEDURE — 99024 POSTOP FOLLOW-UP VISIT: CPT | Performed by: NURSE PRACTITIONER

## 2022-08-29 PROCEDURE — 36591 DRAW BLOOD OFF VENOUS DEVICE: CPT | Performed by: NURSE PRACTITIONER

## 2022-08-29 PROCEDURE — 83735 ASSAY OF MAGNESIUM: CPT | Performed by: NURSE PRACTITIONER

## 2022-08-29 PROCEDURE — 250N000011 HC RX IP 250 OP 636: Performed by: NURSE PRACTITIONER

## 2022-08-29 RX ORDER — CEVIMELINE HYDROCHLORIDE 30 MG/1
30 CAPSULE ORAL 3 TIMES DAILY
Qty: 90 CAPSULE | Refills: 11 | Status: SHIPPED | OUTPATIENT
Start: 2022-08-29 | End: 2022-11-30

## 2022-08-29 RX ORDER — FLUCONAZOLE 200 MG/1
TABLET ORAL
COMMUNITY
Start: 2022-08-19 | End: 2022-08-29

## 2022-08-29 RX ORDER — POTASSIUM CHLORIDE 1500 MG/1
TABLET, EXTENDED RELEASE ORAL
COMMUNITY
Start: 2022-08-11 | End: 2022-08-29

## 2022-08-29 RX ORDER — DEXAMETHASONE 4 MG/1
TABLET ORAL
COMMUNITY
Start: 2022-04-01 | End: 2022-11-30

## 2022-08-29 RX ORDER — HEPARIN SODIUM (PORCINE) LOCK FLUSH IV SOLN 100 UNIT/ML 100 UNIT/ML
5 SOLUTION INTRAVENOUS ONCE
Status: COMPLETED | OUTPATIENT
Start: 2022-08-29 | End: 2022-08-29

## 2022-08-29 RX ADMIN — Medication 5 ML: at 15:15

## 2022-08-29 NOTE — NURSING NOTE
"Chief Complaint   Patient presents with     Port Draw     Port accessed and labs drawn by rn in lab. Vital signs taken in previous encounter.     Port accessed by RN in lab with 20g 3/4\" gripper needle, labs drawn, port flushed with saline and heparin, port de-accessed.  pt checked in for next appointment.    Obdulia Gr RN      "

## 2022-08-29 NOTE — NURSING NOTE
"Oncology Rooming Note    August 29, 2022 4:12 PM   Jareth Gallardo is a 62 year old male who presents for:    Chief Complaint   Patient presents with     Port Draw     Port accessed and labs drawn by rn in lab. Vital signs taken in previous encounter.     Oncology Clinic Visit     Nasopharyngeal Cancer     Initial Vitals: /60   Pulse 82   Wt 114.3 kg (252 lb)   BMI 36.16 kg/m   Estimated body mass index is 36.16 kg/m  as calculated from the following:    Height as of 6/21/22: 1.778 m (5' 10\").    Weight as of this encounter: 114.3 kg (252 lb). Body surface area is 2.38 meters squared.  Data Unavailable Comment: Data Unavailable   No LMP for male patient.  Allergies reviewed: Yes  Medications reviewed: Yes    Medications: Medication refills not needed today.  Pharmacy name entered into [employer]:    [id number] [place] [facility] - [place], [place] - [address] [facility] [place], [place] - [phone]  [facility] - [place], [place] - [address] [place] [address]    Clinical concerns: Pt presents today for f/u.       Mckenna Khoury LPN  8/29/2022              "

## 2022-08-29 NOTE — LETTER
2022         RE: Jareth Gallardo  [address] [place] [address]        Dear Colleague,    Thank you for referring your patient, Jareth Gallardo, to the [facility] RADIATION ONCOLOGY. Please see a copy of my visit note below.    Radiation Oncology Follow-up Visit  2022    Jareth Gallardo  MRN: [id number]   : 1960     DISEASE TREATED:   Squamous cell carcinoma of the nasopharynx, stage C T2 N2 M0    RADIATION THERAPY DELIVERED:   7000 cGy completed 2022    SYSTEMIC TREATMENT:  Carboplatin    INTERVAL SINCE COMPLETION OF RADIATION THERAPY:   2 weeks    SUBJECTIVE/HPI:  Jareth Gallardo is a 62 year old male who is here today for routine 2 week follow up after completing radiation therapy.  Jareth continues to lose quite a bit of weight.  He is happy about his weight loss.  States he does not have an appetite and food does not taste good.  He is eating.  Some days he feels he eats good and some days he eats very little.  He has not been doing salt and soda rinses.  He has a lot of phlegm that is very thick.  Mouth is very dry.  He continues to moisturize.  He is fatigued.  He is not taking anything for pain.  He was treated for thrush near the end of treatment and shortly being off the medication he felt thrush was back he went to urgent care and he was given Diflucan.  He finished that yesterday.    ROS:  Complete review of systems is negative except for symptoms discussed in subjective above.    Current Outpatient Medications   Medication     atorvastatin (LIPITOR) 20 MG tablet     B-D U/F insulin pen needle     blood glucose (NO BRAND SPECIFIED) lancets standard     blood glucose (NO BRAND SPECIFIED) test strip     blood glucose monitoring (ACCU-CHEK RANDELL PLUS) meter device kit     fluticasone-vilanterol (BREO ELLIPTA) 100-25 MCG/INH inhaler     furosemide (LASIX) 20 MG tablet     insulin lispro (HUMALOG KWIKPEN) 100 UNIT/ML (1 unit dial) KWIKPEN      magnesium oxide (MAG-OX) 400 MG tablet     metFORMIN (GLUCOPHAGE XR) 500 MG 24 hr tablet     metFORMIN (GLUCOPHAGE) 1000 MG tablet     nystatin (MYCOSTATIN) 551341 UNIT/ML suspension     omeprazole (PRILOSEC) 20 MG DR capsule     ondansetron (ZOFRAN) 8 MG tablet     potassium chloride ER (K-TAB) 20 MEQ CR tablet     prochlorperazine (COMPAZINE) 10 MG tablet     VICTOZA PEN 18 MG/3ML soln     No current facility-administered medications for this visit.        No Known Allergies    Past Medical History:   Diagnosis Date     COPD (chronic obstructive pulmonary disease) (H)      Dyslipidemia      Nasopharyngeal carcinoma (H)      BARRETT (obstructive sleep apnea)      Type 2 diabetes mellitus with diabetic nephropathy (H)          PHYSICAL EXAM:  /60   Pulse 82   Wt 114.3 kg (252 lb)   BMI 36.16 kg/m      Gen: Alert, in NAD  Eyes: PERRL, EOMI, sclera anicteric  HENT     Head: NC/AT     Ears: TM's clear, no external lesions.     Oral Cavity/Oropharynx: Dry mucous membranes with thickened secretions. No signs of thrush.  Neck: Moderate erythema of the bilateral neck with patchy areas of  desquamation involving the neck. No lymphedema. No palpable cervical adenopathy.  Pulm: No wheezing, stridor or respiratory distress  CV: Well-perfused, no cyanosis  Musculoskeletal: Normal bulk and tone   Skin: Neck as described above otherwise normal color and turgor.      LABS AND IMAGING:  Reviewed.    IMPRESSION:   Mr. Gallardo is a 62 year old male with a SCC of nasopharynx s/p chemoradiation now 2 weeks out since completion of treatment and doing well with slow improvement of acute side effects.    PLAN:   1.  Follow up with Dr. Razo in 1 month.   Continue close follow up with ENT and medical oncology.  2. Continue to moisturize with aquaphor and when skin is completely healed can change to preferred moisturizer.  Discussed importance of sun avoidance or sun protection.  3. Fatigue should continue to improve.  4. Continue  oral cares with salt and soda rinses.  Routine dental follow up at least every 6 months.  Continue to use fluoride trays or fluoride treatments. Continue jaw, neck and swallowing exercises.  5. Continue to push fluids and caloric intake to maintain weight.  Really stressed that he is losing too much weight.  He needs more calories.  6. Prescription sent for Evoxac for dry mouth.  Discussed that dry mouth will improve with time.    Aviva Mackey NP   Radiation Oncology          Again, thank you for allowing me to participate in the care of your patient.        Sincerely,        SILVIANO Yeung CNP

## 2022-08-29 NOTE — PROGRESS NOTES
Radiation Oncology Follow-up Visit  2022    Jareth Gallardo  MRN: [id number]   : 1960     DISEASE TREATED:   Squamous cell carcinoma of the nasopharynx, stage C T2 N2 M0    RADIATION THERAPY DELIVERED:   7000 cGy completed 2022    SYSTEMIC TREATMENT:  Carboplatin    INTERVAL SINCE COMPLETION OF RADIATION THERAPY:   2 weeks    SUBJECTIVE/HPI:  Jareth Gallardo is a 62 year old male who is here today for routine 2 week follow up after completing radiation therapy.  Jareth continues to lose quite a bit of weight.  He is happy about his weight loss.  States he does not have an appetite and food does not taste good.  He is eating.  Some days he feels he eats good and some days he eats very little.  He has not been doing salt and soda rinses.  He has a lot of phlegm that is very thick.  Mouth is very dry.  He continues to moisturize.  He is fatigued.  He is not taking anything for pain.  He was treated for thrush near the end of treatment and shortly being off the medication he felt thrush was back he went to urgent care and he was given Diflucan.  He finished that yesterday.    ROS:  Complete review of systems is negative except for symptoms discussed in subjective above.    Current Outpatient Medications   Medication     atorvastatin (LIPITOR) 20 MG tablet     B-D U/F insulin pen needle     blood glucose (NO BRAND SPECIFIED) lancets standard     blood glucose (NO BRAND SPECIFIED) test strip     blood glucose monitoring (ACCU-CHEK RANDELL PLUS) meter device kit     fluticasone-vilanterol (BREO ELLIPTA) 100-25 MCG/INH inhaler     furosemide (LASIX) 20 MG tablet     insulin lispro (HUMALOG KWIKPEN) 100 UNIT/ML (1 unit dial) KWIKPEN     magnesium oxide (MAG-OX) 400 MG tablet     metFORMIN (GLUCOPHAGE XR) 500 MG 24 hr tablet     metFORMIN (GLUCOPHAGE) 1000 MG tablet     nystatin (MYCOSTATIN) 460897 UNIT/ML suspension     omeprazole (PRILOSEC) 20 MG DR capsule     ondansetron (ZOFRAN) 8 MG tablet      potassium chloride ER (K-TAB) 20 MEQ CR tablet     prochlorperazine (COMPAZINE) 10 MG tablet     VICTOZA PEN 18 MG/3ML soln     No current facility-administered medications for this visit.        No Known Allergies    Past Medical History:   Diagnosis Date     COPD (chronic obstructive pulmonary disease) (H)      Dyslipidemia      Nasopharyngeal carcinoma (H)      BARRETT (obstructive sleep apnea)      Type 2 diabetes mellitus with diabetic nephropathy (H)          PHYSICAL EXAM:  /60   Pulse 82   Wt 114.3 kg (252 lb)   BMI 36.16 kg/m      Gen: Alert, in NAD  Eyes: PERRL, EOMI, sclera anicteric  HENT     Head: NC/AT     Ears: TM's clear, no external lesions.     Oral Cavity/Oropharynx: Dry mucous membranes with thickened secretions. No signs of thrush.  Neck: Moderate erythema of the bilateral neck with patchy areas of  desquamation involving the neck. No lymphedema. No palpable cervical adenopathy.  Pulm: No wheezing, stridor or respiratory distress  CV: Well-perfused, no cyanosis  Musculoskeletal: Normal bulk and tone   Skin: Neck as described above otherwise normal color and turgor.      LABS AND IMAGING:  Reviewed.    IMPRESSION:   Mr. Gallardo is a 62 year old male with a SCC of nasopharynx s/p chemoradiation now 2 weeks out since completion of treatment and doing well with slow improvement of acute side effects.    PLAN:   1.  Follow up with Dr. Razo in 1 month.   Continue close follow up with ENT and medical oncology.  2. Continue to moisturize with aquaphor and when skin is completely healed can change to preferred moisturizer.  Discussed importance of sun avoidance or sun protection.  3. Fatigue should continue to improve.  4. Continue oral cares with salt and soda rinses.  Routine dental follow up at least every 6 months.  Continue to use fluoride trays or fluoride treatments. Continue jaw, neck and swallowing exercises.  5. Continue to push fluids and caloric intake to maintain weight.  Really  stressed that he is losing too much weight.  He needs more calories.  6. Prescription sent for Evoxac for dry mouth.  Discussed that dry mouth will improve with time.    Aviva Mackey NP   Radiation Oncology

## 2022-08-29 NOTE — PROGRESS NOTES
[facility] CANCER [facility]    PATIENT NAME: Jareth Gallardo  MRN # [id number]   DATE OF VISIT: August 29, 2022  YOB: 1960     Referring Provider: Dr. Jesus Boston  Radiation Oncology: Dr. Mane Razo  Primary Care Provider: Dr. Beni Gallardo at [facility] in [facility]    CANCER TYPE: Nasopharyngeal carcinoma, p16 negative, SAM positive  STAGE: [id number] (DENAE)  ECOG PS: 1    PD-L1: 20% using  clone locally; TPS 70% at [facility]   NGS:     SUMMARY  12/11/21 UC for L neck mass, selling  12/9/21 CT neck ([place]). 2.4 cm L neck node medial to SCM just above the hyoid, additional LNs surround, scattered R neck and L supraclavicular LNs  2/15/22 US bx L cervical node. Path: SCC, moderately differentiated,   3/4/22 PET/CT. Nasopharyngeal and oropharyngeal mucosal thickening. 2.6 cm BERNARDO nodular GGO, SUV avid, some additional lingular uptake  4/4/22 C1 carboplatin gemcitabine. Not a candidate for cisplatin due to hearing loss on audiogram.  4/26/22 C2 carboplatin gemcitabine. Neulasta 5/3 after C2D8, ANC 1000   5/16/22 C3 carboplatin gemcitabine  5/31/22 PET/CT. [place]  6/22~8/12/22 Chemoradiation with weekly carboplatin AUC 2    SUBJECTIVE  Jareth returns for follow up. He just met with Aviva. He is losing weight---thick saliva and dry mouth limiting intake. Has scale at home. Not needing pain medication. No fever or infectious concerns. No edema. Energy ok, recently was at cabin    PAST MEDICAL HISTORY  Nasopharyngeal carcinoma as above  DM2  COPD??  Hiatal hernia. Sleeps with his head elevated  LVH on TTE 2009, EF50%, LA Dilation  Dyslipidemia  BARRETT in 2015. Not using CPAP due to developing tooth abscesses when he tried it in 2015  Venous insufficiency in both legs being in an accident involving a mower about 10 years ago, on chronic furosemide. LE US negative 12/15/2009  Cholelithiasis  Hyperplastic colon polyp, due 2020  Tonsillectomy  Baker cyst RLE US 2013    Neuropathy from DM -  R foot numbness chronically    Exposed to lots of wood dust in his occupation    CURRENT OUTPATIENT MEDICATIONS    ALLERGIES  No Known Allergies     REVIEW OF SYSTEMS  As above in the HPI, o/w complete 12-point ROS was negative.    PHYSICAL EXAM  /60   Pulse 82   Wt 114.3 kg (252 lb)   BMI 36.16 kg/m       GEN: NAD  HEENT: EOMI, no icterus, injection or pallor. Dry mucous membranes with thick secretions no thrush.   RESP/HEART: RRR, CTA. No edema  NEURO: alert    LABORATORY AND IMAGING STUDIES    Most Recent 3 CBC's:  Recent Labs   Lab Test 08/29/22  1515 08/11/22  1527 08/01/22  1025   WBC 3.7* 1.3* 2.3*   HGB 13.1* 12.6* 11.8*   MCV 87 86 88    130* 85*   ANEUTAUTO 2.5 0.7* 1.5*     Most Recent 3 BMP's:  Recent Labs   Lab Test 08/29/22  1515 08/22/22  1428 08/11/22  1527 08/01/22  1025     --  140 140   POTASSIUM 3.2*  --  3.3* 3.2*   CHLORIDE 95 96* 99 100   CO2 30  --  33* 34*   BUN 15  --  13 13   CR 1.00  --  0.82 0.74   ANIONGAP 12  --  8 6   LAI 9.1  --  8.9 8.6   *  --  98 95   PROTTOTAL 8.5  --  8.2 7.4   ALBUMIN 3.6  --  3.3* 3.0*    Most Recent 3 LFT's:  Recent Labs   Lab Test 08/29/22  1515 08/11/22  1527 08/01/22  1025   AST 53* 32 30   ALT 37 29 33   ALKPHOS 93 86 74   BILITOTAL 1.3 1.0 0.7    Most Recent 2 TSH and T4:No lab results found.  I reviewed the above labs today.       ASSESSMENT AND PLAN  Nasopharyngeal carcinoma, SAM +julia: s/p induction and chemo rads with weekly carbo x7 doses. finishes RT 8/12. Pancytopenia improving, K still low  -labs locally 9/7, I will mychart him in follow up. Video with me 9/16  -neck CT and Dr. Boston around 6-7 weeks post treatment.   -3 month post-treatment PET/CT, which will be set up through Dr. Boston's office.   [facility] after that 3 month post treatment evaluation    Thrush: completed fluconazole    Thrombocytopenia and neutropenia: resolved    Weight loss, malnutrition: ongoing notable weight  loss. Both radha and I discussed our concerns with this today. He will monitor at home moving forward    BERNARDO/lung nodules: On staging PET/CT, resolved as of restaging PET/CT after induction and prior to chemoradiation. 4R wasn't particularly worrisome to start. Re-evaluate on 3 month post-treatment PET/CT as above.     DM2, steroid-induced hyperglycemia: Per PCP. Sugars stable. Has local follow up Monday     Mild peripheral neuropathy: R foot numbness, very mild, from DM2. Continue to monitor.    Hypokalemia/hypomag: increase 20 K and 400 mag to BID from daily. Recheck next week as above    50 minutes spent on the date of the encounter doing chart review, review of test results, interpretation of tests, patient visit, documentation and discussion with other provider(s)     Maryjo Polanco, CNP

## 2022-08-29 NOTE — LETTER
Date:August 31, 2022      Patient was self referred, no letter generated. Do not send.        [facility] Health Information

## 2022-09-06 ENCOUNTER — TRANSFERRED RECORDS (OUTPATIENT)
Dept: HEALTH INFORMATION MANAGEMENT | Facility: OTHER | Age: 62
End: 2022-09-06

## 2022-09-12 NOTE — PROGRESS NOTES
Department of Radiation Oncology  [facility]  [address] [place] [address]  [phone]       Radiation Oncology Follow-up Visit  2022      Jareth Gallardo  MRN: [id number]   : 1960     DISEASE TREATED:   cT2 N2 M0 squamous cell carcinoma of the nasopharynx    RADIATION THERAPY DELIVERED:   7000 cGy in 35 fractions to the nasopharynx and bilateral neck, 22 - 22    SYSTEMIC THERAPY:  Carboplatin (weekly)    INTERVAL SINCE COMPLETION OF RADIATION THERAPY:   6 weeks    SUBJECTIVE:   Jareth Gallardo is a 62 year old male who was diagnosed with a cT2 N2 M0 nasopharyngeal cancer after he presented in 2022 with a several month history of an enlarging left neck mass. Staging imaging revealed a 1.8 x 4 x 3.7 cm nasopharyngeal mass arising from the left fossa of Rosenmueller extending into the parapharyngeal space along with adenopathy involving the bilateral retropharyngeal regions, left levels 2, 3 and 5 along with right level 2. He received induction chemotherapy with 3 cycles of carboplatin/gemcitabine with restaging imaging demonstrating a significant decrease in the size of the primary tumor and involved lymph nodes. He was recommended to undergo definitive chemoradiation as described above.    Mr. Gallardo returns to radiation oncology clinic today for a routine post-treatment disease surveillance visit. On interview, he endorses persistent dysgeusia and dry mouth. He tolerates a general diet well without pain/difficulty with swallowing. He has mild dull pain in his left neck. He denies fatigue.    PHYSICAL EXAM:  /65   Pulse 106   Temp 98  F (36.7  C)   Resp 16   Wt 114.9 kg (253 lb 4.8 oz)   SpO2 97%   BMI 36.34 kg/m    GEN: Appears well, alert, oriented, and in no acute distress  HEENT: Normal cephalic, atraumatic. Extraocular muscles intact.   Diffusely dry mucosa, normal oral cavity and posterior pharyngeal  wall.  NECK: Full range of motion. Mild submental lymphedema. Known left level II adenopathy reduced in size, now measured roughly 1-2 cm  RESP: Breathing comfortably on room air  SKIN: Normal color and turgor  NEURO: No focal deficits, normal gait  PSYCH: Appropriate mood and affect    Flexible Fiberoptic Nasopharyngoscopy:  Consent for fiberoptic laryngoscopy was obtained and I confirmed correctness of procedure and identity of patient. Fiberoptic laryngoscopy was indicated due to posttreatment disease surveillance. The nose was topically decongested and anesthetized. The fiberoptic laryngoscope was passed through the right naris under endoscopic vision. The turbinates were normal. The inferior and middle meati were clear without purulence, masses, or polyps. There were post radiotherapy changes involving the nasopharynx with hyperemia of the posterior nasopharyngeal wall mucosa and thickened secretions. There was, however, no evidence of residual disease. The soft palate was clear with normal mobility. Passage of the scope into the oropharynx revealed bland-appearing mucosa. The vallecula was clear and the epiglottis was sharp. The larynx was similarly clear with mobile bilaterally. The hypopharynx was clear with no pooling of secretions. The scope was then removed and the procedure was terminated without incident.    LABS AND IMAGING:  Reviewed    IMPRESSION:   Mr. 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 He is now 6 weeks out status post chemoradiation. He is recovering well from treatment-induced side effects.    PLAN:   -PET/CT at 3 months post treatment. Return to clinic for MD visit afterwards.  -If PET/CT yields no concerning findings, will make referral for him to see [facility].  -TSH to collect in his next visit.    The patient was seen and discussed with staff, Dr. Razo.    Fadumo Benjamin MD  Resident, PGY-5  Department of Radiation  Oncology  [facility]      Attending addendum:   I saw and examined the patient with the resident and agree with the documented plan of care.    Mane Razo MD/PhD    Dept of Radiation Oncology  [facility]

## 2022-09-14 NOTE — PROGRESS NOTES
[facility] CANCER [facility]    PATIENT NAME: Jareth Gallardo  MRN # [id number]   DATE OF VISIT: September 16, 2022 YOB: 1960     Referring Provider: Dr. Jesus Boston  Radiation Oncology: Dr. Mane Razo  Primary Care Provider: Dr. Beni Gallardo at [facility] in [facility]    CANCER TYPE: Nasopharyngeal carcinoma, p16 negative, SAM positive  STAGE: [id number] (DENAE)  ECOG PS: 1    PD-L1: 20% using  clone locally; TPS 70% at [facility]     SUMMARY  12/11/21 UC for L neck mass, swelling  12/9/21 CT neck ([place]). 2.4 cm L neck node medial to SCM just above the hyoid, additional LNs surround, scattered R neck and L supraclavicular LNs  2/15/22 US bx L cervical node. Path: SCC, moderately differentiated,   3/4/22 PET/CT. Nasopharyngeal and oropharyngeal mucosal thickening. 2.6 cm BERNARDO nodular GGO, SUV avid, some additional lingular uptake  4/4/22 C1 carboplatin gemcitabine. Not a candidate for cisplatin due to hearing loss on audiogram.  4/26/22 C2 carboplatin gemcitabine. Neulasta 5/3 after C2D8, ANC 1000   5/16/22 C3 carboplatin gemcitabine  5/31/22 PET/CT. [place]  6/22~8/12/22 Chemoradiation with weekly carboplatin AUC 2    SUBJECTIVE  Jareth returns for follow up via video. He is doing well. He is 248 lbs at home and monitors his weight to be sure it is stable. He reports he is eating a normal diet. Has some dry mouth still. No pain. Taking potassium and Mag twice daily.    PAST MEDICAL HISTORY  Nasopharyngeal carcinoma as above  DM2  COPD??  Hiatal hernia. Sleeps with his head elevated  LVH on TTE 2009, EF50%, LA Dilation  Dyslipidemia  BARRETT in 2015. Not using CPAP due to developing tooth abscesses when he tried it in 2015  Venous insufficiency in both legs being in an accident involving a mower about 10 years ago, on chronic furosemide. LE US negative 12/15/2009  Cholelithiasis  Hyperplastic colon polyp, due 2020  Tonsillectomy  Baker cyst RLE US 2013    Neuropathy from DM - R foot  numbness chronically    Exposed to lots of wood dust in his occupation    CURRENT OUTPATIENT MEDICATIONS    ALLERGIES  No Known Allergies     REVIEW OF SYSTEMS  As above in the HPI, o/w complete 12-point ROS was negative.    PHYSICAL EXAM  There were no vitals taken for this visit.     Video physical exam  General: Patient appears well in no acute distress.   Skin: No visualized rash or lesions on visualized skin  Eyes: EOMI, no erythema, sclera icterus or discharge noted  Resp: Appears to be breathing comfortably without accessory muscle usage, speaking in full sentences, no cough  MSK: Appears to have normal range of motion based on visualized movements  Neurologic: No apparent tremors, facial movements symmetric  Psych: affect good, alert and oriented      LABORATORY AND IMAGING STUDIES  Reviewed care everywhere labs from 9/15    ASSESSMENT AND PLAN  Nasopharyngeal carcinoma, SAM +julia: s/p induction and chemo rads with weekly carbo x7 doses. finishes RT 8/12.  -Dr. Razo and Dr. Boston later this month as scheduled   -Dr. Melvin will see him after 3 month post-treatment PET/CT, which will be set up through Dr. Boston's office.   -discsuss survivorship visit w/Aviva after that 3 month post treatment evaluation    Weight loss, malnutrition: reports stability at home. Reiterated need to not be dropping weight.     BERNARDO/lung nodules: On staging PET/CT, resolved as of restaging PET/CT after induction and prior to chemoradiation. 4R wasn't particularly worrisome to start. Re-evaluate on 3 month post-treatment PET/CT as above.     DM2, steroid-induced hyperglycemia: Per PCP. Sugars stable. Had local follow up since completion of therapy     Hypokalemia/hypomag: [id number] K and 400 mag to BID from daily. Recheck locally in about 1 mo to ensure stability. PCP could take over this thereafter    30 minutes spent on the date of the encounter doing chart review, review of test results, interpretation of tests, patient  visit, documentation and discussion with other provider(s)     Maryjo Polanco, CNP       Jareth is a 62 year old who is being evaluated via a billable video visit.      How would you like to obtain your AVS? MyChart  Will anyone else be joining your video visit? Yes, pt's wife Shelley will be joining.         Video-Visit Details    Video Start Time: 11:40 am    Type of service:  Video Visit    Video End Time:11:50 am    Originating Location (pt. Location): Home    Distant Location (provider location):  Provider home    Platform used for Video Visit: [facility]       Medication and allergies have been reviewed.       Joni Petersen, VF

## 2022-09-16 ENCOUNTER — VIRTUAL VISIT (OUTPATIENT)
Dept: ONCOLOGY | Facility: CLINIC | Age: 62
End: 2022-09-16
Attending: NURSE PRACTITIONER
Payer: COMMERCIAL

## 2022-09-16 VITALS — BODY MASS INDEX: 35.5 KG/M2 | WEIGHT: 248 LBS | HEIGHT: 70 IN

## 2022-09-16 DIAGNOSIS — E83.42 HYPOMAGNESEMIA: ICD-10-CM

## 2022-09-16 DIAGNOSIS — E87.6 HYPOKALEMIA: ICD-10-CM

## 2022-09-16 DIAGNOSIS — C11.9 NASOPHARYNGEAL CANCER (H): Primary | ICD-10-CM

## 2022-09-16 PROCEDURE — 99214 OFFICE O/P EST MOD 30 MIN: CPT | Mod: 95 | Performed by: NURSE PRACTITIONER

## 2022-09-16 RX ORDER — MAGNESIUM OXIDE 400 MG/1
400 TABLET ORAL 2 TIMES DAILY
Qty: 30 TABLET | Refills: 1 | Status: SHIPPED | OUTPATIENT
Start: 2022-09-16 | End: 2022-11-30

## 2022-09-16 RX ORDER — POTASSIUM CHLORIDE 1500 MG/1
20 TABLET, EXTENDED RELEASE ORAL 2 TIMES DAILY
Qty: 60 TABLET | Refills: 1 | Status: SHIPPED | OUTPATIENT
Start: 2022-09-16 | End: 2022-11-30

## 2022-09-16 ASSESSMENT — PAIN SCALES - GENERAL: PAINLEVEL: NO PAIN (0)

## 2022-09-21 ENCOUNTER — OFFICE VISIT (OUTPATIENT)
Dept: OTOLARYNGOLOGY | Facility: CLINIC | Age: 62
End: 2022-09-21
Payer: COMMERCIAL

## 2022-09-21 ENCOUNTER — THERAPY VISIT (OUTPATIENT)
Dept: SPEECH THERAPY | Facility: CLINIC | Age: 62
End: 2022-09-21
Payer: COMMERCIAL

## 2022-09-21 VITALS
HEIGHT: 70 IN | SYSTOLIC BLOOD PRESSURE: 112 MMHG | WEIGHT: 254 LBS | HEART RATE: 66 BPM | DIASTOLIC BLOOD PRESSURE: 80 MMHG | BODY MASS INDEX: 36.36 KG/M2 | TEMPERATURE: 97.8 F | OXYGEN SATURATION: 96 %

## 2022-09-21 DIAGNOSIS — R13.12 OROPHARYNGEAL DYSPHAGIA: Primary | ICD-10-CM

## 2022-09-21 DIAGNOSIS — C11.9 NASOPHARYNGEAL CANCER (H): ICD-10-CM

## 2022-09-21 DIAGNOSIS — C11.9 NASOPHARYNGEAL CANCER (H): Primary | ICD-10-CM

## 2022-09-21 PROCEDURE — 99213 OFFICE O/P EST LOW 20 MIN: CPT | Performed by: OTOLARYNGOLOGY

## 2022-09-21 PROCEDURE — 92526 ORAL FUNCTION THERAPY: CPT | Mod: GN | Performed by: SPEECH-LANGUAGE PATHOLOGIST

## 2022-09-21 ASSESSMENT — PAIN SCALES - GENERAL: PAINLEVEL: NO PAIN (1)

## 2022-09-21 NOTE — PROGRESS NOTES
[facility]    Outpatient Speech Language Pathology Progress Note  Patient: Jareth Gallardo  : 1960    Beginning/End Dates of Reporting Period:  22 to 22    Referring Provider: Dr. Jesus Boston    Therapy Diagnosis: Oropharyngeal dysphagia    Client Self Report: Mr. 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 He underwent neoadjuvant chemotherapy with 3 cycles of carboplatin/gemcitabine from 2022 - 2022 followed by chemoXRT finishing on 22. He did NOT have a PEG. He is seen today in conjunction with ENT clinic visit. Reports he was losing a lot of weight, but now it has stabalized. He accompanied today by his wife.         Outcome Measures (most recent score):        Goals:  Goal Identifier Eat   Goal Description With independent use of compensatory strategies, pt will tolerate soft solids and thin liquids without overt s/s of aspiration or adverse lung events.   Target Date 22   Date Met      Progress (detail required for progress note): Goal ongoing. Pt reports eating/drinking is going well. He is able to eat anything he wants except bread d/t xerostomia and citrus items. Denies coughing/TC and feeling of stasis with PO intake. Reports he lost a lot of weight at the end of treatment, but now that has stabalized. Discussed trajectory of improvement of XRT related side effects on swallowing. He is hoping his energy level comes back sooner rather than later. Encouraged pt to continue with PO intake with use of general safe swallow strategies.     Goal Identifier Exercise   Goal Description Pt will independently complete oropharyngeal strengthen exercises 3-5x/day as recommended by treating therapist.   Target Date 22   Date Met      Progress (detail required for progress note): Goal ongoing. Pt reports he  stopped completing exercises after chemoXRT was done. He is upset because he doesn't think anyone told him he needed to continue the exercises after XRT was done. Retrained rationale/importance of continuing home exercise program 2-3x/day for the first two years after XRT to reduce risk of XRT induced fibrosis and subsequent progressive dysphagia. Pt requested new handout, but declined new retraining. New handout given.       Plan:  Continue therapy per current plan of care.    Discharge:  No

## 2022-09-21 NOTE — NURSING NOTE
"Chief Complaint   Patient presents with     RECHECK      Follow up Post  treatment      Blood pressure 112/80, pulse 66, temperature 97.8  F (36.6  C), height 1.778 m (5' 10\"), weight 115.2 kg (254 lb), SpO2 96 %.    Robert Meier LPN    "

## 2022-09-21 NOTE — PATIENT INSTRUCTIONS
1. Please follow-up in first week of Decmeber with PET the same day.   2. Please call the ENT clinic with any questions,concerns, new or worsening symptoms.    -Clinic number is [phone]   - Nel's direct line (Dr. Boston's nurse) [phone]

## 2022-09-21 NOTE — PROGRESS NOTES
September 21, 2022      Mane Razo MD   [facility] Radiation Oncology   [address]   [place], [place] [address]     Dariela Melvin MD   [facility] Department of Medicine   [address]   [place], [place] [address]       Dear Doctors,    PRIOR ONCOLOGIC HISTORY:  The patient is a 62-year-old gentleman with a history of clinically stage T2 N2 nasopharyngeal carcinoma.  He completed concurrent chemoradiotherapy with weekly carboplatin on 08/12/2002.  He did have a few missed treatments, but otherwise did well in terms of his swallowing throughout therapy.  He did his swallowing exercises for a little while, but then discontinued his followup with the speech team and was not seen again until today.  He has not had much in the way of pain.  He notes that his breathing is much better.  He has not had any epistaxis.  He still feels a little bit of fullness in his left neck, but otherwise has no complaints.    PHYSICAL EXAMINATION:  Limited examination was performed.  Examination of the oral cavity shows normal mucosa of the tongue, floor of mouth, hard and soft palate, gingival buccal mucosa, retromolar trigone and posterior pharyngeal wall.  I do not see any tumor from the superior aspect of the oropharynx in its junction with the nasopharynx.  He cannot tolerate mirror exam.  Examination of neck reveals some fullness in the left neck, but no discrete lymphadenopathy.  The right neck feels normal.    IMPRESSION:  Excellent clinical response.    PLAN:    1.  I have told the patient that I will scope him at his next visit after his PET scan.  2.  He will be scheduled for a PET scan about six weeks from now.  3.  He will continue to work on his speech and swallowing exercises.    Thank you for allowing me to participate in the care of this patient. If you have any further questions, please do not hesitate to contact me.     Sincerely,      Jesus Boston  MD, MS  Professor and Chair   Dept. of Otolaryngology-Head and Neck Surgery   [facility]

## 2022-09-21 NOTE — LETTER
9/21/2022       RE: Jareth Gallardo  [address] [address]     Dear Colleague,    Thank you for referring your patient, Jareth Gallardo, to the [facility] EAR NOSE AND THROAT [facility]INIC [place] [facility]. Please see a copy of my visit note below.    September 21, 2022      Mane Razo MD   [facility] Radiation Oncology   [address]   [place], [place] [address]     Dariela Melvin MD   [facility] Department of Medicine   [address]   [place], [place] [address]       Dear Doctors,    PRIOR ONCOLOGIC HISTORY:  The patient is a 62-year-old gentleman with a history of clinically stage T2 N2 nasopharyngeal carcinoma.  He completed concurrent chemoradiotherapy with weekly carboplatin on 08/12/2002.  He did have a few missed treatments, but otherwise did well in terms of his swallowing throughout therapy.  He did his swallowing exercises for a little while, but then discontinued his followup with the speech team and was not seen again until today.  He has not had much in the way of pain.  He notes that his breathing is much better.  He has not had any epistaxis.  He still feels a little bit of fullness in his left neck, but otherwise has no complaints.    PHYSICAL EXAMINATION:  Limited examination was performed.  Examination of the oral cavity shows normal mucosa of the tongue, floor of mouth, hard and soft palate, gingival buccal mucosa, retromolar trigone and posterior pharyngeal wall.  I do not see any tumor from the superior aspect of the oropharynx in its junction with the nasopharynx.  He cannot tolerate mirror exam.  Examination of neck reveals some fullness in the left neck, but no discrete lymphadenopathy.  The right neck feels normal.    IMPRESSION:  Excellent clinical response.    PLAN:    1.  I have told the patient that I will scope him at his next visit after his PET  scan.  2.  He will be scheduled for a PET scan about six weeks from now.  3.  He will continue to work on his speech and swallowing exercises.    Thank you for allowing me to participate in the care of this patient. If you have any further questions, please do not hesitate to contact me.     Sincerely,      Jesus Boston MD, MS  Professor and Chair   Dept. of Otolaryngology-Head and Neck Surgery   [facility]

## 2022-09-28 ENCOUNTER — APPOINTMENT (OUTPATIENT)
Dept: LAB | Facility: CLINIC | Age: 62
End: 2022-09-28
Payer: COMMERCIAL

## 2022-09-28 ENCOUNTER — OFFICE VISIT (OUTPATIENT)
Dept: RADIATION ONCOLOGY | Facility: CLINIC | Age: 62
End: 2022-09-28
Attending: RADIOLOGY
Payer: COMMERCIAL

## 2022-09-28 VITALS
DIASTOLIC BLOOD PRESSURE: 65 MMHG | RESPIRATION RATE: 16 BRPM | TEMPERATURE: 98 F | SYSTOLIC BLOOD PRESSURE: 104 MMHG | WEIGHT: 253.3 LBS | OXYGEN SATURATION: 97 % | HEART RATE: 106 BPM | BODY MASS INDEX: 36.34 KG/M2

## 2022-09-28 DIAGNOSIS — D70.1 CHEMOTHERAPY-INDUCED NEUTROPENIA (H): ICD-10-CM

## 2022-09-28 DIAGNOSIS — C11.9 NASOPHARYNGEAL CANCER (H): Primary | ICD-10-CM

## 2022-09-28 DIAGNOSIS — T45.1X5A CHEMOTHERAPY-INDUCED NEUTROPENIA (H): ICD-10-CM

## 2022-09-28 LAB
ALBUMIN SERPL BCG-MCNC: 3.8 G/DL (ref 3.5–5.2)
ALP SERPL-CCNC: 81 U/L (ref 40–129)
ALT SERPL W P-5'-P-CCNC: 32 U/L (ref 10–50)
ANION GAP SERPL CALCULATED.3IONS-SCNC: 9 MMOL/L (ref 7–15)
AST SERPL W P-5'-P-CCNC: 44 U/L (ref 10–50)
BASOPHILS # BLD AUTO: 0 10E3/UL (ref 0–0.2)
BASOPHILS NFR BLD AUTO: 0 %
BILIRUB SERPL-MCNC: 0.8 MG/DL
BUN SERPL-MCNC: 14.2 MG/DL (ref 8–23)
CALCIUM SERPL-MCNC: 9.4 MG/DL (ref 8.8–10.2)
CHLORIDE SERPL-SCNC: 101 MMOL/L (ref 98–107)
CREAT SERPL-MCNC: 0.86 MG/DL (ref 0.67–1.17)
DEPRECATED HCO3 PLAS-SCNC: 30 MMOL/L (ref 22–29)
EOSINOPHIL # BLD AUTO: 0 10E3/UL (ref 0–0.7)
EOSINOPHIL NFR BLD AUTO: 1 %
ERYTHROCYTE [DISTWIDTH] IN BLOOD BY AUTOMATED COUNT: 14.9 % (ref 10–15)
GFR SERPL CREATININE-BSD FRML MDRD: >90 ML/MIN/1.73M2
GLUCOSE SERPL-MCNC: 87 MG/DL (ref 70–99)
HCT VFR BLD AUTO: 37.4 % (ref 40–53)
HGB BLD-MCNC: 11.9 G/DL (ref 13.3–17.7)
IMM GRANULOCYTES # BLD: 0 10E3/UL
IMM GRANULOCYTES NFR BLD: 0 %
LYMPHOCYTES # BLD AUTO: 0.5 10E3/UL (ref 0.8–5.3)
LYMPHOCYTES NFR BLD AUTO: 14 %
MAGNESIUM SERPL-MCNC: 1.7 MG/DL (ref 1.7–2.3)
MCH RBC QN AUTO: 27.5 PG (ref 26.5–33)
MCHC RBC AUTO-ENTMCNC: 31.8 G/DL (ref 31.5–36.5)
MCV RBC AUTO: 87 FL (ref 78–100)
MONOCYTES # BLD AUTO: 0.4 10E3/UL (ref 0–1.3)
MONOCYTES NFR BLD AUTO: 12 %
NEUTROPHILS # BLD AUTO: 2.7 10E3/UL (ref 1.6–8.3)
NEUTROPHILS NFR BLD AUTO: 73 %
NRBC # BLD AUTO: 0 10E3/UL
NRBC BLD AUTO-RTO: 0 /100
PLATELET # BLD AUTO: 161 10E3/UL (ref 150–450)
POTASSIUM SERPL-SCNC: 3.8 MMOL/L (ref 3.4–5.3)
PROT SERPL-MCNC: 7.6 G/DL (ref 6.4–8.3)
RBC # BLD AUTO: 4.32 10E6/UL (ref 4.4–5.9)
SODIUM SERPL-SCNC: 140 MMOL/L (ref 136–145)
WBC # BLD AUTO: 3.7 10E3/UL (ref 4–11)

## 2022-09-28 PROCEDURE — 80053 COMPREHEN METABOLIC PANEL: CPT | Performed by: RADIOLOGY

## 2022-09-28 PROCEDURE — 31575 DIAGNOSTIC LARYNGOSCOPY: CPT | Performed by: RADIOLOGY

## 2022-09-28 PROCEDURE — 83735 ASSAY OF MAGNESIUM: CPT | Performed by: RADIOLOGY

## 2022-09-28 PROCEDURE — 85025 COMPLETE CBC W/AUTO DIFF WBC: CPT | Performed by: RADIOLOGY

## 2022-09-28 PROCEDURE — 36591 DRAW BLOOD OFF VENOUS DEVICE: CPT | Performed by: RADIOLOGY

## 2022-09-28 PROCEDURE — 250N000011 HC RX IP 250 OP 636: Performed by: RADIOLOGY

## 2022-09-28 RX ORDER — HEPARIN SODIUM (PORCINE) LOCK FLUSH IV SOLN 100 UNIT/ML 100 UNIT/ML
5 SOLUTION INTRAVENOUS ONCE
Status: COMPLETED | OUTPATIENT
Start: 2022-09-28 | End: 2022-09-28

## 2022-09-28 RX ADMIN — Medication 5 ML: at 13:30

## 2022-09-28 ASSESSMENT — PAIN SCALES - GENERAL: PAINLEVEL: NO PAIN (0)

## 2022-09-28 NOTE — NURSING NOTE
Chief Complaint   Patient presents with     Cancer     Port Draw     Labs drawn by RN via port, vitals taken.     Port accessed with 20 gauge 3/4 inch flat needle by RN, labs collected, line flushed with saline and heparin.  Vitals taken. Pt checked in for appointment(s).    Mary Cole RN

## 2022-09-28 NOTE — LETTER
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 Staging imaging revealed a 1.8 x 4 x 3.7 cm nasopharyngeal mass arising from the left fossa of Rosenmueller extending into the parapharyngeal space along with adenopathy involving the bilateral retropharyngeal regions, left levels 2, 3 and 5 along with right level 2. He received induction chemotherapy with 3 cycles of carboplatin/gemcitabine with restaging imaging demonstrating a significant decrease in the size of the primary tumor and involved lymph nodes. He was recommended to undergo definitive chemoradiation as described above.    Mr. Gallardo returns to radiation oncology clinic today for a routine post-treatment disease surveillance visit. On interview, he endorses persistent dysgeusia and dry mouth. He tolerates a general diet well without pain/difficulty with swallowing. He has mild dull pain in his left neck. He denies fatigue.    PHYSICAL EXAM:  /65   Pulse 106   Temp 98  F (36.7  C)   Resp 16   Wt 114.9 kg (253 lb 4.8 oz)   SpO2 97%   BMI 36.34 kg/m    GEN: Appears well, alert, oriented, and in no acute distress  HEENT: Normal cephalic, atraumatic. Extraocular muscles  intact.   Diffusely dry mucosa, normal oral cavity and posterior pharyngeal wall.  NECK: Full range of motion. Mild submental lymphedema. Known left level II adenopathy reduced in size, now measured roughly 1-2 cm  RESP: Breathing comfortably on room air  SKIN: Normal color and turgor  NEURO: No focal deficits, normal gait  PSYCH: Appropriate mood and affect    Flexible Fiberoptic Nasopharyngoscopy:  Consent for fiberoptic laryngoscopy was obtained and I confirmed correctness of procedure and identity of patient. Fiberoptic laryngoscopy was indicated due to posttreatment disease surveillance. The nose was topically decongested and anesthetized. The fiberoptic laryngoscope was passed through the right naris under endoscopic vision. The turbinates were normal. The inferior and middle meati were clear without purulence, masses, or polyps. There were post radiotherapy changes involving the nasopharynx with hyperemia of the posterior nasopharyngeal wall mucosa and thickened secretions. There was, however, no evidence of residual disease. The soft palate was clear with normal mobility. Passage of the scope into the oropharynx revealed bland-appearing mucosa. The vallecula was clear and the epiglottis was sharp. The larynx was similarly clear with mobile bilaterally. The hypopharynx was clear with no pooling of secretions. The scope was then removed and the procedure was terminated without incident.    LABS AND IMAGING:  Reviewed    IMPRESSION:   Mr. 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 He is now 6 weeks out status post chemoradiation. He is recovering well from treatment-induced side effects.    PLAN:   -PET/CT at 3 months post treatment. Return to clinic for MD visit afterwards.  -If PET/CT yields no concerning findings, will make referral for him to see Lymphedema [facility].  -TSH to collect in his next visit.    The patient was seen and discussed with staff,   Danni.    Fadumo Benjamin MD  Resident, PGY-5  Department of Radiation Oncology  [facility]      Attending addendum:   I saw and examined the patient with the resident and agree with the documented plan of care.    Mane Razo MD/PhD    Dept of Radiation Oncology  [facility]      FOLLOW-UP VISIT    Patient Name: Jareth Gallardo      : 1960     Age: 62 year old        ______________________________________________________________________________     Chief Complaint   Patient presents with     Cancer     /65   Pulse 106   Temp 98  F (36.7  C)   Resp 16   Wt 114.9 kg (253 lb 4.8 oz)   SpO2 97%   BMI 36.34 kg/m       Pain  Denies    Labs  Other Labs: Yes: today    Imaging  None      Dental:   Most Recent Dental Visit: No      Speech/Swallowing:   Most Recent evaluation or testin22  Swallowing Restrictions: No difficulties with swallowing    Trismus/Jaw Exercises: Yes    Nutrition:    Weight:   Wt Readings from Last 3 Encounters:   22 114.9 kg (253 lb 4.8 oz)   22 115.2 kg (254 lb)   22 112.5 kg (248 lb)         Oral Symptoms:   Xerostomia:1- Symptomatic without significant dietary alteration; unstimulated saliva flow >0.2 ml/min  Dysphagia: 0-None  Mucositis Oral Symptoms: 0-None  Mucositis: 0- None  Esophagitis:0- None    Other Appointments:     MD Name:  Appointment Date:    MD Name: Appointment Date:   MD Name: Appointment Date:   Other Appointment Notes:     Residual Radiation side effect: No taste, dry mouth       2022   3:26 PM  Scope used today  #2 Pentax Nasolaryngoscope FNL 10P2 Serial number [id number]          Mane Razo MD

## 2022-09-28 NOTE — PROGRESS NOTES
FOLLOW-UP VISIT    Patient Name: Jareth Gallardo      : 1960     Age: 62 year old        ______________________________________________________________________________     Chief Complaint   Patient presents with     Cancer     /65   Pulse 106   Temp 98  F (36.7  C)   Resp 16   Wt 114.9 kg (253 lb 4.8 oz)   SpO2 97%   BMI 36.34 kg/m       Pain  Denies    Labs  Other Labs: Yes: today    Imaging  None      Dental:   Most Recent Dental Visit: No      Speech/Swallowing:   Most Recent evaluation or testin22  Swallowing Restrictions: No difficulties with swallowing    Trismus/Jaw Exercises: Yes    Nutrition:    Weight:   Wt Readings from Last 3 Encounters:   22 114.9 kg (253 lb 4.8 oz)   22 115.2 kg (254 lb)   22 112.5 kg (248 lb)         Oral Symptoms:   Xerostomia:1- Symptomatic without significant dietary alteration; unstimulated saliva flow >0.2 ml/min  Dysphagia: 0-None  Mucositis Oral Symptoms: 0-None  Mucositis: 0- None  Esophagitis:0- None    Other Appointments:     MD Name:  Appointment Date:    MD Name: Appointment Date:   MD Name: Appointment Date:   Other Appointment Notes:     Residual Radiation side effect: No taste, dry mouth       2022   3:26 PM  Scope used today  #2 Pentax Nasolaryngoscope FNL 10P2 Serial number [id number]

## 2022-10-03 ENCOUNTER — HEALTH MAINTENANCE LETTER (OUTPATIENT)
Age: 62
End: 2022-10-03

## 2022-10-22 DIAGNOSIS — E87.6 HYPOKALEMIA: ICD-10-CM

## 2022-10-22 NOTE — TELEPHONE ENCOUNTER
Potassium Chloride 90 day supply request     Not refilled Per Maryjo Polanco: Per my note, pcp will be taking over this following this check. Can someone pass along this info? He is on lasix so this may be a chronic issue moving forward.

## 2022-10-25 RX ORDER — POTASSIUM CHLORIDE 1500 MG/1
20 TABLET, EXTENDED RELEASE ORAL 2 TIMES DAILY
Qty: 180 TABLET | Refills: 1 | OUTPATIENT
Start: 2022-10-25

## 2022-11-09 NOTE — TUMOR CONFERENCE
Tumor Conference Information  Tumor Conference: ENT  Specialties Present: Medical oncology, Radiation oncology, Pathology, Radiology, Surgery  Patient Status: Prospective  Stage: [id number]  Treatment to Date: Biopsy  Clinical Trials: Not discussed  Genetic Testing Discussed/Recommended?: No  Supportive Care Services Discussed/Recommended?: No  Recommended Plan: Follows evidence-based guidelines  Did the review exceed 30 minutes?: did not           Documentation / Disclaimer Cancer Tumor Board Note  Cancer tumor board recommendations do not override what is determined to be reasonable care and treatment, which is dependent on the circumstances of a patient's case; the patient's medical, social, and personal concerns; and the clinical judgment of the oncologist [physician].

## 2022-11-16 ENCOUNTER — APPOINTMENT (OUTPATIENT)
Dept: LAB | Facility: CLINIC | Age: 62
End: 2022-11-16
Attending: INTERNAL MEDICINE
Payer: COMMERCIAL

## 2022-11-16 ENCOUNTER — HOSPITAL ENCOUNTER (OUTPATIENT)
Dept: PET IMAGING | Facility: CLINIC | Age: 62
Discharge: HOME | End: 2022-11-16
Attending: OTOLARYNGOLOGY
Payer: COMMERCIAL

## 2022-11-16 ENCOUNTER — ONCOLOGY VISIT (OUTPATIENT)
Dept: ONCOLOGY | Facility: CLINIC | Age: 62
End: 2022-11-16
Attending: INTERNAL MEDICINE
Payer: COMMERCIAL

## 2022-11-16 VITALS
HEART RATE: 105 BPM | BODY MASS INDEX: 35.08 KG/M2 | DIASTOLIC BLOOD PRESSURE: 71 MMHG | TEMPERATURE: 98.5 F | OXYGEN SATURATION: 96 % | RESPIRATION RATE: 16 BRPM | WEIGHT: 244.49 LBS | SYSTOLIC BLOOD PRESSURE: 101 MMHG

## 2022-11-16 DIAGNOSIS — C11.9 NASOPHARYNGEAL CANCER (H): ICD-10-CM

## 2022-11-16 DIAGNOSIS — Z13.29 SCREENING FOR HYPOTHYROIDISM: ICD-10-CM

## 2022-11-16 DIAGNOSIS — R91.8 PULMONARY NODULES: Primary | ICD-10-CM

## 2022-11-16 DIAGNOSIS — E83.42 HYPOMAGNESEMIA: ICD-10-CM

## 2022-11-16 LAB
ALBUMIN SERPL BCG-MCNC: 3.8 G/DL (ref 3.5–5.2)
ALP SERPL-CCNC: 80 U/L (ref 40–129)
ALT SERPL W P-5'-P-CCNC: 20 U/L (ref 10–50)
ANION GAP SERPL CALCULATED.3IONS-SCNC: 10 MMOL/L (ref 7–15)
AST SERPL W P-5'-P-CCNC: 41 U/L (ref 10–50)
BASOPHILS # BLD AUTO: 0 10E3/UL (ref 0–0.2)
BASOPHILS NFR BLD AUTO: 0 %
BILIRUB SERPL-MCNC: 1 MG/DL
BUN SERPL-MCNC: 14.9 MG/DL (ref 8–23)
CALCIUM SERPL-MCNC: 9.3 MG/DL (ref 8.8–10.2)
CHLORIDE SERPL-SCNC: 96 MMOL/L (ref 98–107)
CREAT SERPL-MCNC: 0.88 MG/DL (ref 0.67–1.17)
DEPRECATED HCO3 PLAS-SCNC: 31 MMOL/L (ref 22–29)
EOSINOPHIL # BLD AUTO: 0 10E3/UL (ref 0–0.7)
EOSINOPHIL NFR BLD AUTO: 1 %
ERYTHROCYTE [DISTWIDTH] IN BLOOD BY AUTOMATED COUNT: 13.9 % (ref 10–15)
GFR SERPL CREATININE-BSD FRML MDRD: >90 ML/MIN/1.73M2
GLUCOSE SERPL-MCNC: 125 MG/DL (ref 70–99)
HCT VFR BLD AUTO: 36.3 % (ref 40–53)
HGB BLD-MCNC: 11.5 G/DL (ref 13.3–17.7)
IMM GRANULOCYTES # BLD: 0 10E3/UL
IMM GRANULOCYTES NFR BLD: 0 %
LYMPHOCYTES # BLD AUTO: 0.6 10E3/UL (ref 0.8–5.3)
LYMPHOCYTES NFR BLD AUTO: 15 %
MAGNESIUM SERPL-MCNC: 1.8 MG/DL (ref 1.7–2.3)
MCH RBC QN AUTO: 27.6 PG (ref 26.5–33)
MCHC RBC AUTO-ENTMCNC: 31.7 G/DL (ref 31.5–36.5)
MCV RBC AUTO: 87 FL (ref 78–100)
MONOCYTES # BLD AUTO: 0.4 10E3/UL (ref 0–1.3)
MONOCYTES NFR BLD AUTO: 10 %
NEUTROPHILS # BLD AUTO: 3 10E3/UL (ref 1.6–8.3)
NEUTROPHILS NFR BLD AUTO: 74 %
NRBC # BLD AUTO: 0 10E3/UL
NRBC BLD AUTO-RTO: 0 /100
PLATELET # BLD AUTO: 190 10E3/UL (ref 150–450)
POTASSIUM SERPL-SCNC: 3.8 MMOL/L (ref 3.4–5.3)
PROT SERPL-MCNC: 7.7 G/DL (ref 6.4–8.3)
RBC # BLD AUTO: 4.16 10E6/UL (ref 4.4–5.9)
SODIUM SERPL-SCNC: 137 MMOL/L (ref 136–145)
T4 FREE SERPL-MCNC: 1.11 NG/DL (ref 0.9–1.7)
TSH SERPL DL<=0.005 MIU/L-ACNC: 2.45 UIU/ML (ref 0.3–4.2)
WBC # BLD AUTO: 4 10E3/UL (ref 4–11)

## 2022-11-16 PROCEDURE — 70491 CT SOFT TISSUE NECK W/DYE: CPT | Mod: 26 | Performed by: RADIOLOGY

## 2022-11-16 PROCEDURE — 74177 CT ABD & PELVIS W/CONTRAST: CPT

## 2022-11-16 PROCEDURE — 87799 DETECT AGENT NOS DNA QUANT: CPT | Performed by: INTERNAL MEDICINE

## 2022-11-16 PROCEDURE — 85014 HEMATOCRIT: CPT | Performed by: INTERNAL MEDICINE

## 2022-11-16 PROCEDURE — 250N000011 HC RX IP 250 OP 636: Performed by: OTOLARYNGOLOGY

## 2022-11-16 PROCEDURE — 74177 CT ABD & PELVIS W/CONTRAST: CPT | Mod: 26 | Performed by: RADIOLOGY

## 2022-11-16 PROCEDURE — 82040 ASSAY OF SERUM ALBUMIN: CPT | Performed by: INTERNAL MEDICINE

## 2022-11-16 PROCEDURE — 99214 OFFICE O/P EST MOD 30 MIN: CPT | Performed by: INTERNAL MEDICINE

## 2022-11-16 PROCEDURE — 84439 ASSAY OF FREE THYROXINE: CPT | Performed by: INTERNAL MEDICINE

## 2022-11-16 PROCEDURE — 78816 PET IMAGE W/CT FULL BODY: CPT | Mod: PS

## 2022-11-16 PROCEDURE — 250N000011 HC RX IP 250 OP 636: Performed by: INTERNAL MEDICINE

## 2022-11-16 PROCEDURE — 36591 DRAW BLOOD OFF VENOUS DEVICE: CPT | Performed by: INTERNAL MEDICINE

## 2022-11-16 PROCEDURE — 343N000001 HC RX 343: Performed by: OTOLARYNGOLOGY

## 2022-11-16 PROCEDURE — 80053 COMPREHEN METABOLIC PANEL: CPT | Performed by: INTERNAL MEDICINE

## 2022-11-16 PROCEDURE — 71260 CT THORAX DX C+: CPT | Mod: 26 | Performed by: RADIOLOGY

## 2022-11-16 PROCEDURE — A9552 F18 FDG: HCPCS | Performed by: OTOLARYNGOLOGY

## 2022-11-16 PROCEDURE — 84443 ASSAY THYROID STIM HORMONE: CPT | Performed by: INTERNAL MEDICINE

## 2022-11-16 PROCEDURE — 78816 PET IMAGE W/CT FULL BODY: CPT | Mod: 26 | Performed by: RADIOLOGY

## 2022-11-16 PROCEDURE — G0463 HOSPITAL OUTPT CLINIC VISIT: HCPCS

## 2022-11-16 PROCEDURE — 70491 CT SOFT TISSUE NECK W/DYE: CPT

## 2022-11-16 PROCEDURE — 83735 ASSAY OF MAGNESIUM: CPT | Performed by: INTERNAL MEDICINE

## 2022-11-16 RX ORDER — HEPARIN SODIUM (PORCINE) LOCK FLUSH IV SOLN 100 UNIT/ML 100 UNIT/ML
500 SOLUTION INTRAVENOUS ONCE
Status: COMPLETED | OUTPATIENT
Start: 2022-11-16 | End: 2022-11-16

## 2022-11-16 RX ORDER — IOPAMIDOL 755 MG/ML
80-135 INJECTION, SOLUTION INTRAVASCULAR ONCE
Status: COMPLETED | OUTPATIENT
Start: 2022-11-16 | End: 2022-11-16

## 2022-11-16 RX ORDER — HEPARIN SODIUM (PORCINE) LOCK FLUSH IV SOLN 100 UNIT/ML 100 UNIT/ML
5 SOLUTION INTRAVENOUS ONCE
Status: COMPLETED | OUTPATIENT
Start: 2022-11-16 | End: 2022-11-16

## 2022-11-16 RX ADMIN — Medication 5 ML: at 13:28

## 2022-11-16 RX ADMIN — IOPAMIDOL 123 ML: 755 INJECTION, SOLUTION INTRAVENOUS at 11:59

## 2022-11-16 RX ADMIN — Medication 500 UNITS: at 12:05

## 2022-11-16 RX ADMIN — FLUDEOXYGLUCOSE F-18 15.2 MCI.: 500 INJECTION, SOLUTION INTRAVENOUS at 10:45

## 2022-11-16 ASSESSMENT — PAIN SCALES - GENERAL: PAINLEVEL: MODERATE PAIN (4)

## 2022-11-16 NOTE — PROGRESS NOTES
[facility] CANCER [facility]    PATIENT NAME: Jraeth Gallardo  MRN # [id number]   DATE OF VISIT: November 16, 2022  YOB: 1960       PATIENT NAME: Jareth Gallardo  MRN # [id number]   DATE OF VISIT: July 28, 2022  YOB: 1960     Referring Provider: Dr. Jesus Boston  Radiation Oncology: Dr. Mane Razo  Primary Care Provider: Dr. Beni Gallardo at [facility] in [facility]    CANCER TYPE: Nasopharyngeal carcinoma, p16 negative, SAM positive  STAGE: [id number] (DENAE)  ECOG PS: 1    PD-L1: 20% using  clone locally; TPS 70% at [facility]   NGS:     SUMMARY  12/11/21 UC for L neck mass, selling  12/9/21 CT neck ([place]). 2.4 cm L neck node medial to SCM just above the hyoid, additional LNs surround, scattered R neck and L supraclavicular LNs  2/15/22 US bx L cervical node. Path: SCC, moderately differentiated,   3/4/22 PET/CT. Nasopharyngeal and oropharyngeal mucosal thickening. 2.6 cm BERNARDO nodular GGO, SUV avid, some additional lingular uptake  4/4~5/16/22/22 C1-3 carboplatin gemcitabine. Not a candidate for cisplatin due to hearing loss on audiogram. Neulasta 5/3 after C2D8, ANC 1000   5/31/22 PET/CT. MS  6/22~8/10/22 Chemoradiation with weekly carboplatin    ASSESSMENT AND PLAN  Nasopharyngeal carcinoma, SAM +julia: PET/CT read not back yet at the time of our visit but there's two clear residual nodes in the L neck. Will discuss at tumor board but ideally would be treated with salvage neck dissection. I'm a little worried about the resectability of the one in the SCM. Sees Dr. Razo on Fri. Dr. Boston and Nel perry, will move up appt with Dr. Boston from mid Dec. EBV PCR pending. Was negative 8/11/22 but hadn't been checked before.     BERNARDO/lung nodules, now new RUL nodules: On staging PET/CT, resolved as of restaging PET/CT after induction and prior to chemoradiation. 4R wasn't particularly worrisome to start. New RUL nodules - look  inflammatory/aspiration-related, but discussed we'll have to keep an eye on them as one of them is almost 1 cm and slightly more solid. Repeat CT chest non contrast in 6 weeks. Will try to coordinate with any other appts.     Hypomag, hypoK: Ok to stop both. Recheck mg when he's here to see Dr. Boston, anticipating it'll be moved to next week.     DM2, steroid-induced hyperglycemia: Per PCP A1c 5.8    Mild peripheral neuropathy: R foot numbness, very mild, from DM2. No change.     30 minutes spent on the date of the encounter doing chart review, history and exam, documentation and further activities per the note     Dariela Melvin MD  Associate Professor of Medicine  Hematology, Oncology and Transplantation      SHAHANA Templeton returns for follow up of nasopharyngeal carcinoma after 3 cycles of induction carboplatin gemcitabine and chemoradiation completed 3 months ago. Doing ok overall. Went deer hunting, shot the largest deer of his hunting career. Eating ok. Dry mouth. Eats almost anything although some things are tougher. For example, had venison the other day. Drinking ok. No breathing issues. Pain largely resolved. He can still feel a lump on his left neck that's sensitive. Doesn't think it's any bigger than when he saw Dr. Razo and Dr. Boston 6 weeks post chemoradiation. No other new problems.    PAST MEDICAL HISTORY  Nasopharyngeal carcinoma as above  DM2  COPD??  Hiatal hernia. Sleeps with his head elevated  LVH on TTE 2009, EF50%, LA Dilation  Dyslipidemia  BARRETT in 2015. Not using CPAP due to developing tooth abscesses when he tried it in 2015  Venous insufficiency in both legs being in an accident involving a mower about 10 years ago, on chronic furosemide. LE US negative 12/15/2009  Cholelithiasis  Hyperplastic colon polyp, due 2020  Tonsillectomy  Baker cyst RLE US 2013    Neuropathy from DM - R foot numbness chronically    Exposed to lots of wood dust in his occupation    CURRENT OUTPATIENT  MEDICATIONS  Current Outpatient Medications   Medication Sig Dispense Refill     atorvastatin (LIPITOR) 20 MG tablet Take 20 mg by mouth       fluticasone-vilanterol (BREO ELLIPTA) 100-25 MCG/INH inhaler INHALE 1 PUFF INTO THE LUNGS ONE TIME A DAY. RINSE MOUTH AFTER USE.       furosemide (LASIX) 20 MG tablet Take 20 mg by mouth       magnesium oxide (MAG-OX) 400 MG tablet Take 1 tablet (400 mg) by mouth 2 times daily 30 tablet 1     metFORMIN (GLUCOPHAGE XR) 500 MG 24 hr tablet        potassium chloride ER (K-TAB) 20 MEQ CR tablet Take 1 tablet (20 mEq) by mouth 2 times daily 60 tablet 1     VICTOZA PEN 18 MG/3ML soln Inject 1.2 mg Subcutaneous daily       B-D U/F insulin pen needle USE ONE TIME A DAY TO INJECT VICTOZA       blood glucose (NO BRAND SPECIFIED) lancets standard Use to test blood sugar 2 times daily or as directed. 90 lancet 1     blood glucose (NO BRAND SPECIFIED) test strip Use to test blood sugar 3 times daily or as directed. 90 strip 0     blood glucose monitoring (ACCU-CHEK RANDELL PLUS) meter device kit Use to test blood sugar 2 times daily or as directed. 1 kit 0     cevimeline (EVOXAC) 30 MG capsule Take 1 capsule (30 mg) by mouth 3 times daily (Patient not taking: Reported on 11/16/2022) 90 capsule 11     dexamethasone (DECADRON) 4 MG tablet  (Patient not taking: Reported on 11/16/2022)       insulin lispro (HUMALOG KWIKPEN) 100 UNIT/ML (1 unit dial) KWIKPEN 1 unit per 50 above 150 correction after chemo. TDD 20 units (Patient not taking: Reported on 11/16/2022)       metFORMIN (GLUCOPHAGE) 1000 MG tablet        nystatin (MYCOSTATIN) 496442 UNIT/ML suspension Take 5 mLs (500,000 Units) by mouth 4 times daily (Patient not taking: Reported on 11/16/2022) 100 mL 0     omeprazole (PRILOSEC) 20 MG DR capsule Take 1 capsule (20 mg) by mouth daily (Patient not taking: Reported on 11/16/2022) 30 capsule 1     ondansetron (ZOFRAN) 8 MG tablet Take 1 tablet (8 mg) by mouth every 8 hours as needed for  nausea (Patient not taking: Reported on 11/16/2022) 30 tablet 11     prochlorperazine (COMPAZINE) 10 MG tablet Take 1 tablet (10 mg) by mouth every 6 hours as needed (Nausea/Vomiting) (Patient not taking: Reported on 11/16/2022) 30 tablet 5     ALLERGIES  No Known Allergies     REVIEW OF SYSTEMS  As above in the HPI, o/w complete 12-point ROS was negative.    PHYSICAL EXAM  /71   Pulse 105   Temp 98.5  F (36.9  C)   Resp 16   Wt 110.9 kg (244 lb 7.8 oz)   SpO2 96%   BMI 35.08 kg/m      GEN: NAD  HEENT: EOMI, no icterus, injection or pallor  NECK: palpable L neck node level 2-3  EXT: no edema  NEURO: alert    LABORATORY AND IMAGING STUDIES   11/16/22 13:34   Sodium 137   Potassium 3.8   Chloride 96 (L)   Carbon Dioxide (CO2) 31 (H)   Urea Nitrogen 14.9   Creatinine 0.88   GFR Estimate >90   Calcium 9.3   Anion Gap 10   Magnesium 1.8   Albumin 3.8   Protein Total 7.7   Alkaline Phosphatase 80   ALT 20   AST 41   Bilirubin Total 1.0   Glucose 125 (H)   T4 Free 1.11   TSH 2.45   WBC 4.0   Hemoglobin 11.5 (L)   Hematocrit 36.3 (L)   Platelet Count 190   RBC Count 4.16 (L)   MCV 87   MCH 27.6   MCHC 31.7   RDW 13.9   % Neutrophils 74   % Lymphocytes 15   % Monocytes 10   % Eosinophils 1   % Basophils 0   Absolute Basophils 0.0   Absolute Eosinophils 0.0   Absolute Immature Granulocytes 0.0   Absolute Lymphocytes 0.6 (L)   Absolute Monocytes 0.4   % Immature Granulocytes 0   Absolute Neutrophils 3.0   Absolute NRBCs 0.0   NRBCs per 100 WBC 0     Labs were independently reviewed and interpreted by me    Imaging was personally reviewed and interpreted by me as above. Report not back at the time of our visit.

## 2022-11-16 NOTE — NURSING NOTE
"Oncology Rooming Note    November 16, 2022 2:38 PM   Jareth Gallardo is a 62 year old male who presents for:    Chief Complaint   Patient presents with     Port Draw     Labs drawn via port by rn in lab. VS taken.     Oncology Clinic Visit     Pt is here for a rtn for Nasopharyngeal Cancer     Initial Vitals: Blood Pressure 101/71   Pulse 105   Temperature 98.5  F (36.9  C)   Respiration 16   Weight 110.9 kg (244 lb 7.8 oz)   Oxygen Saturation 96%   Body Mass Index 35.08 kg/m   Estimated body mass index is 35.08 kg/m  as calculated from the following:    Height as of 9/21/22: 1.778 m (5' 10\").    Weight as of this encounter: 110.9 kg (244 lb 7.8 oz). Body surface area is 2.34 meters squared.  Moderate Pain (4) Comment: Data Unavailable   No LMP for male patient.  Allergies reviewed: Yes  Medications reviewed: Yes    Medications: Medication refills not needed today.  Pharmacy name entered into Liquid[employer]:    [id number] - [place], [place] - [id number] [place] [address] PHARMACY [place], [place] - [address] [facility] [phone]  [facility] - [place], [place] - [address] [place] [address]    Clinical concerns: none       Florida Livingston MA            "

## 2022-11-16 NOTE — LETTER
11/16/2022         RE: Jareth Gallardo  [address] [place] [address]        Dear Colleague,    Thank you for referring your patient, Jareth Gallardo, to the [facility]. Please see a copy of my visit note below.       [facility]    PATIENT NAME: Jareth Gallardo  MRN # [id number]   DATE OF VISIT: November 16, 2022  YOB: 1960       PATIENT NAME: Jareth Gallardo  MRN # [id number]   DATE OF VISIT: July 28, 2022  YOB: 1960     Referring Provider: Dr. Jesus Boston  Radiation Oncology: Dr. Mane Razo  Primary Care Provider: Dr. Beni Gallardo at [facility] in [facility]    CANCER TYPE: Nasopharyngeal carcinoma, p16 negative, SAM positive  STAGE: [id number] (DEANE)  ECOG PS: 1    PD-L1: 20% using  clone locally; TPS 70% at [facility]   NGS:     SUMMARY  12/11/21 UC for L neck mass, selling  12/9/21 CT neck ([facility]). 2.4 cm L neck node medial to SCM just above the hyoid, additional LNs surround, scattered R neck and L supraclavicular LNs  2/15/22 US bx L cervical node. Path: SCC, moderately differentiated,   3/4/22 PET/CT. Nasopharyngeal and oropharyngeal mucosal thickening. 2.6 cm BERNARDO nodular GGO, SUV avid, some additional lingular uptake  4/4~5/16/22/22 C1-3 carboplatin gemcitabine. Not a candidate for cisplatin due to hearing loss on audiogram. Neulasta 5/3 after C2D8, ANC 1000   5/31/22 PET/CT. [place]  6/22~8/10/22 Chemoradiation with weekly carboplatin    ASSESSMENT AND PLAN  Nasopharyngeal carcinoma, SAM +julia: PET/CT read not back yet at the time of our visit but there's two clear residual nodes in the L neck. Will discuss at tumor board but ideally would be treated with salvage neck dissection. I'm a little worried about the resectability of the one in the SCM. Sees Dr. Razo on Fri. Dr. Boston and Nel perry, will move up appt with Dr. Boston from mid Dec. EBV PCR pending. Was negative 8/11/22 but  hadn't been checked before.     BERNARDO/lung nodules, now new RUL nodules: On staging PET/CT, resolved as of restaging PET/CT after induction and prior to chemoradiation. 4R wasn't particularly worrisome to start. New RUL nodules - look inflammatory/aspiration-related, but discussed we'll have to keep an eye on them as one of them is almost 1 cm and slightly more solid. Repeat CT chest non contrast in 6 weeks. Will try to coordinate with any other appts.     Hypomag, hypoK: Ok to stop both. Recheck mg when he's here to see Dr. Boston, anticipating it'll be moved to next week.     DM2, steroid-induced hyperglycemia: Per PCP A1c 5.8    Mild peripheral neuropathy: R foot numbness, very mild, from DM2. No change.     30 minutes spent on the date of the encounter doing chart review, history and exam, documentation and further activities per the note     Dariela Melvin MD  Associate Professor of Medicine  Hematology, Oncology and Transplantation      SHAHANA Templeton returns for follow up of nasopharyngeal carcinoma after 3 cycles of induction carboplatin gemcitabine and chemoradiation completed 3 months ago. Doing ok overall. Went deer hunting, shot the largest deer of his hunting career. Eating ok. Dry mouth. Eats almost anything although some things are tougher. For example, had venison the other day. Drinking ok. No breathing issues. Pain largely resolved. He can still feel a lump on his left neck that's sensitive. Doesn't think it's any bigger than when he saw Dr. Razo and Dr. Boston 6 weeks post chemoradiation. No other new problems.    PAST MEDICAL HISTORY  Nasopharyngeal carcinoma as above  DM2  COPD??  Hiatal hernia. Sleeps with his head elevated  LVH on TTE 2009, EF50%, LA Dilation  Dyslipidemia  BARRETT in 2015. Not using CPAP due to developing tooth abscesses when he tried it in 2015  Venous insufficiency in both legs being in an accident involving a mower about 10 years ago, on chronic furosemide. LE US  negative 12/15/2009  Cholelithiasis  Hyperplastic colon polyp, due 2020  Tonsillectomy  Baker cyst RLE US 2013    Neuropathy from DM - R foot numbness chronically    Exposed to lots of wood dust in his occupation    CURRENT OUTPATIENT MEDICATIONS  Current Outpatient Medications   Medication Sig Dispense Refill     atorvastatin (LIPITOR) 20 MG tablet Take 20 mg by mouth       fluticasone-vilanterol (BREO ELLIPTA) 100-25 MCG/INH inhaler INHALE 1 PUFF INTO THE LUNGS ONE TIME A DAY. RINSE MOUTH AFTER USE.       furosemide (LASIX) 20 MG tablet Take 20 mg by mouth       magnesium oxide (MAG-OX) 400 MG tablet Take 1 tablet (400 mg) by mouth 2 times daily 30 tablet 1     metFORMIN (GLUCOPHAGE XR) 500 MG 24 hr tablet        potassium chloride ER (K-TAB) 20 MEQ CR tablet Take 1 tablet (20 mEq) by mouth 2 times daily 60 tablet 1     VICTOZA PEN 18 MG/3ML soln Inject 1.2 mg Subcutaneous daily       B-D U/F insulin pen needle USE ONE TIME A DAY TO INJECT VICTOZA       blood glucose (NO BRAND SPECIFIED) lancets standard Use to test blood sugar 2 times daily or as directed. 90 lancet 1     blood glucose (NO BRAND SPECIFIED) test strip Use to test blood sugar 3 times daily or as directed. 90 strip 0     blood glucose monitoring (ACCU-CHEK RANDELL PLUS) meter device kit Use to test blood sugar 2 times daily or as directed. 1 kit 0     cevimeline (EVOXAC) 30 MG capsule Take 1 capsule (30 mg) by mouth 3 times daily (Patient not taking: Reported on 11/16/2022) 90 capsule 11     dexamethasone (DECADRON) 4 MG tablet  (Patient not taking: Reported on 11/16/2022)       insulin lispro (HUMALOG KWIKPEN) 100 UNIT/ML (1 unit dial) KWIKPEN 1 unit per 50 above 150 correction after chemo. TDD 20 units (Patient not taking: Reported on 11/16/2022)       metFORMIN (GLUCOPHAGE) 1000 MG tablet        nystatin (MYCOSTATIN) 623566 UNIT/ML suspension Take 5 mLs (500,000 Units) by mouth 4 times daily (Patient not taking: Reported on 11/16/2022) 100 mL 0      omeprazole (PRILOSEC) 20 MG DR capsule Take 1 capsule (20 mg) by mouth daily (Patient not taking: Reported on 11/16/2022) 30 capsule 1     ondansetron (ZOFRAN) 8 MG tablet Take 1 tablet (8 mg) by mouth every 8 hours as needed for nausea (Patient not taking: Reported on 11/16/2022) 30 tablet 11     prochlorperazine (COMPAZINE) 10 MG tablet Take 1 tablet (10 mg) by mouth every 6 hours as needed (Nausea/Vomiting) (Patient not taking: Reported on 11/16/2022) 30 tablet 5     ALLERGIES  No Known Allergies     REVIEW OF SYSTEMS  As above in the HPI, o/w complete 12-point ROS was negative.    PHYSICAL EXAM  /71   Pulse 105   Temp 98.5  F (36.9  C)   Resp 16   Wt 110.9 kg (244 lb 7.8 oz)   SpO2 96%   BMI 35.08 kg/m      GEN: NAD  HEENT: EOMI, no icterus, injection or pallor  NECK: palpable L neck node level 2-3  EXT: no edema  NEURO: alert    LABORATORY AND IMAGING STUDIES   11/16/22 13:34   Sodium 137   Potassium 3.8   Chloride 96 (L)   Carbon Dioxide (CO2) 31 (H)   Urea Nitrogen 14.9   Creatinine 0.88   GFR Estimate >90   Calcium 9.3   Anion Gap 10   Magnesium 1.8   Albumin 3.8   Protein Total 7.7   Alkaline Phosphatase 80   ALT 20   AST 41   Bilirubin Total 1.0   Glucose 125 (H)   T4 Free 1.11   TSH 2.45   WBC 4.0   Hemoglobin 11.5 (L)   Hematocrit 36.3 (L)   Platelet Count 190   RBC Count 4.16 (L)   MCV 87   MCH 27.6   MCHC 31.7   RDW 13.9   % Neutrophils 74   % Lymphocytes 15   % Monocytes 10   % Eosinophils 1   % Basophils 0   Absolute Basophils 0.0   Absolute Eosinophils 0.0   Absolute Immature Granulocytes 0.0   Absolute Lymphocytes 0.6 (L)   Absolute Monocytes 0.4   % Immature Granulocytes 0   Absolute Neutrophils 3.0   Absolute NRBCs 0.0   NRBCs per 100 WBC 0     Labs were independently reviewed and interpreted by me    Imaging was personally reviewed and interpreted by me as above. Report not back at the time of our visit.          Again, thank you for allowing me to participate in the care of your  patient.        Sincerely,        Dariela Melvin MD

## 2022-11-16 NOTE — NURSING NOTE
Chief Complaint   Patient presents with     Port Draw     Labs drawn via port by rn in lab. VS taken.     Port accessed with 20g 3/4 inch power needle by RN, labs collected, line flushed with saline and heparin.  Vitals taken. Pt checked in for appointment(s).    Eric Jc, RN

## 2022-11-17 LAB
EBV DNA COPIES/ML, INSTRUMENT: 1170 COPIES/ML
EBV DNA SPEC NAA+PROBE-LOG#: 3.1 {LOG_COPIES}/ML

## 2022-11-17 NOTE — PROGRESS NOTES
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 Staging imaging revealed a 1.8 x 4 x 3.7 cm nasopharyngeal mass arising from the left fossa of Rosenmueller extending into the parapharyngeal space along with adenopathy involving the bilateral retropharyngeal regions, left levels 2, 3 and 5 along with right level 2. He received induction chemotherapy with 3 cycles of carboplatin/gemcitabine with restaging imaging demonstrating a significant decrease in the size of the primary tumor and involved lymph nodes. He was recommended to undergo definitive chemoradiation as described above.    At his first follow up visit (22), he endorsed persistent dysgeusia and dry mouth. He was tolerating a regular PO diet. He had mild dull pain in his left neck.    Post-treatment TSH (22) was 2.45.    PET CT (22) showed no evidence of metastatic disease in the chest, abdomen or pelvis, with new patchy groundglass and centrilobular RUL and left base pulmonary nodules favored to be infectious/inflammatory. CT neck (22) showed indeterminate focal intense FDG uptake in the midline tongue without corresponding CT abnormality, abnormal residual FDG avid and enlarged left cervical lymph nodes, decreased in size but persistently FDG  avid (one of them is more avid than prior PET). An FDG deposit in the left SCM is new.     He saw Dr. Melvin (11-16-22) who commented that with two clear residual nodes in the L neck, the patient would likely need salvage neck dissection. For the pulmonary nodules, she recommended repeat Chest CT in 6 months.     His case was discussed at multidisciplinary tumor conference (11-18-22) where the consensus was to proceed with salvage neck dissection. Biopsy was deferred, as negative biopsy would not change the recommendation to proceed with surgery. He will follow up with Dr. Boston (12-14-22) if not sooner.     Today, Mr. Gallardo is accompanied by his wife. He endorses persistent fatigue and decreased stamina since radiation. He feels his energy level is about 40% of his baseline. He takes 15-30 minute naps 2-3 times per week. He reports pain in the left neck at the site of a lump which is tender to palpation. He has not required any pain medications for this. He endorses lymphedema under his chin for which he does massage maneuvers to mobilize the fluid. He has not seem lymphedema therapy yet. He had 70 pound weight loss during treatment and has not gained weight back, though he reports good appetite and feels sense of taste is improving. His skin has healed and he no longer uses lotions or emollients. He has left nasal crusting which loosens in the shower, enabling him to blow his nose. He only occasionally does range of motion exercises for his neck and jaw. He denies vision changes or dry eyes.    PHYSICAL EXAM:  /72   Pulse 92   Wt 110.6 kg (243 lb 14.4 oz)   BMI 35.00 kg/m    GEN: Appears well, alert, oriented, and in no acute distress  HEENT: Normal cephalic, atraumatic. Extraocular muscles intact.   Diffusely dry mucosa, normal oral cavity and posterior pharyngeal wall.  NECK: Full range of motion. Moderate submental lymphedema. There is a firm and moderately tender 1 cm palpable node within left  level 2 along the posterior edge of the sternocleidomastoid.   RESP: Breathing comfortably on room air  SKIN: Normal color and turgor  NEURO: No focal deficits, normal gait  PSYCH: Appropriate mood and affect    Flexible Fiberoptic Nasopharyngoscopy:  Consent for fiberoptic laryngoscopy was obtained and I confirmed correctness of procedure and identity of patient. Fiberoptic laryngoscopy was indicated due to posttreatment disease surveillance. The nose was topically decongested and anesthetized. The fiberoptic laryngoscope was passed through the right naris under endoscopic vision. The turbinates were normal. The inferior and middle meati were clear without purulence, masses, or polyps. Passage of the scope into the nasopharynx revealed erythema and mild edema of the posterior nasopharyngeal wall without evidence of residual disease. The soft palate was normal with good mobility. Passage of the scope into the oropharynx revealed bland-appearing mucosa with mildly thickened secretions. The epiglottis was sharp and the glottic structures were normal in appearance with mobile cords bilaterally. There is no pooling of secretions within the hypopharynx. The scope was then removed and the procedure was terminated without incident.    LABS AND IMAGING:  PET CT (11-16-22)  1. No evidence of metastatic disease in the chest, abdomen or pelvis.     2. New patchy groundglass and centrilobular pulmonary nodules in the  right upper lobe and left base with mild FDG uptake are likely  infective/inflammatory. Recommend attention on short interval CT (3-6  months) to document resolution.     3. Please see separate dictated CT of the head and neck for additional  Details.    CT neck (11-16-22)  Primary: NI-RADS 2a. Indeterminate focal intense FDG uptake without  corresponding CT abnormality in the tongue base at the midline.  Clinical correlation is advised.  No suspicious FDG uptake or abnormality on CT along the  nasopharyngeal  mucosa.      Neck: NI-RADS 4. Abnormal residual FDG avid and enlarged left cervical  lymph nodes, decreased in size but persistently FDG avid (one of them  is more avid than prior PET). An FDG deposit in the left SCM is new.      11/16/2022 labs:  TSH: 2.45    IMPRESSION:   Mr. 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 He is now 3 months status post chemoradiation with PET findings concerning for residual disease.    PLAN:   He will follow up with Dr. Boston for further discussion of salvage neck dissection (12-14-22)  Follow up in radiation oncology clinic after surgery  CT chest in 6 months, per Dr. Melvin  Eventually, he will need lymphedema therapy -- we will refer after recovery from surgery  Repeat TSH due 5-2023    The patient was seen and discussed with staff, Dr. Razo.    Tere Soliz MD PGY3  Department of Radiation Oncology  [facility]      Attending addendum:   I saw and examined the patient with the resident and agree with the documented plan of care.    Currently 3 months out from completion of chemoradiation therapy for a locally advanced nasopharyngeal cancer. Imaging evidence concerning for residual FDG avid left cervical adenopathy. Planning for salvage neck dissection with Dr. Boston in the near future.    Mane Razo MD/PhD    Dept of Radiation Oncology  [facility]

## 2022-11-18 ENCOUNTER — PREP FOR PROCEDURE (OUTPATIENT)
Dept: OTOLARYNGOLOGY | Facility: CLINIC | Age: 62
End: 2022-11-18

## 2022-11-18 ENCOUNTER — OFFICE VISIT (OUTPATIENT)
Dept: RADIATION ONCOLOGY | Facility: CLINIC | Age: 62
End: 2022-11-18
Attending: RADIOLOGY
Payer: COMMERCIAL

## 2022-11-18 ENCOUNTER — TUMOR CONFERENCE (OUTPATIENT)
Dept: ONCOLOGY | Facility: CLINIC | Age: 62
End: 2022-11-18
Payer: COMMERCIAL

## 2022-11-18 VITALS
WEIGHT: 243.9 LBS | DIASTOLIC BLOOD PRESSURE: 72 MMHG | SYSTOLIC BLOOD PRESSURE: 104 MMHG | BODY MASS INDEX: 35 KG/M2 | HEART RATE: 92 BPM

## 2022-11-18 DIAGNOSIS — C11.9 NASOPHARYNGEAL CANCER (H): Primary | ICD-10-CM

## 2022-11-18 PROCEDURE — 31231 NASAL ENDOSCOPY DX: CPT

## 2022-11-18 PROCEDURE — G0463 HOSPITAL OUTPT CLINIC VISIT: HCPCS | Mod: 25

## 2022-11-18 NOTE — TUMOR CONFERENCE
Called and spoke with patient regarding the following PET scan results:    IMPRESSION: In this patient with a history of nasopharyngeal carcinoma  status post chemoradiation:     1. No evidence of metastatic disease in the chest, abdomen or pelvis.     2. New patchy groundglass and centrilobular pulmonary nodules in the  right upper lobe and left base with mild FDG uptake are likely  infective/inflammatory. Recommend attention on short interval CT (3-6  months) to document resolution.     3. Please see separate dictated CT of the head and neck for additional  Details.    IMPRESSION:     Primary: NI-RADS 2a. Indeterminate focal intense FDG uptake without  corresponding CT abnormality in the tongue base at the midline.  Clinical correlation is advised.  No suspicious FDG uptake or abnormality on CT along the nasopharyngeal  mucosa.      Neck: NI-RADS 4. Abnormal residual FDG avid and enlarged left cervical  lymph nodes, decreased in size but persistently FDG avid (one of them  is more avid than prior PET). An FDG deposit in the left SCM is new.      Please refer to the whole body PET CT performed as a separate report,  for the findings of the remainder of the body.     NI-RADS CECT Surveillance Legend:     Reviewed with patient that recommendation is to proceed with left neck dissection, given that there are residual nodes that are still persistent post treatment. Also reviewed the need to evaluate the tongue base uptake at that time. Patient verbalized understanding and would like to proceed. He will meet with Dr. Boston to discuss surgery. We will plan for surgical date. Patient advised that he needs to proceed with pre-op physical as well as pre-op covid testing. Patient agreeable and will schedule this locally. Patient encouraged to call with further questions or concerns.     Nel Lau, RN, BSN

## 2022-11-18 NOTE — PROGRESS NOTES
FOLLOW-UP VISIT    Patient Name: Jareth Gallardo      : 1960     Age: 62 year old        ______________________________________________________________________________     Chief Complaint   Patient presents with     Cancer     Follow up:Nasopharyngeal Cancer:Nasophaynx and bilateral neck 7000 cGy completed 22       Pain  Denies    Labs  Other Labs: No    Imaging  CT: 22      Dental:   Most Recent Dental Visit: 2 weeks ago      Speech/Swallowing:   Most Recent evaluation or testing: No  Swallowing Restrictions: No difficulties with swallowing    Trismus/Jaw Exercises: sometimes    Nutrition:    Weight:   Wt Readings from Last 3 Encounters:   22 110.9 kg (244 lb 7.8 oz)   22 114.9 kg (253 lb 4.8 oz)   22 115.2 kg (254 lb)         Oral Symptoms:   Xerostomia:1- Symptomatic without significant dietary alteration; unstimulated saliva flow >0.2 ml/min  Dysphagia: 0-None  Mucositis Oral Symptoms: 0-None  Mucositis: 0- None  Esophagitis:0- None    Other Appointments:     MD Name:  Appointment Date:    MD Name: Appointment Date:   MD Name: Appointment Date:   Other Appointment Notes:     Residual Radiation side effect: No concerns     2022   12:04 PM  Scope used today  #2 Pentax Nasolaryngoscope FNL 10P2 Serial number [id number]

## 2022-11-18 NOTE — LETTER
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 Staging imaging revealed a 1.8 x 4 x 3.7 cm nasopharyngeal mass arising from the left fossa of Rosenmueller extending into the parapharyngeal space along with adenopathy involving the bilateral retropharyngeal regions, left levels 2, 3 and 5 along with right level 2. He received induction chemotherapy with 3 cycles of carboplatin/gemcitabine with restaging imaging demonstrating a significant decrease in the size of the primary tumor and involved lymph nodes. He was recommended to undergo definitive chemoradiation as described above.    At his first follow up visit (22), he endorsed persistent dysgeusia and dry mouth. He was tolerating a regular PO diet. He had mild dull pain in his left neck.    Post-treatment TSH (22) was 2.45.    PET CT (22) showed no evidence of metastatic disease in the chest, abdomen or pelvis, with new patchy groundglass and centrilobular RUL and left base pulmonary nodules favored to be infectious/inflammatory. CT neck (22) showed indeterminate focal intense FDG uptake in the midline tongue without corresponding CT abnormality, abnormal residual  FDG avid and enlarged left cervical lymph nodes, decreased in size but persistently FDG avid (one of them is more avid than prior PET). An FDG deposit in the left SCM is new.     He saw Dr. Melvin (11-16-22) who commented that with two clear residual nodes in the L neck, the patient would likely need salvage neck dissection. For the pulmonary nodules, she recommended repeat Chest CT in 6 months.     His case was discussed at multidisciplinary tumor conference (11-18-22) where the consensus was to proceed with salvage neck dissection. Biopsy was deferred, as negative biopsy would not change the recommendation to proceed with surgery. He will follow up with Dr. Boston (12-14-22) if not sooner.     Today, Mr. Gallardo is accompanied by his wife. He endorses persistent fatigue and decreased stamina since radiation. He feels his energy level is about 40% of his baseline. He takes 15-30 minute naps 2-3 times per week. He reports pain in the left neck at the site of a lump which is tender to palpation. He has not required any pain medications for this. He endorses lymphedema under his chin for which he does massage maneuvers to mobilize the fluid. He has not seem lymphedema therapy yet. He had 70 pound weight loss during treatment and has not gained weight back, though he reports good appetite and feels sense of taste is improving. His skin has healed and he no longer uses lotions or emollients. He has left nasal crusting which loosens in the shower, enabling him to blow his nose. He only occasionally does range of motion exercises for his neck and jaw. He denies vision changes or dry eyes.    PHYSICAL EXAM:  /72   Pulse 92   Wt 110.6 kg (243 lb 14.4 oz)   BMI 35.00 kg/m    GEN: Appears well, alert, oriented, and in no acute distress  HEENT: Normal cephalic, atraumatic. Extraocular muscles intact.   Diffusely dry mucosa, normal oral cavity and posterior pharyngeal wall.  NECK: Full range of motion. Moderate  submental lymphedema. There is a firm and moderately tender 1 cm palpable node within left level 2 along the posterior edge of the sternocleidomastoid.   RESP: Breathing comfortably on room air  SKIN: Normal color and turgor  NEURO: No focal deficits, normal gait  PSYCH: Appropriate mood and affect    Flexible Fiberoptic Nasopharyngoscopy:  Consent for fiberoptic laryngoscopy was obtained and I confirmed correctness of procedure and identity of patient. Fiberoptic laryngoscopy was indicated due to posttreatment disease surveillance. The nose was topically decongested and anesthetized. The fiberoptic laryngoscope was passed through the right naris under endoscopic vision. The turbinates were normal. The inferior and middle meati were clear without purulence, masses, or polyps. Passage of the scope into the nasopharynx revealed erythema and mild edema of the posterior nasopharyngeal wall without evidence of residual disease. The soft palate was normal with good mobility. Passage of the scope into the oropharynx revealed bland-appearing mucosa with mildly thickened secretions. The epiglottis was sharp and the glottic structures were normal in appearance with mobile cords bilaterally. There is no pooling of secretions within the hypopharynx. The scope was then removed and the procedure was terminated without incident.    LABS AND IMAGING:  PET CT (11-16-22)  1. No evidence of metastatic disease in the chest, abdomen or pelvis.     2. New patchy groundglass and centrilobular pulmonary nodules in the  right upper lobe and left base with mild FDG uptake are likely  infective/inflammatory. Recommend attention on short interval CT (3-6  months) to document resolution.     3. Please see separate dictated CT of the head and neck for additional  Details.    CT neck (11-16-22)  Primary: NI-RADS 2a. Indeterminate focal intense FDG uptake without  corresponding CT abnormality in the tongue base at the midline.  Clinical  correlation is advised.  No suspicious FDG uptake or abnormality on CT along the nasopharyngeal  mucosa.      Neck: NI-RADS 4. Abnormal residual FDG avid and enlarged left cervical  lymph nodes, decreased in size but persistently FDG avid (one of them  is more avid than prior PET). An FDG deposit in the left SCM is new.      2022 labs:  TSH: 2.45    IMPRESSION:   Mr. 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 He is now 3 months status post chemoradiation with PET findings concerning for residual disease.    PLAN:   He will follow up with Dr. Boston for further discussion of salvage neck dissection (22)  Follow up in radiation oncology clinic after surgery  CT chest in 6 months, per Dr. Melvin  Eventually, he will need lymphedema therapy -- we will refer after recovery from surgery  Repeat TSH due     The patient was seen and discussed with staff, Dr. Razo.    Tere Soliz MD PGY3  Department of Radiation Oncology  [facility]      Attending addendum:   I saw and examined the patient with the resident and agree with the documented plan of care.    Currently 3 months out from completion of chemoradiation therapy for a locally advanced nasopharyngeal cancer. Imaging evidence concerning for residual FDG avid left cervical adenopathy. Planning for salvage neck dissection with Dr. Boston in the near future.    Mane Razo MD/PhD    Dept of Radiation Oncology  [facility]      FOLLOW-UP VISIT    Patient Name: Jareth Gallardo      : 1960     Age: 62 year old        ______________________________________________________________________________     Chief Complaint   Patient presents with     Cancer     Follow up:Nasopharyngeal Cancer:Nasophaynx and bilateral neck 7000 cGy completed 22       Pain  Denies    Labs  Other Labs: No    Imaging  CT: 22      Dental:   Most Recent Dental  Visit: 2 weeks ago      Speech/Swallowing:   Most Recent evaluation or testing: No  Swallowing Restrictions: No difficulties with swallowing    Trismus/Jaw Exercises: sometimes    Nutrition:    Weight:   Wt Readings from Last 3 Encounters:   11/16/22 110.9 kg (244 lb 7.8 oz)   09/28/22 114.9 kg (253 lb 4.8 oz)   09/21/22 115.2 kg (254 lb)         Oral Symptoms:   Xerostomia:1- Symptomatic without significant dietary alteration; unstimulated saliva flow >0.2 ml/min  Dysphagia: 0-None  Mucositis Oral Symptoms: 0-None  Mucositis: 0- None  Esophagitis:0- None    Other Appointments:     MD Name:  Appointment Date:    MD Name: Appointment Date:   MD Name: Appointment Date:   Other Appointment Notes:     Residual Radiation side effect: No concerns     11/18/2022   12:04 PM  Scope used today  #2 Pentax Nasolaryngoscope FNL 10P2 Serial number [id number]          Mane Razo MD

## 2022-11-18 NOTE — TUMOR CONFERENCE
Head & Neck Tumor Conference Note        Status: new patient   Staff: Dr. Jesus Boston     Tumor Site: nasopharynx  Tumor Pathology: SCC  Tumor Stage: T2N2  Tumor Treatment: completed concurrent chemoradiotherapy with weekly carboplatin on 08/12/2002    Reason for Review: Review imaging, path, and POC    Brief History: This is a 62 year old male with a history of clinically stage T2 N2 nasopharyngeal carcinoma.  Staging imaging revealed a 1.8 x 4 x 3.7 cm nasopharyngeal mass arising from the left fossa of Rosenmueller extending into the parapharyngeal space along with adenopathy involving the bilateral retropharyngeal regions, left levels 2, 3 and 5 along with right level 2. He received induction chemotherapy with 3 cycles of carboplatin/gemcitabine with restaging imaging demonstrating a significant decrease in the size of the primary tumor and involved lymph nodes.  He completed concurrent chemoradiotherapy with weekly carboplatin on 08/12/2002.  He did have a few missed treatments, but otherwise did well in terms of his swallowing throughout therapy.   Examination of neck reveals some fullness in the left neck, but no discrete lymphadenopathy.      Pertinent PMH:   Past Medical History:   Diagnosis Date     COPD (chronic obstructive pulmonary disease) (H)      Dyslipidemia      Nasopharyngeal carcinoma (H)      BARRETT (obstructive sleep apnea)      Type 2 diabetes mellitus with diabetic nephropathy (H)         Smoking Hx:   Social History     Tobacco Use     Smoking status: Former     Packs/day: 4.00     Years: 30.00     Pack years: 120.00     Types: Cigarettes     Quit date: 2012     Years since quitting: 10.8     Smokeless tobacco: Never     Tobacco comments:     quit smoking 10 years ago   Substance Use Topics     Alcohol use: Never     Drug use: Never     Imaging:   PET CT 11/16/22    Findings:     Mucosal spaces: Nasopharyngeal mucosa is within normal limits.  Tonsillar tissue is normal. There is focal  intense FDG uptake along  the tongue base at the midline, increasing since prior examination. No  definite masslike appearance or enhancement seen on CT however there  is focal retraction of the soft tissues at this site. The max SUV  measures 14.8. On prior PET/CT from 5/31/2022 in addition to this  focus which was less intense then, there was left lateral  oropharyngeal FDG uptake, this uptake on today's examination is  resolved.     Neck: Regarding the cervical lymphadenopathy there are residual FDG  avid and abnormally enhancing lymph nodes on today's neck PET/CT,  slight intervally decreased in size. For example there is a left level  2B lymphadenopathy measuring 1.5 cm on today's examination, previously  2.2 cm. The max SUV of this lymph node measures 28.7, on the previous  PET the max SUV measures 14.4.   An enhancing deposit embedded within the left SCM is new. This  enhancing focus measures 1 cm with max SUV of 22.4.   Left level 5 node is resolved.   There are small mildly avid residual enhancing nodes in the left level  2A decreased in size and metabolic activity compared with prior.   There is a small but mildly FDG avid right level 2B lymph node.  Smaller mildly avid right level 3 nodes. These are unchanged in size  and are slightly more FDG avid     Salivary glands: Bilateral parotid glands are normal. Thyroid gland is  normal. Submandibular glands are within normal limits.      Vascular structures are patent.     Mild mucosal thickening in bilateral maxillary sinuses.  Osseous structures are intact with no lytic or sclerotic bone lesions.                                                                      IMPRESSION:     Primary: NI-RADS 2a. Indeterminate focal intense FDG uptake without  corresponding CT abnormality in the tongue base at the midline.  Clinical correlation is advised.  No suspicious FDG uptake or abnormality on CT along the nasopharyngeal  mucosa.      Neck: NI-RADS 4. Abnormal  residual FDG avid and enlarged left cervical  lymph nodes, decreased in size but persistently FDG avid (one of them  is more avid than prior PET). An FDG deposit in the left SCM is new.      Please refer to the whole body PET CT performed as a separate report,  for the findings of the remainder of the body.     NI-RADS CECT Surveillance Legend:     Primary  1: No evidence of recurrence: routine surveillance  2: Low suspicion    a) Superficial abnormality (skin, mucosal surface): direct visual  inspection    b) Ill-defined deep abnormality: short interval follow-up* or PET  3: High suspicion (new or enlarging discrete nodule/mass): biopsy  4: Definitive recurrence (path proven or clinical progression): no  biopsy needed     Nodes  1: No evidence of recurrence: routine surveillance  2: Low suspicion (ill-defined): short interval follow-up or PET  3: High suspicion (new or enlarging lymph node): biopsy if clinically  needed  4: Definitive recurrence (path proven or clinical progression): no  biopsy needed       Pathology:   n/a    Tumor Board Recommendation:   Discussion of the imaging shows uptake at base of tongue; no mass there seen on CT scan. New focus of hypermetabolism along SCM suspicious for a metastasis. A known met was again seen behind the left vessels - was smaller but more hypermetabolic.  No role for biopsy as these are clinically highly concerning.     Plan:   - salvage neck dissection. Will look at tongue base at the time of neck dissection.     Kathy Madrid MD PGY-3  Otolaryngology- Head and Neck Surgery    Documentation / Disclaimer Cancer Tumor Board Note  Cancer tumor board recommendations do not override what is determined to be reasonable care and treatment, which is dependent on the circumstances of a patient's case; the patient's medical, social, and personal concerns; and the clinical judgment of the oncologist [physician].

## 2022-11-30 ENCOUNTER — VIRTUAL VISIT (OUTPATIENT)
Dept: OTOLARYNGOLOGY | Facility: CLINIC | Age: 62
End: 2022-11-30
Payer: COMMERCIAL

## 2022-11-30 VITALS — WEIGHT: 239 LBS | HEIGHT: 70 IN | BODY MASS INDEX: 34.22 KG/M2

## 2022-11-30 DIAGNOSIS — C11.9 NASOPHARYNGEAL CANCER (H): Primary | ICD-10-CM

## 2022-11-30 PROCEDURE — 99213 OFFICE O/P EST LOW 20 MIN: CPT | Mod: 95 | Performed by: OTOLARYNGOLOGY

## 2022-11-30 ASSESSMENT — PAIN SCALES - GENERAL: PAINLEVEL: MODERATE PAIN (4)

## 2022-11-30 NOTE — LETTER
Date:December 7, 2022      Patient was self referred, no letter generated. Do not send.        [facility] Health Information

## 2022-11-30 NOTE — LETTER
11/30/2022       RE: Jareth Gallardo  [address] [place] [address]     Dear Colleague,    Thank you for referring your patient, Jareth Gallardo, to the [facility] [place] [facility]. Please see a copy of my visit note below.    HISTORY OF PRESENT ILLNESS:  Mr. Gallardo is seen today for a video visit.  He has a history of nasopharyngeal cancer treated with chemoradiotherapy.  His stage was T2 N2.  He completed concurrent chemoradiotherapy with weekly carboplatin on 08/12/2022.  His post-treatment PET scan showed two areas of uptake in the left neck, one within the posterior aspect of the sternocleidomastoid and one high in level II.  Because of concern that this could represent residual disease, it was recommended he undergo a neck dissection on the left side and that a biopsy may not retrieve adequate diagnostic tissue.    ASSESSMENT AND PLAN: Therefore, today on the call, I discussed this decision and recommendation with the patient and he is comfortable moving forward.  He also notes that the surgery may result in no cancer seen in the specimen, but he understands that we are trying to be safe and that a biopsy is fraught with challenges in interpretation.  I discussed the risks of surgery with him, which include but are not limited to bleeding, infection, return trips to the operating room and possible cranial nerve injury.  He understands all this and wishes to proceed.  This will be scheduled in the near future.            Again, thank you for allowing me to participate in the care of your patient.      Sincerely,    Jesus Boston MD

## 2022-11-30 NOTE — PROGRESS NOTES
HISTORY OF PRESENT ILLNESS:  Mr. Gallardo is seen today for a video visit.  He has a history of nasopharyngeal cancer treated with chemoradiotherapy.  His stage was T2 N2.  He completed concurrent chemoradiotherapy with weekly carboplatin on 08/12/2022.  His post-treatment PET scan showed two areas of uptake in the left neck, one within the posterior aspect of the sternocleidomastoid and one high in level II.  Because of concern that this could represent residual disease, it was recommended he undergo a neck dissection on the left side and that a biopsy may not retrieve adequate diagnostic tissue.    ASSESSMENT AND PLAN: Therefore, today on the call, I discussed this decision and recommendation with the patient and he is comfortable moving forward.  He also notes that the surgery may result in no cancer seen in the specimen, but he understands that we are trying to be safe and that a biopsy is fraught with challenges in interpretation.  I discussed the risks of surgery with him, which include but are not limited to bleeding, infection, return trips to the operating room and possible cranial nerve injury.  He understands all this and wishes to proceed.  This will be scheduled in the near future.

## 2022-12-02 ENCOUNTER — ANESTHESIA EVENT (OUTPATIENT)
Dept: SURGERY | Facility: CLINIC | Age: 62
DRG: 142 | End: 2022-12-02
Payer: COMMERCIAL

## 2022-12-04 ASSESSMENT — COPD QUESTIONNAIRES
CAT_SEVERITY: MILD
COPD: 1

## 2022-12-04 ASSESSMENT — LIFESTYLE VARIABLES: TOBACCO_USE: 1

## 2022-12-04 NOTE — ANESTHESIA PREPROCEDURE EVALUATION
Anesthesia Pre-Procedure Evaluation    Patient: Jareth Gallardo   MRN: [id number] : 1960        Procedure : Procedure(s):  LARYNGOSCOPY, WITH BIOPSY  left modified radical neck dissection          Past Medical History:   Diagnosis Date     COPD (chronic obstructive pulmonary disease) (H)      Dyslipidemia      Nasopharyngeal carcinoma (H)      BARRETT (obstructive sleep apnea)      Type 2 diabetes mellitus with diabetic nephropathy (H)       Past Surgical History:   Procedure Laterality Date     COLONOSCOPY       INSERT PORT VASCULAR ACCESS Right 2022    Procedure: SINGLE LUMEN POWER PORT INSERTION, VASCULAR ACCESS;  Surgeon: Evens Aponte MD;  Location: [facility] OR     IR CHEST PORT PLACEMENT > 5 YRS OF AGE  2022     KNEE SURGERY       TONSILLECTOMY        No Known Allergies   Social History     Tobacco Use     Smoking status: Former     Packs/day: 4.00     Years: 30.00     Pack years: 120.00     Types: Cigarettes     Quit date:      Years since quitting: 10.9     Smokeless tobacco: Never     Tobacco comments:     quit smoking 10 years ago   Substance Use Topics     Alcohol use: Yes     Comment: rarely      Wt Readings from Last 1 Encounters:   22 108.4 kg (239 lb)        Anesthesia Evaluation   Pt has had prior anesthetic. Type: MAC.    No history of anesthetic complications       ROS/MED HX  ENT/Pulmonary: Comment: NP Ca s/p chemorad finished ?    2022 fiber:  Flexible Fiberoptic Nasopharyngoscopy:  Consent for fiberoptic laryngoscopy was obtained and I confirmed correctness of procedure and identity of patient. Fiberoptic laryngoscopy was indicated due to posttreatment disease surveillance. The nose was topically decongested and anesthetized. The fiberoptic laryngoscope was passed through the right naris under endoscopic vision. The turbinates were normal. The inferior and middle meati were clear without purulence, masses, or polyps. Passage of the scope into the nasopharynx  revealed erythema and mild edema of the posterior nasopharyngeal wall without evidence of residual disease. The soft palate was normal with good mobility. Passage of the scope into the oropharynx revealed bland-appearing mucosa with mildly thickened secretions. The epiglottis was sharp and the glottic structures were normal in appearance with mobile cords bilaterally. There is no pooling of secretions within the hypopharynx. The scope was then removed and the procedure was terminated without incident.    (+) sleep apnea, tobacco use, Past use, mild,  COPD (used his inhaler this morning and feels better than usual),     Neurologic:    (-) no CVA   Cardiovascular:     (+) Dyslipidemia -----Previous cardiac testing   Echo: Date: 4/22 Results:  Left Ventricle  Biplane LVEF is 53%. Left ventricular size is normal. Relative wall thickness  is increased consistent with concentric remodeling. Left ventricular diastolic  function is normal. Inferolateral (posterior) wall akinesis is present.     Right Ventricle  Global right ventricular function is normal. Mild right ventricular dilation  is present.     Atria  Both atria appear normal.     Mitral Valve  The valve leaflets are not well visualized. Trace mitral insufficiency is  present.     Aortic Valve  The valve leaflets are not well visualized. On Doppler interrogation, there is  no significant stenosis or regurgitation. The mean AoV pressure gradient is  3.8 mmHg. The calculated aortic valve are is 3.1 cm^2.     Tricuspid Valve  The valve leaflets are not well visualized. Pulmonary artery systolic pressure  cannot be assessed.     Pulmonic Valve  The valve leaflets are not well visualized. On Doppler interrogation, there is  no significant stenosis or regurgitation.     Vessels  The thoracic aorta is normal. The pulmonary artery cannot be assessed. The  inferior vena cava cannot be assessed.     Pericardium  Cannot assess pericardial effusion.     Compared to Previous  Study  There is no prior study for direct comparison.  Stress Test: Date: Results:    ECG Reviewed: Date: Results:    Cath: Date: Results:      METS/Exercise Tolerance: >4 METS    Hematologic:       Musculoskeletal:       GI/Hepatic:     (+) hiatal hernia,     Renal/Genitourinary:       Endo:     (+) type II DM, Diabetic complications: neuropathy. Obesity,     Psychiatric/Substance Use:    (-) alcohol abuse history and chronic opioid use history   Infectious Disease:       Malignancy: Comment: Nasopharyngeal cancer  (+) Malignancy,     Other:     (-) Any chance pregnant       Physical Exam    Airway        Mallampati: II   TM distance: > 3 FB   Neck ROM: full   Mouth opening: > 3 cm    Respiratory Devices and Support         Dental  no notable dental history         Cardiovascular   cardiovascular exam normal          Pulmonary   pulmonary exam normal            Other findings: Good tracheal mobility, slight submandibular fullness 2/2 radiation    OUTSIDE LABS:  CBC:   Lab Results   Component Value Date    WBC 4.0 11/16/2022    WBC 3.7 (L) 09/28/2022    HGB 11.5 (L) 11/16/2022    HGB 11.9 (L) 09/28/2022    HCT 36.3 (L) 11/16/2022    HCT 37.4 (L) 09/28/2022     11/16/2022     09/28/2022     BMP:   Lab Results   Component Value Date     11/16/2022     09/28/2022    POTASSIUM 3.8 11/16/2022    POTASSIUM 3.8 09/28/2022    CHLORIDE 96 (L) 11/16/2022    CHLORIDE 101 09/28/2022    CO2 31 (H) 11/16/2022    CO2 30 (H) 09/28/2022    BUN 14.9 11/16/2022    BUN 14.2 09/28/2022    CR 0.88 11/16/2022    CR 0.86 09/28/2022     (H) 11/16/2022    GLC 87 09/28/2022     COAGS:   Lab Results   Component Value Date    INR 1.07 07/01/2022     POC: No results found for: BGM, HCG, HCGS  HEPATIC:   Lab Results   Component Value Date    ALBUMIN 3.8 11/16/2022    PROTTOTAL 7.7 11/16/2022    ALT 20 11/16/2022    AST 41 11/16/2022    ALKPHOS 80 11/16/2022    BILITOTAL 1.0 11/16/2022     OTHER:   Lab Results    Component Value Date    LAI 9.3 11/16/2022    MAG 1.8 11/16/2022    TSH 2.45 11/16/2022    T4 1.11 11/16/2022       Anesthesia Plan    ASA Status:  3   NPO Status:  NPO Appropriate    Anesthesia Type: General.     - Airway: ETT   Induction: Intravenous.   Maintenance: Balanced.   Techniques and Equipment:     - Lines/Monitors: 2nd IV     - Blood: T&S     Consents    Anesthesia Plan(s) and associated risks, benefits, and realistic alternatives discussed. Questions answered and patient/representative(s) expressed understanding.     - Discussed: Risks, Benefits and Alternatives for BOTH SEDATION and the PROCEDURE were discussed     - Discussed with:  Patient      - Specific Concerns: risk of mace, stroke, sore throat oral dental damage, blood tx, positioning injury, ponv all discussed .     - Extended Intubation/Ventilatory Support Discussed: No.      - Patient is DNR/DNI Status: No    Use of blood products discussed: Yes.     - Discussed with: Patient.     - Consented: consented to blood products            Reason for refusal: other.     Postoperative Care    Pain management: Multi-modal analgesia.   PONV prophylaxis: Ondansetron (or other 5HT-3)     Comments:                Chandu Newman MD

## 2022-12-05 ENCOUNTER — HOSPITAL ENCOUNTER (INPATIENT)
Facility: CLINIC | Age: 62
LOS: 2 days | Discharge: HOME | DRG: 142 | End: 2022-12-07
Attending: OTOLARYNGOLOGY | Admitting: OTOLARYNGOLOGY
Payer: COMMERCIAL

## 2022-12-05 ENCOUNTER — ANESTHESIA (OUTPATIENT)
Dept: SURGERY | Facility: CLINIC | Age: 62
DRG: 142 | End: 2022-12-05
Payer: COMMERCIAL

## 2022-12-05 DIAGNOSIS — C11.9 NASOPHARYNGEAL CANCER (H): Primary | ICD-10-CM

## 2022-12-05 DIAGNOSIS — Z11.59 ENCOUNTER FOR SCREENING FOR OTHER VIRAL DISEASES: ICD-10-CM

## 2022-12-05 PROBLEM — T88.4XXA HARD TO INTUBATE: Status: ACTIVE | Noted: 2022-12-05

## 2022-12-05 LAB
ABO/RH(D): NORMAL
ANTIBODY SCREEN: NEGATIVE
CREAT SERPL-MCNC: 0.79 MG/DL (ref 0.67–1.17)
GFR SERPL CREATININE-BSD FRML MDRD: >90 ML/MIN/1.73M2
GLUCOSE BLDC GLUCOMTR-MCNC: 136 MG/DL (ref 70–99)
GLUCOSE BLDC GLUCOMTR-MCNC: 151 MG/DL (ref 70–99)
GLUCOSE BLDC GLUCOMTR-MCNC: 169 MG/DL (ref 70–99)
GLUCOSE BLDC GLUCOMTR-MCNC: 80 MG/DL (ref 70–99)
SARS-COV-2 RNA RESP QL NAA+PROBE: NEGATIVE
SPECIMEN EXPIRATION DATE: NORMAL

## 2022-12-05 PROCEDURE — 258N000003 HC RX IP 258 OP 636: Performed by: NURSE ANESTHETIST, CERTIFIED REGISTERED

## 2022-12-05 PROCEDURE — 88331 PATH CONSLTJ SURG 1 BLK 1SPC: CPT | Mod: TC | Performed by: OTOLARYNGOLOGY

## 2022-12-05 PROCEDURE — 88305 TISSUE EXAM BY PATHOLOGIST: CPT | Mod: 26 | Performed by: PATHOLOGY

## 2022-12-05 PROCEDURE — 250N000011 HC RX IP 250 OP 636: Performed by: NURSE ANESTHETIST, CERTIFIED REGISTERED

## 2022-12-05 PROCEDURE — 88331 PATH CONSLTJ SURG 1 BLK 1SPC: CPT | Mod: 26 | Performed by: PATHOLOGY

## 2022-12-05 PROCEDURE — 250N000009 HC RX 250: Performed by: NURSE ANESTHETIST, CERTIFIED REGISTERED

## 2022-12-05 PROCEDURE — 258N000003 HC RX IP 258 OP 636: Performed by: STUDENT IN AN ORGANIZED HEALTH CARE EDUCATION/TRAINING PROGRAM

## 2022-12-05 PROCEDURE — 0CJS8ZZ INSPECTION OF LARYNX, VIA NATURAL OR ARTIFICIAL OPENING ENDOSCOPIC: ICD-10-PCS | Performed by: OTOLARYNGOLOGY

## 2022-12-05 PROCEDURE — 88307 TISSUE EXAM BY PATHOLOGIST: CPT | Mod: 26 | Performed by: PATHOLOGY

## 2022-12-05 PROCEDURE — 31237 NSL/SINS NDSC SURG BX POLYPC: CPT | Mod: 51 | Performed by: OTOLARYNGOLOGY

## 2022-12-05 PROCEDURE — 250N000011 HC RX IP 250 OP 636: Performed by: OTOLARYNGOLOGY

## 2022-12-05 PROCEDURE — 86901 BLOOD TYPING SEROLOGIC RH(D): CPT | Performed by: ANESTHESIOLOGY

## 2022-12-05 PROCEDURE — 272N000001 HC OR GENERAL SUPPLY STERILE: Performed by: OTOLARYNGOLOGY

## 2022-12-05 PROCEDURE — 710N000009 HC RECOVERY PHASE 1, LEVEL 1, PER MIN: Performed by: OTOLARYNGOLOGY

## 2022-12-05 PROCEDURE — 258N000003 HC RX IP 258 OP 636: Performed by: OTOLARYNGOLOGY

## 2022-12-05 PROCEDURE — 120N000002 HC R&B MED SURG/OB UMMC

## 2022-12-05 PROCEDURE — 36415 COLL VENOUS BLD VENIPUNCTURE: CPT | Performed by: OTOLARYNGOLOGY

## 2022-12-05 PROCEDURE — 250N000009 HC RX 250: Performed by: OTOLARYNGOLOGY

## 2022-12-05 PROCEDURE — 07T20ZZ RESECTION OF LEFT NECK LYMPHATIC, OPEN APPROACH: ICD-10-PCS | Performed by: OTOLARYNGOLOGY

## 2022-12-05 PROCEDURE — U0005 INFEC AGEN DETEC AMPLI PROBE: HCPCS | Performed by: RADIOLOGY

## 2022-12-05 PROCEDURE — 09BN8ZX EXCISION OF NASOPHARYNX, VIA NATURAL OR ARTIFICIAL OPENING ENDOSCOPIC, DIAGNOSTIC: ICD-10-PCS | Performed by: OTOLARYNGOLOGY

## 2022-12-05 PROCEDURE — 360N000077 HC SURGERY LEVEL 4, PER MIN: Performed by: OTOLARYNGOLOGY

## 2022-12-05 PROCEDURE — 999N000141 HC STATISTIC PRE-PROCEDURE NURSING ASSESSMENT: Performed by: OTOLARYNGOLOGY

## 2022-12-05 PROCEDURE — 88305 TISSUE EXAM BY PATHOLOGIST: CPT | Mod: TC | Performed by: OTOLARYNGOLOGY

## 2022-12-05 PROCEDURE — 86850 RBC ANTIBODY SCREEN: CPT | Performed by: ANESTHESIOLOGY

## 2022-12-05 PROCEDURE — 250N000012 HC RX MED GY IP 250 OP 636 PS 637: Performed by: STUDENT IN AN ORGANIZED HEALTH CARE EDUCATION/TRAINING PROGRAM

## 2022-12-05 PROCEDURE — 250N000009 HC RX 250: Performed by: STUDENT IN AN ORGANIZED HEALTH CARE EDUCATION/TRAINING PROGRAM

## 2022-12-05 PROCEDURE — 250N000025 HC SEVOFLURANE, PER MIN: Performed by: OTOLARYNGOLOGY

## 2022-12-05 PROCEDURE — 370N000017 HC ANESTHESIA TECHNICAL FEE, PER MIN: Performed by: OTOLARYNGOLOGY

## 2022-12-05 PROCEDURE — 250N000013 HC RX MED GY IP 250 OP 250 PS 637: Performed by: STUDENT IN AN ORGANIZED HEALTH CARE EDUCATION/TRAINING PROGRAM

## 2022-12-05 PROCEDURE — 82565 ASSAY OF CREATININE: CPT | Performed by: OTOLARYNGOLOGY

## 2022-12-05 PROCEDURE — 38724 REMOVAL OF LYMPH NODES NECK: CPT | Mod: LT | Performed by: OTOLARYNGOLOGY

## 2022-12-05 PROCEDURE — 31525 DX LARYNGOSCOPY EXCL NB: CPT | Mod: 51 | Performed by: OTOLARYNGOLOGY

## 2022-12-05 RX ORDER — HYDROMORPHONE HCL IN WATER/PF 6 MG/30 ML
0.4 PATIENT CONTROLLED ANALGESIA SYRINGE INTRAVENOUS
Status: DISCONTINUED | OUTPATIENT
Start: 2022-12-05 | End: 2022-12-07

## 2022-12-05 RX ORDER — HYDROMORPHONE HCL IN WATER/PF 6 MG/30 ML
0.4 PATIENT CONTROLLED ANALGESIA SYRINGE INTRAVENOUS EVERY 5 MIN PRN
Status: DISCONTINUED | OUTPATIENT
Start: 2022-12-05 | End: 2022-12-05 | Stop reason: HOSPADM

## 2022-12-05 RX ORDER — AMOXICILLIN 250 MG
1 CAPSULE ORAL 2 TIMES DAILY
Status: DISCONTINUED | OUTPATIENT
Start: 2022-12-05 | End: 2022-12-07 | Stop reason: HOSPADM

## 2022-12-05 RX ORDER — PROPOFOL 10 MG/ML
INJECTION, EMULSION INTRAVENOUS PRN
Status: DISCONTINUED | OUTPATIENT
Start: 2022-12-05 | End: 2022-12-05

## 2022-12-05 RX ORDER — POLYETHYLENE GLYCOL 3350 17 G/17G
17 POWDER, FOR SOLUTION ORAL DAILY
Status: DISCONTINUED | OUTPATIENT
Start: 2022-12-06 | End: 2022-12-07 | Stop reason: HOSPADM

## 2022-12-05 RX ORDER — HYDROMORPHONE HCL IN WATER/PF 6 MG/30 ML
0.2 PATIENT CONTROLLED ANALGESIA SYRINGE INTRAVENOUS EVERY 5 MIN PRN
Status: DISCONTINUED | OUTPATIENT
Start: 2022-12-05 | End: 2022-12-05 | Stop reason: HOSPADM

## 2022-12-05 RX ORDER — DEXAMETHASONE SODIUM PHOSPHATE 4 MG/ML
INJECTION, SOLUTION INTRA-ARTICULAR; INTRALESIONAL; INTRAMUSCULAR; INTRAVENOUS; SOFT TISSUE PRN
Status: DISCONTINUED | OUTPATIENT
Start: 2022-12-05 | End: 2022-12-05

## 2022-12-05 RX ORDER — FENTANYL CITRATE 50 UG/ML
INJECTION, SOLUTION INTRAMUSCULAR; INTRAVENOUS PRN
Status: DISCONTINUED | OUTPATIENT
Start: 2022-12-05 | End: 2022-12-05

## 2022-12-05 RX ORDER — ONDANSETRON 2 MG/ML
4 INJECTION INTRAMUSCULAR; INTRAVENOUS EVERY 6 HOURS PRN
Status: DISCONTINUED | OUTPATIENT
Start: 2022-12-05 | End: 2022-12-07 | Stop reason: HOSPADM

## 2022-12-05 RX ORDER — LIRAGLUTIDE 6 MG/ML
1.2 INJECTION SUBCUTANEOUS DAILY
Status: DISCONTINUED | OUTPATIENT
Start: 2022-12-05 | End: 2022-12-07 | Stop reason: HOSPADM

## 2022-12-05 RX ORDER — LIDOCAINE 40 MG/G
CREAM TOPICAL
Status: DISCONTINUED | OUTPATIENT
Start: 2022-12-05 | End: 2022-12-07 | Stop reason: HOSPADM

## 2022-12-05 RX ORDER — OXYCODONE HYDROCHLORIDE 10 MG/1
10 TABLET ORAL EVERY 4 HOURS PRN
Status: DISCONTINUED | OUTPATIENT
Start: 2022-12-05 | End: 2022-12-07 | Stop reason: HOSPADM

## 2022-12-05 RX ORDER — ACETAMINOPHEN 325 MG/1
975 TABLET ORAL EVERY 8 HOURS
Status: DISCONTINUED | OUTPATIENT
Start: 2022-12-05 | End: 2022-12-07 | Stop reason: HOSPADM

## 2022-12-05 RX ORDER — CEFAZOLIN SODIUM/WATER 2 G/20 ML
2 SYRINGE (ML) INTRAVENOUS SEE ADMIN INSTRUCTIONS
Status: DISCONTINUED | OUTPATIENT
Start: 2022-12-05 | End: 2022-12-05 | Stop reason: HOSPADM

## 2022-12-05 RX ORDER — HYDROMORPHONE HCL IN WATER/PF 6 MG/30 ML
0.2 PATIENT CONTROLLED ANALGESIA SYRINGE INTRAVENOUS
Status: DISCONTINUED | OUTPATIENT
Start: 2022-12-05 | End: 2022-12-07

## 2022-12-05 RX ORDER — ONDANSETRON 4 MG/1
4 TABLET, ORALLY DISINTEGRATING ORAL EVERY 6 HOURS PRN
Status: DISCONTINUED | OUTPATIENT
Start: 2022-12-05 | End: 2022-12-07 | Stop reason: HOSPADM

## 2022-12-05 RX ORDER — ONDANSETRON 4 MG/1
4 TABLET, ORALLY DISINTEGRATING ORAL EVERY 30 MIN PRN
Status: DISCONTINUED | OUTPATIENT
Start: 2022-12-05 | End: 2022-12-05 | Stop reason: HOSPADM

## 2022-12-05 RX ORDER — ONDANSETRON 2 MG/ML
4 INJECTION INTRAMUSCULAR; INTRAVENOUS EVERY 30 MIN PRN
Status: DISCONTINUED | OUTPATIENT
Start: 2022-12-05 | End: 2022-12-05 | Stop reason: HOSPADM

## 2022-12-05 RX ORDER — NALOXONE HYDROCHLORIDE 0.4 MG/ML
0.4 INJECTION, SOLUTION INTRAMUSCULAR; INTRAVENOUS; SUBCUTANEOUS
Status: DISCONTINUED | OUTPATIENT
Start: 2022-12-05 | End: 2022-12-07 | Stop reason: HOSPADM

## 2022-12-05 RX ORDER — SODIUM CHLORIDE, SODIUM LACTATE, POTASSIUM CHLORIDE, CALCIUM CHLORIDE 600; 310; 30; 20 MG/100ML; MG/100ML; MG/100ML; MG/100ML
INJECTION, SOLUTION INTRAVENOUS CONTINUOUS PRN
Status: DISCONTINUED | OUTPATIENT
Start: 2022-12-05 | End: 2022-12-05

## 2022-12-05 RX ORDER — BISACODYL 10 MG
10 SUPPOSITORY, RECTAL RECTAL DAILY PRN
Status: DISCONTINUED | OUTPATIENT
Start: 2022-12-05 | End: 2022-12-07 | Stop reason: HOSPADM

## 2022-12-05 RX ORDER — PROCHLORPERAZINE MALEATE 10 MG
10 TABLET ORAL EVERY 6 HOURS PRN
Status: DISCONTINUED | OUTPATIENT
Start: 2022-12-05 | End: 2022-12-07 | Stop reason: HOSPADM

## 2022-12-05 RX ORDER — OXYMETAZOLINE HYDROCHLORIDE 0.05 G/100ML
SPRAY NASAL PRN
Status: DISCONTINUED | OUTPATIENT
Start: 2022-12-05 | End: 2022-12-05 | Stop reason: HOSPADM

## 2022-12-05 RX ORDER — NICOTINE POLACRILEX 4 MG
15-30 LOZENGE BUCCAL
Status: DISCONTINUED | OUTPATIENT
Start: 2022-12-05 | End: 2022-12-07 | Stop reason: HOSPADM

## 2022-12-05 RX ORDER — SODIUM CHLORIDE, SODIUM LACTATE, POTASSIUM CHLORIDE, CALCIUM CHLORIDE 600; 310; 30; 20 MG/100ML; MG/100ML; MG/100ML; MG/100ML
INJECTION, SOLUTION INTRAVENOUS CONTINUOUS
Status: DISCONTINUED | OUTPATIENT
Start: 2022-12-05 | End: 2022-12-07 | Stop reason: HOSPADM

## 2022-12-05 RX ORDER — NALOXONE HYDROCHLORIDE 0.4 MG/ML
0.2 INJECTION, SOLUTION INTRAMUSCULAR; INTRAVENOUS; SUBCUTANEOUS
Status: DISCONTINUED | OUTPATIENT
Start: 2022-12-05 | End: 2022-12-07 | Stop reason: HOSPADM

## 2022-12-05 RX ORDER — SODIUM CHLORIDE, SODIUM LACTATE, POTASSIUM CHLORIDE, CALCIUM CHLORIDE 600; 310; 30; 20 MG/100ML; MG/100ML; MG/100ML; MG/100ML
INJECTION, SOLUTION INTRAVENOUS CONTINUOUS
Status: DISCONTINUED | OUTPATIENT
Start: 2022-12-05 | End: 2022-12-05 | Stop reason: HOSPADM

## 2022-12-05 RX ORDER — MINERAL OIL/HYDROPHIL PETROLAT
OINTMENT (GRAM) TOPICAL EVERY 8 HOURS
Status: DISCONTINUED | OUTPATIENT
Start: 2022-12-06 | End: 2022-12-07 | Stop reason: HOSPADM

## 2022-12-05 RX ORDER — CEFAZOLIN SODIUM/WATER 2 G/20 ML
2 SYRINGE (ML) INTRAVENOUS
Status: COMPLETED | OUTPATIENT
Start: 2022-12-05 | End: 2022-12-05

## 2022-12-05 RX ORDER — FENTANYL CITRATE 50 UG/ML
25 INJECTION, SOLUTION INTRAMUSCULAR; INTRAVENOUS EVERY 5 MIN PRN
Status: DISCONTINUED | OUTPATIENT
Start: 2022-12-05 | End: 2022-12-05 | Stop reason: HOSPADM

## 2022-12-05 RX ORDER — ENOXAPARIN SODIUM 100 MG/ML
40 INJECTION SUBCUTANEOUS EVERY 24 HOURS
Status: DISCONTINUED | OUTPATIENT
Start: 2022-12-06 | End: 2022-12-07 | Stop reason: HOSPADM

## 2022-12-05 RX ORDER — ATORVASTATIN CALCIUM 20 MG/1
20 TABLET, FILM COATED ORAL DAILY
Status: DISCONTINUED | OUTPATIENT
Start: 2022-12-05 | End: 2022-12-07 | Stop reason: HOSPADM

## 2022-12-05 RX ORDER — DEXTROSE MONOHYDRATE 25 G/50ML
25-50 INJECTION, SOLUTION INTRAVENOUS
Status: DISCONTINUED | OUTPATIENT
Start: 2022-12-05 | End: 2022-12-07 | Stop reason: HOSPADM

## 2022-12-05 RX ORDER — OXYCODONE HYDROCHLORIDE 5 MG/1
5 TABLET ORAL EVERY 4 HOURS PRN
Status: DISCONTINUED | OUTPATIENT
Start: 2022-12-05 | End: 2022-12-07 | Stop reason: HOSPADM

## 2022-12-05 RX ORDER — FLUTICASONE FUROATE AND VILANTEROL 100; 25 UG/1; UG/1
1 POWDER RESPIRATORY (INHALATION) DAILY
Status: DISCONTINUED | OUTPATIENT
Start: 2022-12-05 | End: 2022-12-07 | Stop reason: HOSPADM

## 2022-12-05 RX ORDER — FENTANYL CITRATE 50 UG/ML
50 INJECTION, SOLUTION INTRAMUSCULAR; INTRAVENOUS EVERY 5 MIN PRN
Status: DISCONTINUED | OUTPATIENT
Start: 2022-12-05 | End: 2022-12-05 | Stop reason: HOSPADM

## 2022-12-05 RX ORDER — ONDANSETRON 2 MG/ML
INJECTION INTRAMUSCULAR; INTRAVENOUS PRN
Status: DISCONTINUED | OUTPATIENT
Start: 2022-12-05 | End: 2022-12-05

## 2022-12-05 RX ORDER — FUROSEMIDE 20 MG
20 TABLET ORAL DAILY
Status: DISCONTINUED | OUTPATIENT
Start: 2022-12-06 | End: 2022-12-07 | Stop reason: HOSPADM

## 2022-12-05 RX ORDER — GLYCOPYRROLATE 0.2 MG/ML
INJECTION, SOLUTION INTRAMUSCULAR; INTRAVENOUS PRN
Status: DISCONTINUED | OUTPATIENT
Start: 2022-12-05 | End: 2022-12-05

## 2022-12-05 RX ORDER — ACETAMINOPHEN 325 MG/1
650 TABLET ORAL EVERY 4 HOURS PRN
Status: DISCONTINUED | OUTPATIENT
Start: 2022-12-08 | End: 2022-12-07 | Stop reason: HOSPADM

## 2022-12-05 RX ORDER — GINSENG 100 MG
CAPSULE ORAL EVERY 8 HOURS
Status: COMPLETED | OUTPATIENT
Start: 2022-12-05 | End: 2022-12-06

## 2022-12-05 RX ORDER — LIDOCAINE HYDROCHLORIDE 20 MG/ML
INJECTION, SOLUTION INFILTRATION; PERINEURAL PRN
Status: DISCONTINUED | OUTPATIENT
Start: 2022-12-05 | End: 2022-12-05

## 2022-12-05 RX ADMIN — ATORVASTATIN CALCIUM 20 MG: 20 TABLET, FILM COATED ORAL at 16:01

## 2022-12-05 RX ADMIN — PROPOFOL 20 MG: 10 INJECTION, EMULSION INTRAVENOUS at 07:47

## 2022-12-05 RX ADMIN — INSULIN ASPART 1 UNITS: 100 INJECTION, SOLUTION INTRAVENOUS; SUBCUTANEOUS at 18:15

## 2022-12-05 RX ADMIN — Medication 2 G: at 08:00

## 2022-12-05 RX ADMIN — ACETAMINOPHEN 975 MG: 325 TABLET, FILM COATED ORAL at 16:00

## 2022-12-05 RX ADMIN — SENNOSIDES AND DOCUSATE SODIUM 1 TABLET: 50; 8.6 TABLET ORAL at 20:08

## 2022-12-05 RX ADMIN — LIDOCAINE HYDROCHLORIDE 100 MG: 20 INJECTION, SOLUTION INFILTRATION; PERINEURAL at 07:39

## 2022-12-05 RX ADMIN — FENTANYL CITRATE 150 MCG: 50 INJECTION, SOLUTION INTRAMUSCULAR; INTRAVENOUS at 07:39

## 2022-12-05 RX ADMIN — SODIUM CHLORIDE, POTASSIUM CHLORIDE, SODIUM LACTATE AND CALCIUM CHLORIDE: 600; 310; 30; 20 INJECTION, SOLUTION INTRAVENOUS at 07:28

## 2022-12-05 RX ADMIN — ONDANSETRON 4 MG: 2 INJECTION INTRAMUSCULAR; INTRAVENOUS at 07:59

## 2022-12-05 RX ADMIN — REMIFENTANIL HYDROCHLORIDE 0.1 MCG/KG/MIN: 1 INJECTION, POWDER, LYOPHILIZED, FOR SOLUTION INTRAVENOUS at 11:23

## 2022-12-05 RX ADMIN — GLYCOPYRROLATE 0.2 MG: 0.2 INJECTION, SOLUTION INTRAMUSCULAR; INTRAVENOUS at 08:16

## 2022-12-05 RX ADMIN — PROPOFOL 180 MG: 10 INJECTION, EMULSION INTRAVENOUS at 07:39

## 2022-12-05 RX ADMIN — BACITRACIN: 500 OINTMENT TOPICAL at 16:03

## 2022-12-05 RX ADMIN — PROPOFOL 50 MG: 10 INJECTION, EMULSION INTRAVENOUS at 07:54

## 2022-12-05 RX ADMIN — REMIFENTANIL HYDROCHLORIDE 0.08 MCG/KG/MIN: 1 INJECTION, POWDER, LYOPHILIZED, FOR SOLUTION INTRAVENOUS at 08:05

## 2022-12-05 RX ADMIN — PHENYLEPHRINE HYDROCHLORIDE 100 MCG: 10 INJECTION INTRAVENOUS at 07:39

## 2022-12-05 RX ADMIN — SODIUM CHLORIDE, POTASSIUM CHLORIDE, SODIUM LACTATE AND CALCIUM CHLORIDE: 600; 310; 30; 20 INJECTION, SOLUTION INTRAVENOUS at 13:11

## 2022-12-05 RX ADMIN — DEXAMETHASONE SODIUM PHOSPHATE 8 MG: 4 INJECTION, SOLUTION INTRA-ARTICULAR; INTRALESIONAL; INTRAMUSCULAR; INTRAVENOUS; SOFT TISSUE at 07:59

## 2022-12-05 RX ADMIN — Medication 30 MG: at 07:47

## 2022-12-05 RX ADMIN — SUCCINYLCHOLINE CHLORIDE 100 MG: 20 INJECTION, SOLUTION INTRAMUSCULAR; INTRAVENOUS; PARENTERAL at 07:40

## 2022-12-05 RX ADMIN — PHENYLEPHRINE HYDROCHLORIDE 100 MCG: 10 INJECTION INTRAVENOUS at 07:46

## 2022-12-05 RX ADMIN — HYDROMORPHONE HYDROCHLORIDE 0.5 MG: 1 INJECTION, SOLUTION INTRAMUSCULAR; INTRAVENOUS; SUBCUTANEOUS at 11:43

## 2022-12-05 ASSESSMENT — ACTIVITIES OF DAILY LIVING (ADL)
ADLS_ACUITY_SCORE: 27
ADLS_ACUITY_SCORE: 27
ADLS_ACUITY_SCORE: 22
ADLS_ACUITY_SCORE: 20
ADLS_ACUITY_SCORE: 20
ADLS_ACUITY_SCORE: 22
ADLS_ACUITY_SCORE: 22
ADLS_ACUITY_SCORE: 20
ADLS_ACUITY_SCORE: 20

## 2022-12-05 NOTE — ANESTHESIA CARE TRANSFER NOTE
Patient: Jareth Gallardo    Procedure: Procedure(s):  LARYNGOSCOPY, WITH BIOPSY  left modified radical neck dissection       Diagnosis: Nasopharyngeal cancer (H) [C11.9]  Diagnosis Additional Information: No value filed.    Anesthesia Type:   General     Note:    Oropharynx: oropharynx clear of all foreign objects and spontaneously breathing  Level of Consciousness: awake  Oxygen Supplementation: face mask  Level of Supplemental Oxygen (L/min / FiO2): 6  Independent Airway: airway patency satisfactory and stable  Dentition: dentition unchanged  Vital Signs Stable: post-procedure vital signs reviewed and stable  Report to RN Given: handoff report given  Patient transferred to: PACU    Handoff Report: Identifed the Patient, Identified the Reponsible Provider, Reviewed the pertinent medical history, Discussed the surgical course, Reviewed Intra-OP anesthesia mangement and issues during anesthesia, Set expectations for post-procedure period and Allowed opportunity for questions and acknowledgement of understanding      Vitals:  Vitals Value Taken Time   /96 (117)    Temp     Pulse 95    Resp 16    SpO2 100%        Electronically Signed By: SILVIANO Stevenson CRNA  December 5, 2022  12:17 PM

## 2022-12-05 NOTE — BRIEF OP NOTE
[facility]    Brief Operative Note    Pre-operative diagnosis: Nasopharyngeal cancer (H) [C11.9]  Post-operative diagnosis Same as pre-operative diagnosis    Procedure: Procedure(s):  LARYNGOSCOPY, WITH BIOPSY  left modified radical neck dissection  Surgeon: Surgeon(s) and Role:     * Jesus Boston MD - Primary     * Wesley Rodas MD - Resident - Assisting  Anesthesia: General   Estimated Blood Loss: 50    Drains: CHON  Specimens:   ID Type Source Tests Collected by Time Destination   1 : Nasopharynx Tissue Other SURGICAL PATHOLOGY EXAM Jesus Boston MD 12/5/2022  8:24 AM    2 : Mass at left posterior SCM Tissue Other SURGICAL PATHOLOGY EXAM Jesus Boston MD 12/5/2022  8:48 AM    3 : level 2A Tissue Other SURGICAL PATHOLOGY EXAM Jesus Boston MD 12/5/2022  9:58 AM    4 : level 2B Tissue Other SURGICAL PATHOLOGY EXAM Jesus Boston MD 12/5/2022  9:58 AM    5 : level 3 Tissue Other SURGICAL PATHOLOGY EXAM Jesus Boston MD 12/5/2022  9:58 AM    6 : level 4 Tissue Other SURGICAL PATHOLOGY EXAM Jesus Boston MD 12/5/2022  9:58 AM    7 : Level 2 deep neck mass Tissue Other SURGICAL PATHOLOGY EXAM Jesus Boston MD 12/5/2022 10:29 AM    8 : Level 2 deep neck margin Tissue Other SURGICAL PATHOLOGY EXAM Jesus Boston MD 12/5/2022 10:30 AM    9 : Level 2B deep neck margin Tissue Other SURGICAL PATHOLOGY EXAM Jesus Boston MD 12/5/2022 10:34 AM    10 : Left level 5 Tissue Other SURGICAL PATHOLOGY EXAM Jesus Boston MD 12/5/2022 10:59 AM      Findings:   See operative report.  Complications: None.  Implants: * No implants in log *      A/P: Jareth Gallardo is a 62 year old male with a past medical history of cT2N2 nasopharyngeal carcinoma s/p induction chemotherapy followed by concurrent chemoradiation (8/12/2022) with leny recurrence s/p DL/nasopharyngoscopy with biopsy, MRND (II-V) with left internal jugular  sacrifice.     Neuro:  - Pain control: tylenol, oxycodone, dilaudid PRN    HEENT:  - Clean incisions with 0.9% sodium chloride and apply bacitracin Q8H x 24h, then transition to Aquaphor  - Monitor and record CHON drain output Qshift  - OT for shoulder exercises    Respiratory:  - supplemental O2 as needed to keep SpO2 >90%  - PTA inhaler    CV/heme:  - HDS  - PTA lipitor  - resume PTA lasix POD1    FEN/GI:  - Regular diet  - Bowel regimen: senna/miralax    :  - straight cath per protocol    Endo  - hold PTA metformin/victoza  - SSI    ID:  - monitor for signs or symptoms of infection    PPX:  - SCDs  - Lovenox POD1    Dispo: 6A

## 2022-12-05 NOTE — OP NOTE
Procedure Date: 12/05/2022    ATTENDING SURGEON:  Jesus Boston M.D.    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    POSTOPERATIVE DIAGNOSES:  [id number] squamous cell carcinoma of the nasopharynx, status post induction chemotherapy followed by concurrent chemoradiation (August 2022) with leny recurrence.    PROCEDURES PERFORMED:  1.  Direct laryngoscopy.  2.  Nasal pharyngoscopy with biopsy.  3.  Left modified radical neck dissection (level 2 through 5).    ANESTHESIA:  General.    ESTIMATED BLOOD LOSS:  50 mL    SPECIMENS:  1.  Nasopharynx.  2.  Mass at left posterior SCM.  3.  Level 2A.  4.  Level 2B.  5.  Level 3.  6.  Level 4.  7.  Level 2 deep neck mass.  8.  Level 2 deep neck margin.  9.  Level 2B deep neck margin.  10.  Left level 5.    COMPLICATIONS:  None apparent.    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  He completed induction chemotherapy followed by concurrent chemoradiation in August 2022.  His post-treatment PET/CT was concerning for several foci of leny recurrences in the left neck.  The risks, benefits and alternatives to the above interventions were discussed with the patient who elected to proceed.    DESCRIPTION OF PROCEDURE:  After obtaining informed consent, the patient was brought back to the operating room and positioned supine on the operating table.  General anesthesia was induced and the patient was orotracheally intubated.  Anesthesia did have difficulty visualizing the larynx with their initial C-MAC blade.  The patient was able to be masked with a heavy jaw thrust and 2 hands on the face mask.  The patient was intubated with a flexible transoral laryngoscopy via Seldinger technique with a 6-0  endotracheal tube.  Anesthesia performed a direct laryngoscopy with a D-blade and had a grade 1 view. The larynx was also inspected using a Joy laryngoscope.  There was a large volume of lingual tonsil, but the larynx is otherwise normal with no edema, masses or other concerning findings.    Afrin pledgets were then placed in the nares bilaterally.  The planned incision lines were marked and injected with 1:100,000 epinephrine plain.  The patient was then prepped and draped in standard fashion.  Timeout was performed and all parties were in agreement.  A 0-degree sinus scope was then used to inspect the nasopharynx.  The mucosa appeared normal.  A biopsy was taken with a straight Thru-Cut forceps and sent for permanent pathology.  We then turned our attention to the neck.  Incision was made through the skin and subcutaneous tissues down to the level of platysma.  The platysma was incised and subplatysmal flaps were elevated superiorly and inferiorly.  The concerning FDG avid nodule in the left SCM was excised and sent for frozen pathology.  This was positive for squamous cell carcinoma.  The fascia along the inferior border of the submandibular gland was incised and the posterior belly of the digastric was identified.  The common facial vein was identified and ligated.  The posterior belly of the digastric was traced superiorly to the mastoid tip.  The investing fascia was dissected off of the SCM and retracted medially.  Dissection was carried down along the medial border of the SCM towards the floor of the neck.  Cranial nerve XI was identified and traced superiorly to where it passed beneath the digastric.  The omohyoid was released and retracted away from the leny packet.  Dissection was carried down onto the floor of the neck where the rootlets were identified.  The level 4 was dissected free with no evidence of intraoperative chyle leak.  The superior aspect of the dissection was complicated by very  matted leny disease at the junction of 2A and 2B on the jugular vein.  We were able to create a plane between the vagus and the tumor.  The jugular vein was dissected circumferentially, superior and inferior to the leny disease.  Vessel loops were placed.  We were not able to successfully dissect the tumor off of the jugular vein, so the decision was made just sacrifice it.  The internal jugular vein was clipped and ligated.  The leny packet was then retracted medially and dissected off of the great vessels, ligating branches as they were encountered.  Specimen was removed and sent for permanent pathology.  Level 2B was then dissected out and sent for permanent pathology.  We then turned our attention to level 5.  Cranial nerve XI was identified and traced along its course from the posterior border of the SCM to the anterior border of the trapezius muscle.  The leny packet was released from the anterior border of the trapezius and clavicle down to the level of the floor of the neck.  The leny packet was retracted superiorly and dissected off of the floor and sent for permanent pathology.  Additional level 5A was removed superior to cranial nerve XI and sent for permanent pathology as well.  A Valsalva maneuver was performed and hemostasis was achieved.  There was no intraoperative chyle leak.  The neck wound was irrigated with normal saline.  Two CHON drains were placed, one in level 5 and one in the gutter.  These were secured with nylon drain stitches.  The platysma and skin were closed with deep Vicryl sutures followed by a running nylon stitch for the skin.  This concluded our procedure.  The patient's care was turned over to our anesthesia colleagues for postoperative care.    Dr. Boston was present and participated in all critical portions of the procedure.    Jesus Boston MD    As Dictated by KADE LESTER MD        D: 12/05/2022   T: 12/05/2022   MT: md    Name:     SUYAPA PARRA  S.  MRN:      [id number]        Account:        [id number]   :      1960           Procedure Date: 2022     Document: [id number]    I was present for the entire procedure  Jesus Boston M.D.

## 2022-12-05 NOTE — ANESTHESIA PROCEDURE NOTES
Airway       Patient location during procedure: OR       Procedure Start/Stop Times: 12/5/2022 7:58 AM  Staff -        CRNA: Nery Garsia APRN CRNA       Performed By: CRNA  Consent for Airway        Urgency: elective  Indications and Patient Condition       Indications for airway management: luisa-procedural       Induction type:intravenous       Mask difficulty assessment: 3 - difficult mask (inadequate, unstable, or two providers) +/- NMBA    Final Airway Details       Final airway type: endotracheal airway       Successful airway: ETT - single and Oral  Endotracheal Airway Details        ETT size (mm): 6.0       Cuffed: yes       Successful intubation technique: flexible bronchoscopy and video laryngoscopy       VL Blade Size: MAC 3       Grade View of Cords: 1       Position: Right       Measured from: lips       Secured at (cm): 24    Post intubation assessment        Placement verified by: capnometry, equal breath sounds and chest rise        Number of attempts at approach: 2       Secured with: pink tape       Ease of procedure: difficult       Dentition: Intact    Medication(s) Administered   Medication Administration Time: 12/5/2022 7:58 AM    Additional Comments       Difficult 2 hand mask with oral airway. Poor view with CMAC 3, large amounts of tongue base tissue and redundant, edematous soft tissue; unable to visualize vocal cords. 6.0 ETT placed over fiberoptic scope by Dr. Boston. After ETT placed, CMAC D-blade view obtained with clear view of cords. RECOMMEND CMAC D-BLADE FOR FUTURE INTUBATIONS.

## 2022-12-05 NOTE — ANESTHESIA POSTPROCEDURE EVALUATION
Patient: Jareth Gallardo    Procedure: Procedure(s):  LARYNGOSCOPY, WITH BIOPSY  left modified radical neck dissection       Anesthesia Type:  General    Note:  Disposition: Admission   Postop Pain Control: Uneventful            Sign Out: Well controlled pain   PONV: No   Neuro/Psych: Uneventful            Sign Out: Acceptable/Baseline neuro status   Airway/Respiratory: Uneventful            Sign Out: Acceptable/Baseline resp. status   CV/Hemodynamics: Uneventful            Sign Out: Acceptable CV status; No obvious hypovolemia; No obvious fluid overload   Other NRE: NONE   DID A NON-ROUTINE EVENT OCCUR? YES    Event details/Postop Comments:  Difficult 2 hand mask with oral airway. Poor view with CMAC 3, large amounts of tongue base tissue and redundant, edematous soft tissue; unable to visualize vocal cords. 6.0 ETT placed over fiberoptic scope by Dr. Boston. After ETT placed, CMAC D-blade view obtained with clear view of cords. RECOMMEND CMAC D-BLADE FOR FUTURE INTUBATIONS.              Last vitals:  Vitals Value Taken Time   /89 12/05/22 1300   Temp 37.4  C (99.4  F) 12/05/22 1209   Pulse 88 12/05/22 1310   Resp 13 12/05/22 1310   SpO2 98 % 12/05/22 1310   Vitals shown include unvalidated device data.    Electronically Signed By: Ede Lara MD  December 5, 2022  1:11 PM

## 2022-12-05 NOTE — OR NURSING
Pt O2 86-88 on arrival to pre-op. Pt states SOB on exertion, now no SOB at rest. O2 increased to 93-95 on room air within a minute. MDA aware.

## 2022-12-06 ENCOUNTER — APPOINTMENT (OUTPATIENT)
Dept: OCCUPATIONAL THERAPY | Facility: CLINIC | Age: 62
DRG: 142 | End: 2022-12-06
Attending: STUDENT IN AN ORGANIZED HEALTH CARE EDUCATION/TRAINING PROGRAM
Payer: COMMERCIAL

## 2022-12-06 LAB
ANION GAP SERPL CALCULATED.3IONS-SCNC: 8 MMOL/L (ref 7–15)
BUN SERPL-MCNC: 12.7 MG/DL (ref 8–23)
CALCIUM SERPL-MCNC: 9 MG/DL (ref 8.8–10.2)
CHLORIDE SERPL-SCNC: 103 MMOL/L (ref 98–107)
CREAT SERPL-MCNC: 0.76 MG/DL (ref 0.67–1.17)
DEPRECATED HCO3 PLAS-SCNC: 27 MMOL/L (ref 22–29)
ERYTHROCYTE [DISTWIDTH] IN BLOOD BY AUTOMATED COUNT: 13.8 % (ref 10–15)
GFR SERPL CREATININE-BSD FRML MDRD: >90 ML/MIN/1.73M2
GLUCOSE BLDC GLUCOMTR-MCNC: 108 MG/DL (ref 70–99)
GLUCOSE BLDC GLUCOMTR-MCNC: 111 MG/DL (ref 70–99)
GLUCOSE BLDC GLUCOMTR-MCNC: 113 MG/DL (ref 70–99)
GLUCOSE BLDC GLUCOMTR-MCNC: 115 MG/DL (ref 70–99)
GLUCOSE BLDC GLUCOMTR-MCNC: 124 MG/DL (ref 70–99)
GLUCOSE BLDC GLUCOMTR-MCNC: 155 MG/DL (ref 70–99)
GLUCOSE SERPL-MCNC: 102 MG/DL (ref 70–99)
HCT VFR BLD AUTO: 34.2 % (ref 40–53)
HGB BLD-MCNC: 10.5 G/DL (ref 13.3–17.7)
MAGNESIUM SERPL-MCNC: 1.9 MG/DL (ref 1.7–2.3)
MCH RBC QN AUTO: 27.3 PG (ref 26.5–33)
MCHC RBC AUTO-ENTMCNC: 30.7 G/DL (ref 31.5–36.5)
MCV RBC AUTO: 89 FL (ref 78–100)
PHOSPHATE SERPL-MCNC: 3.7 MG/DL (ref 2.5–4.5)
PLATELET # BLD AUTO: 152 10E3/UL (ref 150–450)
POTASSIUM SERPL-SCNC: 3.8 MMOL/L (ref 3.4–5.3)
RBC # BLD AUTO: 3.84 10E6/UL (ref 4.4–5.9)
SODIUM SERPL-SCNC: 138 MMOL/L (ref 136–145)
WBC # BLD AUTO: 5 10E3/UL (ref 4–11)

## 2022-12-06 PROCEDURE — 84100 ASSAY OF PHOSPHORUS: CPT | Performed by: STUDENT IN AN ORGANIZED HEALTH CARE EDUCATION/TRAINING PROGRAM

## 2022-12-06 PROCEDURE — 83735 ASSAY OF MAGNESIUM: CPT | Performed by: STUDENT IN AN ORGANIZED HEALTH CARE EDUCATION/TRAINING PROGRAM

## 2022-12-06 PROCEDURE — 120N000002 HC R&B MED SURG/OB UMMC

## 2022-12-06 PROCEDURE — 36415 COLL VENOUS BLD VENIPUNCTURE: CPT | Performed by: STUDENT IN AN ORGANIZED HEALTH CARE EDUCATION/TRAINING PROGRAM

## 2022-12-06 PROCEDURE — 250N000013 HC RX MED GY IP 250 OP 250 PS 637: Performed by: STUDENT IN AN ORGANIZED HEALTH CARE EDUCATION/TRAINING PROGRAM

## 2022-12-06 PROCEDURE — 85027 COMPLETE CBC AUTOMATED: CPT | Performed by: STUDENT IN AN ORGANIZED HEALTH CARE EDUCATION/TRAINING PROGRAM

## 2022-12-06 PROCEDURE — 80048 BASIC METABOLIC PNL TOTAL CA: CPT | Performed by: STUDENT IN AN ORGANIZED HEALTH CARE EDUCATION/TRAINING PROGRAM

## 2022-12-06 PROCEDURE — 97165 OT EVAL LOW COMPLEX 30 MIN: CPT | Mod: GO

## 2022-12-06 PROCEDURE — 258N000003 HC RX IP 258 OP 636: Performed by: STUDENT IN AN ORGANIZED HEALTH CARE EDUCATION/TRAINING PROGRAM

## 2022-12-06 PROCEDURE — 97110 THERAPEUTIC EXERCISES: CPT | Mod: GO

## 2022-12-06 PROCEDURE — 250N000011 HC RX IP 250 OP 636: Performed by: STUDENT IN AN ORGANIZED HEALTH CARE EDUCATION/TRAINING PROGRAM

## 2022-12-06 PROCEDURE — 97530 THERAPEUTIC ACTIVITIES: CPT | Mod: GO

## 2022-12-06 PROCEDURE — 97535 SELF CARE MNGMENT TRAINING: CPT | Mod: GO

## 2022-12-06 RX ADMIN — WHITE PETROLATUM: 1.75 OINTMENT TOPICAL at 20:47

## 2022-12-06 RX ADMIN — ACETAMINOPHEN 975 MG: 325 TABLET, FILM COATED ORAL at 22:55

## 2022-12-06 RX ADMIN — BACITRACIN: 500 OINTMENT TOPICAL at 00:25

## 2022-12-06 RX ADMIN — SENNOSIDES AND DOCUSATE SODIUM 1 TABLET: 50; 8.6 TABLET ORAL at 07:56

## 2022-12-06 RX ADMIN — FUROSEMIDE 20 MG: 20 TABLET ORAL at 07:56

## 2022-12-06 RX ADMIN — ACETAMINOPHEN 975 MG: 325 TABLET, FILM COATED ORAL at 15:44

## 2022-12-06 RX ADMIN — ENOXAPARIN SODIUM 40 MG: 40 INJECTION SUBCUTANEOUS at 08:08

## 2022-12-06 RX ADMIN — POLYETHYLENE GLYCOL 3350 17 G: 17 POWDER, FOR SOLUTION ORAL at 07:56

## 2022-12-06 RX ADMIN — ACETAMINOPHEN 975 MG: 325 TABLET, FILM COATED ORAL at 00:24

## 2022-12-06 RX ADMIN — BACITRACIN: 500 OINTMENT TOPICAL at 15:45

## 2022-12-06 RX ADMIN — ACETAMINOPHEN 975 MG: 325 TABLET, FILM COATED ORAL at 07:57

## 2022-12-06 RX ADMIN — SENNOSIDES AND DOCUSATE SODIUM 1 TABLET: 50; 8.6 TABLET ORAL at 20:47

## 2022-12-06 RX ADMIN — INSULIN ASPART 1 UNITS: 100 INJECTION, SOLUTION INTRAVENOUS; SUBCUTANEOUS at 11:55

## 2022-12-06 RX ADMIN — SODIUM CHLORIDE, POTASSIUM CHLORIDE, SODIUM LACTATE AND CALCIUM CHLORIDE: 600; 310; 30; 20 INJECTION, SOLUTION INTRAVENOUS at 01:23

## 2022-12-06 RX ADMIN — FLUTICASONE FUROATE AND VILANTEROL TRIFENATATE 1 PUFF: 100; 25 POWDER RESPIRATORY (INHALATION) at 07:58

## 2022-12-06 RX ADMIN — ATORVASTATIN CALCIUM 20 MG: 20 TABLET, FILM COATED ORAL at 07:56

## 2022-12-06 RX ADMIN — BACITRACIN: 500 OINTMENT TOPICAL at 07:58

## 2022-12-06 ASSESSMENT — ACTIVITIES OF DAILY LIVING (ADL)
ADLS_ACUITY_SCORE: 27

## 2022-12-06 NOTE — PROGRESS NOTES
"   12/06/22 0951   Appointment Info   Signing Clinician's Name / Credentials (OT) HOWARD Randall   Student Supervision On-site supervision provided   Living Environment   People in Home spouse   Current Living Arrangements house   Home Accessibility wheelchair accessible;stairs to enter home  (Has ramp or stairs to enter home)   Transportation Anticipated family or friend will provide;car, drives self   Living Environment Comments Pt lives in single story home w/ stairs to get to basement. There is a ramp or stairs to enter the home. Pt has walk in shower w/ no AD.   Self-Care   Usual Activity Tolerance excellent   Current Activity Tolerance fair   Regular Exercise Yes   Activity/Exercise Type walking   Exercise Amount/Frequency daily   Equipment Currently Used at Home none   Fall history within last six months no   Activity/Exercise/Self-Care Comment Pt previously IND w/ all ADLs   Instrumental Activities of Daily Living (IADL)   Previous Responsibilities meal prep;housekeeping;laundry;yardwork;medication management;finances;driving;shopping   IADL Comments Pt previously IND w/ all IADLs   General Information   Onset of Illness/Injury or Date of Surgery 12/05/22   Referring Physician Wesley Rodas MD   Additional Occupational Profile Info/Pertinent History of Current Problem Per ENT note, \"Sami is a 62-year-old male with a 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  He completed induction chemotherapy followed by concurrent chemoradiation in August 2022.  His post-treatment PET/CT was concerning for several foci of leny recurrences in the left neck.\" Per chart, pt underwent direct laryngoscopy, nasal pharyngoscopy with biopsy, and left modified radical neck dissection (level 2 through 5) and is now POD#1.   Existing Precautions/Restrictions   (radical neck dissection)   General Observations and Info activity orders: ambulate with assist   Cognitive Status Examination   Cognitive " Status Comments no acute changes   Visual Perception   Impact of Vision Impairment on Function (Vision) no acute changes   Sensory   Sensory Quick Adds sensation intact   Posture   Posture Comments slightly kyphotic, forward head position   Range of Motion Comprehensive   Comment, General Range of Motion limited cervical ROM 2/2 incision   Strength Comprehensive (MMT)   Comment, General Manual Muscle Testing (MMT) Assessment overall deconditioned   Coordination   Coordination Comments WFL   Bed Mobility   Comment (Bed Mobility) IND   Bathing Assessment/Intervention   Comment, (Bathing) anticipate SBA and education required   Upper Body Dressing Assessment/Training   Comment, (Upper Body Dressing) anticipate SBA and education required   Lower Body Dressing Assessment/Training   Comment, (Lower Body Dressing) anticipate SBA and education required   Grooming Assessment/Training   Comment, (Grooming) anticipate SBA and education required   Clinical Impression   Criteria for Skilled Therapeutic Interventions Met (OT) Yes, treatment indicated   OT Diagnosis decreased IND w/ ADLs/IADLs   Influenced by the following impairments ROM, pain, strength, activity tolerance   OT Problem List-Impairments impacting ADL problems related to;activity tolerance impaired;range of motion (ROM);strength;post-surgical precautions   Assessment of Occupational Performance 1-3 Performance Deficits   Identified Performance Deficits dressing, bathing, functional mobility, g/h   Planned Therapy Interventions (OT) ADL retraining;IADL retraining;ROM;strengthening;home program guidelines;progressive activity/exercise;risk factor education   Clinical Decision Making Complexity (OT) low complexity   Anticipated Equipment Needs Upon Discharge (OT)   (TBD)   Risk & Benefits of therapy have been explained evaluation/treatment results reviewed;care plan/treatment goals reviewed;risks/benefits reviewed;current/potential barriers reviewed;participants  voiced agreement with care plan;participants included;patient;spouse/significant other   Clinical Impression Comments Pt would benefit from skilled OT to increase IND w/ ADLs/IADLs   OT Total Evaluation Time   OT Eval, Low Complexity Minutes (57361) 14   OT Goals   Therapy Frequency (OT) 2 times/wk   OT Predicted Duration/Target Date for Goal Attainment 12/09/22   OT Goals Hygiene/Grooming;Upper Body Dressing;Lower Body Dressing;Upper Body Bathing;Lower Body Bathing;OT Goal 1   OT: Hygiene/Grooming modified independent   OT: Upper Body Dressing Modified independent   OT: Lower Body Dressing Modified independent   OT: Upper Body Bathing Modified independent   OT: Lower Body Bathing Modified independent   OT: Goal 1 Pt will IND recite 3 EC strategies to increase IND   OT: Goal 2 Pt will demonstrate competence of 5/5 cervical/shoulder exercises and adhere to % of time to increase IND.   OT Discharge Planning   OT Plan cervical/shoulder exercises, review UB dressing/showering   OT Discharge Recommendation (DC Rec) home with assist   OT Rationale for DC Rec Pt largely limited by cervical ROM and activity tolerance. Pt moving w/ SBA and likely able to return home w/ assist for bathing and higher level IADLs. Pt has wife at home who can help out w/ IADLs.   OT Brief overview of current status SBA   Total Session Time   Timed Code Treatment Minutes 40   Total Session Time (sum of timed and untimed services) 54

## 2022-12-06 NOTE — PLAN OF CARE
Arrived from: PACU  Belongings/meds: Remains with patient, no meds  2 RN Skin Assessment Completed by: Marilynn Loyd  Non-intact findings documented (yes/no/NA): L neck incision BRISEYDA, CHON x2    Status: S/P laryngoscopy with biopsy, left modified radical neck dissection 12/5 with history of SCC of the nasopharynx   Vitals: VSS  Neuros: A&Ox4, strenghth 5/5 throughout   IV: PIV infusing LR 75 ml/hr  Resp/trach: On 2 L oxymask   Diet: Regular   Bowel status: LBM 12/4  : Voids spontaneously   Skin: L neck incision BRISEYDA, CHON x2  Pain: Neck pain managed with scheduled Tylenol   Activity: SBA   Plan: Continue to monitor and follow POC

## 2022-12-06 NOTE — PROGRESS NOTES
ENT Progress Note    Subjective:  Did well overnight with no concerns. Pain well-controlled. No nausea/vomiting.    Objective:  Gen: Awake, alert, resting comfortably, no acute distress  HEENT: Neck incision c/d/i, CHON drains in place x 2 with serosanguinous output. No hematoma or seroma. Neck is soft and nontender. Intraoral exam normal. Shoulder movement grossly intact with small spontaneous movements.  Resp: Breathing comfortably on 1-2L oxymask  CV: Extremities warm and well-perfused.   Neuro: Cranial nerves grossly intact, no focal deficits.     Labs:   Reviewed, pending    Imaging:   Reviewed, no new    Assessment and Plan:  Jareth Gallardo is a 62 year old male with a past medical history of cT2N2 nasopharyngeal carcinoma s/p induction chemotherapy followed by concurrent chemoradiation (8/12/2022) with leny recurrence s/p DL/nasopharyngoscopy with biopsy, MRND (II-V) with left internal jugular sacrifice.      Neuro:  - Pain control: tylenol, oxycodone, dilaudid PRN     HEENT:  - Clean incisions with 0.9% sodium chloride and apply bacitracin Q8H x 24h, then transition to Aquaphor  - Monitor and record CHON drain output Qshift  - OT for shoulder exercises, to see today     Respiratory:  - Supplemental O2 as needed to keep SpO2 >90%  - PTA inhaler     CV/heme:  - HDS  - PTA lipitor  - Resume PTA lasix today     FEN/GI:  - Regular diet  - Bowel regimen: senna/miralax     :  - Straight cath per protocol     Endo  - Hold PTA metformin/victoza  - SSI     ID:  - Monitor for signs or symptoms of infection     PPX:  - SCDs  - Lovenox today     Dispo: 6A. Patient lives far away so goal to remove at least one drain when meeting criteria prior to discharge. Anticipate in next 1-2 days.    Patient discussed with Dr. Darvin Jackson MD  Otolaryngology-Head & Neck Surgery PGY-1  Please contact ENT with questions by dialing [phone] and entering job code [phone] when prompted.

## 2022-12-06 NOTE — PLAN OF CARE
Status: POD #1. S/P laryngoscopy with biopsy, left modified radical neck dissection with history of SCC of the nasopharynx   Vitals: VSS on 1L oxymask. Capno monitoring.   Neuros: AOx4. Strenghth 5/5 throughout. Denied N/T.  IV: PIV infusing LR @75 ml/hr.   Resp/trach: On 1L oxymask. Pt has sleep apnea.   Diet: Regular diet.   Bowel status: LBM 12/04. BS+  : Voids spontaneously, using bedside urinal overnight.   Skin: L neck incision BRISEYDA. CHON x2 - to bulb suction.   Pain: C/o neck pain managed with scheduled Tylenol   Activity: SBA + GB.   Plan: Continue to monitor and follow POC

## 2022-12-06 NOTE — PLAN OF CARE
Status: POD #1. S/P laryngoscopy with biopsy, left modified radical neck dissection with history of SCC of the nasopharynx   Vitals: VSS on 1L oxymask. Continuous pulse ox in place.  Neuros: AOx4. Strenghth 5/5 throughout. Denied N/T.  IV: PIV SLd. Port SLd. Requested hep lock orders but wrong ones obtained. Needs to be clarified with ENT so we can de-access port. Good PO intake.   Resp/trach: On 1L oxymask. Pt has sleep apnea.   Diet: Regular diet.   Bowel status: LBM 12/06 today. Large. Declined all bowel meds this AM.   : Voids spontaneously, using bedside urinal.   Skin: L neck incision BRISEYDA. CHON x2 - to bulb suction WDL, BRISEYDA out put decreasing.  Pain: C/o neck pain managed with scheduled Tylenol-minimal, declined at 1600 assessment.   Activity: SBA + GB.   Plan: Continue to monitor and follow POC

## 2022-12-07 ENCOUNTER — APPOINTMENT (OUTPATIENT)
Dept: OCCUPATIONAL THERAPY | Facility: CLINIC | Age: 62
DRG: 142 | End: 2022-12-07
Attending: OTOLARYNGOLOGY
Payer: COMMERCIAL

## 2022-12-07 VITALS
SYSTOLIC BLOOD PRESSURE: 101 MMHG | HEART RATE: 75 BPM | HEIGHT: 70 IN | BODY MASS INDEX: 34.88 KG/M2 | RESPIRATION RATE: 18 BRPM | WEIGHT: 243.61 LBS | DIASTOLIC BLOOD PRESSURE: 61 MMHG | OXYGEN SATURATION: 94 % | TEMPERATURE: 99.1 F

## 2022-12-07 LAB
GLUCOSE BLDC GLUCOMTR-MCNC: 100 MG/DL (ref 70–99)
GLUCOSE BLDC GLUCOMTR-MCNC: 138 MG/DL (ref 70–99)
GLUCOSE BLDC GLUCOMTR-MCNC: 90 MG/DL (ref 70–99)
GLUCOSE BLDC GLUCOMTR-MCNC: 99 MG/DL (ref 70–99)

## 2022-12-07 PROCEDURE — 250N000011 HC RX IP 250 OP 636: Performed by: OTOLARYNGOLOGY

## 2022-12-07 PROCEDURE — 250N000011 HC RX IP 250 OP 636: Performed by: STUDENT IN AN ORGANIZED HEALTH CARE EDUCATION/TRAINING PROGRAM

## 2022-12-07 PROCEDURE — 97535 SELF CARE MNGMENT TRAINING: CPT | Mod: GO | Performed by: OCCUPATIONAL THERAPIST

## 2022-12-07 PROCEDURE — 97530 THERAPEUTIC ACTIVITIES: CPT | Mod: GO | Performed by: OCCUPATIONAL THERAPIST

## 2022-12-07 PROCEDURE — 97110 THERAPEUTIC EXERCISES: CPT | Mod: GO | Performed by: OCCUPATIONAL THERAPIST

## 2022-12-07 PROCEDURE — 250N000013 HC RX MED GY IP 250 OP 250 PS 637: Performed by: STUDENT IN AN ORGANIZED HEALTH CARE EDUCATION/TRAINING PROGRAM

## 2022-12-07 RX ORDER — MINERAL OIL/HYDROPHIL PETROLAT
OINTMENT (GRAM) TOPICAL EVERY 8 HOURS
Qty: 50 G | Refills: 1 | Status: ON HOLD | OUTPATIENT
Start: 2022-12-07 | End: 2023-10-27

## 2022-12-07 RX ORDER — HEPARIN SODIUM (PORCINE) LOCK FLUSH IV SOLN 100 UNIT/ML 100 UNIT/ML
5-10 SOLUTION INTRAVENOUS
Status: DISCONTINUED | OUTPATIENT
Start: 2022-12-07 | End: 2022-12-07 | Stop reason: HOSPADM

## 2022-12-07 RX ORDER — AMOXICILLIN 250 MG
1 CAPSULE ORAL 2 TIMES DAILY
Qty: 30 TABLET | Refills: 0 | Status: SHIPPED | OUTPATIENT
Start: 2022-12-07 | End: 2023-05-18

## 2022-12-07 RX ORDER — ACETAMINOPHEN 325 MG/1
650 TABLET ORAL EVERY 4 HOURS PRN
Qty: 50 TABLET | Refills: 0 | Status: ON HOLD | OUTPATIENT
Start: 2022-12-08 | End: 2023-10-27

## 2022-12-07 RX ORDER — HEPARIN SODIUM,PORCINE 10 UNIT/ML
5-10 VIAL (ML) INTRAVENOUS EVERY 24 HOURS
Status: DISCONTINUED | OUTPATIENT
Start: 2022-12-07 | End: 2022-12-07 | Stop reason: HOSPADM

## 2022-12-07 RX ORDER — HEPARIN SODIUM,PORCINE 10 UNIT/ML
5-10 VIAL (ML) INTRAVENOUS
Status: DISCONTINUED | OUTPATIENT
Start: 2022-12-07 | End: 2022-12-07 | Stop reason: HOSPADM

## 2022-12-07 RX ADMIN — Medication 5 ML: at 02:53

## 2022-12-07 RX ADMIN — WHITE PETROLATUM: 1.75 OINTMENT TOPICAL at 16:08

## 2022-12-07 RX ADMIN — Medication 5 ML: at 09:10

## 2022-12-07 RX ADMIN — WHITE PETROLATUM: 1.75 OINTMENT TOPICAL at 02:54

## 2022-12-07 RX ADMIN — ACETAMINOPHEN 975 MG: 325 TABLET, FILM COATED ORAL at 16:08

## 2022-12-07 RX ADMIN — ENOXAPARIN SODIUM 40 MG: 40 INJECTION SUBCUTANEOUS at 08:25

## 2022-12-07 RX ADMIN — FUROSEMIDE 20 MG: 20 TABLET ORAL at 08:25

## 2022-12-07 RX ADMIN — FLUTICASONE FUROATE AND VILANTEROL TRIFENATATE 1 PUFF: 100; 25 POWDER RESPIRATORY (INHALATION) at 08:24

## 2022-12-07 RX ADMIN — ATORVASTATIN CALCIUM 20 MG: 20 TABLET, FILM COATED ORAL at 08:25

## 2022-12-07 RX ADMIN — ACETAMINOPHEN 975 MG: 325 TABLET, FILM COATED ORAL at 08:25

## 2022-12-07 ASSESSMENT — ACTIVITIES OF DAILY LIVING (ADL)
ADLS_ACUITY_SCORE: 27

## 2022-12-07 NOTE — PLAN OF CARE
Status: POD 2 laryngoscopy with biopsy, left modified radical neck dissection   Vitals: VSS on 2L oxymask. Continuous pulse ox in place.  Neuros: AOx4. Strenghth 5/5 throughout. Denied N/T.  IV: Chest Port Hep locked  Resp/trach: On 2L oxymask. Pt has sleep apnea.   Diet: Regular diet  Bowel status: LBM 12/06   : Voids spontaneously, using bedside urinal.   Skin: L neck incision BRISEYDA, cleansed with NS and Aquaphor applied per orders. CHON x2 - to bulb suction WDL, BRISEYDA   Pain: Mild neck pain managed with scheduled tylenol    Activity: up SBA/GB   Plan: Continue to monitor and follow POC. Possible discharge today.

## 2022-12-07 NOTE — DISCHARGE SUMMARY
Discharge Summary  Jareth Gallardo  [id number]  1960    Date of Admission: 12/5/2022  Date of Discharge: 12/7/2022    Admission Diagnosis: Nasopharyngeal cancer (H) [C11.9]  Encounter for screening for other viral diseases [Z11.59]  Discharge Diagnosis: Same    Procedures:  Date: 12/5/22  Procedure(s):  LARYNGOSCOPY, WITH BIOPSY  left modified radical neck dissection    Pathology: pending     HPI: Jareth Gallardo is a 62 year old male with history of cT2N2 nasopharyngeal carcinoma s/p induction chemotherapy followed by chemoradiation completed 8/12/22 with leny recurrence.    It was recommended that he undergo operative intervention and the patient consented to the above procedure after detailed explanation of the risks and benefits of said procedure.    Hospital Course: The patient was admitted to the hospital and underwent the above mentioned procedure. He tolerated the procedure without any intra- or luisa-operative complications. Please see the operative report for full details of the procedure. The patient was admitted for post-operative monitoring. His postoperative course was uneventful. He was evaluated by OT for shoulder exercises. One drain was removed prior to discharge. At discharge, the patient's pain was well controlled, the patient was voiding on his own, and was ambulating and tolerating a regular diet.     Discharge Exam:  Vitals:    12/06/22 0436 12/06/22 0810 12/06/22 1500 12/06/22 2255   BP: 116/72 104/65 109/67 105/58   BP Location: Right arm  Right arm Left arm   Patient Position:    Semi-Rosales's   Pulse: 70 89 74 86   Resp: 14 16 18 18   Temp: 98.6  F (37  C) 97.5  F (36.4  C) 97.6  F (36.4  C) 98.2  F (36.8  C)   TempSrc: Axillary Axillary Oral Oral   SpO2: 98% 94% 100% 96%   Weight:       Height:         Gen: Awake, alert, resting comfortably, no acute distress  HEENT: Neck incision c/d/i, CHON drain x1 in place with serosanguinous output. No hematoma or seroma. Neck is soft and  nontender. Intraoral exam normal. Shoulder movement grossly intact with small spontaneous movements.  Resp: Breathing comfortably on RA  CV: Extremities warm and well-perfused.   Neuro: Cranial nerves grossly intact, no focal deficits.     Discharge Medications:     Medication List      Started    acetaminophen 325 MG tablet  Commonly known as: TYLENOL  650 mg, Oral, EVERY 4 HOURS PRN  Start taking on: December 8, 2022     mineral oil-hydrophilic petrolatum external ointment  Topical, EVERY 8 HOURS, Apply to incision.     senna-docusate 8.6-50 MG tablet  Commonly known as: SENOKOT-S/PERICOLACE  1 tablet, Oral, 2 TIMES DAILY        Modified    metFORMIN 500 MG 24 hr tablet  Commonly known as: GLUCOPHAGE XR  What changed: Another medication with the same name was removed. Continue taking this medication, and follow the directions you see here.            Discharge Procedure Orders   Reason for your hospital stay   Order Comments: Post-operative care     Activity   Order Comments: Your activity upon discharge: No heavy lifting greater than 10 lbs and no strenuous exercise for 2 weeks or until follow up appointment. No driving while taking narcotic pain medications.     Order Specific Question Answer Comments   Is discharge order? Yes      Adult [facility] Follow-up and recommended labs and tests   Order Comments: Follow up in ENT clinic with Dr. Boston on 12/14/22 at 2:45pm. Please call the clinic with questions/concerns: [phone].    Otolaryngology/ENT Clinic:  [facility]  [address] [place] [address]      Appointments on [place] and/or [facility] (with [facility] or [facility] provider or service). Call [phone] if you haven't heard regarding these appointments within 7 days of discharge.     When to contact your care team   Order Comments: Please notify your doctor if you experience wound breakdown, sustained bleeding from the wound site, or  "increasing redness, swelling, and/or purulent malorodorous discharge from the wound site which may indicate infection. If you feel it is acute, or experience sudden changes in breathing, chest pain, or excessive sleepiness/somnolence please return to the emergency department or call 911. If you have questions or concerns during the day please call ENT clinic and [phone]. If at night you can call [facility] at [phone] and ask for the \"ENT resident on call\".     Wound care and dressings   Order Comments: Instructions to care for your wound at home: Keep incisions clean and dry. Apply Aquaphor ointment to incisions three times daily to keep moist. You may shower, do not soak, scrub, or submerge incisions under water. If you have a surgical drain, do not get the drain site wet until 24 hours after the drain has been removed.     Monitor and record   Order Comments: You are going home with surgical drains in place. Empty the drains and record output 3 times per day. Squeeze the bulb of the drain and replace cap.  If you have multiple drains, record the outputs separately. Once output is less than 30 mL in 24 hours, please contact the ENT clinic to schedule an appointment for drain removal.     Diet   Order Comments: Follow this diet upon discharge: Regular diet     Order Specific Question Answer Comments   Is discharge order? Yes        Dispo: To home in good condition. All of the patient's questions/concerns have been addressed at this time.     Milady Villeda PA-C  Otolaryngology-Head & Neck Surgery  Please contact ENT by dialing [phone] and entering job code [phone].    "

## 2022-12-08 ENCOUNTER — PATIENT OUTREACH (OUTPATIENT)
Dept: CARE COORDINATION | Facility: CLINIC | Age: 62
End: 2022-12-08

## 2022-12-08 NOTE — PROGRESS NOTES
Clinic Care Coordination Contact  [facility]: Post-Discharge Note  SITUATION                                                      Admission:    Admission Date: 12/05/22   Reason for Admission: Nasopharyngeal cancer  Discharge:   Discharge Date: 12/07/22  Discharge Diagnosis: Nasopharyngeal cancer    BACKGROUND                                                      The patient was admitted to the hospital and underwent the above mentioned procedure. He tolerated the procedure without any intra- or luisa-operative complications. Please see the operative report for full details of the procedure. The patient was admitted for post-operative monitoring. His postoperative course was uneventful. He was evaluated by OT for shoulder exercises. One drain was removed prior to discharge. At discharge, the patient's pain was well controlled, the patient was voiding on his own, and was ambulating and tolerating a regular diet.     ASSESSMENT           Discharge Assessment  How are you doing now that you are home?: Patient shares that he is doing well. No concerns/questions or needs at this time. Thanks writer for the call  How are your symptoms? (Red Flag symptoms escalate to triage hotline per guidelines): Improved  Do you feel your condition is stable enough to be safe at home until your provider visit?: Yes  Does the patient have their discharge instructions? : Yes  Does the patient have questions regarding their discharge instructions? : No  Were you started on any new medications or were there changes to any of your previous medications? : Yes  Does the patient have all of their medications?: Yes  Do you have questions regarding any of your medications? : No  Do you have all of your needed medical supplies or equipment (DME)?  (i.e. oxygen tank, CPAP, cane, etc.): Yes  Discharge follow-up appointment scheduled within 14 calendar days? : Yes  Discharge Follow Up Appointment Date: 12/14/22  Discharge Follow Up Appointment  Scheduled with?: Specialty Care Provider    Post-op ([facility] Only)  If the patient had a surgery or procedure, do they have any questions for a nurse?: No    Post-op (Clinicians Only)  Did the patient have surgery or a procedure: Yes  Incision: closed  Fever: No  Redness: No  Warmth: No  Swelling: No      PLAN                                                      Outpatient Plan:  Follow up in ENT clinic with Dr. Boston on 12/14/22 at 2:45pm. Please call the clinic with questions/concerns: [phone].     Otolaryngology/ENT Clinic:  [facility]  [facility]  [address] [place] [address]        Appointments on [place] and/or [facility] (with [facility] or [facility] provider or service). Call [phone] if you haven't heard regarding these appointments within 7 days of discharge.    Future Appointments   Date Time Provider Department Center   12/9/2022  8:55 AM ENT TUMOR CONFERENCE [facility]   12/14/2022  2:45 PM Haily Hare, SLP [facility]   12/14/2022  2:45 PM Jesus Boston MD [facility]   12/29/2022 10:40 AM [id number]   12/29/2022  1:30 PM Jany Mtz, CNP [facility]         For any urgent concerns, please contact our 24 hour nurse triage line: [phone] ([phone])         LIT Marroquin

## 2022-12-08 NOTE — PROGRESS NOTES
Status: POD #2. S/P laryngoscopy with biopsy, left modified radical neck dissection with history of SCC of the nasopharynx   Vitals: VSS on 1L oxymask. Continuous pulse ox in place.  Neuros: AOx4. Strenghth 5/5 throughout. Denied N/T.  IV: removed.   Resp/trach: On 1L oxymask. Pt has sleep apnea.   Diet: Regular diet.   Bowel status: LBM 12/06 BS+ education on bowel meds given.   : Voids spontaneously, using bedside urinal.   Skin: L neck incision BRISEYDA. CHON x1 - to bulb suction WDL, BRISEYDA out put decreasing. CHON drain education completed, pt quizzed and adequately demonstrated appropriate cares.   Pain: C/o neck pain managed with scheduled Tylenol-minimal, declined at 1600 assessment.   Activity: SBA + GB.   Plan: Continue to monitor and follow POC.    Discharge time/date: 12/7/2022 1800  Walked or Wheelchair: Walked  PIV removed: Yes. Port de-accessed.  Reviewed AVS with patient: Yes.  Medication due times added to AVS in EPIC: Yes  Verbalized understanding of discharge with teachback: Yes.   Medications retrieved from pharmacy: Yes  Supplies sent home: Yes  Belongings from security with patient: Yes.

## 2022-12-08 NOTE — PLAN OF CARE
Occupational Therapy Discharge Summary    Reason for therapy discharge:    Discharged to home w/ assist.    Progress towards therapy goal(s). See goals on Care Plan in [facility] electronic health record for goal details.  Goals partially met.  Barriers to achieving goals:   discharge from facility.    Therapy recommendation(s):    No further therapy is recommended.Pt w/ assist from family at home.

## 2022-12-13 LAB
PATH REPORT.COMMENTS IMP SPEC: ABNORMAL
PATH REPORT.COMMENTS IMP SPEC: ABNORMAL
PATH REPORT.COMMENTS IMP SPEC: YES
PATH REPORT.FINAL DX SPEC: ABNORMAL
PATH REPORT.GROSS SPEC: ABNORMAL
PATH REPORT.INTRAOP OBS SPEC DOC: ABNORMAL
PATH REPORT.MICROSCOPIC SPEC OTHER STN: ABNORMAL
PATH REPORT.RELEVANT HX SPEC: ABNORMAL
PHOTO IMAGE: ABNORMAL

## 2022-12-13 NOTE — TUMOR CONFERENCE
Head & Neck Tumor Conference Note     Status: Established  Staff: Dr. Jesus Boston    Tumor Site: nasopharynx  Tumor Pathology: SCC  Tumor Stage: T2N2; rN1  Tumor Treatment:   - Concurrent chemoradiotherapy with weekly carboplatin on 08/12/2002  - DLB, salvage neck dissection, nasopharyngeal biopsy 12/5/22   - 1.5 and 1.1 cm tumor deposits, 2/30 lymph nodes positive    Reason for Review: Review path and POC    Brief History: This is a 62 year old male with a history of clinically stage T2 N2 nasopharyngeal carcinoma.  Staging imaging revealed a 1.8 x 4 x 3.7 cm nasopharyngeal mass arising from the left fossa of Rosenmueller extending into the parapharyngeal space along with adenopathy involving the bilateral retropharyngeal regions, left levels 2, 3 and 5 along with right level 2. He received induction chemotherapy with 3 cycles of carboplatin/gemcitabine with restaging imaging demonstrating a significant decrease in the size of the primary tumor and involved lymph nodes.  He completed concurrent chemoradiotherapy with weekly carboplatin on 08/12/2002.  He did have a few missed treatments, but otherwise did well in terms of his swallowing throughout therapy.   Examination of neck reveals some fullness in the left neck, but no discrete lymphadenopathy. His PET-CT from 11/16/22 was highly concerning for recurrence in the setting of known residual disease in the neck and FDG uptake in the base of tongue. He was taken to the OR 12/5 for salvage neck dissection, nasopharyngeal biopsy. DLB at the time demonstrated no irregularity of the base of tongue, so no biopsy was performed.    Pertinent PMH:   Past Medical History:   Diagnosis Date     COPD (chronic obstructive pulmonary disease) (H)      Dyslipidemia      Hard to intubate 12/5/2022     Nasopharyngeal carcinoma (H)      BARRETT (obstructive sleep apnea)      Type 2 diabetes mellitus with diabetic nephropathy (H)       Social Hx:   Social History     Tobacco Use      Smoking status: Former     Packs/day: 4.00     Years: 30.00     Pack years: 120.00     Types: Cigarettes     Quit date: 2012     Years since quitting: 10.9     Smokeless tobacco: Never     Tobacco comments:     quit smoking 10 years ago   Substance Use Topics     Alcohol use: Yes     Comment: rarely     Drug use: Never     Imaging: Not reviewed today    Pathology: Surgical pathology, 12/5/22    Final Diagnosis   A. NASOPHARYNX, BIOPSY:  - Squamous mucosa with marked chronic inflammation and fibrosis  - Negative for high-grade dysplasia or malignancy     B. SOFT TISSUE MASS, LEFT POSTERIOR STERNOCLEIDOMASTOID, EXCISION:  - Squamous cell carcinoma, moderately-differentiated invading into the skeletal muscle  - Tumor size: 1.1 cm in greatest dimension   - Margin: Negative (0.2 cm from closest inked margin)  - Angiolymphatic invasion: Present  - Perineural invasion: Present (multiple small nerves involved)     C. LYMPH NODES, LEVEL 2A, DISSECTION:  - Ten lymph nodes, negative for malignancy (0/10)     D. LYMPH NODE, LEVEL 2B, DISSECTION:  - One lymph node, negative for malignancy (0/1)     E. LYMPH NODES, LEVEL 3, DISSECTION:  - Six lymph nodes, negative for malignancy (0/6)     F. LYMPH NODES, LEVEL 4, DISSECTION:  - One lymph node, negative for malignancy (0/1)     G. SOFT TISSUE MASS, LEVEL 2 DEEP NECK, EXCISION:  - Squamous cell carcinoma, moderately-differentiated, invading the fibroadipose tissue   - Tumor size: 1.5 cm greatest dimension  - Margin: Tumor focally involves the inked resection margin  - Angiolymphatic invasion: Present  - Perineural invasion: Not identified     H. LEVEL 2 DEEP NECK MARGIN, BIOPSY:  - Negative for malignancy     I. LEVEL 2B DEEP NECK MARGIN, BIOPSY:  - Negative for malignancy     J. 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 Pathology was reviewed today.     Plan: Repeat imaging in 2 months    Alejandrina Nair MD, PGY-4  Otolaryngology- Head and Neck Surgery    Documentation / Disclaimer Cancer Tumor Board Note  Cancer tumor board recommendations do not override what is determined to be reasonable care and treatment, which is dependent on the circumstances of a patient's case; the patient's medical, social, and personal concerns; and the clinical judgment of the oncologist [physician].

## 2022-12-14 ENCOUNTER — OFFICE VISIT (OUTPATIENT)
Dept: OTOLARYNGOLOGY | Facility: CLINIC | Age: 62
End: 2022-12-14
Payer: COMMERCIAL

## 2022-12-14 VITALS
HEART RATE: 91 BPM | SYSTOLIC BLOOD PRESSURE: 122 MMHG | HEIGHT: 70 IN | BODY MASS INDEX: 34.22 KG/M2 | OXYGEN SATURATION: 96 % | TEMPERATURE: 98.4 F | DIASTOLIC BLOOD PRESSURE: 69 MMHG | WEIGHT: 239 LBS

## 2022-12-14 DIAGNOSIS — C11.9 NASOPHARYNGEAL CANCER (H): Primary | ICD-10-CM

## 2022-12-14 PROCEDURE — 99024 POSTOP FOLLOW-UP VISIT: CPT | Performed by: OTOLARYNGOLOGY

## 2022-12-14 ASSESSMENT — PAIN SCALES - GENERAL: PAINLEVEL: NO PAIN (0)

## 2022-12-14 NOTE — NURSING NOTE
"Chief Complaint   Patient presents with     RECHECK     1 week post op      Blood pressure 122/69, pulse 91, temperature 98.4  F (36.9  C), height 1.778 m (5' 10\"), weight 108.4 kg (239 lb), SpO2 96 %.    Robert Meier LPN    "

## 2022-12-14 NOTE — LETTER
12/14/2022       RE: Jareth Gallardo  [address] [place] [place] [address]     Dear Colleague,    Thank you for referring your patient, Jareth Gallardo, to the [facility] EAR NOSE AND THROAT CLINIC [place] at [facility]. Please see a copy of my visit note below.    PRIOR ONCOLOGIC HISTORY:  He has a history of nasopharyngeal cancer treated with chemoradiotherapy.  His stage was T2 N2.  He completed concurrent chemoradiotherapy with weekly carboplatin on 08/12/2022.  His post-treatment PET scan showed two areas of uptake in the left neck, one within the posterior aspect of the sternocleidomastoid and one high in level II.  Because of concern that this could represent residual disease, it was recommended he undergo a neck dissection on the left side and that a biopsy may not retrieve adequate diagnostic tissue. He underwent left neck dissection of level 2-5 on Dec 5, 2022 which revealed 2 foci of SCC with extension into surrounding tissue.  There was PNI and ALI. We did biopsies of the nasopharynx that were negative.     INTERVAL HISTORY: The patient has been doing well since surgery.  His drain is still in place but ready to come out.     PE:  His neck incision looks normal, his shoulder elevation is  Predictably weak.      IMPRESSION: Doing well    PLAN:   We will remove sutures and drain today, he will follow up with med onc for further therapy as indicated.     Jesus Boston M.D.        Again, thank you for allowing me to participate in the care of your patient.      Sincerely,    Jesus Boston MD

## 2022-12-16 ENCOUNTER — TUMOR CONFERENCE (OUTPATIENT)
Dept: ONCOLOGY | Facility: CLINIC | Age: 62
End: 2022-12-16
Payer: COMMERCIAL

## 2022-12-16 DIAGNOSIS — C11.9 NASOPHARYNGEAL CANCER (H): Primary | ICD-10-CM

## 2022-12-16 NOTE — TUMOR CONFERENCE
Called and left message for patient to review the following pathology results (These results were also reviewed in clinic on Wednesday 12/14 with patient):    Final Diagnosis   A. NASOPHARYNX, BIOPSY:  - Squamous mucosa with marked chronic inflammation and fibrosis  - Negative for high-grade dysplasia or malignancy     B. SOFT TISSUE MASS, LEFT POSTERIOR STERNOCLEIDOMASTOID, EXCISION:  - Squamous cell carcinoma, moderately-differentiated invading into the skeletal muscle  - Tumor size: 1.1 cm in greatest dimension   - Margin: Negative (0.2 cm from closest inked margin)  - Angiolymphatic invasion: Present  - Perineural invasion: Present (multiple small nerves involved)     C. LYMPH NODES, LEVEL 2A, DISSECTION:  - Ten lymph nodes, negative for malignancy (0/10)     D. LYMPH NODE, LEVEL 2B, DISSECTION:  - One lymph node, negative for malignancy (0/1)     E. LYMPH NODES, LEVEL 3, DISSECTION:  - Six lymph nodes, negative for malignancy (0/6)     F. LYMPH NODES, LEVEL 4, DISSECTION:  - One lymph node, negative for malignancy (0/1)     G. SOFT TISSUE MASS, LEVEL 2 DEEP NECK, EXCISION:  - Squamous cell carcinoma, moderately-differentiated, invading the fibroadipose tissue   - Tumor size: 1.5 cm greatest dimension  - Margin: Tumor focally involves the inked resection margin  - Angiolymphatic invasion: Present  - Perineural invasion: Not identified     H. LEVEL 2 DEEP NECK MARGIN, BIOPSY:  - Negative for malignancy     I. LEVEL 2B DEEP NECK MARGIN, BIOPSY:  - Negative for malignancy     J. LYMPH NODES, LEFT LEVEL 5, DISSECTION:  - Ten lymph nodes, negative for malignancy (0/10)     Reviewed that recommendation is to patient to have follow-up with Dr. Melvin to discuss any future immunotherapy treatment and we would plan for repeat imaging in 2-3 months. Advised patient that writer reached out to arrange follow-up appt with Dr. Melvin and we would be in touch to arrange follow-up with Dr. Boston in 2-3 months with repeat  PET. Patient was encouraged to call with further questions or concerns.       Nel Lau, RN, BSN

## 2022-12-21 NOTE — PROGRESS NOTES
PRIOR ONCOLOGIC HISTORY:  He has a history of nasopharyngeal cancer treated with chemoradiotherapy.  His stage was T2 N2.  He completed concurrent chemoradiotherapy with weekly carboplatin on 08/12/2022.  His post-treatment PET scan showed two areas of uptake in the left neck, one within the posterior aspect of the sternocleidomastoid and one high in level II.  Because of concern that this could represent residual disease, it was recommended he undergo a neck dissection on the left side and that a biopsy may not retrieve adequate diagnostic tissue. He underwent left neck dissection of level 2-5 on Dec 5, 2022 which revealed 2 foci of SCC with extension into surrounding tissue.  There was PNI and ALI. We did biopsies of the nasopharynx that were negative.     INTERVAL HISTORY: The patient has been doing well since surgery.  His drain is still in place but ready to come out.     PE:  His neck incision looks normal, his shoulder elevation is  Predictably weak.      IMPRESSION: Doing well    PLAN:   We will remove sutures and drain today, he will follow up with med onc for further therapy as indicated.     Jesus Boston M.D.

## 2022-12-27 ENCOUNTER — VIRTUAL VISIT (OUTPATIENT)
Dept: ONCOLOGY | Facility: CLINIC | Age: 62
End: 2022-12-27
Attending: INTERNAL MEDICINE
Payer: COMMERCIAL

## 2022-12-27 DIAGNOSIS — C11.9 NASOPHARYNGEAL CANCER (H): Primary | ICD-10-CM

## 2022-12-27 DIAGNOSIS — E83.42 HYPOMAGNESEMIA: ICD-10-CM

## 2022-12-27 DIAGNOSIS — Z13.29 SCREENING FOR HYPOTHYROIDISM: ICD-10-CM

## 2022-12-27 PROCEDURE — 99214 OFFICE O/P EST MOD 30 MIN: CPT | Mod: 95 | Performed by: INTERNAL MEDICINE

## 2022-12-27 NOTE — LETTER
12/27/2022         RE: Jareth Gallardo  [address] [place] [address]        Dear Colleague,    Thank you for referring your patient, Jareth Gallardo, to the [facility]. Please see a copy of my visit note below.       [facility]    PATIENT NAME: Jareth Gallardo  MRN # [id number]   DATE OF VISIT: December 27, 2022  YOB: 1960     Referring Provider: Dr. Jesus Boston  Radiation Oncology: Dr. Mane Razo  Primary Care Provider: Dr. Beni Gallardo at [facility] in [facility]    CANCER TYPE: Nasopharyngeal carcinoma, p16 negative, SAM positive  STAGE: vX1I0Ax (DENAE)  ECOG PS: 1    PD-L1: 20% using  clone locally; TPS 70% at [facility]   NGS:     SUMMARY  12/11/21 UC for L neck mass, selling  12/9/21 CT neck ([place]). 2.4 cm L neck node medial to SCM just above the hyoid, additional LNs surround, scattered R neck and L supraclavicular LNs  2/15/22 US bx L cervical node. Path: SCC, moderately differentiated,   3/4/22 PET/CT. Nasopharyngeal and oropharyngeal mucosal thickening. 2.6 cm BERNARDO nodular GGO, SUV avid, some additional lingular uptake  4/4~5/16/22/22 C1-3 carboplatin gemcitabine. Not a candidate for cisplatin due to hearing loss on audiogram. Neulasta 5/3 after C2D8, ANC 1000   5/31/22 PET/CT. NE  6/22~8/10/22 Chemoradiation with weekly carboplatin  11/16/22 PET/CT. Residual 1.5 cm 2B LN (SUV 28.7), 1 cm enhancing focus in SCM (SUV 22.4), small mildly avid residual enhancing nodes in the L level 2A decreased in size and FDG avidity than before, resolved L level 5 node, small but mildly FDG avid R level 2B node, smaller mildly avid R level 3 nodes, unchanged in ize. NI-RADS 2a in the primary site - FDG uptake along the BOT at the midline, increased from prior. New patchy GGO and nodules in the RUL and L base with mild FDG uptake, likely inflammatory, recommend short interval CT  12/5/22 Salvage L modified radical neck  dissection (Dr. Boston). Path: L posterior SCM excision - SCC, 1.1 cm, invading into skeletal muscle, negative margins, +PNI/LVI. Level 2 node 1.5 cm SCC, moderately differentiated, invading into fibroadipose tissue, focally involving the inked resection margin.     ASSESSMENT AND PLAN  Nasopharyngeal carcinoma, SAM +julia: Neck recurrence 3 months after completing induction chemo followed by definitive chemorads, now s/p salvage neck dissection. Path results reviewed, showing positive margin and REGINALD. Discussed that typically we'd recommend chemoradiation post-operatively, but of course, he just completed definitive chemorads 4 months ago, so this is no longer an option. Discussed that unfortunately, the risk of recurrence is very high, certainly more likely than not that the cancer will return. He was sent back to me to discuss the potential role of adjuvant therapy. Reviewed that while there have certainly been clinical trials, some very promising of luisa-operative immunotherapy for SCCHN, not as much for nasopharyngeal carcinoma, etc., that the data do not support it's use on a routine basis outside of a clinical trial. Will see if [facility] has one for him. Assuming not, discussed watchful waiting for now, knowing that the risk of recurrence is very high. If/when we do see recurrence, would advocate starting with pembrolizumab given the high PD-L1 and because the cancer grew quickly after carboplatin gemcitabine (not a candidate for cisplatin). Discussed that sometimes we see impressive, prolonged responses. Scjheduled for PET/CT and appt with Dr. Garcia 2/22. I'll see him the same day    BERNARDO/lung nodules, now new RUL nodules: Will get CT chest locally to follow up    DM2, steroid-induced hyperglycemia: Per PCP, has been very well controlled    Mild peripheral neuropathy: R foot numbness, very mild, from DM2. Not discussed today    30 minutes spent on the date of the encounter doing chart review, history and  exam, documentation and further activities per the note     Dariela Melvin MD  Associate Professor of Medicine  Hematology, Oncology and Transplantation    SUBJECTIVE  Jareth returns for follow up of nasopharyngeal carcinoma after 3 cycles of induction carboplatin gemcitabine and chemoradiation completed 3 months ago, and now s/p L neck dissection. He's doing ok. CHON drains were removed last visit with Dr. Boston. Not in too much pain. Eating and drinking ok. Looking forward to his family visiting for New Years. No other new problems.     PAST MEDICAL HISTORY  Nasopharyngeal carcinoma as above  DM2  COPD??  Hiatal hernia. Sleeps with his head elevated  LVH on TTE 2009, EF50%, LA Dilation  Dyslipidemia  BARRETT in 2015. Not using CPAP due to developing tooth abscesses when he tried it in 2015  Venous insufficiency in both legs being in an accident involving a mower about 10 years ago, on chronic furosemide. LE US negative 12/15/2009  Cholelithiasis  Hyperplastic colon polyp, due 2020  Tonsillectomy  Baker cyst RLE US 2013    Neuropathy from DM - R foot numbness chronically    Exposed to lots of wood dust in his occupation    CURRENT OUTPATIENT MEDICATIONS  Current Outpatient Medications   Medication Sig Dispense Refill     acetaminophen (TYLENOL) 325 MG tablet Take 2 tablets (650 mg) by mouth every 4 hours as needed for other (For optimal non-opioid multimodal pain management to improve pain control.) 50 tablet 0     atorvastatin (LIPITOR) 20 MG tablet Take 20 mg by mouth       B-D U/F insulin pen needle USE ONE TIME A DAY TO INJECT VICTOZA       blood glucose (NO BRAND SPECIFIED) lancets standard Use to test blood sugar 2 times daily or as directed. 90 lancet 1     blood glucose (NO BRAND SPECIFIED) test strip Use to test blood sugar 3 times daily or as directed. 90 strip 0     blood glucose monitoring (ACCU-CHEK RANDELL PLUS) meter device kit Use to test blood sugar 2 times daily or as directed. 1 kit 0      fluticasone-vilanterol (BREO ELLIPTA) 100-25 MCG/INH inhaler INHALE 1 PUFF INTO THE LUNGS ONE TIME A DAY. RINSE MOUTH AFTER USE.       furosemide (LASIX) 20 MG tablet Take 20 mg by mouth       metFORMIN (GLUCOPHAGE XR) 500 MG 24 hr tablet        mineral oil-hydrophilic petrolatum (AQUAPHOR) external ointment Apply topically every 8 hours Apply to incision. 50 g 1     senna-docusate (SENOKOT-S/PERICOLACE) 8.6-50 MG tablet Take 1 tablet by mouth 2 times daily 30 tablet 0     VICTOZA PEN 18 MG/3ML soln Inject 1.2 mg Subcutaneous daily       ALLERGIES  No Known Allergies     REVIEW OF SYSTEMS  As above in the HPI, o/w complete 12-point ROS was negative.    PHYSICAL EXAM  There were no vitals taken for this visit.  GEN: NAD  HEENT: EOMI, no icterus, injection or pallor  NECK: Some lymphedema visible on video visit  NEURO: alert    Remainder of physical exam deferred due to public health emergency and limitations of video visit.    LABORATORY AND IMAGING STUDIES    No new labs.   Path report reviewed  Dr. Boston's last note and the operative report reviewed      Jareth is a 62 year old who is being evaluated via a billable video visit.      Patient stated he is in the state of [place] for the visit today.    How would you like to obtain your AVS? MyChart  If the video visit is dropped, the invitation should be resent by: Send to e-mail at: [email or web address]  Will anyone else be joining your video visit? Janny Bran, Virtual Visit Facilitator        Again, thank you for allowing me to participate in the care of your patient.      Sincerely,    Dariela Melvin MD

## 2022-12-27 NOTE — NURSING NOTE
Patient verified medications and allergies are correct via eCheck-in. Patient confirms no changes at this time and/or since last reviewed by clinic staff.    Katalina Bran, Virtual Facilitator

## 2022-12-27 NOTE — PROGRESS NOTES
Jareth is a 62 year old who is being evaluated via a billable video visit.      Patient stated he is in the state of [place] for the visit today.    How would you like to obtain your AVS? MyChart  If the video visit is dropped, the invitation should be resent by: Send to e-mail at: [email or web address]  Will anyone else be joining your video visit? No  {If patient encounters technical issues they should call [phone] :[id number]}  Katalina Bran Virtual Visit Facilitator    Video-Visit Details    Type of service:  Video Visit   Video Start Time: {video visit start/end time for provider to select:002201}  Video End Time:{video visit start/end time for provider to select:920392}    Originating Location (pt. Location): Home  {PROVIDER LOCATION On-site should be selected for visits conducted from your clinic location or adjoining [facility] hospital, academic office, or other nearby [facility] building. Off-site should be selected for all other provider locations, including home:[id number]}  Distant Location (provider location):  {virtual location provider:[id number]}  Platform used for Video Visit: Ilana

## 2022-12-27 NOTE — PROGRESS NOTES
[facility] CANCER [facility]    PATIENT NAME: Jareth Gallardo  MRN # [id number]   DATE OF VISIT: December 27, 2022  YOB: 1960     Referring Provider: Dr. Jesus Boston  Radiation Oncology: Dr. Mane Razo  Primary Care Provider: Dr. Beni Gallardo at  in [facility]    CANCER TYPE: Nasopharyngeal carcinoma, p16 negative, SAM positive  STAGE: [id number] (DENAE)  ECOG PS: 1    PD-L1: 20% using  clone locally; TPS 70% at [facility]   NGS:     SUMMARY  12/11/21 UC for L neck mass, selling  12/9/21 CT neck ([place]). 2.4 cm L neck node medial to SCM just above the hyoid, additional LNs surround, scattered R neck and L supraclavicular LNs  2/15/22 US bx L cervical node. Path: SCC, moderately differentiated,   3/4/22 PET/CT. Nasopharyngeal and oropharyngeal mucosal thickening. 2.6 cm BERNARDO nodular GGO, SUV avid, some additional lingular uptake  4/4~5/16/22/22 C1-3 carboplatin gemcitabine. Not a candidate for cisplatin due to hearing loss on audiogram. Neulasta 5/3 after C2D8, ANC 1000   5/31/22 PET/CT. VT  6/22~8/10/22 Chemoradiation with weekly carboplatin  11/16/22 PET/CT. Residual 1.5 cm 2B LN (SUV 28.7), 1 cm enhancing focus in SCM (SUV 22.4), small mildly avid residual enhancing nodes in the L level 2A decreased in size and FDG avidity than before, resolved L level 5 node, small but mildly FDG avid R level 2B node, smaller mildly avid R level 3 nodes, unchanged in ize. NI-RADS 2a in the primary site - FDG uptake along the BOT at the midline, increased from prior. New patchy GGO and nodules in the RUL and L base with mild FDG uptake, likely inflammatory, recommend short interval CT  12/5/22 Salvage L modified radical neck dissection (Dr. Boston). Path: L posterior SCM excision - SCC, 1.1 cm, invading into skeletal muscle, negative margins, +PNI/LVI. Level 2 node 1.5 cm SCC, moderately differentiated, invading into fibroadipose tissue, focally involving the inked resection margin.      ASSESSMENT AND PLAN  Nasopharyngeal carcinoma, SAM +julia: Neck recurrence 3 months after completing induction chemo followed by definitive chemorads, now s/p salvage neck dissection. Path results reviewed, showing positive margin and REGINALD. Discussed that typically we'd recommend chemoradiation post-operatively, but of course, he just completed definitive chemorads 4 months ago, so this is no longer an option. Discussed that unfortunately, the risk of recurrence is very high, certainly more likely than not that the cancer will return. He was sent back to me to discuss the potential role of adjuvant therapy. Reviewed that while there have certainly been clinical trials, some very promising of luisa-operative immunotherapy for SCCHN, not as much for nasopharyngeal carcinoma, etc., that the data do not support it's use on a routine basis outside of a clinical trial. Will see if [facility] has one for him. Assuming not, discussed watchful waiting for now, knowing that the risk of recurrence is very high. If/when we do see recurrence, would advocate starting with pembrolizumab given the high PD-L1 and because the cancer grew quickly after carboplatin gemcitabine (not a candidate for cisplatin). Discussed that sometimes we see impressive, prolonged responses. Scjheduled for PET/CT and appt with Dr. Garcia 2/22. I'll see him the same day    BERNARDO/lung nodules, now new RUL nodules: Will get CT chest locally to follow up    DM2, steroid-induced hyperglycemia: Per PCP, has been very well controlled    Mild peripheral neuropathy: R foot numbness, very mild, from DM2. Not discussed today    30 minutes spent on the date of the encounter doing chart review, history and exam, documentation and further activities per the note     Dariela Melvin MD  Associate Professor of Medicine  Hematology, Oncology and Transplantation    SHAHANA Templeton returns for follow up of nasopharyngeal carcinoma after 3 cycles of induction carboplatin  gemcitabine and chemoradiation completed 3 months ago, and now s/p L neck dissection. He's doing ok. CHON drains were removed last visit with Dr. Boston. Not in too much pain. Eating and drinking ok. Looking forward to his family visiting for New Years. No other new problems.     PAST MEDICAL HISTORY  Nasopharyngeal carcinoma as above  DM2  COPD??  Hiatal hernia. Sleeps with his head elevated  LVH on TTE 2009, EF50%, LA Dilation  Dyslipidemia  BARRETT in 2015. Not using CPAP due to developing tooth abscesses when he tried it in 2015  Venous insufficiency in both legs being in an accident involving a mower about 10 years ago, on chronic furosemide. LE US negative 12/15/2009  Cholelithiasis  Hyperplastic colon polyp, due 2020  Tonsillectomy  Baker cyst RLE US 2013    Neuropathy from DM - R foot numbness chronically    Exposed to lots of wood dust in his occupation    CURRENT OUTPATIENT MEDICATIONS  Current Outpatient Medications   Medication Sig Dispense Refill     acetaminophen (TYLENOL) 325 MG tablet Take 2 tablets (650 mg) by mouth every 4 hours as needed for other (For optimal non-opioid multimodal pain management to improve pain control.) 50 tablet 0     atorvastatin (LIPITOR) 20 MG tablet Take 20 mg by mouth       B-D U/F insulin pen needle USE ONE TIME A DAY TO INJECT VICTOZA       blood glucose (NO BRAND SPECIFIED) lancets standard Use to test blood sugar 2 times daily or as directed. 90 lancet 1     blood glucose (NO BRAND SPECIFIED) test strip Use to test blood sugar 3 times daily or as directed. 90 strip 0     blood glucose monitoring (ACCU-CHEK RANDELL PLUS) meter device kit Use to test blood sugar 2 times daily or as directed. 1 kit 0     fluticasone-vilanterol (BREO ELLIPTA) 100-25 MCG/INH inhaler INHALE 1 PUFF INTO THE LUNGS ONE TIME A DAY. RINSE MOUTH AFTER USE.       furosemide (LASIX) 20 MG tablet Take 20 mg by mouth       metFORMIN (GLUCOPHAGE XR) 500 MG 24 hr tablet        mineral oil-hydrophilic  petrolatum (AQUAPHOR) external ointment Apply topically every 8 hours Apply to incision. 50 g 1     senna-docusate (SENOKOT-S/PERICOLACE) 8.6-50 MG tablet Take 1 tablet by mouth 2 times daily 30 tablet 0     VICTOZA PEN 18 MG/3ML soln Inject 1.2 mg Subcutaneous daily       ALLERGIES  No Known Allergies     REVIEW OF SYSTEMS  As above in the HPI, o/w complete 12-point ROS was negative.    PHYSICAL EXAM  There were no vitals taken for this visit.  GEN: NAD  HEENT: EOMI, no icterus, injection or pallor  NECK: Some lymphedema visible on video visit  NEURO: alert    Remainder of physical exam deferred due to public health emergency and limitations of video visit.    LABORATORY AND IMAGING STUDIES    No new labs.   Path report reviewed  Dr. Boston's last note and the operative report reviewed      Jareth is a 62 year old who is being evaluated via a billable video visit.      Patient stated he is in the state of [place] for the visit today.    How would you like to obtain your AVS? MyChart  If the video visit is dropped, the invitation should be resent by: Send to e-mail at: [email or web address]  Will anyone else be joining your video visit? No    Katalina Bran Virtual Visit Facilitator    Video-Visit Details  Type of service:  Video Visit   Video Start Time: 2:41 PM  Video End Time:3:17 PM  Originating Location (pt. Location): Home  Distant Location (provider location):  On-site  Platform used for Video Visit: Ilana

## 2023-01-16 ENCOUNTER — PATIENT OUTREACH (OUTPATIENT)
Dept: OTOLARYNGOLOGY | Facility: CLINIC | Age: 63
End: 2023-01-16

## 2023-01-16 DIAGNOSIS — C11.9 NASOPHARYNGEAL CANCER (H): Primary | ICD-10-CM

## 2023-01-17 PROBLEM — C11.9 NASOPHARYNGEAL CANCER (H): Status: ACTIVE | Noted: 2022-03-22

## 2023-01-17 NOTE — TELEPHONE ENCOUNTER
Patient returned call to indicate that he has been having some neck swelling that also goes up to his ear area. He reports that has been going on for the last few weeks and feels that it is sometimes interfering with his swallowing. Patient reports that the swelling is worse in the morning and he feels that the area feels very tight and stiff. He indicates that he has not started any lymphedema therapy yet. Reviewed with patient that we should have him start lymphedema therapy and he would like to do this in [place].  Lymphedema therapy referral placed and sent to [facility] in [place] (fax: [phone]).     Offered patient return visit with Dr. Boston or Shruti Walden, NP as well as speech for further evaluation but patient declined indicating he would like to try therapy first. If his symptoms worsen or he has any new symptoms he will call and we can arrange follow-up.     Patient encouraged to call with further questions or concerns.       Nel Lau, RN, BSN

## 2023-01-17 NOTE — TELEPHONE ENCOUNTER
Called and left message for patient with request for return call to discuss his photos he sent to medical oncology with some swelling concerns. Left direct line for return call to discuss.     Nel Lau, RN, BSN

## 2023-01-30 ENCOUNTER — LAB (OUTPATIENT)
Dept: LAB | Facility: CLINIC | Age: 63
End: 2023-01-30
Payer: COMMERCIAL

## 2023-01-30 ENCOUNTER — ANCILLARY PROCEDURE (OUTPATIENT)
Dept: CT IMAGING | Facility: CLINIC | Age: 63
End: 2023-01-30
Attending: INTERNAL MEDICINE
Payer: OTHER MISCELLANEOUS

## 2023-01-30 DIAGNOSIS — C11.9 NASOPHARYNGEAL CANCER (H): ICD-10-CM

## 2023-01-30 DIAGNOSIS — R91.8 PULMONARY NODULES: ICD-10-CM

## 2023-01-30 DIAGNOSIS — T45.1X5A CHEMOTHERAPY-INDUCED NEUTROPENIA (H): ICD-10-CM

## 2023-01-30 DIAGNOSIS — D70.1 CHEMOTHERAPY-INDUCED NEUTROPENIA (H): ICD-10-CM

## 2023-01-30 LAB
ALBUMIN SERPL BCG-MCNC: 3.7 G/DL (ref 3.5–5.2)
ALP SERPL-CCNC: 102 U/L (ref 40–129)
ALT SERPL W P-5'-P-CCNC: 16 U/L (ref 10–50)
ANION GAP SERPL CALCULATED.3IONS-SCNC: 8 MMOL/L (ref 7–15)
AST SERPL W P-5'-P-CCNC: 32 U/L (ref 10–50)
BASOPHILS # BLD AUTO: 0 10E3/UL (ref 0–0.2)
BASOPHILS NFR BLD AUTO: 0 %
BILIRUB SERPL-MCNC: 0.5 MG/DL
BUN SERPL-MCNC: 12 MG/DL (ref 8–23)
CALCIUM SERPL-MCNC: 9.2 MG/DL (ref 8.8–10.2)
CHLORIDE SERPL-SCNC: 101 MMOL/L (ref 98–107)
CREAT SERPL-MCNC: 0.81 MG/DL (ref 0.67–1.17)
DEPRECATED HCO3 PLAS-SCNC: 33 MMOL/L (ref 22–29)
EOSINOPHIL # BLD AUTO: 0 10E3/UL (ref 0–0.7)
EOSINOPHIL NFR BLD AUTO: 1 %
ERYTHROCYTE [DISTWIDTH] IN BLOOD BY AUTOMATED COUNT: 13.5 % (ref 10–15)
GFR SERPL CREATININE-BSD FRML MDRD: >90 ML/MIN/1.73M2
GLUCOSE SERPL-MCNC: 71 MG/DL (ref 70–99)
HCT VFR BLD AUTO: 36.5 % (ref 40–53)
HGB BLD-MCNC: 11.6 G/DL (ref 13.3–17.7)
IMM GRANULOCYTES # BLD: 0 10E3/UL
IMM GRANULOCYTES NFR BLD: 0 %
LYMPHOCYTES # BLD AUTO: 0.7 10E3/UL (ref 0.8–5.3)
LYMPHOCYTES NFR BLD AUTO: 19 %
MAGNESIUM SERPL-MCNC: 1.8 MG/DL (ref 1.7–2.3)
MCH RBC QN AUTO: 27 PG (ref 26.5–33)
MCHC RBC AUTO-ENTMCNC: 31.8 G/DL (ref 31.5–36.5)
MCV RBC AUTO: 85 FL (ref 78–100)
MONOCYTES # BLD AUTO: 0.4 10E3/UL (ref 0–1.3)
MONOCYTES NFR BLD AUTO: 11 %
NEUTROPHILS # BLD AUTO: 2.4 10E3/UL (ref 1.6–8.3)
NEUTROPHILS NFR BLD AUTO: 69 %
NRBC # BLD AUTO: 0 10E3/UL
NRBC BLD AUTO-RTO: 0 /100
PLATELET # BLD AUTO: 166 10E3/UL (ref 150–450)
POTASSIUM SERPL-SCNC: 3.6 MMOL/L (ref 3.4–5.3)
PROT SERPL-MCNC: 7.2 G/DL (ref 6.4–8.3)
RBC # BLD AUTO: 4.3 10E6/UL (ref 4.4–5.9)
SODIUM SERPL-SCNC: 142 MMOL/L (ref 136–145)
WBC # BLD AUTO: 3.5 10E3/UL (ref 4–11)

## 2023-01-30 PROCEDURE — 85004 AUTOMATED DIFF WBC COUNT: CPT

## 2023-01-30 PROCEDURE — 36591 DRAW BLOOD OFF VENOUS DEVICE: CPT

## 2023-01-30 PROCEDURE — 83735 ASSAY OF MAGNESIUM: CPT

## 2023-01-30 PROCEDURE — 250N000011 HC RX IP 250 OP 636: Performed by: INTERNAL MEDICINE

## 2023-01-30 PROCEDURE — 71250 CT THORAX DX C-: CPT | Mod: GC | Performed by: RADIOLOGY

## 2023-01-30 PROCEDURE — 80053 COMPREHEN METABOLIC PANEL: CPT

## 2023-01-30 RX ORDER — HEPARIN SODIUM (PORCINE) LOCK FLUSH IV SOLN 100 UNIT/ML 100 UNIT/ML
500 SOLUTION INTRAVENOUS ONCE
Status: COMPLETED | OUTPATIENT
Start: 2023-01-30 | End: 2023-01-30

## 2023-01-30 RX ADMIN — Medication 500 UNITS: at 14:08

## 2023-01-30 NOTE — NURSING NOTE
Chief Complaint   Patient presents with     Port Draw     Port labs     Port accessed by lab RN with 20g 3/4 inch power needle. Labs drawn without incident. Port heparin locked and de-accessed.     Yesenia Daniels RN on 1/30/2023 at 2:14 PM

## 2023-02-12 ENCOUNTER — HEALTH MAINTENANCE LETTER (OUTPATIENT)
Age: 63
End: 2023-02-12

## 2023-02-21 NOTE — PROGRESS NOTES
[facility] CANCER [facility]    PATIENT NAME: Jareth Gallardo  MRN # [id number]   DATE OF VISIT: February 22, 2023  YOB: 1960       Referring Provider: Dr. Jesus Boston  Radiation Oncology: Dr. Mane Razo  Primary Care Provider: Dr. Beni Gallardo at [facility] in [facility]    CANCER TYPE: Nasopharyngeal carcinoma, p16 negative, SAM positive  STAGE: [id number] (DENAE)  ECOG PS: 0    PD-L1: 20% using  clone locally; TPS 70% at [facility]   NGS:     SUMMARY  12/11/21 UC for L neck mass, selling  12/9/21 CT neck ([place]). 2.4 cm L neck node medial to SCM just above the hyoid, additional LNs surround, scattered R neck and L supraclavicular LNs  2/15/22 US bx L cervical node. Path: SCC, moderately differentiated,   3/4/22 PET/CT. Nasopharyngeal and oropharyngeal mucosal thickening. 2.6 cm BERNARDO nodular GGO, SUV avid, some additional lingular uptake  4/4~5/16/22/22 C1-3 carboplatin gemcitabine. Not a candidate for cisplatin due to hearing loss on audiogram. Neulasta 5/3 after C2D8, ANC 1000   5/31/22 PET/CT. NC  6/22~8/10/22 Chemoradiation with weekly carboplatin  11/16/22 PET/CT. Residual 1.5 cm 2B LN (SUV 28.7), 1 cm enhancing focus in SCM (SUV 22.4), small mildly avid residual enhancing nodes in the L level 2A decreased in size and FDG avidity than before, resolved L level 5 node, small but mildly FDG avid R level 2B node, smaller mildly avid R level 3 nodes, unchanged in ize. NI-RADS 2a in the primary site - FDG uptake along the BOT at the midline, increased from prior. New patchy GGO and nodules in the RUL and L base with mild FDG uptake, likely inflammatory, recommend short interval CT  12/5/22 Salvage L modified radical neck dissection (Dr. Boston). Path: L posterior SCM excision - SCC, 1.1 cm, invading into skeletal muscle, negative margins, +PNI/LVI. Level 2 node 1.5 cm SCC, moderately differentiated, invading into fibroadipose tissue, focally involving the inked resection  margin.   1/30/23 CT chest. Patchy LLL GGO, decreased from prior.   2/22/23 PET/CT. Focal updake along the tongue base at midline, decreased from prior, likely inflammatory (SUV 14.8-->8.5), near complete resolution of the rest of the mild mucosal uptake within the tongue base. At least 5 FDG avid lesions in the L deep neck. 2 nodes at the C2 level, posterior to the surgical clips in the L lateral paravertebral region, one measuring 8 mm (SUV 15.9), the other more posteriorly measuring 7 mm. The other 2 adjacent ones are deep to the residual L SCM; 9.6 mm, and a smaller one immediately below in the SCM. There is another mildly FDG avid necrotic lesion inferior to the above 3 foci, again deep to the L SCM. 0.9 cm R level 2b LN (SUV 3.1), likely reactive. Resolution of RUL GGO, stable sub-6 mm nodules.    ASSESSMENT AND PLAN  Nasopharyngeal carcinoma, SAM +julia: Final read from PET/CT wasn't back yet at the time of our visit but there are obvious nodes in the L neck, essentially at the same place as the prior nodes that were removed but had REGINALD. CINTIA everywhere else. Discussed options and will be discussing at tumor board on Fri. One is to try surgery again, but I don't favor this as it still doesn't address the REGINALD from before and importantly, most likely the current nodes will also have REGINALD anyway, so we'll be in the same situation in a few months. He prefers to have surgery if it's an option. The second option is to take advantage of the high PD-L1 expression, and knowing this cancer grew through platinum gemcitabine already, and move on to pembrolizumab, which is guideline-driven second line therapy after cisplatin and gemcitabine. We don't have access to camrelizumab or toripalimab yet. A LMN is in the chart. If things respond or at least remain very stable for a period of time, we can consider consolidative surgery down the road.     ADDENDUM: Discussed at tumor board, recommending systemic therapy first.      BERNARDO/lung nodules, new RUL nodules in 11/2022: Resolving c/w inflammatory changes from mild aspiration that's resolving.     Lymphedema: Doing massages    DM2, steroid-induced hyperglycemia: Controlled, managed by PCP     Mild peripheral neuropathy: R foot numbness, very mild, from DM2. Not discussed today    60 minutes spent on the date of the encounter doing chart review, history and exam, documentation and further activities per the note     Dariela Melvin MD  Associate Professor of Medicine  Hematology, Oncology and Transplantation      SUBJECTIVE  Jareth returns for follow up of nasopharyngeal carcinoma after 3 cycles of induction carboplatin gemcitabine and chemoradiation about 3 months after L neck dissection. Feeling ok. Doing massages. No new issues     PAST MEDICAL HISTORY  Nasopharyngeal carcinoma as above  DM2  COPD??  Hiatal hernia. Sleeps with his head elevated  LVH on TTE 2009, EF50%, LA Dilation  Dyslipidemia  BARRETT in 2015. Not using CPAP due to developing tooth abscesses when he tried it in 2015  Venous insufficiency in both legs being in an accident involving a mower about 10 years ago, on chronic furosemide. LE US negative 12/15/2009  Cholelithiasis  Hyperplastic colon polyp, due 2020  Tonsillectomy  Baker cyst RLE US 2013    Neuropathy from DM - R foot numbness chronically    Exposed to lots of wood dust in his occupation    CURRENT OUTPATIENT MEDICATIONS  Current Outpatient Medications   Medication Sig Dispense Refill     atorvastatin (LIPITOR) 20 MG tablet Take 1 tablet by mouth daily       acetaminophen (TYLENOL) 325 MG tablet Take 2 tablets (650 mg) by mouth every 4 hours as needed for other (For optimal non-opioid multimodal pain management to improve pain control.) 50 tablet 0     atorvastatin (LIPITOR) 20 MG tablet Take 20 mg by mouth       B-D U/F insulin pen needle USE ONE TIME A DAY TO INJECT VICTOZA       blood glucose (NO BRAND SPECIFIED) lancets standard Use to test blood sugar 2  times daily or as directed. 90 lancet 1     blood glucose (NO BRAND SPECIFIED) test strip Use to test blood sugar 3 times daily or as directed. 90 strip 0     blood glucose monitoring (ACCU-CHEK RANDELL PLUS) meter device kit Use to test blood sugar 2 times daily or as directed. 1 kit 0     fluticasone-vilanterol (BREO ELLIPTA) 100-25 MCG/INH inhaler INHALE 1 PUFF INTO THE LUNGS ONE TIME A DAY. RINSE MOUTH AFTER USE.       furosemide (LASIX) 20 MG tablet Take 20 mg by mouth       metFORMIN (GLUCOPHAGE XR) 500 MG 24 hr tablet        mineral oil-hydrophilic petrolatum (AQUAPHOR) external ointment Apply topically every 8 hours Apply to incision. 50 g 1     senna-docusate (SENOKOT-S/PERICOLACE) 8.6-50 MG tablet Take 1 tablet by mouth 2 times daily 30 tablet 0     TRULICITY 1.5 MG/0.5ML pen INJECT 1.5 MG UNDER THE SKIN ONE TIME A WEEK.       VICTOZA PEN 18 MG/3ML soln Inject 1.2 mg Subcutaneous daily (Patient not taking: Reported on 2/22/2023)       ALLERGIES  No Known Allergies    REVIEW OF SYSTEMS  As above in the HPI, o/w complete 12-point ROS was negative.    PHYSICAL EXAM  /69 (BP Location: Right arm, Patient Position: Sitting, Cuff Size: Adult Large)   Pulse 77   Temp 97.9  F (36.6  C) (Oral)   Resp 16   Wt 109.4 kg (241 lb 3.2 oz)   SpO2 98%   BMI 34.61 kg/m      GEN: NAD  HEENT: EOMI, no icterus, injection or pallor  EXT:  no edema  NEURO: alert    LABORATORY AND IMAGING STUDIES   02/22/23 13:33   Sodium 139   Potassium 3.9   Chloride 101   Carbon Dioxide (CO2) 30 (H)   Urea Nitrogen 11.7   Creatinine 0.79   GFR Estimate >90   Calcium 9.0   Anion Gap 8   Albumin 3.6   Protein Total 7.1   Alkaline Phosphatase 96   ALT 17   AST 26   Bilirubin Total 0.6   Glucose 117 (H)   T4 Free 0.90   TSH 3.98   WBC 3.7 (L)   Hemoglobin 11.2 (L)   Hematocrit 35.4 (L)   Platelet Count 146 (L)   RBC Count 4.20 (L)   MCV 84   MCH 26.7   MCHC 31.6   RDW 13.9   % Neutrophils 71   % Lymphocytes 18   % Monocytes 9   %  Eosinophils 1   % Basophils 1   Absolute Basophils 0.0   Absolute Eosinophils 0.0   Absolute Immature Granulocytes 0.0   Absolute Lymphocytes 0.7 (L)   Absolute Monocytes 0.3   % Immature Granulocytes 0   Absolute Neutrophils 2.7   Absolute NRBCs 0.0   NRBCs per 100 WBC 0     Labs were independently reviewed and interpreted by me    CT Chest w/o Contrast  Narrative: EXAMINATION: Chest CT  1/30/2023 2:40 PM    CLINICAL HISTORY: re-evaluate pulm nodules on Nov 16 PET/CT.  Nasopharyngeal carcinoma; Nasopharyngeal cancer (H); Pulmonary nodules    COMPARISON: PET CT 11/16/2022.    TECHNIQUE: CT imaging obtained through the chest without contrast.  Axial, coronal, and sagittal reconstructions and axial MIP reformatted  images are reviewed.     FINDINGS:  Right chest wall Port-A-Cath terminates in the low SVC.    Mediastinum: Unchanged partially visualized 1.3 cm hypodense right  thyroid nodule. The heart is normal in size. No significant  pericardial effusion. Mild/moderate coronary artery calcifications.  The ascending aorta and main pulmonary artery are normal in caliber.  No thoracic lymphadenopathy. The esophagus is unremarkable.     Lungs: The central tracheobronchial tree is patent. No pleural  effusion or pneumothorax. Mild upper lobe predominant paraseptal and  centrilobular emphysematous changes of the lungs. Dependent  subsegmental atelectasis of the lung bases. Patchy groundglass and  consolidative opacities in the right upper lobe and tree-in-bud  nodular opacities in the left lower lobe, decreased from prior. No new  airspace consolidation.    Bones and soft tissues: No suspicious bone findings. Densely sclerotic  probable bone island in the T9 vertebral body.    Upper Abdomen: Limited evaluation of the upper abdomen.  Cholelithiasis. Partial fatty replacement of the pancreatic  parenchyma. Probable subcentimeter right renal cyst.  Impression: IMPRESSION:   1. No evidence of metastatic disease in the  chest.  2. Decreased patchy opacities in the right upper and left lower lobes,  consistent with resolving infection/inflammation.    I have personally reviewed the examination and initial interpretation  and I agree with the findings.    CARMITA JONAS MD         SYSTEM ID:  [id number]     Imaging was personally reviewed and interpreted by me as above. Note finalized after PET/CT report came back; again we did not have a final report at the time of our visit.    Discussed with Dr. Boston right after our visit.

## 2023-02-22 ENCOUNTER — ONCOLOGY VISIT (OUTPATIENT)
Dept: ONCOLOGY | Facility: CLINIC | Age: 63
End: 2023-02-22
Attending: INTERNAL MEDICINE
Payer: COMMERCIAL

## 2023-02-22 ENCOUNTER — HOSPITAL ENCOUNTER (OUTPATIENT)
Dept: PET IMAGING | Facility: CLINIC | Age: 63
Discharge: HOME | End: 2023-02-22
Attending: OTOLARYNGOLOGY
Payer: COMMERCIAL

## 2023-02-22 ENCOUNTER — APPOINTMENT (OUTPATIENT)
Dept: LAB | Facility: CLINIC | Age: 63
End: 2023-02-22
Attending: OTOLARYNGOLOGY
Payer: COMMERCIAL

## 2023-02-22 ENCOUNTER — OFFICE VISIT (OUTPATIENT)
Dept: OTOLARYNGOLOGY | Facility: CLINIC | Age: 63
End: 2023-02-22
Payer: COMMERCIAL

## 2023-02-22 ENCOUNTER — THERAPY VISIT (OUTPATIENT)
Dept: SPEECH THERAPY | Facility: CLINIC | Age: 63
End: 2023-02-22
Payer: COMMERCIAL

## 2023-02-22 VITALS
BODY MASS INDEX: 34.61 KG/M2 | HEART RATE: 77 BPM | RESPIRATION RATE: 16 BRPM | SYSTOLIC BLOOD PRESSURE: 105 MMHG | WEIGHT: 241.2 LBS | DIASTOLIC BLOOD PRESSURE: 69 MMHG | TEMPERATURE: 97.9 F | OXYGEN SATURATION: 98 %

## 2023-02-22 VITALS
DIASTOLIC BLOOD PRESSURE: 73 MMHG | SYSTOLIC BLOOD PRESSURE: 108 MMHG | BODY MASS INDEX: 34.5 KG/M2 | HEIGHT: 70 IN | HEART RATE: 80 BPM | WEIGHT: 241 LBS

## 2023-02-22 DIAGNOSIS — C11.9 NASOPHARYNGEAL CANCER (H): ICD-10-CM

## 2023-02-22 DIAGNOSIS — C11.9 NASOPHARYNGEAL CANCER (H): Primary | ICD-10-CM

## 2023-02-22 DIAGNOSIS — Z13.29 SCREENING FOR HYPOTHYROIDISM: ICD-10-CM

## 2023-02-22 DIAGNOSIS — R13.12 OROPHARYNGEAL DYSPHAGIA: Primary | ICD-10-CM

## 2023-02-22 LAB
ALBUMIN SERPL BCG-MCNC: 3.6 G/DL (ref 3.5–5.2)
ALP SERPL-CCNC: 96 U/L (ref 40–129)
ALT SERPL W P-5'-P-CCNC: 17 U/L (ref 10–50)
ANION GAP SERPL CALCULATED.3IONS-SCNC: 8 MMOL/L (ref 7–15)
AST SERPL W P-5'-P-CCNC: 26 U/L (ref 10–50)
BASOPHILS # BLD AUTO: 0 10E3/UL (ref 0–0.2)
BASOPHILS NFR BLD AUTO: 1 %
BILIRUB SERPL-MCNC: 0.6 MG/DL
BUN SERPL-MCNC: 11.7 MG/DL (ref 8–23)
CALCIUM SERPL-MCNC: 9 MG/DL (ref 8.8–10.2)
CHLORIDE SERPL-SCNC: 101 MMOL/L (ref 98–107)
CREAT SERPL-MCNC: 0.79 MG/DL (ref 0.67–1.17)
DEPRECATED HCO3 PLAS-SCNC: 30 MMOL/L (ref 22–29)
EOSINOPHIL # BLD AUTO: 0 10E3/UL (ref 0–0.7)
EOSINOPHIL NFR BLD AUTO: 1 %
ERYTHROCYTE [DISTWIDTH] IN BLOOD BY AUTOMATED COUNT: 13.9 % (ref 10–15)
GFR SERPL CREATININE-BSD FRML MDRD: >90 ML/MIN/1.73M2
GLUCOSE SERPL-MCNC: 117 MG/DL (ref 70–99)
HCT VFR BLD AUTO: 35.4 % (ref 40–53)
HGB BLD-MCNC: 11.2 G/DL (ref 13.3–17.7)
IMM GRANULOCYTES # BLD: 0 10E3/UL
IMM GRANULOCYTES NFR BLD: 0 %
LYMPHOCYTES # BLD AUTO: 0.7 10E3/UL (ref 0.8–5.3)
LYMPHOCYTES NFR BLD AUTO: 18 %
MCH RBC QN AUTO: 26.7 PG (ref 26.5–33)
MCHC RBC AUTO-ENTMCNC: 31.6 G/DL (ref 31.5–36.5)
MCV RBC AUTO: 84 FL (ref 78–100)
MONOCYTES # BLD AUTO: 0.3 10E3/UL (ref 0–1.3)
MONOCYTES NFR BLD AUTO: 9 %
NEUTROPHILS # BLD AUTO: 2.7 10E3/UL (ref 1.6–8.3)
NEUTROPHILS NFR BLD AUTO: 71 %
NRBC # BLD AUTO: 0 10E3/UL
NRBC BLD AUTO-RTO: 0 /100
PLATELET # BLD AUTO: 146 10E3/UL (ref 150–450)
POTASSIUM SERPL-SCNC: 3.9 MMOL/L (ref 3.4–5.3)
PROT SERPL-MCNC: 7.1 G/DL (ref 6.4–8.3)
RBC # BLD AUTO: 4.2 10E6/UL (ref 4.4–5.9)
SODIUM SERPL-SCNC: 139 MMOL/L (ref 136–145)
T4 FREE SERPL-MCNC: 0.9 NG/DL (ref 0.9–1.7)
TSH SERPL DL<=0.005 MIU/L-ACNC: 3.98 UIU/ML (ref 0.3–4.2)
WBC # BLD AUTO: 3.7 10E3/UL (ref 4–11)

## 2023-02-22 PROCEDURE — A9552 F18 FDG: HCPCS | Performed by: OTOLARYNGOLOGY

## 2023-02-22 PROCEDURE — 71260 CT THORAX DX C+: CPT | Mod: 26 | Performed by: RADIOLOGY

## 2023-02-22 PROCEDURE — 80053 COMPREHEN METABOLIC PANEL: CPT | Performed by: INTERNAL MEDICINE

## 2023-02-22 PROCEDURE — 250N000011 HC RX IP 250 OP 636: Performed by: OTOLARYNGOLOGY

## 2023-02-22 PROCEDURE — 70491 CT SOFT TISSUE NECK W/DYE: CPT

## 2023-02-22 PROCEDURE — 36591 DRAW BLOOD OFF VENOUS DEVICE: CPT | Performed by: INTERNAL MEDICINE

## 2023-02-22 PROCEDURE — 78815 PET IMAGE W/CT SKULL-THIGH: CPT | Mod: PS

## 2023-02-22 PROCEDURE — 70491 CT SOFT TISSUE NECK W/DYE: CPT | Mod: 26 | Performed by: RADIOLOGY

## 2023-02-22 PROCEDURE — 74177 CT ABD & PELVIS W/CONTRAST: CPT | Mod: 26 | Performed by: RADIOLOGY

## 2023-02-22 PROCEDURE — 87799 DETECT AGENT NOS DNA QUANT: CPT | Performed by: INTERNAL MEDICINE

## 2023-02-22 PROCEDURE — 99024 POSTOP FOLLOW-UP VISIT: CPT | Performed by: OTOLARYNGOLOGY

## 2023-02-22 PROCEDURE — G0463 HOSPITAL OUTPT CLINIC VISIT: HCPCS | Mod: 25 | Performed by: INTERNAL MEDICINE

## 2023-02-22 PROCEDURE — 99417 PROLNG OP E/M EACH 15 MIN: CPT | Performed by: INTERNAL MEDICINE

## 2023-02-22 PROCEDURE — 78815 PET IMAGE W/CT SKULL-THIGH: CPT | Mod: 26 | Performed by: RADIOLOGY

## 2023-02-22 PROCEDURE — 84443 ASSAY THYROID STIM HORMONE: CPT | Performed by: INTERNAL MEDICINE

## 2023-02-22 PROCEDURE — 250N000011 HC RX IP 250 OP 636: Performed by: INTERNAL MEDICINE

## 2023-02-22 PROCEDURE — 74177 CT ABD & PELVIS W/CONTRAST: CPT

## 2023-02-22 PROCEDURE — 84439 ASSAY OF FREE THYROXINE: CPT | Performed by: INTERNAL MEDICINE

## 2023-02-22 PROCEDURE — 99215 OFFICE O/P EST HI 40 MIN: CPT | Performed by: INTERNAL MEDICINE

## 2023-02-22 PROCEDURE — 343N000001 HC RX 343: Performed by: OTOLARYNGOLOGY

## 2023-02-22 PROCEDURE — 85025 COMPLETE CBC W/AUTO DIFF WBC: CPT | Performed by: INTERNAL MEDICINE

## 2023-02-22 PROCEDURE — 92526 ORAL FUNCTION THERAPY: CPT | Mod: GN | Performed by: SPEECH-LANGUAGE PATHOLOGIST

## 2023-02-22 RX ORDER — HEPARIN SODIUM (PORCINE) LOCK FLUSH IV SOLN 100 UNIT/ML 100 UNIT/ML
500 SOLUTION INTRAVENOUS ONCE
Status: COMPLETED | OUTPATIENT
Start: 2023-02-22 | End: 2023-02-22

## 2023-02-22 RX ORDER — DULAGLUTIDE 1.5 MG/.5ML
INJECTION, SOLUTION SUBCUTANEOUS
Status: ON HOLD | COMMUNITY
Start: 2023-02-07 | End: 2023-11-01

## 2023-02-22 RX ORDER — ATORVASTATIN CALCIUM 20 MG/1
1 TABLET, FILM COATED ORAL DAILY
COMMUNITY
Start: 2022-12-05 | End: 2023-03-30

## 2023-02-22 RX ORDER — HEPARIN SODIUM (PORCINE) LOCK FLUSH IV SOLN 100 UNIT/ML 100 UNIT/ML
5 SOLUTION INTRAVENOUS
Status: COMPLETED | OUTPATIENT
Start: 2023-02-22 | End: 2023-02-22

## 2023-02-22 RX ORDER — IOPAMIDOL 755 MG/ML
10-135 INJECTION, SOLUTION INTRAVASCULAR ONCE
Status: COMPLETED | OUTPATIENT
Start: 2023-02-22 | End: 2023-02-22

## 2023-02-22 RX ADMIN — IOPAMIDOL 120 ML: 755 INJECTION, SOLUTION INTRAVENOUS at 11:37

## 2023-02-22 RX ADMIN — FLUDEOXYGLUCOSE F-18 14.32 MILLICURIE: 500 INJECTION, SOLUTION INTRAVENOUS at 10:45

## 2023-02-22 RX ADMIN — Medication 5 ML: at 13:27

## 2023-02-22 RX ADMIN — Medication 500 UNITS: at 11:44

## 2023-02-22 ASSESSMENT — PAIN SCALES - GENERAL
PAINLEVEL: NO PAIN (0)
PAINLEVEL: NO PAIN (0)

## 2023-02-22 NOTE — LETTER
2/22/2023         RE: Jareth Gallardo  [address] [place] [address]        Dear Colleague,    Thank you for referring your patient, Jareth Gallardo, to the [facility]. Please see a copy of my visit note below.       [facility]    PATIENT NAME: Jareth Gallardo  MRN # [id number]   DATE OF VISIT: February 22, 2023  YOB: 1960       Referring Provider: Dr. Jesus Boston  Radiation Oncology: Dr. Maen Razo  Primary Care Provider: Dr. Beni Galalrdo at [facility] in [facility]    CANCER TYPE: Nasopharyngeal carcinoma, p16 negative, SAM positive  STAGE: [id number] (DENAE)  ECOG PS: 0    PD-L1: 20% using  clone locally; TPS 70% at [facility]   NGS:     SUMMARY  12/11/21 UC for L neck mass, selling  12/9/21 CT neck ([place]). 2.4 cm L neck node medial to SCM just above the hyoid, additional LNs surround, scattered R neck and L supraclavicular LNs  2/15/22 US bx L cervical node. Path: SCC, moderately differentiated,   3/4/22 PET/CT. Nasopharyngeal and oropharyngeal mucosal thickening. 2.6 cm BERNARDO nodular GGO, SUV avid, some additional lingular uptake  4/4~5/16/22/22 C1-3 carboplatin gemcitabine. Not a candidate for cisplatin due to hearing loss on audiogram. Neulasta 5/3 after C2D8, ANC 1000   5/31/22 PET/CT. ME  6/22~8/10/22 Chemoradiation with weekly carboplatin  11/16/22 PET/CT. Residual 1.5 cm 2B LN (SUV 28.7), 1 cm enhancing focus in SCM (SUV 22.4), small mildly avid residual enhancing nodes in the L level 2A decreased in size and FDG avidity than before, resolved L level 5 node, small but mildly FDG avid R level 2B node, smaller mildly avid R level 3 nodes, unchanged in ize. NI-RADS 2a in the primary site - FDG uptake along the BOT at the midline, increased from prior. New patchy GGO and nodules in the RUL and L base with mild FDG uptake, likely inflammatory, recommend short interval CT  12/5/22 Salvage L modified radical neck  dissection (Dr. Boston). Path: L posterior SCM excision - SCC, 1.1 cm, invading into skeletal muscle, negative margins, +PNI/LVI. Level 2 node 1.5 cm SCC, moderately differentiated, invading into fibroadipose tissue, focally involving the inked resection margin.   1/30/23 CT chest. Patchy LLL GGO, decreased from prior.   2/22/23 PET/CT. Focal updake along the tongue base at midline, decreased from prior, likely inflammatory (SUV 14.8-->8.5), near complete resolution of the rest of the mild mucosal uptake within the tongue base. At least 5 FDG avid lesions in the L deep neck. 2 nodes at the C2 level, posterior to the surgical clips in the L lateral paravertebral region, one measuring 8 mm (SUV 15.9), the other more posteriorly measuring 7 mm. The other 2 adjacent ones are deep to the residual L SCM; 9.6 mm, and a smaller one immediately below in the SCM. There is another mildly FDG avid necrotic lesion inferior to the above 3 foci, again deep to the L SCM. 0.9 cm R level 2b LN (SUV 3.1), likely reactive. Resolution of RUL GGO, stable sub-6 mm nodules.    ASSESSMENT AND PLAN  Nasopharyngeal carcinoma, SAM +julia: Final read from PET/CT wasn't back yet at the time of our visit but there are obvious nodes in the L neck, essentially at the same place as the prior nodes that were removed but had REGINALD. CINTIA everywhere else. Discussed options and will be discussing at tumor board on Fri. One is to try surgery again, but I don't favor this as it still doesn't address the REGINALD from before and importantly, most likely the current nodes will also have REGINALD anyway, so we'll be in the same situation in a few months. He prefers to have surgery if it's an option. The second option is to take advantage of the high PD-L1 expression, and knowing this cancer grew through platinum gemcitabine already, and move on to pembrolizumab, which is guideline-driven second line therapy after cisplatin and gemcitabine. We don't have access to  camrelizumab or toripalimab yet. A LMN is in the chart. If things respond or at least remain very stable for a period of time, we can consider consolidative surgery down the road.     ADDENDUM: Discussed at tumor board, recommending systemic therapy first.     BERNARDO/lung nodules, new RUL nodules in 11/2022: Resolving c/w inflammatory changes from mild aspiration that's resolving.     Lymphedema: Doing massages    DM2, steroid-induced hyperglycemia: Controlled, managed by PCP     Mild peripheral neuropathy: R foot numbness, very mild, from DM2. Not discussed today    60 minutes spent on the date of the encounter doing chart review, history and exam, documentation and further activities per the note     Dariela Melvin MD  Associate Professor of Medicine  Hematology, Oncology and Transplantation      SHAHANA Templeton returns for follow up of nasopharyngeal carcinoma after 3 cycles of induction carboplatin gemcitabine and chemoradiation about 3 months after L neck dissection. Feeling ok. Doing massages. No new issues     PAST MEDICAL HISTORY  Nasopharyngeal carcinoma as above  DM2  COPD??  Hiatal hernia. Sleeps with his head elevated  LVH on TTE 2009, EF50%, LA Dilation  Dyslipidemia  BARRETT in 2015. Not using CPAP due to developing tooth abscesses when he tried it in 2015  Venous insufficiency in both legs being in an accident involving a mower about 10 years ago, on chronic furosemide. LE US negative 12/15/2009  Cholelithiasis  Hyperplastic colon polyp, due 2020  Tonsillectomy  Baker cyst RLE US 2013    Neuropathy from DM - R foot numbness chronically    Exposed to lots of wood dust in his occupation    CURRENT OUTPATIENT MEDICATIONS  Current Outpatient Medications   Medication Sig Dispense Refill     atorvastatin (LIPITOR) 20 MG tablet Take 1 tablet by mouth daily       acetaminophen (TYLENOL) 325 MG tablet Take 2 tablets (650 mg) by mouth every 4 hours as needed for other (For optimal non-opioid multimodal pain  management to improve pain control.) 50 tablet 0     atorvastatin (LIPITOR) 20 MG tablet Take 20 mg by mouth       B-D U/F insulin pen needle USE ONE TIME A DAY TO INJECT VICTOZA       blood glucose (NO BRAND SPECIFIED) lancets standard Use to test blood sugar 2 times daily or as directed. 90 lancet 1     blood glucose (NO BRAND SPECIFIED) test strip Use to test blood sugar 3 times daily or as directed. 90 strip 0     blood glucose monitoring (ACCU-CHEK RANDELL PLUS) meter device kit Use to test blood sugar 2 times daily or as directed. 1 kit 0     fluticasone-vilanterol (BREO ELLIPTA) 100-25 MCG/INH inhaler INHALE 1 PUFF INTO THE LUNGS ONE TIME A DAY. RINSE MOUTH AFTER USE.       furosemide (LASIX) 20 MG tablet Take 20 mg by mouth       metFORMIN (GLUCOPHAGE XR) 500 MG 24 hr tablet        mineral oil-hydrophilic petrolatum (AQUAPHOR) external ointment Apply topically every 8 hours Apply to incision. 50 g 1     senna-docusate (SENOKOT-S/PERICOLACE) 8.6-50 MG tablet Take 1 tablet by mouth 2 times daily 30 tablet 0     TRULICITY 1.5 MG/0.5ML pen INJECT 1.5 MG UNDER THE SKIN ONE TIME A WEEK.       VICTOZA PEN 18 MG/3ML soln Inject 1.2 mg Subcutaneous daily (Patient not taking: Reported on 2/22/2023)       ALLERGIES  No Known Allergies    REVIEW OF SYSTEMS  As above in the HPI, o/w complete 12-point ROS was negative.    PHYSICAL EXAM  /69 (BP Location: Right arm, Patient Position: Sitting, Cuff Size: Adult Large)   Pulse 77   Temp 97.9  F (36.6  C) (Oral)   Resp 16   Wt 109.4 kg (241 lb 3.2 oz)   SpO2 98%   BMI 34.61 kg/m      GEN: NAD  HEENT: EOMI, no icterus, injection or pallor  EXT:  no edema  NEURO: alert    LABORATORY AND IMAGING STUDIES   02/22/23 13:33   Sodium 139   Potassium 3.9   Chloride 101   Carbon Dioxide (CO2) 30 (H)   Urea Nitrogen 11.7   Creatinine 0.79   GFR Estimate >90   Calcium 9.0   Anion Gap 8   Albumin 3.6   Protein Total 7.1   Alkaline Phosphatase 96   ALT 17   AST 26   Bilirubin  Total 0.6   Glucose 117 (H)   T4 Free 0.90   TSH 3.98   WBC 3.7 (L)   Hemoglobin 11.2 (L)   Hematocrit 35.4 (L)   Platelet Count 146 (L)   RBC Count 4.20 (L)   MCV 84   MCH 26.7   MCHC 31.6   RDW 13.9   % Neutrophils 71   % Lymphocytes 18   % Monocytes 9   % Eosinophils 1   % Basophils 1   Absolute Basophils 0.0   Absolute Eosinophils 0.0   Absolute Immature Granulocytes 0.0   Absolute Lymphocytes 0.7 (L)   Absolute Monocytes 0.3   % Immature Granulocytes 0   Absolute Neutrophils 2.7   Absolute NRBCs 0.0   NRBCs per 100 WBC 0     Labs were independently reviewed and interpreted by me    CT Chest w/o Contrast  Narrative: EXAMINATION: Chest CT  1/30/2023 2:40 PM    CLINICAL HISTORY: re-evaluate pulm nodules on Nov 16 PET/CT.  Nasopharyngeal carcinoma; Nasopharyngeal cancer (H); Pulmonary nodules    COMPARISON: PET CT 11/16/2022.    TECHNIQUE: CT imaging obtained through the chest without contrast.  Axial, coronal, and sagittal reconstructions and axial MIP reformatted  images are reviewed.     FINDINGS:  Right chest wall Port-A-Cath terminates in the low SVC.    Mediastinum: Unchanged partially visualized 1.3 cm hypodense right  thyroid nodule. The heart is normal in size. No significant  pericardial effusion. Mild/moderate coronary artery calcifications.  The ascending aorta and main pulmonary artery are normal in caliber.  No thoracic lymphadenopathy. The esophagus is unremarkable.     Lungs: The central tracheobronchial tree is patent. No pleural  effusion or pneumothorax. Mild upper lobe predominant paraseptal and  centrilobular emphysematous changes of the lungs. Dependent  subsegmental atelectasis of the lung bases. Patchy groundglass and  consolidative opacities in the right upper lobe and tree-in-bud  nodular opacities in the left lower lobe, decreased from prior. No new  airspace consolidation.    Bones and soft tissues: No suspicious bone findings. Densely sclerotic  probable bone island in the T9 vertebral  body.    Upper Abdomen: Limited evaluation of the upper abdomen.  Cholelithiasis. Partial fatty replacement of the pancreatic  parenchyma. Probable subcentimeter right renal cyst.  Impression: IMPRESSION:   1. No evidence of metastatic disease in the chest.  2. Decreased patchy opacities in the right upper and left lower lobes,  consistent with resolving infection/inflammation.    I have personally reviewed the examination and initial interpretation  and I agree with the findings.    CARMITA JONAS MD         SYSTEM ID:  [id number]     Imaging was personally reviewed and interpreted by me as above. Note finalized after PET/CT report came back; again we did not have a final report at the time of our visit.    Discussed with Dr. Boston right after our visit.

## 2023-02-22 NOTE — LETTER
February 24, 2023    RE: Jareth Gallardo  [address] [address] [facility] [address] [facility] [place] [address]     To Whom It May Concern,     This is a letter to request pembrolizumab. As you know, Mr. Gallardo has a locally recurrent, unresectable nasopharyngeal carcinoma, that persisted despite carboplatin and gemcitabine induction, then chemoradiation with carboplatin. He is not a candidate for cisplatin due to hearing loss. In light of this platinum-refractory disease, pembrolizumab is in NCCN guidelines as second line treatment and beyond. This cancer has a high PD-L1 expression (TPS 70%) and some patients can enjoy very prolonged disease control with pembrolizumab or nivolumab in this setting (https://ascopubs.org/doi/full/10.1200/JCO.2017.73.3675). This and other data, as well as patients with complete responses that have been very durable, support the rationale for pembrolizumab.     Please do not hesitate to contact me with questions.       Sincerely,      Dariela Melvin MD  Associate Professor of Medicine

## 2023-02-22 NOTE — NURSING NOTE
Chief Complaint   Patient presents with     Port Draw     Labs drawn from port by rn.  VS taken.     Good blood return noted from previously-accessed port.  Labs drawn from port by rn.  Port flushed with NS and heparin then de-accessed.  VS taken.  Pt tolerated well.      Doris Childs RN

## 2023-02-22 NOTE — PROGRESS NOTES
[facility]    Outpatient Speech Language Pathology Discharge Note  Patient: Jareth Gallardo  : 1960    Beginning/End Dates of Reporting Period:  22 to 23    Referring Provider: Dr. Jesus Boston    Therapy Diagnosis: Oropharyngeal dysphagia    Client Self Report: Mr. Gallardo is a 62-year-old gentleman with a previously diagnosed cT2 N2 M0 nasopharyngeal carcinoma. He underwent neoadjuvant chemotherapy with 3 cycles of carboplatin/gemcitabine from 2022 - 2022 followed by chemoXRT finishing on 22. He did NOT have a PEG. He is seen today in conjunction with ENT clinic visit. Reports he is not having any trouble swallowing.           Goals:  Goal Identifier Eat   Goal Description With independent use of compensatory strategies, pt will tolerate soft solids and thin liquids without overt s/s of aspiration or adverse lung events.   Target Date 23   Date Met      Progress (detail required for progress note): Pt reports PO intake continues to go well. He is able to eat everything he wants. Denies coughing/TC and feeling of stasis with PO intake. Reports he is now gaining weight. Reports improvement,, but ongoing dysgeusia. Discussed trajectory of improvement of XRT related side effects on swallowing.  Encouraged pt to continue with PO intake with use of general safe swallow strategies. Trained pt on potential of XRT induced fibrosis and subsquent progressive dysphagia which can lead to G tube dependence. Pt will let MD know should he experience any new or worsening dysphagia, at which point re-evaluation would be warranted.     Goal Identifier Exercise   Goal Description Pt will independently complete oropharyngeal strengthen exercises 3-5x/day as recommended by treating therapist.   Target Date 23   Date Met      Progress (detail required for progress note): Pt reports  he completes the exercises intermittently. Retrained rationale/importance of continuing home exercise program 2-3x/day for the first two years after XRT to reduce risk of XRT induced fibrosis and subsequent progressive dysphagia. Pt reported understanding and stated he would try to resume more regular completion.         Plan:  Discharge from therapy.    Discharge:    Reason for Discharge: No further expectation of progress.    Equipment Issued: None    Discharge Plan: Patient to continue home program. Pt to let MD know should he experience any new or worsening dysphagia at which point re-evaluation would be warranted.

## 2023-02-22 NOTE — LETTER
2/22/2023       RE: Jareth Gallardo  [address] [address] [place] [address]     Dear Colleague,    Thank you for referring your patient, Jareth Gallardo, to the [facility]AT [facility]INIC [place] at [facility]. Please see a copy of my visit note below.    PRIOR ONCOLOGIC HISTORY:  He has a history of nasopharyngeal cancer treated with chemoradiotherapy.  His stage was T2 N2.  He completed concurrent chemoradiotherapy with weekly carboplatin on 08/12/2022.  His post-treatment PET scan showed two areas of uptake in the left neck, one within the posterior aspect of the sternocleidomastoid and one high in level II.  Because of concern that this could represent residual disease, it was recommended he undergo a neck dissection on the left side and that a biopsy may not retrieve adequate diagnostic tissue. He underwent left neck dissection of level 2-5 on Dec 5, 2022 which revealed 2 foci of SCC with extension into surrounding tissue.  There was PNI and ALI. We did biopsies of the nasopharynx that were negative.     HISTORY OF PRESENT ILLNESS:  Mr. Gallardo has been doing fairly well.  He notices persistent hoarseness that has been present since before the surgery on his neck.  He thinks it has been pretty stable.  He denies any pain.  He has no otalgia, odynophagia, dysphagia or hemoptysis.  He has no hearing loss or symptoms of a serous effusion.  He has had no nosebleeds.    PHYSICAL EXAMINATION:  On examination, he is well-appearing, in no distress.  Examination of the oral cavity reveals normal mucosa of the tongue, floor of mouth, hard and soft palate, gingival buccal mucosa and retromolar trigone are normal.  Palpation of the floor of mouth, tongue, and base of tongue are negative.  He cannot tolerate mirror exam.  Neck examination shows that his left neck has become progressively more fibrotic after the surgery, but it is healed.  I do not  palpate any lymphadenopathy in either neck.    PROCEDURE NOTE:  Flexible endoscopy was performed through the right nasal cavity after anesthetization and vasoconstriction using Sang-Synephrine and Xylocaine.  This reveals the nasopharynx looked normal.  I do not see evidence of recurrence here.  The oropharynx, hypopharynx, and larynx are normal.  Vocal fold mobility is normal.  He does have some supraglottic edema, but his vocal folds look well.    IMPRESSION:  No evidence of disease.    PLAN:  He did get CT scans done today and they are not read yet, so we will send him the results via [employer] once they are complete.  Otherwise, I will see him back in two to three months.          Again, thank you for allowing me to participate in the care of your patient.      Sincerely,    Jesus Boston MD

## 2023-02-22 NOTE — PROGRESS NOTES
PRIOR ONCOLOGIC HISTORY:  He has a history of nasopharyngeal cancer treated with chemoradiotherapy.  His stage was T2 N2.  He completed concurrent chemoradiotherapy with weekly carboplatin on 08/12/2022.  His post-treatment PET scan showed two areas of uptake in the left neck, one within the posterior aspect of the sternocleidomastoid and one high in level II.  Because of concern that this could represent residual disease, it was recommended he undergo a neck dissection on the left side and that a biopsy may not retrieve adequate diagnostic tissue. He underwent left neck dissection of level 2-5 on Dec 5, 2022 which revealed 2 foci of SCC with extension into surrounding tissue.  There was PNI and ALI. We did biopsies of the nasopharynx that were negative.     HISTORY OF PRESENT ILLNESS:  Mr. Gallardo has been doing fairly well.  He notices persistent hoarseness that has been present since before the surgery on his neck.  He thinks it has been pretty stable.  He denies any pain.  He has no otalgia, odynophagia, dysphagia or hemoptysis.  He has no hearing loss or symptoms of a serous effusion.  He has had no nosebleeds.    PHYSICAL EXAMINATION:  On examination, he is well-appearing, in no distress.  Examination of the oral cavity reveals normal mucosa of the tongue, floor of mouth, hard and soft palate, gingival buccal mucosa and retromolar trigone are normal.  Palpation of the floor of mouth, tongue, and base of tongue are negative.  He cannot tolerate mirror exam.  Neck examination shows that his left neck has become progressively more fibrotic after the surgery, but it is healed.  I do not palpate any lymphadenopathy in either neck.    PROCEDURE NOTE:  Flexible endoscopy was performed through the right nasal cavity after anesthetization and vasoconstriction using Sang-Synephrine and Xylocaine.  This reveals the nasopharynx looked normal.  I do not see evidence of recurrence here.  The oropharynx, hypopharynx, and  larynx are normal.  Vocal fold mobility is normal.  He does have some supraglottic edema, but his vocal folds look well.    IMPRESSION:  No evidence of disease.    PLAN:  He did get CT scans done today and they are not read yet, so we will send him the results via [employer] once they are complete.  Otherwise, I will see him back in two to three months.

## 2023-02-22 NOTE — NURSING NOTE
"Oncology Rooming Note    February 22, 2023 2:55 PM   Jareth Gallardo is a 62 year old male who presents for:    Chief Complaint   Patient presents with     Port Draw     Labs drawn from port by rn.  VS taken.     Oncology Clinic Visit     Nasopharyngeal cancer      Initial Vitals: /69 (BP Location: Right arm, Patient Position: Sitting, Cuff Size: Adult Large)   Pulse 77   Temp 97.9  F (36.6  C) (Oral)   Resp 16   Wt 109.4 kg (241 lb 3.2 oz)   SpO2 98%   BMI 34.61 kg/m   Estimated body mass index is 34.61 kg/m  as calculated from the following:    Height as of 12/14/22: 1.778 m (5' 10\").    Weight as of this encounter: 109.4 kg (241 lb 3.2 oz). Body surface area is 2.32 meters squared.  No Pain (0) Comment: Data Unavailable   No LMP for male patient.  Allergies reviewed: Yes  Medications reviewed: Yes    Medications: Medication refills not needed today.  Pharmacy name entered into [email or web address]:    [id number] [place] [facility] - [place], [place] - [id number] [place] [address] PHARMACY [place], [place] - [phone]  [facility] - [place], [place] - [address]    Clinical concerns: NONE.       Joey Guzman"

## 2023-02-23 LAB
EBV DNA COPIES/ML, INSTRUMENT: 772 COPIES/ML
EBV DNA SPEC NAA+PROBE-LOG#: 2.9 {LOG_COPIES}/ML

## 2023-02-23 NOTE — PATIENT INSTRUCTIONS
We will call with CT scan results once finalized.   2. Please call the ENT clinic with any questions,concerns, new or worsening symptoms.    -Clinic number is [phone]   - [facility]'s direct line (Dr. CASTRO nurse) [phone]

## 2023-02-24 ENCOUNTER — TUMOR CONFERENCE (OUTPATIENT)
Dept: ONCOLOGY | Facility: CLINIC | Age: 63
End: 2023-02-24
Payer: COMMERCIAL

## 2023-02-24 ENCOUNTER — PATIENT OUTREACH (OUTPATIENT)
Dept: ONCOLOGY | Facility: CLINIC | Age: 63
End: 2023-02-24

## 2023-02-24 NOTE — PROGRESS NOTES
[facility]: Cancer Care                                                                                          I called the patient to introduce myself and discuss the plan of care. He was in the grocery store at the time of my call. I will plan to call on Monday to review. Patient agreed    Signature:  Emiliano Nelson RN

## 2023-02-24 NOTE — TUMOR CONFERENCE
Head & Neck Tumor Conference Note   Status: Established (12/16/2022)   Staff: Dr. Boston     Tumor Site: nasopharynx  Tumor Pathology: SCC  Tumor Stage: T2N2; rN1  Tumor Treatment:   - Concurrent chemoradiotherapy with weekly carboplatin on 08/12/2002  - DLB, salvage neck dissection, nasopharyngeal biopsy 12/5/22              - 1.5 and 1.1 cm tumor deposits, 2/30 lymph nodes positive     Reason for Review: Review path and POC     Brief History: This is a 62 year old male with a history of clinically stage T2 N2 nasopharyngeal carcinoma.  Staging imaging revealed a 1.8 x 4 x 3.7 cm nasopharyngeal mass arising from the left fossa of Rosenmueller extending into the parapharyngeal space along with adenopathy involving the bilateral retropharyngeal regions, left levels 2, 3 and 5 along with right level 2. He received induction chemotherapy with 3 cycles of carboplatin/gemcitabine with restaging imaging demonstrating a significant decrease in the size of the primary tumor and involved lymph nodes.  He completed concurrent chemoradiotherapy with weekly carboplatin on 08/12/2002.  He did have a few missed treatments, but otherwise did well in terms of his swallowing throughout therapy.   Examination of neck reveals some fullness in the left neck, but no discrete lymphadenopathy. His PET-CT from 11/16/22 was highly concerning for recurrence in the setting of known residual disease in the neck and FDG uptake in the base of tongue. He was taken to the OR 12/5 for salvage neck dissection, nasopharyngeal biopsy. DLB at the time demonstrated no irregularity of the base of tongue, so no biopsy was performed.    Here to review 3m post-treatment PET scan     Pertinent PMH:   Past Medical History:   Diagnosis Date     COPD (chronic obstructive pulmonary disease) (H)      Dyslipidemia      Hard to intubate 12/5/2022     Nasopharyngeal carcinoma (H)      BARRETT (obstructive sleep apnea)      Type 2 diabetes mellitus with diabetic  nephropathy (H)       Smoking Hx:   Social History     Tobacco Use     Smoking status: Former     Packs/day: 4.00     Years: 30.00     Pack years: 120.00     Types: Cigarettes     Quit date:      Years since quittin.1     Smokeless tobacco: Never     Tobacco comments:     quit smoking 10 years ago   Substance Use Topics     Alcohol use: Yes     Comment: rarely     Drug use: Never       Imaging: PET/CT 2023   IMPRESSION: In this patient with nasopharyngeal carcinoma status concurrent chemoradiation, now with recent s/p salvage neck dissection    1. Primary: NI-RADS 1} Expected post-treatment changes within the  nasopharynx without evidence of recurrent disease.  2. Neck: NI-RADS 3}  Interval postsurgical changes of left neck dissection. At least 5 foci of uptake within the left deep neck, some within the left paravertebral muscle, others deep to and within SCM suggesting recurrent leny disease.  3. No evidence suggestive for metastatic disease.  4. Resolution of ileus is seen right upper lobe hypermetabolic  groundglass opacities. Stable few sub-6 mm pulmonary nodules.    Tumor Board Recommendations:   Reviewed the post-treatment PET/CT with concerning areas of focal uptake within the left neck, including areas included in previous neck dissection. There are at least 5 areas concerning for tumor deposit/leny disease, with several embedded in the paravertebral muscle and superior aspect of SCM. Discussed treatment options given recurrent leny disease, including radical neck dissection versus systemic therapy. Given difficulty in his original neck dissection due to the radiation changes and tumor essentially seen as deposits within the neck there would be a very high risk of inadequate tumor removal and thus recurrence. Fortunately, his tumor has high PDL-1 expression and starting with systemic therapy would be a better option. Could then consider delayed neck dissection to provide a consolidative  approach, which is becoming a more commonly used regimen for solid tumors.      - Systemic chemotherapy     Estella Torres MD  Otolaryngology Head and Neck Surgery Resident, PGY-3    Documentation / Disclaimer Cancer Tumor Board Note: Cancer tumor board recommendations do not override what is determined to be reasonable care and treatment, which is dependent on the circumstances of a patient's case; the patient's medical, social, and personal concerns; and the clinical judgment of the oncologist [physician].

## 2023-02-24 NOTE — TUMOR CONFERENCE
Called and spoke with patient regarding the following PET scan results:    IMPRESSION: In this patient with nasopharyngeal carcinoma status  concurrent chemoradiation, now with recent s/p salvage neck dissection  on12/5/2022:     1. No evidence suggestive for metastatic disease.     2. Resolution of ileus is seen right upper lobe hypermetabolic  groundglass opacities. Stable few sub-6 mm pulmonary nodules.     3. See dedicated report for the results of the high resolution PET CT  of the neck.     Impression: F18 FDG PET CT of the neck:     1. Primary: NI-RADS 1} Expected post-treatment changes within the  nasopharynx without evidence of recurrent disease.  2. Neck: NI-RADS 3}  Interval postsurgical changes of left neck  dissection. At least 5 foci of uptake within the left deep neck, some  within the left paravertebral muscle, others deep to and within SCM  suggesting recurrent leny disease.   3. Please refer to the whole body PET CT performed as a separate  report, for the findings of the remainder of the body.       Reviewed with patient that given the new findings in the left neck the discussion was radical neck dissection versus systemic therapy. Discussed that the concern with surgery is that there would likely be residual disase after resection and recurrence would be high risk. The thought given his high PDL-1 testing is that  systemic therapy should be considered. Patient is agreeable and will await call from Dr. Melvin's team to discuss next steps. Patient was encouraged to call with further questions or concerns.     Writer sent message to Dr. Melvin's team to arrange follow-up.     Nel Lau, RN, BSN

## 2023-02-27 ENCOUNTER — PATIENT OUTREACH (OUTPATIENT)
Dept: ONCOLOGY | Facility: CLINIC | Age: 63
End: 2023-02-27
Payer: COMMERCIAL

## 2023-02-27 NOTE — PROGRESS NOTES
[facility]: Cancer Care Plan of Care Education Note                                    Discussion with Patient:                                                      I called and spoke to the patient to provide education on pembrolizumab.    Assessment:                                                      Assessment completed with:: Patient    Current living arrangement  I live in a private home with family    Plan of Care Education   Yearly learning assessment completed?: Yes (see Education tab)  Diagnosis:: Nasopharyngeal Cancer  Does patient understand diagnosis?: Yes  Tx plan/regimen:: Pembrolizumab  Does patient understand treatment plan/regimen?: Yes  Preparing for treatment:: Reviewed treatment preparation information with patient (vascular access, day of chemo, visitor policy, what to bring, etc.)  Vascular access:: Peripheral IV  Side effect education:: Diarrhea/Constipation;Fatigue;Lab value monitoring (anemia, neutropenia, thrombocytopenia)  Transportation means:: Regular car  Safety/self care at home reviewed with patient:: Yes  Informal Support system:: Family  Coping - concerns/fears reviewed with patient:: Yes  Plan of Care:: Treatment schedule  When to call provider:: Bleeding;Increased shortness of breath;New/worsening pain;Shaking chills;Temperature >100.4F;Uncontrolled diarrhea/constipation;Uncontrolled nausea/vomiting  Reasons for deferring treatment reviewed with patient:: Yes    Evaluation of Learning  Patient Education Provided: Yes  Readiness:: Acceptance  Method:: Literature;Explanation  Response:: Verbalizes understanding    Intervention/Education provided during outreach:                                                       I reviewed common side effects, logistics, and when to contact the care team while receiving pembrolizumab. I also sent written information via [employer].    I reviewed the upcoming appointments. No other questions or concerns at this time. Encouraged him to reach  out with any questions or concerns    Patient to follow up as scheduled at next appt  Patient to call/MyChart message with updates  Confirmed patient has clinic and triage numbers    Signature:  Emiliano Nelson RN

## 2023-03-08 NOTE — PROGRESS NOTES
[facility] CANCER [facility]    PATIENT NAME: Jareth Gallardo  MRN # [id number]   DATE OF VISIT: March 9, 2023  YOB: 1960       Referring Provider: Dr. Jesus Boston  Radiation Oncology: Dr. Mane Razo  Primary Care Provider: Dr. Beni Gallardo at [facility] in [facility]    CANCER TYPE: Nasopharyngeal carcinoma, p16 negative, SAM positive  STAGE: [id number] (DENAE)  ECOG PS: 0    PD-L1: 20% using  clone locally; TPS 70% at [facility]   NGS:     SUMMARY  12/11/21 UC for L neck mass, selling  12/9/21 CT neck ([place]). 2.4 cm L neck node medial to SCM just above the hyoid, additional LNs surround, scattered R neck and L supraclavicular LNs  2/15/22 US bx L cervical node. Path: SCC, moderately differentiated,   3/4/22 PET/CT. Nasopharyngeal and oropharyngeal mucosal thickening. 2.6 cm BERNARDO nodular GGO, SUV avid, some additional lingular uptake  4/4~5/16/22/22 C1-3 carboplatin gemcitabine. Not a candidate for cisplatin due to hearing loss on audiogram. Neulasta 5/3 after C2D8, ANC 1000   5/31/22 PET/CT. VT  6/22~8/10/22 Chemoradiation with weekly carboplatin  11/16/22 PET/CT. Residual 1.5 cm 2B LN (SUV 28.7), 1 cm enhancing focus in SCM (SUV 22.4), small mildly avid residual enhancing nodes in the L level 2A decreased in size and FDG avidity than before, resolved L level 5 node, small but mildly FDG avid R level 2B node, smaller mildly avid R level 3 nodes, unchanged in ize. NI-RADS 2a in the primary site - FDG uptake along the BOT at the midline, increased from prior. New patchy GGO and nodules in the RUL and L base with mild FDG uptake, likely inflammatory, recommend short interval CT  12/5/22 Salvage L modified radical neck dissection (Dr. Boston). Path: L posterior SCM excision - SCC, 1.1 cm, invading into skeletal muscle, negative margins, +PNI/LVI. Level 2 node 1.5 cm SCC, moderately differentiated, invading into fibroadipose tissue, focally involving the inked resection margin.    1/30/23 CT chest. Patchy LLL GGO, decreased from prior.   2/22/23 PET/CT. Focal updake along the tongue base at midline, decreased from prior, likely inflammatory (SUV 14.8-->8.5), near complete resolution of the rest of the mild mucosal uptake within the tongue base. At least 5 FDG avid lesions in the L deep neck. 2 nodes at the C2 level, posterior to the surgical clips in the L lateral paravertebral region, one measuring 8 mm (SUV 15.9), the other more posteriorly measuring 7 mm. The other 2 adjacent ones are deep to the residual L SCM; 9.6 mm, and a smaller one immediately below in the SCM. There is another mildly FDG avid necrotic lesion inferior to the above 3 foci, again deep to the L SCM. 0.9 cm R level 2b LN (SUV 3.1), likely reactive. Resolution of RUL GGO, stable sub-6 mm nodules.    ASSESSMENT AND PLAN  Nasopharyngeal carcinoma, SAM +julia:  Received induction chemo with gem/carbo C 1-3 followed by chemoradiation with weekly carboplatin and salvage L modified radical neck dissection. Post-treatment PET/CT with concerning areas of focal uptake within the left neck including areas of previous dissection. Discussed options at tumor board 2/24/23 including radical neck dissection versus systemic therapy. Given high PDL-1 expression and high risk of inadequate tumor removal, systemic therapy with pembrolizumab was recommended. Has received chemo teach from [facility]. We further reviewed potential side effects and scheduling today. Clinically he is stable to proceed.  -Cycle 1 pembrolizumab today (200 mg every 21 days)  -RTC every 3 weeks for labs, LEONEL, infusion  -Plan to repeat CTs after 3 cycles, will see Dr. Melvin at that time.    BERNARDO/lung nodules, new RUL nodules in 11/2022: Resolving c/w inflammatory changes from mild aspiration.    Lymphedema: Doing massages, hot showers, stretching exercises.    DM2, steroid-induced hyperglycemia: Controlled, managed by PCP    Mild peripheral neuropathy: R foot numbness,  very mild, from DM2. Improving from his standpoint. Monitor.    Thyroid: TSH slightly elevated at 4.28 today, free T4 WNL. Risk of thyroid dysfunction high with radiation, neck dissection and CPI use. Monitor closely every 3 weeks.    50 minutes spent on the date of the encounter doing chart review, review of test results, interpretation of tests, patient visit, documentation and discussion with family     Jany Mtz CNP  [facility] [address] [address]  [phone]    SHAHANA Templeton is seen in clinic today prior to cycle 1 pembrolizumab. Left neck remains tight. He is diligently performing stretching exercises throughout the day. Hot showers in the morning are helpful to loosen things up in the morning. Nerve pain in the left neck is very sporadic, some days feels nothing at all and other days lot of pins-and-needles sensation. Left arm ROM has been limited since surgery. Fatigue has been persistent although he is working on slowly increasing his activity. Has recently been working many hours per day in his garage. Appetite is good and is improved since he switched to Trulicity from Victoza. Occasionally has some difficulty swallowing food and requires increased sips of water. Denies reflux or heartburn symptoms, nausea or bowel irregularity. Denies any peripheral neuropathy symptom or numbness in the right foot.    PAST MEDICAL HISTORY  Nasopharyngeal carcinoma as above  DM2  COPD??  Hiatal hernia. Sleeps with his head elevated  LVH on TTE 2009, EF50%, LA Dilation  Dyslipidemia  BARRETT in 2015. Not using CPAP due to developing tooth abscesses when he tried it in 2015  Venous insufficiency in both legs being in an accident involving a mower about 10 years ago, on chronic furosemide. 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 90 lancet 1     blood glucose (NO BRAND SPECIFIED) test strip Use to test blood sugar 3 times daily or as directed. 90 strip 0     blood glucose monitoring (ACCU-CHEK RANDELL PLUS) meter device kit Use to test blood sugar 2 times daily or as directed. 1 kit 0     fluticasone-vilanterol (BREO ELLIPTA) 100-25 MCG/INH inhaler INHALE 1 PUFF INTO THE LUNGS ONE TIME A DAY. RINSE MOUTH AFTER USE.       furosemide (LASIX) 20 MG tablet Take 20 mg by mouth       metFORMIN (GLUCOPHAGE XR) 500 MG 24 hr tablet        senna-docusate (SENOKOT-S/PERICOLACE) 8.6-50 MG tablet Take 1 tablet by mouth 2 times daily 30 tablet 0     TRULICITY 1.5 MG/0.5ML pen INJECT 1.5 MG UNDER THE SKIN ONE TIME A WEEK.       acetaminophen (TYLENOL) 325 MG tablet Take 2 tablets (650 mg) by mouth every 4 hours as needed for other (For optimal non-opioid multimodal pain management to improve pain control.) (Patient not taking: Reported on 3/9/2023) 50 tablet 0     B-D U/F insulin pen needle USE ONE TIME A DAY TO INJECT VICTOZA (Patient not taking: Reported on 3/9/2023)       mineral oil-hydrophilic petrolatum (AQUAPHOR) external ointment Apply topically every 8 hours Apply to incision. (Patient not taking: Reported on 3/9/2023) 50 g 1     VICTOZA PEN 18 MG/3ML soln Inject 1.2 mg Subcutaneous daily (Patient not taking: Reported on 2/22/2023)       ALLERGIES  No Known Allergies    REVIEW OF SYSTEMS  As above in the HPI, o/w complete 12-point ROS was negative.    PHYSICAL EXAM  /73 (BP Location: Right arm)   Pulse 84   Temp 98.1  F (36.7  C) (Oral)   Resp 16   Wt 109.4 kg (241 lb 3.2 oz)    SpO2 96%   BMI 34.61 kg/m      General: Well-appearing male in no acute distress.  Eyes: EOMI, PERRL. No scleral icterus.  HEENT: Left neck fibrosis. No appreciable cervical or supraclavicular lymphadenopathy.  Cardiovascular: RRR No murmurs, gallops, or rubs. No peripheral edema.  Respiratory: CTA bilaterally. No wheezes or crackles.  Neurologic: A/O x 4, grossly nonfocal.  Skin: Healed left neck incision. No rashes, petechiae, or bruising noted on exposed skin.    LABORATORY AND IMAGING STUDIES  Most Recent 3 CBC's:  Recent Labs   Lab Test 02/22/23  1333 01/30/23  1401 12/06/22  0851 11/16/22  1334   WBC 3.7* 3.5* 5.0 4.0   HGB 11.2* 11.6* 10.5* 11.5*   MCV 84 85 89 87   * 166 152 190   ANEUTAUTO 2.7 2.4  --  3.0    Most Recent 3 BMP's:  Recent Labs   Lab Test 03/09/23  0832 02/22/23  1333 01/30/23  1401    139 142   POTASSIUM 3.8 3.9 3.6   CHLORIDE 103 101 101   CO2 30* 30* 33*   BUN 13.9 11.7 12.0   CR 0.79 0.79 0.81   ANIONGAP 8 8 8   LAI 8.9 9.0 9.2   * 117* 71   PROTTOTAL 7.0 7.1 7.2   ALBUMIN 3.6 3.6 3.7    Most Recent 2 LFT's:  Recent Labs   Lab Test 03/09/23  0832 02/22/23  1333   AST 24 26   ALT 16 17   ALKPHOS 94 96   BILITOTAL 0.6 0.6    Most Recent TSH and T4:  Recent Labs   Lab Test 03/09/23  0832   TSH 4.28*   T4 0.96     Phos/Mag:  Lab Results   Component Value Date    PHOS 3.7 12/06/2022    MAG 1.8 01/30/2023    MAG 1.9 12/06/2022    MAG 1.8 11/16/2022      I reviewed the above laboratory data today.

## 2023-03-09 ENCOUNTER — INFUSION THERAPY VISIT (OUTPATIENT)
Dept: ONCOLOGY | Facility: CLINIC | Age: 63
End: 2023-03-09
Attending: INTERNAL MEDICINE
Payer: COMMERCIAL

## 2023-03-09 ENCOUNTER — APPOINTMENT (OUTPATIENT)
Dept: LAB | Facility: CLINIC | Age: 63
End: 2023-03-09
Attending: INTERNAL MEDICINE
Payer: COMMERCIAL

## 2023-03-09 VITALS
RESPIRATION RATE: 16 BRPM | TEMPERATURE: 98.1 F | OXYGEN SATURATION: 96 % | WEIGHT: 241.2 LBS | HEART RATE: 84 BPM | BODY MASS INDEX: 34.61 KG/M2 | DIASTOLIC BLOOD PRESSURE: 73 MMHG | SYSTOLIC BLOOD PRESSURE: 124 MMHG

## 2023-03-09 DIAGNOSIS — C11.9 NASOPHARYNGEAL CANCER (H): Primary | ICD-10-CM

## 2023-03-09 DIAGNOSIS — R80.9 TYPE 2 DIABETES MELLITUS WITH MICROALBUMINURIA, WITHOUT LONG-TERM CURRENT USE OF INSULIN (H): ICD-10-CM

## 2023-03-09 DIAGNOSIS — I89.0 LYMPHEDEMA: ICD-10-CM

## 2023-03-09 DIAGNOSIS — Z13.29 SCREENING FOR HYPOTHYROIDISM: ICD-10-CM

## 2023-03-09 DIAGNOSIS — E11.29 TYPE 2 DIABETES MELLITUS WITH MICROALBUMINURIA, WITHOUT LONG-TERM CURRENT USE OF INSULIN (H): ICD-10-CM

## 2023-03-09 LAB
ALBUMIN SERPL BCG-MCNC: 3.6 G/DL (ref 3.5–5.2)
ALP SERPL-CCNC: 94 U/L (ref 40–129)
ALT SERPL W P-5'-P-CCNC: 16 U/L (ref 10–50)
ANION GAP SERPL CALCULATED.3IONS-SCNC: 8 MMOL/L (ref 7–15)
AST SERPL W P-5'-P-CCNC: 24 U/L (ref 10–50)
BILIRUB SERPL-MCNC: 0.6 MG/DL
BUN SERPL-MCNC: 13.9 MG/DL (ref 8–23)
CALCIUM SERPL-MCNC: 8.9 MG/DL (ref 8.8–10.2)
CHLORIDE SERPL-SCNC: 103 MMOL/L (ref 98–107)
CREAT SERPL-MCNC: 0.79 MG/DL (ref 0.67–1.17)
DEPRECATED HCO3 PLAS-SCNC: 30 MMOL/L (ref 22–29)
GFR SERPL CREATININE-BSD FRML MDRD: >90 ML/MIN/1.73M2
GLUCOSE SERPL-MCNC: 167 MG/DL (ref 70–99)
HOLD SPECIMEN: NORMAL
POTASSIUM SERPL-SCNC: 3.8 MMOL/L (ref 3.4–5.3)
PROT SERPL-MCNC: 7 G/DL (ref 6.4–8.3)
SODIUM SERPL-SCNC: 141 MMOL/L (ref 136–145)
T4 FREE SERPL-MCNC: 0.96 NG/DL (ref 0.9–1.7)
TSH SERPL DL<=0.005 MIU/L-ACNC: 4.28 UIU/ML (ref 0.3–4.2)

## 2023-03-09 PROCEDURE — 96413 CHEMO IV INFUSION 1 HR: CPT

## 2023-03-09 PROCEDURE — 84443 ASSAY THYROID STIM HORMONE: CPT | Performed by: REGISTERED NURSE

## 2023-03-09 PROCEDURE — 250N000011 HC RX IP 250 OP 636: Performed by: REGISTERED NURSE

## 2023-03-09 PROCEDURE — 84439 ASSAY OF FREE THYROXINE: CPT | Performed by: REGISTERED NURSE

## 2023-03-09 PROCEDURE — 258N000003 HC RX IP 258 OP 636: Performed by: INTERNAL MEDICINE

## 2023-03-09 PROCEDURE — 80053 COMPREHEN METABOLIC PANEL: CPT | Performed by: REGISTERED NURSE

## 2023-03-09 PROCEDURE — G0463 HOSPITAL OUTPT CLINIC VISIT: HCPCS | Mod: 25 | Performed by: REGISTERED NURSE

## 2023-03-09 PROCEDURE — 99215 OFFICE O/P EST HI 40 MIN: CPT | Performed by: REGISTERED NURSE

## 2023-03-09 PROCEDURE — 250N000011 HC RX IP 250 OP 636: Performed by: INTERNAL MEDICINE

## 2023-03-09 RX ORDER — MEPERIDINE HYDROCHLORIDE 25 MG/ML
25 INJECTION INTRAMUSCULAR; INTRAVENOUS; SUBCUTANEOUS EVERY 30 MIN PRN
Status: CANCELLED | OUTPATIENT
Start: 2023-03-09

## 2023-03-09 RX ORDER — ALBUTEROL SULFATE 0.83 MG/ML
2.5 SOLUTION RESPIRATORY (INHALATION)
Status: CANCELLED | OUTPATIENT
Start: 2023-03-09

## 2023-03-09 RX ORDER — ALBUTEROL SULFATE 90 UG/1
1-2 AEROSOL, METERED RESPIRATORY (INHALATION)
Status: CANCELLED
Start: 2023-03-09

## 2023-03-09 RX ORDER — HEPARIN SODIUM,PORCINE 10 UNIT/ML
5 VIAL (ML) INTRAVENOUS
Status: CANCELLED | OUTPATIENT
Start: 2023-03-09

## 2023-03-09 RX ORDER — EPINEPHRINE 1 MG/ML
0.3 INJECTION, SOLUTION INTRAMUSCULAR; SUBCUTANEOUS EVERY 5 MIN PRN
Status: CANCELLED | OUTPATIENT
Start: 2023-03-09

## 2023-03-09 RX ORDER — HEPARIN SODIUM (PORCINE) LOCK FLUSH IV SOLN 100 UNIT/ML 100 UNIT/ML
500 SOLUTION INTRAVENOUS ONCE
Status: COMPLETED | OUTPATIENT
Start: 2023-03-09 | End: 2023-03-09

## 2023-03-09 RX ORDER — METHYLPREDNISOLONE SODIUM SUCCINATE 125 MG/2ML
125 INJECTION, POWDER, LYOPHILIZED, FOR SOLUTION INTRAMUSCULAR; INTRAVENOUS
Status: CANCELLED
Start: 2023-03-09

## 2023-03-09 RX ORDER — DIPHENHYDRAMINE HYDROCHLORIDE 50 MG/ML
50 INJECTION INTRAMUSCULAR; INTRAVENOUS
Status: CANCELLED
Start: 2023-03-09

## 2023-03-09 RX ORDER — HEPARIN SODIUM (PORCINE) LOCK FLUSH IV SOLN 100 UNIT/ML 100 UNIT/ML
5 SOLUTION INTRAVENOUS
Status: CANCELLED | OUTPATIENT
Start: 2023-03-09

## 2023-03-09 RX ORDER — HEPARIN SODIUM (PORCINE) LOCK FLUSH IV SOLN 100 UNIT/ML 100 UNIT/ML
5 SOLUTION INTRAVENOUS
Status: DISCONTINUED | OUTPATIENT
Start: 2023-03-09 | End: 2023-03-09 | Stop reason: HOSPADM

## 2023-03-09 RX ORDER — LORAZEPAM 2 MG/ML
0.5 INJECTION INTRAMUSCULAR EVERY 4 HOURS PRN
Status: CANCELLED | OUTPATIENT
Start: 2023-03-09

## 2023-03-09 RX ADMIN — SODIUM CHLORIDE 250 ML: 9 INJECTION, SOLUTION INTRAVENOUS at 10:35

## 2023-03-09 RX ADMIN — SODIUM CHLORIDE 200 MG: 9 INJECTION, SOLUTION INTRAVENOUS at 10:36

## 2023-03-09 RX ADMIN — Medication 500 UNITS: at 08:26

## 2023-03-09 RX ADMIN — Medication 5 ML: at 11:12

## 2023-03-09 ASSESSMENT — PAIN SCALES - GENERAL: PAINLEVEL: MILD PAIN (3)

## 2023-03-09 NOTE — PROGRESS NOTES
Infusion Nursing Note:  Jareth Gallardo presents today for Cycle 1 Day 1 Keytruda.    Patient seen and examined by Jany Mtz in clinic prior to infusion    Note: Today was pt's first keytruda infusion.  Information on keytruda was reviewed with pt by Jany Mtz in clinic.  Side effects of immunotherapy was reiterated by this nurse and pt verbalized an understanding    Intravenous Access:  Implanted Port accessed inlab    Treatment Conditions:  Component      Latest Ref Rng & Units 3/9/2023   Sodium      136 - 145 mmol/L 141   Potassium      3.4 - 5.3 mmol/L 3.8   Chloride      98 - 107 mmol/L 103   Carbon Dioxide (CO2)      22 - 29 mmol/L 30 (H)   Anion Gap      7 - 15 mmol/L 8   Urea Nitrogen      8.0 - 23.0 mg/dL 13.9   Creatinine      0.67 - 1.17 mg/dL 0.79   Calcium      8.8 - 10.2 mg/dL 8.9   Glucose      70 - 99 mg/dL 167 (H)   Alkaline Phosphatase      40 - 129 U/L 94   AST      10 - 50 U/L 24   ALT      10 - 50 U/L 16   Protein Total      6.4 - 8.3 g/dL 7.0   Albumin      3.5 - 5.2 g/dL 3.6   Bilirubin Total      <=1.2 mg/dL 0.6   GFR Estimate      >60 mL/min/1.73m2 >90     Results reviewed, labs MET treatment parameters, ok to proceed with treatment.    Post Infusion Assessment:  Patient tolerated infusion without incident.     Discharge Plan:   AVS to patient via [facility].  Patient will return 3/30/23 for cycle 2  Patient discharged in stable condition accompanied by: self.  Departure Mode: Ambulatory.      Sejal Chavarria RN

## 2023-03-09 NOTE — NURSING NOTE
"Oncology Rooming Note    March 9, 2023 8:39 AM   Jareth Gallarod is a 62 year old male who presents for:    Chief Complaint   Patient presents with     Oncology Clinic Visit     Nasopharyngeal cancer     Initial Vitals: /73 (BP Location: Right arm)   Pulse 84   Temp 98.1  F (36.7  C) (Oral)   Resp 16   Wt 109.4 kg (241 lb 3.2 oz)   SpO2 96%   BMI 34.61 kg/m   Estimated body mass index is 34.61 kg/m  as calculated from the following:    Height as of 2/22/23: 1.778 m (5' 10\").    Weight as of this encounter: 109.4 kg (241 lb 3.2 oz). Body surface area is 2.32 meters squared.  Mild Pain (3) Comment: Data Unavailable   No LMP for male patient.  Allergies reviewed: Yes  Medications reviewed: Yes    Medications: Medication refills not needed today.  Pharmacy name entered into [employer]:    [id number] - [place], [place] - [address] [facility] - [place], [place] -  [facility] [id number]  [facility] - [place], [place] - [address] [facility] [address]    Clinical concerns:        Izabel Mcduffie CMA              "

## 2023-03-09 NOTE — PATIENT INSTRUCTIONS
[facility] Triage and after hours / weekends / holidays:  [phone]    Please call the triage or after hours line if you experience a temperature greater than or equal to 100.4, shaking chills, have uncontrolled nausea, vomiting and/or diarrhea, dizziness, shortness of breath, chest pain, bleeding, unexplained bruising, or if you have any other new/concerning symptoms, questions or concerns.      If you are having any concerning symptoms or wish to speak to a provider before your next infusion visit, please call your care coordinator or triage to notify them so we can adequately serve you.     If you need a refill on a narcotic prescription or other medication, please call before your infusion appointment.

## 2023-03-09 NOTE — NURSING NOTE
"Chief Complaint   Patient presents with     Oncology Clinic Visit     Nasopharyngeal cancer     Port Draw     Labs drawn via port by RN. Vitals taken.     Port accessed with 20G 3/4\" flat needle by RN, labs collected, line flushed with saline and heparin.  Vitals taken. Pt checked in for appointment(s).    Cintia Parra RN    " 5

## 2023-03-29 NOTE — PROGRESS NOTES
[facility] CANCER [facility]    PATIENT NAME: Jareth Gallardo  MRN # [id number]   DATE OF VISIT: March 30, 2023  YOB: 1960       Referring Provider: Dr. Jesus Boston  Radiation Oncology: Dr. Mane Razo  Primary Care Provider: Dr. Beni Gallardo at [facility] in [facility]    CANCER TYPE: Nasopharyngeal carcinoma, p16 negative, SAM positive  STAGE: [id number] (DENAE)  ECOG PS: 0    PD-L1: 20% using  clone locally; TPS 70% at [facility]   NGS:     SUMMARY  12/11/21 UC for L neck mass, selling  12/9/21 CT neck ([facility]). 2.4 cm L neck node medial to SCM just above the hyoid, additional LNs surround, scattered R neck and L supraclavicular LNs  2/15/22 US bx L cervical node. Path: SCC, moderately differentiated,   3/4/22 PET/CT. Nasopharyngeal and oropharyngeal mucosal thickening. 2.6 cm BERNARDO nodular GGO, SUV avid, some additional lingular uptake  4/4~5/16/22/22 C1-3 carboplatin gemcitabine. Not a candidate for cisplatin due to hearing loss on audiogram. Neulasta 5/3 after C2D8, ANC 1000   5/31/22 PET/CT. WA  6/22~8/10/22 Chemoradiation with weekly carboplatin  11/16/22 PET/CT. Residual 1.5 cm 2B LN (SUV 28.7), 1 cm enhancing focus in SCM (SUV 22.4), small mildly avid residual enhancing nodes in the L level 2A decreased in size and FDG avidity than before, resolved L level 5 node, small but mildly FDG avid R level 2B node, smaller mildly avid R level 3 nodes, unchanged in ize. NI-RADS 2a in the primary site - FDG uptake along the BOT at the midline, increased from prior. New patchy GGO and nodules in the RUL and L base with mild FDG uptake, likely inflammatory, recommend short interval CT  12/5/22 Salvage L modified radical neck dissection (Dr. Boston). Path: L posterior SCM excision - SCC, 1.1 cm, invading into skeletal muscle, negative margins, +PNI/LVI. Level 2 node 1.5 cm SCC, moderately differentiated, invading into fibroadipose tissue, focally involving the inked resection margin.    1/30/23 CT chest. Patchy LLL GGO, decreased from prior.   2/22/23 PET/CT. Focal updake along the tongue base at midline, decreased from prior, likely inflammatory (SUV 14.8-->8.5), near complete resolution of the rest of the mild mucosal uptake within the tongue base. At least 5 FDG avid lesions in the L deep neck. 2 nodes at the C2 level, posterior to the surgical clips in the L lateral paravertebral region, one measuring 8 mm (SUV 15.9), the other more posteriorly measuring 7 mm. The other 2 adjacent ones are deep to the residual L SCM; 9.6 mm, and a smaller one immediately below in the SCM. There is another mildly FDG avid necrotic lesion inferior to the above 3 foci, again deep to the L SCM. 0.9 cm R level 2b LN (SUV 3.1), likely reactive. Resolution of RUL GGO, stable sub-6 mm nodules.    ASSESSMENT AND PLAN  Nasopharyngeal carcinoma, SAM +julia:  Received induction chemo with gem/carbo C 1-3 followed by chemoradiation with weekly carboplatin and salvage L modified radical neck dissection. Post-treatment PET/CT with concerning areas of focal uptake within the left neck including areas of previous dissection. Discussed options at tumor board 2/24/23 including radical neck dissection versus systemic therapy. Given high PDL-1 expression and high risk of inadequate tumor removal, systemic therapy with pembrolizumab was recommended. Tolerated cycle 1 well without concern. Clinically he is stable to proceed. Pembrolizumab (200 mg every 21 days) started 3/9/23.   -Cycle 2 pembrolizumab today  -RTC every 3 weeks for labs, LEONEL, infusion  -Plan to repeat CTs 4/25 and see Dr. Melvin 4/27    BERNARDO/lung nodules, new RUL nodules in 11/2022: Resolving c/w inflammatory changes from mild aspiration.    Lymphedema: Doing massages, hot showers, and daily stretching exercises.    DM2, steroid-induced hyperglycemia: Controlled, managed by PCP     Mild peripheral neuropathy: R foot numbness, very mild, from DM2. Improving from his  "standpoint. Monitor.    Thyroid: TSH continues to be slightly elevated at 4.69, FT4 normal 0.96 . Risk of thyroid dysfunction high with radiation, neck dissection and CPI use. Monitor closely every 3 weeks.    40 minutes spent on the date of the encounter doing chart review, review of test results, interpretation of tests, patient visit and documentation     Giulia Pittman CNP    The patient was seen in conjunction with Giulia Pittman CNP who served as a scribe for today's visit. I have reviewed and edited the above note, and agree with the above findings and plan.      Jany Mtz CNP  [facility] Cancer [address] [address]  [phone]      SHAHANA Templeton is following up after cycle 1 Pembrolizumab on 3/9.  Patient reports he is feeling well and if it weren't for the cancer would be \"in the best shape of his life.\" He does report new stinging in the neck when taking a shower and hot water his the left neck. Reports a couple weeks ago going ice fishing and wearing knee high socks and still having a line from where the socks reached.  He is currently on a Lasix 20mg daily and elevates his feet as needed.  Denies faitgue, N/V, shortness of breath, cough, diarrhea, constipation, hematochezia, hematuria, or skin changes.      PAST MEDICAL HISTORY  Nasopharyngeal carcinoma as above  DM2  COPD??  Hiatal hernia. Sleeps with his head elevated  LVH on TTE 2009, EF50%, LA Dilation  Dyslipidemia  BARRETT in 2015. Not using CPAP due to developing tooth abscesses when he tried it in 2015  Venous insufficiency in both legs being in an accident involving a mower about 10 years ago, on chronic furosemide. 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 RINSE MOUTH AFTER USE.       furosemide (LASIX) 20 MG tablet Take 20 mg by mouth       metFORMIN (GLUCOPHAGE XR) 500 MG 24 hr tablet daily       mineral oil-hydrophilic petrolatum (AQUAPHOR) external ointment Apply topically every 8 hours Apply to incision. (Patient taking differently: Apply topically as needed Apply to incision.) 50 g 1     senna-docusate (SENOKOT-S/PERICOLACE) 8.6-50 MG tablet Take 1 tablet by mouth 2 times daily (Patient taking differently: Take 1 tablet by mouth daily) 30 tablet 0     TRULICITY 1.5 MG/0.5ML pen INJECT 1.5 MG UNDER THE SKIN ONE TIME A WEEK.       acetaminophen (TYLENOL) 325 MG tablet Take 2 tablets (650 mg) by mouth every 4 hours as needed for other (For optimal non-opioid multimodal pain management to improve pain control.) (Patient not taking: Reported on 3/9/2023) 50 tablet 0     B-D U/F insulin pen needle USE ONE TIME A DAY TO INJECT VICTOZA (Patient not taking: Reported on 3/9/2023)       blood glucose (NO BRAND SPECIFIED) lancets standard Use to test blood sugar 2 times daily or as directed. (Patient not taking: Reported on 3/30/2023) 90 lancet 1     blood glucose (NO BRAND SPECIFIED) test strip Use to test blood sugar 3 times daily or as directed. (Patient not taking: Reported on 3/30/2023) 90 strip 0     blood glucose monitoring (ACCU-CHEK RANDELL PLUS) meter device kit Use to test blood sugar 2 times daily or as directed. (Patient not taking: Reported on 3/30/2023) 1 kit 0     ALLERGIES  No Known Allergies    REVIEW OF SYSTEMS  As above in the HPI, o/w complete 12-point ROS was negative.    PHYSICAL EXAM  BP 96/66   Pulse 75   Temp 98  F (36.7  C) (Oral)   Resp 16   Wt 110.5 kg (243 lb 9.6 oz)   SpO2 98%   BMI 34.95 kg/m      General: No acute distress  Lymph: No lymphadenopathy in  neck  Heart: Regular, rate, and rhythm  Lungs: Clear to ascultation bilaterally  Abdomen: Positive bowel sounds. Soft, non-distended, non-tender. No organomegaly or mass.   Extremities: Bilateral non-pitting lower extremity edema  Neuro: Cranial nerves grossly intact  Rash: none  Vascular access: port      LABORATORY AND IMAGING STUDIES  Most Recent 3 CBC's:  Recent Labs   Lab Test 02/22/23  1333 01/30/23  1401 12/06/22  0851 11/16/22  1334   WBC 3.7* 3.5* 5.0 4.0   HGB 11.2* 11.6* 10.5* 11.5*   MCV 84 85 89 87   * 166 152 190   ANEUTAUTO 2.7 2.4  --  3.0    Most Recent 3 BMP's:  Recent Labs   Lab Test 03/30/23  0828 03/09/23  0832 02/22/23  1333    141 139   POTASSIUM 4.0 3.8 3.9   CHLORIDE 104 103 101   CO2 31* 30* 30*   BUN 11.7 13.9 11.7   CR 0.79 0.79 0.79   ANIONGAP 6* 8 8   LAI 9.0 8.9 9.0   * 167* 117*   PROTTOTAL 7.3 7.0 7.1   ALBUMIN 3.7 3.6 3.6    Most Recent 2 LFT's:  Recent Labs   Lab Test 03/30/23  0828 03/09/23  0832   AST 25 24   ALT 14 16   ALKPHOS 96 94   BILITOTAL 0.6 0.6    Most Recent TSH and T4:  Recent Labs   Lab Test 03/30/23  0828   TSH 4.69*   T4 0.96     Phos/Mag:  Lab Results   Component Value Date    PHOS 3.7 12/06/2022    MAG 1.8 01/30/2023    MAG 1.9 12/06/2022    MAG 1.8 11/16/2022      I reviewed the above laboratory data today.

## 2023-03-30 ENCOUNTER — INFUSION THERAPY VISIT (OUTPATIENT)
Dept: ONCOLOGY | Facility: CLINIC | Age: 63
End: 2023-03-30
Attending: INTERNAL MEDICINE
Payer: COMMERCIAL

## 2023-03-30 ENCOUNTER — APPOINTMENT (OUTPATIENT)
Dept: LAB | Facility: CLINIC | Age: 63
End: 2023-03-30
Attending: INTERNAL MEDICINE
Payer: COMMERCIAL

## 2023-03-30 VITALS
BODY MASS INDEX: 34.95 KG/M2 | SYSTOLIC BLOOD PRESSURE: 96 MMHG | RESPIRATION RATE: 16 BRPM | OXYGEN SATURATION: 98 % | TEMPERATURE: 98 F | DIASTOLIC BLOOD PRESSURE: 66 MMHG | HEART RATE: 75 BPM | WEIGHT: 243.6 LBS

## 2023-03-30 DIAGNOSIS — C11.9 NASOPHARYNGEAL CANCER (H): Primary | ICD-10-CM

## 2023-03-30 DIAGNOSIS — R60.0 BILATERAL LOWER EXTREMITY EDEMA: ICD-10-CM

## 2023-03-30 LAB
ALBUMIN SERPL BCG-MCNC: 3.7 G/DL (ref 3.5–5.2)
ALP SERPL-CCNC: 96 U/L (ref 40–129)
ALT SERPL W P-5'-P-CCNC: 14 U/L (ref 10–50)
ANION GAP SERPL CALCULATED.3IONS-SCNC: 6 MMOL/L (ref 7–15)
AST SERPL W P-5'-P-CCNC: 25 U/L (ref 10–50)
BILIRUB SERPL-MCNC: 0.6 MG/DL
BUN SERPL-MCNC: 11.7 MG/DL (ref 8–23)
CALCIUM SERPL-MCNC: 9 MG/DL (ref 8.8–10.2)
CHLORIDE SERPL-SCNC: 104 MMOL/L (ref 98–107)
CREAT SERPL-MCNC: 0.79 MG/DL (ref 0.67–1.17)
DEPRECATED HCO3 PLAS-SCNC: 31 MMOL/L (ref 22–29)
GFR SERPL CREATININE-BSD FRML MDRD: >90 ML/MIN/1.73M2
GLUCOSE SERPL-MCNC: 101 MG/DL (ref 70–99)
POTASSIUM SERPL-SCNC: 4 MMOL/L (ref 3.4–5.3)
PROT SERPL-MCNC: 7.3 G/DL (ref 6.4–8.3)
SODIUM SERPL-SCNC: 141 MMOL/L (ref 136–145)
T4 FREE SERPL-MCNC: 0.96 NG/DL (ref 0.9–1.7)
TSH SERPL DL<=0.005 MIU/L-ACNC: 4.69 UIU/ML (ref 0.3–4.2)

## 2023-03-30 PROCEDURE — 96413 CHEMO IV INFUSION 1 HR: CPT

## 2023-03-30 PROCEDURE — 84439 ASSAY OF FREE THYROXINE: CPT | Performed by: REGISTERED NURSE

## 2023-03-30 PROCEDURE — 250N000011 HC RX IP 250 OP 636: Performed by: REGISTERED NURSE

## 2023-03-30 PROCEDURE — G0463 HOSPITAL OUTPT CLINIC VISIT: HCPCS | Mod: 25 | Performed by: REGISTERED NURSE

## 2023-03-30 PROCEDURE — 99215 OFFICE O/P EST HI 40 MIN: CPT | Performed by: REGISTERED NURSE

## 2023-03-30 PROCEDURE — 250N000011 HC RX IP 250 OP 636: Performed by: INTERNAL MEDICINE

## 2023-03-30 PROCEDURE — 80053 COMPREHEN METABOLIC PANEL: CPT | Performed by: REGISTERED NURSE

## 2023-03-30 PROCEDURE — 258N000003 HC RX IP 258 OP 636: Performed by: REGISTERED NURSE

## 2023-03-30 PROCEDURE — 84443 ASSAY THYROID STIM HORMONE: CPT | Performed by: REGISTERED NURSE

## 2023-03-30 RX ORDER — ALBUTEROL SULFATE 0.83 MG/ML
2.5 SOLUTION RESPIRATORY (INHALATION)
Status: CANCELLED | OUTPATIENT
Start: 2023-03-30

## 2023-03-30 RX ORDER — HEPARIN SODIUM (PORCINE) LOCK FLUSH IV SOLN 100 UNIT/ML 100 UNIT/ML
5 SOLUTION INTRAVENOUS ONCE
Status: COMPLETED | OUTPATIENT
Start: 2023-03-30 | End: 2023-03-30

## 2023-03-30 RX ORDER — DIPHENHYDRAMINE HYDROCHLORIDE 50 MG/ML
50 INJECTION INTRAMUSCULAR; INTRAVENOUS
Status: CANCELLED
Start: 2023-03-30

## 2023-03-30 RX ORDER — LORAZEPAM 2 MG/ML
0.5 INJECTION INTRAMUSCULAR EVERY 4 HOURS PRN
Status: CANCELLED | OUTPATIENT
Start: 2023-03-30

## 2023-03-30 RX ORDER — METHYLPREDNISOLONE SODIUM SUCCINATE 125 MG/2ML
125 INJECTION, POWDER, LYOPHILIZED, FOR SOLUTION INTRAMUSCULAR; INTRAVENOUS
Status: CANCELLED
Start: 2023-03-30

## 2023-03-30 RX ORDER — ALBUTEROL SULFATE 90 UG/1
1-2 AEROSOL, METERED RESPIRATORY (INHALATION)
Status: CANCELLED
Start: 2023-03-30

## 2023-03-30 RX ORDER — MEPERIDINE HYDROCHLORIDE 25 MG/ML
25 INJECTION INTRAMUSCULAR; INTRAVENOUS; SUBCUTANEOUS EVERY 30 MIN PRN
Status: CANCELLED | OUTPATIENT
Start: 2023-03-30

## 2023-03-30 RX ORDER — EPINEPHRINE 1 MG/ML
0.3 INJECTION, SOLUTION INTRAMUSCULAR; SUBCUTANEOUS EVERY 5 MIN PRN
Status: CANCELLED | OUTPATIENT
Start: 2023-03-30

## 2023-03-30 RX ORDER — HEPARIN SODIUM,PORCINE 10 UNIT/ML
5 VIAL (ML) INTRAVENOUS
Status: CANCELLED | OUTPATIENT
Start: 2023-03-30

## 2023-03-30 RX ORDER — HEPARIN SODIUM (PORCINE) LOCK FLUSH IV SOLN 100 UNIT/ML 100 UNIT/ML
5 SOLUTION INTRAVENOUS
Status: CANCELLED | OUTPATIENT
Start: 2023-03-30

## 2023-03-30 RX ORDER — HEPARIN SODIUM (PORCINE) LOCK FLUSH IV SOLN 100 UNIT/ML 100 UNIT/ML
5 SOLUTION INTRAVENOUS
Status: DISCONTINUED | OUTPATIENT
Start: 2023-03-30 | End: 2023-03-30 | Stop reason: HOSPADM

## 2023-03-30 RX ADMIN — Medication 5 ML: at 08:31

## 2023-03-30 RX ADMIN — Medication 5 ML: at 11:06

## 2023-03-30 RX ADMIN — SODIUM CHLORIDE 200 MG: 9 INJECTION, SOLUTION INTRAVENOUS at 10:30

## 2023-03-30 RX ADMIN — SODIUM CHLORIDE 250 ML: 9 INJECTION, SOLUTION INTRAVENOUS at 10:30

## 2023-03-30 ASSESSMENT — PAIN SCALES - GENERAL: PAINLEVEL: NO PAIN (0)

## 2023-03-30 NOTE — NURSING NOTE
Chief Complaint   Patient presents with     Port Draw     Labs drawn by RN via port, vitals taken.     Port accessed with 20 gauge 3/4 inch flat needle by RN, labs collected, line flushed with saline and heparin.  Vitals taken. Pt checked in for appointment(s).    Mary Cole RN

## 2023-03-30 NOTE — PROGRESS NOTES
Infusion Nursing Note:  Jareth Gallardo presents today for Cycle 2 Day 1 Keytruda.    Patient seen by provider today: Yes: Jany Mtz NP   present during visit today: Not Applicable.    Note: Patient presents to infusion today doing well. No new questions or concerns following his visit with Jany Mtz NP.     Intravenous Access:  Implanted Port.    Treatment Conditions:  Lab Results   Component Value Date    HGB 11.2 (L) 02/22/2023    WBC 3.7 (L) 02/22/2023    ANEU 7.0 05/16/2022    ANEUTAUTO 2.7 02/22/2023     (L) 02/22/2023      Lab Results   Component Value Date     03/30/2023    POTASSIUM 4.0 03/30/2023    MAG 1.8 01/30/2023    CR 0.79 03/30/2023    LAI 9.0 03/30/2023    BILITOTAL 0.6 03/30/2023    ALBUMIN 3.7 03/30/2023    ALT 14 03/30/2023    AST 25 03/30/2023     Results reviewed, labs MET treatment parameters, ok to proceed with treatment.    Post Infusion Assessment:  Patient tolerated infusion without incident.  Blood return noted pre and post infusion.  Site patent and intact, free from redness, edema or discomfort.  No evidence of extravasations.  Access discontinued per protocol.     Discharge Plan:   Patient declined prescription refills.  Discharge instructions reviewed with: Patient.  Patient and/or family verbalized understanding of discharge instructions and all questions answered.  AVS to patient via [employer].  Patient will return 4/20 for next appointment.   Patient discharged in stable condition accompanied by: self.  Departure Mode: Ambulatory.      Eugenia Russo RN

## 2023-03-30 NOTE — NURSING NOTE
"Oncology Rooming Note    March 30, 2023 8:40 AM   Jareth Gallardo is a 62 year old male who presents for:    Chief Complaint   Patient presents with     Port Draw     Labs drawn by RN via port, vitals taken.     Oncology Clinic Visit     Nasopharyngeal      Initial Vitals: BP 96/66   Pulse 75   Temp 98  F (36.7  C) (Oral)   Resp 16   Wt 110.5 kg (243 lb 9.6 oz)   SpO2 98%   BMI 34.95 kg/m   Estimated body mass index is 34.95 kg/m  as calculated from the following:    Height as of 2/22/23: 1.778 m (5' 10\").    Weight as of this encounter: 110.5 kg (243 lb 9.6 oz). Body surface area is 2.34 meters squared.  No Pain (0) Comment: Data Unavailable   No LMP for male patient.  Allergies reviewed: Yes  Medications reviewed: Yes    Medications: Medication refills not needed today.  Pharmacy name entered into [employer]:    [id number] - [place], [place] - [id number] [place] [address] PHARMACY [facility] - [place], [place] - [address] [place] [address] [phone]  [facility] - [place], [place] - [address] [place] [address]    Clinical concerns: None.        Marilynn Saravia, RICCO            "

## 2023-03-30 NOTE — PATIENT INSTRUCTIONS
Contact Numbers  [facility]: [phone] (for symptom and scheduling needs)    Please call the [facility] Triage line if you experience a temperature greater than or equal to 100.4, shaking chills, have uncontrolled nausea, vomiting and/or diarrhea, dizziness, shortness of breath, chest pain, bleeding, unexplained bruising, or if you have any other new/concerning symptoms, questions or concerns.     If you are having any concerning symptoms or wish to speak to a provider before your next infusion visit, please call your care coordinator or triage to notify them so we can adequately serve you.     If you need a refill on a narcotic prescription or other medication, please call triage before your infusion appointment.           March 2023 Sunday Monday Tuesday Wednesday Thursday Friday Saturday                  1     2     3     4       5     6     7     8     9    LAB CENTRAL   8:15 AM   (15 min.)   [facility] LAB DRAW   [facility]    RETURN CCSL   8:45 AM   (45 min.)   Jany Mtz CNP   [facility]    ONC INFUSION 1 HR (60 MIN)  12:00 PM   (60 min.)    ONC INFUSION NURSE   [facility] 10     11       12     13     14     15     16     17     18       19     20     21     22     23     24     25       26     27     28     29     30    LAB CENTRAL   8:30 AM   (15 min.)   [facility] LAB DRAW   [facility]    RETURN CCSL   8:45 AM   (45 min.)   Jany Mtz CNP   [facility]    ONC INFUSION 1 HR (60 MIN)  11:00 AM   (60 min.)    ONC INFUSION NURSE   [facility] 31 April 2023 Sunday Monday Tuesday Wednesday Thursday Friday Saturday                                 1       2     3     4     5     6     7     8       9     10     11     12     13     14     15       16     17     18     19     20    LAB CENTRAL    9:30 AM   (15 min.)   [facility] LAB DRAW   [facility]    RETURN CCSL   9:45 AM   (45 min.)   Jany Mtz CNP   [facility]    ONC INFUSION 1 HR (60 MIN)  11:00 AM   (60 min.)    ONC INFUSION NURSE   [facility] 21     22       23     24     25    CT CHEST ABDOMEN W   9:50 AM   (20 min.)   [facility]   [facility] [place] [place]    CT SOFT TISSUE NECK W  10:25 AM   (20 min.)   [facility]   [facility] [place] 26     27    RETURN CCSL   7:55 AM   (30 min.)   Dariela Melvin MD   [facility] 28     29       30                                                     Lab Results:  Recent Results (from the past 12 hour(s))   Comprehensive metabolic panel    Collection Time: 03/30/23  8:28 AM   Result Value Ref Range    Sodium 141 136 - 145 mmol/L    Potassium 4.0 3.4 - 5.3 mmol/L    Chloride 104 98 - 107 mmol/L    Carbon Dioxide (CO2) 31 (H) 22 - 29 mmol/L    Anion Gap 6 (L) 7 - 15 mmol/L    Urea Nitrogen 11.7 8.0 - 23.0 mg/dL    Creatinine 0.79 0.67 - 1.17 mg/dL    Calcium 9.0 8.8 - 10.2 mg/dL    Glucose 101 (H) 70 - 99 mg/dL    Alkaline Phosphatase 96 40 - 129 U/L    AST 25 10 - 50 U/L    ALT 14 10 - 50 U/L    Protein Total 7.3 6.4 - 8.3 g/dL    Albumin 3.7 3.5 - 5.2 g/dL    Bilirubin Total 0.6 <=1.2 mg/dL    GFR Estimate >90 >60 mL/min/1.73m2   TSH with free T4 reflex    Collection Time: 03/30/23  8:28 AM   Result Value Ref Range    TSH 4.69 (H) 0.30 - 4.20 uIU/mL

## 2023-04-19 ENCOUNTER — PATIENT OUTREACH (OUTPATIENT)
Dept: ONCOLOGY | Facility: CLINIC | Age: 63
End: 2023-04-19
Payer: COMMERCIAL

## 2023-04-19 NOTE — PROGRESS NOTES
[facility]: Cancer Care                                                                                          I called the patient to follow up on his ER visit to [facility]. Please see ER physician note for HPI and treatment plan.     He has been diagnosed with URI likely related to parainfluenza as seen on respiratory panel done at the ER. He is wondering if he needs to come in tomorrow for therapy.   After review with IZABELLA Carmichael we will postpone treatment by one week and adjust the rest of his appointments accordingly.     Encouraged the patient to call with new or worsening symptoms.     Signature:  Emiliano Nelson, DNP, RN, OCN  RN Care Coordinator   Dr. Asha De Leon and Dr. Dariela Melvin  [facility]

## 2023-04-27 ENCOUNTER — APPOINTMENT (OUTPATIENT)
Dept: LAB | Facility: CLINIC | Age: 63
End: 2023-04-27
Attending: INTERNAL MEDICINE
Payer: COMMERCIAL

## 2023-04-27 ENCOUNTER — ONCOLOGY VISIT (OUTPATIENT)
Dept: ONCOLOGY | Facility: CLINIC | Age: 63
End: 2023-04-27
Attending: INTERNAL MEDICINE
Payer: COMMERCIAL

## 2023-04-27 VITALS
BODY MASS INDEX: 33.82 KG/M2 | WEIGHT: 235.7 LBS | DIASTOLIC BLOOD PRESSURE: 72 MMHG | TEMPERATURE: 98.6 F | RESPIRATION RATE: 16 BRPM | OXYGEN SATURATION: 95 % | HEART RATE: 99 BPM | SYSTOLIC BLOOD PRESSURE: 100 MMHG

## 2023-04-27 DIAGNOSIS — C11.9 NASOPHARYNGEAL CANCER (H): Primary | ICD-10-CM

## 2023-04-27 DIAGNOSIS — I89.0 LYMPHEDEMA: ICD-10-CM

## 2023-04-27 DIAGNOSIS — Z13.29 SCREENING FOR HYPOTHYROIDISM: ICD-10-CM

## 2023-04-27 DIAGNOSIS — R80.9 TYPE 2 DIABETES MELLITUS WITH MICROALBUMINURIA, WITHOUT LONG-TERM CURRENT USE OF INSULIN (H): ICD-10-CM

## 2023-04-27 DIAGNOSIS — E11.29 TYPE 2 DIABETES MELLITUS WITH MICROALBUMINURIA, WITHOUT LONG-TERM CURRENT USE OF INSULIN (H): ICD-10-CM

## 2023-04-27 LAB
ALBUMIN SERPL BCG-MCNC: 3.7 G/DL (ref 3.5–5.2)
ALP SERPL-CCNC: 92 U/L (ref 40–129)
ALT SERPL W P-5'-P-CCNC: 10 U/L (ref 10–50)
ANION GAP SERPL CALCULATED.3IONS-SCNC: 6 MMOL/L (ref 7–15)
AST SERPL W P-5'-P-CCNC: 22 U/L (ref 10–50)
BILIRUB SERPL-MCNC: 0.5 MG/DL
BUN SERPL-MCNC: 13.3 MG/DL (ref 8–23)
CALCIUM SERPL-MCNC: 9.5 MG/DL (ref 8.8–10.2)
CHLORIDE SERPL-SCNC: 102 MMOL/L (ref 98–107)
CREAT SERPL-MCNC: 0.81 MG/DL (ref 0.67–1.17)
DEPRECATED HCO3 PLAS-SCNC: 31 MMOL/L (ref 22–29)
GFR SERPL CREATININE-BSD FRML MDRD: >90 ML/MIN/1.73M2
GLUCOSE SERPL-MCNC: 95 MG/DL (ref 70–99)
POTASSIUM SERPL-SCNC: 4.1 MMOL/L (ref 3.4–5.3)
PROT SERPL-MCNC: 7.7 G/DL (ref 6.4–8.3)
SODIUM SERPL-SCNC: 139 MMOL/L (ref 136–145)
T4 FREE SERPL-MCNC: 0.85 NG/DL (ref 0.9–1.7)
TSH SERPL DL<=0.005 MIU/L-ACNC: 4.31 UIU/ML (ref 0.3–4.2)

## 2023-04-27 PROCEDURE — 84443 ASSAY THYROID STIM HORMONE: CPT | Performed by: REGISTERED NURSE

## 2023-04-27 PROCEDURE — 84439 ASSAY OF FREE THYROXINE: CPT | Performed by: REGISTERED NURSE

## 2023-04-27 PROCEDURE — 96413 CHEMO IV INFUSION 1 HR: CPT

## 2023-04-27 PROCEDURE — 99215 OFFICE O/P EST HI 40 MIN: CPT | Performed by: REGISTERED NURSE

## 2023-04-27 PROCEDURE — 250N000011 HC RX IP 250 OP 636: Performed by: REGISTERED NURSE

## 2023-04-27 PROCEDURE — 258N000003 HC RX IP 258 OP 636: Performed by: REGISTERED NURSE

## 2023-04-27 PROCEDURE — G0463 HOSPITAL OUTPT CLINIC VISIT: HCPCS | Mod: 25 | Performed by: REGISTERED NURSE

## 2023-04-27 PROCEDURE — 80053 COMPREHEN METABOLIC PANEL: CPT | Performed by: REGISTERED NURSE

## 2023-04-27 RX ORDER — DIPHENHYDRAMINE HYDROCHLORIDE 50 MG/ML
50 INJECTION INTRAMUSCULAR; INTRAVENOUS
Status: CANCELLED
Start: 2023-04-27

## 2023-04-27 RX ORDER — METHYLPREDNISOLONE SODIUM SUCCINATE 125 MG/2ML
125 INJECTION, POWDER, LYOPHILIZED, FOR SOLUTION INTRAMUSCULAR; INTRAVENOUS
Status: CANCELLED
Start: 2023-04-27

## 2023-04-27 RX ORDER — ALBUTEROL SULFATE 90 UG/1
1-2 AEROSOL, METERED RESPIRATORY (INHALATION)
Status: CANCELLED
Start: 2023-04-27

## 2023-04-27 RX ORDER — HEPARIN SODIUM,PORCINE 10 UNIT/ML
5 VIAL (ML) INTRAVENOUS
Status: CANCELLED | OUTPATIENT
Start: 2023-04-27

## 2023-04-27 RX ORDER — MEPERIDINE HYDROCHLORIDE 25 MG/ML
25 INJECTION INTRAMUSCULAR; INTRAVENOUS; SUBCUTANEOUS EVERY 30 MIN PRN
Status: CANCELLED | OUTPATIENT
Start: 2023-04-27

## 2023-04-27 RX ORDER — EPINEPHRINE 1 MG/ML
0.3 INJECTION, SOLUTION INTRAMUSCULAR; SUBCUTANEOUS EVERY 5 MIN PRN
Status: CANCELLED | OUTPATIENT
Start: 2023-04-27

## 2023-04-27 RX ORDER — ALBUTEROL SULFATE 0.83 MG/ML
2.5 SOLUTION RESPIRATORY (INHALATION)
Status: CANCELLED | OUTPATIENT
Start: 2023-04-27

## 2023-04-27 RX ORDER — HEPARIN SODIUM (PORCINE) LOCK FLUSH IV SOLN 100 UNIT/ML 100 UNIT/ML
5 SOLUTION INTRAVENOUS
Status: DISCONTINUED | OUTPATIENT
Start: 2023-04-27 | End: 2023-04-27 | Stop reason: HOSPADM

## 2023-04-27 RX ORDER — HEPARIN SODIUM (PORCINE) LOCK FLUSH IV SOLN 100 UNIT/ML 100 UNIT/ML
500 SOLUTION INTRAVENOUS ONCE
Status: COMPLETED | OUTPATIENT
Start: 2023-04-27 | End: 2023-04-27

## 2023-04-27 RX ORDER — HEPARIN SODIUM (PORCINE) LOCK FLUSH IV SOLN 100 UNIT/ML 100 UNIT/ML
5 SOLUTION INTRAVENOUS
Status: CANCELLED | OUTPATIENT
Start: 2023-04-27

## 2023-04-27 RX ORDER — LORAZEPAM 2 MG/ML
0.5 INJECTION INTRAMUSCULAR EVERY 4 HOURS PRN
Status: CANCELLED | OUTPATIENT
Start: 2023-04-27

## 2023-04-27 RX ADMIN — SODIUM CHLORIDE 250 ML: 9 INJECTION, SOLUTION INTRAVENOUS at 14:27

## 2023-04-27 RX ADMIN — Medication 500 UNITS: at 12:53

## 2023-04-27 RX ADMIN — Medication 5 ML: at 15:21

## 2023-04-27 RX ADMIN — SODIUM CHLORIDE 200 MG: 9 INJECTION, SOLUTION INTRAVENOUS at 14:41

## 2023-04-27 ASSESSMENT — PAIN SCALES - GENERAL: PAINLEVEL: NO PAIN (0)

## 2023-04-27 NOTE — Clinical Note
4/27/2023         RE: Jareth Gallardo  [address] [place] [address]        Dear Colleague,    Thank you for referring your patient, Jareth Gallardo, to the [facility]CER [facility]. Please see a copy of my visit note below.       [facility]    PATIENT NAME: Jareth Gallardo  MRN # [id number]   DATE OF VISIT: April 27, 2023  YOB: 1960       Referring Provider: Dr. Jesus Boston  Radiation Oncology: Dr. Mane Razo  Primary Care Provider: Dr. Beni Gallardo at [facility] in [facility]    CANCER TYPE: Nasopharyngeal carcinoma, p16 negative, SAM positive  STAGE: [id number] (DENAE)  ECOG PS: 0    PD-L1: 20% using  clone locally; TPS 70% at [facility]   NGS:     SUMMARY  12/11/21 UC for L neck mass, selling  12/9/21 CT neck ([place]). 2.4 cm L neck node medial to SCM just above the hyoid, additional LNs surround, scattered R neck and L supraclavicular LNs  2/15/22 US bx L cervical node. Path: SCC, moderately differentiated,   3/4/22 PET/CT. Nasopharyngeal and oropharyngeal mucosal thickening. 2.6 cm BERNARDO nodular GGO, SUV avid, some additional lingular uptake  4/4~5/16/22/22 C1-3 carboplatin gemcitabine. Not a candidate for cisplatin due to hearing loss on audiogram. Neulasta 5/3 after C2D8, ANC 1000   5/31/22 PET/CT. VT  6/22~8/10/22 Chemoradiation with weekly carboplatin  11/16/22 PET/CT. Residual 1.5 cm 2B LN (SUV 28.7), 1 cm enhancing focus in SCM (SUV 22.4), small mildly avid residual enhancing nodes in the L level 2A decreased in size and FDG avidity than before, resolved L level 5 node, small but mildly FDG avid R level 2B node, smaller mildly avid R level 3 nodes, unchanged in ize. NI-RADS 2a in the primary site - FDG uptake along the BOT at the midline, increased from prior. New patchy GGO and nodules in the RUL and L base with mild FDG uptake, likely inflammatory, recommend short interval CT  12/5/22 Salvage L modified radical neck  dissection (Dr. Boston). Path: L posterior SCM excision - SCC, 1.1 cm, invading into skeletal muscle, negative margins, +PNI/LVI. Level 2 node 1.5 cm SCC, moderately differentiated, invading into fibroadipose tissue, focally involving the inked resection margin.   1/30/23 CT chest. Patchy LLL GGO, decreased from prior.   2/22/23 PET/CT. Focal updake along the tongue base at midline, decreased from prior, likely inflammatory (SUV 14.8-->8.5), near complete resolution of the rest of the mild mucosal uptake within the tongue base. At least 5 FDG avid lesions in the L deep neck. 2 nodes at the C2 level, posterior to the surgical clips in the L lateral paravertebral region, one measuring 8 mm (SUV 15.9), the other more posteriorly measuring 7 mm. The other 2 adjacent ones are deep to the residual L SCM; 9.6 mm, and a smaller one immediately below in the SCM. There is another mildly FDG avid necrotic lesion inferior to the above 3 foci, again deep to the L SCM. 0.9 cm R level 2b LN (SUV 3.1), likely reactive. Resolution of RUL GGO, stable sub-6 mm nodules.    ASSESSMENT AND PLAN  Nasopharyngeal carcinoma, SAM +julai:  Received induction chemo with gem/carbo C 1-3 followed by chemoradiation with weekly carboplatin and salvage L modified radical neck dissection. Post-treatment PET/CT with concerning areas of focal uptake within the left neck including areas of previous dissection. Discussed options at tumor board 2/24/23 including radical neck dissection versus systemic therapy. Given high PDL-1 expression and high risk of inadequate tumor removal, systemic therapy with pembrolizumab was recommended. Tolerated cycle 1 well without concern. Clinically he is stable to proceed. Pembrolizumab (200 mg every 21 days) started 3/9/23.   -Cycle 2 pembrolizumab today  -RTC every 3 weeks for labs, LEONEL, infusion  -Plan to repeat CTs 4/25 and see Dr. Melvin 4/27    BERNARDO/lung nodules, new RUL nodules in 11/2022: Resolving c/w  inflammatory changes from mild aspiration.    Lymphedema: Doing massages, hot showers, and daily stretching exercises.    DM2, steroid-induced hyperglycemia: Controlled, managed by PCP     Mild peripheral neuropathy: R foot numbness, very mild, from DM2. Improving from his standpoint. Monitor.    Thyroid: TSH continues to be slightly elevated at 4.69, FT4 normal 0.96 . Risk of thyroid dysfunction high with radiation, neck dissection and CPI use. Monitor closely every 3 weeks.    *** minutes spent on the date of the encounter doing {2021 E&M time in:[id number]}     Jany Mtz, CNP    SUBJECTIVE  ***    PAST MEDICAL HISTORY  Nasopharyngeal carcinoma as above  DM2  COPD??  Hiatal hernia. Sleeps with his head elevated  LVH on TTE 2009, EF50%, LA Dilation  Dyslipidemia  BARRETT in 2015. Not using CPAP due to developing tooth abscesses when he tried it in 2015  Venous insufficiency in both legs being in an accident involving a mower about 10 years ago, on chronic furosemide. LE US negative 12/15/2009  Cholelithiasis  Hyperplastic colon polyp, due 2020  Tonsillectomy  Baker cyst RLE US 2013    Neuropathy from DM - R foot numbness chronically    Exposed to lots of wood dust in his occupation    CURRENT OUTPATIENT MEDICATIONS  Current Outpatient Medications   Medication Sig Dispense Refill     atorvastatin (LIPITOR) 20 MG tablet Take 20 mg by mouth       fluticasone-vilanterol (BREO ELLIPTA) 100-25 MCG/INH inhaler INHALE 1 PUFF INTO THE LUNGS ONE TIME A DAY. RINSE MOUTH AFTER USE.       furosemide (LASIX) 20 MG tablet Take 20 mg by mouth       metFORMIN (GLUCOPHAGE XR) 500 MG 24 hr tablet daily       TRULICITY 1.5 MG/0.5ML pen INJECT 1.5 MG UNDER THE SKIN ONE TIME A WEEK.       acetaminophen (TYLENOL) 325 MG tablet Take 2 tablets (650 mg) by mouth every 4 hours as needed for other (For optimal non-opioid multimodal pain management to improve pain control.) (Patient not taking: Reported on 3/9/2023) 50 tablet 0     B-D U/F insulin  pen needle USE ONE TIME A DAY TO INJECT VICTOZA (Patient not taking: Reported on 3/9/2023)       blood glucose (NO BRAND SPECIFIED) lancets standard Use to test blood sugar 2 times daily or as directed. (Patient not taking: Reported on 3/30/2023) 90 lancet 1     blood glucose (NO BRAND SPECIFIED) test strip Use to test blood sugar 3 times daily or as directed. (Patient not taking: Reported on 3/30/2023) 90 strip 0     blood glucose monitoring (ACCU-CHEK RANDELL PLUS) meter device kit Use to test blood sugar 2 times daily or as directed. (Patient not taking: Reported on 3/30/2023) 1 kit 0     mineral oil-hydrophilic petrolatum (AQUAPHOR) external ointment Apply topically every 8 hours Apply to incision. (Patient not taking: Reported on 4/27/2023) 50 g 1     senna-docusate (SENOKOT-S/PERICOLACE) 8.6-50 MG tablet Take 1 tablet by mouth 2 times daily (Patient not taking: Reported on 4/27/2023) 30 tablet 0     ALLERGIES  No Known Allergies    REVIEW OF SYSTEMS  As above in the HPI, o/w complete 12-point ROS was negative.    PHYSICAL EXAM  /72   Pulse 99   Temp 98.6  F (37  C) (Oral)   Resp 16   Wt 106.9 kg (235 lb 11.2 oz)   SpO2 95%   BMI 33.82 kg/m      General: No acute distress  Lymph: No lymphadenopathy in neck  Heart: Regular, rate, and rhythm  Lungs: Clear to ascultation bilaterally  Abdomen: Positive bowel sounds. Soft, non-distended, non-tender. No organomegaly or mass.   Extremities: Bilateral non-pitting lower extremity edema  Neuro: Cranial nerves grossly intact  Rash: none  Vascular access: port      LABORATORY AND IMAGING STUDIES  Most Recent 3 CBC's:  Recent Labs   Lab Test 04/27/23  1251 02/22/23  1333 01/30/23  1401   WBC 4.6 3.7* 3.5*   HGB 10.9* 11.2* 11.6*   MCV 85 84 85    146* 166   ANEUTAUTO 3.2 2.7 2.4    Most Recent 3 BMP's:  Recent Labs   Lab Test 03/30/23  0828 03/09/23  0832 02/22/23  1333    141 139   POTASSIUM 4.0 3.8 3.9   CHLORIDE 104 103 101   CO2 31* 30* 30*   BUN  11.7 13.9 11.7   CR 0.79 0.79 0.79   ANIONGAP 6* 8 8   LAI 9.0 8.9 9.0   * 167* 117*   PROTTOTAL 7.3 7.0 7.1   ALBUMIN 3.7 3.6 3.6    Most Recent 2 LFT's:  Recent Labs   Lab Test 03/30/23  0828 03/09/23  0832   AST 25 24   ALT 14 16   ALKPHOS 96 94   BILITOTAL 0.6 0.6    Most Recent TSH and T4:  Recent Labs   Lab Test 03/30/23  0828   TSH 4.69*   T4 0.96     Phos/Mag:  Lab Results   Component Value Date    PHOS 3.7 12/06/2022    MAG 1.8 01/30/2023    MAG 1.9 12/06/2022    MAG 1.8 11/16/2022      I reviewed the above laboratory data today.        [facility]    PATIENT NAME: Jareth Gallardo  MRN # [id number]   DATE OF VISIT: April 27, 2023  YOB: 1960       Referring Provider: Dr. Jesus Boston  Radiation Oncology: Dr. Mane Razo  Primary Care Provider: Dr. Beni Gallardo at [facility] in [facility]    CANCER TYPE: Nasopharyngeal carcinoma, p16 negative, SAM positive  STAGE: [id number] (DENAE)  ECOG PS: 0    PD-L1: 20% using  clone locally; TPS 70% at [facility]   NGS:     SUMMARY  12/11/21 UC for L neck mass, selling  12/9/21 CT neck ([place]). 2.4 cm L neck node medial to SCM just above the hyoid, additional LNs surround, scattered R neck and L supraclavicular LNs  2/15/22 US bx L cervical node. Path: SCC, moderately differentiated,   3/4/22 PET/CT. Nasopharyngeal and oropharyngeal mucosal thickening. 2.6 cm BERNARDO nodular GGO, SUV avid, some additional lingular uptake  4/4~5/16/22/22 C1-3 carboplatin gemcitabine. Not a candidate for cisplatin due to hearing loss on audiogram. Neulasta 5/3 after C2D8, ANC 1000   5/31/22 PET/CT. PA  6/22~8/10/22 Chemoradiation with weekly carboplatin  11/16/22 PET/CT. Residual 1.5 cm 2B LN (SUV 28.7), 1 cm enhancing focus in SCM (SUV 22.4), small mildly avid residual enhancing nodes in the L level 2A decreased in size and FDG avidity than before, resolved L level 5 node, small but mildly FDG avid R level 2B node, smaller mildly avid R  level 3 nodes, unchanged in ize. NI-RADS 2a in the primary site - FDG uptake along the BOT at the midline, increased from prior. New patchy GGO and nodules in the RUL and L base with mild FDG uptake, likely inflammatory, recommend short interval CT  12/5/22 Salvage L modified radical neck dissection (Dr. Boston). Path: L posterior SCM excision - SCC, 1.1 cm, invading into skeletal muscle, negative margins, +PNI/LVI. Level 2 node 1.5 cm SCC, moderately differentiated, invading into fibroadipose tissue, focally involving the inked resection margin.   1/30/23 CT chest. Patchy LLL GGO, decreased from prior.   2/22/23 PET/CT. Focal updake along the tongue base at midline, decreased from prior, likely inflammatory (SUV 14.8-->8.5), near complete resolution of the rest of the mild mucosal uptake within the tongue base. At least 5 FDG avid lesions in the L deep neck. 2 nodes at the C2 level, posterior to the surgical clips in the L lateral paravertebral region, one measuring 8 mm (SUV 15.9), the other more posteriorly measuring 7 mm. The other 2 adjacent ones are deep to the residual L SCM; 9.6 mm, and a smaller one immediately below in the SCM. There is another mildly FDG avid necrotic lesion inferior to the above 3 foci, again deep to the L SCM. 0.9 cm R level 2b LN (SUV 3.1), likely reactive. Resolution of RUL GGO, stable sub-6 mm nodules.    ASSESSMENT AND PLAN  Nasopharyngeal carcinoma, SAM +julia:  Received induction chemo with gem/carbo C 1-3 followed by chemoradiation with weekly carboplatin and salvage L modified radical neck dissection. Post-treatment PET/CT with concerning areas of focal uptake within the left neck including areas of previous dissection. Discussed options at [facility] 2/24/23 including radical neck dissection versus systemic therapy. Given high PDL-1 expression and high risk of inadequate tumor removal, systemic therapy with pembrolizumab was recommended. Tolerating well without concern for immune  mediated SE. C3 delayed last week due to URI. Will proceed with treatment today given improvement in symptoms.  -Cycle 3 pembrolizumab today   -Repeat CT chest/abd and CT neck 5/3, visit with Dr. Melvin 5/4 to review    BERNARDO/lung nodules, new RUL nodules in 11/2022: Resolving c/w inflammatory changes from mild aspiration.    Lymphedema: Continue massages, hot showers, and daily stretching exercises.    DM2, steroid-induced hyperglycemia: Controlled, managed by PCP     Mild peripheral neuropathy: R foot numbness, very mild, from DM2. Improving.    Thyroid: TSH continues to be slightly elevated at 4.31. Risk of thyroid dysfunction high with radiation, neck dissection and CPI use. Monitor closely every 3 weeks.    40 minutes spent on the date of the encounter doing chart review, review of outside records, review of test results, interpretation of tests, patient visit, documentation and discussion with family     Jany Mtz CNP    SHAHANA Templeton is seen in clinic today prior to cycle 3 pembrolizumab. Treatment was delayed last week due to URI. He presented to the ED locally 4/18 due to fever, cough and rhinorrhea. Viral swab was positive for parainfluenza. Reports improvement in symptoms. Has an occasional cough and clears his throat. No sinus congestion, dyspnea, fever, appetite changes or diarrhea. Numbness in his chin, left neck and shoulder continue to slowly improve.     PAST MEDICAL HISTORY  Nasopharyngeal carcinoma as above  DM2  COPD??  Hiatal hernia. Sleeps with his head elevated  LVH on TTE 2009, EF50%, LA Dilation  Dyslipidemia  BARRETT in 2015. Not using CPAP due to developing tooth abscesses when he tried it in 2015  Venous insufficiency in both legs being in an accident involving a mower about 10 years ago, on chronic furosemide. LE US negative 12/15/2009  Cholelithiasis  Hyperplastic colon polyp, due 2020  Tonsillectomy  Baker cyst RLE US 2013    Neuropathy from DM - R foot numbness  chronically    Exposed to lots of wood dust in his occupation    CURRENT OUTPATIENT MEDICATIONS  Current Outpatient Medications   Medication Sig Dispense Refill     atorvastatin (LIPITOR) 20 MG tablet Take 20 mg by mouth       fluticasone-vilanterol (BREO ELLIPTA) 100-25 MCG/INH inhaler INHALE 1 PUFF INTO THE LUNGS ONE TIME A DAY. RINSE MOUTH AFTER USE.       furosemide (LASIX) 20 MG tablet Take 20 mg by mouth       metFORMIN (GLUCOPHAGE XR) 500 MG 24 hr tablet daily       TRULICITY 1.5 MG/0.5ML pen INJECT 1.5 MG UNDER THE SKIN ONE TIME A WEEK.       acetaminophen (TYLENOL) 325 MG tablet Take 2 tablets (650 mg) by mouth every 4 hours as needed for other (For optimal non-opioid multimodal pain management to improve pain control.) (Patient not taking: Reported on 3/9/2023) 50 tablet 0     B-D U/F insulin pen needle USE ONE TIME A DAY TO INJECT VICTOZA (Patient not taking: Reported on 3/9/2023)       blood glucose (NO BRAND SPECIFIED) lancets standard Use to test blood sugar 2 times daily or as directed. (Patient not taking: Reported on 3/30/2023) 90 lancet 1     blood glucose (NO BRAND SPECIFIED) test strip Use to test blood sugar 3 times daily or as directed. (Patient not taking: Reported on 3/30/2023) 90 strip 0     blood glucose monitoring (ACCU-CHEK RANDELL PLUS) meter device kit Use to test blood sugar 2 times daily or as directed. (Patient not taking: Reported on 3/30/2023) 1 kit 0     mineral oil-hydrophilic petrolatum (AQUAPHOR) external ointment Apply topically every 8 hours Apply to incision. (Patient not taking: Reported on 4/27/2023) 50 g 1     senna-docusate (SENOKOT-S/PERICOLACE) 8.6-50 MG tablet Take 1 tablet by mouth 2 times daily (Patient not taking: Reported on 4/27/2023) 30 tablet 0     ALLERGIES  No Known Allergies    REVIEW OF SYSTEMS  As above in the HPI, o/w complete 12-point ROS was negative.    PHYSICAL EXAM  /72   Pulse 99   Temp 98.6  F (37  C) (Oral)   Resp 16   Wt 106.9 kg (235 lb  11.2 oz)   SpO2 95%   BMI 33.82 kg/m      General: No acute distress  Lymph: No lymphadenopathy in neck  Heart: RRR, no m/g/r  Lungs: Clear to ascultation bilaterally  Neuro: Cranial nerves grossly intact, numbness to left neck  Rash: none  Vascular access: port      LABORATORY AND IMAGING STUDIES  Most Recent 3 CBC's:  Recent Labs   Lab Test 04/27/23  1251 02/22/23  1333 01/30/23  1401   WBC 4.6 3.7* 3.5*   HGB 10.9* 11.2* 11.6*   MCV 85 84 85    146* 166   ANEUTAUTO 3.2 2.7 2.4    Most Recent 3 BMP's:  Recent Labs   Lab Test 04/27/23  1251 03/30/23  0828 03/09/23  0832    141 141   POTASSIUM 4.1 4.0 3.8   CHLORIDE 102 104 103   CO2 31* 31* 30*   BUN 13.3 11.7 13.9   CR 0.81 0.79 0.79   ANIONGAP 6* 6* 8   LAI 9.5 9.0 8.9   GLC 95 101* 167*   PROTTOTAL 7.7 7.3 7.0   ALBUMIN 3.7 3.7 3.6    Most Recent 2 LFT's:  Recent Labs   Lab Test 04/27/23  1251 03/30/23  0828   AST 22 25   ALT 10 14   ALKPHOS 92 96   BILITOTAL 0.5 0.6    Most Recent TSH and T4:  Recent Labs   Lab Test 04/27/23  1251 03/30/23  0828   TSH 4.31* 4.69*   T4  --  0.96     Phos/Mag:  Lab Results   Component Value Date    PHOS 3.7 12/06/2022    MAG 1.8 01/30/2023    MAG 1.9 12/06/2022    MAG 1.8 11/16/2022      I reviewed the above laboratory data today.       Again, thank you for allowing me to participate in the care of your patient.        Sincerely,        Jany Mtz, CNP

## 2023-04-27 NOTE — PROGRESS NOTES
[facility] CANCER [facility]    PATIENT NAME: Jareth Gallardo  MRN # [id number]   DATE OF VISIT: April 27, 2023  YOB: 1960       Referring Provider: Dr. Jesus Boston  Radiation Oncology: Dr. Mane Razo  Primary Care Provider: Dr. Beni Gallardo at [facility] in [facility]    CANCER TYPE: Nasopharyngeal carcinoma, p16 negative, SAM positive  STAGE: [id number] (DENAE)  ECOG PS: 0    PD-L1: 20% using  clone locally; TPS 70% at [facility]   NGS:     SUMMARY  12/11/21 UC for L neck mass, selling  12/9/21 CT neck ([place]). 2.4 cm L neck node medial to SCM just above the hyoid, additional LNs surround, scattered R neck and L supraclavicular LNs  2/15/22 US bx L cervical node. Path: SCC, moderately differentiated,   3/4/22 PET/CT. Nasopharyngeal and oropharyngeal mucosal thickening. 2.6 cm BERNARDO nodular GGO, SUV avid, some additional lingular uptake  4/4~5/16/22/22 C1-3 carboplatin gemcitabine. Not a candidate for cisplatin due to hearing loss on audiogram. Neulasta 5/3 after C2D8, ANC 1000   5/31/22 PET/CT. ID  6/22~8/10/22 Chemoradiation with weekly carboplatin  11/16/22 PET/CT. Residual 1.5 cm 2B LN (SUV 28.7), 1 cm enhancing focus in SCM (SUV 22.4), small mildly avid residual enhancing nodes in the L level 2A decreased in size and FDG avidity than before, resolved L level 5 node, small but mildly FDG avid R level 2B node, smaller mildly avid R level 3 nodes, unchanged in ize. NI-RADS 2a in the primary site - FDG uptake along the BOT at the midline, increased from prior. New patchy GGO and nodules in the RUL and L base with mild FDG uptake, likely inflammatory, recommend short interval CT  12/5/22 Salvage L modified radical neck dissection (Dr. Boston). Path: L posterior SCM excision - SCC, 1.1 cm, invading into skeletal muscle, negative margins, +PNI/LVI. Level 2 node 1.5 cm SCC, moderately differentiated, invading into fibroadipose tissue, focally involving the inked resection margin.    1/30/23 CT chest. Patchy LLL GGO, decreased from prior.   2/22/23 PET/CT. Focal updake along the tongue base at midline, decreased from prior, likely inflammatory (SUV 14.8-->8.5), near complete resolution of the rest of the mild mucosal uptake within the tongue base. At least 5 FDG avid lesions in the L deep neck. 2 nodes at the C2 level, posterior to the surgical clips in the L lateral paravertebral region, one measuring 8 mm (SUV 15.9), the other more posteriorly measuring 7 mm. The other 2 adjacent ones are deep to the residual L SCM; 9.6 mm, and a smaller one immediately below in the SCM. There is another mildly FDG avid necrotic lesion inferior to the above 3 foci, again deep to the L SCM. 0.9 cm R level 2b LN (SUV 3.1), likely reactive. Resolution of RUL GGO, stable sub-6 mm nodules.    ASSESSMENT AND PLAN  Nasopharyngeal carcinoma, SAM +julia:  Received induction chemo with gem/carbo C 1-3 followed by chemoradiation with weekly carboplatin and salvage L modified radical neck dissection. Post-treatment PET/CT with concerning areas of focal uptake within the left neck including areas of previous dissection. Discussed options at  2/24/23 including radical neck dissection versus systemic therapy. Given high PDL-1 expression and high risk of inadequate tumor removal, systemic therapy with pembrolizumab was recommended. Tolerating well without concern for immune mediated SE. C3 delayed last week due to URI. Will proceed with treatment today given improvement in symptoms.  -Cycle 3 pembrolizumab today   -Repeat CT chest/abd and CT neck 5/3, visit with Dr. Melvin 5/4 to review    BERNARDO/lung nodules, new RUL nodules in 11/2022: Resolving c/w inflammatory changes from mild aspiration.    Lymphedema: Continue massages, hot showers, and daily stretching exercises.    DM2, steroid-induced hyperglycemia: Controlled, managed by PCP     Mild peripheral neuropathy: R foot numbness, very mild, from DM2.  Improving.    Thyroid: TSH continues to be slightly elevated at 4.31. Risk of thyroid dysfunction high with radiation, neck dissection and CPI use. Monitor closely every 3 weeks.    40 minutes spent on the date of the encounter doing chart review, review of outside records, review of test results, interpretation of tests, patient visit, documentation and discussion with family     IZABELLA Powell  Jareth is seen in clinic today prior to cycle 3 pembrolizumab. Treatment was delayed last week due to URI. He presented to the ED locally 4/18 due to fever, cough and rhinorrhea. Viral swab was positive for parainfluenza. Reports improvement in symptoms. Has an occasional cough and clears his throat. No sinus congestion, dyspnea, fever, appetite changes or diarrhea. Numbness in his chin, left neck and shoulder continue to slowly improve.     PAST MEDICAL HISTORY  Nasopharyngeal carcinoma as above  DM2  COPD??  Hiatal hernia. Sleeps with his head elevated  LVH on TTE 2009, EF50%, LA Dilation  Dyslipidemia  BARRETT in 2015. Not using CPAP due to developing tooth abscesses when he tried it in 2015  Venous insufficiency in both legs being in an accident involving a mower about 10 years ago, on chronic furosemide. LE US negative 12/15/2009  Cholelithiasis  Hyperplastic colon polyp, due 2020  Tonsillectomy  Baker cyst RLE US 2013    Neuropathy from DM - R foot numbness chronically    Exposed to lots of wood dust in his occupation    CURRENT OUTPATIENT MEDICATIONS  Current Outpatient Medications   Medication Sig Dispense Refill     atorvastatin (LIPITOR) 20 MG tablet Take 20 mg by mouth       fluticasone-vilanterol (BREO ELLIPTA) 100-25 MCG/INH inhaler INHALE 1 PUFF INTO THE LUNGS ONE TIME A DAY. RINSE MOUTH AFTER USE.       furosemide (LASIX) 20 MG tablet Take 20 mg by mouth       metFORMIN (GLUCOPHAGE XR) 500 MG 24 hr tablet daily       TRULICITY 1.5 MG/0.5ML pen INJECT 1.5 MG UNDER THE SKIN ONE TIME A WEEK.        acetaminophen (TYLENOL) 325 MG tablet Take 2 tablets (650 mg) by mouth every 4 hours as needed for other (For optimal non-opioid multimodal pain management to improve pain control.) (Patient not taking: Reported on 3/9/2023) 50 tablet 0     B-D U/F insulin pen needle USE ONE TIME A DAY TO INJECT VICTOZA (Patient not taking: Reported on 3/9/2023)       blood glucose (NO BRAND SPECIFIED) lancets standard Use to test blood sugar 2 times daily or as directed. (Patient not taking: Reported on 3/30/2023) 90 lancet 1     blood glucose (NO BRAND SPECIFIED) test strip Use to test blood sugar 3 times daily or as directed. (Patient not taking: Reported on 3/30/2023) 90 strip 0     blood glucose monitoring (ACCU-CHEK RANDELL PLUS) meter device kit Use to test blood sugar 2 times daily or as directed. (Patient not taking: Reported on 3/30/2023) 1 kit 0     mineral oil-hydrophilic petrolatum (AQUAPHOR) external ointment Apply topically every 8 hours Apply to incision. (Patient not taking: Reported on 4/27/2023) 50 g 1     senna-docusate (SENOKOT-S/PERICOLACE) 8.6-50 MG tablet Take 1 tablet by mouth 2 times daily (Patient not taking: Reported on 4/27/2023) 30 tablet 0     ALLERGIES  No Known Allergies    REVIEW OF SYSTEMS  As above in the HPI, o/w complete 12-point ROS was negative.    PHYSICAL EXAM  /72   Pulse 99   Temp 98.6  F (37  C) (Oral)   Resp 16   Wt 106.9 kg (235 lb 11.2 oz)   SpO2 95%   BMI 33.82 kg/m      General: No acute distress  Lymph: No lymphadenopathy in neck  Heart: RRR, no m/g/r  Lungs: Clear to ascultation bilaterally  Neuro: Cranial nerves grossly intact, numbness to left neck  Rash: none  Vascular access: port      LABORATORY AND IMAGING STUDIES  Most Recent 3 CBC's:  Recent Labs   Lab Test 04/27/23  1251 02/22/23  1333 01/30/23  1401   WBC 4.6 3.7* 3.5*   HGB 10.9* 11.2* 11.6*   MCV 85 84 85    146* 166   ANEUTAUTO 3.2 2.7 2.4    Most Recent 3 BMP's:  Recent Labs   Lab Test 04/27/23  9259  03/30/23  0828 03/09/23  0832    141 141   POTASSIUM 4.1 4.0 3.8   CHLORIDE 102 104 103   CO2 31* 31* 30*   BUN 13.3 11.7 13.9   CR 0.81 0.79 0.79   ANIONGAP 6* 6* 8   LAI 9.5 9.0 8.9   GLC 95 101* 167*   PROTTOTAL 7.7 7.3 7.0   ALBUMIN 3.7 3.7 3.6    Most Recent 2 LFT's:  Recent Labs   Lab Test 04/27/23  1251 03/30/23  0828   AST 22 25   ALT 10 14   ALKPHOS 92 96   BILITOTAL 0.5 0.6    Most Recent TSH and T4:  Recent Labs   Lab Test 04/27/23  1251 03/30/23  0828   TSH 4.31* 4.69*   T4  --  0.96     Phos/Mag:  Lab Results   Component Value Date    PHOS 3.7 12/06/2022    MAG 1.8 01/30/2023    MAG 1.9 12/06/2022    MAG 1.8 11/16/2022      I reviewed the above laboratory data today.

## 2023-04-27 NOTE — NURSING NOTE
"Oncology Rooming Note    April 27, 2023 1:16 PM   Jareth Gallardo is a 62 year old male who presents for:    Chief Complaint   Patient presents with     Port Draw     Labs drawn via port by RN in lab.  VS taken      Oncology Clinic Visit     RTN for Nasopharyngeal Cancer     Initial Vitals: Blood Pressure 100/72   Pulse 99   Temperature 98.6  F (37  C) (Oral)   Respiration 16   Weight 106.9 kg (235 lb 11.2 oz)   Oxygen Saturation 95%   Body Mass Index 33.82 kg/m   Estimated body mass index is 33.82 kg/m  as calculated from the following:    Height as of 2/22/23: 1.778 m (5' 10\").    Weight as of this encounter: 106.9 kg (235 lb 11.2 oz). Body surface area is 2.3 meters squared.  No Pain (0) Comment: Data Unavailable   No LMP for male patient.  Allergies reviewed: Yes  Medications reviewed: Yes    Medications: Medication refills not needed today.  Pharmacy name entered into [employer]:    [id number] [id number] [place] [address] PHARMACY [place], [place] - [address] [facility] [id number]  [place] [facility] - [place], [place] - [address] [facility]    Clinical concerns: none       Florida Livingston MA            "

## 2023-04-27 NOTE — PROGRESS NOTES
Infusion Nursing Note:  Jareth Gallardo presents today for Cycle 3 Day 1 Pembrolizumab (KEYTRUDA).    Patient seen by provider today: Yes: Jany Mtz NP   present during visit today: Not Applicable.    Note:    Patient arrives to infusion after an LEONEL visit.  He offers no new concerns that this time.    Intravenous Access:  Implanted Port.    Treatment Conditions:  Lab Results   Component Value Date    HGB 10.9 (L) 04/27/2023    WBC 4.6 04/27/2023    ANEU 7.0 05/16/2022    ANEUTAUTO 3.2 04/27/2023     04/27/2023      Lab Results   Component Value Date     04/27/2023    POTASSIUM 4.1 04/27/2023    MAG 1.8 01/30/2023    CR 0.81 04/27/2023    LAI 9.5 04/27/2023    BILITOTAL 0.5 04/27/2023    ALBUMIN 3.7 04/27/2023    ALT 10 04/27/2023    AST 22 04/27/2023     Results reviewed, labs MET treatment parameters, ok to proceed with treatment.    Post Infusion Assessment:  Patient tolerated infusion without incident.  Blood return noted pre and post infusion.  Site patent and intact, free from redness, edema or discomfort.  No evidence of extravasations.  Access discontinued per protocol.     Discharge Plan:   Patient declined prescription refills.  Discharge instructions reviewed with: Patient.  Patient and/or family verbalized understanding of discharge instructions and all questions answered.  AVS to patient via [employer].  Patient will return 5/18/23 for next appointment.   Patient discharged in stable condition accompanied by: self.  Departure Mode: Ambulatory.  Face to Face time: 0.      LUIS FERNANDO CAREY RN

## 2023-05-03 ENCOUNTER — ANCILLARY PROCEDURE (OUTPATIENT)
Dept: CT IMAGING | Facility: CLINIC | Age: 63
End: 2023-05-03
Attending: INTERNAL MEDICINE
Payer: COMMERCIAL

## 2023-05-03 DIAGNOSIS — C11.9 NASOPHARYNGEAL CANCER (H): ICD-10-CM

## 2023-05-03 PROCEDURE — 74177 CT ABD & PELVIS W/CONTRAST: CPT | Mod: GC | Performed by: RADIOLOGY

## 2023-05-03 PROCEDURE — 71260 CT THORAX DX C+: CPT | Mod: GC | Performed by: RADIOLOGY

## 2023-05-03 PROCEDURE — 70491 CT SOFT TISSUE NECK W/DYE: CPT | Performed by: RADIOLOGY

## 2023-05-03 RX ORDER — IOPAMIDOL 755 MG/ML
122 INJECTION, SOLUTION INTRAVASCULAR ONCE
Status: COMPLETED | OUTPATIENT
Start: 2023-05-03 | End: 2023-05-03

## 2023-05-03 RX ORDER — LORAZEPAM 2 MG/ML
0.5 INJECTION INTRAMUSCULAR EVERY 4 HOURS PRN
Status: CANCELLED | OUTPATIENT
Start: 2023-05-18

## 2023-05-03 RX ORDER — IOPAMIDOL 755 MG/ML
122 INJECTION, SOLUTION INTRAVASCULAR ONCE
Status: DISCONTINUED | OUTPATIENT
Start: 2023-05-03 | End: 2023-05-03

## 2023-05-03 RX ORDER — HEPARIN SODIUM (PORCINE) LOCK FLUSH IV SOLN 100 UNIT/ML 100 UNIT/ML
5 SOLUTION INTRAVENOUS
Status: CANCELLED | OUTPATIENT
Start: 2023-05-18

## 2023-05-03 RX ORDER — HEPARIN SODIUM (PORCINE) LOCK FLUSH IV SOLN 100 UNIT/ML 100 UNIT/ML
500 SOLUTION INTRAVENOUS ONCE
Status: COMPLETED | OUTPATIENT
Start: 2023-05-03 | End: 2023-05-03

## 2023-05-03 RX ORDER — METHYLPREDNISOLONE SODIUM SUCCINATE 125 MG/2ML
125 INJECTION, POWDER, LYOPHILIZED, FOR SOLUTION INTRAMUSCULAR; INTRAVENOUS
Status: CANCELLED
Start: 2023-05-18

## 2023-05-03 RX ORDER — ALBUTEROL SULFATE 0.83 MG/ML
2.5 SOLUTION RESPIRATORY (INHALATION)
Status: CANCELLED | OUTPATIENT
Start: 2023-05-18

## 2023-05-03 RX ORDER — MEPERIDINE HYDROCHLORIDE 25 MG/ML
25 INJECTION INTRAMUSCULAR; INTRAVENOUS; SUBCUTANEOUS EVERY 30 MIN PRN
Status: CANCELLED | OUTPATIENT
Start: 2023-05-18

## 2023-05-03 RX ORDER — DIPHENHYDRAMINE HYDROCHLORIDE 50 MG/ML
50 INJECTION INTRAMUSCULAR; INTRAVENOUS
Status: CANCELLED
Start: 2023-05-18

## 2023-05-03 RX ORDER — ALBUTEROL SULFATE 90 UG/1
1-2 AEROSOL, METERED RESPIRATORY (INHALATION)
Status: CANCELLED
Start: 2023-05-18

## 2023-05-03 RX ORDER — EPINEPHRINE 1 MG/ML
0.3 INJECTION, SOLUTION INTRAMUSCULAR; SUBCUTANEOUS EVERY 5 MIN PRN
Status: CANCELLED | OUTPATIENT
Start: 2023-05-18

## 2023-05-03 RX ORDER — HEPARIN SODIUM,PORCINE 10 UNIT/ML
5 VIAL (ML) INTRAVENOUS
Status: CANCELLED | OUTPATIENT
Start: 2023-05-18

## 2023-05-03 RX ADMIN — HEPARIN SODIUM (PORCINE) LOCK FLUSH IV SOLN 100 UNIT/ML 500 UNITS: 100 SOLUTION at 13:53

## 2023-05-03 RX ADMIN — IOPAMIDOL 122 ML: 755 INJECTION, SOLUTION INTRAVASCULAR at 13:43

## 2023-05-03 NOTE — PROGRESS NOTES
[facility] CANCER [facility]    PATIENT NAME: Jareth Gallardo  MRN # [id number]   DATE OF VISIT: May 4, 2023  YOB: 1960     Referring Provider: Dr. Jesus Boston  Radiation Oncology: Dr. Mane Razo  Primary Care Provider: Dr. Beni Gallardo at [facility] in [facility]    CANCER TYPE: Nasopharyngeal carcinoma, p16 negative, SAM positive  STAGE: [id number] (DENAE)  ECOG PS: 0    PD-L1: 20% using  clone locally; TPS 70% at [facility]   NGS:     SUMMARY  12/11/21 UC for L neck mass, selling  12/9/21 CT neck ([place]). 2.4 cm L neck node medial to SCM just above the hyoid, additional LNs surround, scattered R neck and L supraclavicular LNs  2/15/22 US bx L cervical node. Path: SCC, moderately differentiated,   3/4/22 PET/CT. Nasopharyngeal and oropharyngeal mucosal thickening. 2.6 cm BERNARDO nodular GGO, SUV avid, some additional lingular uptake  4/4~5/16/22/22 C1-3 carboplatin gemcitabine. Not a candidate for cisplatin due to hearing loss on audiogram. Neulasta 5/3 after C2D8, ANC 1000   5/31/22 PET/CT. NJ  6/22~8/10/22 Chemoradiation with weekly carboplatin  11/16/22 PET/CT. Residual 1.5 cm 2B LN (SUV 28.7), 1 cm enhancing focus in SCM (SUV 22.4), small mildly avid residual enhancing nodes in the L level 2A decreased in size and FDG avidity than before, resolved L level 5 node, small but mildly FDG avid R level 2B node, smaller mildly avid R level 3 nodes, unchanged in ize. NI-RADS 2a in the primary site - FDG uptake along the BOT at the midline, increased from prior. New patchy GGO and nodules in the RUL and L base with mild FDG uptake, likely inflammatory, recommend short interval CT  12/5/22 Salvage L modified radical neck dissection (Dr. Boston). Path: L posterior SCM excision - SCC, 1.1 cm, invading into skeletal muscle, negative margins, +PNI/LVI. Level 2 node 1.5 cm SCC, moderately differentiated, invading into fibroadipose tissue, focally involving the inked resection margin.    1/30/23 CT chest. Patchy LLL GGO, decreased from prior.   2/22/23 PET/CT. Focal updake along the tongue base at midline, decreased from prior, likely inflammatory (SUV 14.8-->8.5), near complete resolution of the rest of the mild mucosal uptake within the tongue base. At least 5 FDG avid lesions in the L deep neck. 2 nodes at the C2 level, posterior to the surgical clips in the L lateral paravertebral region, one measuring 8 mm (SUV 15.9), the other more posteriorly measuring 7 mm. The other 2 adjacent ones are deep to the residual L SCM; 9.6 mm, and a smaller one immediately below in the SCM. There is another mildly FDG avid necrotic lesion inferior to the above 3 foci, again deep to the L SCM. 0.9 cm R level 2b LN (SUV 3.1), likely reactive. Resolution of RUL GGO, stable sub-6 mm nodules.  3/9/23~current Pembrolizumab . C3 delayed 1 week due to parainfluenza  4/18/23 ED at [facility] for fever and cough. CXR negative. Viral panel showed parainfluenza.  5/3/23 CT neck and CT CAP.     ASSESSMENT AND PLAN  Nasopharyngeal carcinoma, SAM +julia: Recent parainfluenza may influence adenopathy looking larger. No symptoms to suggest progression. CT CAP not read yet, will call him if anything changes when the report is avail. I don't see anything standing out on my review. Continue pembro. Talked about possibilities - true PD, inflammatory response to parainfluenza, or pseudoprogression. Restage with CT chest/abd and neck after 2 more cycles     BERNARDO/lung nodules, new RUL nodules in 11/2022: Resolving c/w inflammatory changes from mild aspiration that's resolving.     Lymphedema: Doing massages but persistent     Screening for hypothyroidism: TSH going up a little bit since March, free T4 adequate although a little lower 4/27. Will be checked with the next infusion; if remains abnormal, will start levothyroxine, probably around 50 mcg daily     DM2, steroid-induced hyperglycemia: Controlled, managed by PCP      Mild peripheral neuropathy: R foot numbness, very mild, from DM2. Unchanged    30 minutes spent by me on the date of the encounter doing chart review, history and exam, documentation and further activities per the note     Dariela Melvin MD  Associate Professor of Medicine  Hematology, Oncology and Transplantation      SUBJECTIVE  Jareth returns for follow up of nasopharyngeal carcinoma after 3 cycles of pembrolizumab  Parainfluenza 4/18 - fever, throat tickle, runny nose, coughing - resolved after 10-11 days   Better now  No other new problems  Swallowing, eating ok  No pain  Sister is having health issues. Concern for lymphoma. Jareth is worried as evaluation is taking a while.     PAST MEDICAL HISTORY  Nasopharyngeal carcinoma as above  DM2  COPD??  Hiatal hernia. Sleeps with his head elevated  LVH on TTE 2009, EF50%, LA Dilation  Dyslipidemia  BARRETT in 2015. Not using CPAP due to developing tooth abscesses when he tried it in 2015  Venous insufficiency in both legs being in an accident involving a mower about 10 years ago, on chronic furosemide. LE US negative 12/15/2009  Cholelithiasis  Hyperplastic colon polyp, due 2020  Tonsillectomy  Baker cyst RLE US 2013    Neuropathy from DM - R foot numbness chronically    Exposed to lots of wood dust in his occupation    CURRENT OUTPATIENT MEDICATIONS  Current Outpatient Medications   Medication Sig Dispense Refill     atorvastatin (LIPITOR) 20 MG tablet Take 20 mg by mouth       fluticasone-vilanterol (BREO ELLIPTA) 100-25 MCG/INH inhaler INHALE 1 PUFF INTO THE LUNGS ONE TIME A DAY. RINSE MOUTH AFTER USE.       furosemide (LASIX) 20 MG tablet Take 20 mg by mouth       TRULICITY 1.5 MG/0.5ML pen INJECT 1.5 MG UNDER THE SKIN ONE TIME A WEEK.       acetaminophen (TYLENOL) 325 MG tablet Take 2 tablets (650 mg) by mouth every 4 hours as needed for other (For optimal non-opioid multimodal pain management to improve pain control.) (Patient not taking: Reported on  3/9/2023) 50 tablet 0     B-D U/F insulin pen needle USE ONE TIME A DAY TO INJECT VICTOZA (Patient not taking: Reported on 3/9/2023)       blood glucose (NO BRAND SPECIFIED) lancets standard Use to test blood sugar 2 times daily or as directed. (Patient not taking: Reported on 3/30/2023) 90 lancet 1     blood glucose (NO BRAND SPECIFIED) test strip Use to test blood sugar 3 times daily or as directed. (Patient not taking: Reported on 3/30/2023) 90 strip 0     blood glucose monitoring (ACCU-CHEK RANDELL PLUS) meter device kit Use to test blood sugar 2 times daily or as directed. (Patient not taking: Reported on 3/30/2023) 1 kit 0     metFORMIN (GLUCOPHAGE XR) 500 MG 24 hr tablet daily       mineral oil-hydrophilic petrolatum (AQUAPHOR) external ointment Apply topically every 8 hours Apply to incision. (Patient not taking: Reported on 4/27/2023) 50 g 1     senna-docusate (SENOKOT-S/PERICOLACE) 8.6-50 MG tablet Take 1 tablet by mouth 2 times daily (Patient not taking: Reported on 4/27/2023) 30 tablet 0     ALLERGIES  No Known Allergies     REVIEW OF SYSTEMS  As above in the HPI, o/w complete 12-point ROS was negative.    PHYSICAL EXAM  There were no vitals taken for this visit.  GEN: NAD  HEENT: EOMI, no icterus, injection or pallor  NECK: Lymphedema apparent on video  NEURO: alert  SKIN: no rashes    Remainder of physical exam deferred due to public health emergency and limitations of video visit.    LABORATORY AND IMAGING STUDIES   03/30/23 08:28 04/27/23 12:51   Sodium 141 139   Potassium 4.0 4.1   Chloride 104 102   Carbon Dioxide (CO2) 31 (H) 31 (H)   Urea Nitrogen 11.7 13.3   Creatinine 0.79 0.81   GFR Estimate >90 >90   Calcium 9.0 9.5   Anion Gap 6 (L) 6 (L)   Albumin 3.7 3.7   Protein Total 7.3 7.7   Alkaline Phosphatase 96 92   ALT 14 10   AST 25 22   Bilirubin Total 0.6 0.5   Glucose 101 (H) 95   T4 Free 0.96 0.85 (L)   TSH 4.69 (H) 4.31 (H)   WBC  4.6   Hemoglobin  10.9 (L)   Hematocrit  34.7 (L)   Platelet  Count  206   RBC Count  4.07 (L)   MCV  85   MCH  26.8   MCHC  31.4 (L)   RDW  14.8   % Neutrophils  69   % Lymphocytes  18   % Monocytes  10   % Eosinophils  2   % Basophils  0   Absolute Basophils  0.0   Absolute Eosinophils  0.1   Absolute Immature Granulocytes  0.0   Absolute Lymphocytes  0.8   Absolute Monocytes  0.4   % Immature Granulocytes  1   Absolute Neutrophils  3.2   Absolute NRBCs  0.0   NRBCs per 100 WBC  0     Labs were independently reviewed and interpreted by me     EXAM: CT SOFT TISSUE NECK W CONTRAST  5/3/2023 1:57 PM      HISTORY: restage nasopharyngeal carcinoma s/p induction chemo in  April-May 2022, then chemorads completed in Aug 2022. R neck  persistent disease in Nov 2022 s/p salvage neck dissection 12/2023  with REGINALD; neck recurrence Feb 2023. Now s/p 2 cycles of pembrolizumab;  Nasopharyngeal cancer (H)         COMPARISON: CT neck soft tissue 2/22/2023, MR soft tissue neck  4/1/2022     TECHNIQUE: Following intravenous administration of nonionic iodinated  contrast medium, thin section helical CT images were obtained from the  skull base down to the level of the aortic arch.  Axial, coronal and  sagittal reformations were performed with 2-3 mm slice thickness  reconstruction. Images were reviewed in soft tissue, lung and bone  windows.     CONTRAST: Isovue 370 122cc     FINDINGS:   No focal oral cavity, nasopharyngeal, oropharyngeal, or hypopharyngeal  abnormality. Mild retropharyngeal edema. Edema in the left posterior  triangle. Redemonstrated focal uptake along the base, unchanged.  Salivary glands are within normal limits.     Several subcentimeter superior mediastinal lymph nodes.     Surgical clips in the left neck. Mild edema of the left side of the  face and neck and platysma muscle reflecting posttreatment changes.  Partial surgical resection of the left sternocleidomastoid. Deep to  the residual sternocleidomastoid muscle there is an enhancing nodular  lesion seen in series 7  image [id number] measuring 15.1 mm x 17.4 mm x 44.8  mm. Inferiorly, this includes a necrotic/cystic component. When  compared to CT soft tissue neck 2/22/2023 this appears to represent  interval enlargement and consolidation of 3 separate lesions. Anterior  to this lesion are 2 contrast enhancing foci abutting the posterior  auricular artery which also show interval enlargement of up to 8.5 and  9.8 mm. No new foci identified.     Small area of calcification in the left lobe of the thyroid gland,  otherwise normal.     Left jugular vein surgically absent. Otherwise normal cervical  vasculature; no high grade arterial stenosis.     No suspicious osseus lesion. No high grade spinal canal stenosis.     Thickening of the maxillary mucosa bilaterally. Clear mastoid air  cells. No periapical dental lucency. The imaged skull base,  intracranial and orbital structures are within normal limits.     No suspicious finding in the visualized superior mediastinum/thorax.  Clear lung apices.                                                                      IMPRESSION:   1. Primary: NI-RADS 1} Expected posttreatment changes within the  nasopharynx without evidence of recurrent disease.  2. Neck: NI-RADS 3} Interval postsurgical changes of left neck  dissection. Interval enlargement of at least 5 foci within the left  deep neck, suggestive for recurrent leny disease and progression.     I have personally reviewed the examination and initial interpretation  and I agree with the findings.     VIRA WEEKS MD     Imaging was personally reviewed and interpreted by me    Virtual Visit Details  Type of service:  Video Visit   Originating Location (pt. Location): Home  Distant Location (provider location):  On-site  Platform used for Video Visit: Ilana

## 2023-05-04 ENCOUNTER — VIRTUAL VISIT (OUTPATIENT)
Dept: ONCOLOGY | Facility: CLINIC | Age: 63
End: 2023-05-04
Attending: INTERNAL MEDICINE
Payer: COMMERCIAL

## 2023-05-04 DIAGNOSIS — C11.9 NASOPHARYNGEAL CANCER (H): Primary | ICD-10-CM

## 2023-05-04 DIAGNOSIS — Z13.29 SCREENING FOR HYPOTHYROIDISM: ICD-10-CM

## 2023-05-04 PROCEDURE — 99214 OFFICE O/P EST MOD 30 MIN: CPT | Mod: VID | Performed by: INTERNAL MEDICINE

## 2023-05-04 NOTE — LETTER
5/4/2023         RE: Jareth Gallardo  [id number] [place] [address]        Dear Colleague,    Thank you for referring your patient, Jareth Gallardo, to the [facility]. Please see a copy of my visit note below.       [facility]    PATIENT NAME: Jareth Gallardo  MRN # [id number]   DATE OF VISIT: May 4, 2023  YOB: 1960     Referring Provider: Dr. Jesus Boston  Radiation Oncology: Dr. Mane Razo  Primary Care Provider: Dr. Beni Gallardo at [facility] in [facility]    CANCER TYPE: Nasopharyngeal carcinoma, p16 negative, SAM positive  STAGE: [id number] (DENAE)  ECOG PS: 0    PD-L1: 20% using  clone locally; TPS 70% at [facility]   NGS:     SUMMARY  12/11/21 UC for L neck mass, selling  12/9/21 CT neck ([place]). 2.4 cm L neck node medial to SCM just above the hyoid, additional LNs surround, scattered R neck and L supraclavicular LNs  2/15/22 US bx L cervical node. Path: SCC, moderately differentiated,   3/4/22 PET/CT. Nasopharyngeal and oropharyngeal mucosal thickening. 2.6 cm BERNARDO nodular GGO, SUV avid, some additional lingular uptake  4/4~5/16/22/22 C1-3 carboplatin gemcitabine. Not a candidate for cisplatin due to hearing loss on audiogram. Neulasta 5/3 after C2D8, ANC 1000   5/31/22 PET/CT. DE  6/22~8/10/22 Chemoradiation with weekly carboplatin  11/16/22 PET/CT. Residual 1.5 cm 2B LN (SUV 28.7), 1 cm enhancing focus in SCM (SUV 22.4), small mildly avid residual enhancing nodes in the L level 2A decreased in size and FDG avidity than before, resolved L level 5 node, small but mildly FDG avid R level 2B node, smaller mildly avid R level 3 nodes, unchanged in ize. NI-RADS 2a in the primary site - FDG uptake along the BOT at the midline, increased from prior. New patchy GGO and nodules in the RUL and L base with mild FDG uptake, likely inflammatory, recommend short interval CT  12/5/22 Salvage L modified radical neck dissection  (Dr. Boston). Path: L posterior SCM excision - SCC, 1.1 cm, invading into skeletal muscle, negative margins, +PNI/LVI. Level 2 node 1.5 cm SCC, moderately differentiated, invading into fibroadipose tissue, focally involving the inked resection margin.   1/30/23 CT chest. Patchy LLL GGO, decreased from prior.   2/22/23 PET/CT. Focal updake along the tongue base at midline, decreased from prior, likely inflammatory (SUV 14.8-->8.5), near complete resolution of the rest of the mild mucosal uptake within the tongue base. At least 5 FDG avid lesions in the L deep neck. 2 nodes at the C2 level, posterior to the surgical clips in the L lateral paravertebral region, one measuring 8 mm (SUV 15.9), the other more posteriorly measuring 7 mm. The other 2 adjacent ones are deep to the residual L SCM; 9.6 mm, and a smaller one immediately below in the SCM. There is another mildly FDG avid necrotic lesion inferior to the above 3 foci, again deep to the L SCM. 0.9 cm R level 2b LN (SUV 3.1), likely reactive. Resolution of RUL GGO, stable sub-6 mm nodules.  3/9/23~current Pembrolizumab . C3 delayed 1 week due to parainfluenza  4/18/23 ED at [facility] for fever and cough. CXR negative. Viral panel showed parainfluenza.  5/3/23 CT neck and CT CAP.     ASSESSMENT AND PLAN  Nasopharyngeal carcinoma, SAM +julia: Recent parainfluenza may influence adenopathy looking larger. No symptoms to suggest progression. CT CAP not read yet, will call him if anything changes when the report is avail. I don't see anything standing out on my review. Continue pembro. Talked about possibilities - true PD, inflammatory response to parainfluenza, or pseudoprogression. Restage with CT chest/abd and neck after 2 more cycles     BERNARDO/lung nodules, new RUL nodules in 11/2022: Resolving c/w inflammatory changes from mild aspiration that's resolving.     Lymphedema: Doing massages but persistent     Screening for hypothyroidism: TSH going up a  little bit since March, free T4 adequate although a little lower 4/27. Will be checked with the next infusion; if remains abnormal, will start levothyroxine, probably around 50 mcg daily     DM2, steroid-induced hyperglycemia: Controlled, managed by PCP     Mild peripheral neuropathy: R foot numbness, very mild, from DM2. Unchanged    30 minutes spent by me on the date of the encounter doing chart review, history and exam, documentation and further activities per the note     Dariela Melvin MD  Associate Professor of Medicine  Hematology, Oncology and Transplantation      SUBJECTIVE  Jareth returns for follow up of nasopharyngeal carcinoma after 3 cycles of pembrolizumab  Parainfluenza 4/18 - fever, throat tickle, runny nose, coughing - resolved after 10-11 days   Better now  No other new problems  Swallowing, eating ok  No pain  Sister is having health issues. Concern for lymphoma. Jareth is worried as evaluation is taking a while.     PAST MEDICAL HISTORY  Nasopharyngeal carcinoma as above  DM2  COPD??  Hiatal hernia. Sleeps with his head elevated  LVH on TTE 2009, EF50%, LA Dilation  Dyslipidemia  BARRETT in 2015. Not using CPAP due to developing tooth abscesses when he tried it in 2015  Venous insufficiency in both legs being in an accident involving a mower about 10 years ago, on chronic furosemide. LE US negative 12/15/2009  Cholelithiasis  Hyperplastic colon polyp, due 2020  Tonsillectomy  Baker cyst RLE US 2013    Neuropathy from DM - R foot numbness chronically    Exposed to lots of wood dust in his occupation    CURRENT OUTPATIENT MEDICATIONS  Current Outpatient Medications   Medication Sig Dispense Refill    atorvastatin (LIPITOR) 20 MG tablet Take 20 mg by mouth      fluticasone-vilanterol (BREO ELLIPTA) 100-25 MCG/INH inhaler INHALE 1 PUFF INTO THE LUNGS ONE TIME A DAY. RINSE MOUTH AFTER USE.      furosemide (LASIX) 20 MG tablet Take 20 mg by mouth      TRULICITY 1.5 MG/0.5ML pen INJECT 1.5 MG UNDER THE  SKIN ONE TIME A WEEK.      acetaminophen (TYLENOL) 325 MG tablet Take 2 tablets (650 mg) by mouth every 4 hours as needed for other (For optimal non-opioid multimodal pain management to improve pain control.) (Patient not taking: Reported on 3/9/2023) 50 tablet 0    B-D U/F insulin pen needle USE ONE TIME A DAY TO INJECT VICTOZA (Patient not taking: Reported on 3/9/2023)      blood glucose (NO BRAND SPECIFIED) lancets standard Use to test blood sugar 2 times daily or as directed. (Patient not taking: Reported on 3/30/2023) 90 lancet 1    blood glucose (NO BRAND SPECIFIED) test strip Use to test blood sugar 3 times daily or as directed. (Patient not taking: Reported on 3/30/2023) 90 strip 0    blood glucose monitoring (ACCU-CHEK RANDELL PLUS) meter device kit Use to test blood sugar 2 times daily or as directed. (Patient not taking: Reported on 3/30/2023) 1 kit 0    metFORMIN (GLUCOPHAGE XR) 500 MG 24 hr tablet daily      mineral oil-hydrophilic petrolatum (AQUAPHOR) external ointment Apply topically every 8 hours Apply to incision. (Patient not taking: Reported on 4/27/2023) 50 g 1    senna-docusate (SENOKOT-S/PERICOLACE) 8.6-50 MG tablet Take 1 tablet by mouth 2 times daily (Patient not taking: Reported on 4/27/2023) 30 tablet 0     ALLERGIES  No Known Allergies     REVIEW OF SYSTEMS  As above in the HPI, o/w complete 12-point ROS was negative.    PHYSICAL EXAM  There were no vitals taken for this visit.  GEN: NAD  HEENT: EOMI, no icterus, injection or pallor  NECK: Lymphedema apparent on video  NEURO: alert  SKIN: no rashes    Remainder of physical exam deferred due to public health emergency and limitations of video visit.    LABORATORY AND IMAGING STUDIES   03/30/23 08:28 04/27/23 12:51   Sodium 141 139   Potassium 4.0 4.1   Chloride 104 102   Carbon Dioxide (CO2) 31 (H) 31 (H)   Urea Nitrogen 11.7 13.3   Creatinine 0.79 0.81   GFR Estimate >90 >90   Calcium 9.0 9.5   Anion Gap 6 (L) 6 (L)   Albumin 3.7 3.7    Protein Total 7.3 7.7   Alkaline Phosphatase 96 92   ALT 14 10   AST 25 22   Bilirubin Total 0.6 0.5   Glucose 101 (H) 95   T4 Free 0.96 0.85 (L)   TSH 4.69 (H) 4.31 (H)   WBC  4.6   Hemoglobin  10.9 (L)   Hematocrit  34.7 (L)   Platelet Count  206   RBC Count  4.07 (L)   MCV  85   MCH  26.8   MCHC  31.4 (L)   RDW  14.8   % Neutrophils  69   % Lymphocytes  18   % Monocytes  10   % Eosinophils  2   % Basophils  0   Absolute Basophils  0.0   Absolute Eosinophils  0.1   Absolute Immature Granulocytes  0.0   Absolute Lymphocytes  0.8   Absolute Monocytes  0.4   % Immature Granulocytes  1   Absolute Neutrophils  3.2   Absolute NRBCs  0.0   NRBCs per 100 WBC  0     Labs were independently reviewed and interpreted by me     EXAM: CT SOFT TISSUE NECK W CONTRAST  5/3/2023 1:57 PM      HISTORY: restage nasopharyngeal carcinoma s/p induction chemo in  April-May 2022, then chemorads completed in Aug 2022. R neck  persistent disease in Nov 2022 s/p salvage neck dissection 12/2023  with REGINALD; neck recurrence Feb 2023. Now s/p 2 cycles of pembrolizumab;  Nasopharyngeal cancer (H)         COMPARISON: CT neck soft tissue 2/22/2023, MR soft tissue neck  4/1/2022     TECHNIQUE: Following intravenous administration of nonionic iodinated  contrast medium, thin section helical CT images were obtained from the  skull base down to the level of the aortic arch.  Axial, coronal and  sagittal reformations were performed with 2-3 mm slice thickness  reconstruction. Images were reviewed in soft tissue, lung and bone  windows.     CONTRAST: Isovue 370 122cc     FINDINGS:   No focal oral cavity, nasopharyngeal, oropharyngeal, or hypopharyngeal  abnormality. Mild retropharyngeal edema. Edema in the left posterior  triangle. Redemonstrated focal uptake along the base, unchanged.  Salivary glands are within normal limits.     Several subcentimeter superior mediastinal lymph nodes.     Surgical clips in the left neck. Mild edema of the left side of  the  face and neck and platysma muscle reflecting posttreatment changes.  Partial surgical resection of the left sternocleidomastoid. Deep to  the residual sternocleidomastoid muscle there is an enhancing nodular  lesion seen in series 7 image 77-84 measuring 15.1 mm x 17.4 mm x 44.8  mm. Inferiorly, this includes a necrotic/cystic component. When  compared to CT soft tissue neck 2/22/2023 this appears to represent  interval enlargement and consolidation of 3 separate lesions. Anterior  to this lesion are 2 contrast enhancing foci abutting the posterior  auricular artery which also show interval enlargement of up to 8.5 and  9.8 mm. No new foci identified.     Small area of calcification in the left lobe of the thyroid gland,  otherwise normal.     Left jugular vein surgically absent. Otherwise normal cervical  vasculature; no high grade arterial stenosis.     No suspicious osseus lesion. No high grade spinal canal stenosis.     Thickening of the maxillary mucosa bilaterally. Clear mastoid air  cells. No periapical dental lucency. The imaged skull base,  intracranial and orbital structures are within normal limits.     No suspicious finding in the visualized superior mediastinum/thorax.  Clear lung apices.                                                                      IMPRESSION:   1. Primary: NI-RADS 1} Expected posttreatment changes within the  nasopharynx without evidence of recurrent disease.  2. Neck: NI-RADS 3} Interval postsurgical changes of left neck  dissection. Interval enlargement of at least 5 foci within the left  deep neck, suggestive for recurrent leny disease and progression.     I have personally reviewed the examination and initial interpretation  and I agree with the findings.     VIRA WEEKS MD     Imaging was personally reviewed and interpreted by me    Virtual Visit Details  Type of service:  Video Visit   Originating Location (pt. Location): Home  Distant Location (provider  location):  On-site  Platform used for Video Visit: Ilana Melvin MD

## 2023-05-04 NOTE — NURSING NOTE
Is the patient currently in the state of [place]? YES    Visit mode:VIDEO    If the visit is dropped, the patient can be reconnected by: VIDEO VISIT: Text to cell phone: [phone]    Will anyone else be joining the visit? NO      How would you like to obtain your AVS? MyChart    Are changes needed to the allergy or medication list? NO    Reason for visit: RECHECK (Follow up - results)

## 2023-05-16 NOTE — PROGRESS NOTES
[facility]CER [facility]    PATIENT NAME: Jareth Gallardo  MRN # [id number]   DATE OF VISIT: May 18, 2023  YOB: 1960     Referring Provider: Dr. Jesus Boston  Radiation Oncology: Dr. Mane Razo  Primary Care Provider: Dr. Beni Gallardo at [facility] in [facility]    CANCER TYPE: Nasopharyngeal carcinoma, p16 negative, SAM positive  STAGE: [id number] (DENAE)  ECOG PS: 0    PD-L1: 20% using  clone locally; TPS 70% at [facility]   NGS:     SUMMARY  12/11/21 UC for L neck mass, selling  12/9/21 CT neck ([place]). 2.4 cm L neck node medial to SCM just above the hyoid, additional LNs surround, scattered R neck and L supraclavicular LNs  2/15/22 US bx L cervical node. Path: SCC, moderately differentiated,   3/4/22 PET/CT. Nasopharyngeal and oropharyngeal mucosal thickening. 2.6 cm BERNARDO nodular GGO, SUV avid, some additional lingular uptake  4/4~5/16/22/22 C1-3 carboplatin gemcitabine. Not a candidate for cisplatin due to hearing loss on audiogram. Neulasta 5/3 after C2D8, ANC 1000   5/31/22 PET/CT. DE  6/22~8/10/22 Chemoradiation with weekly carboplatin  11/16/22 PET/CT. Residual 1.5 cm 2B LN (SUV 28.7), 1 cm enhancing focus in SCM (SUV 22.4), small mildly avid residual enhancing nodes in the L level 2A decreased in size and FDG avidity than before, resolved L level 5 node, small but mildly FDG avid R level 2B node, smaller mildly avid R level 3 nodes, unchanged in ize. NI-RADS 2a in the primary site - FDG uptake along the BOT at the midline, increased from prior. New patchy GGO and nodules in the RUL and L base with mild FDG uptake, likely inflammatory, recommend short interval CT  12/5/22 Salvage L modified radical neck dissection (Dr. Boston). Path: L posterior SCM excision - SCC, 1.1 cm, invading into skeletal muscle, negative margins, +PNI/LVI. Level 2 node 1.5 cm SCC, moderately differentiated, invading into fibroadipose tissue, focally involving the inked resection margin.    1/30/23 CT chest. Patchy LLL GGO, decreased from prior.   2/22/23 PET/CT. Focal updake along the tongue base at midline, decreased from prior, likely inflammatory (SUV 14.8-->8.5), near complete resolution of the rest of the mild mucosal uptake within the tongue base. At least 5 FDG avid lesions in the L deep neck. 2 nodes at the C2 level, posterior to the surgical clips in the L lateral paravertebral region, one measuring 8 mm (SUV 15.9), the other more posteriorly measuring 7 mm. The other 2 adjacent ones are deep to the residual L SCM; 9.6 mm, and a smaller one immediately below in the SCM. There is another mildly FDG avid necrotic lesion inferior to the above 3 foci, again deep to the L SCM. 0.9 cm R level 2b LN (SUV 3.1), likely reactive. Resolution of RUL GGO, stable sub-6 mm nodules.  3/9/23~current Pembrolizumab . C3 delayed 1 week due to parainfluenza  4/18/23 ED at [facility] for fever and cough. CXR negative. Viral panel showed parainfluenza.  5/3/23 CT neck and CT CAP.     ASSESSMENT AND PLAN  Nasopharyngeal carcinoma, SAM +julia: CT neck after 3 cycles of pembro with interval enlargement of at least 5 foci within the left deep neck. CT CAP with nonspecific increased size of enlarged mediastinal lymph nodules. Adenopathy potentially related to recent parainfluenza virus or pseudoprogression. Restage with CT chest/abd and CT neck after 2 additional cycles. Tolerating pembro well.  -Cycle 4 pembrolizumab today  -RTC in 5 weeks for LEONEL visit, cycle 5    BERNARDO/lung nodules, new RUL nodules in 11/2022: Resolving c/w inflammatory changes from mild aspiration that's resolving.     Lymphedema: Persistent, performing daily massages/exercises, warm showers.    Screening for hypothyroidism: TSH continues to rise, significant jump today to 23.09, FT4 0.43. Begin levothyroxine 50 mcg daily. Repeat TSH in 3 weeks.    DM2, steroid-induced hyperglycemia: Controlled, managed by PCP     Mild peripheral  neuropathy: R foot numbness, very mild, from DM2. Unchanged    50 minutes spent on the date of the encounter doing chart review, review of test results, interpretation of tests, patient visit, documentation and discussion with family     Jany Mtz CNP    SUBJECTIVE  Jareth returns for follow up of nasopharyngeal carcinoma prior to cycle 4 Keytruda.    Overall he continues to feel well. Denies any cough, shortness of breath, joint pain or diarrhea. Notes that there is one area alone his neck incision that has changed in sensation. It isn't particularly painful. Has been busy working in his yard and woodworking. Will often work hard for 10-11 hours per day. Naps when needed. They got a new puppy last week, a yellow lab ([employer]).     PAST MEDICAL HISTORY  Nasopharyngeal carcinoma as above  DM2  COPD??  Hiatal hernia. Sleeps with his head elevated  LVH on TTE 2009, EF50%, LA Dilation  Dyslipidemia  BARRETT in 2015. Not using CPAP due to developing tooth abscesses when he tried it in 2015  Venous insufficiency in both legs being in an accident involving a mower about 10 years ago, on chronic furosemide. LE US negative 12/15/2009  Cholelithiasis  Hyperplastic colon polyp, due 2020  Tonsillectomy  Baker cyst RLE US 2013    Neuropathy from DM - R foot numbness chronically    Exposed to lots of wood dust in his occupation    CURRENT OUTPATIENT MEDICATIONS  Current Outpatient Medications   Medication Sig Dispense Refill     acetaminophen (TYLENOL) 325 MG tablet Take 2 tablets (650 mg) by mouth every 4 hours as needed for other (For optimal non-opioid multimodal pain management to improve pain control.) 50 tablet 0     atorvastatin (LIPITOR) 20 MG tablet Take 20 mg by mouth       fluticasone-vilanterol (BREO ELLIPTA) 100-25 MCG/INH inhaler INHALE 1 PUFF INTO THE LUNGS ONE TIME A DAY. RINSE MOUTH AFTER USE.       furosemide (LASIX) 20 MG tablet Take 20 mg by mouth       levothyroxine (SYNTHROID/LEVOTHROID) 50 MCG tablet Take 1  tablet (50 mcg) by mouth daily 60 tablet 1     metFORMIN (GLUCOPHAGE XR) 500 MG 24 hr tablet daily       mineral oil-hydrophilic petrolatum (AQUAPHOR) external ointment Apply topically every 8 hours Apply to incision. 50 g 1     TRULICITY 1.5 MG/0.5ML pen INJECT 1.5 MG UNDER THE SKIN ONE TIME A WEEK.       B-D U/F insulin pen needle USE ONE TIME A DAY TO INJECT VICTOZA (Patient not taking: Reported on 3/9/2023)       blood glucose (NO BRAND SPECIFIED) lancets standard Use to test blood sugar 2 times daily or as directed. (Patient not taking: Reported on 3/30/2023) 90 lancet 1     blood glucose (NO BRAND SPECIFIED) test strip Use to test blood sugar 3 times daily or as directed. (Patient not taking: Reported on 3/30/2023) 90 strip 0     blood glucose monitoring (ACCU-CHEK RANDELL PLUS) meter device kit Use to test blood sugar 2 times daily or as directed. (Patient not taking: Reported on 3/30/2023) 1 kit 0     senna-docusate (SENOKOT-S/PERICOLACE) 8.6-50 MG tablet Take 1 tablet by mouth 2 times daily (Patient not taking: Reported on 4/27/2023) 30 tablet 0     ALLERGIES  No Known Allergies     REVIEW OF SYSTEMS  As above in the HPI, o/w complete 12-point ROS was negative.    PHYSICAL EXAM  /65 (BP Location: Right arm, Patient Position: Sitting, Cuff Size: Adult Regular)   Pulse 74   Temp 98.3  F (36.8  C) (Oral)   Resp 18   Wt 107.8 kg (237 lb 9.6 oz)   SpO2 95%   BMI 34.09 kg/m      General: No acute distress  Lymph: No lymphadenopathy in neck  Heart: RRR, no m/g/r  Lungs: Clear to ascultation bilaterally  Neuro: Cranial nerves grossly intact, numbness to left neck  Rash: none  Vascular access: port    LABORATORY AND IMAGING STUDIES   Latest Reference Range & Units 05/18/23 09:54   Sodium 136 - 145 mmol/L 140   Potassium 3.4 - 5.3 mmol/L 4.1   Chloride 98 - 107 mmol/L 103   Carbon Dioxide (CO2) 22 - 29 mmol/L 31 (H)   Urea Nitrogen 8.0 - 23.0 mg/dL 13.0   Creatinine 0.67 - 1.17 mg/dL 0.82   GFR Estimate >60  mL/min/1.73m2 >90   Calcium 8.8 - 10.2 mg/dL 8.9   Anion Gap 7 - 15 mmol/L 6 (L)   Albumin 3.5 - 5.2 g/dL 3.7   Protein Total 6.4 - 8.3 g/dL 7.4   Alkaline Phosphatase 40 - 129 U/L 85   ALT 10 - 50 U/L 12   AST 10 - 50 U/L 28   Bilirubin Total <=1.2 mg/dL 0.5   Glucose 70 - 99 mg/dL 95   T4 Free 0.90 - 1.70 ng/dL 0.43 (L)   TSH 0.30 - 4.20 uIU/mL 23.09 (H)   (H): Data is abnormally high  (L): Data is abnormally low

## 2023-05-18 ENCOUNTER — APPOINTMENT (OUTPATIENT)
Dept: LAB | Facility: CLINIC | Age: 63
End: 2023-05-18
Payer: COMMERCIAL

## 2023-05-18 ENCOUNTER — INFUSION THERAPY VISIT (OUTPATIENT)
Dept: ONCOLOGY | Facility: CLINIC | Age: 63
End: 2023-05-18
Payer: COMMERCIAL

## 2023-05-18 ENCOUNTER — ONCOLOGY VISIT (OUTPATIENT)
Dept: ONCOLOGY | Facility: CLINIC | Age: 63
End: 2023-05-18
Payer: COMMERCIAL

## 2023-05-18 ENCOUNTER — PATIENT OUTREACH (OUTPATIENT)
Dept: ONCOLOGY | Facility: CLINIC | Age: 63
End: 2023-05-18

## 2023-05-18 VITALS
RESPIRATION RATE: 18 BRPM | WEIGHT: 237.6 LBS | HEART RATE: 74 BPM | SYSTOLIC BLOOD PRESSURE: 101 MMHG | BODY MASS INDEX: 34.09 KG/M2 | OXYGEN SATURATION: 95 % | DIASTOLIC BLOOD PRESSURE: 65 MMHG | TEMPERATURE: 98.3 F

## 2023-05-18 DIAGNOSIS — I89.0 LYMPHEDEMA: ICD-10-CM

## 2023-05-18 DIAGNOSIS — R80.9 TYPE 2 DIABETES MELLITUS WITH MICROALBUMINURIA, WITHOUT LONG-TERM CURRENT USE OF INSULIN (H): ICD-10-CM

## 2023-05-18 DIAGNOSIS — E03.9 HYPOTHYROIDISM, UNSPECIFIED TYPE: ICD-10-CM

## 2023-05-18 DIAGNOSIS — C11.9 NASOPHARYNGEAL CANCER (H): Primary | ICD-10-CM

## 2023-05-18 DIAGNOSIS — E11.29 TYPE 2 DIABETES MELLITUS WITH MICROALBUMINURIA, WITHOUT LONG-TERM CURRENT USE OF INSULIN (H): ICD-10-CM

## 2023-05-18 LAB
ALBUMIN SERPL BCG-MCNC: 3.7 G/DL (ref 3.5–5.2)
ALP SERPL-CCNC: 85 U/L (ref 40–129)
ALT SERPL W P-5'-P-CCNC: 12 U/L (ref 10–50)
ANION GAP SERPL CALCULATED.3IONS-SCNC: 6 MMOL/L (ref 7–15)
AST SERPL W P-5'-P-CCNC: 28 U/L (ref 10–50)
BILIRUB SERPL-MCNC: 0.5 MG/DL
BUN SERPL-MCNC: 13 MG/DL (ref 8–23)
CALCIUM SERPL-MCNC: 8.9 MG/DL (ref 8.8–10.2)
CHLORIDE SERPL-SCNC: 103 MMOL/L (ref 98–107)
CREAT SERPL-MCNC: 0.82 MG/DL (ref 0.67–1.17)
DEPRECATED HCO3 PLAS-SCNC: 31 MMOL/L (ref 22–29)
GFR SERPL CREATININE-BSD FRML MDRD: >90 ML/MIN/1.73M2
GLUCOSE SERPL-MCNC: 95 MG/DL (ref 70–99)
HOLD SPECIMEN: NORMAL
POTASSIUM SERPL-SCNC: 4.1 MMOL/L (ref 3.4–5.3)
PROT SERPL-MCNC: 7.4 G/DL (ref 6.4–8.3)
SODIUM SERPL-SCNC: 140 MMOL/L (ref 136–145)
TSH SERPL DL<=0.005 MIU/L-ACNC: 23.09 UIU/ML (ref 0.3–4.2)

## 2023-05-18 PROCEDURE — G0463 HOSPITAL OUTPT CLINIC VISIT: HCPCS | Mod: 25 | Performed by: REGISTERED NURSE

## 2023-05-18 PROCEDURE — 258N000003 HC RX IP 258 OP 636: Performed by: REGISTERED NURSE

## 2023-05-18 PROCEDURE — 250N000011 HC RX IP 250 OP 636

## 2023-05-18 PROCEDURE — 96413 CHEMO IV INFUSION 1 HR: CPT

## 2023-05-18 PROCEDURE — 99215 OFFICE O/P EST HI 40 MIN: CPT | Performed by: REGISTERED NURSE

## 2023-05-18 PROCEDURE — 80053 COMPREHEN METABOLIC PANEL: CPT | Performed by: INTERNAL MEDICINE

## 2023-05-18 PROCEDURE — 36591 DRAW BLOOD OFF VENOUS DEVICE: CPT | Performed by: INTERNAL MEDICINE

## 2023-05-18 PROCEDURE — 250N000011 HC RX IP 250 OP 636: Performed by: REGISTERED NURSE

## 2023-05-18 PROCEDURE — 84443 ASSAY THYROID STIM HORMONE: CPT | Performed by: INTERNAL MEDICINE

## 2023-05-18 RX ORDER — METHYLPREDNISOLONE SODIUM SUCCINATE 125 MG/2ML
125 INJECTION, POWDER, LYOPHILIZED, FOR SOLUTION INTRAMUSCULAR; INTRAVENOUS
Status: CANCELLED
Start: 2023-05-19

## 2023-05-18 RX ORDER — ALBUTEROL SULFATE 0.83 MG/ML
2.5 SOLUTION RESPIRATORY (INHALATION)
Status: CANCELLED | OUTPATIENT
Start: 2023-05-19

## 2023-05-18 RX ORDER — LORAZEPAM 2 MG/ML
0.5 INJECTION INTRAMUSCULAR EVERY 4 HOURS PRN
Status: CANCELLED | OUTPATIENT
Start: 2023-05-19

## 2023-05-18 RX ORDER — LEVOTHYROXINE SODIUM 50 UG/1
50 TABLET ORAL DAILY
Qty: 60 TABLET | Refills: 1 | Status: SHIPPED | OUTPATIENT
Start: 2023-05-18 | End: 2023-06-29

## 2023-05-18 RX ORDER — HEPARIN SODIUM (PORCINE) LOCK FLUSH IV SOLN 100 UNIT/ML 100 UNIT/ML
5 SOLUTION INTRAVENOUS
Status: DISCONTINUED | OUTPATIENT
Start: 2023-05-18 | End: 2023-05-18 | Stop reason: HOSPADM

## 2023-05-18 RX ORDER — EPINEPHRINE 1 MG/ML
0.3 INJECTION, SOLUTION INTRAMUSCULAR; SUBCUTANEOUS EVERY 5 MIN PRN
Status: CANCELLED | OUTPATIENT
Start: 2023-05-19

## 2023-05-18 RX ORDER — ALBUTEROL SULFATE 90 UG/1
1-2 AEROSOL, METERED RESPIRATORY (INHALATION)
Status: CANCELLED
Start: 2023-05-19

## 2023-05-18 RX ORDER — HEPARIN SODIUM (PORCINE) LOCK FLUSH IV SOLN 100 UNIT/ML 100 UNIT/ML
5 SOLUTION INTRAVENOUS
Status: CANCELLED | OUTPATIENT
Start: 2023-05-19

## 2023-05-18 RX ORDER — HEPARIN SODIUM (PORCINE) LOCK FLUSH IV SOLN 100 UNIT/ML 100 UNIT/ML
5 SOLUTION INTRAVENOUS ONCE
Status: COMPLETED | OUTPATIENT
Start: 2023-05-18 | End: 2023-05-18

## 2023-05-18 RX ORDER — MEPERIDINE HYDROCHLORIDE 25 MG/ML
25 INJECTION INTRAMUSCULAR; INTRAVENOUS; SUBCUTANEOUS EVERY 30 MIN PRN
Status: CANCELLED | OUTPATIENT
Start: 2023-05-19

## 2023-05-18 RX ORDER — DIPHENHYDRAMINE HYDROCHLORIDE 50 MG/ML
50 INJECTION INTRAMUSCULAR; INTRAVENOUS
Status: CANCELLED
Start: 2023-05-19

## 2023-05-18 RX ORDER — HEPARIN SODIUM,PORCINE 10 UNIT/ML
5 VIAL (ML) INTRAVENOUS
Status: CANCELLED | OUTPATIENT
Start: 2023-05-19

## 2023-05-18 RX ADMIN — Medication 5 ML: at 12:37

## 2023-05-18 RX ADMIN — SODIUM CHLORIDE 250 ML: 9 INJECTION, SOLUTION INTRAVENOUS at 11:53

## 2023-05-18 RX ADMIN — SODIUM CHLORIDE 200 MG: 9 INJECTION, SOLUTION INTRAVENOUS at 11:57

## 2023-05-18 RX ADMIN — Medication 5 ML: at 09:45

## 2023-05-18 ASSESSMENT — PAIN SCALES - GENERAL: PAINLEVEL: NO PAIN (0)

## 2023-05-18 NOTE — PATIENT INSTRUCTIONS
[facility] Triage and after hours / weekends / holidays:  [phone]    Please call the triage or after hours line if you experience a temperature greater than or equal to 100.4, shaking chills, have uncontrolled nausea, vomiting and/or diarrhea, dizziness, shortness of breath, chest pain, bleeding, unexplained bruising, or if you have any other new/concerning symptoms, questions or concerns.      If you are having any concerning symptoms or wish to speak to a provider before your next infusion visit, please call triage to notify them so we can adequately serve you.     If you need a refill on a narcotic prescription or other medication, please call before your infusion appointment.                 May 2023      Albert Monday Tuesday Wednesday Thursday Friday Saturday        1     2     3    CT CHEST ABDOMEN W   1:10 PM   (20 min.)   [email or web address]   [facility] [place]    CT SOFT TISSUE NECK W   1:45 PM   (20 min.)   [email or web address]   [facility] [place] 4    RETURN CCSL   8:25 AM   (30 min.)   Dariela Melvin MD   [facility] 5     6       7     8     9     10     11     12     13       14     15     16     17     18    LAB CENTRAL   9:30 AM   (15 min.)   [facility] DRAW   [facility]    RETURN ACTIVE TREATMENT   9:45 AM   (45 min.)   Jany Mtz CNP   [facility]    ONC INFUSION 1 HR (60 MIN)  11:00 AM   (60 min.)    ONC INFUSION NURSE   [facility] 19     20       21     22     23     24     25     26     27       28     29     30     31 June 2023 Sunday Monday Tuesday Wednesday Thursday Friday Saturday                       1     2     3       4     5     6     7     8    LAB CENTRAL   1:00 PM   (15 min.)   [facility] DRAW   [facility]    RETURN ACTIVE TREATMENT    1:15 PM   (45 min.)   Jany Mtz CNP   [facility]    ONC INFUSION 1 HR (60 MIN)   2:30 PM   (60 min.)    ONC INFUSION NURSE   [facility] 9     10       11     12     13     14     15     16     17       18     19     20     21     22     23     24       25     26     27     28    RETURN CCSL   3:45 PM   (30 min.)   Dariela Melvin MD   [facility] 29    LAB CENTRAL  10:30 AM   (15 min.)   [facility] DRAW   [facility]    ONC INFUSION 1 HR (60 MIN)  11:00 AM   (60 min.)    ONC INFUSION NURSE   [facility] 30                            Lab Results:  Recent Results (from the past 12 hour(s))   T4 free    Collection Time: 05/18/23  9:54 AM   Result Value Ref Range    Free T4 0.43 (L) 0.90 - 1.70 ng/dL   Comprehensive metabolic panel    Collection Time: 05/18/23  9:54 AM   Result Value Ref Range    Sodium 140 136 - 145 mmol/L    Potassium 4.1 3.4 - 5.3 mmol/L    Chloride 103 98 - 107 mmol/L    Carbon Dioxide (CO2) 31 (H) 22 - 29 mmol/L    Anion Gap 6 (L) 7 - 15 mmol/L    Urea Nitrogen 13.0 8.0 - 23.0 mg/dL    Creatinine 0.82 0.67 - 1.17 mg/dL    Calcium 8.9 8.8 - 10.2 mg/dL    Glucose 95 70 - 99 mg/dL    Alkaline Phosphatase 85 40 - 129 U/L    AST 28 10 - 50 U/L    ALT 12 10 - 50 U/L    Protein Total 7.4 6.4 - 8.3 g/dL    Albumin 3.7 3.5 - 5.2 g/dL    Bilirubin Total 0.5 <=1.2 mg/dL    GFR Estimate >90 >60 mL/min/1.73m2   Extra Purple Top Tube    Collection Time: 05/18/23  9:54 AM   Result Value Ref Range    Hold Specimen JIC    TSH    Collection Time: 05/18/23  9:54 AM   Result Value Ref Range    TSH 23.09 (H) 0.30 - 4.20 uIU/mL

## 2023-05-18 NOTE — NURSING NOTE
"Chief Complaint   Patient presents with     Port Draw     Labs drawn via port by RN. Vitals taken.     Labs drawn via port by RN. Port accessed with 20G 3/4\" power needle. Flushed with NS and heparin. Pt tolerated well. Vitals taken. Pt checked in for next appointment.    Marlena Morgan, RN  "

## 2023-05-18 NOTE — NURSING NOTE
"Oncology Rooming Note    May 18, 2023 10:06 AM   Jareth Gallardo is a 62 year old male who presents for:    Chief Complaint   Patient presents with     Port Draw     Labs drawn via port by RN. Vitals taken.     Oncology Clinic Visit     Return- nasopharyngeal cancer     Initial Vitals: /65 (BP Location: Right arm, Patient Position: Sitting, Cuff Size: Adult Regular)   Pulse 74   Temp 98.3  F (36.8  C) (Oral)   Resp 18   Wt 107.8 kg (237 lb 9.6 oz)   SpO2 95%   BMI 34.09 kg/m   Estimated body mass index is 34.09 kg/m  as calculated from the following:    Height as of 2/22/23: 1.778 m (5' 10\").    Weight as of this encounter: 107.8 kg (237 lb 9.6 oz). Body surface area is 2.31 meters squared.  No Pain (0) Comment: Data Unavailable   No LMP for male patient.  Allergies reviewed: Yes  Medications reviewed: Yes    Medications: Medication refills not needed today.  Pharmacy name entered into [employer]:    [id number] [place] [facility] - [place], [place] - [address] [facility] [place], [place] - [phone]  [place] [facility] - [place], [place] - [address]    Clinical concerns: No new clinical concerns other than reason for visit today.      Radha Rojas            "

## 2023-05-18 NOTE — Clinical Note
5/18/2023         RE: Jareth Gallardo  [address] [place] [address]        Dear Colleague,    Thank you for referring your patient, Jareth Gallardo, to the [facility]. Please see a copy of my visit note below.       [facility]    PATIENT NAME: Jareth Gallardo  MRN # [id number]   DATE OF VISIT: May 18, 2023  YOB: 1960     Referring Provider: Dr. Jesus Boston  Radiation Oncology: Dr. Mane Razo  Primary Care Provider: Dr. Beni Gallardo at [facility] in [facility]    CANCER TYPE: Nasopharyngeal carcinoma, p16 negative, SAM positive  STAGE: [id number] (DENAE)  ECOG PS: 0    PD-L1: 20% using  clone locally; TPS 70% at [facility]   NGS:     SUMMARY  12/11/21 UC for L neck mass, selling  12/9/21 CT neck ([place]). 2.4 cm L neck node medial to SCM just above the hyoid, additional LNs surround, scattered R neck and L supraclavicular LNs  2/15/22 US bx L cervical node. Path: SCC, moderately differentiated,   3/4/22 PET/CT. Nasopharyngeal and oropharyngeal mucosal thickening. 2.6 cm BERNARDO nodular GGO, SUV avid, some additional lingular uptake  4/4~5/16/22/22 C1-3 carboplatin gemcitabine. Not a candidate for cisplatin due to hearing loss on audiogram. Neulasta 5/3 after C2D8, ANC 1000   5/31/22 PET/CT. DC  6/22~8/10/22 Chemoradiation with weekly carboplatin  11/16/22 PET/CT. Residual 1.5 cm 2B LN (SUV 28.7), 1 cm enhancing focus in SCM (SUV 22.4), small mildly avid residual enhancing nodes in the L level 2A decreased in size and FDG avidity than before, resolved L level 5 node, small but mildly FDG avid R level 2B node, smaller mildly avid R level 3 nodes, unchanged in ize. NI-RADS 2a in the primary site - FDG uptake along the BOT at the midline, increased from prior. New patchy GGO and nodules in the RUL and L base with mild FDG uptake, likely inflammatory, recommend short interval CT  12/5/22 Salvage L modified radical neck dissection  (Dr. Boston). Path: L posterior SCM excision - SCC, 1.1 cm, invading into skeletal muscle, negative margins, +PNI/LVI. Level 2 node 1.5 cm SCC, moderately differentiated, invading into fibroadipose tissue, focally involving the inked resection margin.   1/30/23 CT chest. Patchy LLL GGO, decreased from prior.   2/22/23 PET/CT. Focal updake along the tongue base at midline, decreased from prior, likely inflammatory (SUV 14.8-->8.5), near complete resolution of the rest of the mild mucosal uptake within the tongue base. At least 5 FDG avid lesions in the L deep neck. 2 nodes at the C2 level, posterior to the surgical clips in the L lateral paravertebral region, one measuring 8 mm (SUV 15.9), the other more posteriorly measuring 7 mm. The other 2 adjacent ones are deep to the residual L SCM; 9.6 mm, and a smaller one immediately below in the SCM. There is another mildly FDG avid necrotic lesion inferior to the above 3 foci, again deep to the L SCM. 0.9 cm R level 2b LN (SUV 3.1), likely reactive. Resolution of RUL GGO, stable sub-6 mm nodules.  3/9/23~current Pembrolizumab . C3 delayed 1 week due to parainfluenza  4/18/23 ED at [facility] for fever and cough. CXR negative. Viral panel showed parainfluenza.  5/3/23 CT neck and CT CAP.     ASSESSMENT AND PLAN  Nasopharyngeal carcinoma, SAM +julia: CT neck after 3 cycles of pembro with interval enlargement of at least 5 foci within the left deep neck. CT CAP with nonspecific increased size of enlarged mediastinal lymph nodules. Adenopathy potentially related to recent parainfluenza virus or pseudoprogression. Restage with CT chest/abd and CT neck after 2 additional cycles. Tolerating pembro well.  -Cycle 4 pembrolizumab today  -RTC in 5 weeks for LEONEL visit, cycle 5    BERNARDO/lung nodules, new RUL nodules in 11/2022: Resolving c/w inflammatory changes from mild aspiration that's resolving.     Lymphedema: Persistent, performing daily massages/exercises, warm  showers.    Screening for hypothyroidism: TSH continues to rise, significant jump today to 23.09, FT4 0.43. Begin levothyroxine 50 mcg daily. Repeat TSH in 3 weeks.    DM2, steroid-induced hyperglycemia: Controlled, managed by PCP     Mild peripheral neuropathy: R foot numbness, very mild, from DM2. Unchanged    50 minutes spent on the date of the encounter doing chart review, review of test results, interpretation of tests, patient visit, documentation and discussion with family     Jany Mtz, IZABELLA    SUBJECTIVE  Jareth returns for follow up of nasopharyngeal carcinoma prior to cycle 4 Keytruda.    Overall he continues to feel well. Denies any cough, shortness of breath, joint pain or diarrhea. Notes that there is one area alone his neck incision that has changed in sensation. It isn't particularly painful. Has been busy working in his yard and woodworking. Will often work hard for 10-11 hours per day. Naps when needed. They got a new puppy last week, a yellow lab ([employer]).     PAST MEDICAL HISTORY  Nasopharyngeal carcinoma as above  DM2  COPD??  Hiatal hernia. Sleeps with his head elevated  LVH on TTE 2009, EF50%, LA Dilation  Dyslipidemia  BARRETT in 2015. Not using CPAP due to developing tooth abscesses when he tried it in 2015  Venous insufficiency in both legs being in an accident involving a mower about 10 years ago, on chronic furosemide. LE US negative 12/15/2009  Cholelithiasis  Hyperplastic colon polyp, due 2020  Tonsillectomy  Baker cyst RLE US 2013    Neuropathy from DM - R foot numbness chronically    Exposed to lots of wood dust in his occupation    CURRENT OUTPATIENT MEDICATIONS  Current Outpatient Medications   Medication Sig Dispense Refill     acetaminophen (TYLENOL) 325 MG tablet Take 2 tablets (650 mg) by mouth every 4 hours as needed for other (For optimal non-opioid multimodal pain management to improve pain control.) 50 tablet 0     atorvastatin (LIPITOR) 20 MG tablet Take 20 mg by mouth        fluticasone-vilanterol (BREO ELLIPTA) 100-25 MCG/INH inhaler INHALE 1 PUFF INTO THE LUNGS ONE TIME A DAY. RINSE MOUTH AFTER USE.       furosemide (LASIX) 20 MG tablet Take 20 mg by mouth       levothyroxine (SYNTHROID/LEVOTHROID) 50 MCG tablet Take 1 tablet (50 mcg) by mouth daily 60 tablet 1     metFORMIN (GLUCOPHAGE XR) 500 MG 24 hr tablet daily       mineral oil-hydrophilic petrolatum (AQUAPHOR) external ointment Apply topically every 8 hours Apply to incision. 50 g 1     TRULICITY 1.5 MG/0.5ML pen INJECT 1.5 MG UNDER THE SKIN ONE TIME A WEEK.       B-D U/F insulin pen needle USE ONE TIME A DAY TO INJECT VICTOZA (Patient not taking: Reported on 3/9/2023)       blood glucose (NO BRAND SPECIFIED) lancets standard Use to test blood sugar 2 times daily or as directed. (Patient not taking: Reported on 3/30/2023) 90 lancet 1     blood glucose (NO BRAND SPECIFIED) test strip Use to test blood sugar 3 times daily or as directed. (Patient not taking: Reported on 3/30/2023) 90 strip 0     blood glucose monitoring (ACCU-CHEK RANDELL PLUS) meter device kit Use to test blood sugar 2 times daily or as directed. (Patient not taking: Reported on 3/30/2023) 1 kit 0     senna-docusate (SENOKOT-S/PERICOLACE) 8.6-50 MG tablet Take 1 tablet by mouth 2 times daily (Patient not taking: Reported on 4/27/2023) 30 tablet 0     ALLERGIES  No Known Allergies     REVIEW OF SYSTEMS  As above in the HPI, o/w complete 12-point ROS was negative.    PHYSICAL EXAM  /65 (BP Location: Right arm, Patient Position: Sitting, Cuff Size: Adult Regular)   Pulse 74   Temp 98.3  F (36.8  C) (Oral)   Resp 18   Wt 107.8 kg (237 lb 9.6 oz)   SpO2 95%   BMI 34.09 kg/m      General: No acute distress  Lymph: No lymphadenopathy in neck  Heart: RRR, no m/g/r  Lungs: Clear to ascultation bilaterally  Neuro: Cranial nerves grossly intact, numbness to left neck  Rash: none  Vascular access: port    LABORATORY AND IMAGING STUDIES   Latest Reference Range &  Units 05/18/23 09:54   Sodium 136 - 145 mmol/L 140   Potassium 3.4 - 5.3 mmol/L 4.1   Chloride 98 - 107 mmol/L 103   Carbon Dioxide (CO2) 22 - 29 mmol/L 31 (H)   Urea Nitrogen 8.0 - 23.0 mg/dL 13.0   Creatinine 0.67 - 1.17 mg/dL 0.82   GFR Estimate >60 mL/min/1.73m2 >90   Calcium 8.8 - 10.2 mg/dL 8.9   Anion Gap 7 - 15 mmol/L 6 (L)   Albumin 3.5 - 5.2 g/dL 3.7   Protein Total 6.4 - 8.3 g/dL 7.4   Alkaline Phosphatase 40 - 129 U/L 85   ALT 10 - 50 U/L 12   AST 10 - 50 U/L 28   Bilirubin Total <=1.2 mg/dL 0.5   Glucose 70 - 99 mg/dL 95   T4 Free 0.90 - 1.70 ng/dL 0.43 (L)   TSH 0.30 - 4.20 uIU/mL 23.09 (H)   (H): Data is abnormally high  (L): Data is abnormally low           Again, thank you for allowing me to participate in the care of your patient.        Sincerely,        Jany Mtz, CNP

## 2023-05-18 NOTE — PROGRESS NOTES
[facility]/Voicemail    Clinical Data: Care Coordinator Outreach    Outreach attempted x 1.  Left message on patient's voicemail with call back information and requested return call.    Plan: Care Coordinator will try to reach patient again in 1-2 business days.    Calling patient to let him know that the thyroid function tests from today do indicate that he could start levothyroxine. I also sent a MyChart with this information. Encouraged him to call me back with questions.    Emiliano Nelson DNP, RN, OCN  RN Care Coordinator   Dr. Asha De Leon and Dr. Dariela Melvin  [facility]

## 2023-05-18 NOTE — PROGRESS NOTES
Infusion Nursing Note:  Jareth Gallardo presents today for Day 1 Cycle 4 Keytruda.    Patient seen by provider today: Yes: Jany Mtz CNP   present during visit today: Not Applicable.    Note: Patient presents to infusion feeling ok. Pt denies new acute discomfort and states no acute complaints or concerns not addressed by LEONEL today.      Intravenous Access:  Implanted Port.    Treatment Conditions:  Lab Results   Component Value Date     05/18/2023    POTASSIUM 4.1 05/18/2023    MAG 1.8 01/30/2023    CR 0.82 05/18/2023    LAI 8.9 05/18/2023    BILITOTAL 0.5 05/18/2023    ALBUMIN 3.7 05/18/2023    ALT 12 05/18/2023    AST 28 05/18/2023     Results reviewed, labs MET treatment parameters, ok to proceed with treatment.      Post Infusion Assessment:  Patient tolerated infusion without incident.  Blood return noted pre and post infusion.  Site patent and intact, free from redness, edema or discomfort.  No evidence of extravasations.  Access discontinued per protocol.       Discharge Plan:   Patient declined prescription refills.  Discharge instructions reviewed with: Patient.  Patient and/or family verbalized understanding of discharge instructions and all questions answered.  AVS to patient via [employer].  Patient will return 6/8 for next appointment.   Patient discharged in stable condition accompanied by: self.  Departure Mode: Ambulatory.  Face to Face time: 0 minutes.      Luis Correa RN

## 2023-05-21 ENCOUNTER — HEALTH MAINTENANCE LETTER (OUTPATIENT)
Age: 63
End: 2023-05-21

## 2023-06-07 NOTE — PROGRESS NOTES
[facility] CANCER [facility]    PATIENT NAME: Jareth Gallardo  MRN # [id number]   DATE OF VISIT: June 8, 2023  YOB: 1960     Referring Provider: Dr. Jesus Boston  Radiation Oncology: Dr. Mane Razo  Primary Care Provider: Dr. Beni Gallardo at [facility] in [place]    CANCER TYPE: Nasopharyngeal carcinoma, p16 negative, SAM positive  STAGE: [id number] (DENAE)  ECOG PS: 0    PD-L1: 20% using  clone locally; TPS 70% at [facility]   NGS:     SUMMARY  12/11/21 UC for L neck mass, selling  12/9/21 CT neck ([place]). 2.4 cm L neck node medial to SCM just above the hyoid, additional LNs surround, scattered R neck and L supraclavicular LNs  2/15/22 US bx L cervical node. Path: SCC, moderately differentiated,   3/4/22 PET/CT. Nasopharyngeal and oropharyngeal mucosal thickening. 2.6 cm BERNARDO nodular GGO, SUV avid, some additional lingular uptake  4/4~5/16/22/22 C1-3 carboplatin gemcitabine. Not a candidate for cisplatin due to hearing loss on audiogram. Neulasta 5/3 after C2D8, ANC 1000   5/31/22 PET/CT. IN  6/22~8/10/22 Chemoradiation with weekly carboplatin  11/16/22 PET/CT. Residual 1.5 cm 2B LN (SUV 28.7), 1 cm enhancing focus in SCM (SUV 22.4), small mildly avid residual enhancing nodes in the L level 2A decreased in size and FDG avidity than before, resolved L level 5 node, small but mildly FDG avid R level 2B node, smaller mildly avid R level 3 nodes, unchanged in ize. NI-RADS 2a in the primary site - FDG uptake along the BOT at the midline, increased from prior. New patchy GGO and nodules in the RUL and L base with mild FDG uptake, likely inflammatory, recommend short interval CT  12/5/22 Salvage L modified radical neck dissection (Dr. Boston). Path: L posterior SCM excision - SCC, 1.1 cm, invading into skeletal muscle, negative margins, +PNI/LVI. Level 2 node 1.5 cm SCC, moderately differentiated, invading into fibroadipose tissue, focally involving the inked resection margin.    1/30/23 CT chest. Patchy LLL GGO, decreased from prior.   2/22/23 PET/CT. Focal updake along the tongue base at midline, decreased from prior, likely inflammatory (SUV 14.8-->8.5), near complete resolution of the rest of the mild mucosal uptake within the tongue base. At least 5 FDG avid lesions in the L deep neck. 2 nodes at the C2 level, posterior to the surgical clips in the L lateral paravertebral region, one measuring 8 mm (SUV 15.9), the other more posteriorly measuring 7 mm. The other 2 adjacent ones are deep to the residual L SCM; 9.6 mm, and a smaller one immediately below in the SCM. There is another mildly FDG avid necrotic lesion inferior to the above 3 foci, again deep to the L SCM. 0.9 cm R level 2b LN (SUV 3.1), likely reactive. Resolution of RUL GGO, stable sub-6 mm nodules.  3/9/23~current Pembrolizumab . C3 delayed 1 week due to parainfluenza  4/18/23 ED at [facility] for fever and cough. CXR negative. Viral panel showed parainfluenza.  5/3/23 CT neck and CT CAP.     ASSESSMENT AND PLAN  Nasopharyngeal carcinoma, SAM +julia: CT neck after 3 cycles of pembro with interval enlargement of at least 5 foci within the left deep neck. CT CAP with nonspecific increased size of enlarged mediastinal lymph nodules. Adenopathy potentially related to recent parainfluenza virus or pseudoprogression. Tolerated Keytruda well. Restage with CT chest/abd and CT neck after 2 additional cycles. Tolerating pembro well.  -Cycle 5 pembrolizumab today  -Needs CT neck and CT chest prior to appointment with Dr. Melvin in 3 weeks, requested today    BERNARDO/lung nodules, new RUL nodules in 11/2022: Resolving c/w inflammatory changes from mild aspiration that's resolving.     Lymphedema: Persistent, performing daily massages/exercises, warm showers.    Hypothyroidism. Now on levothyroxine 50 mcg daily. Confirms compliance. TSH improving. No dose adjustment yet as he only started levo 3 weeks ago. Recheck on return.      DM2, steroid-induced hyperglycemia: Controlled, managed by PCP     Mild peripheral neuropathy: R foot numbness, very mild, from DM2. Unchanged    47 minutes spent on the date of the encounter doing chart review, review of test results, interpretation of tests, patient visit and documentation     Jany Mtz, CNP      SUBJECTIVE  Jareth returns for follow up of nasopharyngeal carcinoma prior to cycle 5 Keytruda.    Continues to feel well. Has been staying very busy. Works in his yard frequently. Has been gardening and working on bigger projects as well. Continues to stay busy with their new puppy, Patricio. Denies any recent viral illness. No fever, cough, dyspnea or diarrhea. Left neck feels tight. Completes stretches/exercises in the morning.    PAST MEDICAL HISTORY  Nasopharyngeal carcinoma as above  DM2  COPD??  Hiatal hernia. Sleeps with his head elevated  LVH on TTE 2009, EF50%, LA Dilation  Dyslipidemia  BARRETT in 2015. Not using CPAP due to developing tooth abscesses when he tried it in 2015  Venous insufficiency in both legs being in an accident involving a mower about 10 years ago, on chronic furosemide. LE US negative 12/15/2009  Cholelithiasis  Hyperplastic colon polyp, due 2020  Tonsillectomy  Baker cyst RLE US 2013    Neuropathy from DM - R foot numbness chronically    Exposed to lots of wood dust in his occupation    CURRENT OUTPATIENT MEDICATIONS  Current Outpatient Medications   Medication Sig Dispense Refill     acetaminophen (TYLENOL) 325 MG tablet Take 2 tablets (650 mg) by mouth every 4 hours as needed for other (For optimal non-opioid multimodal pain management to improve pain control.) 50 tablet 0     atorvastatin (LIPITOR) 20 MG tablet Take 20 mg by mouth       fluticasone-vilanterol (BREO ELLIPTA) 100-25 MCG/INH inhaler INHALE 1 PUFF INTO THE LUNGS ONE TIME A DAY. RINSE MOUTH AFTER USE.       furosemide (LASIX) 20 MG tablet Take 20 mg by mouth       levothyroxine (SYNTHROID/LEVOTHROID) 50  MCG tablet Take 1 tablet (50 mcg) by mouth daily 60 tablet 1     metFORMIN (GLUCOPHAGE XR) 500 MG 24 hr tablet Take 500 mg by mouth daily (with dinner)       mineral oil-hydrophilic petrolatum (AQUAPHOR) external ointment Apply topically every 8 hours Apply to incision. 50 g 1     TRULICITY 1.5 MG/0.5ML pen INJECT 1.5 MG UNDER THE SKIN ONE TIME A WEEK.       blood glucose (NO BRAND SPECIFIED) lancets standard Use to test blood sugar 2 times daily or as directed. (Patient not taking: Reported on 3/30/2023) 90 lancet 1     blood glucose (NO BRAND SPECIFIED) test strip Use to test blood sugar 3 times daily or as directed. (Patient not taking: Reported on 3/30/2023) 90 strip 0     blood glucose monitoring (ACCU-CHEK RANDELL PLUS) meter device kit Use to test blood sugar 2 times daily or as directed. (Patient not taking: Reported on 3/30/2023) 1 kit 0     ALLERGIES  No Known Allergies     REVIEW OF SYSTEMS  As above in the HPI, o/w complete 12-point ROS was negative.    PHYSICAL EXAM  /67   Pulse 68   Temp 97.8  F (36.6  C) (Oral)   Resp 16   Wt 109.4 kg (241 lb 3.2 oz)   SpO2 97%   BMI 34.61 kg/m      General: No acute distress  Lymph: No lymphadenopathy in neck  Heart: RRR, no m/g/r  Lungs: Clear to ascultation bilaterally  Neuro: Cranial nerves grossly intact, numbness to left neck  Rash: none  Vascular access: port    LABORATORY AND IMAGING STUDIES   Latest Reference Range & Units 06/08/23 13:25   Sodium 136 - 145 mmol/L 141   Potassium 3.4 - 5.3 mmol/L 4.0   Chloride 98 - 107 mmol/L 105   Carbon Dioxide (CO2) 22 - 29 mmol/L 30 (H)   Urea Nitrogen 8.0 - 23.0 mg/dL 15.3   Creatinine 0.67 - 1.17 mg/dL 0.84   GFR Estimate >60 mL/min/1.73m2 >90   Calcium 8.8 - 10.2 mg/dL 8.8   Anion Gap 7 - 15 mmol/L 6 (L)   Albumin 3.5 - 5.2 g/dL 3.7   Protein Total 6.4 - 8.3 g/dL 7.0   Alkaline Phosphatase 40 - 129 U/L 82   ALT 10 - 50 U/L 12   AST 10 - 50 U/L 26   Bilirubin Total <=1.2 mg/dL 0.5   Glucose 70 - 99 mg/dL 90    WBC 4.0 - 11.0 10e3/uL 3.7 (L)   Hemoglobin 13.3 - 17.7 g/dL 11.2 (L)   Hematocrit 40.0 - 53.0 % 35.7 (L)   Platelet Count 150 - 450 10e3/uL 145 (L)   RBC Count 4.40 - 5.90 10e6/uL 4.11 (L)   MCV 78 - 100 fL 87   MCH 26.5 - 33.0 pg 27.3   MCHC 31.5 - 36.5 g/dL 31.4 (L)   RDW 10.0 - 15.0 % 15.4 (H)   % Neutrophils % 68   % Lymphocytes % 18   % Monocytes % 10   % Eosinophils % 2   % Basophils % 1   Absolute Basophils 0.0 - 0.2 10e3/uL 0.0   Absolute Eosinophils 0.0 - 0.7 10e3/uL 0.1   Absolute Immature Granulocytes <=0.4 10e3/uL 0.0   Absolute Lymphocytes 0.8 - 5.3 10e3/uL 0.7 (L)   Absolute Monocytes 0.0 - 1.3 10e3/uL 0.4   % Immature Granulocytes % 1   Absolute Neutrophils 1.6 - 8.3 10e3/uL 2.6   Absolute NRBCs 10e3/uL 0.0   NRBCs per 100 WBC <1 /100 0   (H): Data is abnormally high  (L): Data is abnormally low

## 2023-06-08 ENCOUNTER — ONCOLOGY VISIT (OUTPATIENT)
Dept: ONCOLOGY | Facility: CLINIC | Age: 63
End: 2023-06-08
Attending: REGISTERED NURSE
Payer: COMMERCIAL

## 2023-06-08 ENCOUNTER — APPOINTMENT (OUTPATIENT)
Dept: LAB | Facility: CLINIC | Age: 63
End: 2023-06-08
Attending: REGISTERED NURSE
Payer: COMMERCIAL

## 2023-06-08 VITALS
OXYGEN SATURATION: 97 % | WEIGHT: 241.2 LBS | DIASTOLIC BLOOD PRESSURE: 67 MMHG | SYSTOLIC BLOOD PRESSURE: 102 MMHG | RESPIRATION RATE: 16 BRPM | BODY MASS INDEX: 34.61 KG/M2 | TEMPERATURE: 97.8 F | HEART RATE: 68 BPM

## 2023-06-08 DIAGNOSIS — I89.0 LYMPHEDEMA: ICD-10-CM

## 2023-06-08 DIAGNOSIS — E11.29 TYPE 2 DIABETES MELLITUS WITH MICROALBUMINURIA, WITHOUT LONG-TERM CURRENT USE OF INSULIN (H): ICD-10-CM

## 2023-06-08 DIAGNOSIS — R80.9 TYPE 2 DIABETES MELLITUS WITH MICROALBUMINURIA, WITHOUT LONG-TERM CURRENT USE OF INSULIN (H): ICD-10-CM

## 2023-06-08 DIAGNOSIS — C11.9 NASOPHARYNGEAL CANCER (H): Primary | ICD-10-CM

## 2023-06-08 DIAGNOSIS — E03.9 HYPOTHYROIDISM, UNSPECIFIED TYPE: ICD-10-CM

## 2023-06-08 LAB
ALBUMIN SERPL BCG-MCNC: 3.7 G/DL (ref 3.5–5.2)
ALP SERPL-CCNC: 82 U/L (ref 40–129)
ALT SERPL W P-5'-P-CCNC: 12 U/L (ref 10–50)
ANION GAP SERPL CALCULATED.3IONS-SCNC: 6 MMOL/L (ref 7–15)
AST SERPL W P-5'-P-CCNC: 26 U/L (ref 10–50)
BASOPHILS # BLD AUTO: 0 10E3/UL (ref 0–0.2)
BASOPHILS NFR BLD AUTO: 1 %
BILIRUB SERPL-MCNC: 0.5 MG/DL
BUN SERPL-MCNC: 15.3 MG/DL (ref 8–23)
CALCIUM SERPL-MCNC: 8.8 MG/DL (ref 8.8–10.2)
CHLORIDE SERPL-SCNC: 105 MMOL/L (ref 98–107)
CREAT SERPL-MCNC: 0.84 MG/DL (ref 0.67–1.17)
DEPRECATED HCO3 PLAS-SCNC: 30 MMOL/L (ref 22–29)
EOSINOPHIL # BLD AUTO: 0.1 10E3/UL (ref 0–0.7)
EOSINOPHIL NFR BLD AUTO: 2 %
ERYTHROCYTE [DISTWIDTH] IN BLOOD BY AUTOMATED COUNT: 15.4 % (ref 10–15)
GFR SERPL CREATININE-BSD FRML MDRD: >90 ML/MIN/1.73M2
GLUCOSE SERPL-MCNC: 90 MG/DL (ref 70–99)
HCT VFR BLD AUTO: 35.7 % (ref 40–53)
HGB BLD-MCNC: 11.2 G/DL (ref 13.3–17.7)
IMM GRANULOCYTES # BLD: 0 10E3/UL
IMM GRANULOCYTES NFR BLD: 1 %
LYMPHOCYTES # BLD AUTO: 0.7 10E3/UL (ref 0.8–5.3)
LYMPHOCYTES NFR BLD AUTO: 18 %
MCH RBC QN AUTO: 27.3 PG (ref 26.5–33)
MCHC RBC AUTO-ENTMCNC: 31.4 G/DL (ref 31.5–36.5)
MCV RBC AUTO: 87 FL (ref 78–100)
MONOCYTES # BLD AUTO: 0.4 10E3/UL (ref 0–1.3)
MONOCYTES NFR BLD AUTO: 10 %
NEUTROPHILS # BLD AUTO: 2.6 10E3/UL (ref 1.6–8.3)
NEUTROPHILS NFR BLD AUTO: 68 %
NRBC # BLD AUTO: 0 10E3/UL
NRBC BLD AUTO-RTO: 0 /100
PLATELET # BLD AUTO: 145 10E3/UL (ref 150–450)
POTASSIUM SERPL-SCNC: 4 MMOL/L (ref 3.4–5.3)
PROT SERPL-MCNC: 7 G/DL (ref 6.4–8.3)
RBC # BLD AUTO: 4.11 10E6/UL (ref 4.4–5.9)
SODIUM SERPL-SCNC: 141 MMOL/L (ref 136–145)
T4 FREE SERPL-MCNC: 0.58 NG/DL (ref 0.9–1.7)
TSH SERPL DL<=0.005 MIU/L-ACNC: 15.94 UIU/ML (ref 0.3–4.2)
WBC # BLD AUTO: 3.7 10E3/UL (ref 4–11)

## 2023-06-08 PROCEDURE — G0463 HOSPITAL OUTPT CLINIC VISIT: HCPCS | Performed by: REGISTERED NURSE

## 2023-06-08 PROCEDURE — 85004 AUTOMATED DIFF WBC COUNT: CPT | Performed by: REGISTERED NURSE

## 2023-06-08 PROCEDURE — 80053 COMPREHEN METABOLIC PANEL: CPT | Performed by: REGISTERED NURSE

## 2023-06-08 PROCEDURE — 96413 CHEMO IV INFUSION 1 HR: CPT

## 2023-06-08 PROCEDURE — 250N000011 HC RX IP 250 OP 636: Performed by: REGISTERED NURSE

## 2023-06-08 PROCEDURE — 258N000003 HC RX IP 258 OP 636: Performed by: REGISTERED NURSE

## 2023-06-08 PROCEDURE — 84443 ASSAY THYROID STIM HORMONE: CPT | Performed by: REGISTERED NURSE

## 2023-06-08 PROCEDURE — 99215 OFFICE O/P EST HI 40 MIN: CPT | Performed by: REGISTERED NURSE

## 2023-06-08 PROCEDURE — 36591 DRAW BLOOD OFF VENOUS DEVICE: CPT | Performed by: REGISTERED NURSE

## 2023-06-08 PROCEDURE — 84439 ASSAY OF FREE THYROXINE: CPT | Performed by: REGISTERED NURSE

## 2023-06-08 RX ORDER — HEPARIN SODIUM,PORCINE 10 UNIT/ML
5 VIAL (ML) INTRAVENOUS
Status: CANCELLED | OUTPATIENT
Start: 2023-06-09

## 2023-06-08 RX ORDER — HEPARIN SODIUM (PORCINE) LOCK FLUSH IV SOLN 100 UNIT/ML 100 UNIT/ML
5 SOLUTION INTRAVENOUS ONCE
Status: COMPLETED | OUTPATIENT
Start: 2023-06-08 | End: 2023-06-08

## 2023-06-08 RX ORDER — HEPARIN SODIUM (PORCINE) LOCK FLUSH IV SOLN 100 UNIT/ML 100 UNIT/ML
5 SOLUTION INTRAVENOUS
Status: DISCONTINUED | OUTPATIENT
Start: 2023-06-08 | End: 2023-06-08 | Stop reason: HOSPADM

## 2023-06-08 RX ORDER — MEPERIDINE HYDROCHLORIDE 25 MG/ML
25 INJECTION INTRAMUSCULAR; INTRAVENOUS; SUBCUTANEOUS EVERY 30 MIN PRN
Status: CANCELLED | OUTPATIENT
Start: 2023-06-09

## 2023-06-08 RX ORDER — ALBUTEROL SULFATE 0.83 MG/ML
2.5 SOLUTION RESPIRATORY (INHALATION)
Status: CANCELLED | OUTPATIENT
Start: 2023-06-09

## 2023-06-08 RX ORDER — HEPARIN SODIUM (PORCINE) LOCK FLUSH IV SOLN 100 UNIT/ML 100 UNIT/ML
5 SOLUTION INTRAVENOUS
Status: CANCELLED | OUTPATIENT
Start: 2023-06-09

## 2023-06-08 RX ORDER — EPINEPHRINE 1 MG/ML
0.3 INJECTION, SOLUTION INTRAMUSCULAR; SUBCUTANEOUS EVERY 5 MIN PRN
Status: CANCELLED | OUTPATIENT
Start: 2023-06-09

## 2023-06-08 RX ORDER — METHYLPREDNISOLONE SODIUM SUCCINATE 125 MG/2ML
125 INJECTION, POWDER, LYOPHILIZED, FOR SOLUTION INTRAMUSCULAR; INTRAVENOUS
Status: CANCELLED
Start: 2023-06-09

## 2023-06-08 RX ORDER — LORAZEPAM 2 MG/ML
0.5 INJECTION INTRAMUSCULAR EVERY 4 HOURS PRN
Status: CANCELLED | OUTPATIENT
Start: 2023-06-09

## 2023-06-08 RX ORDER — DIPHENHYDRAMINE HYDROCHLORIDE 50 MG/ML
50 INJECTION INTRAMUSCULAR; INTRAVENOUS
Status: CANCELLED
Start: 2023-06-09

## 2023-06-08 RX ORDER — ALBUTEROL SULFATE 90 UG/1
1-2 AEROSOL, METERED RESPIRATORY (INHALATION)
Status: CANCELLED
Start: 2023-06-09

## 2023-06-08 RX ADMIN — Medication 5 ML: at 13:26

## 2023-06-08 RX ADMIN — Medication 5 ML: at 15:34

## 2023-06-08 RX ADMIN — SODIUM CHLORIDE 250 ML: 9 INJECTION, SOLUTION INTRAVENOUS at 14:47

## 2023-06-08 RX ADMIN — SODIUM CHLORIDE 200 MG: 9 INJECTION, SOLUTION INTRAVENOUS at 14:57

## 2023-06-08 ASSESSMENT — PAIN SCALES - GENERAL: PAINLEVEL: NO PAIN (0)

## 2023-06-08 NOTE — NURSING NOTE
Chief Complaint   Patient presents with     Port Draw     Port accessed with 20g flat needle by RN, labs collected, line flushed with saline and heparin.  Vitals taken. Pt checked in for appointment(s).      Brit WHITING RN PHN BSN  BMT/Oncology Lab

## 2023-06-08 NOTE — Clinical Note
6/8/2023         RE: Jareth Gallardo  [address] [place] [address]        Dear Colleague,    Thank you for referring your patient, Jareth Gallardo, to the [facility] CANCER [facility]. Please see a copy of my visit note below.       [facility] CANCER [facility]    PATIENT NAME: Jareth Gallardo  MRN # [id number]   DATE OF VISIT: June 8, 2023  YOB: 1960     Referring Provider: Dr. Jesus Boston  Radiation Oncology: Dr. Mane Razo  Primary Care Provider: Dr. Beni Gallardo at [facility] in [facility]    CANCER TYPE: Nasopharyngeal carcinoma, p16 negative, SAM positive  STAGE: [id number] (DENAE)  ECOG PS: 0    PD-L1: 20% using  clone locally; TPS 70% at [facility]   NGS:     SUMMARY  12/11/21 UC for L neck mass, selling  12/9/21 CT neck ([place]). 2.4 cm L neck node medial to SCM just above the hyoid, additional LNs surround, scattered R neck and L supraclavicular LNs  2/15/22 US bx L cervical node. Path: SCC, moderately differentiated,   3/4/22 PET/CT. Nasopharyngeal and oropharyngeal mucosal thickening. 2.6 cm BERNARDO nodular GGO, SUV avid, some additional lingular uptake  4/4~5/16/22/22 C1-3 carboplatin gemcitabine. Not a candidate for cisplatin due to hearing loss on audiogram. Neulasta 5/3 after C2D8, ANC 1000   5/31/22 PET/CT. ME  6/22~8/10/22 Chemoradiation with weekly carboplatin  11/16/22 PET/CT. Residual 1.5 cm 2B LN (SUV 28.7), 1 cm enhancing focus in SCM (SUV 22.4), small mildly avid residual enhancing nodes in the L level 2A decreased in size and FDG avidity than before, resolved L level 5 node, small but mildly FDG avid R level 2B node, smaller mildly avid R level 3 nodes, unchanged in ize. NI-RADS 2a in the primary site - FDG uptake along the BOT at the midline, increased from prior. New patchy GGO and nodules in the RUL and L base with mild FDG uptake, likely inflammatory, recommend short interval CT  12/5/22 Salvage L modified radical neck dissection  (Dr. Boston). Path: L posterior SCM excision - SCC, 1.1 cm, invading into skeletal muscle, negative margins, +PNI/LVI. Level 2 node 1.5 cm SCC, moderately differentiated, invading into fibroadipose tissue, focally involving the inked resection margin.   1/30/23 CT chest. Patchy LLL GGO, decreased from prior.   2/22/23 PET/CT. Focal updake along the tongue base at midline, decreased from prior, likely inflammatory (SUV 14.8-->8.5), near complete resolution of the rest of the mild mucosal uptake within the tongue base. At least 5 FDG avid lesions in the L deep neck. 2 nodes at the C2 level, posterior to the surgical clips in the L lateral paravertebral region, one measuring 8 mm (SUV 15.9), the other more posteriorly measuring 7 mm. The other 2 adjacent ones are deep to the residual L SCM; 9.6 mm, and a smaller one immediately below in the SCM. There is another mildly FDG avid necrotic lesion inferior to the above 3 foci, again deep to the L SCM. 0.9 cm R level 2b LN (SUV 3.1), likely reactive. Resolution of RUL GGO, stable sub-6 mm nodules.  3/9/23~current Pembrolizumab . C3 delayed 1 week due to parainfluenza  4/18/23 ED at [facility] for fever and cough. CXR negative. Viral panel showed parainfluenza.  5/3/23 CT neck and CT CAP.     ASSESSMENT AND PLAN  Nasopharyngeal carcinoma, SAM +julia: CT neck after 3 cycles of pembro with interval enlargement of at least 5 foci within the left deep neck. CT CAP with nonspecific increased size of enlarged mediastinal lymph nodules. Adenopathy potentially related to recent parainfluenza virus or pseudoprogression. Restage with CT chest/abd and CT neck after 2 additional cycles. Tolerating pembro well.  -Cycle 4 pembrolizumab today  -RTC in 5 weeks for LEONEL visit,    BERNARDO/lung nodules, new RUL nodules in 11/2022: Resolving c/w inflammatory changes from mild aspiration that's resolving.     Lymphedema: Persistent, performing daily massages/exercises, warm  showers.    Screening for hypothyroidism: TSH continues to rise, significant jump today to 23.09, FT4 0.43. Begin levothyroxine 50 mcg daily. Repeat TSH in 3 weeks.    DM2, steroid-induced hyperglycemia: Controlled, managed by PCP     Mild peripheral neuropathy: R foot numbness, very mild, from DM2. Unchanged    *** minutes spent on the date of the encounter doing {2021 E&M time in:[id number]}     Jany Mtz, CNP      SUBJECTIVE  Jareth returns for follow up of nasopharyngeal carcinoma prior to cycle 5 Keytruda.    ***    PAST MEDICAL HISTORY  Nasopharyngeal carcinoma as above  DM2  COPD??  Hiatal hernia. Sleeps with his head elevated  LVH on TTE 2009, EF50%, LA Dilation  Dyslipidemia  BARRETT in 2015. Not using CPAP due to developing tooth abscesses when he tried it in 2015  Venous insufficiency in both legs being in an accident involving a mower about 10 years ago, on chronic furosemide. LE US negative 12/15/2009  Cholelithiasis  Hyperplastic colon polyp, due 2020  Tonsillectomy  Baker cyst RLE US 2013    Neuropathy from DM - R foot numbness chronically    Exposed to lots of wood dust in his occupation    CURRENT OUTPATIENT MEDICATIONS  Current Outpatient Medications   Medication Sig Dispense Refill     acetaminophen (TYLENOL) 325 MG tablet Take 2 tablets (650 mg) by mouth every 4 hours as needed for other (For optimal non-opioid multimodal pain management to improve pain control.) 50 tablet 0     atorvastatin (LIPITOR) 20 MG tablet Take 20 mg by mouth       fluticasone-vilanterol (BREO ELLIPTA) 100-25 MCG/INH inhaler INHALE 1 PUFF INTO THE LUNGS ONE TIME A DAY. RINSE MOUTH AFTER USE.       furosemide (LASIX) 20 MG tablet Take 20 mg by mouth       levothyroxine (SYNTHROID/LEVOTHROID) 50 MCG tablet Take 1 tablet (50 mcg) by mouth daily 60 tablet 1     metFORMIN (GLUCOPHAGE XR) 500 MG 24 hr tablet Take 500 mg by mouth daily (with dinner)       mineral oil-hydrophilic petrolatum (AQUAPHOR) external ointment Apply  topically every 8 hours Apply to incision. 50 g 1     TRULICITY 1.5 MG/0.5ML pen INJECT 1.5 MG UNDER THE SKIN ONE TIME A WEEK.       blood glucose (NO BRAND SPECIFIED) lancets standard Use to test blood sugar 2 times daily or as directed. (Patient not taking: Reported on 3/30/2023) 90 lancet 1     blood glucose (NO BRAND SPECIFIED) test strip Use to test blood sugar 3 times daily or as directed. (Patient not taking: Reported on 3/30/2023) 90 strip 0     blood glucose monitoring (ACCU-CHEK RANDELL PLUS) meter device kit Use to test blood sugar 2 times daily or as directed. (Patient not taking: Reported on 3/30/2023) 1 kit 0     ALLERGIES  No Known Allergies     REVIEW OF SYSTEMS  As above in the HPI, o/w complete 12-point ROS was negative.    PHYSICAL EXAM  /67   Pulse 68   Temp 97.8  F (36.6  C) (Oral)   Resp 16   Wt 109.4 kg (241 lb 3.2 oz)   SpO2 97%   BMI 34.61 kg/m      General: No acute distress  Lymph: No lymphadenopathy in neck  Heart: RRR, no m/g/r  Lungs: Clear to ascultation bilaterally  Neuro: Cranial nerves grossly intact, numbness to left neck  Rash: none  Vascular access: port    LABORATORY AND IMAGING STUDIES   Latest Reference Range & Units 06/08/23 13:25   Sodium 136 - 145 mmol/L 141   Potassium 3.4 - 5.3 mmol/L 4.0   Chloride 98 - 107 mmol/L 105   Carbon Dioxide (CO2) 22 - 29 mmol/L 30 (H)   Urea Nitrogen 8.0 - 23.0 mg/dL 15.3   Creatinine 0.67 - 1.17 mg/dL 0.84   GFR Estimate >60 mL/min/1.73m2 >90   Calcium 8.8 - 10.2 mg/dL 8.8   Anion Gap 7 - 15 mmol/L 6 (L)   Albumin 3.5 - 5.2 g/dL 3.7   Protein Total 6.4 - 8.3 g/dL 7.0   Alkaline Phosphatase 40 - 129 U/L 82   ALT 10 - 50 U/L 12   AST 10 - 50 U/L 26   Bilirubin Total <=1.2 mg/dL 0.5   Glucose 70 - 99 mg/dL 90   WBC 4.0 - 11.0 10e3/uL 3.7 (L)   Hemoglobin 13.3 - 17.7 g/dL 11.2 (L)   Hematocrit 40.0 - 53.0 % 35.7 (L)   Platelet Count 150 - 450 10e3/uL 145 (L)   RBC Count 4.40 - 5.90 10e6/uL 4.11 (L)   MCV 78 - 100 fL 87   MCH 26.5 - 33.0  pg 27.3   MCHC 31.5 - 36.5 g/dL 31.4 (L)   RDW 10.0 - 15.0 % 15.4 (H)   % Neutrophils % 68   % Lymphocytes % 18   % Monocytes % 10   % Eosinophils % 2   % Basophils % 1   Absolute Basophils 0.0 - 0.2 10e3/uL 0.0   Absolute Eosinophils 0.0 - 0.7 10e3/uL 0.1   Absolute Immature Granulocytes <=0.4 10e3/uL 0.0   Absolute Lymphocytes 0.8 - 5.3 10e3/uL 0.7 (L)   Absolute Monocytes 0.0 - 1.3 10e3/uL 0.4   % Immature Granulocytes % 1   Absolute Neutrophils 1.6 - 8.3 10e3/uL 2.6   Absolute NRBCs 10e3/uL 0.0   NRBCs per 100 WBC <1 /100 0   (H): Data is abnormally high  (L): Data is abnormally low       Again, thank you for allowing me to participate in the care of your patient.        Sincerely,        Jany Mtz, CNP

## 2023-06-08 NOTE — NURSING NOTE
"Oncology Rooming Note    June 8, 2023 1:41 PM   Jareth Gallardo is a 62 year old male who presents for:    Chief Complaint   Patient presents with     Port Draw     Port accessed with 20g flat needle by RN, labs collected, line flushed with saline and heparin.  Vitals taken. Pt checked in for appointment(s).      Oncology Clinic Visit     Nasopharyngeal Cancer     Initial Vitals: /67   Pulse 68   Temp 97.8  F (36.6  C) (Oral)   Resp 16   Wt 109.4 kg (241 lb 3.2 oz)   SpO2 97%   BMI 34.61 kg/m   Estimated body mass index is 34.61 kg/m  as calculated from the following:    Height as of 2/22/23: 1.778 m (5' 10\").    Weight as of this encounter: 109.4 kg (241 lb 3.2 oz). Body surface area is 2.32 meters squared.  No Pain (0) Comment: Data Unavailable   No LMP for male patient.  Allergies reviewed: Yes  Medications reviewed: Yes    Medications: Medication refills not needed today.  Pharmacy name entered into jaeyos:    [id number] - [place], [place] - [address] [facility] [place], [place] - [phone]  [place] [facility] - [place], [place] - [address]    Clinical concerns: Pt presents today for f/u.       Mckenna Khoury LPN  6/8/2023              "

## 2023-06-08 NOTE — PROGRESS NOTES
Infusion Nursing Note:  Jareth Gallardo presents today for C5D1 Keytruda.    Patient seen by provider today: Yes: Jany Mtz NP   present during visit today: Not Applicable.    Note: No complaints made.      Intravenous Access:  Peripheral IV placed.    Treatment Conditions:  Lab Results   Component Value Date    HGB 11.2 (L) 06/08/2023    WBC 3.7 (L) 06/08/2023    ANEU 7.0 05/16/2022    ANEUTAUTO 2.6 06/08/2023     (L) 06/08/2023      Lab Results   Component Value Date     06/08/2023    POTASSIUM 4.0 06/08/2023    MAG 1.8 01/30/2023    CR 0.84 06/08/2023    LAI 8.8 06/08/2023    BILITOTAL 0.5 06/08/2023    ALBUMIN 3.7 06/08/2023    ALT 12 06/08/2023    AST 26 06/08/2023     Results reviewed, labs MET treatment parameters, ok to proceed with treatment.      Post Infusion Assessment:  Patient tolerated infusion without incident.  Blood return noted pre and post infusion.  Site patent and intact, free from redness, edema or discomfort.  No evidence of extravasations.  Access discontinued per protocol.       Discharge Plan:   Patient declined prescription refills.  Discharge instructions reviewed with: Patient.  Patient and/or family verbalized understanding of discharge instructions and all questions answered.  AVS to patient via [employer].  Patient will return 6/29/23 for next appointment.   Patient discharged in stable condition accompanied by: self.  Departure Mode: Ambulatory.      CAROL VAN RN

## 2023-06-26 ENCOUNTER — ANCILLARY PROCEDURE (OUTPATIENT)
Dept: CT IMAGING | Facility: CLINIC | Age: 63
End: 2023-06-26
Attending: INTERNAL MEDICINE
Payer: COMMERCIAL

## 2023-06-26 DIAGNOSIS — C11.9 NASOPHARYNGEAL CANCER (H): ICD-10-CM

## 2023-06-26 PROCEDURE — 74160 CT ABDOMEN W/CONTRAST: CPT | Performed by: RADIOLOGY

## 2023-06-26 PROCEDURE — 70491 CT SOFT TISSUE NECK W/DYE: CPT | Performed by: RADIOLOGY

## 2023-06-26 PROCEDURE — 71260 CT THORAX DX C+: CPT | Performed by: RADIOLOGY

## 2023-06-26 RX ORDER — HEPARIN SODIUM (PORCINE) LOCK FLUSH IV SOLN 100 UNIT/ML 100 UNIT/ML
500 SOLUTION INTRAVENOUS ONCE
Status: COMPLETED | OUTPATIENT
Start: 2023-06-26 | End: 2023-06-26

## 2023-06-26 RX ORDER — IOPAMIDOL 755 MG/ML
122 INJECTION, SOLUTION INTRAVASCULAR ONCE
Status: COMPLETED | OUTPATIENT
Start: 2023-06-26 | End: 2023-06-26

## 2023-06-26 RX ADMIN — HEPARIN SODIUM (PORCINE) LOCK FLUSH IV SOLN 100 UNIT/ML 500 UNITS: 100 SOLUTION at 13:11

## 2023-06-26 RX ADMIN — IOPAMIDOL 122 ML: 755 INJECTION, SOLUTION INTRAVASCULAR at 13:05

## 2023-06-27 RX ORDER — LORAZEPAM 2 MG/ML
0.5 INJECTION INTRAMUSCULAR EVERY 4 HOURS PRN
Status: CANCELLED | OUTPATIENT
Start: 2023-06-30

## 2023-06-27 RX ORDER — HEPARIN SODIUM (PORCINE) LOCK FLUSH IV SOLN 100 UNIT/ML 100 UNIT/ML
5 SOLUTION INTRAVENOUS
Status: CANCELLED | OUTPATIENT
Start: 2023-06-30

## 2023-06-27 RX ORDER — EPINEPHRINE 1 MG/ML
0.3 INJECTION, SOLUTION INTRAMUSCULAR; SUBCUTANEOUS EVERY 5 MIN PRN
Status: CANCELLED | OUTPATIENT
Start: 2023-06-30

## 2023-06-27 RX ORDER — METHYLPREDNISOLONE SODIUM SUCCINATE 125 MG/2ML
125 INJECTION, POWDER, LYOPHILIZED, FOR SOLUTION INTRAMUSCULAR; INTRAVENOUS
Status: CANCELLED
Start: 2023-06-30

## 2023-06-27 RX ORDER — ALBUTEROL SULFATE 90 UG/1
1-2 AEROSOL, METERED RESPIRATORY (INHALATION)
Status: CANCELLED
Start: 2023-06-30

## 2023-06-27 RX ORDER — DIPHENHYDRAMINE HYDROCHLORIDE 50 MG/ML
50 INJECTION INTRAMUSCULAR; INTRAVENOUS
Status: CANCELLED
Start: 2023-06-30

## 2023-06-27 RX ORDER — HEPARIN SODIUM,PORCINE 10 UNIT/ML
5 VIAL (ML) INTRAVENOUS
Status: CANCELLED | OUTPATIENT
Start: 2023-06-30

## 2023-06-27 RX ORDER — ALBUTEROL SULFATE 0.83 MG/ML
2.5 SOLUTION RESPIRATORY (INHALATION)
Status: CANCELLED | OUTPATIENT
Start: 2023-06-30

## 2023-06-27 RX ORDER — MEPERIDINE HYDROCHLORIDE 25 MG/ML
25 INJECTION INTRAMUSCULAR; INTRAVENOUS; SUBCUTANEOUS EVERY 30 MIN PRN
Status: CANCELLED | OUTPATIENT
Start: 2023-06-30

## 2023-06-28 ENCOUNTER — VIRTUAL VISIT (OUTPATIENT)
Dept: ONCOLOGY | Facility: CLINIC | Age: 63
End: 2023-06-28
Attending: INTERNAL MEDICINE
Payer: COMMERCIAL

## 2023-06-28 DIAGNOSIS — E03.9 HYPOTHYROIDISM, UNSPECIFIED TYPE: ICD-10-CM

## 2023-06-28 DIAGNOSIS — C11.9 NASOPHARYNGEAL CANCER (H): Primary | ICD-10-CM

## 2023-06-28 PROCEDURE — 99214 OFFICE O/P EST MOD 30 MIN: CPT | Mod: 95 | Performed by: INTERNAL MEDICINE

## 2023-06-28 NOTE — PROGRESS NOTES
[facility] CANCER [facility]    PATIENT NAME: Jareth Gallardo  MRN # [id number]   DATE OF VISIT: June 28, 2023  YOB: 1960     Referring Provider: Dr. Jesus Boston  Radiation Oncology: Dr. Mane Razo  Primary Care Provider: Dr. Beni Gallardo at [facility] in [facility]    CANCER TYPE: Nasopharyngeal carcinoma, p16 negative, SAM positive  STAGE: [id number] (DENAE)  ECOG PS: 0    PD-L1: 20% using  clone locally; TPS 70% at [facility]   NGS:     SUMMARY  12/11/21 UC for L neck mass, selling  12/9/21 CT neck ([place]). 2.4 cm L neck node medial to SCM just above the hyoid, additional LNs surround, scattered R neck and L supraclavicular LNs  2/15/22 US bx L cervical node. Path: SCC, moderately differentiated,   3/4/22 PET/CT. Nasopharyngeal and oropharyngeal mucosal thickening. 2.6 cm BERNARDO nodular GGO, SUV avid, some additional lingular uptake  4/4~5/16/22/22 C1-3 carboplatin gemcitabine. Not a candidate for cisplatin due to hearing loss on audiogram. Neulasta 5/3 after C2D8, ANC 1000   5/31/22 PET/CT. DC  6/22~8/10/22 Chemoradiation with weekly carboplatin  11/16/22 PET/CT. Residual 1.5 cm 2B LN (SUV 28.7), 1 cm enhancing focus in SCM (SUV 22.4), small mildly avid residual enhancing nodes in the L level 2A decreased in size and FDG avidity than before, resolved L level 5 node, small but mildly FDG avid R level 2B node, smaller mildly avid R level 3 nodes, unchanged in ize. NI-RADS 2a in the primary site - FDG uptake along the BOT at the midline, increased from prior. New patchy GGO and nodules in the RUL and L base with mild FDG uptake, likely inflammatory, recommend short interval CT  12/5/22 Salvage L modified radical neck dissection (Dr. Boston). Path: L posterior SCM excision - SCC, 1.1 cm, invading into skeletal muscle, negative margins, +PNI/LVI. Level 2 node 1.5 cm SCC, moderately differentiated, invading into fibroadipose tissue, focally involving the inked resection margin.    1/30/23 CT chest. Patchy LLL GGO, decreased from prior.   2/22/23 PET/CT. Focal updake along the tongue base at midline, decreased from prior, likely inflammatory (SUV 14.8-->8.5), near complete resolution of the rest of the mild mucosal uptake within the tongue base. At least 5 FDG avid lesions in the L deep neck. 2 nodes at the C2 level, posterior to the surgical clips in the L lateral paravertebral region, one measuring 8 mm (SUV 15.9), the other more posteriorly measuring 7 mm. The other 2 adjacent ones are deep to the residual L SCM; 9.6 mm, and a smaller one immediately below in the SCM. There is another mildly FDG avid necrotic lesion inferior to the above 3 foci, again deep to the L SCM. 0.9 cm R level 2b LN (SUV 3.1), likely reactive. Resolution of RUL GGO, stable sub-6 mm nodules.  3/9/23~current Pembrolizumab . C3 delayed 1 week due to parainfluenza. Good MI  4/18/23 ED at [facility] for fever and cough. CXR negative. Viral panel showed parainfluenza.    ASSESSMENT AND PLAN  Nasopharyngeal carcinoma, SAM +julia: Nodes are a little smaller which is great - they were larger on the last CT Tolerating pembrolizumab without difficulty. Proceed with dose tomorrow, but hold subsequent dose until we see the repeat CT chest - see below. Will set up restaging at the next visit with me. Discussed briefly getting infusions up in [place], which is about 1 1/2 hours from their home rather than coming all the way down. He'd like to see how it is when he gets the CT then decide.     BERNARDO/lung nodules, new RUL nodules in 11/2022, LLL GGO 6/2023: Resolving c/w inflammatory changes from mild aspiration that's resolving. Now new GGOs in the LLL. He has likely chronic aspiration from prior treatment and had issues with aspiration frankly prior to the cancer diagnosis. We discussed the possibility of immune pneumonitis. Asymptomatic. Referral to Pulm. Repeat CT chest non contrast in 4 weeks. If worse, will hold  pembro thereafter until we get a bronch/BAL to r/o infection, get a sense of the cell composition. He does have a little bronchiectasis at the bases bilaterally.     Lymphedema: Not doing massages regularly or swallowing exercises. Encouraged him to do both. Is doing stretching regularly though.     Hypothyroidism: Levothyroxine 50 mcg daily started in mid May. Will have recheck tomorrow with infusion    DM2, steroid-induced hyperglycemia: Controlled, managed by PCP     Mild peripheral neuropathy: R foot numbness, very mild, from DM2. Unchanged    30 minutes spent by me on the date of the encounter doing chart review, history and exam, documentation and further activities per the note     Dariela Melvin MD  Associate Professor of Medicine  Hematology, Oncology and Transplantation      SUBJECTIVE  Jareth returns for follow up of nasopharyngeal carcinoma after 3 cycles of pembrolizumab  Doing well    Aspiration prior to cancer  Recent aspiration of a pea, tooko a couple of hours to come out - not too long ago  Not doing swallowing exercvises  Doing neck stretching daily   No coughing, fevers, chills, shortness of breath, or getting winded more easily    Sister - ended up having a goiter - s/p surgery, recovering ok. Relieved not cancerous    PAST MEDICAL HISTORY  Nasopharyngeal carcinoma as above  DM2  COPD??  Hiatal hernia. Sleeps with his head elevated  LVH on TTE 2009, EF50%, LA Dilation  Dyslipidemia  BARRETT in 2015. Not using CPAP due to developing tooth abscesses when he tried it in 2015  Venous insufficiency in both legs being in an accident involving a mower about 10 years ago, on chronic furosemide. LE US negative 12/15/2009  Cholelithiasis  Hyperplastic colon polyp, due 2020  Tonsillectomy  Baker cyst RLE US 2013    Neuropathy from DM - R foot numbness chronically    Exposed to lots of wood dust in his occupation    CURRENT OUTPATIENT MEDICATIONS  Current Outpatient Medications   Medication Sig Dispense Refill      acetaminophen (TYLENOL) 325 MG tablet Take 2 tablets (650 mg) by mouth every 4 hours as needed for other (For optimal non-opioid multimodal pain management to improve pain control.) 50 tablet 0     atorvastatin (LIPITOR) 20 MG tablet Take 20 mg by mouth       blood glucose (NO BRAND SPECIFIED) lancets standard Use to test blood sugar 2 times daily or as directed. (Patient not taking: Reported on 3/30/2023) 90 lancet 1     blood glucose (NO BRAND SPECIFIED) test strip Use to test blood sugar 3 times daily or as directed. (Patient not taking: Reported on 3/30/2023) 90 strip 0     blood glucose monitoring (ACCU-CHEK RANDELL PLUS) meter device kit Use to test blood sugar 2 times daily or as directed. (Patient not taking: Reported on 3/30/2023) 1 kit 0     fluticasone-vilanterol (BREO ELLIPTA) 100-25 MCG/INH inhaler INHALE 1 PUFF INTO THE LUNGS ONE TIME A DAY. RINSE MOUTH AFTER USE.       furosemide (LASIX) 20 MG tablet Take 20 mg by mouth       levothyroxine (SYNTHROID/LEVOTHROID) 50 MCG tablet Take 1 tablet (50 mcg) by mouth daily 60 tablet 1     metFORMIN (GLUCOPHAGE XR) 500 MG 24 hr tablet Take 500 mg by mouth daily (with dinner)       mineral oil-hydrophilic petrolatum (AQUAPHOR) external ointment Apply topically every 8 hours Apply to incision. 50 g 1     TRULICITY 1.5 MG/0.5ML pen INJECT 1.5 MG UNDER THE SKIN ONE TIME A WEEK.       ALLERGIES  No Known Allergies     PHYSICAL EXAM  There were no vitals taken for this visit.  GEN: NAD  HEENT: EOMI, no icterus, injection or pallor. Can see lymphedema on the video call  NEURO: alert  SKIN: no rashes    Remainder of physical exam deferred due to public health emergency and limitations of video visit.    LABORATORY AND IMAGING STUDIES    6/8, 5/18, 4/27 labs were independently reviewed and interpreted by me    CT Chest Abdomen w Contrast  Narrative: EXAM: CT CHEST ABDOMEN W CONTRAST  LOCATION: [facility]  DATE:  6/26/2023    INDICATION: restage nasopharyngeal carcinoma, after 5 cycles of pembro. Parainfluenza 4 18, L neck adenopathy a little larger on 5 3 23 CT  COMPARISON: Multiple priors, most recently CT chest abdomen pelvis 05/03/2023  TECHNIQUE: CT scan of the chest and abdomen was performed following injection of IV contrast. Multiplanar reformats were obtained. Dose reduction techniques were used.   CONTRAST: Isovue 370 122cc    FINDINGS:     LUNGS AND PLEURA: Emphysema. Increasing groundglass and small peribronchial nodular opacities in the left lower lobe. Some previously new right upper lobe groundglass opacities have resolved. No pleural effusion or pneumothorax.    Stable tiny nodule in the right upper lobe laterally (5/105). No new discrete nodules.    MEDIASTINUM/AXILLAE: Right Port-A-Cath with tip near the SVC/RA junction. Normal heart size. No pericardial effusion. Pericardial calcifications. Normal caliber thoracic aorta. No thoracic lymphadenopathy.    CORONARY ARTERY CALCIFICATION: Moderate.    HEPATOBILIARY: No worrisome liver lesion. Cholelithiasis. No biliary dilation.    PANCREAS: No significant mass, duct dilatation, or inflammatory change.    SPLEEN: Normal.    ADRENAL GLANDS: Normal.    KIDNEYS: No significant mass, stones, or hydronephrosis. There are simple or benign cysts. No follow up is needed.    BOWEL: Visualized bowel loops are normal in caliber. Duodenal diverticulum.    LYMPH NODES: No abdominal lymphadenopathy.    VASCULATURE: Normal caliber abdominal aorta. Atheromatous disease.    MUSCULOSKELETAL: Skeletal degenerative changes. No significant osseous abnormality. T9 bone island.  Impression: IMPRESSION:  1.  Nothing to suggest new or worsening disease in the chest or abdomen since 05/03/2023.    2.  New left lower lobe groundglass opacities and small peribronchial nodular opacities which are most likely infectious/inflammatory. Resolution of previously new right upper lobe groundglass  opacities.    3.  No pathologically enlarged thoracic lymph nodes.     CT SOFT TISSUE NECK W CONTRAST 6/26/2023 1:16 PM     History:  restage nasopharyngeal carcinoma, after 5 cycles of  Parainfluenza 4/18, L neck adenopathy a little larger on 5/3/23 CT;  Nasopharyngeal cancer (H)  ICD-10: Nasopharyngeal cancer (H)      Comparison:  Neck CT 5/3/2022 and PET/CT 2/22/2023     Technique: Following intravenous administration of nonionic iodinated  contrast medium, thin section helical CT images were obtained from the  skull base down to the level of the aortic arch.  Axial, coronal and  sagittal reformations were performed with 2-3 mm slice thickness  reconstruction. Images were reviewed in soft tissue, lung and bone  windows.     Contrast: Isovue 370 122cc     Findings:   Stable postsurgical changes of the left neck with partial resection of  the left sternocleidomastoid. There is persistent soft tissue and  platysmal thickening in the left side of the face, consistent with  posttreatment changes. Left neck dissection changes. Decreased size in  the enhancing necrotic lymph nodes deep to the left  sternocleidomastoid and measures 14 x 9 mm on coronal imaging,  previously measuring 4.5 x 1.3 cm. A few bilateral level 2 cervical  lymph nodes that are not enlarged by size criteria.      Evaluation of the mucosal space demonstrates no evident abnormality  in the oropharynx, hypopharynx or the glottis. The tongue base appears  normal.  The major salivary glands appear unremarkable. Unchanged  subcentimeter thyroid nodules.     The left internal jugular vein surgically absent. The remaining  vasculature within the neck is patent. Partially visualized right  chest wall Port-A-Cath.     Evaluation of the osseous structures demonstrate no worrisome lytic or  sclerotic lesion. . Unchanged multilevel degenerative changes of the  cervical spine. No evidence of high-grade stenosis. Polypoid mucosal  thickening within the maxillary  sinuses.. Stable partial opacification  of the right mastoid air cells. The left mastoid air cells are clear.  Orbits appear unremarkable  No ventriculomegaly, abnormal intracranial enhancement, evidence of  intracranial hemorrhage. Gray-white differentiation is preserved.     The visualized lung apices are clear. Again seen are multiple  prominent mediastinal lymph nodes, that are incompletely evaluated.                                                                      Impression:     Impression:  Primary: NI-RADS 1} Expected post-treatment changes in the nasopharynx  without evidence of recurrent disease at the primary site.  Neck: NI-RADS 1}  Significant improvement of partially necrotic, large  conglomeration of deep left cervical lymph nodes. No new cervical  lymphadenopathy.     Imaging was personally reviewed and interpreted by me and I compared to the prior PET    Virtual Visit Details  Type of service:  Video Visit   Originating Location (pt. Location): Home  Distant Location (provider location):  On-site  Platform used for Video Visit: Ilana

## 2023-06-28 NOTE — LETTER
6/28/2023         RE: Jareth Gallardo  [address] [place] [address]        Dear Colleague,    Thank you for referring your patient, Jareth Gallardo, to the [facility]. Please see a copy of my visit note below.           [facility]    PATIENT NAME: Jareth Gallardo  MRN # [id number]   DATE OF VISIT: June 28, 2023  YOB: 1960     Referring Provider: Dr. Jesus Boston  Radiation Oncology: Dr. Mane Razo  Primary Care Provider: Dr. Beni Gallardo at [facility] in [facility]    CANCER TYPE: Nasopharyngeal carcinoma, p16 negative, SAM positive  STAGE: [id number] (DENAE)  ECOG PS: 0    PD-L1: 20% using  clone locally; TPS 70% at [facility]   NGS:     SUMMARY  12/11/21 UC for L neck mass, selling  12/9/21 CT neck ([place]). 2.4 cm L neck node medial to SCM just above the hyoid, additional LNs surround, scattered R neck and L supraclavicular LNs  2/15/22 US bx L cervical node. Path: SCC, moderately differentiated,   3/4/22 PET/CT. Nasopharyngeal and oropharyngeal mucosal thickening. 2.6 cm BERNARDO nodular GGO, SUV avid, some additional lingular uptake  4/4~5/16/22/22 C1-3 carboplatin gemcitabine. Not a candidate for cisplatin due to hearing loss on audiogram. Neulasta 5/3 after C2D8, ANC 1000   5/31/22 PET/CT. SD  6/22~8/10/22 Chemoradiation with weekly carboplatin  11/16/22 PET/CT. Residual 1.5 cm 2B LN (SUV 28.7), 1 cm enhancing focus in SCM (SUV 22.4), small mildly avid residual enhancing nodes in the L level 2A decreased in size and FDG avidity than before, resolved L level 5 node, small but mildly FDG avid R level 2B node, smaller mildly avid R level 3 nodes, unchanged in ize. NI-RADS 2a in the primary site - FDG uptake along the BOT at the midline, increased from prior. New patchy GGO and nodules in the RUL and L base with mild FDG uptake, likely inflammatory, recommend short interval CT  12/5/22 Salvage L modified radical neck  dissection (Dr. Boston). Path: L posterior SCM excision - SCC, 1.1 cm, invading into skeletal muscle, negative margins, +PNI/LVI. Level 2 node 1.5 cm SCC, moderately differentiated, invading into fibroadipose tissue, focally involving the inked resection margin.   1/30/23 CT chest. Patchy LLL GGO, decreased from prior.   2/22/23 PET/CT. Focal updake along the tongue base at midline, decreased from prior, likely inflammatory (SUV 14.8-->8.5), near complete resolution of the rest of the mild mucosal uptake within the tongue base. At least 5 FDG avid lesions in the L deep neck. 2 nodes at the C2 level, posterior to the surgical clips in the L lateral paravertebral region, one measuring 8 mm (SUV 15.9), the other more posteriorly measuring 7 mm. The other 2 adjacent ones are deep to the residual L SCM; 9.6 mm, and a smaller one immediately below in the SCM. There is another mildly FDG avid necrotic lesion inferior to the above 3 foci, again deep to the L SCM. 0.9 cm R level 2b LN (SUV 3.1), likely reactive. Resolution of RUL GGO, stable sub-6 mm nodules.  3/9/23~current Pembrolizumab . C3 delayed 1 week due to parainfluenza. Good NE  4/18/23 ED at [facility] for fever and cough. CXR negative. Viral panel showed parainfluenza.    ASSESSMENT AND PLAN  Nasopharyngeal carcinoma, SAM +julai: Nodes are a little smaller which is great - they were larger on the last CT Tolerating pembrolizumab without difficulty. Proceed with dose tomorrow, but hold subsequent dose until we see the repeat CT chest - see below. Will set up restaging at the next visit with me. Discussed briefly getting infusions up in [place], which is about 1 1/2 hours from their home rather than coming all the way down. He'd like to see how it is when he gets the CT then decide.     BERNARDO/lung nodules, new RUL nodules in 11/2022, LLL GGO 6/2023: Resolving c/w inflammatory changes from mild aspiration that's resolving. Now new GGOs in the LLL. He  has likely chronic aspiration from prior treatment and had issues with aspiration frankly prior to the cancer diagnosis. We discussed the possibility of immune pneumonitis. Asymptomatic. Referral to Pulm. Repeat CT chest non contrast in 4 weeks. If worse, will hold pembro thereafter until we get a bronch/BAL to r/o infection, get a sense of the cell composition. He does have a little bronchiectasis at the bases bilaterally.     Lymphedema: Not doing massages regularly or swallowing exercises. Encouraged him to do both. Is doing stretching regularly though.     Hypothyroidism: Levothyroxine 50 mcg daily started in mid May. Will have recheck tomorrow with infusion    DM2, steroid-induced hyperglycemia: Controlled, managed by PCP     Mild peripheral neuropathy: R foot numbness, very mild, from DM2. Unchanged    30 minutes spent by me on the date of the encounter doing chart review, history and exam, documentation and further activities per the note     Dariela Melvin MD  Associate Professor of Medicine  Hematology, Oncology and Transplantation      SUBJECTIVE  Jareth returns for follow up of nasopharyngeal carcinoma after 3 cycles of pembrolizumab  Doing well    Aspiration prior to cancer  Recent aspiration of a pea, tooko a couple of hours to come out - not too long ago  Not doing swallowing exercvises  Doing neck stretching daily   No coughing, fevers, chills, shortness of breath, or getting winded more easily    Sister - ended up having a goiter - s/p surgery, recovering ok. Relieved not cancerous    PAST MEDICAL HISTORY  Nasopharyngeal carcinoma as above  DM2  COPD??  Hiatal hernia. Sleeps with his head elevated  LVH on TTE 2009, EF50%, LA Dilation  Dyslipidemia  BARRETT in 2015. Not using CPAP due to developing tooth abscesses when he tried it in 2015  Venous insufficiency in both legs being in an accident involving a mower about 10 years ago, on chronic furosemide. LE US negative  12/15/2009  Cholelithiasis  Hyperplastic colon polyp, due 2020  Tonsillectomy  Baker cyst RLE US 2013    Neuropathy from DM - R foot numbness chronically    Exposed to lots of wood dust in his occupation    CURRENT OUTPATIENT MEDICATIONS  Current Outpatient Medications   Medication Sig Dispense Refill    acetaminophen (TYLENOL) 325 MG tablet Take 2 tablets (650 mg) by mouth every 4 hours as needed for other (For optimal non-opioid multimodal pain management to improve pain control.) 50 tablet 0    atorvastatin (LIPITOR) 20 MG tablet Take 20 mg by mouth      blood glucose (NO BRAND SPECIFIED) lancets standard Use to test blood sugar 2 times daily or as directed. (Patient not taking: Reported on 3/30/2023) 90 lancet 1    blood glucose (NO BRAND SPECIFIED) test strip Use to test blood sugar 3 times daily or as directed. (Patient not taking: Reported on 3/30/2023) 90 strip 0    blood glucose monitoring (ACCU-CHEK RANDELL PLUS) meter device kit Use to test blood sugar 2 times daily or as directed. (Patient not taking: Reported on 3/30/2023) 1 kit 0    fluticasone-vilanterol (BREO ELLIPTA) 100-25 MCG/INH inhaler INHALE 1 PUFF INTO THE LUNGS ONE TIME A DAY. RINSE MOUTH AFTER USE.      furosemide (LASIX) 20 MG tablet Take 20 mg by mouth      levothyroxine (SYNTHROID/LEVOTHROID) 50 MCG tablet Take 1 tablet (50 mcg) by mouth daily 60 tablet 1    metFORMIN (GLUCOPHAGE XR) 500 MG 24 hr tablet Take 500 mg by mouth daily (with dinner)      mineral oil-hydrophilic petrolatum (AQUAPHOR) external ointment Apply topically every 8 hours Apply to incision. 50 g 1    TRULICITY 1.5 MG/0.5ML pen INJECT 1.5 MG UNDER THE SKIN ONE TIME A WEEK.       ALLERGIES  No Known Allergies     PHYSICAL EXAM  There were no vitals taken for this visit.  GEN: NAD  HEENT: EOMI, no icterus, injection or pallor. Can see lymphedema on the video call  NEURO: alert  SKIN: no rashes    Remainder of physical exam deferred due to public health emergency and  limitations of video visit.    LABORATORY AND IMAGING STUDIES    6/8, 5/18, 4/27 labs were independently reviewed and interpreted by me    CT Chest Abdomen w Contrast  Narrative: EXAM: CT CHEST ABDOMEN W CONTRAST  LOCATION: [facility]  DATE: 6/26/2023    INDICATION: restage nasopharyngeal carcinoma, after 5 cycles of pembro. Parainfluenza 4 18, L neck adenopathy a little larger on 5 3 23 CT  COMPARISON: Multiple priors, most recently CT chest abdomen pelvis 05/03/2023  TECHNIQUE: CT scan of the chest and abdomen was performed following injection of IV contrast. Multiplanar reformats were obtained. Dose reduction techniques were used.   CONTRAST: Isovue 370 122cc    FINDINGS:     LUNGS AND PLEURA: Emphysema. Increasing groundglass and small peribronchial nodular opacities in the left lower lobe. Some previously new right upper lobe groundglass opacities have resolved. No pleural effusion or pneumothorax.    Stable tiny nodule in the right upper lobe laterally (5/105). No new discrete nodules.    MEDIASTINUM/AXILLAE: Right Port-A-Cath with tip near the SVC/RA junction. Normal heart size. No pericardial effusion. Pericardial calcifications. Normal caliber thoracic aorta. No thoracic lymphadenopathy.    CORONARY ARTERY CALCIFICATION: Moderate.    HEPATOBILIARY: No worrisome liver lesion. Cholelithiasis. No biliary dilation.    PANCREAS: No significant mass, duct dilatation, or inflammatory change.    SPLEEN: Normal.    ADRENAL GLANDS: Normal.    KIDNEYS: No significant mass, stones, or hydronephrosis. There are simple or benign cysts. No follow up is needed.    BOWEL: Visualized bowel loops are normal in caliber. Duodenal diverticulum.    LYMPH NODES: No abdominal lymphadenopathy.    VASCULATURE: Normal caliber abdominal aorta. Atheromatous disease.    MUSCULOSKELETAL: Skeletal degenerative changes. No significant osseous abnormality. T9 bone island.  Impression:  IMPRESSION:  1.  Nothing to suggest new or worsening disease in the chest or abdomen since 05/03/2023.    2.  New left lower lobe groundglass opacities and small peribronchial nodular opacities which are most likely infectious/inflammatory. Resolution of previously new right upper lobe groundglass opacities.    3.  No pathologically enlarged thoracic lymph nodes.     CT SOFT TISSUE NECK W CONTRAST 6/26/2023 1:16 PM     History:  restage nasopharyngeal carcinoma, after 5 cycles of  Parainfluenza 4/18, L neck adenopathy a little larger on 5/3/23 CT;  Nasopharyngeal cancer (H)  ICD-10: Nasopharyngeal cancer (H)      Comparison:  Neck CT 5/3/2022 and PET/CT 2/22/2023     Technique: Following intravenous administration of nonionic iodinated  contrast medium, thin section helical CT images were obtained from the  skull base down to the level of the aortic arch.  Axial, coronal and  sagittal reformations were performed with 2-3 mm slice thickness  reconstruction. Images were reviewed in soft tissue, lung and bone  windows.     Contrast: Isovue 370 122cc     Findings:   Stable postsurgical changes of the left neck with partial resection of  the left sternocleidomastoid. There is persistent soft tissue and  platysmal thickening in the left side of the face, consistent with  posttreatment changes. Left neck dissection changes. Decreased size in  the enhancing necrotic lymph nodes deep to the left  sternocleidomastoid and measures 14 x 9 mm on coronal imaging,  previously measuring 4.5 x 1.3 cm. A few bilateral level 2 cervical  lymph nodes that are not enlarged by size criteria.      Evaluation of the mucosal space demonstrates no evident abnormality  in the oropharynx, hypopharynx or the glottis. The tongue base appears  normal.  The major salivary glands appear unremarkable. Unchanged  subcentimeter thyroid nodules.     The left internal jugular vein surgically absent. The remaining  vasculature within the neck is patent.  Partially visualized right  chest wall Port-A-Cath.     Evaluation of the osseous structures demonstrate no worrisome lytic or  sclerotic lesion. . Unchanged multilevel degenerative changes of the  cervical spine. No evidence of high-grade stenosis. Polypoid mucosal  thickening within the maxillary sinuses.. Stable partial opacification  of the right mastoid air cells. The left mastoid air cells are clear.  Orbits appear unremarkable  No ventriculomegaly, abnormal intracranial enhancement, evidence of  intracranial hemorrhage. Gray-white differentiation is preserved.     The visualized lung apices are clear. Again seen are multiple  prominent mediastinal lymph nodes, that are incompletely evaluated.                                                                      Impression:     Impression:  Primary: NI-RADS 1} Expected post-treatment changes in the nasopharynx  without evidence of recurrent disease at the primary site.  Neck: NI-RADS 1}  Significant improvement of partially necrotic, large  conglomeration of deep left cervical lymph nodes. No new cervical  lymphadenopathy.     Imaging was personally reviewed and interpreted by me and I compared to the prior PET    Virtual Visit Details  Type of service:  Video Visit   Originating Location (pt. Location): Home  Distant Location (provider location):  On-site  Platform used for Video Visit: Ilana Melvin MD

## 2023-06-28 NOTE — NURSING NOTE
Is the patient currently in the state of [place]? YES    Visit mode:VIDEO    If the visit is dropped, the patient can be reconnected by: VIDEO VISIT: Text to cell phone: [phone]    Will anyone else be joining the visit? YES: How would they like to receive their invitation?       How would you like to obtain your AVS? MyChart    Are changes needed to the allergy or medication list? NO     Patient denies any changes since echeck-in regarding medication and allergies and states all information entered during echeck-in remains accurate.    Reason for visit: RECHECK    Raji HADDAD

## 2023-06-29 ENCOUNTER — INFUSION THERAPY VISIT (OUTPATIENT)
Dept: ONCOLOGY | Facility: CLINIC | Age: 63
End: 2023-06-29
Attending: INTERNAL MEDICINE
Payer: COMMERCIAL

## 2023-06-29 ENCOUNTER — APPOINTMENT (OUTPATIENT)
Dept: LAB | Facility: CLINIC | Age: 63
End: 2023-06-29
Attending: INTERNAL MEDICINE
Payer: COMMERCIAL

## 2023-06-29 VITALS
OXYGEN SATURATION: 95 % | SYSTOLIC BLOOD PRESSURE: 109 MMHG | DIASTOLIC BLOOD PRESSURE: 74 MMHG | HEART RATE: 73 BPM | WEIGHT: 237.9 LBS | BODY MASS INDEX: 34.14 KG/M2 | TEMPERATURE: 98.5 F | RESPIRATION RATE: 18 BRPM

## 2023-06-29 DIAGNOSIS — C11.9 NASOPHARYNGEAL CANCER (H): Primary | ICD-10-CM

## 2023-06-29 DIAGNOSIS — E03.9 HYPOTHYROIDISM, UNSPECIFIED TYPE: ICD-10-CM

## 2023-06-29 DIAGNOSIS — Z13.29 SCREENING FOR HYPOTHYROIDISM: ICD-10-CM

## 2023-06-29 LAB
ALBUMIN SERPL BCG-MCNC: 4 G/DL (ref 3.5–5.2)
ALP SERPL-CCNC: 90 U/L (ref 40–129)
ALT SERPL W P-5'-P-CCNC: 14 U/L (ref 0–70)
ANION GAP SERPL CALCULATED.3IONS-SCNC: 6 MMOL/L (ref 7–15)
AST SERPL W P-5'-P-CCNC: 30 U/L (ref 0–45)
BASOPHILS # BLD AUTO: 0 10E3/UL (ref 0–0.2)
BASOPHILS NFR BLD AUTO: 0 %
BILIRUB SERPL-MCNC: 0.6 MG/DL
BUN SERPL-MCNC: 15.5 MG/DL (ref 8–23)
CALCIUM SERPL-MCNC: 9 MG/DL (ref 8.8–10.2)
CHLORIDE SERPL-SCNC: 103 MMOL/L (ref 98–107)
CREAT SERPL-MCNC: 0.81 MG/DL (ref 0.67–1.17)
DEPRECATED HCO3 PLAS-SCNC: 28 MMOL/L (ref 22–29)
EOSINOPHIL # BLD AUTO: 0.1 10E3/UL (ref 0–0.7)
EOSINOPHIL NFR BLD AUTO: 1 %
ERYTHROCYTE [DISTWIDTH] IN BLOOD BY AUTOMATED COUNT: 15.6 % (ref 10–15)
GFR SERPL CREATININE-BSD FRML MDRD: >90 ML/MIN/1.73M2
GLUCOSE SERPL-MCNC: 87 MG/DL (ref 70–99)
HCT VFR BLD AUTO: 36.4 % (ref 40–53)
HGB BLD-MCNC: 11.7 G/DL (ref 13.3–17.7)
IMM GRANULOCYTES # BLD: 0 10E3/UL
IMM GRANULOCYTES NFR BLD: 0 %
LYMPHOCYTES # BLD AUTO: 0.9 10E3/UL (ref 0.8–5.3)
LYMPHOCYTES NFR BLD AUTO: 24 %
MCH RBC QN AUTO: 27.9 PG (ref 26.5–33)
MCHC RBC AUTO-ENTMCNC: 32.1 G/DL (ref 31.5–36.5)
MCV RBC AUTO: 87 FL (ref 78–100)
MONOCYTES # BLD AUTO: 0.3 10E3/UL (ref 0–1.3)
MONOCYTES NFR BLD AUTO: 8 %
NEUTROPHILS # BLD AUTO: 2.5 10E3/UL (ref 1.6–8.3)
NEUTROPHILS NFR BLD AUTO: 67 %
NRBC # BLD AUTO: 0 10E3/UL
NRBC BLD AUTO-RTO: 0 /100
PLATELET # BLD AUTO: 153 10E3/UL (ref 150–450)
POTASSIUM SERPL-SCNC: 4.4 MMOL/L (ref 3.4–5.3)
PROT SERPL-MCNC: 7.7 G/DL (ref 6.4–8.3)
RBC # BLD AUTO: 4.19 10E6/UL (ref 4.4–5.9)
SODIUM SERPL-SCNC: 137 MMOL/L (ref 136–145)
T4 FREE SERPL-MCNC: 0.45 NG/DL (ref 0.9–1.7)
TSH SERPL DL<=0.005 MIU/L-ACNC: 22.68 UIU/ML (ref 0.3–4.2)
WBC # BLD AUTO: 3.8 10E3/UL (ref 4–11)

## 2023-06-29 PROCEDURE — 85025 COMPLETE CBC W/AUTO DIFF WBC: CPT

## 2023-06-29 PROCEDURE — 258N000003 HC RX IP 258 OP 636: Performed by: INTERNAL MEDICINE

## 2023-06-29 PROCEDURE — 36591 DRAW BLOOD OFF VENOUS DEVICE: CPT

## 2023-06-29 PROCEDURE — 250N000011 HC RX IP 250 OP 636: Mod: JZ | Performed by: INTERNAL MEDICINE

## 2023-06-29 PROCEDURE — 84439 ASSAY OF FREE THYROXINE: CPT

## 2023-06-29 PROCEDURE — 80053 COMPREHEN METABOLIC PANEL: CPT | Performed by: INTERNAL MEDICINE

## 2023-06-29 PROCEDURE — 96413 CHEMO IV INFUSION 1 HR: CPT

## 2023-06-29 PROCEDURE — 84443 ASSAY THYROID STIM HORMONE: CPT

## 2023-06-29 RX ORDER — HEPARIN SODIUM (PORCINE) LOCK FLUSH IV SOLN 100 UNIT/ML 100 UNIT/ML
5 SOLUTION INTRAVENOUS
Status: DISCONTINUED | OUTPATIENT
Start: 2023-06-29 | End: 2023-06-29 | Stop reason: HOSPADM

## 2023-06-29 RX ORDER — LEVOTHYROXINE SODIUM 100 UG/1
100 TABLET ORAL DAILY
Qty: 30 TABLET | Refills: 11 | Status: SHIPPED | OUTPATIENT
Start: 2023-06-29 | End: 2023-09-27

## 2023-06-29 RX ORDER — HEPARIN SODIUM (PORCINE) LOCK FLUSH IV SOLN 100 UNIT/ML 100 UNIT/ML
5 SOLUTION INTRAVENOUS ONCE
Status: COMPLETED | OUTPATIENT
Start: 2023-06-29 | End: 2023-06-29

## 2023-06-29 RX ADMIN — SODIUM CHLORIDE 250 ML: 9 INJECTION, SOLUTION INTRAVENOUS at 11:47

## 2023-06-29 RX ADMIN — SODIUM CHLORIDE 200 MG: 9 INJECTION, SOLUTION INTRAVENOUS at 11:47

## 2023-06-29 RX ADMIN — Medication 5 ML: at 10:36

## 2023-06-29 RX ADMIN — Medication 5 ML: at 12:25

## 2023-06-29 ASSESSMENT — PAIN SCALES - GENERAL: PAINLEVEL: NO PAIN (0)

## 2023-06-29 NOTE — PROGRESS NOTES
Infusion Nursing Note:  Jareth Gallardo presents today for Cycle 6 Day 1 Keytruda.    Patient seen by provider today: No   present during visit today: Not Applicable.    Note: Patient presents to infusion today doing well. No new changes or concerns overnight since his visit with Dr. Melvin yesterday 6/28.    Intravenous Access:  Implanted Port.    Treatment Conditions:  Lab Results   Component Value Date    HGB 11.7 (L) 06/29/2023    WBC 3.8 (L) 06/29/2023    ANEU 7.0 05/16/2022    ANEUTAUTO 2.5 06/29/2023     06/29/2023      Lab Results   Component Value Date     06/29/2023    POTASSIUM 4.4 06/29/2023    MAG 1.8 01/30/2023    CR 0.81 06/29/2023    LAI 9.0 06/29/2023    BILITOTAL 0.6 06/29/2023    ALBUMIN 4.0 06/29/2023    ALT 14 06/29/2023    AST 30 06/29/2023     Results reviewed, labs MET treatment parameters, ok to proceed with treatment.    Post Infusion Assessment:  Patient tolerated infusion without incident.  Blood return noted pre and post infusion.  Site patent and intact, free from redness, edema or discomfort.  No evidence of extravasations.  Access discontinued per protocol.     Discharge Plan:   Patient declined prescription refills.  Discharge instructions reviewed with: Patient.  Patient and/or family verbalized understanding of discharge instructions and all questions answered.  AVS to patient via [employer]. Next appointment not yet scheduled at time of discharge. Patient aware to watch MYCHART and to call if he does not hear anything.   Patient discharged in stable condition accompanied by: self.  Departure Mode: Ambulatory.      Eugenia Russo RN

## 2023-06-29 NOTE — NURSING NOTE
"Chief Complaint   Patient presents with     Port Draw     Labs drawn via port by RN. Vitals taken.     Labs drawn via port by RN. Port accessed with 20G 3/4\" power needle. Flushed with NS and heparin. Pt tolerated well. Vitals taken. Pt checked in for next appointment.    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 She presented here from [facility] for elevated WBC and low hemoglobin, severe weakness. Pt states for the past 2-3 days has been having increased weakness and lethargy, decreased appetite. +sputum productive cough. + cui, no sob, no difficulty breathing. No abdominal pain, nausea, vomiting, no bloody stools. Has endorsed multiple episodes of loose stools x weeks, currently being treated for c.diff with oral vancomycin.       Fatigue/dyspnea: likely due to severe anemia  - pt s/p total 2 units PRBC   - no melena, no hematochezia. no BRBPR. occult stool neg.   - repeat post transfusion hgb stable.  - likely poor appetite with iron deficiency vs. r/o other hematological issues  - no clear signs of infection, monitor off abx.   - monitor for fluid overload, she tends to require IV Lasix intermittently post transfusion.  - IV Lasix 20 mg x 1 for basilar crackles post transfusion. stable.   - iron studies (though recent transfusion likely affect results), vit b12, D, folic, TSH.   - resume diet. doubt GI bleed   - Physical therapy. Out of bed to chair with assistance.   - House heme/onc consulted for pancytopenia with elevated WBC. s/p bone marrow bx 1/23/23. path pending  - reconsulted Rheum for follow up treatment of vasculitis  (she is chronically on steroids).   - Started Bactrim pcp ppx.    Diarrhea, unspecified  - elevated WBC  - no documented c.diff PCR, re-ordered.  - s/p Vanco PO a few days (started in rehab)  - diarrhea completely resolved.   - monitor off PO vanco per ID  - now constipated. PRN bowel regimen started. +BM    Pulmonary vasculitis   - Recent TTE on 11/3/22 reviewed: normal LV. outpt card Dr. Ady Cooper  - outpt pulm Mack Tucker   - s/p thoracoscopic biopsy of mediastinal lymph node and Lung wedge resection 11/10/22  - recent pleural effusion s/p 650 cc U/S-guided rt thoracentesis 11/17/22  - exudative but no neutrophilic predominance and initial Gram stain w/o organisms  - cultures neg.   - path results favoring vasculitis: no evidence for lymphoma. Cytology also came back negative.   - comfortable on nasal canula, better post diuretic last admission.   - rheum and heme-onc following previously, will reconsult.   - last admission, she was not started on immunosuppressants such as cellcept given recent treatment for COVID19 at that time  - c/w prednisone 20 mg qdaily.   - RTX under consideration --> acute hepatitis panel (-) and quantiferon indeterminate  - ID started PCP ppx with Bactrim.     hx of Tachycardia    - cont low-dose metoprolol, 50 mg to 25 mg dose reduced in rehab.   - card consult (Dr. Hooker) in the past, if needed    Anxiety and depression  - stable, continue citalopram and Remeron.  - Pt denied SI/HI ideations, denied visual and auditory hallucinations.     GERD (gastroesophageal reflux disease)  - continue PPI    Hyperlipidemia.   - continue home ezetimibe. okay to hold if nonformulary     DVT ppx   - holding AC in the setting of anemia, pancytopenia.   - will start Lovenox SQ if pancytopenia/ plts recovers.

## 2023-06-29 NOTE — PATIENT INSTRUCTIONS
Contact Numbers  [facility]: [phone] (for symptom and scheduling needs)    Please call the [facility] Triage line if you experience a temperature greater than or equal to 100.4, shaking chills, have uncontrolled nausea, vomiting and/or diarrhea, dizziness, shortness of breath, chest pain, bleeding, unexplained bruising, or if you have any other new/concerning symptoms, questions or concerns.     If you are having any concerning symptoms or wish to speak to a provider before your next infusion visit, please call your care coordinator or triage to notify them so we can adequately serve you.     If you need a refill on a narcotic prescription or other medication, please call triage before your infusion appointment.           June 2023 Sunday Monday Tuesday Wednesday Thursday Friday Saturday                       1     2     3       4     5     6     7     8    LAB CENTRAL   1:00 PM   (15 min.)   [facility] LAB DRAW   [facility]    RETURN ACTIVE TREATMENT   1:15 PM   (45 min.)   Jany Mtz CNP   [facility]    ONC INFUSION 1 HR (60 MIN)   2:30 PM   (60 min.)    ONC INFUSION NURSE   [facility] 9     10       11     12     13     14     15     16     17       18     19     20     21     22     23     24       25     26    CT CHEST ABDOMEN W  12:50 PM   (20 min.)   [facility]   [facility]    CT SOFT TISSUE NECK W   1:25 PM   (20 min.)   [facility]   [facility] 27     28    RETURN CCSL   3:45 PM   (30 min.)   Dariela Melvin MD   [facility] 29    LAB CENTRAL  10:30 AM   (15 min.)   [facility] DRAW   [facility]    ONC INFUSION 1 HR (60 MIN)  11:00 AM   (60 min.)    ONC INFUSION NURSE   [facility] 30 July 2023 Sunday Monday  Tuesday Wednesday Thursday Friday Saturday                                 1       2     3     4     5     6     7     8       9     10     11     12     13     14     15       16     17     18     19     20     21     22       23     24     25     26     27     28     29       30     31                                                Lab Results:  Recent Results (from the past 12 hour(s))   Comprehensive metabolic panel    Collection Time: 06/29/23 10:44 AM   Result Value Ref Range    Sodium 137 136 - 145 mmol/L    Potassium 4.4 3.4 - 5.3 mmol/L    Chloride 103 98 - 107 mmol/L    Carbon Dioxide (CO2) 28 22 - 29 mmol/L    Anion Gap 6 (L) 7 - 15 mmol/L    Urea Nitrogen 15.5 8.0 - 23.0 mg/dL    Creatinine 0.81 0.67 - 1.17 mg/dL    Calcium 9.0 8.8 - 10.2 mg/dL    Glucose 87 70 - 99 mg/dL    Alkaline Phosphatase 90 40 - 129 U/L    AST 30 0 - 45 U/L    ALT 14 0 - 70 U/L    Protein Total 7.7 6.4 - 8.3 g/dL    Albumin 4.0 3.5 - 5.2 g/dL    Bilirubin Total 0.6 <=1.2 mg/dL    GFR Estimate >90 >60 mL/min/1.73m2   CBC with platelets and differential    Collection Time: 06/29/23 10:44 AM   Result Value Ref Range    WBC Count 3.8 (L) 4.0 - 11.0 10e3/uL    RBC Count 4.19 (L) 4.40 - 5.90 10e6/uL    Hemoglobin 11.7 (L) 13.3 - 17.7 g/dL    Hematocrit 36.4 (L) 40.0 - 53.0 %    MCV 87 78 - 100 fL    MCH 27.9 26.5 - 33.0 pg    MCHC 32.1 31.5 - 36.5 g/dL    RDW 15.6 (H) 10.0 - 15.0 %    Platelet Count 153 150 - 450 10e3/uL    % Neutrophils 67 %    % Lymphocytes 24 %    % Monocytes 8 %    % Eosinophils 1 %    % Basophils 0 %    % Immature Granulocytes 0 %    NRBCs per 100 WBC 0 <1 /100    Absolute Neutrophils 2.5 1.6 - 8.3 10e3/uL    Absolute Lymphocytes 0.9 0.8 - 5.3 10e3/uL    Absolute Monocytes 0.3 0.0 - 1.3 10e3/uL    Absolute Eosinophils 0.1 0.0 - 0.7 10e3/uL    Absolute Basophils 0.0 0.0 - 0.2 10e3/uL    Absolute Immature Granulocytes 0.0 <=0.4 10e3/uL    Absolute NRBCs 0.0 10e3/uL

## 2023-07-12 ENCOUNTER — TELEPHONE (OUTPATIENT)
Dept: PULMONOLOGY | Facility: CLINIC | Age: 63
End: 2023-07-12
Payer: COMMERCIAL

## 2023-07-12 DIAGNOSIS — C11.9 NASOPHARYNGEAL CANCER (H): Primary | ICD-10-CM

## 2023-07-12 NOTE — TELEPHONE ENCOUNTER
Patient Contacted    Appointment type: NEW  Provider: PINKY  Return date: 11/1/23  Specialty phone number: [phone]  Additional appointment(s) needed: FPFT, CXR  Additonal Notes: PT requested mychart itinerary.

## 2023-07-23 DIAGNOSIS — E03.9 HYPOTHYROIDISM, UNSPECIFIED TYPE: ICD-10-CM

## 2023-07-24 ENCOUNTER — HOSPITAL ENCOUNTER (OUTPATIENT)
Dept: CT IMAGING | Facility: OTHER | Age: 63
Discharge: HOME | End: 2023-07-24
Attending: INTERNAL MEDICINE | Admitting: INTERNAL MEDICINE
Payer: COMMERCIAL

## 2023-07-24 DIAGNOSIS — C11.9 NASOPHARYNGEAL CANCER (H): ICD-10-CM

## 2023-07-24 PROCEDURE — 71250 CT THORAX DX C-: CPT

## 2023-07-24 RX ORDER — LEVOTHYROXINE SODIUM 100 UG/1
TABLET ORAL
Qty: 30 TABLET | Refills: 11 | OUTPATIENT
Start: 2023-07-24

## 2023-07-24 NOTE — TELEPHONE ENCOUNTER
Dr. Melvin filled 30 tablets on 629/23 w/ 11 refills to [facility] in [place], [place]. Pt should have refills on file.

## 2023-08-02 NOTE — PROGRESS NOTES
[facility] CANCER [facility]    PATIENT NAME: Jareth Gallardo  MRN # [id number]   DATE OF VISIT: August 3, 2023  YOB: 1960       Referring Provider: Dr. Jesus Boston  Radiation Oncology: Dr. Mane Razo  Primary Care Provider: Dr. Beni Gallardo at [facility] in [place] [facility]    CANCER TYPE: Nasopharyngeal carcinoma, p16 negative, SAM positive  STAGE: [id number] (DENAE)  ECOG PS: 0    PD-L1: 20% using  clone locally; TPS 70% at [facility]   NGS:     SUMMARY  12/11/21 UC for L neck mass, selling  12/9/21 CT neck ([place]). 2.4 cm L neck node medial to SCM just above the hyoid, additional LNs surround, scattered R neck and L supraclavicular LNs  2/15/22 US bx L cervical node. Path: SCC, moderately differentiated,   3/4/22 PET/CT. Nasopharyngeal and oropharyngeal mucosal thickening. 2.6 cm BERNARDO nodular GGO, SUV avid, some additional lingular uptake  4/4~5/16/22/22 C1-3 carboplatin gemcitabine. Not a candidate for cisplatin due to hearing loss on audiogram. Neulasta 5/3 after C2D8, ANC 1000   5/31/22 PET/CT. [place]  6/22~8/10/22 Chemoradiation with weekly carboplatin  11/16/22 PET/CT. Residual 1.5 cm 2B LN (SUV 28.7), 1 cm enhancing focus in SCM (SUV 22.4), small mildly avid residual enhancing nodes in the L level 2A decreased in size and FDG avidity than before, resolved L level 5 node, small but mildly FDG avid R level 2B node, smaller mildly avid R level 3 nodes, unchanged in ize. NI-RADS 2a in the primary site - FDG uptake along the BOT at the midline, increased from prior. New patchy GGO and nodules in the RUL and L base with mild FDG uptake, likely inflammatory, recommend short interval CT  12/5/22 Salvage L modified radical neck dissection (Dr. Boston). Path: L posterior SCM excision - SCC, 1.1 cm, invading into skeletal muscle, negative margins, +PNI/LVI. Level 2 node 1.5 cm SCC, moderately differentiated, invading into fibroadipose tissue, focally involving the inked resection  margin.   1/30/23 CT chest. Patchy LLL GGO, decreased from prior.   2/22/23 PET/CT. Focal updake along the tongue base at midline, decreased from prior, likely inflammatory (SUV 14.8-->8.5), near complete resolution of the rest of the mild mucosal uptake within the tongue base. At least 5 FDG avid lesions in the L deep neck. 2 nodes at the C2 level, posterior to the surgical clips in the L lateral paravertebral region, one measuring 8 mm (SUV 15.9), the other more posteriorly measuring 7 mm. The other 2 adjacent ones are deep to the residual L SCM; 9.6 mm, and a smaller one immediately below in the SCM. There is another mildly FDG avid necrotic lesion inferior to the above 3 foci, again deep to the L SCM. 0.9 cm R level 2b LN (SUV 3.1), likely reactive. Resolution of RUL GGO, stable sub-6 mm nodules.  3/9/23~current Pembrolizumab . C3 delayed 1 week due to parainfluenza. Good [place]  4/18/23 ED at [facility] for fever and cough. CXR negative. Viral panel showed parainfluenza.    ASSESSMENT AND PLAN  Nasopharyngeal carcinoma, SAM +julia: Restage with CT neck and chest abd in about 6 weeks. Continue pembro in the meantime. In response to his question, discussed that we can do pembro for up to 2 years in the absence of disease progression, side effects that require us to stop or his wanting to stop.     BERNARDO/lung nodules, new RUL nodules in 11/2022, LLL GGO 6/2023: Resolving c/w inflammatory changes from mild aspiration. Now new GGOs in the LLL on repeat CT 7/24, again look inflammatory, but not c/w immune-mediated pneumonitis. Asymptomatic. Re-evaluate on next CT. Has upcoming appt with Pulmonary Medicine, referral made from prior to appt with me.    Lymphedema: Not doing massages regularly or swallowing exercises. Encouraged him to do both. Discussed again that the swallowing exercises is not really only for now but for the long term.    Hypothyroidism: Levothyroxine 50 mcg daily started in mid May. Levels still  low 6/29, will base dose adjustments on levels in Aug or Sept.    FH: Many members of his family have cancers. He outlined some of them today. Discussed referral to Cancer Risk Management/Genetic Counseling program. He's interested. Referral placed.    DM2, steroid-induced hyperglycemia: Controlled, managed by PCP     Mild peripheral neuropathy: R foot numbness, very mild, from DM2. Not discussed today    30 minutes spent by me on the date of the encounter doing chart review, history and exam, documentation and further activities per the note     Dariela Melvin MD  Associate Professor of Medicine  Hematology, Oncology and Transplantation      SHAHANA Templeton returns for follow up of nasopharyngeal carcinoma after 6 cycles of pembrolizumab  Doing ok  Asking whether I've received a questionnaire from his  team about his ongoing case.   A paternal cousin has the same leukemia as his dad - he thinks lymphocytic leukemia. A paternal aunt - his cousin's mom - also had leukemia.  Birthday tomorrow!     PAST MEDICAL HISTORY  Nasopharyngeal carcinoma as above  DM2  COPD??  Hiatal hernia. Sleeps with his head elevated  LVH on TTE 2009, EF50%, LA Dilation  Dyslipidemia  BARRETT in 2015. Not using CPAP due to developing tooth abscesses when he tried it in 2015  Venous insufficiency in both legs being in an accident involving a mower about 10 years ago, on chronic furosemide. LE US negative 12/15/2009  Cholelithiasis  Hyperplastic colon polyp, due 2020  Tonsillectomy  Baker cyst RLE US 2013    Neuropathy from DM - R foot numbness chronically    Exposed to lots of wood dust in his occupation    CURRENT OUTPATIENT MEDICATIONS  Reviewed    ALLERGIES  No Known Allergies     PHYSICAL EXAM  There were no vitals taken for this visit.  GEN: NAD  HEENT: EOMI, no icterus, injection or pallor  NECK: clear lymphedema R > L   NEURO: alert    Remainder of physical exam deferred due to public health emergency and limitations of video  visit.    LABORATORY AND IMAGING STUDIES    Labs 6/29/23 were independently reviewed and interpreted by me    CT Chest Low Dose Non Contrast  Narrative: CT CHEST LOW DOSE NON CONTRAST    History: Nasopharyngeal cancer (H)     Comparison: 6/26/2023    Technique: Helical acquisition low dose CT chest. Images reviewed in  lung, soft tissue and bone windows. This CT exam was performed using  one or more the following dose reduction techniques: automated  exposure control, adjustment of the mA and/or kV according to patient  size, and/or iterative reconstruction technique.    Findings:   Patchy nodularity in the left lower lobe is redemonstrated, unchanged  in the distribution when compared to 6/26/2023. Emphysematous changes  are minimal. No pleural effusion is seen.    The heart size is unchanged. Coronary calcifications are seen. No  suspicious thoracic adenopathy is identified allowing for noncontrast  low-dose technique.    Limited images of the upper abdomen demonstrate a calcified gallstone.    No suspicious osseous lesion.  Impression: Impression:   Variable distribution of patchy ill-defined and nodular opacities at  the left lung base suggests recurrent pneumonitis, possibly related to  recurrent aspiration or recurrent pneumonia. Consider speech  assessment.    JUAN CARLOS MALCOLM MD         SYSTEM ID:  [id number]     Imaging was personally reviewed and interpreted by me and compared to the May CT.     Virtual Visit Details  Type of service:  Video Visit   Originating Location (pt. Location): Home  Distant Location (provider location):  On-site  Platform used for Video Visit: Ilana

## 2023-08-03 ENCOUNTER — PATIENT OUTREACH (OUTPATIENT)
Dept: ONCOLOGY | Facility: CLINIC | Age: 63
End: 2023-08-03

## 2023-08-03 ENCOUNTER — VIRTUAL VISIT (OUTPATIENT)
Dept: ONCOLOGY | Facility: CLINIC | Age: 63
End: 2023-08-03
Attending: INTERNAL MEDICINE
Payer: COMMERCIAL

## 2023-08-03 VITALS — HEIGHT: 70 IN | BODY MASS INDEX: 33.5 KG/M2 | WEIGHT: 234 LBS

## 2023-08-03 DIAGNOSIS — C11.9 NASOPHARYNGEAL CANCER (H): Primary | ICD-10-CM

## 2023-08-03 DIAGNOSIS — E03.9 HYPOTHYROIDISM, UNSPECIFIED TYPE: ICD-10-CM

## 2023-08-03 PROCEDURE — 99214 OFFICE O/P EST MOD 30 MIN: CPT | Mod: 95 | Performed by: INTERNAL MEDICINE

## 2023-08-03 ASSESSMENT — PAIN SCALES - GENERAL: PAINLEVEL: NO PAIN (0)

## 2023-08-03 NOTE — NURSING NOTE
Is the patient currently in the state of [place]? YES    Visit mode:VIDEO    If the visit is dropped, the patient can be reconnected by: VIDEO VISIT: Send to e-mail at: [email or web address]    Will anyone else be joining the visit? NO      How would you like to obtain your AVS? MyChart    Are changes needed to the allergy or medication list? NO    Patient declined individual allergy and medication review by support staff because he reviewed them on echeck in.    Janel HADDAD      Reason for visit: RECHECK

## 2023-08-03 NOTE — LETTER
8/3/2023         RE: Jareth Gallardo  [address] [place] [address]        Dear Colleague,    Thank you for referring your patient, Jareth Gallardo, to the [facility] CANCER [facility]. Please see a copy of my visit note below.         [facility] CANCER [facility]    PATIENT NAME: Jareth Gallardo  MRN # [id number]   DATE OF VISIT: August 3, 2023  YOB: 1960       Referring Provider: Dr. Jesus Boston  Radiation Oncology: Dr. Mane Razo  Primary Care Provider: Dr. Beni Gallardo at [facility] in [facility]    CANCER TYPE: Nasopharyngeal carcinoma, p16 negative, SAM positive  STAGE: [id number] (DENAE)  ECOG PS: 0    PD-L1: 20% using  clone locally; TPS 70% at [facility]   NGS:     SUMMARY  12/11/21 UC for L neck mass, selling  12/9/21 CT neck ([place]). 2.4 cm L neck node medial to SCM just above the hyoid, additional LNs surround, scattered R neck and L supraclavicular LNs  2/15/22 US bx L cervical node. Path: SCC, moderately differentiated,   3/4/22 PET/CT. Nasopharyngeal and oropharyngeal mucosal thickening. 2.6 cm BERNARDO nodular GGO, SUV avid, some additional lingular uptake  4/4~5/16/22/22 C1-3 carboplatin gemcitabine. Not a candidate for cisplatin due to hearing loss on audiogram. Neulasta 5/3 after C2D8, ANC 1000   5/31/22 PET/CT. [place]  6/22~8/10/22 Chemoradiation with weekly carboplatin  11/16/22 PET/CT. Residual 1.5 cm 2B LN (SUV 28.7), 1 cm enhancing focus in SCM (SUV 22.4), small mildly avid residual enhancing nodes in the L level 2A decreased in size and FDG avidity than before, resolved L level 5 node, small but mildly FDG avid R level 2B node, smaller mildly avid R level 3 nodes, unchanged in ize. NI-RADS 2a in the primary site - FDG uptake along the BOT at the midline, increased from prior. New patchy GGO and nodules in the RUL and L base with mild FDG uptake, likely inflammatory, recommend short interval CT  12/5/22 Salvage L modified radical neck  dissection (Dr. Boston). Path: L posterior SCM excision - SCC, 1.1 cm, invading into skeletal muscle, negative margins, +PNI/LVI. Level 2 node 1.5 cm SCC, moderately differentiated, invading into fibroadipose tissue, focally involving the inked resection margin.   1/30/23 CT chest. Patchy LLL GGO, decreased from prior.   2/22/23 PET/CT. Focal updake along the tongue base at midline, decreased from prior, likely inflammatory (SUV 14.8-->8.5), near complete resolution of the rest of the mild mucosal uptake within the tongue base. At least 5 FDG avid lesions in the L deep neck. 2 nodes at the C2 level, posterior to the surgical clips in the L lateral paravertebral region, one measuring 8 mm (SUV 15.9), the other more posteriorly measuring 7 mm. The other 2 adjacent ones are deep to the residual L SCM; 9.6 mm, and a smaller one immediately below in the SCM. There is another mildly FDG avid necrotic lesion inferior to the above 3 foci, again deep to the L SCM. 0.9 cm R level 2b LN (SUV 3.1), likely reactive. Resolution of RUL GGO, stable sub-6 mm nodules.  3/9/23~current Pembrolizumab . C3 delayed 1 week due to parainfluenza. Good [place]  4/18/23 ED at [facility] for fever and cough. CXR negative. Viral panel showed parainfluenza.    ASSESSMENT AND PLAN  Nasopharyngeal carcinoma, SAM +julia: Restage with CT neck and chest abd in about 6 weeks. Continue pembro in the meantime. In response to his question, discussed that we can do pembro for up to 2 years in the absence of disease progression, side effects that require us to stop or his wanting to stop.     BERNARDO/lung nodules, new RUL nodules in 11/2022, LLL GGO 6/2023: Resolving c/w inflammatory changes from mild aspiration. Now new GGOs in the LLL on repeat CT 7/24, again look inflammatory, but not c/w immune-mediated pneumonitis. Asymptomatic. Re-evaluate on next CT. Has upcoming appt with Pulmonary Medicine, referral made from prior to appt with  me.    Lymphedema: Not doing massages regularly or swallowing exercises. Encouraged him to do both. Discussed again that the swallowing exercises is not really only for now but for the long term.    Hypothyroidism: Levothyroxine 50 mcg daily started in mid May. Levels still low 6/29, will base dose adjustments on levels in Aug or Sept.    DM2, steroid-induced hyperglycemia: Controlled, managed by PCP     Mild peripheral neuropathy: R foot numbness, very mild, from DM2. Not discussed today    30 minutes spent by me on the date of the encounter doing chart review, history and exam, documentation and further activities per the note     Dariela Melvin MD  Associate Professor of Medicine  Hematology, Oncology and Transplantation      SUBJECTIVE  Jareth returns for follow up of nasopharyngeal carcinoma after 6 cycles of pembrolizumab  Doing ok  Asking whether I've received a questionnaire from his  team about his ongoing case.   A paternal cousin has the same leukemia as his dad - he thinks lymphocytic leukemia. A paternal aunt - his cousin's mom - also had leukemia.  Birthday tomorrow!     PAST MEDICAL HISTORY  Nasopharyngeal carcinoma as above  DM2  COPD??  Hiatal hernia. Sleeps with his head elevated  LVH on TTE 2009, EF50%, LA Dilation  Dyslipidemia  BARRETT in 2015. Not using CPAP due to developing tooth abscesses when he tried it in 2015  Venous insufficiency in both legs being in an accident involving a mower about 10 years ago, on chronic furosemide. LE US negative 12/15/2009  Cholelithiasis  Hyperplastic colon polyp, due 2020  Tonsillectomy  Baker cyst RLE US 2013    Neuropathy from DM - R foot numbness chronically    Exposed to lots of wood dust in his occupation    CURRENT OUTPATIENT MEDICATIONS  Reviewed    ALLERGIES  No Known Allergies     PHYSICAL EXAM  There were no vitals taken for this visit.  GEN: NAD  HEENT: EOMI, no icterus, injection or pallor  NECK: clear lymphedema R > L   NEURO:  alert    Remainder of physical exam deferred due to public health emergency and limitations of video visit.    LABORATORY AND IMAGING STUDIES    Labs 6/29/23 were independently reviewed and interpreted by me    CT Chest Low Dose Non Contrast  Narrative: CT CHEST LOW DOSE NON CONTRAST    History: Nasopharyngeal cancer (H)     Comparison: 6/26/2023    Technique: Helical acquisition low dose CT chest. Images reviewed in  lung, soft tissue and bone windows. This CT exam was performed using  one or more the following dose reduction techniques: automated  exposure control, adjustment of the mA and/or kV according to patient  size, and/or iterative reconstruction technique.    Findings:   Patchy nodularity in the left lower lobe is redemonstrated, unchanged  in the distribution when compared to 6/26/2023. Emphysematous changes  are minimal. No pleural effusion is seen.    The heart size is unchanged. Coronary calcifications are seen. No  suspicious thoracic adenopathy is identified allowing for noncontrast  low-dose technique.    Limited images of the upper abdomen demonstrate a calcified gallstone.    No suspicious osseous lesion.  Impression: Impression:   Variable distribution of patchy ill-defined and nodular opacities at  the left lung base suggests recurrent pneumonitis, possibly related to  recurrent aspiration or recurrent pneumonia. Consider speech  assessment.    JUAN CARLOS MALCOLM MD         SYSTEM ID:  [id number]     Imaging was personally reviewed and interpreted by me and compared to the May CT.     Virtual Visit Details  Type of service:  Video Visit   Originating Location (pt. Location): Home  Distant Location (provider location):  On-site  Platform used for Video Visit: AmWell      Again, thank you for allowing me to participate in the care of your patient.        Sincerely,        Dariela Melvin MD

## 2023-08-03 NOTE — PROGRESS NOTES
received Cancer Risk Management Program referral, referred for:       multiple family members with cancers. Jareth has nasopharyngeal carcinoma, not EBV related.        Reviewed for appropriate plan, and sent to New Patient Scheduling for completion.

## 2023-08-04 ENCOUNTER — TELEPHONE (OUTPATIENT)
Dept: ONCOLOGY | Facility: OTHER | Age: 63
End: 2023-08-04
Payer: COMMERCIAL

## 2023-08-04 NOTE — TELEPHONE ENCOUNTER
Oncology/Hematology Care Coordination - Referral Review    Referred by:  Dr Melvin at [facility]    Diagnosis:  Nasopharyngeal carcinoma, p16 negative, SAM positive  STAGE: [id number] (DENAE)  ECOG PS: 0  PD-L1: 20% using  clone llocally     Treatment Plan:  Keytruda every 21 days is due on 23.    Most recent Imagin23 CT neck, chest, and abdomen and 23 CT chest    Most recent Lab:  23    Surgery/Biopsy:  2/15/22     Pathology:  SCC, moderately differentiated    Outside Records:  See chart.  SUMMARY from 8/3/23 Dr Melvin visit  21                   UC for L neck mass, selling  21                     CT neck ([place]). 2.4 cm L neck node medial to SCM just above the hyoid, additional LNs surround, scattered R neck and L supraclavicular LNs  2/15/22                     US bx L cervical node. Path: SCC, moderately differentiated,   3/4/22                       PET/CT. Nasopharyngeal and oropharyngeal mucosal thickening. 2.6 cm BERNARDO nodular GGO, SUV avid, some additional lingular uptake  ~         C1-3 carboplatin gemcitabine. Not a candidate for cisplatin due to hearing loss on audiogram. Neulasta 5/3 after C2D8, ANC 1000   22                     PET/CT. [place]  ~8/10/22            Chemoradiation with weekly carboplatin  22                   PET/CT. Residual 1.5 cm 2B LN (SUV 28.7), 1 cm enhancing focus in SCM (SUV 22.4), small mildly avid residual enhancing nodes in the L level 2A decreased in size and FDG avidity than before, resolved L level 5 node, small but mildly FDG avid R level 2B node, smaller mildly avid R level 3 nodes, unchanged in ize. NI-RADS 2a in the primary site - FDG uptake along the BOT at the midline, increased from prior. New patchy GGO and nodules in the RUL and L base with mild FDG uptake, likely inflammatory, recommend short interval CT  22                     Salvage L modified radical neck dissection (Dr. Boston). Path: L  posterior SCM excision - SCC, 1.1 cm, invading into skeletal muscle, negative margins, +PNI/LVI. Level 2 node 1.5 cm SCC, moderately differentiated, invading into fibroadipose tissue, focally involving the inked resection margin.   1/30/23                     CT chest. Patchy LLL GGO, decreased from prior.   2/22/23                     PET/CT. Focal updake along the tongue base at midline, decreased from prior, likely inflammatory (SUV 14.8-->8.5), near complete resolution of the rest of the mild mucosal uptake within the tongue base. At least 5 FDG avid lesions in the L deep neck. 2 nodes at the C2 level, posterior to the surgical clips in the L lateral paravertebral region, one measuring 8 mm (SUV 15.9), the other more posteriorly measuring 7 mm. The other 2 adjacent ones are deep to the residual L SCM; 9.6 mm, and a smaller one immediately below in the SCM. There is another mildly FDG avid necrotic lesion inferior to the above 3 foci, again deep to the L SCM. 0.9 cm R level 2b LN (SUV 3.1), likely reactive. Resolution of RUL GGO, stable sub-6 mm nodules.  3/9/23~current        Pembrolizumab . C3 delayed 1 week due to parainfluenza. Good [place]  4/18/23                     ED at [facility] for fever and cough. CXR negative. Viral panel showed parainfluenza.  Ange Rizzo CMA ([facility]).....................8/4/2023  2:21 PM

## 2023-08-07 DIAGNOSIS — C11.9 NASOPHARYNGEAL CANCER (H): Primary | ICD-10-CM

## 2023-08-07 RX ORDER — LORAZEPAM 2 MG/ML
0.5 INJECTION INTRAMUSCULAR EVERY 4 HOURS PRN
Status: CANCELLED | OUTPATIENT
Start: 2023-08-09

## 2023-08-07 RX ORDER — HEPARIN SODIUM,PORCINE 10 UNIT/ML
5 VIAL (ML) INTRAVENOUS
Status: CANCELLED | OUTPATIENT
Start: 2023-08-09

## 2023-08-07 RX ORDER — HEPARIN SODIUM (PORCINE) LOCK FLUSH IV SOLN 100 UNIT/ML 100 UNIT/ML
5 SOLUTION INTRAVENOUS
Status: CANCELLED | OUTPATIENT
Start: 2023-08-09

## 2023-08-07 RX ORDER — EPINEPHRINE 1 MG/ML
0.3 INJECTION, SOLUTION, CONCENTRATE INTRAVENOUS EVERY 5 MIN PRN
Status: CANCELLED | OUTPATIENT
Start: 2023-08-09

## 2023-08-07 RX ORDER — ALBUTEROL SULFATE 90 UG/1
1-2 AEROSOL, METERED RESPIRATORY (INHALATION)
Status: CANCELLED
Start: 2023-08-09

## 2023-08-07 RX ORDER — ALBUTEROL SULFATE 0.83 MG/ML
2.5 SOLUTION RESPIRATORY (INHALATION)
Status: CANCELLED | OUTPATIENT
Start: 2023-08-09

## 2023-08-07 RX ORDER — DIPHENHYDRAMINE HYDROCHLORIDE 50 MG/ML
50 INJECTION INTRAMUSCULAR; INTRAVENOUS
Status: CANCELLED
Start: 2023-08-09

## 2023-08-07 RX ORDER — MEPERIDINE HYDROCHLORIDE 25 MG/ML
25 INJECTION INTRAMUSCULAR; INTRAVENOUS; SUBCUTANEOUS EVERY 30 MIN PRN
Status: CANCELLED | OUTPATIENT
Start: 2023-08-09

## 2023-08-09 ENCOUNTER — INFUSION THERAPY VISIT (OUTPATIENT)
Dept: ONCOLOGY | Facility: CLINIC | Age: 63
End: 2023-08-09
Attending: INTERNAL MEDICINE
Payer: COMMERCIAL

## 2023-08-09 ENCOUNTER — APPOINTMENT (OUTPATIENT)
Dept: LAB | Facility: CLINIC | Age: 63
End: 2023-08-09
Attending: INTERNAL MEDICINE
Payer: COMMERCIAL

## 2023-08-09 VITALS
RESPIRATION RATE: 18 BRPM | TEMPERATURE: 97.8 F | OXYGEN SATURATION: 97 % | DIASTOLIC BLOOD PRESSURE: 67 MMHG | SYSTOLIC BLOOD PRESSURE: 102 MMHG | HEART RATE: 79 BPM | WEIGHT: 239.2 LBS | BODY MASS INDEX: 34.32 KG/M2

## 2023-08-09 DIAGNOSIS — C11.9 NASOPHARYNGEAL CANCER (H): Primary | ICD-10-CM

## 2023-08-09 LAB
ALBUMIN SERPL BCG-MCNC: 3.9 G/DL (ref 3.5–5.2)
ALP SERPL-CCNC: 89 U/L (ref 40–129)
ALT SERPL W P-5'-P-CCNC: 18 U/L (ref 0–70)
ANION GAP SERPL CALCULATED.3IONS-SCNC: 7 MMOL/L (ref 7–15)
AST SERPL W P-5'-P-CCNC: 29 U/L (ref 0–45)
BASOPHILS # BLD AUTO: 0 10E3/UL (ref 0–0.2)
BASOPHILS NFR BLD AUTO: 1 %
BILIRUB SERPL-MCNC: 0.6 MG/DL
BUN SERPL-MCNC: 16.1 MG/DL (ref 8–23)
CALCIUM SERPL-MCNC: 8.9 MG/DL (ref 8.8–10.2)
CHLORIDE SERPL-SCNC: 104 MMOL/L (ref 98–107)
CREAT SERPL-MCNC: 0.84 MG/DL (ref 0.67–1.17)
DEPRECATED HCO3 PLAS-SCNC: 29 MMOL/L (ref 22–29)
EOSINOPHIL # BLD AUTO: 0.1 10E3/UL (ref 0–0.7)
EOSINOPHIL NFR BLD AUTO: 3 %
ERYTHROCYTE [DISTWIDTH] IN BLOOD BY AUTOMATED COUNT: 14.5 % (ref 10–15)
GFR SERPL CREATININE-BSD FRML MDRD: >90 ML/MIN/1.73M2
GLUCOSE SERPL-MCNC: 99 MG/DL (ref 70–99)
HCT VFR BLD AUTO: 36.1 % (ref 40–53)
HGB BLD-MCNC: 11.6 G/DL (ref 13.3–17.7)
IMM GRANULOCYTES # BLD: 0 10E3/UL
IMM GRANULOCYTES NFR BLD: 0 %
LYMPHOCYTES # BLD AUTO: 1 10E3/UL (ref 0.8–5.3)
LYMPHOCYTES NFR BLD AUTO: 23 %
MCH RBC QN AUTO: 28.4 PG (ref 26.5–33)
MCHC RBC AUTO-ENTMCNC: 32.1 G/DL (ref 31.5–36.5)
MCV RBC AUTO: 89 FL (ref 78–100)
MONOCYTES # BLD AUTO: 0.4 10E3/UL (ref 0–1.3)
MONOCYTES NFR BLD AUTO: 10 %
NEUTROPHILS # BLD AUTO: 2.8 10E3/UL (ref 1.6–8.3)
NEUTROPHILS NFR BLD AUTO: 63 %
NRBC # BLD AUTO: 0 10E3/UL
NRBC BLD AUTO-RTO: 0 /100
PLATELET # BLD AUTO: 152 10E3/UL (ref 150–450)
POTASSIUM SERPL-SCNC: 4.1 MMOL/L (ref 3.4–5.3)
PROT SERPL-MCNC: 7.4 G/DL (ref 6.4–8.3)
RBC # BLD AUTO: 4.08 10E6/UL (ref 4.4–5.9)
SODIUM SERPL-SCNC: 140 MMOL/L (ref 136–145)
T4 FREE SERPL-MCNC: 1 NG/DL (ref 0.9–1.7)
TSH SERPL DL<=0.005 MIU/L-ACNC: 4.7 UIU/ML (ref 0.3–4.2)
WBC # BLD AUTO: 4.3 10E3/UL (ref 4–11)

## 2023-08-09 PROCEDURE — 84443 ASSAY THYROID STIM HORMONE: CPT | Performed by: INTERNAL MEDICINE

## 2023-08-09 PROCEDURE — 85025 COMPLETE CBC W/AUTO DIFF WBC: CPT

## 2023-08-09 PROCEDURE — 36591 DRAW BLOOD OFF VENOUS DEVICE: CPT

## 2023-08-09 PROCEDURE — 96413 CHEMO IV INFUSION 1 HR: CPT

## 2023-08-09 PROCEDURE — 82310 ASSAY OF CALCIUM: CPT | Performed by: INTERNAL MEDICINE

## 2023-08-09 PROCEDURE — 80053 COMPREHEN METABOLIC PANEL: CPT | Performed by: INTERNAL MEDICINE

## 2023-08-09 PROCEDURE — 84439 ASSAY OF FREE THYROXINE: CPT | Performed by: INTERNAL MEDICINE

## 2023-08-09 PROCEDURE — 250N000011 HC RX IP 250 OP 636: Mod: JZ | Performed by: INTERNAL MEDICINE

## 2023-08-09 PROCEDURE — 258N000003 HC RX IP 258 OP 636: Performed by: INTERNAL MEDICINE

## 2023-08-09 RX ORDER — HEPARIN SODIUM (PORCINE) LOCK FLUSH IV SOLN 100 UNIT/ML 100 UNIT/ML
5 SOLUTION INTRAVENOUS
Status: DISCONTINUED | OUTPATIENT
Start: 2023-08-09 | End: 2023-08-09 | Stop reason: HOSPADM

## 2023-08-09 RX ORDER — HEPARIN SODIUM (PORCINE) LOCK FLUSH IV SOLN 100 UNIT/ML 100 UNIT/ML
5 SOLUTION INTRAVENOUS ONCE
Status: COMPLETED | OUTPATIENT
Start: 2023-08-09 | End: 2023-08-09

## 2023-08-09 RX ADMIN — SODIUM CHLORIDE 200 MG: 9 INJECTION, SOLUTION INTRAVENOUS at 14:14

## 2023-08-09 RX ADMIN — Medication 5 ML: at 12:46

## 2023-08-09 RX ADMIN — Medication 5 ML: at 14:53

## 2023-08-09 RX ADMIN — SODIUM CHLORIDE 250 ML: 9 INJECTION, SOLUTION INTRAVENOUS at 13:56

## 2023-08-09 ASSESSMENT — PAIN SCALES - GENERAL: PAINLEVEL: NO PAIN (0)

## 2023-08-09 NOTE — PROGRESS NOTES
Infusion Nursing Note:  Jareth Gallardo presents today for cycle 7, day 1 keytruda.    Patient seen by provider today: No   present during visit today: Not Applicable.    Note: Patient states he is feeling well today with no new concerns.      Intravenous Access:  Implanted Port.    Treatment Conditions:  Lab Results   Component Value Date    HGB 11.6 (L) 08/09/2023    WBC 4.3 08/09/2023    ANEU 7.0 05/16/2022    ANEUTAUTO 2.8 08/09/2023     08/09/2023        Lab Results   Component Value Date     08/09/2023    POTASSIUM 4.1 08/09/2023    MAG 1.8 01/30/2023    CR 0.84 08/09/2023    LAI 8.9 08/09/2023    BILITOTAL 0.6 08/09/2023    ALBUMIN 3.9 08/09/2023    ALT 18 08/09/2023    AST 29 08/09/2023       Results reviewed, labs MET treatment parameters, ok to proceed with treatment.      Post Infusion Assessment:  Patient tolerated infusion without incident.  Blood return noted pre and post infusion.  Site patent and intact, free from redness, edema or discomfort.  No evidence of extravasations.  Access discontinued per protocol.       Discharge Plan:   Patient declined prescription refills.  Discharge instructions reviewed with: Patient.  Patient and/or family verbalized understanding of discharge instructions and all questions answered.  AVS to patient via [employer].  Patient will return 8/28 for next provider appointment. Has next infusion set up closer to home  Patient discharged in stable condition accompanied by: self.  Departure Mode: Ambulatory.      Lisandra Moura RN

## 2023-08-09 NOTE — PATIENT INSTRUCTIONS
[facility] Triage and after hours / weekends / holidays:  [phone]    Please call the triage or after hours line if you experience a temperature greater than or equal to 100.4, shaking chills, have uncontrolled nausea, vomiting and/or diarrhea, dizziness, shortness of breath, chest pain, bleeding, unexplained bruising, or if you have any other new/concerning symptoms, questions or concerns.      If you are having any concerning symptoms or wish to speak to a provider before your next infusion visit, please call triage to notify them so we can adequately serve you.     If you need a refill on a narcotic prescription or other medication, please call before your infusion appointment.             August 2023 Sunday Monday Tuesday Wednesday Thursday Friday Saturday             1     2     3    RETURN CCSL   8:25 AM   (30 min.)   Dariela Melvin MD   [facility] 4  Happy Birthday!     5       6     7     8     9    LAB CENTRAL  12:45 PM   (15 min.)   [facility] LAB DRAW   [facility]    ONC INFUSION 1 HR (60 MIN)   1:30 PM   (60 min.)    ONC INFUSION NURSE   [facility] 10     11     12       13     14     15     16     17     18     19       20     21     22     23     24     25     26       27     28    RETURN CCSL   8:45 AM   (45 min.)   Laura Guzman APRN CNP   [facility] 29    LAB   1:00 PM   (10 min.)    LAB   [facility]   1:00 PM   (60 min.)   Chriss Martin MD   [facility] 30 31 September 2023 Sunday Monday Tuesday Wednesday Thursday Friday Saturday                            1     2       3     4     5     6     7     8     9       10     11     12     13    CT CHEST/ABDOMEN/PELVIS W   8:30 AM   (45 min.)   GHCT1   [facility]    CT SOFT TISSUE NECK W   9:30 AM   (15 min.)    GHCT1   [facility] 14    RETURN CCSL   7:55 AM   (30 min.)   Dariela Melvin MD   [facility] 15     16       17     18     19     20     21     22     23       24     25     26     27     28     29     30                    Recent Results (from the past 24 hour(s))   Comprehensive metabolic panel    Collection Time: 08/09/23 12:52 PM   Result Value Ref Range    Sodium 140 136 - 145 mmol/L    Potassium 4.1 3.4 - 5.3 mmol/L    Chloride 104 98 - 107 mmol/L    Carbon Dioxide (CO2) 29 22 - 29 mmol/L    Anion Gap 7 7 - 15 mmol/L    Urea Nitrogen 16.1 8.0 - 23.0 mg/dL    Creatinine 0.84 0.67 - 1.17 mg/dL    Calcium 8.9 8.8 - 10.2 mg/dL    Glucose 99 70 - 99 mg/dL    Alkaline Phosphatase 89 40 - 129 U/L    AST 29 0 - 45 U/L    ALT 18 0 - 70 U/L    Protein Total 7.4 6.4 - 8.3 g/dL    Albumin 3.9 3.5 - 5.2 g/dL    Bilirubin Total 0.6 <=1.2 mg/dL    GFR Estimate >90 >60 mL/min/1.73m2   TSH with free T4 reflex    Collection Time: 08/09/23 12:52 PM   Result Value Ref Range    TSH 4.70 (H) 0.30 - 4.20 uIU/mL   CBC with platelets and differential    Collection Time: 08/09/23 12:52 PM   Result Value Ref Range    WBC Count 4.3 4.0 - 11.0 10e3/uL    RBC Count 4.08 (L) 4.40 - 5.90 10e6/uL    Hemoglobin 11.6 (L) 13.3 - 17.7 g/dL    Hematocrit 36.1 (L) 40.0 - 53.0 %    MCV 89 78 - 100 fL    MCH 28.4 26.5 - 33.0 pg    MCHC 32.1 31.5 - 36.5 g/dL    RDW 14.5 10.0 - 15.0 %    Platelet Count 152 150 - 450 10e3/uL    % Neutrophils 63 %    % Lymphocytes 23 %    % Monocytes 10 %    % Eosinophils 3 %    % Basophils 1 %    % Immature Granulocytes 0 %    NRBCs per 100 WBC 0 <1 /100    Absolute Neutrophils 2.8 1.6 - 8.3 10e3/uL    Absolute Lymphocytes 1.0 0.8 - 5.3 10e3/uL    Absolute Monocytes 0.4 0.0 - 1.3 10e3/uL    Absolute Eosinophils 0.1 0.0 - 0.7 10e3/uL    Absolute Basophils 0.0 0.0 - 0.2 10e3/uL    Absolute Immature Granulocytes 0.0 <=0.4 10e3/uL    Absolute NRBCs 0.0 10e3/uL   T4 free     Collection Time: 08/09/23 12:52 PM   Result Value Ref Range    Free T4 1.00 0.90 - 1.70 ng/dL

## 2023-08-11 NOTE — NURSING NOTE
Chief Complaint   Patient presents with     Port Draw     Labs drawn by RN via port, vitals taken.     Port accessed with 20 gauge 3/4 inch gripper needle by RN, labs collected, line flushed with saline and heparin.  Vitals taken. Pt checked in for appointment(s).    Mary Cole RN     Tamia Trujillo 64 year old female 1958     Chief Complaint   Patient presents with   • Office Visit   • Pain     Bilateral shoulder, wrist, hand     HPI:   The patient is a 64 year old female with seropositive rheumatoid who presents today for follow up. She is on Orencia infusions and  Plaquenil for RA.  She complains of fatigue and joint pain.     .The patient is followed by pulmonology and has rheumatoid arthritis interstitial lung disease.  She is on an inhaler.     The patient has a significant past medical history of uterine cancer status post chemotherapy in 2008 with no reoccurrence.     Past medications tried:  Enbrel-ineffective   Humira- rash  Actemra-ineffective    Rituximab- late onset neutropenia.    REVIEW OF SYSTEMS:  + fatigue, + joint painNo fevers, no oral ulcers, no alopecia, no Raynaud phenomenon, no eye inflammation, no visual changes, no rashes, no chest pain, no pleurisy, + shortness of breath, + dyspnea on exertion, no hematuria, no melena, no bright red blood per rectum, no nausea, no vomiting, no dysphagia, no bleeding, no seizures.    Past Medical History:   Diagnosis Date   • Allergic rhinitis     previously on immunotherapy and xolair.  Dust/mold/grasses are triggers.   • Anxiety    • Cancer (CMD)     lung mets secondary to uterine   • Depression    • Endometrial adenocarcinoma (CMD)     WITH OVARIAN METS and pulmonary met RLL s/p cyberknife 2018 (Dr. Chao).  Currently followed by Dr. Heredia   • Essential (primary) hypertension    • Fibromyalgia 02/14/2017   • GERD (esophageal reflux)    • Glucose intolerance (impaired glucose tolerance)    • Histoplasmosis retinitis    • Hyperlipidemia    • JOAN (iron deficiency anemia)    • Long-term use of immunosuppressant medication 01/17/2014   • Moderate persistent asthma without complication     On symbicort in past, but had to stop due to thrush.   • Nonalcoholic steatohepatitis (STONE)    • Obesity    • Occipital neuralgia    • Other  chronic pain 2012   • Ovarian cancer (CMD)    • Parkinson disease (CMD) 03/15/2018   • Personal history of chemotherapy    • Personal history of radiation therapy     post lung ca   • Pneumonia    • Polycystic ovaries    • PONV (postoperative nausea and vomiting)    • RAD (reactive airway disease)    • Seropositive rheumatoid arthritis (CMD) 01/17/2014    generalized.  Dr. Deleon.  On plaquenil and orencia currently.  On enbrel, actemra, rituxan, methotrexate.   • Sinusitis, chronic    • Sleep apnea     USES CPAP WITH A 13 SETTING   • Urinary tract infection         Past Surgical History:   Procedure Laterality Date   • Appendectomy  01/01/1970   • Bilateral salpingoophorectomy     • Bronchoscopy  11/16/2021    EBUS   • Cholecystectomy  01/01/1990    Cholecystectomy (gallbladder)   • Colonoscopy  07/29/2009   • D and c  08/06/2008    D&C   • Drain abscess parotid,complic     • Esophagogastroduodenoscopy  12/04/2018   • Gastroplasty     • Gastroplasty,obesity,other  01/01/1990   • Hysterectomy     • Ir lung biopsy  06/15/2018   • Knee arthroscopy w/ meniscectomy  12/11/2009    Left   • Laser surgery of eye      ocular histoplasmosis   • Removal gallbladder     • Total abdominal hysterectomy w/ bilateral salpingoophorectomy  08/06/2008    Total Abd Hyst w BSO and LN dissection   • Upper gi endoscopy,exam  07/29/2009   • Upper gi endoscopy,exam  03/16/2011       ALLERGIES:   Allergen Reactions   • Doxycycline Hydrochloride ANAPHYLAXIS     Rosacea   • Levofloxacin MYALGIA     Muscle/tendon pain about 12 days into therapy.     • Singulair      Autoimmune symptoms   • Bactrim Ds RASH and PRURITUS   • Fluzone High-Dose PRURITUS   • Rituximab Other (See Comments)     neutropenia       Current Outpatient Medications   Medication Sig Dispense Refill   • gabapentin (NEURONTIN) 300 MG capsule Take 1 capsule by mouth in the morning and 1 capsule in the evening. 180 capsule 1   • sodium chloride (NORMAL SALINE) 0.9 % bolus Inject  50 mLs into the vein every 28 days. Use for reconstitution of Orencia     • hydroxychloroquine (PLAQUENIL) 200 MG tablet TAKE 1 TABLET BY MOUTH TWICE DAILY 180 tablet 1   • metFORMIN (GLUCOPHAGE-XR) 500 MG 24 hr tablet Take 1 tablet by mouth daily (with breakfast). 90 tablet 3   • ondansetron (Zofran) 8 MG tablet Take 1 tablet by mouth every 8 hours as needed for Nausea. 30 tablet 1   • triamcinolone (ARISTOCORT) 0.1 % ointment Apply a thin layer to eczema patches on body twice daily as needed 80 g 2   • triamcinolone (ARISTOCORT) 0.1 % cream Apply a thin layer twice daily to folds for 1 week 30 g 2   • abatacept (ORENCIA) 250 MG injection Inject 1,000 mg into the vein every 28 days. RN to administer each dose. Infuse into the vein over 30 minutes using ambulatory pump. Slow infusion for significant side effects (headache, nausea, cough, dyspnea, chest pain, change in BP, urticarial, or dizziness and Notify physician.    For signs of anaphylaxis or severe reaction stop infusion, institute treatment as ordered and notify physician.    Dispense one 10ml vial of Sterile Water per vial of drug. 4 each 11   • EPINEPHrine PF (ADRENALIN) 1 MG/ML Solution Inject 0.3-0.5 mLs into the muscle every 5 minutes as needed (bronchial constriction or severe hypotension (>30% decrease in systolic BP)). RN to administer each dose. 2 mL 11   • sodium chloride, PF, 0.9 % injectable solution Inject 500 mLs into the vein every 10 minutes as needed (hypotension). Give over 10 minutes for hypotension.  May repeat times 1. RN to administer each dose. 1000 mL 11   • diphenhydrAMINE (BENADRYL) 50 MG/ML injectable solution Inject 0.5-1 mLs into the vein as needed for Itching (Hives, itching, swollen lips or tongue). RN to administer each dose. 1 mL 11   • famotidine (PEPCID) 20 MG/2ML injection Inject 2 mLs into the vein as needed (hives, itching, swollen lips or tongue). RN to administer each dose. 2 mL 11   • ipratropium-albuterol (DUONEB)  0.5-2.5 (3) MG/3ML nebulizer solution Take 3 mLs by nebulization every 6 hours as needed for Wheezing. 3 mL 3   • omeprazole (PriLOSEC) 40 MG capsule Take 1 capsule by mouth daily. 90 capsule 3   • spironolactone (ALDACTONE) 25 MG tablet Take 1 tablet by mouth 3 times daily. 270 tablet 3   • losartan (COZAAR) 50 MG tablet Take 1 tablet by mouth daily. 90 tablet 3   • rosuvastatin (CRESTOR) 5 MG tablet Take 1 tablet by mouth daily. 90 tablet 3   • modafinil (PROVIGIL) 100 MG tablet Take 1 tablet by mouth daily. 30 tablet 3   • benzonatate (Tessalon Perles) 100 MG capsule Take 1 capsule by mouth 3 times daily as needed for Cough. 30 capsule 3   • nystatin (MYCOSTATIN) 542644 UNIT/GM powder Apply a thin layer to folds of skin once daily as needed 60 g 5   • niacin (NIASPAN) 500 MG CR tablet Take 1 tablet by mouth nightly. 30 tablet 11   • sodium chloride, PF, 0.9 % injectable solution Inject 500 mLs into the vein every 10 minutes as needed (hypotension). Give over 10 minutes for hypotension.  May repeat times 1. RN to administer each dose. 1000 mL 0   • fluticasone (FLONASE) 50 MCG/ACT nasal spray 1 spray in each nostril 1 to 2 times a day as needed for persistent nasal congestion 16 g 11   • Water For Injection Sterile (STERILE WATER) injectable solution Inject 40 mLs into the vein as directed. Every 4 weeks to reconstitute IV Orencia     • Sodium Chloride Flush (NORMAL SALINE FLUSH IV) Inject 500 mLs into the vein as directed. Rescue medication for any IV Orencia reactions     • acetaminophen (TYLENOL) 500 MG tablet Take 1,000 mg by mouth every 6 hours as needed. Pre Orencia infusion     • diphenhydrAMINE HCl (BENADRYL ALLERGY PO) Take 50 mg by mouth as directed. pre Orencia infusion     • sodium chloride 0.9 % injectable solution Inject 5 mLs into the vein as directed. pre and post orencia infusion for PIV access and deaccess     • pramipexole (MIRAPEX) 0.125 MG tablet Take 0.125 mg by mouth 3 times daily.     •  estradiol (ESTRACE) 0.1 MG/GM vaginal cream Place 1 g vaginally twice a week. 42.5 g 12   • buPROPion (WELLBUTRIN XL) 150 MG 24 hr tablet Take 150 mg by mouth daily.     • FLUoxetine (PROZAC) 20 MG capsule Take 40 mg by mouth daily.      • busPIRone (BUSPAR) 15 MG tablet Take 15 mg by mouth 2 times daily.     • LORazepam (ATIVAN) 1 MG tablet Take 1 mg by mouth nightly.      • Cholecalciferol (VITAMIN D-3) 5000 UNIT TABS Take  by mouth.     • Multiple Vitamin (MULTIVITAMINS) OR TABS 1 TABLET DAILY 0 0     No current facility-administered medications for this visit.        Social History     Socioeconomic History   • Marital status: /Civil Union     Spouse name: Evan   • Number of children: 0   • Years of education: Not on file   • Highest education level: Not on file   Occupational History   • Occupation:      Employer: [employer]     Comment: [employer]   Tobacco Use   • Smoking status: Never   • Smokeless tobacco: Never   Vaping Use   • Vaping Use: never used   Substance and Sexual Activity   • Alcohol use: No     Alcohol/week: 0.0 standard drinks of alcohol   • Drug use: No   • Sexual activity: Yes     Partners: Male     Birth control/protection: Post-menopausal   Other Topics Concern   •  Service No   • Blood Transfusions Yes     Comment: during chemo   • Caffeine Concern No   • Occupational Exposure No   • Hobby Hazards No   • Sleep Concern No   • Stress Concern No   • Weight Concern Yes   • Special Diet No   • Back Care No   • Exercise No   • Bike Helmet Not Asked   • Seat Belt Not Asked   • Self-Exams Not Asked   Social History Narrative    .  One dog.  No birds/hot tubs.  Born in [place], then has lived in [place], [place].  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     Social Determinants of Health     Financial Resource Strain: Not on file   Food Insecurity: Not on file   Transportation Needs: Not on file   Physical Activity: Not on file   Stress: Not  on file   Social Connections: Not on file   Intimate Partner Violence: Not At Risk (11/16/2021)    Intimate Partner Violence    • Social Determinants: Intimate Partner Violence Past Fear: No    • Social Determinants: Intimate Partner Violence Current Fear: No       Family History   Problem Relation Age of Onset   • Allergic Rhinitis Neg Hx    • Angioedema Neg Hx    • Asthma Neg Hx    • Eczema Neg Hx    • Immunodeficiency Neg Hx    • Urticaria Neg Hx          PHYSICAL EXAMINATION:   Vitals:    08/11/23 1004   BP: 108/70   Pulse: 76   Resp: 16   SpO2: 96%   Weight: 121.1 kg (267 lb)   LMP: 08/06/2008      GENERAL: Well dressed and obese.   HEENT: Normocephalic atraumatic. Normal appearing sclerae and   conjunctivae.   NECK: Supple and nontender. No cervical or supraclavicular   lymphadenopathy.   CARDIOVASCULAR: Regular rate and rhythm. Normal S1, S2. No murmurs or   rubs.   LUNGS: Clear to auscultation. No rales or wheezing. Breathing is   unlabored.   EXTREMITIES: No edema, cyanosis, or clubbing.   SKIN:  No rashes, digital ulcers, periungual erythema, nail pitting,   nodules, or sclerodactyly.   MUSCULOSKELETAL: Bilateral upper extremities: No synovitis. Normal range of motion.  Normal strength   Bilateral lower extremities: No synovitis. Normal range of motion. Normal strength.   SPINE: No tenderness, normal range of motion.   PSYCHOLOGICAL: Alert and oriented x3. Mood and affect are normal.     LABORATORY AND DIAGNOSTIC DATA:  Reviewed in Epic to date.     ASSESSMENT AND PLAN:   This is a 64 year old female with:     1)Seropositive rheumatoid arthritis-stable.   -  Continue Orencia IV 1000 mg every 4 weeks    -  Continue Plaquenil 200 mg twice daily   -  PRN flares: Medrol Dosepak   -  Obtain Sedimentation Rate, C-reactive protein every 8 weeks      2)  Rheumatoid arthritis  - ILD  Per pulmonology  -  Start azathioprine 50 mg po bid after obtain TPMT enzyme activity.  Patient was explained risks and side effects  of medication. She understands and agrees to initiate the medication.  -  Follow up with pulmonology.     3) Long-term use of immunosuppressants with drug toxicity monitoring   -   Obtain CBC, CMP every 8 weeks   -   The patient will obtain an annual eye exam with OCT (optical coherence tomography) testing     4) Sialadenitis-stable  -   Managed by ENT     5) Non-alcoholic steatohepatitis   -   Followed by hepatology    6) Xerostomia   -   Continue Evoxac 30 mg tid as needed.     7) Fibromyalgia and chemotherapy induced neuropathy.  -  Continue gabapentin 300 mg. Take 1 capsule 2 times daily.        8) Iron deficiency anemia -stable     Follow up in 2 1/2 months

## 2023-08-25 ENCOUNTER — VIRTUAL VISIT (OUTPATIENT)
Dept: ONCOLOGY | Facility: CLINIC | Age: 63
End: 2023-08-25
Attending: NURSE PRACTITIONER
Payer: COMMERCIAL

## 2023-08-25 VITALS — BODY MASS INDEX: 33.5 KG/M2 | WEIGHT: 234 LBS | HEIGHT: 70 IN

## 2023-08-25 DIAGNOSIS — C11.9 NASOPHARYNGEAL CANCER (H): Primary | ICD-10-CM

## 2023-08-25 PROCEDURE — 99214 OFFICE O/P EST MOD 30 MIN: CPT | Mod: 95 | Performed by: NURSE PRACTITIONER

## 2023-08-25 ASSESSMENT — PAIN SCALES - GENERAL: PAINLEVEL: NO PAIN (0)

## 2023-08-25 NOTE — LETTER
8/25/2023         RE: Jareth Gallardo  [address] [place] [address]        Dear Colleague,    Thank you for referring your patient, Jareth Gallardo, to the [facility]. Please see a copy of my visit note below.    Virtual Visit Details    Type of service:  Video Visit     Originating Location (pt. Location): Home    Distant Location (provider location):  On-site  Platform used for Video Visit: AmWell    Reason for Visit: seen in follow-up of recurrent nasopharyngeal carcinoma    Referring Provider: Dr. Jesus Boston  Radiation Oncology: Dr. Mane Razo  Primary Care Provider: Dr. Beni Gallardo at  in [facility]     CANCER TYPE: Nasopharyngeal carcinoma, p16 negative, SAM positive  STAGE: dG6M6Pc (DENAE)  ECOG PS: 0     PD-L1: 20% using  clone locally; TPS 70% at [facility]   NGS:      SUMMARY  12/11/21                   UC for L neck mass, selling  12/9/21                     CT neck ([place]). 2.4 cm L neck node medial to SCM just above the hyoid, additional LNs surround, scattered R neck and L supraclavicular LNs  2/15/22                     US bx L cervical node. Path: SCC, moderately differentiated,   3/4/22                       PET/CT. Nasopharyngeal and oropharyngeal mucosal thickening. 2.6 cm BERNARDO nodular GGO, SUV avid, some additional lingular uptake  4/4~5/16/22/22         C1-3 carboplatin gemcitabine. Not a candidate for cisplatin due to hearing loss on audiogram. Neulasta 5/3 after C2D8, ANC 1000   5/31/22                     PET/CT. [place]  6/22~8/10/22            Chemoradiation with weekly carboplatin  11/16/22                   PET/CT. Residual 1.5 cm 2B LN (SUV 28.7), 1 cm enhancing focus in SCM (SUV 22.4), small mildly avid residual enhancing nodes in the L level 2A decreased in size and FDG avidity than before, resolved L level 5 node, small but mildly FDG avid R level 2B node, smaller mildly avid R level 3 nodes, unchanged in  vijaya. NI-RADS 2a in the primary site - FDG uptake along the BOT at the midline, increased from prior. New patchy GGO and nodules in the RUL and L base with mild FDG uptake, likely inflammatory, recommend short interval CT  12/5/22                     Salvage L modified radical neck dissection (Dr. Boston). Path: L posterior SCM excision - SCC, 1.1 cm, invading into skeletal muscle, negative margins, +PNI/LVI. Level 2 node 1.5 cm SCC, moderately differentiated, invading into fibroadipose tissue, focally involving the inked resection margin.   1/30/23                     CT chest. Patchy LLL GGO, decreased from prior.   2/22/23                     PET/CT. Focal updake along the tongue base at midline, decreased from prior, likely inflammatory (SUV 14.8-->8.5), near complete resolution of the rest of the mild mucosal uptake within the tongue base. At least 5 FDG avid lesions in the L deep neck. 2 nodes at the C2 level, posterior to the surgical clips in the L lateral paravertebral region, one measuring 8 mm (SUV 15.9), the other more posteriorly measuring 7 mm. The other 2 adjacent ones are deep to the residual L SCM; 9.6 mm, and a smaller one immediately below in the SCM. There is another mildly FDG avid necrotic lesion inferior to the above 3 foci, again deep to the L SCM. 0.9 cm R level 2b LN (SUV 3.1), likely reactive. Resolution of RUL GGO, stable sub-6 mm nodules.  3/9/23~current        Pembrolizumab . C3 delayed 1 week due to parainfluenza. Good [place]  4/18/23                     ED at [facility] for fever and cough. CXR negative. Viral panel showed parainfluenza.    Interval history: Jareth is feeling well. Has had minimal side effects with keytruda. No shortness of breath, cough or wheezing. No rashes. Bowel and bladder function is normal. Has had chronic dry mouth since completion of radiation. Drinks more water to manage this. Notes some intermittent stiffness and swelling in the left neck and  shoulder. Improves with ROM exercises.  -no new areas of pain.   -appetite is good. Weight is stable. Lost about 100 lb during his initial course of treatment with chemoradiation, but has since maintained the weight.    Current Outpatient Medications   Medication Sig Dispense Refill    acetaminophen (TYLENOL) 325 MG tablet Take 2 tablets (650 mg) by mouth every 4 hours as needed for other (For optimal non-opioid multimodal pain management to improve pain control.) 50 tablet 0    atorvastatin (LIPITOR) 20 MG tablet Take 20 mg by mouth      blood glucose (NO BRAND SPECIFIED) lancets standard Use to test blood sugar 2 times daily or as directed. 90 lancet 1    blood glucose (NO BRAND SPECIFIED) test strip Use to test blood sugar 3 times daily or as directed. 90 strip 0    blood glucose monitoring (ACCU-CHEK RANDELL PLUS) meter device kit Use to test blood sugar 2 times daily or as directed. 1 kit 0    fluticasone-vilanterol (BREO ELLIPTA) 100-25 MCG/INH inhaler INHALE 1 PUFF INTO THE LUNGS ONE TIME A DAY. RINSE MOUTH AFTER USE.      furosemide (LASIX) 20 MG tablet Take 20 mg by mouth      levothyroxine (SYNTHROID/LEVOTHROID) 100 MCG tablet Take 1 tablet (100 mcg) by mouth daily 30 tablet 11    metFORMIN (GLUCOPHAGE XR) 500 MG 24 hr tablet Take 500 mg by mouth daily (with dinner)      mineral oil-hydrophilic petrolatum (AQUAPHOR) external ointment Apply topically every 8 hours Apply to incision. 50 g 1    TRULICITY 1.5 MG/0.5ML pen INJECT 1.5 MG UNDER THE SKIN ONE TIME A WEEK.          No Known Allergies      Video physical exam  General: Patient appears well in no acute distress.   Skin: No visualized rash or lesions on visualized skin  Eyes: EOMI, no erythema, sclera icterus or discharge noted  Resp: Appears to be breathing comfortably without accessory muscle usage, speaking in full sentences, no cough  MSK: Appears to have normal range of motion based on visualized movements  Neurologic: No apparent tremors, facial  "movements symmetric  Psych: affect engaged, alert and oriented   Height 1.778 m (5' 10\"), weight 106.1 kg (234 lb).  Wt Readings from Last 4 Encounters:   08/25/23 106.1 kg (234 lb)   08/09/23 108.5 kg (239 lb 3.2 oz)   08/03/23 106.1 kg (234 lb)   06/29/23 107.9 kg (237 lb 14.4 oz)         Labs:    Latest Reference Range & Units 08/09/23 12:52   Sodium 136 - 145 mmol/L 140   Potassium 3.4 - 5.3 mmol/L 4.1   Chloride 98 - 107 mmol/L 104   Carbon Dioxide (CO2) 22 - 29 mmol/L 29   Urea Nitrogen 8.0 - 23.0 mg/dL 16.1   Creatinine 0.67 - 1.17 mg/dL 0.84   GFR Estimate >60 mL/min/1.73m2 >90   Calcium 8.8 - 10.2 mg/dL 8.9   Anion Gap 7 - 15 mmol/L 7   Albumin 3.5 - 5.2 g/dL 3.9   Protein Total 6.4 - 8.3 g/dL 7.4   Alkaline Phosphatase 40 - 129 U/L 89   ALT 0 - 70 U/L 18   AST 0 - 45 U/L 29   Bilirubin Total <=1.2 mg/dL 0.6   Glucose 70 - 99 mg/dL 99   T4 Free 0.90 - 1.70 ng/dL 1.00   TSH 0.30 - 4.20 uIU/mL 4.70 (H)   WBC 4.0 - 11.0 10e3/uL 4.3   Hemoglobin 13.3 - 17.7 g/dL 11.6 (L)   Hematocrit 40.0 - 53.0 % 36.1 (L)   Platelet Count 150 - 450 10e3/uL 152   RBC Count 4.40 - 5.90 10e6/uL 4.08 (L)   MCV 78 - 100 fL 89   MCH 26.5 - 33.0 pg 28.4   MCHC 31.5 - 36.5 g/dL 32.1   RDW 10.0 - 15.0 % 14.5   % Neutrophils % 63   % Lymphocytes % 23   % Monocytes % 10   % Eosinophils % 3   % Basophils % 1   Absolute Basophils 0.0 - 0.2 10e3/uL 0.0   Absolute Eosinophils 0.0 - 0.7 10e3/uL 0.1   Absolute Immature Granulocytes <=0.4 10e3/uL 0.0   Absolute Lymphocytes 0.8 - 5.3 10e3/uL 1.0   Absolute Monocytes 0.0 - 1.3 10e3/uL 0.4   % Immature Granulocytes % 0   Absolute Neutrophils 1.6 - 8.3 10e3/uL 2.8   Absolute NRBCs 10e3/uL 0.0   NRBCs per 100 WBC <1 /100 0   (H): Data is abnormally high  (L): Data is abnormally low    Imaging: n/a    Impression/plan:   Recurrent nasopharyngeal carcinoma, EBERpositive  -initiated pembrolizumab in March 2023 with a positive radiographic response in the neck nodes.  -tolerating treatment well. Is due " for treatment again on 8/30. Has been in contact with the nursing staff with Dr. Gordillo. Plans for labs and a visit with Dr. Gordillo on 8/29 and will then have infusion on 8/30. I don't see infusion scheduled, but they have assured him it will be scheduled. Will follow-up with Dr. Melvin in mid-Sept with another CT to assess response    BERNARDO nodules, new RUL nodules in 11/2022, LLL GGO6/23 that were resolving.  -new GGO in the lLL on repeat CT from 7/24, look inflammatory, but not c/w immune-mediated pneumonitis  -no syptoms to suggest progression. Meeting with pulmonary in a couple of months    Lymphedema in the neck  -working on ROM when the neck is tight. Has been encouraged to work on massages and swallowing exercises regularly    Hypothyroidism  -on levothyroxine 50 mcg daily, levels low in June, but planning to re-eval in Sept and make adjustments as needed.    DM2, steroid induced hyperglycemia  -managed by PCP    Family hx of multiple famly members with malignancy  -was referred to cancer risk management    R foot numbness, mild from DM2  -monitor    Sincerely,        SILVIANO Lewis CNP

## 2023-08-25 NOTE — PROGRESS NOTES
Virtual Visit Details    Type of service:  Video Visit     Originating Location (pt. Location): Home    Distant Location (provider location):  On-site  Platform used for Video Visit: Ilana    Reason for Visit: seen in follow-up of recurrent nasopharyngeal carcinoma    Referring Provider: Dr. Jesus Boston  Radiation Oncology: Dr. Mane Razo  Primary Care Provider: Dr. Beni Gallardo at [facility] in [facility]     CANCER TYPE: Nasopharyngeal carcinoma, p16 negative, SAM positive  STAGE: mS6P0Iq (DENAE)  ECOG PS: 0     PD-L1: 20% using  clone locally; TPS 70% at [facility]   NGS:      SUMMARY  12/11/21                   UC for L neck mass, selling  12/9/21                     CT neck ([place]). 2.4 cm L neck node medial to SCM just above the hyoid, additional LNs surround, scattered R neck and L supraclavicular LNs  2/15/22                     US bx L cervical node. Path: SCC, moderately differentiated,   3/4/22                       PET/CT. Nasopharyngeal and oropharyngeal mucosal thickening. 2.6 cm BERNARDO nodular GGO, SUV avid, some additional lingular uptake  4/4~5/16/22/22         C1-3 carboplatin gemcitabine. Not a candidate for cisplatin due to hearing loss on audiogram. Neulasta 5/3 after C2D8, ANC 1000   5/31/22                     PET/CT. [place]  6/22~8/10/22            Chemoradiation with weekly carboplatin  11/16/22                   PET/CT. Residual 1.5 cm 2B LN (SUV 28.7), 1 cm enhancing focus in SCM (SUV 22.4), small mildly avid residual enhancing nodes in the L level 2A decreased in size and FDG avidity than before, resolved L level 5 node, small but mildly FDG avid R level 2B node, smaller mildly avid R level 3 nodes, unchanged in ize. NI-RADS 2a in the primary site - FDG uptake along the BOT at the midline, increased from prior. New patchy GGO and nodules in the RUL and L base with mild FDG uptake, likely inflammatory, recommend short interval CT  12/5/22                     Salvage L  modified radical neck dissection (Dr. Boston). Path: L posterior SCM excision - SCC, 1.1 cm, invading into skeletal muscle, negative margins, +PNI/LVI. Level 2 node 1.5 cm SCC, moderately differentiated, invading into fibroadipose tissue, focally involving the inked resection margin.   1/30/23                     CT chest. Patchy LLL GGO, decreased from prior.   2/22/23                     PET/CT. Focal updake along the tongue base at midline, decreased from prior, likely inflammatory (SUV 14.8-->8.5), near complete resolution of the rest of the mild mucosal uptake within the tongue base. At least 5 FDG avid lesions in the L deep neck. 2 nodes at the C2 level, posterior to the surgical clips in the L lateral paravertebral region, one measuring 8 mm (SUV 15.9), the other more posteriorly measuring 7 mm. The other 2 adjacent ones are deep to the residual L SCM; 9.6 mm, and a smaller one immediately below in the SCM. There is another mildly FDG avid necrotic lesion inferior to the above 3 foci, again deep to the L SCM. 0.9 cm R level 2b LN (SUV 3.1), likely reactive. Resolution of RUL GGO, stable sub-6 mm nodules.  3/9/23~current        Pembrolizumab . C3 delayed 1 week due to parainfluenza. Good [place]  4/18/23                     ED at [facility] for fever and cough. CXR negative. Viral panel showed parainfluenza.    Interval history: Jareth is feeling well. Has had minimal side effects with keytruda. No shortness of breath, cough or wheezing. No rashes. Bowel and bladder function is normal. Has had chronic dry mouth since completion of radiation. Drinks more water to manage this. Notes some intermittent stiffness and swelling in the left neck and shoulder. Improves with ROM exercises.  -no new areas of pain.   -appetite is good. Weight is stable. Lost about 100 lb during his initial course of treatment with chemoradiation, but has since maintained the weight.    Current Outpatient Medications   Medication  "Sig Dispense Refill    acetaminophen (TYLENOL) 325 MG tablet Take 2 tablets (650 mg) by mouth every 4 hours as needed for other (For optimal non-opioid multimodal pain management to improve pain control.) 50 tablet 0    atorvastatin (LIPITOR) 20 MG tablet Take 20 mg by mouth      blood glucose (NO BRAND SPECIFIED) lancets standard Use to test blood sugar 2 times daily or as directed. 90 lancet 1    blood glucose (NO BRAND SPECIFIED) test strip Use to test blood sugar 3 times daily or as directed. 90 strip 0    blood glucose monitoring (ACCU-CHEK RANDELL PLUS) meter device kit Use to test blood sugar 2 times daily or as directed. 1 kit 0    fluticasone-vilanterol (BREO ELLIPTA) 100-25 MCG/INH inhaler INHALE 1 PUFF INTO THE LUNGS ONE TIME A DAY. RINSE MOUTH AFTER USE.      furosemide (LASIX) 20 MG tablet Take 20 mg by mouth      levothyroxine (SYNTHROID/LEVOTHROID) 100 MCG tablet Take 1 tablet (100 mcg) by mouth daily 30 tablet 11    metFORMIN (GLUCOPHAGE XR) 500 MG 24 hr tablet Take 500 mg by mouth daily (with dinner)      mineral oil-hydrophilic petrolatum (AQUAPHOR) external ointment Apply topically every 8 hours Apply to incision. 50 g 1    TRULICITY 1.5 MG/0.5ML pen INJECT 1.5 MG UNDER THE SKIN ONE TIME A WEEK.          No Known Allergies      Video physical exam  General: Patient appears well in no acute distress.   Skin: No visualized rash or lesions on visualized skin  Eyes: EOMI, no erythema, sclera icterus or discharge noted  Resp: Appears to be breathing comfortably without accessory muscle usage, speaking in full sentences, no cough  MSK: Appears to have normal range of motion based on visualized movements  Neurologic: No apparent tremors, facial movements symmetric  Psych: affect engaged, alert and oriented   Height 1.778 m (5' 10\"), weight 106.1 kg (234 lb).  Wt Readings from Last 4 Encounters:   08/25/23 106.1 kg (234 lb)   08/09/23 108.5 kg (239 lb 3.2 oz)   08/03/23 106.1 kg (234 lb)   06/29/23 107.9 kg " (237 lb 14.4 oz)         Labs:    Latest Reference Range & Units 08/09/23 12:52   Sodium 136 - 145 mmol/L 140   Potassium 3.4 - 5.3 mmol/L 4.1   Chloride 98 - 107 mmol/L 104   Carbon Dioxide (CO2) 22 - 29 mmol/L 29   Urea Nitrogen 8.0 - 23.0 mg/dL 16.1   Creatinine 0.67 - 1.17 mg/dL 0.84   GFR Estimate >60 mL/min/1.73m2 >90   Calcium 8.8 - 10.2 mg/dL 8.9   Anion Gap 7 - 15 mmol/L 7   Albumin 3.5 - 5.2 g/dL 3.9   Protein Total 6.4 - 8.3 g/dL 7.4   Alkaline Phosphatase 40 - 129 U/L 89   ALT 0 - 70 U/L 18   AST 0 - 45 U/L 29   Bilirubin Total <=1.2 mg/dL 0.6   Glucose 70 - 99 mg/dL 99   T4 Free 0.90 - 1.70 ng/dL 1.00   TSH 0.30 - 4.20 uIU/mL 4.70 (H)   WBC 4.0 - 11.0 10e3/uL 4.3   Hemoglobin 13.3 - 17.7 g/dL 11.6 (L)   Hematocrit 40.0 - 53.0 % 36.1 (L)   Platelet Count 150 - 450 10e3/uL 152   RBC Count 4.40 - 5.90 10e6/uL 4.08 (L)   MCV 78 - 100 fL 89   MCH 26.5 - 33.0 pg 28.4   MCHC 31.5 - 36.5 g/dL 32.1   RDW 10.0 - 15.0 % 14.5   % Neutrophils % 63   % Lymphocytes % 23   % Monocytes % 10   % Eosinophils % 3   % Basophils % 1   Absolute Basophils 0.0 - 0.2 10e3/uL 0.0   Absolute Eosinophils 0.0 - 0.7 10e3/uL 0.1   Absolute Immature Granulocytes <=0.4 10e3/uL 0.0   Absolute Lymphocytes 0.8 - 5.3 10e3/uL 1.0   Absolute Monocytes 0.0 - 1.3 10e3/uL 0.4   % Immature Granulocytes % 0   Absolute Neutrophils 1.6 - 8.3 10e3/uL 2.8   Absolute NRBCs 10e3/uL 0.0   NRBCs per 100 WBC <1 /100 0   (H): Data is abnormally high  (L): Data is abnormally low    Imaging: n/a    Impression/plan:   Recurrent nasopharyngeal carcinoma, EBERpositive  -initiated pembrolizumab in March 2023 with a positive radiographic response in the neck nodes.  -tolerating treatment well. Is due for treatment again on 8/30. Has been in contact with the nursing staff with Dr. Gordillo. Plans for labs and a visit with Dr. Gordillo on 8/29 and will then have infusion on 8/30. I don't see infusion scheduled, but they have assured him it will be scheduled. Will  follow-up with Dr. Melvin in mid-Sept with another CT to assess response    BERNARDO nodules, new RUL nodules in 11/2022, LLL GGO6/23 that were resolving.  -new GGO in the lLL on repeat CT from 7/24, look inflammatory, but not c/w immune-mediated pneumonitis  -no syptoms to suggest progression. Meeting with pulmonary in a couple of months    Lymphedema in the neck  -working on ROM when the neck is tight. Has been encouraged to work on massages and swallowing exercises regularly    Hypothyroidism  -on levothyroxine 50 mcg daily, levels low in June, but planning to re-eval in Sept and make adjustments as needed.    DM2, steroid induced hyperglycemia  -managed by PCP    Family hx of multiple famly members with malignancy  -was referred to cancer risk management    R foot numbness, mild from DM2  -monitor

## 2023-08-25 NOTE — NURSING NOTE
Is the patient currently in the state of [place]? YES    Visit mode:VIDEO    If the visit is dropped, the patient can be reconnected by: VIDEO VISIT: Send to e-mail at: [email or web address]    Will anyone else be joining the visit? NO  (If patient encounters technical issues they should call [phone])    How would you like to obtain your AVS? MyChart    Are changes needed to the allergy or medication list? No    Reason for visit: Video Visit (Follow up )    Debra ATKINS

## 2023-08-29 ENCOUNTER — ONCOLOGY VISIT (OUTPATIENT)
Dept: ONCOLOGY | Facility: OTHER | Age: 63
End: 2023-08-29
Attending: INTERNAL MEDICINE
Payer: COMMERCIAL

## 2023-08-29 ENCOUNTER — LAB (OUTPATIENT)
Dept: LAB | Facility: OTHER | Age: 63
End: 2023-08-29
Attending: INTERNAL MEDICINE
Payer: COMMERCIAL

## 2023-08-29 VITALS
DIASTOLIC BLOOD PRESSURE: 88 MMHG | HEART RATE: 79 BPM | BODY MASS INDEX: 34.36 KG/M2 | OXYGEN SATURATION: 95 % | HEIGHT: 70 IN | SYSTOLIC BLOOD PRESSURE: 122 MMHG | TEMPERATURE: 98.6 F | RESPIRATION RATE: 12 BRPM | WEIGHT: 240 LBS

## 2023-08-29 DIAGNOSIS — C11.9 NASOPHARYNGEAL CANCER (H): Primary | ICD-10-CM

## 2023-08-29 DIAGNOSIS — E03.9 HYPOTHYROIDISM, UNSPECIFIED TYPE: ICD-10-CM

## 2023-08-29 DIAGNOSIS — C11.9 NASOPHARYNGEAL CANCER (H): ICD-10-CM

## 2023-08-29 LAB
ALBUMIN SERPL BCG-MCNC: 4 G/DL (ref 3.5–5.2)
ALP SERPL-CCNC: 84 U/L (ref 40–129)
ALT SERPL W P-5'-P-CCNC: 19 U/L (ref 0–70)
ANION GAP SERPL CALCULATED.3IONS-SCNC: 9 MMOL/L (ref 7–15)
AST SERPL W P-5'-P-CCNC: 29 U/L (ref 0–45)
BASOPHILS # BLD AUTO: 0 10E3/UL (ref 0–0.2)
BASOPHILS NFR BLD AUTO: 1 %
BILIRUB SERPL-MCNC: 0.9 MG/DL
BUN SERPL-MCNC: 15.6 MG/DL (ref 8–23)
CALCIUM SERPL-MCNC: 9.1 MG/DL (ref 8.8–10.2)
CHLORIDE SERPL-SCNC: 102 MMOL/L (ref 98–107)
CREAT SERPL-MCNC: 0.9 MG/DL (ref 0.67–1.17)
DEPRECATED HCO3 PLAS-SCNC: 27 MMOL/L (ref 22–29)
EOSINOPHIL # BLD AUTO: 0.1 10E3/UL (ref 0–0.7)
EOSINOPHIL NFR BLD AUTO: 3 %
ERYTHROCYTE [DISTWIDTH] IN BLOOD BY AUTOMATED COUNT: 14.1 % (ref 10–15)
GFR SERPL CREATININE-BSD FRML MDRD: >90 ML/MIN/1.73M2
GLUCOSE SERPL-MCNC: 86 MG/DL (ref 70–99)
HCT VFR BLD AUTO: 36.3 % (ref 40–53)
HGB BLD-MCNC: 11.9 G/DL (ref 13.3–17.7)
HOLD SPECIMEN: NORMAL
IMM GRANULOCYTES # BLD: 0 10E3/UL
IMM GRANULOCYTES NFR BLD: 0 %
LYMPHOCYTES # BLD AUTO: 1.2 10E3/UL (ref 0.8–5.3)
LYMPHOCYTES NFR BLD AUTO: 27 %
MCH RBC QN AUTO: 28.7 PG (ref 26.5–33)
MCHC RBC AUTO-ENTMCNC: 32.8 G/DL (ref 31.5–36.5)
MCV RBC AUTO: 88 FL (ref 78–100)
MONOCYTES # BLD AUTO: 0.4 10E3/UL (ref 0–1.3)
MONOCYTES NFR BLD AUTO: 9 %
NEUTROPHILS # BLD AUTO: 2.7 10E3/UL (ref 1.6–8.3)
NEUTROPHILS NFR BLD AUTO: 60 %
NRBC # BLD AUTO: 0 10E3/UL
NRBC BLD AUTO-RTO: 0 /100
PLATELET # BLD AUTO: 149 10E3/UL (ref 150–450)
POTASSIUM SERPL-SCNC: 3.9 MMOL/L (ref 3.4–5.3)
PROT SERPL-MCNC: 7.6 G/DL (ref 6.4–8.3)
RBC # BLD AUTO: 4.15 10E6/UL (ref 4.4–5.9)
SODIUM SERPL-SCNC: 138 MMOL/L (ref 136–145)
T4 FREE SERPL-MCNC: 1.04 NG/DL (ref 0.9–1.7)
TSH SERPL DL<=0.005 MIU/L-ACNC: 4.91 UIU/ML (ref 0.3–4.2)
WBC # BLD AUTO: 4.4 10E3/UL (ref 4–11)

## 2023-08-29 PROCEDURE — 85004 AUTOMATED DIFF WBC COUNT: CPT | Mod: ZL

## 2023-08-29 PROCEDURE — 84439 ASSAY OF FREE THYROXINE: CPT | Mod: ZL

## 2023-08-29 PROCEDURE — 84443 ASSAY THYROID STIM HORMONE: CPT | Mod: ZL

## 2023-08-29 PROCEDURE — 99417 PROLNG OP E/M EACH 15 MIN: CPT | Performed by: INTERNAL MEDICINE

## 2023-08-29 PROCEDURE — 87799 DETECT AGENT NOS DNA QUANT: CPT | Mod: ZL

## 2023-08-29 PROCEDURE — 80053 COMPREHEN METABOLIC PANEL: CPT | Mod: ZL

## 2023-08-29 PROCEDURE — 99205 OFFICE O/P NEW HI 60 MIN: CPT | Performed by: INTERNAL MEDICINE

## 2023-08-29 PROCEDURE — 36415 COLL VENOUS BLD VENIPUNCTURE: CPT | Mod: ZL

## 2023-08-29 RX ORDER — EPINEPHRINE 1 MG/ML
0.3 INJECTION, SOLUTION, CONCENTRATE INTRAVENOUS EVERY 5 MIN PRN
Status: CANCELLED | OUTPATIENT
Start: 2023-08-30

## 2023-08-29 RX ORDER — HEPARIN SODIUM,PORCINE 10 UNIT/ML
5 VIAL (ML) INTRAVENOUS
Status: CANCELLED | OUTPATIENT
Start: 2023-08-30

## 2023-08-29 RX ORDER — HEPARIN SODIUM (PORCINE) LOCK FLUSH IV SOLN 100 UNIT/ML 100 UNIT/ML
5 SOLUTION INTRAVENOUS
Status: CANCELLED | OUTPATIENT
Start: 2023-08-30

## 2023-08-29 RX ORDER — LORAZEPAM 2 MG/ML
0.5 INJECTION INTRAMUSCULAR EVERY 4 HOURS PRN
Status: CANCELLED | OUTPATIENT
Start: 2023-08-30

## 2023-08-29 RX ORDER — ALBUTEROL SULFATE 90 UG/1
1-2 AEROSOL, METERED RESPIRATORY (INHALATION)
Status: CANCELLED
Start: 2023-08-30

## 2023-08-29 RX ORDER — DIPHENHYDRAMINE HYDROCHLORIDE 50 MG/ML
50 INJECTION INTRAMUSCULAR; INTRAVENOUS
Status: CANCELLED
Start: 2023-08-30

## 2023-08-29 RX ORDER — MEPERIDINE HYDROCHLORIDE 50 MG/ML
25 INJECTION INTRAMUSCULAR; INTRAVENOUS; SUBCUTANEOUS EVERY 30 MIN PRN
Status: CANCELLED | OUTPATIENT
Start: 2023-08-30

## 2023-08-29 RX ORDER — ALBUTEROL SULFATE 0.83 MG/ML
2.5 SOLUTION RESPIRATORY (INHALATION)
Status: CANCELLED | OUTPATIENT
Start: 2023-08-30

## 2023-08-29 RX ORDER — METHYLPREDNISOLONE SODIUM SUCCINATE 125 MG/2ML
125 INJECTION, POWDER, LYOPHILIZED, FOR SOLUTION INTRAMUSCULAR; INTRAVENOUS
Status: CANCELLED
Start: 2023-08-30

## 2023-08-29 ASSESSMENT — PAIN SCALES - GENERAL: PAINLEVEL: NO PAIN (0)

## 2023-08-29 NOTE — PROGRESS NOTES
ncology Consultation:  Date on this visit: 8/29/2023    Jareth Gallardo  is referred by Dr. Melvin  for an oncology consultation. He requires evaluation for new diagnosis of     Primary Physician: Laura Guzman     History Of Present Illness:  Mr. 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  According to patient he had presented  Nursing Notes:   Renée Larios, [facility]  8/29/2023  1:12 PM  Sign at exiting of workspace  Chief Complaint   Patient presents with    Oncology Clinic Visit November of 5/20/2021 with left neck swelling that occurred overnight.  He was seen by urgent care locally in the [place] area was treated with antibiotics initially and then was ultimately treated for presumed cat scratch fever with antibiotics symptoms worsen to the point where he says he has had swelling into his left shoulder.  CT neck was ordered and patient was referred to ENT but this was not covered by his insurance was eventually seen by general surgeon by the name of Dr. Rivera ordered a CT neck procedure ultrasound-guided FNA of a left cervical node biopsy was consistent with p16 negative squamous cell carcinoma.  Patient was subsequently referred to Dr. Boston of  ENT at the [facility].  He reviewed the CT neck that was performed on December 9, 2021 and was read as extensive left-sided neck adenopathy with lymph nodes measuring up to 2.4 cm medial to the left sternocleidomastoid muscle just above the level of the hyoid bone.  A PET/CT was ordered and performed on March 9, 2022 and it was read as exuberant soft tissue thickening within the nasopharyngeal and oropharyngeal mucosa concerning for nasopharyngeal carcinoma.  Soft tissue nodular uptake was evident within the prevertebral soft tissues and about differential because of the epiglottis with extensive neck lymph node uptake.  There was uptake within the lung parenchyma concerning for  metastatic uptake of primary malignancy infectious or inflammatory cannot be excluded.  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  The case was presented at the tumor board and the feeling was that patient would require induction chemotherapy followed by chemoradiation and therapy.  It was felt that this regimen would improve recurrence free survival and overall survival as compared to chemo radiotherapy alone.  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  Tumor was also found to be PD-L1 positive at 20% using the :.  Patient was ultimately seen by Dr. Dariela Melvin medical oncology at [facility] on March 22, 2022.  Patient did have a history of hearing loss and subsequently it was decided the patient be a candidate for carboplatin instead of cisplatin as induction therapy.  The plan was to treat with carboplatin and gemcitabine for 3 cycles and then restage in terms of the lung nodules they will be reevaluated after induction.  He was started on carboplatin gemcitabine and was treated between April 4, 2021 to until May 16, 2022.  He required Neulasta support due to neutropenia PET scan performed on May 31, 2022 revealed a partial response subsequently underwent chemoradiotherapy with weekly carboplatin and received radiation therapy at the [facility] under the direction of Dr. Skaggs.  PET scan done on November 16, 2022 revealed a residual 1.5 cm level 2 lymph node with an SUV of 28.7.  A 1 cm enhancing focus in the sternocleidomastoid muscle with an SUV of 22.4 there was a level 2A lymph node that on the left with decreased in size.  With decreased FDG avidity there was a resolved left level 5 node node.  There was a new patchy groundglass opacity and nodule in the right upper lobe.  Which was felt to be inflammatory subsequently patient underwent a salvage  left modified radical neck neck dissection for by Dr. Boston on December 5, 2022.  Pathology revealed stuff tissue mass involving the left posterior sternocleidal mastoid muscle consistent with squamous cell carcinoma moderately differentiated invading into skeletal muscle tumor size 1.1 cm in greatest dimension margins were negative angiolymphatic invasion was present.  A second soft tissue mass at level 2 revealed squamous cell carcinoma moderately differentiated invading the fibroadipose tissue.  Tumor size was 1.5 cm in greatest dimension.  Tumor focally involves the inked resection margin.  Angiolymphatic invasion was present.  All lymph nodes were negative for malignancy.  PET scan performed on February 22, 2023 revealed near complete resolution of the rest of the mild mucosal uptake within the tongue base there were at least 5 FDG avid lesions in the neck there were hypermetabolic there was a mildly FDG avid necrotic lesion inferior to which was deep to the left sternocleidal mastoid muscle.  There was resolution of the right upper lobe groundglass opacities.  The case was presented at tumor conference on February 24, 2023 and the feeling was that there was residual disease on PET scan and recurrence to be at high risk given the fact the patient was.  1 positive and the plan was to proceed with pembrolizumab.  Patient was started on pembrolizumab on March 9, 2023 under the direction of Dr. Norm Melvin cycle 3 was delayed 1 week due to apparent influenza patient had a good partial response he was seen in the emergency department at [facility] for fever and cough and the patient was treated for    Influenza patient now is here to start fourth cycle of pembrolizumab he lives closer to [place] and would rather get his treatment here as opposed to going to the [facility] plans on having imaging subsequently and follow-up with Dr. Melvin currently is doing relatively well he does note 100 pound  weight loss since initiation chemo radiation therapy is been doing neck exercises and is able to move his neck well for the most part with good lateral rotation of the neck.  He has been treated for hypothyroidism likely due to checkpoint inhibitor effect but otherwise is tolerating pembrolizumab well without significant rash or diarrhea or significant shortness of breath he says he has a hard time swallowing what he describes as cold cuts or dry meat but is able to swallow most foods without any problems he says he has neuropathic symptoms in his left neck and left shoulder since his surgery which he can tolerate on he has been told by Dr. 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  He denies any fevers night sweats weight loss he does have a dry throat which can be somewhat difficult.     Medication Reconciliation: complete    Renée Larios CMA ([facility]) 8/29/2023 1:12 PM    Past Medical/Surgical History:  Past Medical History:   Diagnosis Date    COPD (chronic obstructive pulmonary disease) (H)     Dyslipidemia     Hard to intubate 12/5/2022    Nasopharyngeal carcinoma (H)     BARRETT (obstructive sleep apnea)     Type 2 diabetes mellitus with diabetic nephropathy (H)      Past Surgical History:   Procedure Laterality Date    COLONOSCOPY      DISSECTION RADICAL NECK MODIFIED Left 12/5/2022    Procedure: left modified radical neck dissection;  Surgeon: Jesus Boston MD;  Location: [facility] OR    INSERT PORT VASCULAR ACCESS Right 03/31/2022    Procedure: SINGLE LUMEN POWER PORT INSERTION, VASCULAR ACCESS;  Surgeon: Evens Aponte MD;  Location: [facility] OR    IR CHEST PORT PLACEMENT > 5 YRS OF AGE  03/31/2022    KNEE SURGERY      LARYNGOSCOPY WITH  BIOPSY(IES) N/A 12/5/2022    Procedure: LARYNGOSCOPY, WITH BIOPSY;  Surgeon: Jesus Boston MD;  Location: [facility] OR    TONSILLECTOMY       Cancer History: as above  Allergies:  Allergies as of 08/29/2023    (No Known Allergies)     Current Medications:  Current Outpatient Medications   Medication Sig Dispense Refill    atorvastatin (LIPITOR) 20 MG tablet Take 20 mg by mouth      fluticasone-vilanterol (BREO ELLIPTA) 100-25 MCG/INH inhaler INHALE 1 PUFF INTO THE LUNGS ONE TIME A DAY. RINSE MOUTH AFTER USE.      furosemide (LASIX) 20 MG tablet Take 20 mg by mouth      metFORMIN (GLUCOPHAGE XR) 500 MG 24 hr tablet Take 500 mg by mouth daily (with dinner)      TRULICITY 1.5 MG/0.5ML pen INJECT 1.5 MG UNDER THE SKIN ONE TIME A WEEK.      acetaminophen (TYLENOL) 325 MG tablet Take 2 tablets (650 mg) by mouth every 4 hours as needed for other (For optimal non-opioid multimodal pain management to improve pain control.) 50 tablet 0    blood glucose (NO BRAND SPECIFIED) lancets standard Use to test blood sugar 2 times daily or as directed. 90 lancet 1    blood glucose (NO BRAND SPECIFIED) test strip Use to test blood sugar 3 times daily or as directed. 90 strip 0    blood glucose monitoring (ACCU-CHEK RANDELL PLUS) meter device kit Use to test blood sugar 2 times daily or as directed. 1 kit 0    levothyroxine (SYNTHROID/LEVOTHROID) 100 MCG tablet Take 1 tablet (100 mcg) by mouth daily 30 tablet 11    mineral oil-hydrophilic petrolatum (AQUAPHOR) external ointment Apply topically every 8 hours Apply to incision. (Patient not taking: Reported on 8/29/2023) 50 g 1      Family History:  Family History   Problem Relation Age of Onset    Cerebrovascular Disease Mother     Leukemia Father     Melanoma Sister      Social History:  Social History     Socioeconomic History    Marital status:      Spouse name: Not on file    Number of children: Not on file    Years of education: Not on file    Highest education level: Not on file  "  Occupational History    Not on file   Tobacco Use    Smoking status: Former     Packs/day: 4.00     Years: 30.00     Pack years: 120.00     Types: Cigarettes     Quit date:      Years since quittin.6     Passive exposure: Past    Smokeless tobacco: Never    Tobacco comments:     quit smoking. Passive exposure in childhood home.    Substance and Sexual Activity    Alcohol use: Yes     Comment: rarely    Drug use: Never    Sexual activity: Not on file   Other Topics Concern    Not on file   Social History Narrative    Not on file     Social Determinants of Health     Financial Resource Strain: Not on file   Food Insecurity: Not on file   Transportation Needs: Not on file   Physical Activity: Not on file   Stress: Not on file   Social Connections: Not on file   Intimate Partner Violence: Not on file   Housing Stability: Not on file     Physical Exam:  /88 (BP Location: Right arm, Patient Position: Sitting, Cuff Size: Adult Large)   Pulse 79   Temp 98.6  F (37  C) (Oral)   Resp 12   Ht 1.778 m (5' 10\")   Wt 108.9 kg (240 lb)   SpO2 95%   BMI 34.44 kg/m      GENERAL APPEARANCE: healthy, alert and no distress     EYES: PERRLA     HENT: Mouth without ulcers or lesions dry oral mucosa     Neck: slight left neck swelling     LYMPHATICS: No cervical, supraclavicular, axillary or inguinal lymphadenopathy     RESP: lungs clear to auscultation - no rales, rhonchi or wheezes     CARDIOVASCULAR: regular rates and rhythm, normal S1 S2, no S3 or S4 and no murmur.     ABDOMEN:  soft, nontender, no HSM or masses and bowel sounds normal     MUSCULOSKELETAL: extremities normal- no gross deformities noted, no evidence of inflammation in joints, FROM in all extremities. No edema b/l LE.     SKIN: no suspicious lesions or rashes     PSYCHIATRIC: mentation appears normal and affect normal  NEURO: grossly non-focal  Laboratory/Imaging Studies  Results for orders placed or performed in visit on 23   T4 free     " Status: Normal   Result Value Ref Range    Free T4 1.04 0.90 - 1.70 ng/dL   TSH     Status: Abnormal   Result Value Ref Range    TSH 4.91 (H) 0.30 - 4.20 uIU/mL   Comprehensive metabolic panel     Status: Normal   Result Value Ref Range    Sodium 138 136 - 145 mmol/L    Potassium 3.9 3.4 - 5.3 mmol/L    Chloride 102 98 - 107 mmol/L    Carbon Dioxide (CO2) 27 22 - 29 mmol/L    Anion Gap 9 7 - 15 mmol/L    Urea Nitrogen 15.6 8.0 - 23.0 mg/dL    Creatinine 0.90 0.67 - 1.17 mg/dL    Calcium 9.1 8.8 - 10.2 mg/dL    Glucose 86 70 - 99 mg/dL    Alkaline Phosphatase 84 40 - 129 U/L    AST 29 0 - 45 U/L    ALT 19 0 - 70 U/L    Protein Total 7.6 6.4 - 8.3 g/dL    Albumin 4.0 3.5 - 5.2 g/dL    Bilirubin Total 0.9 <=1.2 mg/dL    GFR Estimate >90 >60 mL/min/1.73m2   CBC with platelets and differential     Status: Abnormal   Result Value Ref Range    WBC Count 4.4 4.0 - 11.0 10e3/uL    RBC Count 4.15 (L) 4.40 - 5.90 10e6/uL    Hemoglobin 11.9 (L) 13.3 - 17.7 g/dL    Hematocrit 36.3 (L) 40.0 - 53.0 %    MCV 88 78 - 100 fL    MCH 28.7 26.5 - 33.0 pg    MCHC 32.8 31.5 - 36.5 g/dL    RDW 14.1 10.0 - 15.0 %    Platelet Count 149 (L) 150 - 450 10e3/uL    % Neutrophils 60 %    % Lymphocytes 27 %    % Monocytes 9 %    % Eosinophils 3 %    % Basophils 1 %    % Immature Granulocytes 0 %    NRBCs per 100 WBC 0 <1 /100    Absolute Neutrophils 2.7 1.6 - 8.3 10e3/uL    Absolute Lymphocytes 1.2 0.8 - 5.3 10e3/uL    Absolute Monocytes 0.4 0.0 - 1.3 10e3/uL    Absolute Eosinophils 0.1 0.0 - 0.7 10e3/uL    Absolute Basophils 0.0 0.0 - 0.2 10e3/uL    Absolute Immature Granulocytes 0.0 <=0.4 10e3/uL    Absolute NRBCs 0.0 10e3/uL   Extra Tube     Status: None    Narrative    The following orders were created for panel order Extra Tube.  Procedure                               Abnormality         Status                     ---------                               -----------         ------                     Extra Serum Separator Tu...[[id number]]                       Final result                 Please view results for these tests on the individual orders.   Extra Serum Separator Tube (SST)     Status: None   Result Value Ref Range    Hold Specimen JIC    CBC with Platelets & Differential     Status: Abnormal    Narrative    The following orders were created for panel order CBC with Platelets & Differential.  Procedure                               Abnormality         Status                     ---------                               -----------         ------                     CBC with platelets and d...[[id number]]  Abnormal            Final result                 Please view results for these tests on the individual orders.     ASSESSMENT/PLAN: Nasopharyngeal carcinoma Michelle-Barr virus positive.  Patient is status post induction chemotherapy with gemcitabine carboplatin x3 cycles followed by chemo and radiation therapy with weekly carboplatin followed by salvage left modified radical neck dissection there was residual disease on posttreatment PET scan and given his PD-L1 positivity it was felt the patient would require systemic therapy of pembrolizumab as well as high risk of progression/recurrence.  The plan is to proceed with cycle 4 here his receive cycle 3 is numerous today the partial response clinically patient is here for his fourth cycle which she was begin tomorrow August 30 plans to follow-up with Dr. Melvin for repeat imaging otherwise we will continue to treatment here we will continue to monitor labs including TSH and T4.  135 minutes was spent on this patient we spent time reviewing literature on treatment of nasopharyngeal carcinoma Michelle-Barr virus positive disease.  We also spent time reviewing pathology reports reviewing multiple physician provider notes including pathology ENT surgery medical oncology WE performed history and physical and documented history physical discussed wood dust exposure as a possible and likely etiology of his  nasopharyngeal carcinoma we spent time discussing side effects of checkpoint inhibitor therapyand ordering pembrolizumab  cc Dariela Melvin MD

## 2023-08-29 NOTE — NURSING NOTE
Chief Complaint   Patient presents with    Oncology Clinic Visit     Medication Reconciliation: complete    Renée Larios CMA ([facility]) 8/29/2023 1:12 PM

## 2023-08-30 ENCOUNTER — HOSPITAL ENCOUNTER (OUTPATIENT)
Dept: INFUSION THERAPY | Facility: OTHER | Age: 63
Discharge: HOME | End: 2023-08-30
Attending: INTERNAL MEDICINE | Admitting: FAMILY MEDICINE
Payer: COMMERCIAL

## 2023-08-30 VITALS
BODY MASS INDEX: 34.38 KG/M2 | SYSTOLIC BLOOD PRESSURE: 104 MMHG | WEIGHT: 239.6 LBS | RESPIRATION RATE: 16 BRPM | DIASTOLIC BLOOD PRESSURE: 68 MMHG | HEART RATE: 71 BPM | TEMPERATURE: 98.1 F

## 2023-08-30 DIAGNOSIS — C11.9 NASOPHARYNGEAL CANCER (H): Primary | ICD-10-CM

## 2023-08-30 LAB
EBV DNA COPIES/ML, INSTRUMENT: ABNORMAL COPIES/ML
EBV DNA SPEC NAA+PROBE-LOG#: 4.2 {LOG_COPIES}/ML

## 2023-08-30 PROCEDURE — 96413 CHEMO IV INFUSION 1 HR: CPT

## 2023-08-30 PROCEDURE — 258N000003 HC RX IP 258 OP 636: Performed by: INTERNAL MEDICINE

## 2023-08-30 PROCEDURE — 250N000011 HC RX IP 250 OP 636: Mod: JZ | Performed by: INTERNAL MEDICINE

## 2023-08-30 RX ORDER — HEPARIN SODIUM (PORCINE) LOCK FLUSH IV SOLN 100 UNIT/ML 100 UNIT/ML
5 SOLUTION INTRAVENOUS
Status: DISCONTINUED | OUTPATIENT
Start: 2023-08-30 | End: 2023-08-31 | Stop reason: HOSPADM

## 2023-08-30 RX ADMIN — HEPARIN 5 ML: 100 SYRINGE at 15:04

## 2023-08-30 RX ADMIN — SODIUM CHLORIDE 200 MG: 9 INJECTION, SOLUTION INTRAVENOUS at 14:10

## 2023-08-30 RX ADMIN — SODIUM CHLORIDE 250 ML: 9 INJECTION, SOLUTION INTRAVENOUS at 14:07

## 2023-08-30 NOTE — NURSING NOTE
Infusion Nursing Note:  Jareth Gallardo presents today for Keytruda.    Patient seen by provider today: No   present during visit today: Not Applicable.    Note: N/A.      Intravenous Access:  Labs drawn without difficulty drawn yesterday  Implanted Port accessed with brisk blood return.    Treatment Conditions:  Lab Results   Component Value Date    HGB 11.9 (L) 08/29/2023    WBC 4.4 08/29/2023    ANEU 7.0 05/16/2022    ANEUTAUTO 2.7 08/29/2023     (L) 08/29/2023        Lab Results   Component Value Date     08/29/2023    POTASSIUM 3.9 08/29/2023    MAG 1.8 01/30/2023    CR 0.90 08/29/2023    LAI 9.1 08/29/2023    BILITOTAL 0.9 08/29/2023    ALBUMIN 4.0 08/29/2023    ALT 19 08/29/2023    AST 29 08/29/2023       Results reviewed, labs MET treatment parameters, ok to proceed with treatment.      Post Infusion Assessment:  Patient tolerated infusion without incident.  Blood return noted pre and post infusion.  Site patent and intact, free from redness, edema or discomfort.  No evidence of extravasations.  Access discontinued per protocol.  Biologic Infusion Post Education: Call the triage nurse at your clinic or seek medical attention if you have chills and/or temperature greater than or equal to 100.5, uncontrolled nausea/vomiting, diarrhea, constipation, dizziness, shortness of breath, chest pain, heart palpitations, weakness or any other new or concerning symptoms, questions or concerns.  You cannot have any live virus vaccines prior to or during treatment or up to 6 months post infusion.  If you have an upcoming surgery, medical procedure or dental procedure during treatment, this should be discussed with your ordering physician and your surgeon/dentist.  If you are having any concerning symptom, if you are unsure if you should get your next infusion or wish to speak to a provider before your next infusion, please call your care coordinator or triage nurse at your clinic to notify them  so we can adequately serve you.       Discharge Plan:   Discharge instructions reviewed with: Patient.  Patient and/or family verbalized understanding of discharge instructions and all questions answered.  Copy of AVS reviewed with patient and/or family.  Patient will return  9/20 for next appointment.  Patient discharged in stable condition accompanied by: self.  Departure Mode: Ambulatory.      Michelle Palma RN

## 2023-09-13 ENCOUNTER — HOSPITAL ENCOUNTER (OUTPATIENT)
Dept: CT IMAGING | Facility: OTHER | Age: 63
Discharge: HOME | End: 2023-09-13
Attending: INTERNAL MEDICINE
Payer: COMMERCIAL

## 2023-09-13 DIAGNOSIS — C11.9 NASOPHARYNGEAL CANCER (H): ICD-10-CM

## 2023-09-13 PROCEDURE — 250N000011 HC RX IP 250 OP 636: Mod: JZ | Performed by: RADIOLOGY

## 2023-09-13 PROCEDURE — 250N000011 HC RX IP 250 OP 636: Performed by: INTERNAL MEDICINE

## 2023-09-13 PROCEDURE — 70491 CT SOFT TISSUE NECK W/DYE: CPT

## 2023-09-13 PROCEDURE — 74177 CT ABD & PELVIS W/CONTRAST: CPT

## 2023-09-13 RX ORDER — IOPAMIDOL 755 MG/ML
137 INJECTION, SOLUTION INTRAVASCULAR ONCE
Status: COMPLETED | OUTPATIENT
Start: 2023-09-13 | End: 2023-09-13

## 2023-09-13 RX ORDER — HEPARIN SODIUM (PORCINE) LOCK FLUSH IV SOLN 100 UNIT/ML 100 UNIT/ML
500 SOLUTION INTRAVENOUS ONCE
Status: COMPLETED | OUTPATIENT
Start: 2023-09-13 | End: 2023-09-13

## 2023-09-13 RX ADMIN — IOPAMIDOL 137 ML: 755 INJECTION, SOLUTION INTRAVENOUS at 09:12

## 2023-09-13 RX ADMIN — HEPARIN 500 UNITS: 100 SYRINGE at 09:18

## 2023-09-13 NOTE — PROGRESS NOTES
[facility] CANCER [facility]    PATIENT NAME: Jareth Gallardo  MRN # [id number]   DATE OF VISIT: September 14, 2023  YOB: 1960     Referring Provider: Dr. Jesus Boston  Radiation Oncology: Dr. Mane Razo  Primary Care Provider: Dr. Beni Gallardo at [facility] in [facility]    CANCER TYPE: Nasopharyngeal carcinoma, p16 negative, SAM positive  STAGE: [id number] (DENAE)  ECOG PS: 0    PD-L1: 20% using  clone locally; TPS 70% at [facility]   NGS:     SUMMARY  12/11/21 UC for L neck mass, selling  12/9/21 CT neck ([place]). 2.4 cm L neck node medial to SCM just above the hyoid, additional LNs surround, scattered R neck and L supraclavicular LNs  2/15/22 US bx L cervical node. Path: SCC, moderately differentiated,   3/4/22 PET/CT. Nasopharyngeal and oropharyngeal mucosal thickening. 2.6 cm BERNARDO nodular GGO, SUV avid, some additional lingular uptake  4/4~5/16/22/22 C1-3 carboplatin gemcitabine. Not a candidate for cisplatin due to hearing loss on audiogram. Neulasta 5/3 after C2D8, ANC 1000   5/31/22 PET/CT. IN  6/22~8/10/22 Chemoradiation with weekly carboplatin  11/16/22 PET/CT. Residual 1.5 cm 2B LN (SUV 28.7), 1 cm enhancing focus in SCM (SUV 22.4), small mildly avid residual enhancing nodes in the L level 2A decreased in size and FDG avidity than before, resolved L level 5 node, small but mildly FDG avid R level 2B node, smaller mildly avid R level 3 nodes, unchanged in ize. NI-RADS 2a in the primary site - FDG uptake along the BOT at the midline, increased from prior. New patchy GGO and nodules in the RUL and L base with mild FDG uptake, likely inflammatory, recommend short interval CT  12/5/22 Salvage L modified radical neck dissection (Dr. Boston). Path: L posterior SCM excision - SCC, 1.1 cm, invading into skeletal muscle, negative margins, +PNI/LVI. Level 2 node 1.5 cm SCC, moderately differentiated, invading into fibroadipose tissue, focally involving the inked resection  margin.   1/30/23 CT chest. Patchy LLL GGO, decreased from prior.   2/22/23 PET/CT. Focal updake along the tongue base at midline, decreased from prior, likely inflammatory (SUV 14.8-->8.5), near complete resolution of the rest of the mild mucosal uptake within the tongue base. At least 5 FDG avid lesions in the L deep neck. 2 nodes at the C2 level, posterior to the surgical clips in the L lateral paravertebral region, one measuring 8 mm (SUV 15.9), the other more posteriorly measuring 7 mm. The other 2 adjacent ones are deep to the residual L SCM; 9.6 mm, and a smaller one immediately below in the SCM. There is another mildly FDG avid necrotic lesion inferior to the above 3 foci, again deep to the L SCM. 0.9 cm R level 2b LN (SUV 3.1), likely reactive. Resolution of RUL GGO, stable sub-6 mm nodules.  3/9/23~current Pembrolizumab . C3 delayed 1 week due to parainfluenza. Good MT  4/18/23 ED at [facility] for fever and cough. CXR negative. Viral panel showed parainfluenza.    ASSESSMENT AND PLAN  Nasopharyngeal carcinoma, SAM +julia: Ongoing deepening response. Continue pembro. PET/CT sometime in early 2024 to get a sense of whether there's residual FDG avidity. Consider aggressive approach - potentially surgery down the road if deemed resectable. Remains to be seen. Continue to trend EBV levels.     BERNARDO/lung nodules, new RUL nodules in 11/2022, LLL GGO 6/2023: Resolving c/w inflammatory changes from mild aspiration. Now new GGOs in the LLL on CT 7/24, better, c/w inflammation from likely aspiration. Still has some haziness in the LLL that is persistent.    Lymphedema: Doing massages daily now in the shower. Stretching throughout the day.    Hypothyroidism: Levothyroxine 50 mcg daily started in mid May. Base dose adjustments on levels in Aug or Sept.    FH: Many members of his family have cancers. Appt with Denise Jones GC 11/30/23    DM2, steroid-induced hyperglycemia: Per PCP. A1c 8/28 5.6     Mild  peripheral neuropathy: R foot numbness, very mild, from DM2. Not discussed today    40 minutes spent by me on the date of the encounter doing chart review, history and exam, documentation and further activities per the note     Dariela Melvin MD  Associate Professor of Medicine  Hematology, Oncology and Transplantation      SUBJECTIVE  Jareth returns for follow up of nasopharyngeal carcinoma on pembrolizumab.   Doing ok  Tightness in L neck. Trying different topicals -   Doing lymphedema massages  No new issues   Wife just got back from a road trip with her sister.     PAST MEDICAL HISTORY  Nasopharyngeal carcinoma as above  DM2  COPD??  Hiatal hernia. Sleeps with his head elevated  LVH on TTE 2009, EF50%, LA Dilation  Dyslipidemia  BARRETT in 2015. Not using CPAP due to developing tooth abscesses when he tried it in 2015  Venous insufficiency in both legs being in an accident involving a mower about 10 years ago, on chronic furosemide. LE US negative 12/15/2009  Cholelithiasis  Hyperplastic colon polyp, due 2020  Tonsillectomy  Baker cyst RLE US 2013    Neuropathy from DM - R foot numbness chronically    Exposed to lots of wood dust in his occupation    CURRENT OUTPATIENT MEDICATIONS  Reviewed    ALLERGIES  No Known Allergies     PHYSICAL EXAM  There were no vitals taken for this visit.  GEN: NAD  HEENT: EOMI, no icterus, injection or pallor. Obvious lymphedema, R > L  NEURO: alert  SKIN: no rashes    Remainder of physical exam deferred due to public health emergency and limitations of video visit.    LABORATORY AND IMAGING STUDIES    Labs 8/29/23, 8/9/23 were independently reviewed and interpreted by me    CT Chest/Abdomen/Pelvis w Contrast  Narrative: EXAMINATION: CT CHEST/ABDOMEN/PELVIS W CONTRAST, 9/13/2023 9:27 AM    HISTORY: restage nasopharyngeal carcinoma, SAM negative, on  pembrolizumab; Nasopharyngeal cancer (H)    COMPARISON: CT chest 7/24/2023; CT chest and abdomen 6/26/2023; PET/CT  2/22/2023,  3/4/2022    TECHNIQUE:  Imaging protocol: Computed tomography images of chest, abdomen and  pelvis with contrast. Contrast: 137 mL Isovue-370.  Acquisition: This CT exam was performed using one or more the  following dose reduction techniques: automated exposure control,  adjustment of the mA and/or kV according to patient size, and/or  iterative reconstruction technique.  Processing: 3D rendering on independent workstation using Maximum  Intensity Projection (MIP) was performed and archived to PACS. 3D  reconstructions are interpreted and reported by supervising  radiologist.    FINDINGS:    CHEST:  LUNGS: No suspicious pulmonary nodules. Bibasilar discoid and  subpleural atelectasis. No acute airspace opacities.  PLEURA: Trace pleural fluid. No pneumothorax. Benign left pleural  calcifications.  VESSELS: Atherosclerotic calcification of the aorta without aneurysmal  dilation. Right chest Port-A-Cath with tip terminating in the superior  right atrium.  HEART: No cardiomegaly. Mild coronary calcification.   LYMPH NODES: There are no pathologically enlarged lymph nodes.  THYROID: Nonenlarged. Stable subcentimeter nodularity without  suspicious features.    ABDOMEN/PELVIS:  LIVER: Normal contour. No suspicious liver lesions.  GALLBLADDER: Cholelithiasis without cholecystitis.  BILE DUCTS: No biliary tract dilatation.  PANCREAS: Within normal limits.  SPLEEN: No splenomegaly. Left upper quadrant splenule.  ADRENALS: Within normal limits.    KIDNEYS/URETERS: No renal soft tissue masses, hydronephrosis,  nephrolithiasis. Benign renal cortical cysts and additional too small  to characterize hypoattenuating renal parenchymal foci.  URINARY BLADDER: Within normal limits.  REPRODUCTIVE ORGANS: No pelvic masses.    BOWEL: No bowel dilatation or wall thickening. 3.8 x 3.1 cm duodenal  diverticulum at the ampulla of Vater. 6.4 x 2.2 cm duodenal  diverticulum along segment 2.  PERITONEUM/RETROPERITONEUM: No free air or free  fluid.  VESSELS: Within normal limits.  LYMPH NODES: There are no pathologically enlarged lymph nodes. Stable  prominent right external iliac chain and inguinal lymph nodes which  were not hypermetabolic on comparison 2/22/2023.    BONES AND SOFT TISSUES:  No suspicious osseous lesions. Degenerative periarticular changes and  spinal spondylosis, including findings of advanced degenerative  changes of the hips bilaterally with complete joint space loss  medially.  Impression: IMPRESSION:  No evidence of metastatic disease in the chest, abdomen, or pelvis.    KIRIT FREEDMAN MD         SYSTEM ID:  [id number]  CT Soft Tissue Neck w Contrast  Narrative: EXAM: CT SOFT TISSUE NECK W CONTRAST 9/13/2023 9:26 AM    HISTORY: restage nasopharyngeal carcinoma, SAM negative, on  pembrolizumab.; Nasopharyngeal cancer (H)      COMPARISON: CT neck 6/26/2023, 12/9/2021; PET/CT 3/4/2022    Technique: Following intravenous administration of nonionic iodinated  contrast medium, thin section helical CT images were obtained from the  skull base down to the level of the aortic arch.  Axial, coronal and  sagittal reformations were performed. Images were reviewed in soft  tissue, lung and bone windows.    CONTRAST: 137 mL Isovue-370    FINDINGS:   No evidence of recurrent disease at the primary site.    Postsurgical changes of left neck with partial resection of left  sternocleidomastoid, left neck dissection. Stable thickening of the  left sternocleidomastoid and left platysma. Expected post-treatment  changes are noted including effacement of fat planes, left greater  than right, supraglottic mucosal edema and thickening of the skin and  subcutaneous soft tissues.    Decreased size of enhancing necrotic lymph node deep to the left  sternocleidomastoid, measures 6 x 5 mm, previously 14 x 9 mm. No  pathologically enlarged lymph nodes. Decreased prominence of  nonpathologically enlarged retropharyngeal and bilateral level 2  lymph  nodes.    There are no findings to suggest a second primary in the imaged  aerodigestive tract.    Evaluation of the visualized portions of the brain, orbits, spine, and  lungs show no aggressive lesion suspicious for metastatic involvement.    The major salivary glands are unremarkable. Stable subcentimeter  thyroid nodules.    Left internal jugular vein is surgically absent. Major arterial  structures appear patent. Right chest Port-A-Cath, tip terminates  inferior to the field-of-view. Mild paranasal sinus mucosal  thickening. Right mastoid air cells are partially opacified, likely  chronic. Left mastoid air cells demonstrate minimal fluid.    The visualized lung apices are clear.  Impression: IMPRESSION:  Primary: 1} Expected post-treatment changes in the neck without  evidence of recurrent disease at the primary site.  Neck: 1}  Continued significant improvement in cervical  lymphadenopathy. No pathologically enlarged lymph nodes on today's  study.     CECT Surveillance Legend:    Primary  1: No evidence of recurrence: routine surveillance  2: Low suspicion    a) Superficial abnormality (skin, mucosal surface): direct visual  inspection    b) Ill-defined deep abnormality: short interval follow-up* or PET  3: High suspicion (new or enlarging discrete nodule/mass): biopsy  4: Definitive recurrence (path proven or clinical progression): no  biopsy needed    Nodes  1: No evidence of recurrence: routine surveillance  2: Low suspicion (ill-defined): short interval follow-up or PET  3: High suspicion (new or enlarging lymph node): biopsy if clinically  needed  4: Definitive recurrence (path proven or clinical progression): no  biopsy needed    *short interval follow-up: 3 months    KIRIT FREEDMAN MD         SYSTEM ID:  [id number]     Imaging was personally reviewed and interpreted by me - the LLL changes are clearly better. See above.   The LNs in the left neck are getting noticeably smaller and are losing their  distinct borders.     Virtual Visit Details  Type of service:  Video Visit  Originating Location (pt. Location): Home    Distant Location (provider location):  On-site  Platform used for Video Visit: Ilana

## 2023-09-13 NOTE — OR NURSING
RN to access implanted port for imaging purposes. Assess blood return, site appearance, and ability to flush the implanted port. Flush implanted port with 10 mL normal saline prior to using. After procedure, flush implanted port with 10 mL normal saline, then instill 500 units of heparin 100 unit/mL.  Remove needle and tubing, cover site with a dry sterile dressing.     Document the implanted port in a flowsheet.     Upon discontinuation of access, use smart phrase .RNPORTASSESSMENT in a nurse's note.

## 2023-09-13 NOTE — OR NURSING
Patient's implanted port was accessed with a non-coring simms needle by a trained RN per hospital policy/procedure. Blood return normal. Site noted to be Normal. Patient tolerated well.     Following imaging exam, implanted port was flushed with 10 mL sterile normal saline and 500 units (5 mLs) heparin. Site was deaccessed by a trained RN per hospital policy/procedure. Site noted to be Normal. Patient tolerated well.     Site covered with dry, sterile dressing.     See flowsheet and MAR for details.

## 2023-09-14 ENCOUNTER — VIRTUAL VISIT (OUTPATIENT)
Dept: ONCOLOGY | Facility: CLINIC | Age: 63
End: 2023-09-14
Attending: INTERNAL MEDICINE
Payer: COMMERCIAL

## 2023-09-14 VITALS — WEIGHT: 235 LBS | BODY MASS INDEX: 33.64 KG/M2 | HEIGHT: 70 IN

## 2023-09-14 DIAGNOSIS — E03.9 HYPOTHYROIDISM, UNSPECIFIED TYPE: ICD-10-CM

## 2023-09-14 DIAGNOSIS — C11.9 NASOPHARYNGEAL CANCER (H): Primary | ICD-10-CM

## 2023-09-14 PROCEDURE — 99215 OFFICE O/P EST HI 40 MIN: CPT | Mod: 95 | Performed by: INTERNAL MEDICINE

## 2023-09-14 RX ORDER — HEPARIN SODIUM (PORCINE) LOCK FLUSH IV SOLN 100 UNIT/ML 100 UNIT/ML
5 SOLUTION INTRAVENOUS
Status: CANCELLED | OUTPATIENT
Start: 2023-09-20

## 2023-09-14 RX ORDER — METHYLPREDNISOLONE SODIUM SUCCINATE 125 MG/2ML
125 INJECTION, POWDER, LYOPHILIZED, FOR SOLUTION INTRAMUSCULAR; INTRAVENOUS
Status: CANCELLED
Start: 2023-09-20

## 2023-09-14 RX ORDER — ALBUTEROL SULFATE 90 UG/1
1-2 AEROSOL, METERED RESPIRATORY (INHALATION)
Status: CANCELLED
Start: 2023-09-20

## 2023-09-14 RX ORDER — LORAZEPAM 2 MG/ML
0.5 INJECTION INTRAMUSCULAR EVERY 4 HOURS PRN
Status: CANCELLED | OUTPATIENT
Start: 2023-09-20

## 2023-09-14 RX ORDER — ALBUTEROL SULFATE 0.83 MG/ML
2.5 SOLUTION RESPIRATORY (INHALATION)
Status: CANCELLED | OUTPATIENT
Start: 2023-09-20

## 2023-09-14 RX ORDER — MEPERIDINE HYDROCHLORIDE 25 MG/ML
25 INJECTION INTRAMUSCULAR; INTRAVENOUS; SUBCUTANEOUS EVERY 30 MIN PRN
Status: CANCELLED | OUTPATIENT
Start: 2023-09-20

## 2023-09-14 RX ORDER — DIPHENHYDRAMINE HYDROCHLORIDE 50 MG/ML
50 INJECTION INTRAMUSCULAR; INTRAVENOUS
Status: CANCELLED
Start: 2023-09-20

## 2023-09-14 RX ORDER — HEPARIN SODIUM,PORCINE 10 UNIT/ML
5 VIAL (ML) INTRAVENOUS
Status: CANCELLED | OUTPATIENT
Start: 2023-09-20

## 2023-09-14 RX ORDER — EPINEPHRINE 1 MG/ML
0.3 INJECTION, SOLUTION INTRAMUSCULAR; SUBCUTANEOUS EVERY 5 MIN PRN
Status: CANCELLED | OUTPATIENT
Start: 2023-09-20

## 2023-09-14 ASSESSMENT — PAIN SCALES - GENERAL: PAINLEVEL: NO PAIN (0)

## 2023-09-14 NOTE — NURSING NOTE
Is the patient currently in the state of [place]? YES    Visit mode:VIDEO    If the visit is dropped, the patient can be reconnected by: VIDEO VISIT: Text to cell phone:   Telephone Information:   Mobile [phone]       Will anyone else be joining the visit? NO    How would you like to obtain your AVS? MyChart    Are changes needed to the allergy or medication list? Pt stated no changes to allergies and Pt stated no med changes    Reason for visit: BRII Rose LPN

## 2023-09-14 NOTE — LETTER
9/14/2023         RE: Jareth Gallardo  [address] [place] [address]        Dear Colleague,    Thank you for referring your patient, Jareth Gallardo, to the [facility]. Please see a copy of my visit note below.       [facility]    PATIENT NAME: Jareth Gallardo  MRN # [id number]   DATE OF VISIT: September 14, 2023  YOB: 1960     Referring Provider: Dr. Jesus Boston  Radiation Oncology: Dr. Mane Razo  Primary Care Provider: Dr. Beni Gallardo at [facility] in [facility]    CANCER TYPE: Nasopharyngeal carcinoma, p16 negative, SAM positive  STAGE: pB3D3Pd (DENAE)  ECOG PS: 0    PD-L1: 20% using  clone locally; TPS 70% at [facility]   NGS:     SUMMARY  12/11/21 UC for L neck mass, selling  12/9/21 CT neck ([place]). 2.4 cm L neck node medial to SCM just above the hyoid, additional LNs surround, scattered R neck and L supraclavicular LNs  2/15/22 US bx L cervical node. Path: SCC, moderately differentiated,   3/4/22 PET/CT. Nasopharyngeal and oropharyngeal mucosal thickening. 2.6 cm BERNARDO nodular GGO, SUV avid, some additional lingular uptake  4/4~5/16/22/22 C1-3 carboplatin gemcitabine. Not a candidate for cisplatin due to hearing loss on audiogram. Neulasta 5/3 after C2D8, ANC 1000   5/31/22 PET/CT. CT  6/22~8/10/22 Chemoradiation with weekly carboplatin  11/16/22 PET/CT. Residual 1.5 cm 2B LN (SUV 28.7), 1 cm enhancing focus in SCM (SUV 22.4), small mildly avid residual enhancing nodes in the L level 2A decreased in size and FDG avidity than before, resolved L level 5 node, small but mildly FDG avid R level 2B node, smaller mildly avid R level 3 nodes, unchanged in ize. NI-RADS 2a in the primary site - FDG uptake along the BOT at the midline, increased from prior. New patchy GGO and nodules in the RUL and L base with mild FDG uptake, likely inflammatory, recommend short interval CT  12/5/22 Salvage L modified radical neck  dissection (Dr. Boston). Path: L posterior SCM excision - SCC, 1.1 cm, invading into skeletal muscle, negative margins, +PNI/LVI. Level 2 node 1.5 cm SCC, moderately differentiated, invading into fibroadipose tissue, focally involving the inked resection margin.   1/30/23 CT chest. Patchy LLL GGO, decreased from prior.   2/22/23 PET/CT. Focal updake along the tongue base at midline, decreased from prior, likely inflammatory (SUV 14.8-->8.5), near complete resolution of the rest of the mild mucosal uptake within the tongue base. At least 5 FDG avid lesions in the L deep neck. 2 nodes at the C2 level, posterior to the surgical clips in the L lateral paravertebral region, one measuring 8 mm (SUV 15.9), the other more posteriorly measuring 7 mm. The other 2 adjacent ones are deep to the residual L SCM; 9.6 mm, and a smaller one immediately below in the SCM. There is another mildly FDG avid necrotic lesion inferior to the above 3 foci, again deep to the L SCM. 0.9 cm R level 2b LN (SUV 3.1), likely reactive. Resolution of RUL GGO, stable sub-6 mm nodules.  3/9/23~current Pembrolizumab . C3 delayed 1 week due to parainfluenza. Good MS  4/18/23 ED at [facility] for fever and cough. CXR negative. Viral panel showed parainfluenza.    ASSESSMENT AND PLAN  Nasopharyngeal carcinoma, SAM +julia: Ongoing deepening response. Continue pembro. PET/CT sometime in early 2024 to get a sense of whether there's residual FDG avidity. Consider aggressive approach - potentially surgery down the road if deemed resectable. Remains to be seen. Continue to trend EBV levels.     BERNARDO/lung nodules, new RUL nodules in 11/2022, LLL GGO 6/2023: Resolving c/w inflammatory changes from mild aspiration. Now new GGOs in the LLL on CT 7/24, better, c/w inflammation from likely aspiration. Still has some haziness in the LLL that is persistent.    Lymphedema: Doing massages daily now in the shower. Stretching throughout the  day.    Hypothyroidism: Levothyroxine 50 mcg daily started in mid May. Base dose adjustments on levels in Aug or Sept.    FH: Many members of his family have cancers. Appt with Denise Jones GC 11/30/23    DM2, steroid-induced hyperglycemia: Per PCP. A1c 8/28 5.6     Mild peripheral neuropathy: R foot numbness, very mild, from DM2. Not discussed today    40 minutes spent by me on the date of the encounter doing chart review, history and exam, documentation and further activities per the note     Dariela Melvin MD  Associate Professor of Medicine  Hematology, Oncology and Transplantation      SUBJECTIVE  Jareth returns for follow up of nasopharyngeal carcinoma on pembrolizumab.   Doing ok  Tightness in L neck. Trying different topicals -   Doing lymphedema massages  No new issues   Wife just got back from a road trip with her sister.     PAST MEDICAL HISTORY  Nasopharyngeal carcinoma as above  DM2  COPD??  Hiatal hernia. Sleeps with his head elevated  LVH on TTE 2009, EF50%, LA Dilation  Dyslipidemia  BARRETT in 2015. Not using CPAP due to developing tooth abscesses when he tried it in 2015  Venous insufficiency in both legs being in an accident involving a mower about 10 years ago, on chronic furosemide. LE US negative 12/15/2009  Cholelithiasis  Hyperplastic colon polyp, due 2020  Tonsillectomy  Baker cyst RLE US 2013    Neuropathy from DM - R foot numbness chronically    Exposed to lots of wood dust in his occupation    CURRENT OUTPATIENT MEDICATIONS  Reviewed    ALLERGIES  No Known Allergies     PHYSICAL EXAM  There were no vitals taken for this visit.  GEN: NAD  HEENT: EOMI, no icterus, injection or pallor. Obvious lymphedema, R > L  NEURO: alert  SKIN: no rashes    Remainder of physical exam deferred due to public health emergency and limitations of video visit.    LABORATORY AND IMAGING STUDIES    Labs 8/29/23, 8/9/23 were independently reviewed and interpreted by me    CT Chest/Abdomen/Pelvis w  Contrast  Narrative: EXAMINATION: CT CHEST/ABDOMEN/PELVIS W CONTRAST, 9/13/2023 9:27 AM    HISTORY: restage nasopharyngeal carcinoma, SAM negative, on  pembrolizumab; Nasopharyngeal cancer (H)    COMPARISON: CT chest 7/24/2023; CT chest and abdomen 6/26/2023; PET/CT  2/22/2023, 3/4/2022    TECHNIQUE:  Imaging protocol: Computed tomography images of chest, abdomen and  pelvis with contrast. Contrast: 137 mL Isovue-370.  Acquisition: This CT exam was performed using one or more the  following dose reduction techniques: automated exposure control,  adjustment of the mA and/or kV according to patient size, and/or  iterative reconstruction technique.  Processing: 3D rendering on independent workstation using Maximum  Intensity Projection (MIP) was performed and archived to PACS. 3D  reconstructions are interpreted and reported by supervising  radiologist.    FINDINGS:    CHEST:  LUNGS: No suspicious pulmonary nodules. Bibasilar discoid and  subpleural atelectasis. No acute airspace opacities.  PLEURA: Trace pleural fluid. No pneumothorax. Benign left pleural  calcifications.  VESSELS: Atherosclerotic calcification of the aorta without aneurysmal  dilation. Right chest Port-A-Cath with tip terminating in the superior  right atrium.  HEART: No cardiomegaly. Mild coronary calcification.   LYMPH NODES: There are no pathologically enlarged lymph nodes.  THYROID: Nonenlarged. Stable subcentimeter nodularity without  suspicious features.    ABDOMEN/PELVIS:  LIVER: Normal contour. No suspicious liver lesions.  GALLBLADDER: Cholelithiasis without cholecystitis.  BILE DUCTS: No biliary tract dilatation.  PANCREAS: Within normal limits.  SPLEEN: No splenomegaly. Left upper quadrant splenule.  ADRENALS: Within normal limits.    KIDNEYS/URETERS: No renal soft tissue masses, hydronephrosis,  nephrolithiasis. Benign renal cortical cysts and additional too small  to characterize hypoattenuating renal parenchymal foci.  URINARY BLADDER:  Within normal limits.  REPRODUCTIVE ORGANS: No pelvic masses.    BOWEL: No bowel dilatation or wall thickening. 3.8 x 3.1 cm duodenal  diverticulum at the ampulla of Vater. 6.4 x 2.2 cm duodenal  diverticulum along segment 2.  PERITONEUM/RETROPERITONEUM: No free air or free fluid.  VESSELS: Within normal limits.  LYMPH NODES: There are no pathologically enlarged lymph nodes. Stable  prominent right external iliac chain and inguinal lymph nodes which  were not hypermetabolic on comparison 2/22/2023.    BONES AND SOFT TISSUES:  No suspicious osseous lesions. Degenerative periarticular changes and  spinal spondylosis, including findings of advanced degenerative  changes of the hips bilaterally with complete joint space loss  medially.  Impression: IMPRESSION:  No evidence of metastatic disease in the chest, abdomen, or pelvis.    KIRIT FREEDMAN MD         SYSTEM ID:  [id number]  CT Soft Tissue Neck w Contrast  Narrative: EXAM: CT SOFT TISSUE NECK W CONTRAST 9/13/2023 9:26 AM    HISTORY: restage nasopharyngeal carcinoma, SAM negative, on  pembrolizumab.; Nasopharyngeal cancer (H)      COMPARISON: CT neck 6/26/2023, 12/9/2021; PET/CT 3/4/2022    Technique: Following intravenous administration of nonionic iodinated  contrast medium, thin section helical CT images were obtained from the  skull base down to the level of the aortic arch.  Axial, coronal and  sagittal reformations were performed. Images were reviewed in soft  tissue, lung and bone windows.    CONTRAST: 137 mL Isovue-370    FINDINGS:   No evidence of recurrent disease at the primary site.    Postsurgical changes of left neck with partial resection of left  sternocleidomastoid, left neck dissection. Stable thickening of the  left sternocleidomastoid and left platysma. Expected post-treatment  changes are noted including effacement of fat planes, left greater  than right, supraglottic mucosal edema and thickening of the skin and  subcutaneous soft  tissues.    Decreased size of enhancing necrotic lymph node deep to the left  sternocleidomastoid, measures 6 x 5 mm, previously 14 x 9 mm. No  pathologically enlarged lymph nodes. Decreased prominence of  nonpathologically enlarged retropharyngeal and bilateral level 2 lymph  nodes.    There are no findings to suggest a second primary in the imaged  aerodigestive tract.    Evaluation of the visualized portions of the brain, orbits, spine, and  lungs show no aggressive lesion suspicious for metastatic involvement.    The major salivary glands are unremarkable. Stable subcentimeter  thyroid nodules.    Left internal jugular vein is surgically absent. Major arterial  structures appear patent. Right chest Port-A-Cath, tip terminates  inferior to the field-of-view. Mild paranasal sinus mucosal  thickening. Right mastoid air cells are partially opacified, likely  chronic. Left mastoid air cells demonstrate minimal fluid.    The visualized lung apices are clear.  Impression: IMPRESSION:  Primary: 1} Expected post-treatment changes in the neck without  evidence of recurrent disease at the primary site.  Neck: 1}  Continued significant improvement in cervical  lymphadenopathy. No pathologically enlarged lymph nodes on today's  study.     CECT Surveillance Legend:    Primary  1: No evidence of recurrence: routine surveillance  2: Low suspicion    a) Superficial abnormality (skin, mucosal surface): direct visual  inspection    b) Ill-defined deep abnormality: short interval follow-up* or PET  3: High suspicion (new or enlarging discrete nodule/mass): biopsy  4: Definitive recurrence (path proven or clinical progression): no  biopsy needed    Nodes  1: No evidence of recurrence: routine surveillance  2: Low suspicion (ill-defined): short interval follow-up or PET  3: High suspicion (new or enlarging lymph node): biopsy if clinically  needed  4: Definitive recurrence (path proven or clinical progression): no  biopsy  needed    *short interval follow-up: 3 months    KIRIT FREEDMAN MD         SYSTEM ID:  [id number]     Imaging was personally reviewed and interpreted by me - the LLL changes are clearly better. See above.   The LNs in the left neck are getting noticeably smaller and are losing their distinct borders.     Virtual Visit Details  Type of service:  Video Visit  Originating Location (pt. Location): Home    Distant Location (provider location):  On-site  Platform used for Video Visit: Ilana Melvin MD

## 2023-09-20 ENCOUNTER — HOSPITAL ENCOUNTER (OUTPATIENT)
Dept: INFUSION THERAPY | Facility: OTHER | Age: 63
Discharge: HOME | End: 2023-09-20
Attending: INTERNAL MEDICINE
Payer: COMMERCIAL

## 2023-09-20 VITALS — DIASTOLIC BLOOD PRESSURE: 66 MMHG | SYSTOLIC BLOOD PRESSURE: 97 MMHG | HEART RATE: 80 BPM

## 2023-09-20 VITALS
WEIGHT: 241 LBS | RESPIRATION RATE: 20 BRPM | BODY MASS INDEX: 34.58 KG/M2 | DIASTOLIC BLOOD PRESSURE: 63 MMHG | SYSTOLIC BLOOD PRESSURE: 103 MMHG | TEMPERATURE: 97.3 F | HEART RATE: 69 BPM

## 2023-09-20 DIAGNOSIS — C11.9 NASOPHARYNGEAL CANCER (H): Primary | ICD-10-CM

## 2023-09-20 LAB
ALBUMIN SERPL BCG-MCNC: 3.8 G/DL (ref 3.5–5.2)
ALP SERPL-CCNC: 95 U/L (ref 40–129)
ALT SERPL W P-5'-P-CCNC: 15 U/L (ref 0–70)
ANION GAP SERPL CALCULATED.3IONS-SCNC: 8 MMOL/L (ref 7–15)
AST SERPL W P-5'-P-CCNC: 23 U/L (ref 0–45)
BASOPHILS # BLD AUTO: 0 10E3/UL (ref 0–0.2)
BASOPHILS NFR BLD AUTO: 0 %
BILIRUB SERPL-MCNC: 0.8 MG/DL
BUN SERPL-MCNC: 16.5 MG/DL (ref 8–23)
CALCIUM SERPL-MCNC: 9.1 MG/DL (ref 8.8–10.2)
CHLORIDE SERPL-SCNC: 101 MMOL/L (ref 98–107)
CREAT SERPL-MCNC: 0.87 MG/DL (ref 0.67–1.17)
DEPRECATED HCO3 PLAS-SCNC: 29 MMOL/L (ref 22–29)
EGFRCR SERPLBLD CKD-EPI 2021: >90 ML/MIN/1.73M2
EOSINOPHIL # BLD AUTO: 0.1 10E3/UL (ref 0–0.7)
EOSINOPHIL NFR BLD AUTO: 2 %
ERYTHROCYTE [DISTWIDTH] IN BLOOD BY AUTOMATED COUNT: 13.8 % (ref 10–15)
GLUCOSE SERPL-MCNC: 90 MG/DL (ref 70–99)
HCT VFR BLD AUTO: 36.6 % (ref 40–53)
HGB BLD-MCNC: 11.8 G/DL (ref 13.3–17.7)
IMM GRANULOCYTES # BLD: 0 10E3/UL
IMM GRANULOCYTES NFR BLD: 0 %
LYMPHOCYTES # BLD AUTO: 1 10E3/UL (ref 0.8–5.3)
LYMPHOCYTES NFR BLD AUTO: 18 %
MCH RBC QN AUTO: 28.6 PG (ref 26.5–33)
MCHC RBC AUTO-ENTMCNC: 32.2 G/DL (ref 31.5–36.5)
MCV RBC AUTO: 89 FL (ref 78–100)
MONOCYTES # BLD AUTO: 0.4 10E3/UL (ref 0–1.3)
MONOCYTES NFR BLD AUTO: 7 %
NEUTROPHILS # BLD AUTO: 4 10E3/UL (ref 1.6–8.3)
NEUTROPHILS NFR BLD AUTO: 73 %
NRBC # BLD AUTO: 0 10E3/UL
NRBC BLD AUTO-RTO: 0 /100
PLATELET # BLD AUTO: 151 10E3/UL (ref 150–450)
POTASSIUM SERPL-SCNC: 4.3 MMOL/L (ref 3.4–5.3)
PROT SERPL-MCNC: 7.3 G/DL (ref 6.4–8.3)
RBC # BLD AUTO: 4.12 10E6/UL (ref 4.4–5.9)
SODIUM SERPL-SCNC: 138 MMOL/L (ref 136–145)
T4 FREE SERPL-MCNC: 0.85 NG/DL (ref 0.9–1.7)
TSH SERPL DL<=0.005 MIU/L-ACNC: 6.16 UIU/ML (ref 0.3–4.2)
WBC # BLD AUTO: 5.5 10E3/UL (ref 4–11)

## 2023-09-20 PROCEDURE — 96413 CHEMO IV INFUSION 1 HR: CPT

## 2023-09-20 PROCEDURE — 85025 COMPLETE CBC W/AUTO DIFF WBC: CPT | Performed by: INTERNAL MEDICINE

## 2023-09-20 PROCEDURE — 84443 ASSAY THYROID STIM HORMONE: CPT | Performed by: INTERNAL MEDICINE

## 2023-09-20 PROCEDURE — 80053 COMPREHEN METABOLIC PANEL: CPT | Performed by: INTERNAL MEDICINE

## 2023-09-20 PROCEDURE — 258N000003 HC RX IP 258 OP 636: Performed by: INTERNAL MEDICINE

## 2023-09-20 PROCEDURE — 84439 ASSAY OF FREE THYROXINE: CPT | Performed by: INTERNAL MEDICINE

## 2023-09-20 PROCEDURE — 36591 DRAW BLOOD OFF VENOUS DEVICE: CPT | Performed by: INTERNAL MEDICINE

## 2023-09-20 PROCEDURE — 250N000011 HC RX IP 250 OP 636: Mod: JZ | Performed by: INTERNAL MEDICINE

## 2023-09-20 RX ORDER — HEPARIN SODIUM (PORCINE) LOCK FLUSH IV SOLN 100 UNIT/ML 100 UNIT/ML
5 SOLUTION INTRAVENOUS
Status: DISCONTINUED | OUTPATIENT
Start: 2023-09-20 | End: 2023-09-21 | Stop reason: HOSPADM

## 2023-09-20 RX ADMIN — SODIUM CHLORIDE 200 MG: 9 INJECTION, SOLUTION INTRAVENOUS at 14:27

## 2023-09-20 RX ADMIN — SODIUM CHLORIDE 250 ML: 9 INJECTION, SOLUTION INTRAVENOUS at 14:28

## 2023-09-20 RX ADMIN — HEPARIN 5 ML: 100 SYRINGE at 15:09

## 2023-09-20 NOTE — NURSING NOTE
Patients R port accessed using non-coring, 19 gauge, 3/4 inch needle. Port accessed per facility protocol.    Hand hygiene performed: yes   Mask donned by caregiver: yes   Site prepped with CHG: yes   Labs drawn: yes   Dressing applied using aseptic technique: yes   Port flushed easily, without resistance. Flushed with 10 cc's normal saline.   Immediate blood return noted. 10 cc blood discarded.  2 vials blood drawn and sent to lab for results.  Port flushed with 20 cc 0.9% normal saline. Patient tolerated port flush well, denies pain nor discomfort at this time. Awaiting lab results for today's infusion.   Jany Jenkins RN ....................  9/20/2023   1:46 PM

## 2023-09-20 NOTE — NURSING NOTE
"Infusion Nursing Note:  Jareth Gallardo presents today for Keytruda cycle 9 day 1.    Patient seen by provider today: No   present during visit today: Not Applicable.    Note: Pt states he has had a \"head cold\" for a few days. States that his nasal drainage and cough are a bit worse than when he had his virtual visit on 9-14-23. Denies fevers, chills, SOB, or any other symptoms. Updated Chriss Martin MD, ok to proceed with treatment today. Instructed patient to follow up in [facility] or with PCP for any worsening of symptoms, or if fever, SOB, or any other changes in condition occur. Pt verbalized understanding.     Intravenous Access:  Labs drawn without difficulty.  Implanted Port.    Treatment Conditions:  Lab Results   Component Value Date     09/20/2023    POTASSIUM 4.3 09/20/2023    MAG 1.8 01/30/2023    CR 0.87 09/20/2023    LAI 9.1 09/20/2023    BILITOTAL 0.8 09/20/2023    ALBUMIN 3.8 09/20/2023    ALT 15 09/20/2023    AST 23 09/20/2023       Results reviewed, labs MET treatment parameters, ok to proceed with treatment.      Post Infusion Assessment:  Patient tolerated infusion without incident.  Blood return noted pre and post infusion.  Site patent and intact, free from redness, edema or discomfort.  No evidence of extravasations.  Access discontinued per protocol.       Discharge Plan:   Discharge instructions reviewed with: Patient.  Patient and/or family verbalized understanding of discharge instructions and all questions answered.  AVS to patient via [facility].  Patient will return 10-11-23 for next appointment.   Patient discharged in stable condition accompanied by: self.  Departure Mode: Ambulatory.      Jany Jenkins RN    "

## 2023-09-21 NOTE — CONFIDENTIAL NOTE
RECORDS RECEIVED FROM: internal /ce   DATE RECEIVED: 11.1.23    NOTES STATUS DETAILS   OFFICE NOTE from referring provider internal    Dariela Melvin MD      OFFICE NOTE from other specialist     DISCHARGE SUMMARY from hospital     DISCHARGE REPORT from the ER     MEDICATION LIST internal     IMAGING  (NEED IMAGES AND REPORTS)     CT SCAN internal  9.13.23, 2.22.23, 1.30.23, 11.16.22, 5.31.22   CHEST XRAY (CXR) internal /ce Scheduled 11.1.23     [facility]- 4/18/23   TESTS     PULMONARY FUNCTION TESTING (PFT) internal  Scheduled 11.1.23        Action 9.21.23 sv    Action Taken Image request sent to [facility]-  CXR-  4/18/23--received in [facility]--

## 2023-09-27 DIAGNOSIS — E03.9 HYPOTHYROIDISM, UNSPECIFIED TYPE: ICD-10-CM

## 2023-09-27 RX ORDER — LEVOTHYROXINE SODIUM 112 UG/1
112 TABLET ORAL DAILY
Qty: 60 TABLET | Refills: 6 | Status: SHIPPED | OUTPATIENT
Start: 2023-09-27

## 2023-10-02 DIAGNOSIS — C11.9 NASOPHARYNGEAL CANCER (H): Primary | ICD-10-CM

## 2023-10-02 RX ORDER — LORAZEPAM 2 MG/ML
0.5 INJECTION INTRAMUSCULAR EVERY 4 HOURS PRN
Status: CANCELLED | OUTPATIENT
Start: 2023-10-11

## 2023-10-02 RX ORDER — EPINEPHRINE 1 MG/ML
0.3 INJECTION, SOLUTION INTRAMUSCULAR; SUBCUTANEOUS EVERY 5 MIN PRN
Status: CANCELLED | OUTPATIENT
Start: 2023-10-11

## 2023-10-02 RX ORDER — ALBUTEROL SULFATE 0.83 MG/ML
2.5 SOLUTION RESPIRATORY (INHALATION)
Status: CANCELLED | OUTPATIENT
Start: 2023-10-11

## 2023-10-02 RX ORDER — MEPERIDINE HYDROCHLORIDE 25 MG/ML
25 INJECTION INTRAMUSCULAR; INTRAVENOUS; SUBCUTANEOUS EVERY 30 MIN PRN
Status: CANCELLED | OUTPATIENT
Start: 2023-10-11

## 2023-10-02 RX ORDER — HEPARIN SODIUM (PORCINE) LOCK FLUSH IV SOLN 100 UNIT/ML 100 UNIT/ML
5 SOLUTION INTRAVENOUS
Status: CANCELLED | OUTPATIENT
Start: 2023-10-11

## 2023-10-02 RX ORDER — HEPARIN SODIUM,PORCINE 10 UNIT/ML
5 VIAL (ML) INTRAVENOUS
Status: CANCELLED | OUTPATIENT
Start: 2023-10-11

## 2023-10-02 RX ORDER — ALBUTEROL SULFATE 90 UG/1
1-2 AEROSOL, METERED RESPIRATORY (INHALATION)
Status: CANCELLED
Start: 2023-10-11

## 2023-10-02 RX ORDER — DIPHENHYDRAMINE HYDROCHLORIDE 50 MG/ML
50 INJECTION INTRAMUSCULAR; INTRAVENOUS
Status: CANCELLED
Start: 2023-10-11

## 2023-10-02 RX ORDER — METHYLPREDNISOLONE SODIUM SUCCINATE 125 MG/2ML
125 INJECTION, POWDER, LYOPHILIZED, FOR SOLUTION INTRAMUSCULAR; INTRAVENOUS
Status: CANCELLED
Start: 2023-10-11

## 2023-10-11 ENCOUNTER — HOSPITAL ENCOUNTER (OUTPATIENT)
Dept: INFUSION THERAPY | Facility: OTHER | Age: 63
Discharge: HOME | End: 2023-10-11
Attending: INTERNAL MEDICINE | Admitting: INTERNAL MEDICINE
Payer: COMMERCIAL

## 2023-10-11 VITALS
WEIGHT: 244.6 LBS | SYSTOLIC BLOOD PRESSURE: 102 MMHG | TEMPERATURE: 98 F | HEART RATE: 74 BPM | RESPIRATION RATE: 20 BRPM | BODY MASS INDEX: 35.1 KG/M2 | DIASTOLIC BLOOD PRESSURE: 64 MMHG

## 2023-10-11 DIAGNOSIS — C11.9 NASOPHARYNGEAL CANCER (H): Primary | ICD-10-CM

## 2023-10-11 LAB
ALBUMIN SERPL BCG-MCNC: 3.8 G/DL (ref 3.5–5.2)
ALP SERPL-CCNC: 78 U/L (ref 40–129)
ALT SERPL W P-5'-P-CCNC: 17 U/L (ref 0–70)
ANION GAP SERPL CALCULATED.3IONS-SCNC: 4 MMOL/L (ref 7–15)
AST SERPL W P-5'-P-CCNC: 31 U/L (ref 0–45)
BASO+EOS+MONOS # BLD AUTO: ABNORMAL 10*3/UL
BASO+EOS+MONOS NFR BLD AUTO: ABNORMAL %
BASOPHILS # BLD AUTO: 0 10E3/UL (ref 0–0.2)
BASOPHILS NFR BLD AUTO: 1 %
BILIRUB SERPL-MCNC: 0.7 MG/DL
BUN SERPL-MCNC: 17.5 MG/DL (ref 8–23)
CALCIUM SERPL-MCNC: 9 MG/DL (ref 8.8–10.2)
CHLORIDE SERPL-SCNC: 103 MMOL/L (ref 98–107)
CREAT SERPL-MCNC: 0.89 MG/DL (ref 0.67–1.17)
DEPRECATED HCO3 PLAS-SCNC: 31 MMOL/L (ref 22–29)
EGFRCR SERPLBLD CKD-EPI 2021: >90 ML/MIN/1.73M2
EOSINOPHIL # BLD AUTO: 0.1 10E3/UL (ref 0–0.7)
EOSINOPHIL NFR BLD AUTO: 2 %
ERYTHROCYTE [DISTWIDTH] IN BLOOD BY AUTOMATED COUNT: 13.9 % (ref 10–15)
GLUCOSE SERPL-MCNC: 117 MG/DL (ref 70–99)
HCT VFR BLD AUTO: 33.9 % (ref 40–53)
HGB BLD-MCNC: 11 G/DL (ref 13.3–17.7)
IMM GRANULOCYTES # BLD: 0 10E3/UL
IMM GRANULOCYTES NFR BLD: 0 %
LYMPHOCYTES # BLD AUTO: 0.8 10E3/UL (ref 0.8–5.3)
LYMPHOCYTES NFR BLD AUTO: 21 %
MCH RBC QN AUTO: 28.4 PG (ref 26.5–33)
MCHC RBC AUTO-ENTMCNC: 32.4 G/DL (ref 31.5–36.5)
MCV RBC AUTO: 88 FL (ref 78–100)
MONOCYTES # BLD AUTO: 0.3 10E3/UL (ref 0–1.3)
MONOCYTES NFR BLD AUTO: 8 %
NEUTROPHILS # BLD AUTO: 2.7 10E3/UL (ref 1.6–8.3)
NEUTROPHILS NFR BLD AUTO: 68 %
NRBC # BLD AUTO: 0 10E3/UL
NRBC BLD AUTO-RTO: 0 /100
PLATELET # BLD AUTO: 159 10E3/UL (ref 150–450)
POTASSIUM SERPL-SCNC: 4.2 MMOL/L (ref 3.4–5.3)
PROT SERPL-MCNC: 7.2 G/DL (ref 6.4–8.3)
RBC # BLD AUTO: 3.87 10E6/UL (ref 4.4–5.9)
SODIUM SERPL-SCNC: 138 MMOL/L (ref 135–145)
TSH SERPL DL<=0.005 MIU/L-ACNC: 3.45 UIU/ML (ref 0.3–4.2)
WBC # BLD AUTO: 3.9 10E3/UL (ref 4–11)

## 2023-10-11 PROCEDURE — 80053 COMPREHEN METABOLIC PANEL: CPT | Performed by: INTERNAL MEDICINE

## 2023-10-11 PROCEDURE — 258N000003 HC RX IP 258 OP 636: Performed by: INTERNAL MEDICINE

## 2023-10-11 PROCEDURE — 36591 DRAW BLOOD OFF VENOUS DEVICE: CPT | Performed by: INTERNAL MEDICINE

## 2023-10-11 PROCEDURE — 96413 CHEMO IV INFUSION 1 HR: CPT

## 2023-10-11 PROCEDURE — 84443 ASSAY THYROID STIM HORMONE: CPT | Performed by: INTERNAL MEDICINE

## 2023-10-11 PROCEDURE — 85025 COMPLETE CBC W/AUTO DIFF WBC: CPT | Performed by: INTERNAL MEDICINE

## 2023-10-11 PROCEDURE — 250N000011 HC RX IP 250 OP 636: Mod: JZ | Performed by: INTERNAL MEDICINE

## 2023-10-11 RX ORDER — HEPARIN SODIUM (PORCINE) LOCK FLUSH IV SOLN 100 UNIT/ML 100 UNIT/ML
5 SOLUTION INTRAVENOUS
Status: DISCONTINUED | OUTPATIENT
Start: 2023-10-11 | End: 2023-10-12 | Stop reason: HOSPADM

## 2023-10-11 RX ADMIN — SODIUM CHLORIDE 250 ML: 9 INJECTION, SOLUTION INTRAVENOUS at 11:41

## 2023-10-11 RX ADMIN — HEPARIN 5 ML: 100 SYRINGE at 12:45

## 2023-10-11 RX ADMIN — SODIUM CHLORIDE 200 MG: 9 INJECTION, SOLUTION INTRAVENOUS at 12:02

## 2023-10-11 NOTE — NURSING NOTE
Infusion Nursing Note:  Jareth Gallardo presents today for Keytruda cycle 10 day 1.    Patient seen by provider today: No   present during visit today: Not Applicable.    Note: N/A.      Intravenous Access:  Labs drawn without difficulty.  Implanted Port accessed with brisk blood return.    Treatment Conditions:  Lab Results   Component Value Date    HGB 11.0 (L) 10/11/2023    WBC 3.9 (L) 10/11/2023    ANEU 7.0 05/16/2022    ANEUTAUTO 2.7 10/11/2023     10/11/2023        Lab Results   Component Value Date     10/11/2023    POTASSIUM 4.2 10/11/2023    MAG 1.8 01/30/2023    CR 0.89 10/11/2023    LAI 9.0 10/11/2023    BILITOTAL 0.7 10/11/2023    ALBUMIN 3.8 10/11/2023    ALT 17 10/11/2023    AST 31 10/11/2023       Results reviewed, labs MET treatment parameters, ok to proceed with treatment.      Post Infusion Assessment:  Patient tolerated infusion without incident.  Blood return noted pre and post infusion.  Site patent and intact, free from redness, edema or discomfort.  No evidence of extravasations.  Access discontinued per protocol.  Biologic Infusion Post Education: Call the triage nurse at your clinic or seek medical attention if you have chills and/or temperature greater than or equal to 100.5, uncontrolled nausea/vomiting, diarrhea, constipation, dizziness, shortness of breath, chest pain, heart palpitations, weakness or any other new or concerning symptoms, questions or concerns.  You cannot have any live virus vaccines prior to or during treatment or up to 6 months post infusion.  If you have an upcoming surgery, medical procedure or dental procedure during treatment, this should be discussed with your ordering physician and your surgeon/dentist.  If you are having any concerning symptom, if you are unsure if you should get your next infusion or wish to speak to a provider before your next infusion, please call your care coordinator or triage nurse at your clinic to notify them  so we can adequately serve you.       Discharge Plan:   Discharge instructions reviewed with: Patient.  Patient and/or family verbalized understanding of discharge instructions and all questions answered.  Copy of AVS reviewed with patient and/or family.  Patient will return as scheduled for next appointment.  Patient discharged in stable condition accompanied by: self.  Departure Mode: Ambulatory.      Michelle Palma RN

## 2023-10-17 ENCOUNTER — TELEPHONE (OUTPATIENT)
Dept: PULMONOLOGY | Facility: CLINIC | Age: 63
End: 2023-10-17
Payer: COMMERCIAL

## 2023-10-17 NOTE — TELEPHONE ENCOUNTER
Left Voicemail (1st Attempt) for the patient to call back and schedule the following:    Appointment type: DOREEN  Provider: PINKY  Return date: 11/03/2023  Specialty phone number: [phone]  Additional appointment(s) needed: CXR, FPFT  Additonal Notes: Rescheduling from cancelled clinic 11/1

## 2023-10-18 NOTE — CONFIDENTIAL NOTE
RECORDS RECEIVED FROM: internal /ce   DATE RECEIVED: 11.1.23    NOTES STATUS DETAILS   OFFICE NOTE from referring provider internal    Dariela Melvin MD      OFFICE NOTE from other specialist       DISCHARGE SUMMARY from hospital       DISCHARGE REPORT from the ER       MEDICATION LIST internal      IMAGING  (NEED IMAGES AND REPORTS)       CT SCAN internal  9.13.23, 2.22.23, 1.30.23, 11.16.22, 5.31.22   CHEST XRAY (CXR) internal /ce Scheduled 11.1.23      [facility]- 4/18/23   TESTS       PULMONARY FUNCTION TESTING (PFT) internal  Scheduled 11.1.23        Action 9.21.23 sv    Action Taken Image request sent to [facility]-  CXR-  4/18/23--received in [facility]--

## 2023-10-22 ENCOUNTER — HEALTH MAINTENANCE LETTER (OUTPATIENT)
Age: 63
End: 2023-10-22

## 2023-10-26 ENCOUNTER — APPOINTMENT (OUTPATIENT)
Dept: GENERAL RADIOLOGY | Facility: OTHER | Age: 63
DRG: 872 | End: 2023-10-26
Attending: STUDENT IN AN ORGANIZED HEALTH CARE EDUCATION/TRAINING PROGRAM
Payer: COMMERCIAL

## 2023-10-26 ENCOUNTER — APPOINTMENT (OUTPATIENT)
Dept: CT IMAGING | Facility: OTHER | Age: 63
DRG: 872 | End: 2023-10-26
Attending: STUDENT IN AN ORGANIZED HEALTH CARE EDUCATION/TRAINING PROGRAM
Payer: COMMERCIAL

## 2023-10-26 ENCOUNTER — HOSPITAL ENCOUNTER (INPATIENT)
Facility: OTHER | Age: 63
LOS: 2 days | Discharge: HOME | DRG: 872 | End: 2023-10-28
Attending: STUDENT IN AN ORGANIZED HEALTH CARE EDUCATION/TRAINING PROGRAM | Admitting: INTERNAL MEDICINE
Payer: COMMERCIAL

## 2023-10-26 ENCOUNTER — TELEPHONE (OUTPATIENT)
Dept: ONCOLOGY | Facility: OTHER | Age: 63
End: 2023-10-26

## 2023-10-26 DIAGNOSIS — Z97.8 INTRAVENOUS CATHETER IN PLACE: ICD-10-CM

## 2023-10-26 DIAGNOSIS — C11.9 MALIGNANT NEOPLASM OF NASOPHARYNGEAL WALL (H): Primary | ICD-10-CM

## 2023-10-26 DIAGNOSIS — Z20.822 LAB TEST NEGATIVE FOR COVID-19 VIRUS: ICD-10-CM

## 2023-10-26 DIAGNOSIS — R00.0 SINUS TACHYCARDIA: ICD-10-CM

## 2023-10-26 DIAGNOSIS — A41.9 SEPSIS WITHOUT ACUTE ORGAN DYSFUNCTION, DUE TO UNSPECIFIED ORGANISM (H): ICD-10-CM

## 2023-10-26 DIAGNOSIS — R50.9 FEVER OF UNKNOWN ORIGIN: ICD-10-CM

## 2023-10-26 LAB
ALBUMIN SERPL BCG-MCNC: 4 G/DL (ref 3.5–5.2)
ALBUMIN UR-MCNC: 70 MG/DL
ALP SERPL-CCNC: 82 U/L (ref 40–129)
ALT SERPL W P-5'-P-CCNC: 11 U/L (ref 0–70)
ANION GAP SERPL CALCULATED.3IONS-SCNC: 13 MMOL/L (ref 7–15)
APPEARANCE UR: ABNORMAL
AST SERPL W P-5'-P-CCNC: 24 U/L (ref 0–45)
BACTERIA #/AREA URNS HPF: ABNORMAL /HPF
BASOPHILS # BLD AUTO: 0 10E3/UL (ref 0–0.2)
BASOPHILS NFR BLD AUTO: 0 %
BILIRUB SERPL-MCNC: 1.6 MG/DL
BILIRUB UR QL STRIP: NEGATIVE
BUN SERPL-MCNC: 14.9 MG/DL (ref 8–23)
CALCIUM SERPL-MCNC: 9.3 MG/DL (ref 8.8–10.2)
CHLORIDE SERPL-SCNC: 95 MMOL/L (ref 98–107)
COLOR UR AUTO: YELLOW
CREAT SERPL-MCNC: 1.1 MG/DL (ref 0.67–1.17)
D DIMER PPP FEU-MCNC: 1.25 UG/ML FEU (ref 0–0.5)
DEPRECATED HCO3 PLAS-SCNC: 27 MMOL/L (ref 22–29)
EGFRCR SERPLBLD CKD-EPI 2021: 76 ML/MIN/1.73M2
EOSINOPHIL # BLD AUTO: 0 10E3/UL (ref 0–0.7)
EOSINOPHIL NFR BLD AUTO: 0 %
ERYTHROCYTE [DISTWIDTH] IN BLOOD BY AUTOMATED COUNT: 14.8 % (ref 10–15)
FLUAV RNA SPEC QL NAA+PROBE: NEGATIVE
FLUBV RNA RESP QL NAA+PROBE: NEGATIVE
GLUCOSE BLDC GLUCOMTR-MCNC: 127 MG/DL (ref 70–99)
GLUCOSE SERPL-MCNC: 95 MG/DL (ref 70–99)
GLUCOSE UR STRIP-MCNC: NEGATIVE MG/DL
HCT VFR BLD AUTO: 37.1 % (ref 40–53)
HGB BLD-MCNC: 12.3 G/DL (ref 13.3–17.7)
HGB UR QL STRIP: NEGATIVE
HOLD SPECIMEN: NORMAL
HOLD SPECIMEN: NORMAL
HYALINE CASTS: 10 /LPF
IMM GRANULOCYTES # BLD: 0 10E3/UL
IMM GRANULOCYTES NFR BLD: 0 %
KETONES UR STRIP-MCNC: NEGATIVE MG/DL
LACTATE SERPL-SCNC: 1.2 MMOL/L (ref 0.7–2)
LEUKOCYTE ESTERASE UR QL STRIP: NEGATIVE
LYMPHOCYTES # BLD AUTO: 0.5 10E3/UL (ref 0.8–5.3)
LYMPHOCYTES NFR BLD AUTO: 11 %
MCH RBC QN AUTO: 28.5 PG (ref 26.5–33)
MCHC RBC AUTO-ENTMCNC: 33.2 G/DL (ref 31.5–36.5)
MCV RBC AUTO: 86 FL (ref 78–100)
MONOCYTES # BLD AUTO: 0.5 10E3/UL (ref 0–1.3)
MONOCYTES NFR BLD AUTO: 10 %
MUCOUS THREADS #/AREA URNS LPF: PRESENT /LPF
NEUTROPHILS # BLD AUTO: 3.7 10E3/UL (ref 1.6–8.3)
NEUTROPHILS NFR BLD AUTO: 79 %
NITRATE UR QL: NEGATIVE
NRBC # BLD AUTO: 0 10E3/UL
NRBC BLD AUTO-RTO: 0 /100
PH UR STRIP: 5.5 [PH] (ref 5–9)
PLATELET # BLD AUTO: 145 10E3/UL (ref 150–450)
POTASSIUM SERPL-SCNC: 3.8 MMOL/L (ref 3.4–5.3)
PROCALCITONIN SERPL IA-MCNC: 0.18 NG/ML
PROT SERPL-MCNC: 8.5 G/DL (ref 6.4–8.3)
RBC # BLD AUTO: 4.32 10E6/UL (ref 4.4–5.9)
RBC CAST: 4 /LPF
RBC URINE: <1 /HPF
RSV RNA SPEC NAA+PROBE: NEGATIVE
SARS-COV-2 RNA RESP QL NAA+PROBE: NEGATIVE
SARS-COV-2 RNA RESP QL NAA+PROBE: NEGATIVE
SODIUM SERPL-SCNC: 135 MMOL/L (ref 135–145)
SP GR UR STRIP: 1.02 (ref 1–1.03)
TROPONIN T SERPL HS-MCNC: 9 NG/L
UROBILINOGEN UR STRIP-MCNC: NORMAL MG/DL
WBC # BLD AUTO: 4.8 10E3/UL (ref 4–11)
WBC URINE: 1 /HPF

## 2023-10-26 PROCEDURE — 258N000003 HC RX IP 258 OP 636: Performed by: INTERNAL MEDICINE

## 2023-10-26 PROCEDURE — C9803 HOPD COVID-19 SPEC COLLECT: HCPCS | Performed by: STUDENT IN AN ORGANIZED HEALTH CARE EDUCATION/TRAINING PROGRAM

## 2023-10-26 PROCEDURE — 36415 COLL VENOUS BLD VENIPUNCTURE: CPT | Performed by: STUDENT IN AN ORGANIZED HEALTH CARE EDUCATION/TRAINING PROGRAM

## 2023-10-26 PROCEDURE — 96365 THER/PROPH/DIAG IV INF INIT: CPT | Mod: XU | Performed by: STUDENT IN AN ORGANIZED HEALTH CARE EDUCATION/TRAINING PROGRAM

## 2023-10-26 PROCEDURE — 96367 TX/PROPH/DG ADDL SEQ IV INF: CPT | Performed by: STUDENT IN AN ORGANIZED HEALTH CARE EDUCATION/TRAINING PROGRAM

## 2023-10-26 PROCEDURE — 87637 SARSCOV2&INF A&B&RSV AMP PRB: CPT | Performed by: INTERNAL MEDICINE

## 2023-10-26 PROCEDURE — 93010 ELECTROCARDIOGRAM REPORT: CPT | Performed by: INTERNAL MEDICINE

## 2023-10-26 PROCEDURE — 250N000011 HC RX IP 250 OP 636: Performed by: STUDENT IN AN ORGANIZED HEALTH CARE EDUCATION/TRAINING PROGRAM

## 2023-10-26 PROCEDURE — 96361 HYDRATE IV INFUSION ADD-ON: CPT | Performed by: STUDENT IN AN ORGANIZED HEALTH CARE EDUCATION/TRAINING PROGRAM

## 2023-10-26 PROCEDURE — 87635 SARS-COV-2 COVID-19 AMP PRB: CPT | Performed by: STUDENT IN AN ORGANIZED HEALTH CARE EDUCATION/TRAINING PROGRAM

## 2023-10-26 PROCEDURE — 258N000003 HC RX IP 258 OP 636: Performed by: FAMILY MEDICINE

## 2023-10-26 PROCEDURE — 87040 BLOOD CULTURE FOR BACTERIA: CPT | Performed by: STUDENT IN AN ORGANIZED HEALTH CARE EDUCATION/TRAINING PROGRAM

## 2023-10-26 PROCEDURE — 81003 URINALYSIS AUTO W/O SCOPE: CPT | Performed by: STUDENT IN AN ORGANIZED HEALTH CARE EDUCATION/TRAINING PROGRAM

## 2023-10-26 PROCEDURE — 84145 PROCALCITONIN (PCT): CPT | Performed by: STUDENT IN AN ORGANIZED HEALTH CARE EDUCATION/TRAINING PROGRAM

## 2023-10-26 PROCEDURE — 83605 ASSAY OF LACTIC ACID: CPT | Performed by: INTERNAL MEDICINE

## 2023-10-26 PROCEDURE — 93005 ELECTROCARDIOGRAM TRACING: CPT | Performed by: STUDENT IN AN ORGANIZED HEALTH CARE EDUCATION/TRAINING PROGRAM

## 2023-10-26 PROCEDURE — 250N000013 HC RX MED GY IP 250 OP 250 PS 637: Performed by: STUDENT IN AN ORGANIZED HEALTH CARE EDUCATION/TRAINING PROGRAM

## 2023-10-26 PROCEDURE — 80053 COMPREHEN METABOLIC PANEL: CPT | Performed by: STUDENT IN AN ORGANIZED HEALTH CARE EDUCATION/TRAINING PROGRAM

## 2023-10-26 PROCEDURE — 87484 EHRLICHA CHAFFEENSIS AMP PRB: CPT | Performed by: STUDENT IN AN ORGANIZED HEALTH CARE EDUCATION/TRAINING PROGRAM

## 2023-10-26 PROCEDURE — 85379 FIBRIN DEGRADATION QUANT: CPT | Performed by: STUDENT IN AN ORGANIZED HEALTH CARE EDUCATION/TRAINING PROGRAM

## 2023-10-26 PROCEDURE — 120N000001 HC R&B MED SURG/OB

## 2023-10-26 PROCEDURE — 84484 ASSAY OF TROPONIN QUANT: CPT | Performed by: STUDENT IN AN ORGANIZED HEALTH CARE EDUCATION/TRAINING PROGRAM

## 2023-10-26 PROCEDURE — 250N000011 HC RX IP 250 OP 636: Performed by: FAMILY MEDICINE

## 2023-10-26 PROCEDURE — 86618 LYME DISEASE ANTIBODY: CPT | Performed by: STUDENT IN AN ORGANIZED HEALTH CARE EDUCATION/TRAINING PROGRAM

## 2023-10-26 PROCEDURE — 99291 CRITICAL CARE FIRST HOUR: CPT | Mod: 25 | Performed by: STUDENT IN AN ORGANIZED HEALTH CARE EDUCATION/TRAINING PROGRAM

## 2023-10-26 PROCEDURE — 71275 CT ANGIOGRAPHY CHEST: CPT

## 2023-10-26 PROCEDURE — 71045 X-RAY EXAM CHEST 1 VIEW: CPT

## 2023-10-26 PROCEDURE — 85025 COMPLETE CBC W/AUTO DIFF WBC: CPT | Performed by: STUDENT IN AN ORGANIZED HEALTH CARE EDUCATION/TRAINING PROGRAM

## 2023-10-26 PROCEDURE — 99291 CRITICAL CARE FIRST HOUR: CPT | Performed by: STUDENT IN AN ORGANIZED HEALTH CARE EDUCATION/TRAINING PROGRAM

## 2023-10-26 PROCEDURE — 258N000003 HC RX IP 258 OP 636: Performed by: STUDENT IN AN ORGANIZED HEALTH CARE EDUCATION/TRAINING PROGRAM

## 2023-10-26 RX ORDER — ONDANSETRON 4 MG/1
4 TABLET, ORALLY DISINTEGRATING ORAL EVERY 6 HOURS PRN
Status: DISCONTINUED | OUTPATIENT
Start: 2023-10-26 | End: 2023-10-28 | Stop reason: HOSPADM

## 2023-10-26 RX ORDER — CEFAZOLIN SODIUM 1 G/50ML
2000 SOLUTION INTRAVENOUS ONCE
Status: COMPLETED | OUTPATIENT
Start: 2023-10-26 | End: 2023-10-26

## 2023-10-26 RX ORDER — ACETAMINOPHEN 650 MG/1
650 SUPPOSITORY RECTAL EVERY 6 HOURS PRN
Status: DISCONTINUED | OUTPATIENT
Start: 2023-10-26 | End: 2023-10-28 | Stop reason: HOSPADM

## 2023-10-26 RX ORDER — NICOTINE POLACRILEX 4 MG
15-30 LOZENGE BUCCAL
Status: DISCONTINUED | OUTPATIENT
Start: 2023-10-26 | End: 2023-10-28 | Stop reason: HOSPADM

## 2023-10-26 RX ORDER — HEPARIN SODIUM,PORCINE 10 UNIT/ML
5-10 VIAL (ML) INTRAVENOUS
Status: DISCONTINUED | OUTPATIENT
Start: 2023-10-26 | End: 2023-10-28 | Stop reason: HOSPADM

## 2023-10-26 RX ORDER — PIPERACILLIN SODIUM, TAZOBACTAM SODIUM 4; .5 G/20ML; G/20ML
4.5 INJECTION, POWDER, LYOPHILIZED, FOR SOLUTION INTRAVENOUS EVERY 8 HOURS
Status: DISCONTINUED | OUTPATIENT
Start: 2023-10-26 | End: 2023-10-26 | Stop reason: DRUGHIGH

## 2023-10-26 RX ORDER — HEPARIN SODIUM,PORCINE 10 UNIT/ML
5-10 VIAL (ML) INTRAVENOUS EVERY 24 HOURS
Status: DISCONTINUED | OUTPATIENT
Start: 2023-10-26 | End: 2023-10-28 | Stop reason: HOSPADM

## 2023-10-26 RX ORDER — DEXTROSE MONOHYDRATE 25 G/50ML
25-50 INJECTION, SOLUTION INTRAVENOUS
Status: DISCONTINUED | OUTPATIENT
Start: 2023-10-26 | End: 2023-10-28 | Stop reason: HOSPADM

## 2023-10-26 RX ORDER — HEPARIN SODIUM (PORCINE) LOCK FLUSH IV SOLN 100 UNIT/ML 100 UNIT/ML
5-10 SOLUTION INTRAVENOUS
Status: DISCONTINUED | OUTPATIENT
Start: 2023-10-26 | End: 2023-10-28 | Stop reason: HOSPADM

## 2023-10-26 RX ORDER — IBUPROFEN 400 MG/1
400 TABLET, FILM COATED ORAL ONCE
Status: COMPLETED | OUTPATIENT
Start: 2023-10-27 | End: 2023-10-27

## 2023-10-26 RX ORDER — ONDANSETRON 2 MG/ML
4 INJECTION INTRAMUSCULAR; INTRAVENOUS EVERY 6 HOURS PRN
Status: DISCONTINUED | OUTPATIENT
Start: 2023-10-26 | End: 2023-10-28 | Stop reason: HOSPADM

## 2023-10-26 RX ORDER — DOXYCYCLINE 100 MG/10ML
100 INJECTION, POWDER, LYOPHILIZED, FOR SOLUTION INTRAVENOUS EVERY 12 HOURS
Status: DISCONTINUED | OUTPATIENT
Start: 2023-10-26 | End: 2023-10-26

## 2023-10-26 RX ORDER — ACETAMINOPHEN 325 MG/1
650 TABLET ORAL EVERY 6 HOURS PRN
Status: DISCONTINUED | OUTPATIENT
Start: 2023-10-26 | End: 2023-10-28 | Stop reason: HOSPADM

## 2023-10-26 RX ORDER — ENOXAPARIN SODIUM 100 MG/ML
40 INJECTION SUBCUTANEOUS EVERY 24 HOURS
Status: DISCONTINUED | OUTPATIENT
Start: 2023-10-27 | End: 2023-10-28 | Stop reason: HOSPADM

## 2023-10-26 RX ORDER — PIPERACILLIN SODIUM, TAZOBACTAM SODIUM 4; .5 G/20ML; G/20ML
4.5 INJECTION, POWDER, LYOPHILIZED, FOR SOLUTION INTRAVENOUS EVERY 6 HOURS
Status: DISCONTINUED | OUTPATIENT
Start: 2023-10-26 | End: 2023-10-28 | Stop reason: HOSPADM

## 2023-10-26 RX ORDER — SODIUM CHLORIDE 9 MG/ML
INJECTION, SOLUTION INTRAVENOUS CONTINUOUS PRN
Status: DISCONTINUED | OUTPATIENT
Start: 2023-10-26 | End: 2023-10-28 | Stop reason: HOSPADM

## 2023-10-26 RX ORDER — IOPAMIDOL 755 MG/ML
92 INJECTION, SOLUTION INTRAVASCULAR ONCE
Status: COMPLETED | OUTPATIENT
Start: 2023-10-26 | End: 2023-10-26

## 2023-10-26 RX ORDER — ACETAMINOPHEN 500 MG
1000 TABLET ORAL ONCE
Status: COMPLETED | OUTPATIENT
Start: 2023-10-26 | End: 2023-10-26

## 2023-10-26 RX ORDER — DOXYCYCLINE 100 MG/1
100 CAPSULE ORAL ONCE
Status: DISCONTINUED | OUTPATIENT
Start: 2023-10-26 | End: 2023-10-26

## 2023-10-26 RX ORDER — DOXYCYCLINE 100 MG/1
100 CAPSULE ORAL 2 TIMES DAILY
Qty: 20 CAPSULE | Refills: 0 | Status: SHIPPED | OUTPATIENT
Start: 2023-10-26 | End: 2023-10-28

## 2023-10-26 RX ORDER — DOXYCYCLINE 100 MG/10ML
100 INJECTION, POWDER, LYOPHILIZED, FOR SOLUTION INTRAVENOUS EVERY 12 HOURS
Status: DISCONTINUED | OUTPATIENT
Start: 2023-10-27 | End: 2023-10-28 | Stop reason: HOSPADM

## 2023-10-26 RX ADMIN — IOPAMIDOL 92 ML: 755 INJECTION, SOLUTION INTRAVENOUS at 17:39

## 2023-10-26 RX ADMIN — HEPARIN, PORCINE (PF) 10 UNIT/ML INTRAVENOUS SYRINGE 5 ML: at 19:58

## 2023-10-26 RX ADMIN — SODIUM CHLORIDE 1000 ML: 9 INJECTION, SOLUTION INTRAVENOUS at 17:30

## 2023-10-26 RX ADMIN — ACETAMINOPHEN 1000 MG: 500 TABLET, FILM COATED ORAL at 19:03

## 2023-10-26 RX ADMIN — VANCOMYCIN HYDROCHLORIDE 2000 MG: 1 INJECTION, POWDER, LYOPHILIZED, FOR SOLUTION INTRAVENOUS at 20:06

## 2023-10-26 RX ADMIN — PIPERACILLIN AND TAZOBACTAM 4.5 G: 4; .5 INJECTION, POWDER, LYOPHILIZED, FOR SOLUTION INTRAVENOUS at 22:55

## 2023-10-26 RX ADMIN — DOXYCYCLINE 100 MG: 100 INJECTION, POWDER, LYOPHILIZED, FOR SOLUTION INTRAVENOUS at 17:10

## 2023-10-26 RX ADMIN — SODIUM CHLORIDE: 9 INJECTION, SOLUTION INTRAVENOUS at 22:56

## 2023-10-26 ASSESSMENT — ACTIVITIES OF DAILY LIVING (ADL)
ADLS_ACUITY_SCORE: 35
ADLS_ACUITY_SCORE: 35
ADLS_ACUITY_SCORE: 22
ADLS_ACUITY_SCORE: 35

## 2023-10-26 NOTE — TELEPHONE ENCOUNTER
Spoke to Shelley. Jareth has been running fevers and is not feeling well. He is sweating a lot too. She states she wants to bring him in to be seen. I did recommend she bring him to our ER for evaluation in case he needs any immediate imaging or labs.  She would still like a call back from ZULEIKA Devine to discuss the plan for next week's infusion treatment in light of this.    Renée Larios CMA([facility])..................10/26/2023   10:10 AM

## 2023-10-26 NOTE — TELEPHONE ENCOUNTER
Patient went to ED. Will call to do status check tomorrow.  Sybil Zamora RN...........10/26/2023 2:04 PM

## 2023-10-26 NOTE — ED TRIAGE NOTES
Pt to ER from home with c/o 5 days of sweats, fever, chills.  Changes noted to facial and swelling s/p nasopharyngeal cancer. Had vision changes on Sunday, resolved on own back to normal.  Has not been seen or contacted PCP. Tylenol this AM at 0800. Denies pain or nausea, however decreased appetite.  Pt states he will start to sweat profusely and this will be followed by intense chills.

## 2023-10-26 NOTE — ED PROVIDER NOTES
History     Chief Complaint   Patient presents with    Sweats    Fever       Jareth Gallardo is a 63 year old male who presents with fever, chills, sweats.  Onset 5 days ago.  Symptoms primarily occur during daytime and resolve overnight.  Early on in this course he had a 1 minute episode where he had divergent vision with double vision that self resolved.  Denies any other infectious symptoms including headache, sore throat, nausea, vomiting, cough, dyspnea, diarrhea, dysuria, wounds.  He has had exposure to some ticks recently.  Currently receives Keytruda for nasopharyngeal carcinoma, last dose 2 weeks ago.  Chronic indwelling right anterior chest port.  Denies any recent lines of her placed inhaler.  No known sick contacts and wife has not had similar symptoms.  He has chronic left lateral neck scar tissue that is unchanged.  He had some tingling sensation in his left shoulder that seemed to resolve over the past 5 days.    No Known Allergies    Patient Active Problem List    Diagnosis Date Noted    Sinus tachycardia 10/26/2023     Priority: Medium    Fever of unknown origin 10/26/2023     Priority: Medium    Sepsis without acute organ dysfunction, due to unspecified organism (H) 10/26/2023     Priority: Medium    Encounter for screening for other viral diseases 12/05/2022     Priority: Medium    Hard to intubate 12/05/2022     Priority: Medium    Morbid obesity (H) 06/30/2022     Priority: Medium    Chemotherapy-induced neutropenia  06/01/2022     Priority: Medium    Nasopharyngeal cancer (H) 03/22/2022     Priority: Medium       Past Medical History:   Diagnosis Date    COPD (chronic obstructive pulmonary disease) (H)     Dyslipidemia     Hard to intubate 12/5/2022    Nasopharyngeal carcinoma (H)     BARRETT (obstructive sleep apnea)     Type 2 diabetes mellitus with diabetic nephropathy (H)        Past Surgical History:   Procedure Laterality Date    COLONOSCOPY      DISSECTION RADICAL NECK MODIFIED Left  "2022    Procedure: left modified radical neck dissection;  Surgeon: Jesus Boston MD;  Location: [facility] OR    INSERT PORT VASCULAR ACCESS Right 2022    Procedure: SINGLE LUMEN POWER PORT INSERTION, VASCULAR ACCESS;  Surgeon: Evens Aponte MD;  Location: [facility] OR    IR CHEST PORT PLACEMENT > 5 YRS OF AGE  2022    KNEE SURGERY      LARYNGOSCOPY WITH BIOPSY(IES) N/A 2022    Procedure: LARYNGOSCOPY, WITH BIOPSY;  Surgeon: Jesus Boston MD;  Location: [facility] OR    TONSILLECTOMY         Family History   Problem Relation Age of Onset    Cerebrovascular Disease Mother     Leukemia Father     Melanoma Sister        Social History     Tobacco Use    Smoking status: Former     Packs/day: 4.00     Years: 30.00     Additional pack years: 0.00     Total pack years: 120.00     Types: Cigarettes     Quit date:      Years since quittin.8     Passive exposure: Past    Smokeless tobacco: Never    Tobacco comments:     quit smoking. Passive exposure in childhood home.    Substance Use Topics    Alcohol use: Yes     Comment: rarely    Drug use: Never       Medications:    doxycycline hyclate (VIBRAMYCIN) 100 MG capsule  acetaminophen (TYLENOL) 325 MG tablet  atorvastatin (LIPITOR) 20 MG tablet  blood glucose (NO BRAND SPECIFIED) lancets standard  blood glucose (NO BRAND SPECIFIED) test strip  blood glucose monitoring (ACCU-CHEK RANDELL PLUS) meter device kit  fluticasone-vilanterol (BREO ELLIPTA) 100-25 MCG/INH inhaler  furosemide (LASIX) 20 MG tablet  levothyroxine (SYNTHROID/LEVOTHROID) 112 MCG tablet  metFORMIN (GLUCOPHAGE XR) 500 MG 24 hr tablet  mineral oil-hydrophilic petrolatum (AQUAPHOR) external ointment  TRULICITY 1.5 MG/0.5ML pen        Review of Systems: See HPI for pertinent negatives and positives. All other systems reviewed and found to be negative.    Physical Exam   BP (!) 150/90   Pulse 108   Temp (!) 104.5  F (40.3  C) (Tympanic)   Resp 16   Ht 1.778 m (5' 10\")   Wt 111.1 kg " (245 lb)   SpO2 94%   BMI 35.15 kg/m       General: awake, comfortable  HEENT: atraumatic, chronic left lateral neck scar tissue, symmetric pupils, EOMI  Respiratory: normal effort, clear to auscultation bilaterally  Cardiovascular: regular rate and rhythm, no murmurs  Abdomen: soft, nondistended, nontender  Extremities: no deformities, edema, or tenderness  Skin: warm, dry, no rashes  Neuro: alert, no focal deficits  Psych: appropriate mood and affect    ED Course      ED Course as of 10/26/23 1927   Thu Oct 26, 2023   1918 Sign out at shift change. Awaiting admission.       Results for orders placed or performed during the hospital encounter of 10/26/23 (from the past 24 hour(s))   Extra Tube    Narrative    The following orders were created for panel order Extra Tube.  Procedure                               Abnormality         Status                     ---------                               -----------         ------                     Extra Blue Top Tube[[id number]]                              Final result                 Please view results for these tests on the individual orders.   Extra Blue Top Tube   Result Value Ref Range    Hold Specimen [facility]    D dimer quantitative   Result Value Ref Range    D-Dimer Quantitative 1.25 (H) 0.00 - 0.50 ug/mL FEU    Narrative    This D-dimer assay is intended for use in conjunction with a clinical pretest probability assessment model to exclude pulmonary embolism (PE) and deep venous thrombosis (DVT) in outpatients suspected of PE or DVT. The cut-off value is 0.50 ug/mL FEU.    For patients 50 years of age or older, the application of age-adjusted cut-off values for D-Dimer may increase the specificity without significant effect on sensitivity. The literature suggested calculation age adjusted cut-off in ug/L = age in years x 10 ug/L. The results in this laboratory are reported as ug/mL rather than ug/L. The calculation for age adjusted cut off in ug/mL= age in years  x 0.01 ug/mL. For example, the cut off for a 76 year old male is 76 x 0.01 ug/mL = 0.76 ug/mL (760 ug/L).    M Kathy et al. Age adjusted D-dimer cut-off levels to rule out pulmonary embolism: The ADJUST-PE Study. POLLY 2014;311:0445-9380.; HJ Skyler et al. Diagnostic accuracy of conventional or age adjusted D-dimer cutoff values in older patients with suspected venous thromboembolism. Systemic review and meta-analysis. BMJ 2013:346:f2492.   Symptomatic COVID-19 Virus (Coronavirus) by PCR Nose    Specimen: Nose; Swab   Result Value Ref Range    SARS CoV2 PCR Negative Negative    Narrative    Testing was performed using the [employer] Xpress SARS-CoV-2 Assay on the Cepheid Gene-Xpert Instrument Systems. Additional information about this Emergency Use Authorization (EUA) assay can be found via the Lab Guide. This test should be ordered for the detection of SARS-CoV-2 in individuals who meet SARS-CoV-2 clinical and/or epidemiological criteria as well as from individuals without symptoms or other reasons to suspect COVID-19. Test performance for asymptomatic patients has only been established in anterior nasal swab specimens. This test is for in vitro diagnostic use under the FDA EUA for laboratories certified under CLIA to perform high complexity testing. This test has not been FDA cleared or approved. A negative result does not rule out the presence of PCR inhibitors in the specimen or target RNA concentration below the limit of detection for the assay. The possibility of a false negative should be considered if the patient's recent exposure or clinical presentation suggests COVID-19. This test was validated by [facility]. This laboratory is certified under the Clinical Laboratory Improvement Amendments (CLIA) as qualified to perform high complexity clinical laboratory testing.   CBC with platelets differential    Narrative    The following orders were created for panel  order CBC with platelets differential.  Procedure                               Abnormality         Status                     ---------                               -----------         ------                     CBC with platelets and d...[[id number]]  Abnormal            Final result                 Please view results for these tests on the individual orders.   Comprehensive metabolic panel   Result Value Ref Range    Sodium 135 135 - 145 mmol/L    Potassium 3.8 3.4 - 5.3 mmol/L    Carbon Dioxide (CO2) 27 22 - 29 mmol/L    Anion Gap 13 7 - 15 mmol/L    Urea Nitrogen 14.9 8.0 - 23.0 mg/dL    Creatinine 1.10 0.67 - 1.17 mg/dL    GFR Estimate 76 >60 mL/min/1.73m2    Calcium 9.3 8.8 - 10.2 mg/dL    Chloride 95 (L) 98 - 107 mmol/L    Glucose 95 70 - 99 mg/dL    Alkaline Phosphatase 82 40 - 129 U/L    AST 24 0 - 45 U/L    ALT 11 0 - 70 U/L    Protein Total 8.5 (H) 6.4 - 8.3 g/dL    Albumin 4.0 3.5 - 5.2 g/dL    Bilirubin Total 1.6 (H) <=1.2 mg/dL   CBC with platelets and differential   Result Value Ref Range    WBC Count 4.8 4.0 - 11.0 10e3/uL    RBC Count 4.32 (L) 4.40 - 5.90 10e6/uL    Hemoglobin 12.3 (L) 13.3 - 17.7 g/dL    Hematocrit 37.1 (L) 40.0 - 53.0 %    MCV 86 78 - 100 fL    MCH 28.5 26.5 - 33.0 pg    MCHC 33.2 31.5 - 36.5 g/dL    RDW 14.8 10.0 - 15.0 %    Platelet Count 145 (L) 150 - 450 10e3/uL    % Neutrophils 79 %    % Lymphocytes 11 %    % Monocytes 10 %    % Eosinophils 0 %    % Basophils 0 %    % Immature Granulocytes 0 %    NRBCs per 100 WBC 0 <1 /100    Absolute Neutrophils 3.7 1.6 - 8.3 10e3/uL    Absolute Lymphocytes 0.5 (L) 0.8 - 5.3 10e3/uL    Absolute Monocytes 0.5 0.0 - 1.3 10e3/uL    Absolute Eosinophils 0.0 0.0 - 0.7 10e3/uL    Absolute Basophils 0.0 0.0 - 0.2 10e3/uL    Absolute Immature Granulocytes 0.0 <=0.4 10e3/uL    Absolute NRBCs 0.0 10e3/uL   Procalcitonin   Result Value Ref Range    Procalcitonin 0.18 (H) <0.05 ng/mL   Troponin T, High Sensitivity   Result Value Ref Range    Troponin  T, High Sensitivity 9 <=22 ng/L   Extra Tube    Narrative    The following orders were created for panel order Extra Tube.  Procedure                               Abnormality         Status                     ---------                               -----------         ------                     Extra Green Top (Lithium...[[id number]]                      Final result                 Please view results for these tests on the individual orders.   Extra Green Top (Lithium Heparin) ON ICE   Result Value Ref Range    Hold Specimen [facility]    Lactic Acid STAT   Result Value Ref Range    Lactic Acid 1.2 0.7 - 2.0 mmol/L   XR Chest Port 1 View    Narrative    Exam:  XR CHEST PORT 1 VIEW    HISTORY: fever unknown origin.    COMPARISON:  9/13/2023, 7/24/2023    FINDINGS: There is a right chest port with the catheter terminating at  the cavoatrial junction.    The cardiomediastinal contours are normal.      No focal consolidation, effusion, or pneumothorax.      No acute osseous abnormality.       Impression    IMPRESSION:      No acute cardiopulmonary process.      ODELL MORALES MD         SYSTEM ID:  [id number]   UA with Microscopic reflex to Culture    Specimen: Urine, Midstream   Result Value Ref Range    Color Urine Yellow Colorless, Straw, Light Yellow, Yellow    Appearance Urine Slightly Cloudy (A) Clear    Glucose Urine Negative Negative mg/dL    Bilirubin Urine Negative Negative    Ketones Urine Negative Negative mg/dL    Specific Gravity Urine 1.017 1.000 - 1.030    Blood Urine Negative Negative    pH Urine 5.5 5.0 - 9.0    Protein Albumin Urine 70 (A) Negative mg/dL    Urobilinogen Urine Normal Normal, 2.0 mg/dL    Nitrite Urine Negative Negative    Leukocyte Esterase Urine Negative Negative    Bacteria Urine Few (A) None Seen /HPF    Mucus Urine Present (A) None Seen /LPF    RBC Urine <1 <=2 /HPF    WBC Urine 1 <=5 /HPF    Hyaline Casts Urine 10 (H) <=2 /LPF    RBC Casts Urine 4 (H) None Seen /LPF    Narrative     Urine Culture not indicated   CT Chest Pulmonary Embolism w Contrast    Narrative    CT CHEST PULMONARY EMBOLISM W CONTRAST    HISTORY: 63 years Male +dimerPt to ER from home with c/o 5 days of  sweats, fever, chills.  Changes noted to facial and swelling s/p  nasopharyngeal cancer. Had vision changes on Sunday, resolved on own  back to normal.  Has not been seen or contacted PCP. Tylenol this AM  at 0800. Denies pain or nausea, however decreased appetite.  Pt states  he will start to sweat profusely and this will be followed by intense  chills.    TECHNIQUE: Axial CT imaging of the chest was performed With  intravenous contrast. Coronal and sagittal reconstructions were  obtained.  This exam was performed using one or more of the following dose  reduction techniques:  Automated exposure control, adjustment of the BIRGIT and/or KV according  to patient's size, and/or use of iterative reconstruction technique.    COMPARISON: 9/13/2023    FINDINGS:  No evidence of pulmonary embolism. The thoracic aorta is unremarkable.  There is no mediastinal or hilar or axillary lymphadenopathy.      Mild dependent atelectasis. Lungs otherwise clear.         No concerning osseous lesions are identified.    There is cholelithiasis. There is no biliary dilatation.      Impression    IMPRESSION: No evidence of pulmonary embolism.    ARNOLD TEE MD         SYSTEM ID:  [id number]       Medications   doxycycline (VIBRAMYCIN) 100 mg vial to attach to  mL bag (100 mg Intravenous $New Bag 10/26/23 1710)   sodium chloride 0.9% BOLUS 1,000 mL (1,000 mLs Intravenous $New Bag 10/26/23 1730)   sodium chloride (PF) 0.9% PF flush 10-20 mL (has no administration in time range)   sodium chloride (PF) 0.9% PF flush 10-20 mL (has no administration in time range)   sodium chloride (PF) 0.9% PF flush 10-20 mL (has no administration in time range)   heparin lock flush 10 UNIT/ML injection 5-10 mL (has no administration in time range)   heparin  lock flush 10 UNIT/ML injection 5-10 mL (has no administration in time range)   heparin 100 unit/mL injection 5-10 mL (has no administration in time range)   piperacillin-tazobactam (ZOSYN) 4.5 g vial to attach to  mL bag (has no administration in time range)   doxycycline (VIBRAMYCIN) 100 mg vial to attach to  mL bag (has no administration in time range)   iopamidol (ISOVUE-370) solution 92 mL (92 mLs Intravenous $Given 10/26/23 5800)   acetaminophen (TYLENOL) tablet 1,000 mg (1,000 mg Oral $Given 10/26/23 0483)       Assessments & Plan (with Medical Decision Making)     I have reviewed the nursing notes.    63 year old male evaluated for fever, chills for 5 days in context of nasopharyngeal cancer. Initially afebrile later spiking fever. Labs overall reassuring, remarkable for mild PCT elevation and mild leukocytopenia. No neutropenia. HR initially 94 but later into the 110s and remaining there. Temperature increased to most recently 104.5. Well appearing. Not focal infection symptoms or signs. Tick exposure recently. These studies and blood cultures pending. With unexplained fever and tachycardia d dimer collected and positive with subsequent negative PE CT study. Ran admission by day hospitalist who felt patient was more appropriate for outpatient management and did not want to admit. Therefore originally hoping to send home but ultimately with increasing temperature think he should be monitoring as inpatient to follow cultures and receive broad spectrum antibiotics (originally just given doxycycline dose for potential tick borne illness). Vancomycin and zosyn ordered. He has already received fluids. Fever of unknown origin, possibly related to cancer. Admission accepted by nocturnist Dr Genao.    I have reviewed the findings, diagnosis, plan with the patient.    45 minutes of critical care time was spent with this patient, exclusive of all other separately billable services.     New Prescriptions     DOXYCYCLINE HYCLATE (VIBRAMYCIN) 100 MG CAPSULE    Take 1 capsule (100 mg) by mouth 2 times daily for 10 days       Final diagnoses:   Fever of unknown origin   Sinus tachycardia   Sepsis without acute organ dysfunction, due to unspecified organism (H)       10/26/2023   [facility]     Fred Rodrigues MD  10/26/23 1927       Fred Rodrigues MD  10/26/23 1955       Fred Rodrigues MD  10/27/23 0653

## 2023-10-27 ENCOUNTER — PATIENT OUTREACH (OUTPATIENT)
Dept: ONCOLOGY | Facility: OTHER | Age: 63
End: 2023-10-27
Payer: COMMERCIAL

## 2023-10-27 LAB
ALBUMIN SERPL BCG-MCNC: 2.9 G/DL (ref 3.5–5.2)
ALP SERPL-CCNC: 66 U/L (ref 40–129)
ALT SERPL W P-5'-P-CCNC: 11 U/L (ref 0–70)
ANION GAP SERPL CALCULATED.3IONS-SCNC: 10 MMOL/L (ref 7–15)
AST SERPL W P-5'-P-CCNC: 27 U/L (ref 0–45)
B BURGDOR IGG+IGM SER QL: 0.61
BILIRUB SERPL-MCNC: 1.3 MG/DL
BUN SERPL-MCNC: 19 MG/DL (ref 8–23)
CALCIUM SERPL-MCNC: 8.7 MG/DL (ref 8.8–10.2)
CHLORIDE SERPL-SCNC: 102 MMOL/L (ref 98–107)
CREAT SERPL-MCNC: 1.18 MG/DL (ref 0.67–1.17)
DEPRECATED HCO3 PLAS-SCNC: 24 MMOL/L (ref 22–29)
EGFRCR SERPLBLD CKD-EPI 2021: 70 ML/MIN/1.73M2
ERYTHROCYTE [DISTWIDTH] IN BLOOD BY AUTOMATED COUNT: 15 % (ref 10–15)
GLUCOSE BLDC GLUCOMTR-MCNC: 116 MG/DL (ref 70–99)
GLUCOSE BLDC GLUCOMTR-MCNC: 152 MG/DL (ref 70–99)
GLUCOSE BLDC GLUCOMTR-MCNC: 155 MG/DL (ref 70–99)
GLUCOSE BLDC GLUCOMTR-MCNC: 93 MG/DL (ref 70–99)
GLUCOSE SERPL-MCNC: 112 MG/DL (ref 70–99)
HCT VFR BLD AUTO: 31.3 % (ref 40–53)
HGB BLD-MCNC: 10.1 G/DL (ref 13.3–17.7)
MCH RBC QN AUTO: 28.3 PG (ref 26.5–33)
MCHC RBC AUTO-ENTMCNC: 32.3 G/DL (ref 31.5–36.5)
MCV RBC AUTO: 88 FL (ref 78–100)
PLATELET # BLD AUTO: 111 10E3/UL (ref 150–450)
POTASSIUM SERPL-SCNC: 3.8 MMOL/L (ref 3.4–5.3)
PROT SERPL-MCNC: 6.9 G/DL (ref 6.4–8.3)
RBC # BLD AUTO: 3.57 10E6/UL (ref 4.4–5.9)
SODIUM SERPL-SCNC: 136 MMOL/L (ref 135–145)
WBC # BLD AUTO: 4.8 10E3/UL (ref 4–11)

## 2023-10-27 PROCEDURE — 85027 COMPLETE CBC AUTOMATED: CPT | Performed by: INTERNAL MEDICINE

## 2023-10-27 PROCEDURE — 250N000011 HC RX IP 250 OP 636: Performed by: FAMILY MEDICINE

## 2023-10-27 PROCEDURE — 250N000013 HC RX MED GY IP 250 OP 250 PS 637: Performed by: INTERNAL MEDICINE

## 2023-10-27 PROCEDURE — 99207 PR NO CHARGE LOS: CPT | Performed by: FAMILY MEDICINE

## 2023-10-27 PROCEDURE — 250N000011 HC RX IP 250 OP 636: Mod: JZ | Performed by: FAMILY MEDICINE

## 2023-10-27 PROCEDURE — 250N000013 HC RX MED GY IP 250 OP 250 PS 637: Performed by: FAMILY MEDICINE

## 2023-10-27 PROCEDURE — 120N000001 HC R&B MED SURG/OB

## 2023-10-27 PROCEDURE — 250N000011 HC RX IP 250 OP 636: Mod: JZ | Performed by: INTERNAL MEDICINE

## 2023-10-27 PROCEDURE — 80053 COMPREHEN METABOLIC PANEL: CPT | Performed by: INTERNAL MEDICINE

## 2023-10-27 PROCEDURE — 250N000011 HC RX IP 250 OP 636: Mod: JZ | Performed by: STUDENT IN AN ORGANIZED HEALTH CARE EDUCATION/TRAINING PROGRAM

## 2023-10-27 PROCEDURE — 36415 COLL VENOUS BLD VENIPUNCTURE: CPT | Performed by: INTERNAL MEDICINE

## 2023-10-27 RX ORDER — VANCOMYCIN HYDROCHLORIDE 1 G/200ML
1000 INJECTION, SOLUTION INTRAVENOUS EVERY 12 HOURS
Status: DISCONTINUED | OUTPATIENT
Start: 2023-10-27 | End: 2023-10-28 | Stop reason: HOSPADM

## 2023-10-27 RX ORDER — ACETAMINOPHEN 500 MG
500 TABLET ORAL EVERY 6 HOURS PRN
COMMUNITY

## 2023-10-27 RX ORDER — IBUPROFEN 600 MG/1
600 TABLET, FILM COATED ORAL EVERY 6 HOURS PRN
Status: DISCONTINUED | OUTPATIENT
Start: 2023-10-27 | End: 2023-10-28 | Stop reason: HOSPADM

## 2023-10-27 RX ORDER — ATORVASTATIN CALCIUM 20 MG/1
20 TABLET, FILM COATED ORAL DAILY
Status: DISCONTINUED | OUTPATIENT
Start: 2023-10-27 | End: 2023-10-28 | Stop reason: HOSPADM

## 2023-10-27 RX ORDER — FLUTICASONE FUROATE AND VILANTEROL 100; 25 UG/1; UG/1
1 POWDER RESPIRATORY (INHALATION) DAILY
Status: DISCONTINUED | OUTPATIENT
Start: 2023-10-27 | End: 2023-10-28 | Stop reason: HOSPADM

## 2023-10-27 RX ORDER — LEVOTHYROXINE SODIUM 112 UG/1
112 TABLET ORAL DAILY
Status: DISCONTINUED | OUTPATIENT
Start: 2023-10-27 | End: 2023-10-28 | Stop reason: HOSPADM

## 2023-10-27 RX ADMIN — FLUTICASONE FUROATE AND VILANTEROL TRIFENATATE 1 PUFF: 100; 25 POWDER RESPIRATORY (INHALATION) at 08:57

## 2023-10-27 RX ADMIN — DOXYCYCLINE 100 MG: 100 INJECTION, POWDER, LYOPHILIZED, FOR SOLUTION INTRAVENOUS at 22:06

## 2023-10-27 RX ADMIN — PIPERACILLIN AND TAZOBACTAM 4.5 G: 4; .5 INJECTION, POWDER, LYOPHILIZED, FOR SOLUTION INTRAVENOUS at 11:00

## 2023-10-27 RX ADMIN — VANCOMYCIN HYDROCHLORIDE 1000 MG: 1 INJECTION, SOLUTION INTRAVENOUS at 19:52

## 2023-10-27 RX ADMIN — IBUPROFEN 600 MG: 600 TABLET, FILM COATED ORAL at 11:15

## 2023-10-27 RX ADMIN — ENOXAPARIN SODIUM 40 MG: 40 INJECTION SUBCUTANEOUS at 10:59

## 2023-10-27 RX ADMIN — PIPERACILLIN AND TAZOBACTAM 4.5 G: 4; .5 INJECTION, POWDER, LYOPHILIZED, FOR SOLUTION INTRAVENOUS at 18:03

## 2023-10-27 RX ADMIN — IBUPROFEN 400 MG: 400 TABLET, FILM COATED ORAL at 00:06

## 2023-10-27 RX ADMIN — IBUPROFEN 600 MG: 600 TABLET, FILM COATED ORAL at 19:59

## 2023-10-27 RX ADMIN — PIPERACILLIN AND TAZOBACTAM 4.5 G: 4; .5 INJECTION, POWDER, LYOPHILIZED, FOR SOLUTION INTRAVENOUS at 04:33

## 2023-10-27 RX ADMIN — ATORVASTATIN CALCIUM 20 MG: 20 TABLET, FILM COATED ORAL at 10:59

## 2023-10-27 RX ADMIN — VANCOMYCIN HYDROCHLORIDE 1000 MG: 1 INJECTION, SOLUTION INTRAVENOUS at 08:49

## 2023-10-27 RX ADMIN — ACETAMINOPHEN 650 MG: 325 TABLET, FILM COATED ORAL at 10:59

## 2023-10-27 RX ADMIN — ACETAMINOPHEN 650 MG: 325 TABLET, FILM COATED ORAL at 19:59

## 2023-10-27 RX ADMIN — LEVOTHYROXINE SODIUM 112 MCG: 0.11 TABLET ORAL at 10:59

## 2023-10-27 RX ADMIN — DOXYCYCLINE 100 MG: 100 INJECTION, POWDER, LYOPHILIZED, FOR SOLUTION INTRAVENOUS at 06:49

## 2023-10-27 ASSESSMENT — ACTIVITIES OF DAILY LIVING (ADL)
ADLS_ACUITY_SCORE: 24
ADLS_ACUITY_SCORE: 24
ADLS_ACUITY_SCORE: 26
ADLS_ACUITY_SCORE: 22
ADLS_ACUITY_SCORE: 26
ADLS_ACUITY_SCORE: 24
ADLS_ACUITY_SCORE: 26
ADLS_ACUITY_SCORE: 26

## 2023-10-27 NOTE — PROGRESS NOTES
Patient currently admitted with recurrent fever, chills. Admitted 10/26/23 with assessment being sepsis with unknown source of infection. He has removed several ticks and titers pending for ehrlichia and lyme. Discussed with wife that he should be seen before next Keytruda. Plan is for discharge tomorrow.  Sybil Zamora RN...........10/27/2023 3:43 PM

## 2023-10-27 NOTE — PROGRESS NOTES
Patient febrile, with a temp of 102.5. tylenol and Ibuprofin given. Recheck was 104.0 F and then 102.8. Dr. Finch is aware. Will continue to monitor.

## 2023-10-27 NOTE — H&P
"[facility]    History and Physical - Hospitalist Service       Date of Admission:  10/26/2023    Assessment & Plan    Sepsis  -unknown infectious source  -+tick exposure; Ehrlichia and Lyme titers pending; cont doxycycline   -blood cultures pending  -COVID-19/flu/RSV neg  -cont empiric Vanco and Zoysn for now  -UA, CXR unremarkable  -CTA: no PE    Nasopharyngeal CA  -Cycle 10 pembrolizumab on 10/11/23  -surgical resection 12/2022; chemo and radiation prior to surgery    Hypothyroidism  -cont levothyroxine    Type 2 diabetes  -hold metformin  -ISS  -last A1c 5.6    HLD  -cont atorvastatin     Diet:  mod carb  DVT Prophylaxis: Lovenox  Arthur Catheter: Not present  Lines: PRESENT             Code Status:  FULL    Clinically Significant Risk Factors Present on Admission         # Obesity: Estimated body mass index is 35.15 kg/m  as calculated from the following:    Height as of this encounter: 1.778 m (5' 10\").    Weight as of this encounter: 111.1 kg (245 lb).              Disposition Plan      Expected Discharge Date: 10/28/2023                The patient's care was discussed with the Patient.        KAYLEE JACKSON MD  [facility]  Securely message with the Vocera Web Console (learn more here)  Text page via [facility] Paging/Directory      Visit/Communication Style   Virtual (Video) communication was used to evaluate Jareth.  Jareth consented to the use of video communication: yes  Video START time: 0135, 10/27/2023  Video STOP time: 0145, 10/27/2023   Patient's location: [facility]   Provider's location during the visit: [facility] Tele-medicine site        ______________________________________________________________________    Chief Complaint   fever    History of Present Illness   63yoM with nasopharyngeal CA, Type 2 Dm, hypothyroidism, and HLD presented to the ED with fever and chills for five days.  He denies URI symptoms, abdominal pain, " n/v/d, cough, SOB, dysuria, HA, skin rashes.  He has removed five ticks in the last two weeks.  His last pembrolizumab treatment was 10/11/23.    Review of Systems    General: negative for , weakness  Eyes: negative for blurred vision, loss of vision  Ear Nose and Throat: negative for pharyngitis, speech or swallowing difficulties  Respiratory:  negative for sputum production, wheezing, LION, pleuritic pain, sob or cough  Cardiology:  negative for chest pain, palpitations, orthopnea, PND, edema, syncope   Gastrointestinal: negative for abdominal pain, nausea, vomiting, diarrhea, constipation, hematemesis, melena or hematochezia  Genitourinary: negative for frequency, urgency, dysuria, hematuria   Neurological: negative for focal weakness, paresthesia    Past Medical History    I have reviewed this patient's medical history and updated it with pertinent information if needed.   Past Medical History:   Diagnosis Date    COPD (chronic obstructive pulmonary disease) (H)     Dyslipidemia     Hard to intubate 12/5/2022    Nasopharyngeal carcinoma (H)     BARRETT (obstructive sleep apnea)     Type 2 diabetes mellitus with diabetic nephropathy (H)        Past Surgical History   I have reviewed this patient's surgical history and updated it with pertinent information if needed.  Past Surgical History:   Procedure Laterality Date    COLONOSCOPY      DISSECTION RADICAL NECK MODIFIED Left 12/5/2022    Procedure: left modified radical neck dissection;  Surgeon: Jesus Boston MD;  Location: [facility] OR    INSERT PORT VASCULAR ACCESS Right 03/31/2022    Procedure: SINGLE LUMEN POWER PORT INSERTION, VASCULAR ACCESS;  Surgeon: Evens Aponte MD;  Location: [facility] OR    IR CHEST PORT PLACEMENT > 5 YRS OF AGE  03/31/2022    KNEE SURGERY      LARYNGOSCOPY WITH BIOPSY(IES) N/A 12/5/2022    Procedure: LARYNGOSCOPY, WITH BIOPSY;  Surgeon: Jesus Boston MD;  Location: [facility] OR    TONSILLECTOMY         Social History   I have reviewed this  patient's social history and updated it with pertinent information if needed.  Social History     Tobacco Use    Smoking status: Former     Packs/day: 4.00     Years: 30.00     Additional pack years: 0.00     Total pack years: 120.00     Types: Cigarettes     Quit date:      Years since quittin.8     Passive exposure: Past    Smokeless tobacco: Never    Tobacco comments:     quit smoking. Passive exposure in childhood home.    Substance Use Topics    Alcohol use: Yes     Comment: rarely    Drug use: Never       Family History   I have reviewed this patient's family history and updated it with pertinent information if needed.  Family History   Problem Relation Age of Onset    Cerebrovascular Disease Mother     Leukemia Father     Melanoma Sister        Prior to Admission Medications   Prior to Admission Medications   Prescriptions Last Dose Informant Patient Reported? Taking?   TRULICITY 1.5 MG/0.5ML pen   Yes No   Sig: INJECT 1.5 MG UNDER THE SKIN ONE TIME A WEEK.   acetaminophen (TYLENOL) 325 MG tablet   No No   Sig: Take 2 tablets (650 mg) by mouth every 4 hours as needed for other (For optimal non-opioid multimodal pain management to improve pain control.)   atorvastatin (LIPITOR) 20 MG tablet   Yes No   Sig: Take 20 mg by mouth   blood glucose (NO BRAND SPECIFIED) lancets standard   No No   Sig: Use to test blood sugar 2 times daily or as directed.   blood glucose (NO BRAND SPECIFIED) test strip   No No   Sig: Use to test blood sugar 3 times daily or as directed.   blood glucose monitoring (ACCU-CHEK RANDELL PLUS) meter device kit   No No   Sig: Use to test blood sugar 2 times daily or as directed.   fluticasone-vilanterol (BREO ELLIPTA) 100-25 MCG/INH inhaler   Yes No   Sig: INHALE 1 PUFF INTO THE LUNGS ONE TIME A DAY. RINSE MOUTH AFTER USE.   furosemide (LASIX) 20 MG tablet   Yes No   Sig: Take 20 mg by mouth   levothyroxine (SYNTHROID/LEVOTHROID) 112 MCG tablet   No No   Sig: Take 1 tablet (112 mcg) by  mouth daily   metFORMIN (GLUCOPHAGE XR) 500 MG 24 hr tablet   Yes No   Sig: Take 500 mg by mouth daily (with dinner)   mineral oil-hydrophilic petrolatum (AQUAPHOR) external ointment   No No   Sig: Apply topically every 8 hours Apply to incision.   Patient not taking: Reported on 8/29/2023      Facility-Administered Medications: None     Allergies   No Known Allergies    Physical Exam   Vital Signs: Temp: (!) 103.6  F (39.8  C) Temp src: Tympanic BP: (!) 150/92 Pulse: 112   Resp: 15 SpO2: 94 % O2 Device: None (Room air)    Weight: 245 lbs 0 oz    Gen:  Well-developed, well-nourished, in no acute distress, lying semi-supine in hospital stretcher  HEENT:  Anicteric sclera, PER, hearing intact to voice  Resp:  No accessory muscle use, breath sounds clear; no wheezes no rales no rhonchi  Card:  No murmur, normal S1, S2   Abd:  Soft per RN exam, no TTP, non-distended, normoactive bowel sounds are present  Musc:  Normal strength and movement of the major muscle groups without obvious deformity  Psych:  Good insight, oriented to person, place and time, not anxious, not agitated    Data     Recent Labs   Lab 10/26/23  1346   WBC 4.8   HGB 12.3*   MCV 86   *      POTASSIUM 3.8   CHLORIDE 95*   CO2 27   BUN 14.9   CR 1.10   ANIONGAP 13   LAI 9.3   GLC 95   ALBUMIN 4.0   PROTTOTAL 8.5*   BILITOTAL 1.6*   ALKPHOS 82   ALT 11   AST 24         Recent Results (from the past 24 hour(s))   XR Chest Port 1 View    Narrative    Exam:  XR CHEST PORT 1 VIEW    HISTORY: fever unknown origin.    COMPARISON:  9/13/2023, 7/24/2023    FINDINGS: There is a right chest port with the catheter terminating at  the cavoatrial junction.    The cardiomediastinal contours are normal.      No focal consolidation, effusion, or pneumothorax.      No acute osseous abnormality.       Impression    IMPRESSION:      No acute cardiopulmonary process.      ODELL MORALES MD         SYSTEM ID:  [id number]   CT Chest Pulmonary Embolism w Contrast     Narrative    CT CHEST PULMONARY EMBOLISM W CONTRAST    HISTORY: 63 years Male +dimerPt to ER from home with c/o 5 days of  sweats, fever, chills.  Changes noted to facial and swelling s/p  nasopharyngeal cancer. Had vision changes on Sunday, resolved on own  back to normal.  Has not been seen or contacted PCP. Tylenol this AM  at 0800. Denies pain or nausea, however decreased appetite.  Pt states  he will start to sweat profusely and this will be followed by intense  chills.    TECHNIQUE: Axial CT imaging of the chest was performed With  intravenous contrast. Coronal and sagittal reconstructions were  obtained.  This exam was performed using one or more of the following dose  reduction techniques:  Automated exposure control, adjustment of the BIRGIT and/or KV according  to patient's size, and/or use of iterative reconstruction technique.    COMPARISON: 9/13/2023    FINDINGS:  No evidence of pulmonary embolism. The thoracic aorta is unremarkable.  There is no mediastinal or hilar or axillary lymphadenopathy.      Mild dependent atelectasis. Lungs otherwise clear.         No concerning osseous lesions are identified.    There is cholelithiasis. There is no biliary dilatation.      Impression    IMPRESSION: No evidence of pulmonary embolism.    ARNOLD TEE MD         SYSTEM ID:  [id number]

## 2023-10-27 NOTE — PROGRESS NOTES
Chart accessed pending admission to [facility].  Attempted to call ED for report, no answer at this time. Kellen Maldonado RN on 10/26/2023 at 8:37 PM

## 2023-10-27 NOTE — PHARMACY-VANCOMYCIN DOSING SERVICE
"Pharmacy Vancomycin Initial Note  Date of Service 2023  Patient's  1960  63 year old, male    Indication: Sepsis    Current estimated CrCl = Estimated Creatinine Clearance: 80.9 mL/min (A) (based on SCr of 1.18 mg/dL (H)).    Creatinine for last 3 days  10/26/2023:  1:46 PM Creatinine 1.10 mg/dL  10/27/2023:  5:41 AM Creatinine 1.18 mg/dL    Recent Vancomycin Level(s) for last 3 days  No results found for requested labs within last 3 days.      Vancomycin IV Administrations (past 72 hours)                     vancomycin (VANCOCIN) 2,000 mg in sodium chloride 0.9 % 500 mL intermittent infusion (mg) 2,000 mg New Bag 10/26/23 2006                    Nephrotoxins and other renal medications (From now, onward)      Start     Dose/Rate Route Frequency Ordered Stop    10/27/23 0800  vancomycin (VANCOCIN) 1,000 mg in NaCl 0.9% 200 mL intermittent infusion         1,000 mg  over 60 Minutes Intravenous EVERY 12 HOURS 10/27/23 0739      10/26/23 2000  piperacillin-tazobactam (ZOSYN) 4.5 g vial to attach to  mL bag        Note to Pharmacy: For [facility], [facility] and [facility]: For Zosyn-naive patients, use the \"Zosyn initial dose + extended infusion\" order panel.    4.5 g  over 30 Minutes Intravenous EVERY 6 HOURS 10/26/23 193              Contrast Orders - past 72 hours (72h ago, onward)      Start     Dose/Rate Route Frequency Stop    10/26/23 173  iopamidol (ISOVUE-370) solution 92 mL         92 mL Intravenous ONCE 10/26/23 1739            InsightRX Prediction of Planned Initial Vancomycin Regimen  Loading dose: 2000 mg at 2000 10/26  Regimen: 1000 mg IV every 12 hours.  Start time: 08:06 on 10/27/2023  Exposure target: AUC24 (range)400-600 mg/L.hr   AUC24,ss: 500 mg/L.hr  Probability of AUC24 > 400: 73 %  Ctrough,ss: 16.8 mg/L  Probability of Ctrough,ss > 20: 34 %  Probability of nephrotoxicity (Lodise AISHWARYA ): 12 %        Plan:  Start vancomycin  1000 mg IV q12h.   Vancomycin monitoring method: AUC  Vancomycin " therapeutic monitoring goal: 400-600 mg*h/L  Pharmacy will check vancomycin levels as appropriate in 1-3 Days.    Serum creatinine levels will be ordered daily for the first week of therapy and at least twice weekly for subsequent weeks.      Mag Jimenez RPH

## 2023-10-27 NOTE — PROGRESS NOTES
Interdisciplinary Discharge Planning Note    Anticipated Discharge Date:10/28-10/29    Anticipated Discharge Location: Home     Clinical Needs Before Discharge:  stable vital signs and antibiotics     Treatment Needs After Discharge:  None identified    Potential Barriers to Discharge: None Identified     ZIYAD Mirza  10/27/2023,  12:54 PM

## 2023-10-27 NOTE — PROGRESS NOTES
10/26/23 2230   Initial Information   Patient Belongings remains with patient   Patient Belongings Remaining with Patient clothing  (shoes)   Did you bring any home meds/supplements to the hospital?  No                      Admission:  I am responsible for any personal items that are not sent to the safe or pharmacy.  [facility] is not responsible for loss, theft or damage of any property in my possession.    Signature:  _________________________________ Date: _______  Time: _____                                              Staff Signature:  ____________________________ Date: ________  Time: _____      2nd Staff person, if patient is unable/unwilling to sign:    Signature: ________________________________ Date: ________  Time: _____     Discharge:  [facility] has returned all of my personal belongings:    Signature: _________________________________ Date: ________  Time: _____                                          Staff Signature:  ____________________________ Date: ________  Time: _____

## 2023-10-27 NOTE — PROGRESS NOTES
"NS ADMISSION NOTE    Patient admitted to room 316 at approximately 2137 via wheel chair from emergency room. Patient was accompanied by nurse.     Verbal SBAR report received from ZULEIKA Solis prior to patient arrival.     Patient ambulated to bed with one assist. Patient alert and oriented X 3. The patient is not having any pain.  . Admission vital signs: Blood pressure 118/74, pulse 96, temperature (!) 102.3  F (39.1  C), temperature source Tympanic, resp. rate 20, height 1.778 m (5' 10\"), weight 108 kg (238 lb 3.2 oz), SpO2 95%. Patient was oriented to plan of care, call light, bed controls, tv, telephone, bathroom, and visiting hours.     Risk Assessment    The following safety risks were identified during admission: fall. Yellow risk band applied: YES.     Kellen Maldonado RN      "

## 2023-10-27 NOTE — PROGRESS NOTES
"[facility]    Medicine Progress Note - Hospitalist Service    Date of Admission:  10/26/2023    Assessment & Plan   Principal Problem:    Sepsis without acute organ dysfunction, due to unspecified organism (H)  Unknown etiology  Initial labs normal with normal WBC, low procalcitonin, normal UA, normal CXR  CTA neg for PE  COVID-19/flu/RSV neg   Recent tick exposure. Ehrlichia and Lyme titers pending   Continue doxycycline  Continue empiric Zosyn and vancomycin while waiting on urine and blood cultures    Nasopharyngeal CA  -Cycle 10 pembrolizumab on 10/11/23  -surgical resection 12/2022; chemo and radiation prior to surgery     Hypothyroidism  cont levothyroxine     Type 2 diabetes  hold metformin  ISS  last A1c 5.6     Hyperlipidemia  Continue atorvastatin          Diet: Combination Diet Regular Diet Adult; Moderate Consistent Carb (60 g CHO per Meal) Diet    DVT Prophylaxis: Enoxaparin (Lovenox) SQ  Arthur Catheter: Not present  Lines: PRESENT      Port A Cath Single 03/31/22 Right Chest wall-Site Assessment: WDL      Cardiac Monitoring: None  Code Status: Full Code      Clinically Significant Risk Factors Present on Admission           # Hypercalcemia: corrected calcium is >10.1, will monitor as appropriate    # Hypoalbuminemia: Lowest albumin = 2.9 g/dL at 10/27/2023  5:41 AM, will monitor as appropriate   # Thrombocytopenia: Lowest platelets = 111 in last 2 days, will monitor for bleeding        # Obesity: Estimated body mass index is 33.88 kg/m  as calculated from the following:    Height as of this encounter: 1.778 m (5' 10\").    Weight as of this encounter: 107.1 kg (236 lb 1.6 oz).              Disposition Plan     Expected Discharge Date: 10/28/2023                    Frank Lipscomb MD  Hospitalist Service  [facility]  Securely message with Att[employer] (more info)  Text page via [facility] Paging/Directory "   ______________________________________________________________________    Interval History   Still spiking a fever. No cough, SOB, stomach upset, diarrhea. Feels fine between fevers.    Physical Exam   Vital Signs: Temp: (!) 102.8  F (39.3  C) Temp src: Tympanic BP: 126/76 Pulse: 97   Resp: 16 SpO2: 94 % O2 Device: None (Room air)    Weight: 236 lbs 1.6 oz    General Appearance: Alert. No acute distress  Chest/Respiratory Exam: Clear to auscultation bilaterally  Cardiovascular Exam: Regular rate and rhythm. S1, S2, no murmur, gallop, or rubs.  Gastrointestinal Exam: Soft, nontender  Extremities: No lower extremity edema.  Psychiatric: Normal affect and mentation      Medical Decision Making             Data     I have personally reviewed the following data over the past 24 hrs:    4.8  \   10.1 (L)   / 111 (L)     136 102 19.0 /  116 (H)   3.8 24 1.18 (H) \     ALT: 11 AST: 27 AP: 66 TBILI: 1.3 (H)   ALB: 2.9 (L) TOT PROTEIN: 6.9 LIPASE: N/A     Trop: N/A BNP: N/A     Procal: N/A CRP: N/A Lactic Acid: N/A       INR:  N/A PTT:  N/A   D-dimer:  N/A Fibrinogen:  N/A       Imaging results reviewed over the past 24 hrs:   Recent Results (from the past 24 hour(s))   CT Chest Pulmonary Embolism w Contrast    Narrative    CT CHEST PULMONARY EMBOLISM W CONTRAST    HISTORY: 63 years Male +dimerPt to ER from home with c/o 5 days of  sweats, fever, chills.  Changes noted to facial and swelling s/p  nasopharyngeal cancer. Had vision changes on Sunday, resolved on own  back to normal.  Has not been seen or contacted PCP. Tylenol this AM  at 0800. Denies pain or nausea, however decreased appetite.  Pt states  he will start to sweat profusely and this will be followed by intense  chills.    TECHNIQUE: Axial CT imaging of the chest was performed With  intravenous contrast. Coronal and sagittal reconstructions were  obtained.  This exam was performed using one or more of the following dose  reduction techniques:  Automated exposure  control, adjustment of the BIRGIT and/or KV according  to patient's size, and/or use of iterative reconstruction technique.    COMPARISON: 9/13/2023    FINDINGS:  No evidence of pulmonary embolism. The thoracic aorta is unremarkable.  There is no mediastinal or hilar or axillary lymphadenopathy.      Mild dependent atelectasis. Lungs otherwise clear.         No concerning osseous lesions are identified.    There is cholelithiasis. There is no biliary dilatation.      Impression    IMPRESSION: No evidence of pulmonary embolism.    ARNOLD TEE MD         SYSTEM ID:  [id number]

## 2023-10-27 NOTE — PHARMACY-ADMISSION MEDICATION HISTORY
Pharmacist Admission Medication History    Admission medication history is complete. The information provided in this note is only as accurate as the sources available at the time of the update.    Information Source(s): Patient via in-person, Surescripts    Pertinent Information: Patient spoke well and was knowledgeable about medications, expressed no other PRN or scheduled OTC or Rx products    Changes made to PTA medication list:  Added: None  Deleted: Aquaphor  Changed: Completed atorvastatin and lasix sigs to include frequency, APAP to 500 mg per patient report    Medication Affordability:  Not including over the counter (OTC) medications, was there a time in the past 3 months when you did not take your medications as prescribed because of cost?: No    Allergies reviewed with patient and updates made in EHR: yes- NKDA    Medication History Completed By: Mag Jimenez RPH 10/27/2023 9:30 AM    PTA Med List   Medication Sig Last Dose    acetaminophen (TYLENOL) 500 MG tablet Take 500 mg by mouth every 6 hours as needed for mild pain 10/26/2023 at AM    atorvastatin (LIPITOR) 20 MG tablet Take 20 mg by mouth daily 10/26/2023 at AM    doxycycline hyclate (VIBRAMYCIN) 100 MG capsule Take 1 capsule (100 mg) by mouth 2 times daily for 10 days     fluticasone-vilanterol (BREO ELLIPTA) 100-25 MCG/INH inhaler INHALE 1 PUFF INTO THE LUNGS ONE TIME A DAY. RINSE MOUTH AFTER USE. 10/26/2023 at AM    furosemide (LASIX) 20 MG tablet Take 20 mg by mouth daily 10/26/2023 at AM    levothyroxine (SYNTHROID/LEVOTHROID) 112 MCG tablet Take 1 tablet (112 mcg) by mouth daily 10/26/2023 at AM    metFORMIN (GLUCOPHAGE XR) 500 MG 24 hr tablet Take 500 mg by mouth daily 10/26/2023 at AM    TRULICITY 1.5 MG/0.5ML pen INJECT 1.5 MG UNDER THE SKIN ONE TIME A WEEK. 10/23/2023 at AM

## 2023-10-27 NOTE — PLAN OF CARE
Goal Outcome Evaluation:  Pt was admitted overnight from the ED for sepsis. Highest temp of 102.3. Tylenol utilized in ED, Ibuprofen given, ice packs applied. Face flushed, diaphoretic. Denies pain. Lungs with expiratory wheezes first assessment, clear 2nd assessment. O2 stable on room air. Congested cough present as well as nasal congestion. HR tachy at times. Pt skin has scattered scabs. Pt pointed out 3 different areas where deer ticks had been attached recently. Abrasions present to legs. Pt has had multiple falls in the last couple months. Bed alarm in place. Pt is A&O X4. Up assist of 1. PIV saline locked. Port accessed in the ED and is heparin locked. Kellen Maldonado RN on 10/27/2023 at 5:21 AM      Plan of Care Reviewed With: patient    Overall Patient Progress: no change

## 2023-10-28 ENCOUNTER — HOSPITAL ENCOUNTER (EMERGENCY)
Facility: OTHER | Age: 63
End: 2023-10-28
Payer: COMMERCIAL

## 2023-10-28 VITALS
BODY MASS INDEX: 34.09 KG/M2 | WEIGHT: 238.1 LBS | TEMPERATURE: 101 F | HEART RATE: 80 BPM | OXYGEN SATURATION: 97 % | SYSTOLIC BLOOD PRESSURE: 96 MMHG | HEIGHT: 70 IN | DIASTOLIC BLOOD PRESSURE: 68 MMHG | RESPIRATION RATE: 18 BRPM

## 2023-10-28 LAB
ANION GAP SERPL CALCULATED.3IONS-SCNC: 9 MMOL/L (ref 7–15)
BUN SERPL-MCNC: 15.4 MG/DL (ref 8–23)
CALCIUM SERPL-MCNC: 8.8 MG/DL (ref 8.8–10.2)
CHLORIDE SERPL-SCNC: 103 MMOL/L (ref 98–107)
CREAT SERPL-MCNC: 1.17 MG/DL (ref 0.67–1.17)
DEPRECATED HCO3 PLAS-SCNC: 26 MMOL/L (ref 22–29)
EGFRCR SERPLBLD CKD-EPI 2021: 71 ML/MIN/1.73M2
ERYTHROCYTE [DISTWIDTH] IN BLOOD BY AUTOMATED COUNT: 15 % (ref 10–15)
GLUCOSE BLDC GLUCOMTR-MCNC: 105 MG/DL (ref 70–99)
GLUCOSE BLDC GLUCOMTR-MCNC: 89 MG/DL (ref 70–99)
GLUCOSE SERPL-MCNC: 104 MG/DL (ref 70–99)
HCT VFR BLD AUTO: 31.9 % (ref 40–53)
HGB BLD-MCNC: 10.1 G/DL (ref 13.3–17.7)
HOLD SPECIMEN: NORMAL
HOLD SPECIMEN: NORMAL
MCH RBC QN AUTO: 27.6 PG (ref 26.5–33)
MCHC RBC AUTO-ENTMCNC: 31.7 G/DL (ref 31.5–36.5)
MCV RBC AUTO: 87 FL (ref 78–100)
PLATELET # BLD AUTO: 99 10E3/UL (ref 150–450)
POTASSIUM SERPL-SCNC: 3.5 MMOL/L (ref 3.4–5.3)
PROCALCITONIN SERPL IA-MCNC: 0.37 NG/ML
RBC # BLD AUTO: 3.66 10E6/UL (ref 4.4–5.9)
SODIUM SERPL-SCNC: 138 MMOL/L (ref 135–145)
WBC # BLD AUTO: 3.6 10E3/UL (ref 4–11)

## 2023-10-28 PROCEDURE — 250N000013 HC RX MED GY IP 250 OP 250 PS 637: Performed by: INTERNAL MEDICINE

## 2023-10-28 PROCEDURE — 250N000013 HC RX MED GY IP 250 OP 250 PS 637: Performed by: FAMILY MEDICINE

## 2023-10-28 PROCEDURE — 85027 COMPLETE CBC AUTOMATED: CPT | Performed by: FAMILY MEDICINE

## 2023-10-28 PROCEDURE — 94640 AIRWAY INHALATION TREATMENT: CPT

## 2023-10-28 PROCEDURE — 250N000011 HC RX IP 250 OP 636: Mod: JZ | Performed by: STUDENT IN AN ORGANIZED HEALTH CARE EDUCATION/TRAINING PROGRAM

## 2023-10-28 PROCEDURE — 99239 HOSP IP/OBS DSCHRG MGMT >30: CPT | Performed by: FAMILY MEDICINE

## 2023-10-28 PROCEDURE — 36415 COLL VENOUS BLD VENIPUNCTURE: CPT | Performed by: FAMILY MEDICINE

## 2023-10-28 PROCEDURE — 999N000157 HC STATISTIC RCP TIME EA 10 MIN

## 2023-10-28 PROCEDURE — 84145 PROCALCITONIN (PCT): CPT | Performed by: FAMILY MEDICINE

## 2023-10-28 PROCEDURE — 86666 EHRLICHIA ANTIBODY: CPT | Performed by: FAMILY MEDICINE

## 2023-10-28 PROCEDURE — 250N000011 HC RX IP 250 OP 636: Mod: JZ | Performed by: FAMILY MEDICINE

## 2023-10-28 PROCEDURE — 80048 BASIC METABOLIC PNL TOTAL CA: CPT | Performed by: FAMILY MEDICINE

## 2023-10-28 PROCEDURE — 250N000011 HC RX IP 250 OP 636: Performed by: FAMILY MEDICINE

## 2023-10-28 RX ORDER — DOXYCYCLINE 100 MG/1
100 CAPSULE ORAL 2 TIMES DAILY
Qty: 20 CAPSULE | Refills: 0 | Status: ON HOLD | OUTPATIENT
Start: 2023-10-28 | End: 2023-11-07

## 2023-10-28 RX ADMIN — IBUPROFEN 600 MG: 600 TABLET, FILM COATED ORAL at 02:07

## 2023-10-28 RX ADMIN — VANCOMYCIN HYDROCHLORIDE 1000 MG: 1 INJECTION, SOLUTION INTRAVENOUS at 07:58

## 2023-10-28 RX ADMIN — FLUTICASONE FUROATE AND VILANTEROL TRIFENATATE 1 PUFF: 100; 25 POWDER RESPIRATORY (INHALATION) at 07:49

## 2023-10-28 RX ADMIN — ACETAMINOPHEN 650 MG: 325 TABLET, FILM COATED ORAL at 13:34

## 2023-10-28 RX ADMIN — PIPERACILLIN AND TAZOBACTAM 4.5 G: 4; .5 INJECTION, POWDER, LYOPHILIZED, FOR SOLUTION INTRAVENOUS at 12:36

## 2023-10-28 RX ADMIN — ACETAMINOPHEN 650 MG: 325 TABLET, FILM COATED ORAL at 02:06

## 2023-10-28 RX ADMIN — ATORVASTATIN CALCIUM 20 MG: 20 TABLET, FILM COATED ORAL at 10:46

## 2023-10-28 RX ADMIN — LEVOTHYROXINE SODIUM 112 MCG: 0.11 TABLET ORAL at 10:46

## 2023-10-28 RX ADMIN — ACETAMINOPHEN 650 MG: 325 TABLET, FILM COATED ORAL at 08:15

## 2023-10-28 RX ADMIN — IBUPROFEN 600 MG: 600 TABLET, FILM COATED ORAL at 16:19

## 2023-10-28 RX ADMIN — DOXYCYCLINE 100 MG: 100 INJECTION, POWDER, LYOPHILIZED, FOR SOLUTION INTRAVENOUS at 10:46

## 2023-10-28 RX ADMIN — PIPERACILLIN AND TAZOBACTAM 4.5 G: 4; .5 INJECTION, POWDER, LYOPHILIZED, FOR SOLUTION INTRAVENOUS at 05:31

## 2023-10-28 RX ADMIN — PIPERACILLIN AND TAZOBACTAM 4.5 G: 4; .5 INJECTION, POWDER, LYOPHILIZED, FOR SOLUTION INTRAVENOUS at 00:10

## 2023-10-28 ASSESSMENT — ACTIVITIES OF DAILY LIVING (ADL)
ADLS_ACUITY_SCORE: 26

## 2023-10-28 NOTE — DISCHARGE SUMMARY
"[facility]  Hospitalist Discharge Summary      Date of Admission:  10/26/2023  Date of Discharge:  10/28/2023  Discharging Provider: Frank Lipscomb MD  Discharge Service: Hospitalist Service    Discharge Diagnoses   Principal Problem:    Sepsis without acute organ dysfunction, due to unspecified organism (H)  Active Problems:    Sinus tachycardia    Fever of unknown origin        Clinically Significant Risk Factors     # Obesity: Estimated body mass index is 34.16 kg/m  as calculated from the following:    Height as of this encounter: 1.778 m (5' 10\").    Weight as of this encounter: 108 kg (238 lb 1.6 oz).       Follow-ups Needed After Discharge   Follow-up Appointments     Follow-up and recommended labs and tests       Follow up with oncology as scheduled          Unresulted Labs Ordered in the Past 30 Days of this Admission       Date and Time Order Name Status Description    10/27/2023  3:45 PM Anaplasma phagocytoph Antibodies IgG IgM In process     10/26/2023  1:05 PM Ehrlichia Anaplasma Sp by PCR In process     10/26/2023  1:05 PM Blood Culture Arm, Left Preliminary     10/26/2023  1:05 PM Blood Culture Arm, Left Preliminary         These results will be followed up by hospitalist pool    Discharge Disposition   Discharged to home  Condition at discharge: Stable    Hospital Course   Principal Problem:    Sepsis without acute organ dysfunction, due to unspecified organism (H)  Unknown etiology, suspect tick disease  Initial labs normal with normal WBC, low procalcitonin, normal UA, normal CXR  CTA neg for PE  COVID-19/flu/RSV neg   Recent tick exposure. Ehrlichia and Lyme titers pending   Received empiric Zosyn and vancomycin while waiting on blood cultures.   Blood culture negative x 48 hrs, so no further IV antibiotic indication without focal source  No symptoms to guide treatment   Still had a fever to 102.3 morning of discharge, but feels fine.   Continue doxycycline for potential " anaplasma  Consider fever due to Keytruda (10 to 33% occurrence). 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       Frank Lipscomb MD  [facility]  [address]   [place] [address]  Phone: [phone]  Fax: [phone]  ______________________________________________________________________    Physical Exam   Vital Signs: Temp: 97.8  F (36.6  C) Temp src: Tympanic BP: 96/68 Pulse: 80   Resp: 18 SpO2: 97 % O2 Device: None (Room air)    Weight: 238 lbs 1.6 oz  General Appearance: Alert. No acute distress  Chest/Respiratory Exam: Clear to auscultation bilaterally  Cardiovascular Exam: Regular rate and rhythm. S1, S2, no murmur, gallop, or rubs.  Gastrointestinal Exam: Soft, nontender  Extremities: No lower extremity edema.  Psychiatric: Normal affect and mentation         Primary Care Physician   Laura Guzman    Discharge Orders      Reason for your hospital stay    Fever, most likely due to anaplasma. Tests are pending. If testing is all negative, then discuss potential fever from Keytruda with Dr Martin     Follow-up and recommended labs and tests     Follow up with oncology as scheduled     Activity    Your activity upon discharge: activity as tolerated     Diet    Follow this diet upon discharge: diabetic diet       Significant Results and Procedures   Most Recent 3 CBC's:  Recent Labs   Lab Test 10/28/23  0549 10/27/23  0541 10/26/23  1346    WBC 3.6* 4.8 4.8   HGB 10.1* 10.1* 12.3*   MCV 87 88 86   PLT 99* 111* 145*     Most Recent 3 BMP's:  Recent Labs   Lab Test 10/28/23  1108 10/28/23  0733 10/28/23  0549 10/27/23  0754 10/27/23  0541 10/26/23  2245 10/26/23  1346   NA  --   --  138  --  136  --  135   POTASSIUM  --   --  3.5  --  3.8  --  3.8   CHLORIDE  --   --  103  --  102  --  95*   CO2  --   --  26  --  24  --  27   BUN  --   --  15.4  --  19.0  --  14.9   CR  --   --  1.17  --  1.18*  --  1.10   ANIONGAP  --   --  9  --  10  --  13   LAI  --   --  8.8  --  8.7*  --  9.3   * 89 104*   < > 112*   < > 95    < > = values in this interval not displayed.     Most Recent 2 LFT's:  Recent Labs   Lab Test 10/27/23  0541 10/26/23  1346   AST 27 24   ALT 11 11   ALKPHOS 66 82   BILITOTAL 1.3* 1.6*   ,   Results for orders placed or performed during the hospital encounter of 10/26/23   XR Chest Port 1 View    Narrative    Exam:  XR CHEST PORT 1 VIEW    HISTORY: fever unknown origin.    COMPARISON:  9/13/2023, 7/24/2023    FINDINGS: There is a right chest port with the catheter terminating at  the cavoatrial junction.    The cardiomediastinal contours are normal.      No focal consolidation, effusion, or pneumothorax.      No acute osseous abnormality.       Impression    IMPRESSION:      No acute cardiopulmonary process.      ODELL MORALES MD         SYSTEM ID:  [id number]   CT Chest Pulmonary Embolism w Contrast    Narrative    CT CHEST PULMONARY EMBOLISM W CONTRAST    HISTORY: 63 years Male +dimerPt to ER from home with c/o 5 days of  sweats, fever, chills.  Changes noted to facial and swelling s/p  nasopharyngeal cancer. Had vision changes on Sunday, resolved on own  back to normal.  Has not been seen or contacted PCP. Tylenol this AM  at 0800. Denies pain or nausea, however decreased appetite.  Pt states  he will start to sweat profusely and this will be followed by intense  chills.    TECHNIQUE: Axial CT imaging of the chest was performed  With  intravenous contrast. Coronal and sagittal reconstructions were  obtained.  This exam was performed using one or more of the following dose  reduction techniques:  Automated exposure control, adjustment of the BIRGIT and/or KV according  to patient's size, and/or use of iterative reconstruction technique.    COMPARISON: 9/13/2023    FINDINGS:  No evidence of pulmonary embolism. The thoracic aorta is unremarkable.  There is no mediastinal or hilar or axillary lymphadenopathy.      Mild dependent atelectasis. Lungs otherwise clear.         No concerning osseous lesions are identified.    There is cholelithiasis. There is no biliary dilatation.      Impression    IMPRESSION: No evidence of pulmonary embolism.    ARNOLD TEE MD         SYSTEM ID:  [id number]       Discharge Medications   Current Discharge Medication List        START taking these medications    Details   doxycycline hyclate (VIBRAMYCIN) 100 MG capsule Take 1 capsule (100 mg) by mouth 2 times daily  Qty: 20 capsule, Refills: 0    Associated Diagnoses: Fever of unknown origin           CONTINUE these medications which have NOT CHANGED    Details   acetaminophen (TYLENOL) 500 MG tablet Take 500 mg by mouth every 6 hours as needed for mild pain      atorvastatin (LIPITOR) 20 MG tablet Take 20 mg by mouth daily      fluticasone-vilanterol (BREO ELLIPTA) 100-25 MCG/INH inhaler INHALE 1 PUFF INTO THE LUNGS ONE TIME A DAY. RINSE MOUTH AFTER USE.      furosemide (LASIX) 20 MG tablet Take 20 mg by mouth daily      levothyroxine (SYNTHROID/LEVOTHROID) 112 MCG tablet Take 1 tablet (112 mcg) by mouth daily  Qty: 60 tablet, Refills: 6    Associated Diagnoses: Hypothyroidism, unspecified type      metFORMIN (GLUCOPHAGE XR) 500 MG 24 hr tablet Take 500 mg by mouth daily      TRULICITY 1.5 MG/0.5ML pen INJECT 1.5 MG UNDER THE SKIN ONE TIME A WEEK.      blood glucose (NO BRAND SPECIFIED) lancets standard Use to test blood sugar 2 times daily or as  directed.  Qty: 90 lancet, Refills: 1    Associated Diagnoses: Nasopharyngeal cancer (H); Type 2 diabetes mellitus with microalbuminuria, without long-term current use of insulin (H)      blood glucose (NO BRAND SPECIFIED) test strip Use to test blood sugar 3 times daily or as directed.  Qty: 90 strip, Refills: 0    Associated Diagnoses: Nasopharyngeal cancer (H); Type 2 diabetes mellitus with microalbuminuria, without long-term current use of insulin (H)      blood glucose monitoring (ACCU-CHEK RANDELL PLUS) meter device kit Use to test blood sugar 2 times daily or as directed.  Qty: 1 kit, Refills: 0    Associated Diagnoses: Nasopharyngeal cancer (H); Type 2 diabetes mellitus with microalbuminuria, without long-term current use of insulin (H)           Allergies   No Known Allergies

## 2023-10-28 NOTE — PROGRESS NOTES
Patient has been noted to have elevated temps.  102.9 Tylenol and Ibuprofen administered.  Recheck temp was 104.9.  Cold packs applied under arms and fan turned on, patient did not want cool wash clothes, rechecked again and it was 103.9, then 103.1 and last check was 102.1.  Continuing to monitor and recheck frequently.

## 2023-10-28 NOTE — PLAN OF CARE
"Patient is A&Ox4, he uses his call light to alert staff to his needs.  He has denied pain through out the night. He has been afebrile since 0200.    BP 97/67 (BP Location: Left arm, Patient Position: Semi-Rosales's, Cuff Size: Adult Regular)   Pulse 74   Temp (!) 96.3  F (35.7  C) (Tympanic)   Resp 16   Ht 1.778 m (5' 10\")   Wt 107.1 kg (236 lb 1.6 oz)   SpO2 95%   BMI 33.88 kg/m       Goal Outcome Evaluation:      Plan of Care Reviewed With: patient    Overall Patient Progress: no changeOverall Patient Progress: no change           "

## 2023-10-28 NOTE — PROGRESS NOTES
Pt was sitting comfortably at the edge of bed for his DPI, was clear and not SOB.No other interventions at this time.  Adilia Mittal, RT

## 2023-10-28 NOTE — PLAN OF CARE
Goal Outcome Evaluation:       [place] NSG DISCHARGE NOTE    Patient discharged to home at 4:41 PM via wheel chair. Accompanied by spouse and staff. Discharge instructions reviewed with patient and spouse, opportunity offered to ask questions. Prescriptions sent to patients preferred pharmacy. All belongings sent with patient.    Hilda Greco RN

## 2023-10-28 NOTE — PROGRESS NOTES
After discharge orders placed, he had significant emesis. Attributes to rotten tomatoes for lunch. Feels better since emesis. Still really wants to leave.  Offered option to discharge now, stay for some time to make sure he feels okay, or cancel discharge and stay overnight.  He agreed to stay at least an hour to make sure he feels okay before still discharging today.  He had a temp to 102 before discharging.  This is still expected as most likely etiologies are Anaplasma, for which he is receiving doxycycline, and periodic fevers due to Keytruda.  Procalcitonin was low.  Blood cultures were negative.  Does not clearly warrant broad-spectrum antibiotics.  If he continues to have high fever, should return for evaluation.

## 2023-10-28 NOTE — PROGRESS NOTES
SAFETY CHECKLIST  ID Bands and Risk clasps correct and in place (DNR, Fall risk, Allergy, Latex, Limb):  Yes  All Lines Reconciled and labeled correctly: Yes  Whiteboard updated:Yes  Environmental interventions: Yes  Joi Guajardo RN on 10/27/2023 at 8:04 PM

## 2023-10-28 NOTE — PHARMACY - DISCHARGE MEDICATION RECONCILIATION AND EDUCATION
Pharmacy: Discharge Counseling and Medication Reconciliation    Jareth Gallardo  [address] [place] [address]  [phone] (home)   63 year old male  PCP:Laura Guzman    No Known Allergies    Discharge Counseling:    Pharmacist met with patient (and/or family) today to review the medication portion of the After Visit Summary (with an emphasis on NEW medications) and to address patient's questions/concerns.     Summary of Education:   Met with patient at time of discharge to review all changes in medications including new doxycycline. Discussed indications, directions for use, and possible side effects. Patient asked a few questions and all concerns were addressed.     Materials Provided:   MedCounselor sheets printed from Clinical Pharmacology on: doxycycline    Discharge Medication Reconciliation:    Yue Hyatt RPH has reviewed the patient's discharge medication orders and has compared them to the inpatient medication administration record and to what the patient was taking prior to admission- any discrepancies have been resolved.     It has been determined that the patient has an adequate supply of medications available or which can be obtained from the patient's preferred pharmacy, which has been confirmed as: [facility] [place], [place]. (Prescription sent to [place] was discontinued as patient will not be able to make it to pick it up before the store closes)    Thank you for the consult.     Yue Hyatt RPH ....................  10/28/2023   11:20 AM

## 2023-10-28 NOTE — PROGRESS NOTES
"Pt. Discharge vitals BP (P) 118/69   Pulse (P) 110   Temp (!) (P) 102.9  F (39.4  C) (Tympanic)   Resp (P) 18   Ht 1.778 m (5' 10\")   Wt 108 kg (238 lb 1.6 oz)   SpO2 (P) 97%   BMI 34.16 kg/m   . Dr Finch made aware and ok for pt. to go home. Pt. And spouse educated on acetaminophen and ibuprofen administration instructions, including daily max dose.   "

## 2023-10-29 NOTE — PROGRESS NOTES
Patient returned to [facility] at  as port was not flushed with heparin upon deaccessing and discharging from facility. Unable to scan patient as had removed hospital name band. Verified patient with name and  as well as wife seconding. Port accessed with 20 G power port needle after cleaning with chlorhexidine. Patient did not want any Lidocaine prior to accessing. Accessed without difficulty. Flashback noted. Port flushed with 10 ml normal saline followed by 500 units (100units/ml or 5ml) heparin. Port deaccessed and bandaid applied. Patient left [facility] with wife, stated will return Tuesday for further treatment.

## 2023-10-30 ENCOUNTER — PATIENT OUTREACH (OUTPATIENT)
Dept: FAMILY MEDICINE | Facility: OTHER | Age: 63
End: 2023-10-30
Payer: COMMERCIAL

## 2023-10-30 LAB
A PHAGOCYTOPH DNA BLD QL NAA+PROBE: NOT DETECTED
E CHAFFEENSIS DNA BLD QL NAA+PROBE: NOT DETECTED
E EWINGII DNA SPEC QL NAA+PROBE: NOT DETECTED
EHRLICHIA DNA SPEC QL NAA+PROBE: NOT DETECTED

## 2023-10-30 NOTE — TELEPHONE ENCOUNTER
Patient has PCP elsewhere, no follow-up here. No TCM call required per policy.  Patient to follow up with oncology, patient noted to have an appointment 10/31/23    Aundrea Parks RN on 10/30/2023 at 8:04 AM

## 2023-10-31 ENCOUNTER — HOSPITAL ENCOUNTER (OUTPATIENT)
Dept: INFUSION THERAPY | Facility: OTHER | Age: 63
Discharge: HOME | End: 2023-10-31
Attending: INTERNAL MEDICINE
Payer: COMMERCIAL

## 2023-10-31 ENCOUNTER — APPOINTMENT (OUTPATIENT)
Dept: GENERAL RADIOLOGY | Facility: OTHER | Age: 63
End: 2023-10-31
Attending: EMERGENCY MEDICINE
Payer: COMMERCIAL

## 2023-10-31 ENCOUNTER — APPOINTMENT (OUTPATIENT)
Dept: ULTRASOUND IMAGING | Facility: OTHER | Age: 63
End: 2023-10-31
Attending: EMERGENCY MEDICINE
Payer: COMMERCIAL

## 2023-10-31 ENCOUNTER — HOSPITAL ENCOUNTER (EMERGENCY)
Facility: OTHER | Age: 63
Discharge: ANOTHER HOSPITAL | End: 2023-10-31
Attending: EMERGENCY MEDICINE | Admitting: EMERGENCY MEDICINE
Payer: COMMERCIAL

## 2023-10-31 ENCOUNTER — PATIENT OUTREACH (OUTPATIENT)
Dept: ONCOLOGY | Facility: CLINIC | Age: 63
End: 2023-10-31

## 2023-10-31 ENCOUNTER — ONCOLOGY VISIT (OUTPATIENT)
Dept: ONCOLOGY | Facility: OTHER | Age: 63
End: 2023-10-31
Attending: INTERNAL MEDICINE
Payer: COMMERCIAL

## 2023-10-31 VITALS
DIASTOLIC BLOOD PRESSURE: 70 MMHG | HEART RATE: 102 BPM | TEMPERATURE: 101.1 F | OXYGEN SATURATION: 92 % | BODY MASS INDEX: 35.07 KG/M2 | RESPIRATION RATE: 24 BRPM | HEIGHT: 70 IN | WEIGHT: 244.93 LBS | SYSTOLIC BLOOD PRESSURE: 116 MMHG

## 2023-10-31 VITALS
TEMPERATURE: 102.6 F | DIASTOLIC BLOOD PRESSURE: 64 MMHG | RESPIRATION RATE: 20 BRPM | SYSTOLIC BLOOD PRESSURE: 120 MMHG | OXYGEN SATURATION: 94 % | BODY MASS INDEX: 35.15 KG/M2 | HEART RATE: 109 BPM | WEIGHT: 245 LBS

## 2023-10-31 DIAGNOSIS — H53.2 DOUBLE VISION: ICD-10-CM

## 2023-10-31 DIAGNOSIS — E86.0 DEHYDRATION: Primary | ICD-10-CM

## 2023-10-31 DIAGNOSIS — E86.0 DEHYDRATION: ICD-10-CM

## 2023-10-31 DIAGNOSIS — N17.9 AKI (ACUTE KIDNEY INJURY) (H): Primary | ICD-10-CM

## 2023-10-31 DIAGNOSIS — N17.9 AKI (ACUTE KIDNEY INJURY) (H): ICD-10-CM

## 2023-10-31 DIAGNOSIS — C11.9 NASOPHARYNGEAL CANCER (H): ICD-10-CM

## 2023-10-31 DIAGNOSIS — R50.9 FEBRILE ILLNESS, ACUTE: ICD-10-CM

## 2023-10-31 DIAGNOSIS — R50.9 FEVER, UNSPECIFIED FEVER CAUSE: Primary | ICD-10-CM

## 2023-10-31 DIAGNOSIS — R30.0 DYSURIA: ICD-10-CM

## 2023-10-31 LAB
A PHAGOCYTOPH IGG TITR SER IF: NORMAL {TITER}
A PHAGOCYTOPH IGM TITR SER IF: NORMAL {TITER}
ALBUMIN MFR UR ELPH: 187.1 MG/DL
ALBUMIN SERPL BCG-MCNC: 2.9 G/DL (ref 3.5–5.2)
ALBUMIN SERPL BCG-MCNC: 3.1 G/DL (ref 3.5–5.2)
ALBUMIN UR-MCNC: 300 MG/DL
ALP SERPL-CCNC: 83 U/L (ref 40–129)
ALP SERPL-CCNC: 91 U/L (ref 40–129)
ALT SERPL W P-5'-P-CCNC: 19 U/L (ref 0–70)
ALT SERPL W P-5'-P-CCNC: 20 U/L (ref 0–70)
ANION GAP SERPL CALCULATED.3IONS-SCNC: 12 MMOL/L (ref 7–15)
ANION GAP SERPL CALCULATED.3IONS-SCNC: 12 MMOL/L (ref 7–15)
APPEARANCE UR: ABNORMAL
AST SERPL W P-5'-P-CCNC: 43 U/L (ref 0–45)
AST SERPL W P-5'-P-CCNC: 50 U/L (ref 0–45)
BACTERIA #/AREA URNS HPF: ABNORMAL /HPF
BACTERIA BLD CULT: NO GROWTH
BACTERIA BLD CULT: NO GROWTH
BASE EXCESS BLDV CALC-SCNC: -1.4 MMOL/L (ref -7.7–1.9)
BASOPHILS # BLD AUTO: 0 10E3/UL (ref 0–0.2)
BASOPHILS # BLD AUTO: 0 10E3/UL (ref 0–0.2)
BASOPHILS NFR BLD AUTO: 0 %
BASOPHILS NFR BLD AUTO: 0 %
BILIRUB SERPL-MCNC: 0.5 MG/DL
BILIRUB SERPL-MCNC: 0.5 MG/DL
BILIRUB UR QL STRIP: NEGATIVE
BUN SERPL-MCNC: 40.2 MG/DL (ref 8–23)
BUN SERPL-MCNC: 41 MG/DL (ref 8–23)
CALCIUM SERPL-MCNC: 8.4 MG/DL (ref 8.8–10.2)
CALCIUM SERPL-MCNC: 8.8 MG/DL (ref 8.8–10.2)
CHLORIDE SERPL-SCNC: 100 MMOL/L (ref 98–107)
CHLORIDE SERPL-SCNC: 99 MMOL/L (ref 98–107)
COLOR UR AUTO: YELLOW
CREAT SERPL-MCNC: 4.58 MG/DL (ref 0.67–1.17)
CREAT SERPL-MCNC: 4.59 MG/DL (ref 0.67–1.17)
CREAT UR-MCNC: 163.9 MG/DL
CRP SERPL-MCNC: 330.01 MG/L
DEPRECATED HCO3 PLAS-SCNC: 22 MMOL/L (ref 22–29)
DEPRECATED HCO3 PLAS-SCNC: 22 MMOL/L (ref 22–29)
EGFRCR SERPLBLD CKD-EPI 2021: 14 ML/MIN/1.73M2
EGFRCR SERPLBLD CKD-EPI 2021: 14 ML/MIN/1.73M2
EOSINOPHIL # BLD AUTO: 0 10E3/UL (ref 0–0.7)
EOSINOPHIL # BLD AUTO: 0 10E3/UL (ref 0–0.7)
EOSINOPHIL NFR BLD AUTO: 0 %
EOSINOPHIL NFR BLD AUTO: 0 %
ERYTHROCYTE [DISTWIDTH] IN BLOOD BY AUTOMATED COUNT: 15.2 % (ref 10–15)
ERYTHROCYTE [DISTWIDTH] IN BLOOD BY AUTOMATED COUNT: 15.4 % (ref 10–15)
ERYTHROCYTE [SEDIMENTATION RATE] IN BLOOD BY WESTERGREN METHOD: 60 MM/HR (ref 0–20)
FLUAV RNA SPEC QL NAA+PROBE: NEGATIVE
FLUBV RNA RESP QL NAA+PROBE: NEGATIVE
GLUCOSE SERPL-MCNC: 121 MG/DL (ref 70–99)
GLUCOSE SERPL-MCNC: 148 MG/DL (ref 70–99)
GLUCOSE UR STRIP-MCNC: NEGATIVE MG/DL
HCO3 BLDV-SCNC: 23 MMOL/L (ref 21–28)
HCT VFR BLD AUTO: 26.8 % (ref 40–53)
HCT VFR BLD AUTO: 29.5 % (ref 40–53)
HGB BLD-MCNC: 8.6 G/DL (ref 13.3–17.7)
HGB BLD-MCNC: 9.6 G/DL (ref 13.3–17.7)
HGB UR QL STRIP: ABNORMAL
HOLD SPECIMEN: NORMAL
IMM GRANULOCYTES # BLD: 0 10E3/UL
IMM GRANULOCYTES # BLD: 0 10E3/UL
IMM GRANULOCYTES NFR BLD: 1 %
IMM GRANULOCYTES NFR BLD: 1 %
KETONES UR STRIP-MCNC: NEGATIVE MG/DL
LACTATE SERPL-SCNC: 0.8 MMOL/L (ref 0.7–2)
LDH SERPL L TO P-CCNC: 314 U/L (ref 0–250)
LEUKOCYTE ESTERASE UR QL STRIP: NEGATIVE
LYMPHOCYTES # BLD AUTO: 0.2 10E3/UL (ref 0.8–5.3)
LYMPHOCYTES # BLD AUTO: 0.3 10E3/UL (ref 0.8–5.3)
LYMPHOCYTES NFR BLD AUTO: 4 %
LYMPHOCYTES NFR BLD AUTO: 6 %
MCH RBC QN AUTO: 27.4 PG (ref 26.5–33)
MCH RBC QN AUTO: 27.9 PG (ref 26.5–33)
MCHC RBC AUTO-ENTMCNC: 32.1 G/DL (ref 31.5–36.5)
MCHC RBC AUTO-ENTMCNC: 32.5 G/DL (ref 31.5–36.5)
MCV RBC AUTO: 85 FL (ref 78–100)
MCV RBC AUTO: 86 FL (ref 78–100)
MONOCYTES # BLD AUTO: 0.3 10E3/UL (ref 0–1.3)
MONOCYTES # BLD AUTO: 0.3 10E3/UL (ref 0–1.3)
MONOCYTES NFR BLD AUTO: 8 %
MONOCYTES NFR BLD AUTO: 8 %
MUCOUS THREADS #/AREA URNS LPF: PRESENT /LPF
NEUTROPHILS # BLD AUTO: 3.7 10E3/UL (ref 1.6–8.3)
NEUTROPHILS # BLD AUTO: 3.7 10E3/UL (ref 1.6–8.3)
NEUTROPHILS NFR BLD AUTO: 85 %
NEUTROPHILS NFR BLD AUTO: 87 %
NITRATE UR QL: NEGATIVE
NRBC # BLD AUTO: 0 10E3/UL
NRBC # BLD AUTO: 0 10E3/UL
NRBC BLD AUTO-RTO: 0 /100
NRBC BLD AUTO-RTO: 0 /100
O2/TOTAL GAS SETTING VFR VENT: 0 %
OXYHGB MFR BLDV: 83 % (ref 70–75)
PCO2 BLDV: 36 MM HG (ref 40–50)
PH BLDV: 7.41 [PH] (ref 7.32–7.43)
PH UR STRIP: 5.5 [PH] (ref 5–9)
PLATELET # BLD AUTO: 88 10E3/UL (ref 150–450)
PLATELET # BLD AUTO: 95 10E3/UL (ref 150–450)
PO2 BLDV: 49 MM HG (ref 25–47)
POTASSIUM SERPL-SCNC: 3.7 MMOL/L (ref 3.4–5.3)
POTASSIUM SERPL-SCNC: 3.8 MMOL/L (ref 3.4–5.3)
PROT SERPL-MCNC: 6.7 G/DL (ref 6.4–8.3)
PROT SERPL-MCNC: 7.2 G/DL (ref 6.4–8.3)
PROT/CREAT 24H UR: 1.14 MG/MG CR (ref 0–0.2)
RBC # BLD AUTO: 3.14 10E6/UL (ref 4.4–5.9)
RBC # BLD AUTO: 3.44 10E6/UL (ref 4.4–5.9)
RBC URINE: 6 /HPF
RSV RNA SPEC NAA+PROBE: NEGATIVE
SARS-COV-2 RNA RESP QL NAA+PROBE: NEGATIVE
SODIUM SERPL-SCNC: 133 MMOL/L (ref 135–145)
SODIUM SERPL-SCNC: 134 MMOL/L (ref 135–145)
SP GR UR STRIP: 1.01 (ref 1–1.03)
UROBILINOGEN UR STRIP-MCNC: NORMAL MG/DL
WBC # BLD AUTO: 4.3 10E3/UL (ref 4–11)
WBC # BLD AUTO: 4.3 10E3/UL (ref 4–11)
WBC URINE: 10 /HPF

## 2023-10-31 PROCEDURE — 99285 EMERGENCY DEPT VISIT HI MDM: CPT | Mod: 25 | Performed by: EMERGENCY MEDICINE

## 2023-10-31 PROCEDURE — 82805 BLOOD GASES W/O2 SATURATION: CPT | Performed by: EMERGENCY MEDICINE

## 2023-10-31 PROCEDURE — 71045 X-RAY EXAM CHEST 1 VIEW: CPT

## 2023-10-31 PROCEDURE — 96360 HYDRATION IV INFUSION INIT: CPT

## 2023-10-31 PROCEDURE — 86140 C-REACTIVE PROTEIN: CPT | Mod: ZL | Performed by: INTERNAL MEDICINE

## 2023-10-31 PROCEDURE — 87086 URINE CULTURE/COLONY COUNT: CPT | Mod: ZL | Performed by: INTERNAL MEDICINE

## 2023-10-31 PROCEDURE — 76770 US EXAM ABDO BACK WALL COMP: CPT

## 2023-10-31 PROCEDURE — 87040 BLOOD CULTURE FOR BACTERIA: CPT | Performed by: EMERGENCY MEDICINE

## 2023-10-31 PROCEDURE — 96361 HYDRATE IV INFUSION ADD-ON: CPT | Performed by: EMERGENCY MEDICINE

## 2023-10-31 PROCEDURE — 87637 SARSCOV2&INF A&B&RSV AMP PRB: CPT | Performed by: EMERGENCY MEDICINE

## 2023-10-31 PROCEDURE — 96360 HYDRATION IV INFUSION INIT: CPT | Performed by: EMERGENCY MEDICINE

## 2023-10-31 PROCEDURE — 85652 RBC SED RATE AUTOMATED: CPT | Mod: ZL | Performed by: INTERNAL MEDICINE

## 2023-10-31 PROCEDURE — 258N000003 HC RX IP 258 OP 636: Performed by: EMERGENCY MEDICINE

## 2023-10-31 PROCEDURE — 80053 COMPREHEN METABOLIC PANEL: CPT | Mod: ZL | Performed by: INTERNAL MEDICINE

## 2023-10-31 PROCEDURE — 83605 ASSAY OF LACTIC ACID: CPT | Performed by: EMERGENCY MEDICINE

## 2023-10-31 PROCEDURE — 87476 LYME DIS DNA AMP PROBE: CPT | Mod: ZL | Performed by: INTERNAL MEDICINE

## 2023-10-31 PROCEDURE — 36415 COLL VENOUS BLD VENIPUNCTURE: CPT | Mod: ZL | Performed by: INTERNAL MEDICINE

## 2023-10-31 PROCEDURE — 93005 ELECTROCARDIOGRAM TRACING: CPT | Mod: RTG

## 2023-10-31 PROCEDURE — 99417 PROLNG OP E/M EACH 15 MIN: CPT | Performed by: INTERNAL MEDICINE

## 2023-10-31 PROCEDURE — C9803 HOPD COVID-19 SPEC COLLECT: HCPCS | Performed by: EMERGENCY MEDICINE

## 2023-10-31 PROCEDURE — 81001 URINALYSIS AUTO W/SCOPE: CPT | Mod: ZL | Performed by: INTERNAL MEDICINE

## 2023-10-31 PROCEDURE — 85025 COMPLETE CBC W/AUTO DIFF WBC: CPT | Mod: ZL | Performed by: INTERNAL MEDICINE

## 2023-10-31 PROCEDURE — 84156 ASSAY OF PROTEIN URINE: CPT | Mod: ZL | Performed by: INTERNAL MEDICINE

## 2023-10-31 PROCEDURE — 93005 ELECTROCARDIOGRAM TRACING: CPT | Performed by: EMERGENCY MEDICINE

## 2023-10-31 PROCEDURE — 99285 EMERGENCY DEPT VISIT HI MDM: CPT | Performed by: EMERGENCY MEDICINE

## 2023-10-31 PROCEDURE — 250N000013 HC RX MED GY IP 250 OP 250 PS 637: Performed by: EMERGENCY MEDICINE

## 2023-10-31 PROCEDURE — 93010 ELECTROCARDIOGRAM REPORT: CPT | Performed by: INTERNAL MEDICINE

## 2023-10-31 PROCEDURE — 85025 COMPLETE CBC W/AUTO DIFF WBC: CPT | Performed by: EMERGENCY MEDICINE

## 2023-10-31 PROCEDURE — 87040 BLOOD CULTURE FOR BACTERIA: CPT | Mod: ZL,91 | Performed by: INTERNAL MEDICINE

## 2023-10-31 PROCEDURE — 36415 COLL VENOUS BLD VENIPUNCTURE: CPT | Performed by: EMERGENCY MEDICINE

## 2023-10-31 PROCEDURE — 84450 TRANSFERASE (AST) (SGOT): CPT | Performed by: EMERGENCY MEDICINE

## 2023-10-31 PROCEDURE — 99215 OFFICE O/P EST HI 40 MIN: CPT | Performed by: INTERNAL MEDICINE

## 2023-10-31 PROCEDURE — 83615 LACTATE (LD) (LDH) ENZYME: CPT | Mod: ZL | Performed by: INTERNAL MEDICINE

## 2023-10-31 PROCEDURE — 258N000003 HC RX IP 258 OP 636: Performed by: INTERNAL MEDICINE

## 2023-10-31 RX ORDER — SODIUM CHLORIDE 9 MG/ML
INJECTION, SOLUTION INTRAVENOUS CONTINUOUS
Status: DISCONTINUED | OUTPATIENT
Start: 2023-10-31 | End: 2023-11-01 | Stop reason: HOSPADM

## 2023-10-31 RX ORDER — ACETAMINOPHEN 500 MG
1000 TABLET ORAL EVERY 8 HOURS PRN
Status: DISCONTINUED | OUTPATIENT
Start: 2023-10-31 | End: 2023-11-01 | Stop reason: HOSPADM

## 2023-10-31 RX ORDER — MEPERIDINE HYDROCHLORIDE 50 MG/ML
25 INJECTION INTRAMUSCULAR; INTRAVENOUS; SUBCUTANEOUS EVERY 30 MIN PRN
Status: CANCELLED | OUTPATIENT
Start: 2023-10-31

## 2023-10-31 RX ORDER — HEPARIN SODIUM,PORCINE 10 UNIT/ML
5-20 VIAL (ML) INTRAVENOUS DAILY PRN
Status: CANCELLED | OUTPATIENT
Start: 2023-10-31

## 2023-10-31 RX ORDER — ALBUTEROL SULFATE 90 UG/1
1-2 AEROSOL, METERED RESPIRATORY (INHALATION)
Status: CANCELLED
Start: 2023-10-31

## 2023-10-31 RX ORDER — METFORMIN HCL 500 MG
500 TABLET, EXTENDED RELEASE 24 HR ORAL
Status: DISCONTINUED | OUTPATIENT
Start: 2023-10-31 | End: 2023-11-01 | Stop reason: HOSPADM

## 2023-10-31 RX ORDER — METHYLPREDNISOLONE SODIUM SUCCINATE 125 MG/2ML
125 INJECTION, POWDER, LYOPHILIZED, FOR SOLUTION INTRAMUSCULAR; INTRAVENOUS
Status: CANCELLED
Start: 2023-10-31

## 2023-10-31 RX ORDER — ACETAMINOPHEN 500 MG
1000 TABLET ORAL ONCE
Status: COMPLETED | OUTPATIENT
Start: 2023-10-31 | End: 2023-10-31

## 2023-10-31 RX ORDER — EPINEPHRINE 1 MG/ML
0.3 INJECTION, SOLUTION, CONCENTRATE INTRAVENOUS EVERY 5 MIN PRN
Status: CANCELLED | OUTPATIENT
Start: 2023-10-31

## 2023-10-31 RX ORDER — DIPHENHYDRAMINE HYDROCHLORIDE 50 MG/ML
50 INJECTION INTRAMUSCULAR; INTRAVENOUS
Status: CANCELLED
Start: 2023-10-31

## 2023-10-31 RX ORDER — CIPROFLOXACIN 500 MG/1
500 TABLET, FILM COATED ORAL 2 TIMES DAILY
Status: CANCELLED | OUTPATIENT
Start: 2023-10-31

## 2023-10-31 RX ORDER — HEPARIN SODIUM (PORCINE) LOCK FLUSH IV SOLN 100 UNIT/ML 100 UNIT/ML
5 SOLUTION INTRAVENOUS
Status: DISCONTINUED | OUTPATIENT
Start: 2023-10-31 | End: 2023-11-01 | Stop reason: HOSPADM

## 2023-10-31 RX ORDER — ALBUTEROL SULFATE 0.83 MG/ML
2.5 SOLUTION RESPIRATORY (INHALATION)
Status: CANCELLED | OUTPATIENT
Start: 2023-10-31

## 2023-10-31 RX ORDER — HEPARIN SODIUM (PORCINE) LOCK FLUSH IV SOLN 100 UNIT/ML 100 UNIT/ML
5 SOLUTION INTRAVENOUS
Status: CANCELLED | OUTPATIENT
Start: 2023-10-31

## 2023-10-31 RX ORDER — IBUPROFEN 200 MG
400 TABLET ORAL EVERY 6 HOURS PRN
Status: ON HOLD | COMMUNITY
End: 2023-11-01

## 2023-10-31 RX ORDER — ONDANSETRON 2 MG/ML
4 INJECTION INTRAMUSCULAR; INTRAVENOUS EVERY 8 HOURS PRN
Status: DISCONTINUED | OUTPATIENT
Start: 2023-10-31 | End: 2023-11-01 | Stop reason: HOSPADM

## 2023-10-31 RX ADMIN — SODIUM CHLORIDE 1000 ML: 9 INJECTION, SOLUTION INTRAVENOUS at 14:38

## 2023-10-31 RX ADMIN — SODIUM CHLORIDE: 9 INJECTION, SOLUTION INTRAVENOUS at 20:07

## 2023-10-31 RX ADMIN — ACETAMINOPHEN 1000 MG: 500 TABLET, FILM COATED ORAL at 20:07

## 2023-10-31 ASSESSMENT — ACTIVITIES OF DAILY LIVING (ADL)
ADLS_ACUITY_SCORE: 35

## 2023-10-31 ASSESSMENT — PAIN SCALES - GENERAL: PAINLEVEL: NO PAIN (0)

## 2023-10-31 NOTE — NURSING NOTE
Infusion Nursing Note:  Jareth Gallardo presents today for fluids.    Patient seen by provider today: Yes: ACP   present during visit today: Not Applicable.    Note: patient worked in for fluids post ACP appointment while labs pending, after lab resulted it was determined that he needed further evaluation in Emergency department. Patient assisted over via wheelchair and report given to Rocio TO      Intravenous Access:  Implanted Port.    Treatment Conditions:  Not Applicable.      Post Infusion Assessment:  Patient tolerated infusion without incident.  Blood return noted pre and post infusion.  Site patent and intact, free from redness, edema or discomfort.  No evidence of extravasations. Port left accessed and report given to triage nurse      Discharge Plan:   Patient assisted to ER via wheelchair.      Jean S. Hammann, RN

## 2023-10-31 NOTE — TELEPHONE ENCOUNTER
[facility]: Cancer Care                                                                                          Post-discharge chart review completed. Jareth was readmitted to the hospital overnight. RNCC will follow hospital course and follow up with Jareth when appropriate.    Maryjo Ugalde, RN, BSN  RN Care Coordinator  [facility]

## 2023-10-31 NOTE — ED TRIAGE NOTES
Pt presents to ED from infusion. Pt has had increase in creatine. Pt has fever. Pt is on keytruda.    Eve Harper RN on 10/31/2023 at 3:50 PM       Triage Assessment (Adult)       Row Name 10/31/23 1549          Skin Circulation/Temperature WDL    Skin Circulation/Temperature WDL X  hot

## 2023-10-31 NOTE — NURSING NOTE
Chief Complaint   Patient presents with    Oncology Clinic Visit     Hospital F/U:  Nasopharyngeal cancer     Medication Reconciliation: complete    Ange Rizzo CMA ([facility])

## 2023-10-31 NOTE — PATIENT INSTRUCTIONS
Labs today    Hold treatment tomorrow    Ct scans and MRI ASAP    Radiology scheduling will contact you to arrange the scans that have been ordered.  If you have not received a call in the next 2 weeks, please call them at [phone].  [facility] scheduling will contact you to arrange the MRI that has been ordered.  If you have not received a call in the next 2 weeks, please call them at [phone].    Follow up with Dr. Melvin prior to any further treatment

## 2023-11-01 ENCOUNTER — PRE VISIT (OUTPATIENT)
Dept: PULMONOLOGY | Facility: CLINIC | Age: 63
End: 2023-11-01

## 2023-11-01 ENCOUNTER — HOSPITAL ENCOUNTER (INPATIENT)
Facility: CLINIC | Age: 63
LOS: 6 days | Discharge: HOME | DRG: 683 | End: 2023-11-07
Attending: INTERNAL MEDICINE | Admitting: PEDIATRICS
Payer: COMMERCIAL

## 2023-11-01 DIAGNOSIS — N17.0 ACUTE KIDNEY FAILURE WITH TUBULAR NECROSIS (H): ICD-10-CM

## 2023-11-01 DIAGNOSIS — E83.42 HYPOMAGNESEMIA: ICD-10-CM

## 2023-11-01 DIAGNOSIS — E87.70 HYPERVOLEMIA, UNSPECIFIED HYPERVOLEMIA TYPE: Primary | ICD-10-CM

## 2023-11-01 DIAGNOSIS — E11.9 TYPE 2 DIABETES, HBA1C GOAL < 7% (H): Chronic | ICD-10-CM

## 2023-11-01 DIAGNOSIS — N17.9 ACUTE KIDNEY INJURY (H): ICD-10-CM

## 2023-11-01 LAB
ALBUMIN SERPL BCG-MCNC: 2.6 G/DL (ref 3.5–5.2)
ANION GAP SERPL CALCULATED.3IONS-SCNC: 11 MMOL/L (ref 7–15)
APTT PPP: 41 SECONDS (ref 22–38)
BASOPHILS # BLD AUTO: 0 10E3/UL (ref 0–0.2)
BASOPHILS # BLD AUTO: 0 10E3/UL (ref 0–0.2)
BASOPHILS NFR BLD AUTO: 0 %
BASOPHILS NFR BLD AUTO: 0 %
BUN SERPL-MCNC: 46.2 MG/DL (ref 8–23)
CALCIUM SERPL-MCNC: 8.2 MG/DL (ref 8.8–10.2)
CHLORIDE SERPL-SCNC: 99 MMOL/L (ref 98–107)
CHLORIDE UR-SCNC: 30 MMOL/L
CREAT SERPL-MCNC: 5.19 MG/DL (ref 0.67–1.17)
CREAT UR-MCNC: 130.6 MG/DL
CRP SERPL-MCNC: 320.58 MG/L
DAT POLY: NEGATIVE
DEPRECATED HCO3 PLAS-SCNC: 23 MMOL/L (ref 22–29)
EGFRCR SERPLBLD CKD-EPI 2021: 12 ML/MIN/1.73M2
EOSINOPHIL # BLD AUTO: 0 10E3/UL (ref 0–0.7)
EOSINOPHIL # BLD AUTO: 0 10E3/UL (ref 0–0.7)
EOSINOPHIL NFR BLD AUTO: 0 %
EOSINOPHIL NFR BLD AUTO: 0 %
ERYTHROCYTE [DISTWIDTH] IN BLOOD BY AUTOMATED COUNT: 15.2 % (ref 10–15)
ERYTHROCYTE [DISTWIDTH] IN BLOOD BY AUTOMATED COUNT: 15.4 % (ref 10–15)
FERRITIN SERPL-MCNC: 1722 NG/ML (ref 31–409)
FIBRINOGEN PPP-MCNC: 675 MG/DL (ref 170–490)
FOLATE SERPL-MCNC: 9.2 NG/ML (ref 4.6–34.8)
GLUCOSE SERPL-MCNC: 115 MG/DL (ref 70–99)
HAPTOGLOB SERPL-MCNC: 169 MG/DL (ref 32–197)
HCT VFR BLD AUTO: 27.2 % (ref 40–53)
HCT VFR BLD AUTO: 27.9 % (ref 40–53)
HGB BLD-MCNC: 8.9 G/DL (ref 13.3–17.7)
HGB BLD-MCNC: 9.1 G/DL (ref 13.3–17.7)
HOLD SPECIMEN: NORMAL
IMM GRANULOCYTES # BLD: 0 10E3/UL
IMM GRANULOCYTES # BLD: 0 10E3/UL
IMM GRANULOCYTES NFR BLD: 0 %
IMM GRANULOCYTES NFR BLD: 1 %
INR PPP: 1.19 (ref 0.85–1.15)
IRON BINDING CAPACITY (ROCHE): 126 UG/DL (ref 240–430)
IRON SATN MFR SERPL: 11 % (ref 15–46)
IRON SERPL-MCNC: 14 UG/DL (ref 61–157)
LACTATE SERPL-SCNC: 0.7 MMOL/L (ref 0.7–2)
LDH SERPL L TO P-CCNC: 340 U/L (ref 0–250)
LYMPHOCYTES # BLD AUTO: 0.2 10E3/UL (ref 0.8–5.3)
LYMPHOCYTES # BLD AUTO: 0.2 10E3/UL (ref 0.8–5.3)
LYMPHOCYTES NFR BLD AUTO: 6 %
LYMPHOCYTES NFR BLD AUTO: 6 %
MCH RBC QN AUTO: 28.3 PG (ref 26.5–33)
MCH RBC QN AUTO: 28.3 PG (ref 26.5–33)
MCHC RBC AUTO-ENTMCNC: 32.6 G/DL (ref 31.5–36.5)
MCHC RBC AUTO-ENTMCNC: 32.7 G/DL (ref 31.5–36.5)
MCV RBC AUTO: 87 FL (ref 78–100)
MCV RBC AUTO: 87 FL (ref 78–100)
MONOCYTES # BLD AUTO: 0.3 10E3/UL (ref 0–1.3)
MONOCYTES # BLD AUTO: 0.3 10E3/UL (ref 0–1.3)
MONOCYTES NFR BLD AUTO: 8 %
MONOCYTES NFR BLD AUTO: 9 %
NEUTROPHILS # BLD AUTO: 3 10E3/UL (ref 1.6–8.3)
NEUTROPHILS # BLD AUTO: 3.3 10E3/UL (ref 1.6–8.3)
NEUTROPHILS NFR BLD AUTO: 84 %
NEUTROPHILS NFR BLD AUTO: 86 %
NRBC # BLD AUTO: 0 10E3/UL
NRBC # BLD AUTO: 0 10E3/UL
NRBC BLD AUTO-RTO: 0 /100
NRBC BLD AUTO-RTO: 0 /100
OSMOLALITY SERPL: 288 MMOL/KG (ref 280–301)
OSMOLALITY UR: 287 MMOL/KG (ref 100–1200)
PHOSPHATE SERPL-MCNC: 3.9 MG/DL (ref 2.5–4.5)
PLATELET # BLD AUTO: 91 10E3/UL (ref 150–450)
PLATELET # BLD AUTO: 97 10E3/UL (ref 150–450)
POTASSIUM SERPL-SCNC: 3.8 MMOL/L (ref 3.4–5.3)
POTASSIUM UR-SCNC: 23.6 MMOL/L
RBC # BLD AUTO: 3.14 10E6/UL (ref 4.4–5.9)
RBC # BLD AUTO: 3.22 10E6/UL (ref 4.4–5.9)
RETICS # AUTO: 0.01 10E6/UL (ref 0.03–0.1)
RETICS/RBC NFR AUTO: 0.3 % (ref 0.5–2)
SODIUM SERPL-SCNC: 133 MMOL/L (ref 135–145)
SODIUM UR-SCNC: 46 MMOL/L
SPECIMEN EXPIRATION DATE: NORMAL
TRANSFERRIN SERPL-MCNC: 116 MG/DL (ref 200–360)
VIT B12 SERPL-MCNC: 276 PG/ML (ref 232–1245)
WBC # BLD AUTO: 3.6 10E3/UL (ref 4–11)
WBC # BLD AUTO: 3.8 10E3/UL (ref 4–11)

## 2023-11-01 PROCEDURE — 86038 ANTINUCLEAR ANTIBODIES: CPT | Performed by: INTERNAL MEDICINE

## 2023-11-01 PROCEDURE — 85730 THROMBOPLASTIN TIME PARTIAL: CPT | Performed by: PHYSICIAN ASSISTANT

## 2023-11-01 PROCEDURE — 86162 COMPLEMENT TOTAL (CH50): CPT | Performed by: INTERNAL MEDICINE

## 2023-11-01 PROCEDURE — 99254 IP/OBS CNSLTJ NEW/EST MOD 60: CPT | Mod: FS | Performed by: PHYSICIAN ASSISTANT

## 2023-11-01 PROCEDURE — 99207 PR NO BILLABLE SERVICE THIS VISIT: CPT | Performed by: PHYSICIAN ASSISTANT

## 2023-11-01 PROCEDURE — 86160 COMPLEMENT ANTIGEN: CPT | Performed by: INTERNAL MEDICINE

## 2023-11-01 PROCEDURE — 87449 NOS EACH ORGANISM AG IA: CPT | Performed by: PHYSICIAN ASSISTANT

## 2023-11-01 PROCEDURE — 83605 ASSAY OF LACTIC ACID: CPT | Performed by: PEDIATRICS

## 2023-11-01 PROCEDURE — 83935 ASSAY OF URINE OSMOLALITY: CPT | Performed by: PEDIATRICS

## 2023-11-01 PROCEDURE — 82040 ASSAY OF SERUM ALBUMIN: CPT | Performed by: PEDIATRICS

## 2023-11-01 PROCEDURE — 83930 ASSAY OF BLOOD OSMOLALITY: CPT | Performed by: PEDIATRICS

## 2023-11-01 PROCEDURE — 84133 ASSAY OF URINE POTASSIUM: CPT | Performed by: PEDIATRICS

## 2023-11-01 PROCEDURE — 83550 IRON BINDING TEST: CPT | Performed by: PEDIATRICS

## 2023-11-01 PROCEDURE — 83010 ASSAY OF HAPTOGLOBIN QUANT: CPT | Performed by: PEDIATRICS

## 2023-11-01 PROCEDURE — 86037 ANCA TITER EACH ANTIBODY: CPT | Performed by: INTERNAL MEDICINE

## 2023-11-01 PROCEDURE — 85025 COMPLETE CBC W/AUTO DIFF WBC: CPT | Performed by: PEDIATRICS

## 2023-11-01 PROCEDURE — 86140 C-REACTIVE PROTEIN: CPT | Performed by: PEDIATRICS

## 2023-11-01 PROCEDURE — 99207 PR APP CREDIT; MD BILLING SHARED VISIT: CPT | Performed by: PEDIATRICS

## 2023-11-01 PROCEDURE — 82746 ASSAY OF FOLIC ACID SERUM: CPT | Performed by: PEDIATRICS

## 2023-11-01 PROCEDURE — 36591 DRAW BLOOD OFF VENOUS DEVICE: CPT | Performed by: PHYSICIAN ASSISTANT

## 2023-11-01 PROCEDURE — 258N000003 HC RX IP 258 OP 636: Performed by: PEDIATRICS

## 2023-11-01 PROCEDURE — 250N000011 HC RX IP 250 OP 636: Performed by: PEDIATRICS

## 2023-11-01 PROCEDURE — 84300 ASSAY OF URINE SODIUM: CPT | Performed by: PEDIATRICS

## 2023-11-01 PROCEDURE — 99223 1ST HOSP IP/OBS HIGH 75: CPT | Mod: GC | Performed by: INTERNAL MEDICINE

## 2023-11-01 PROCEDURE — 120N000002 HC R&B MED SURG/OB UMMC

## 2023-11-01 PROCEDURE — 82570 ASSAY OF URINE CREATININE: CPT | Performed by: INTERNAL MEDICINE

## 2023-11-01 PROCEDURE — 86880 COOMBS TEST DIRECT: CPT | Performed by: PHYSICIAN ASSISTANT

## 2023-11-01 PROCEDURE — 85045 AUTOMATED RETICULOCYTE COUNT: CPT | Performed by: PEDIATRICS

## 2023-11-01 PROCEDURE — 87040 BLOOD CULTURE FOR BACTERIA: CPT | Performed by: PHYSICIAN ASSISTANT

## 2023-11-01 PROCEDURE — 87103 BLOOD FUNGUS CULTURE: CPT | Performed by: PHYSICIAN ASSISTANT

## 2023-11-01 PROCEDURE — 85610 PROTHROMBIN TIME: CPT | Performed by: PHYSICIAN ASSISTANT

## 2023-11-01 PROCEDURE — 85384 FIBRINOGEN ACTIVITY: CPT | Performed by: PHYSICIAN ASSISTANT

## 2023-11-01 PROCEDURE — 84466 ASSAY OF TRANSFERRIN: CPT | Performed by: PEDIATRICS

## 2023-11-01 PROCEDURE — 250N000013 HC RX MED GY IP 250 OP 250 PS 637: Performed by: PEDIATRICS

## 2023-11-01 PROCEDURE — 258N000003 HC RX IP 258 OP 636

## 2023-11-01 PROCEDURE — 82607 VITAMIN B-12: CPT | Performed by: PEDIATRICS

## 2023-11-01 PROCEDURE — 99222 1ST HOSP IP/OBS MODERATE 55: CPT | Performed by: PEDIATRICS

## 2023-11-01 PROCEDURE — 83615 LACTATE (LD) (LDH) ENZYME: CPT | Performed by: PEDIATRICS

## 2023-11-01 PROCEDURE — 36415 COLL VENOUS BLD VENIPUNCTURE: CPT | Performed by: PHYSICIAN ASSISTANT

## 2023-11-01 PROCEDURE — 82436 ASSAY OF URINE CHLORIDE: CPT | Performed by: PEDIATRICS

## 2023-11-01 PROCEDURE — 36415 COLL VENOUS BLD VENIPUNCTURE: CPT | Performed by: PEDIATRICS

## 2023-11-01 PROCEDURE — 82728 ASSAY OF FERRITIN: CPT | Performed by: PEDIATRICS

## 2023-11-01 PROCEDURE — 85060 BLOOD SMEAR INTERPRETATION: CPT | Performed by: PATHOLOGY

## 2023-11-01 RX ORDER — SODIUM CHLORIDE, SODIUM LACTATE, POTASSIUM CHLORIDE, CALCIUM CHLORIDE 600; 310; 30; 20 MG/100ML; MG/100ML; MG/100ML; MG/100ML
INJECTION, SOLUTION INTRAVENOUS
Status: COMPLETED
Start: 2023-11-01 | End: 2023-11-01

## 2023-11-01 RX ORDER — ONDANSETRON 4 MG/1
4 TABLET, ORALLY DISINTEGRATING ORAL EVERY 6 HOURS PRN
Status: DISCONTINUED | OUTPATIENT
Start: 2023-11-01 | End: 2023-11-07 | Stop reason: HOSPADM

## 2023-11-01 RX ORDER — FLUTICASONE FUROATE AND VILANTEROL 100; 25 UG/1; UG/1
1 POWDER RESPIRATORY (INHALATION) DAILY
Status: DISCONTINUED | OUTPATIENT
Start: 2023-11-01 | End: 2023-11-07 | Stop reason: HOSPADM

## 2023-11-01 RX ORDER — METFORMIN HCL 500 MG
500 TABLET, EXTENDED RELEASE 24 HR ORAL DAILY
Status: ON HOLD | COMMUNITY
End: 2023-11-07

## 2023-11-01 RX ORDER — LEVOTHYROXINE SODIUM 112 UG/1
112 TABLET ORAL DAILY
Status: DISCONTINUED | OUTPATIENT
Start: 2023-11-01 | End: 2023-11-07 | Stop reason: HOSPADM

## 2023-11-01 RX ORDER — PROCHLORPERAZINE 25 MG
25 SUPPOSITORY, RECTAL RECTAL EVERY 12 HOURS PRN
Status: DISCONTINUED | OUTPATIENT
Start: 2023-11-01 | End: 2023-11-07 | Stop reason: HOSPADM

## 2023-11-01 RX ORDER — HEPARIN SODIUM 5000 [USP'U]/.5ML
5000 INJECTION, SOLUTION INTRAVENOUS; SUBCUTANEOUS EVERY 12 HOURS
Status: DISCONTINUED | OUTPATIENT
Start: 2023-11-01 | End: 2023-11-01

## 2023-11-01 RX ORDER — ACETAMINOPHEN 325 MG/1
650 TABLET ORAL EVERY 6 HOURS PRN
Status: DISCONTINUED | OUTPATIENT
Start: 2023-11-01 | End: 2023-11-07 | Stop reason: HOSPADM

## 2023-11-01 RX ORDER — PROCHLORPERAZINE MALEATE 10 MG
10 TABLET ORAL EVERY 6 HOURS PRN
Status: DISCONTINUED | OUTPATIENT
Start: 2023-11-01 | End: 2023-11-07 | Stop reason: HOSPADM

## 2023-11-01 RX ORDER — SODIUM CHLORIDE, SODIUM LACTATE, POTASSIUM CHLORIDE, CALCIUM CHLORIDE 600; 310; 30; 20 MG/100ML; MG/100ML; MG/100ML; MG/100ML
INJECTION, SOLUTION INTRAVENOUS CONTINUOUS
Status: DISCONTINUED | OUTPATIENT
Start: 2023-11-01 | End: 2023-11-02

## 2023-11-01 RX ORDER — ONDANSETRON 2 MG/ML
4 INJECTION INTRAMUSCULAR; INTRAVENOUS EVERY 6 HOURS PRN
Status: DISCONTINUED | OUTPATIENT
Start: 2023-11-01 | End: 2023-11-07 | Stop reason: HOSPADM

## 2023-11-01 RX ORDER — ATORVASTATIN CALCIUM 20 MG/1
20 TABLET, FILM COATED ORAL DAILY
Status: DISCONTINUED | OUTPATIENT
Start: 2023-11-01 | End: 2023-11-07 | Stop reason: HOSPADM

## 2023-11-01 RX ADMIN — LEVOTHYROXINE SODIUM 112 MCG: 112 TABLET ORAL at 09:46

## 2023-11-01 RX ADMIN — SODIUM CHLORIDE, POTASSIUM CHLORIDE, SODIUM LACTATE AND CALCIUM CHLORIDE: 600; 310; 30; 20 INJECTION, SOLUTION INTRAVENOUS at 03:58

## 2023-11-01 RX ADMIN — SODIUM CHLORIDE, POTASSIUM CHLORIDE, SODIUM LACTATE AND CALCIUM CHLORIDE: 600; 310; 30; 20 INJECTION, SOLUTION INTRAVENOUS at 22:39

## 2023-11-01 RX ADMIN — PROCHLORPERAZINE EDISYLATE 10 MG: 5 INJECTION INTRAMUSCULAR; INTRAVENOUS at 17:47

## 2023-11-01 RX ADMIN — ACETAMINOPHEN 650 MG: 325 TABLET, FILM COATED ORAL at 17:54

## 2023-11-01 RX ADMIN — SODIUM CHLORIDE, POTASSIUM CHLORIDE, SODIUM LACTATE AND CALCIUM CHLORIDE: 600; 310; 30; 20 INJECTION, SOLUTION INTRAVENOUS at 13:29

## 2023-11-01 RX ADMIN — FLUTICASONE FUROATE AND VILANTEROL TRIFENATATE 1 PUFF: 100; 25 POWDER RESPIRATORY (INHALATION) at 09:47

## 2023-11-01 RX ADMIN — ACETAMINOPHEN 650 MG: 325 TABLET, FILM COATED ORAL at 09:46

## 2023-11-01 RX ADMIN — ATORVASTATIN CALCIUM 20 MG: 20 TABLET, FILM COATED ORAL at 09:46

## 2023-11-01 ASSESSMENT — ACTIVITIES OF DAILY LIVING (ADL)
ADLS_ACUITY_SCORE: 25
ADLS_ACUITY_SCORE: 22
ADLS_ACUITY_SCORE: 22
ADLS_ACUITY_SCORE: 35
ADLS_ACUITY_SCORE: 22
ADLS_ACUITY_SCORE: 25
ADLS_ACUITY_SCORE: 22
ADLS_ACUITY_SCORE: 22
ADLS_ACUITY_SCORE: 25

## 2023-11-01 NOTE — PROGRESS NOTES
"SPIRITUAL HEALTH SERVICES Progress Note  WB  5 Ortho  ON-CALL    Referral Source: Routine request at admission.      Patient/Family Understanding of Illness and Goals of Care - Patient shares \"we don't know right now what's going on.\" He reflected on his illness journey of continued fevers. He feels reassured by the care he is receiving.     Distress and Loss - Patient shares worry around unknowns and what if this leads to end of life. He became tearful naming his wife and love of her and grandchildren.      Strengths, Coping, and Resources - Patient names family. He names his granddaughters as primary source of becki.     Meaning, Beliefs, and Spirituality - Patient is Taoist. He finds himself asking lately, \"What are you trying to teach me God?\" He seeks out family and friends in his questioning and continue to live with a feeling of not knowing at this time. I normalized his questioning and the challenges with health that can bring about distress.     Plan of Care - Unit  will be notified of visit.       Micki Garcia MDiv    Pager: [phone]    [facility] remains available 24/7 for emergent requests/referrals, either by having the switchboard page the on-call  or by entering an ASAP/STAT consult in Epic (this will also page the on-call ).    "

## 2023-11-01 NOTE — ED NOTES
Called [facility] for BLS transport to the [facility].   They will send a crew shortly.     Desiree Mares RN on 10/31/2023 at 10:14 PM

## 2023-11-01 NOTE — PLAN OF CARE
"  VS: /72 (BP Location: Left arm)   Pulse 101   Temp (!) 102.7  F (39.3  C)   Resp 18   Ht 1.778 m (5' 10\")   Wt 111.1 kg (244 lb 14.9 oz)   SpO2 96%   BMI 35.14 kg/m      O2: When awake SPO WNL, when asleep pt desaturating SPO2 noted to be 88%, denies SOB, hooked to O2 at 2 LPM via nasal cannula, paged provider Wander.   Output: Voids spontaneously, using toilet as needed   Last BM: 11-1-2023   Activity: SBA    Skin: Old scratched on BLE, dry negative discharge   Pain: Denies   CMS: A&OX4, denies numbness and tingling   Dressing: Transparent dressing on (R) port CDI   Diet: Regular diet, with poor appetite, with episode of emesis, 50 ml tan colored, fluid, after taking strawberry milk   LDA: Port A cath on (R) upper chest infusing to ringer lactate 100cc/hr   Equipment: Personal items, call light, IV pole   Plan: Monitor clinical condition closely.    Additional Info: Patient became febrile at 1754 hours, 650 mg PRN tylenol given. Latest temp 98.3. Sepsis score 2, lactic acid WNL     "

## 2023-11-01 NOTE — PLAN OF CARE
"5 Ortho  Admission    /72 (BP Location: Left arm, Patient Position: Semi-Rosales's, Cuff Size: Adult Large)   Pulse 105   Temp 100.3  F (37.9  C) (Oral)   Ht 1.778 m (5' 10\")   Wt 111.1 kg (244 lb 14.9 oz)   SpO2 96%   BMI 35.14 kg/m      Admitted 63 year old male patient at 2:45 am from [facility] for Dx: Acute Kidney Injury. Initial VS obtained. Physical assessment done. Patient oriented to room, call light teaching done. Provider paged of pt arrival to the unit.  Orientation: Alert, oriented  x4  Bowel/Bladder: Continent. Last BM: 10/31/23  Pain:Denies  Ambulation/Transfers:Independent  Diet/ Liquids:Regular diet  Tubes/ Lines/ Drains: Right chest port with LR running at 100 ml/hour   Oxygen:on room air, denies SOB  Skin: Intact. Old scratches on BLE   Bed alarm on for safety, call light within reach. Continue with POC.  "

## 2023-11-01 NOTE — PHARMACY-ADMISSION MEDICATION HISTORY
Pharmacist Admission Medication History    Admission medication history is complete. The information provided in this note is only as accurate as the sources available at the time of the update.    Information Source(s): Family member (Wife Shelley) via phone    Pertinent Information: Patient was sleepy so information was gathered from Wife. Added Trulicity and metformin to PTA list.     Changes made to PTA medication list:  Added:   Trulicity 1.5 mg once weekly on Mondays  Metformin 500 mg ER once daily  Deleted: None  Changed: None    Allergies reviewed with patient and updates made in EHR: no    Medication History Completed By: Jg Abrams RPH 11/1/2023 6:04 PM    PTA Med List   Medication Sig Last Dose    acetaminophen (TYLENOL) 500 MG tablet Take 500 mg by mouth every 6 hours as needed for mild pain Unknown at PRN    atorvastatin (LIPITOR) 20 MG tablet Take 20 mg by mouth daily 10/31/2023 at PM    doxycycline hyclate (VIBRAMYCIN) 100 MG capsule Take 1 capsule (100 mg) by mouth 2 times daily 10/31/2023    dulaglutide (TRULICITY) 1.5 MG/0.5ML pen Inject 1.5 mg Subcutaneous every 7 days mondays 10/30/2023 at AM    fluticasone-vilanterol (BREO ELLIPTA) 100-25 MCG/INH inhaler INHALE 1 PUFF INTO THE LUNGS ONE TIME A DAY. RINSE MOUTH AFTER USE. 10/31/2023 at AM    furosemide (LASIX) 20 MG tablet Take 20 mg by mouth daily 10/31/2023 at AM    levothyroxine (SYNTHROID/LEVOTHROID) 112 MCG tablet Take 1 tablet (112 mcg) by mouth daily 10/31/2023 at AM    metFORMIN (GLUCOPHAGE XR) 500 MG 24 hr tablet Take 500 mg by mouth daily 10/31/2023 at unknown     Jg Abrams PharmD

## 2023-11-01 NOTE — ED PROVIDER NOTES
[facility] AND [facility]  Emergency Department Visit Note    Abnormal Labs and Fever      History of Present Illness     Jareth Gallardo is a 63 ear old male presenting at the request of his hematologist for admission to the hospital.  Patient has a history of nasopharyngeal carcinoma and has undergone surgery, chemo and radiation and is currently receiving Keytruda every 3 weeks.  He was admitted on the 26 for fever of unknown etiology.  He was discharged on doxycycline for presumed tickborne illness but these tests have recently come back negative.  Patient presented with follow-up and was noted to be febrile.  Repeat labs showed a CRP of 330 and new acute kidney injury with a BUN of 40 and creatinine of 4.5.  Patient states that since discharge she has felt more rundown and is having intermittent fevers and chills.  He denies headache neck stiffness cough shortness of breath abdominal pain nausea vomiting    Medications:  Prior to Admission medications    Medication Sig Last Dose Taking? Auth Provider Long Term End Date   acetaminophen (TYLENOL) 500 MG tablet Take 500 mg by mouth every 6 hours as needed for mild pain   Unknown, Entered By History     atorvastatin (LIPITOR) 20 MG tablet Take 20 mg by mouth daily   Reported, Patient Yes    blood glucose (NO BRAND SPECIFIED) lancets standard Use to test blood sugar 2 times daily or as directed.   Maryjo Polanco CNP     blood glucose (NO BRAND SPECIFIED) test strip Use to test blood sugar 3 times daily or as directed.   Maryjo Polanco CNP     blood glucose monitoring (ACCU-CHEK RANDELL PLUS) meter device kit Use to test blood sugar 2 times daily or as directed.   Maryjo Polanco CNP     doxycycline hyclate (VIBRAMYCIN) 100 MG capsule Take 1 capsule (100 mg) by mouth 2 times daily   Frank Lipscomb MD     fluticasone-vilanterol (BREO ELLIPTA) 100-25 MCG/INH inhaler INHALE 1 PUFF INTO THE LUNGS ONE TIME A DAY. RINSE MOUTH AFTER USE.   Reported,  Patient     furosemide (LASIX) 20 MG tablet Take 20 mg by mouth daily   Reported, Patient Yes    ibuprofen (ADVIL/MOTRIN) 200 MG tablet Take 400 mg by mouth every 6 hours as needed for fever   Reported, Patient     levothyroxine (SYNTHROID/LEVOTHROID) 112 MCG tablet Take 1 tablet (112 mcg) by mouth daily   Dariela Melvin MD Yes    metFORMIN (GLUCOPHAGE XR) 500 MG 24 hr tablet Take 500 mg by mouth daily   Reported, Patient Yes    TRULICITY 1.5 MG/0.5ML pen INJECT 1.5 MG UNDER THE SKIN ONE TIME A WEEK.   Reported, Patient         Allergies:  No Known Allergies    Problem List:  Patient Active Problem List   Diagnosis    Nasopharyngeal cancer (H)    Chemotherapy-induced neutropenia     Morbid obesity (H)    Encounter for screening for other viral diseases    Hard to intubate    Sinus tachycardia    Fever of unknown origin    Sepsis without acute organ dysfunction, due to unspecified organism (H)    Dehydration       Past Medical History:  Past Medical History:   Diagnosis Date    COPD (chronic obstructive pulmonary disease) (H)     Dyslipidemia     Hard to intubate 12/5/2022    Nasopharyngeal carcinoma (H)     BARRETT (obstructive sleep apnea)     Type 2 diabetes mellitus with diabetic nephropathy (H)        Past Surgical History:  Past Surgical History:   Procedure Laterality Date    COLONOSCOPY      DISSECTION RADICAL NECK MODIFIED Left 12/5/2022    Procedure: left modified radical neck dissection;  Surgeon: Jesus Boston MD;  Location: [facility] OR    INSERT PORT VASCULAR ACCESS Right 03/31/2022    Procedure: SINGLE LUMEN POWER PORT INSERTION, VASCULAR ACCESS;  Surgeon: Evens Aponte MD;  Location: [facility] OR    IR CHEST PORT PLACEMENT > 5 YRS OF AGE  03/31/2022    KNEE SURGERY      LARYNGOSCOPY WITH BIOPSY(IES) N/A 12/5/2022    Procedure: LARYNGOSCOPY, WITH BIOPSY;  Surgeon: Jesus Boston MD;  Location: [facility] OR    TONSILLECTOMY         Social History:  Social History     Tobacco Use    Smoking status: Former      "Packs/day: 4.00     Years: 30.00     Additional pack years: 0.00     Total pack years: 120.00     Types: Cigarettes     Quit date:      Years since quittin.8     Passive exposure: Past    Smokeless tobacco: Never    Tobacco comments:     quit smoking. Passive exposure in childhood home.    Substance Use Topics    Alcohol use: Not Currently     Comment: Hx of 4 beer per month    Drug use: Never       Review of Systems:  Complete review of systems obtained and pertinent positive and negative findings noted in HPI. Review of systems otherwise negative.    Physical Exam     Vital signs: /70   Pulse 102   Temp (!) 101.1  F (38.4  C) (Tympanic)   Resp 24   Ht 1.778 m (5' 10\")   Wt 111.1 kg (244 lb 14.9 oz)   SpO2 92%   BMI 35.14 kg/m      Physical Exam:    General: awake and alert, comfortable  HEENT: atraumatic, no scleral injection, no nasal discharge, neck supple  Chest: clear to auscultation bilaterally without wheezes or crackles, non labored respirations  Cardiovascular: regular rate and rhythm, no murmurs or gallops  Abdomen: soft, nontender, no rebound or guarding, nondistended  Extremities: no deformities, edema, or tenderness  Skin: warm, dry, no rashes  Neuro: Left facial palsy alert and oriented x 3, moving extremities x 4, ambulates without difficulty      Medical Decision Making & ED Course     Jareth Gallardo is a 63 year old male presenting at the request of his hematologist for acute kidney injury and persistent fevers.  No source of fever is yet been identified so we will repeat blood cultures and urine cultures.  Patient will need IV fluids for his new acute kidney injury.  Anticipate admission.    ED Course as of 10/31/23 2138   Tue Oct 31, 2023   1720 Creatinine(!): 4.59   1720 Urea Nitrogen(!): 40.2   1721 XR Chest Port 1 View     HISTORY: Fever. .     COMPARISON:  10/26/2023     FINDINGS:     The cardiomediastinal contours are stable.  No focal consolidation, effusion or " pneumothorax.                                                                      IMPRESSION:  Stable portable chest.       JUAN CARLOS MALCOLM MD      1817 SARS CoV2 PCR: Negative   1921 Hospitalist recommends transfer to the [facility] for renal as this is likely ATN   1958 Awaiting transfer to the [facility].  Will speak to the hospitalist but it may be a delayed transfer.  Patient is aware of this.  We will put him in a more comfortable inpatient bed while we await transfer   2028 Patient accepted by Dr Reis to med surg on [place]     laboratory studies, imaging, and medical records.  .    Diagnosis & Disposition     Diagnosis:  1. JOCE (acute kidney injury) (H24)    2. Febrile illness, acute    3. Nasopharyngeal cancer (H)          Disposition:  Transfer to the [facility] when bed availble    MD Yareli Herrera Theresa M, MD  10/31/23 2001       Nancy Downs MD  10/31/23 2138

## 2023-11-01 NOTE — PROGRESS NOTES
Brief Medicine Cross-Cover Note:    Reviewed Nephrology and Oncology recs (all ordered). Additionally ordered repeat blood cultures, 1,3 Beta D glucan, and fungal blood culture. CMV and EBV pending. Tylenol for fever. Monitor clinical condition closely.    Wander Guerra PA-C on 11/1/2023 at 6:27 PM

## 2023-11-01 NOTE — CONSULTS
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 Prior to presentation, patient had been having 10-11 days of nausea/vomiting. He had diarrhea yesterday and has been having cyclical fevers last few days. He was recently admitted in [facility] 10/26-28, thought to be due to tick-borne illness but workup was negative anaplasma, ehrlichia, and lyme. He has been on doxy since that admission. He was seen in oncology clinic yesterday and labs were notable for Cr 4.58. He was sent to the ED for further evaluation. He had reported that he was taking NSAIDs frequently so there was concern for NSAID induced AIN vs immune mediated nephritis from pembrolizumab. He was started on IVF and repeat blood and urine cultures have been negative thus far. He was transferred to the [facility] for further nephrology evaluation. Renal ultrasound showed no evidence of obstruction.     Oncologic History:  Adapted from Dr. Chriss Martin' clinic note on 10/31/2023:  \"Mr. Jareth Gallardo returns for follow-up p16 negative for nasopharyngeal carcinoma.  Patient was referred to us by Dr. Melvin at the [place] for ongoing treatment to be given locally.  Initially presented with left neck swelling and was seen by urgent care in the [place] area.  Was treated with antibiotics initially and then was ultimately treated for presumed cat scratch failure with antibiotics.  Symptoms worsened the point where he says he had swelling in his left shoulder.  CT neck was ordered and patient was referred to ENT but could not be seen immediately due to insurance reasons.  He was seen by " a general surgeon by the name of Dr. 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  Patient was subsequently referred to Dr. Boston of  ENT at the [facility].  He reviewed the CT neck that was performed on December 9, 2021 and was read as extensive left-sided neck adenopathy with lymph nodes measuring up to 2.4 cm medial to the left sternocleidomastoid muscle just above the level of the hyoid bone.  A PET/CT was ordered and performed on March 9, 2022 and it was read as exuberant soft tissue thickening within the nasopharyngeal and oropharyngeal mucosa concerning for nasopharyngeal carcinoma.  Soft tissue nodular uptake was evident within the prevertebral soft tissues and about differential because of the epiglottis with extensive neck lymph node uptake.  There was uptake within the lung parenchyma concerning for metastatic uptake of primary malignancy infectious or inflammatory cannot be excluded.  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  The case was presented at the tumor board and the feeling was that patient would require induction chemotherapy followed by chemoradiation and therapy.  It was felt that this regimen would improve recurrence free survival and overall survival as compared to chemo radiotherapy alone.  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  Tumor was also found to be PD-L1 positive at 20% using the :.  Patient was ultimately seen by Dr. Dariela Melvin medical oncology at [place] on March 22, 2022.  Patient did have a history of hearing loss and subsequently it was decided the patient be a candidate for carboplatin instead of cisplatin as induction therapy.  The plan was to treat with carboplatin and gemcitabine for 3 cycles and  then restage in terms of the lung nodules they will be reevaluated after induction.  He was started on carboplatin gemcitabine and was treated between April 4, 2021 to until May 16, 2022.  He required Neulasta support due to neutropenia PET scan performed on May 31, 2022 revealed a partial response subsequently underwent chemoradiotherapy with weekly carboplatin and received radiation therapy at the [facility] under the direction of Dr. Skaggs.  PET scan done on November 16, 2022 revealed a residual 1.5 cm level 2 lymph node with an SUV of 28.7.  A 1 cm enhancing focus in the sternocleidomastoid muscle with an SUV of 22.4 there was a level 2A lymph node that on the left with decreased in size.  With decreased FDG avidity therewas a resolved left level 5 node node.  There was a new patchy groundglass opacity and nodule in the right upper lobe.  Which was felt to be inflammatory subsequently patient underwent a salvage left modified radical neck neck dissection for by Dr. Boston on December 5, 2022.  Pathology revealed stuff tissue mass involving the left posterior sternocleidal mastoid muscle consistent with squamous cell carcinoma moderately differentiated invading into skeletal muscle tumor size 1.1 cm in greatest dimension margins were negative angiolymphatic invasion was present.  A second soft tissue mass at level 2 revealed squamous cell carcinoma moderately differentiated invading the fibroadipose tissue.  Tumor size was 1.5 cm in greatest dimension.  Tumor focally involves the inked resection margin.  Angiolymphatic invasion was present.  All lymph nodes were negative for malignancy.  PET scan performed on February 22, 2023 revealed near complete resolution of the rest of the mild mucosal uptake within the tongue base there were at least 5 FDG avid lesions in the neck there were hypermetabolic there was a mildly FDG avid necrotic lesion inferior to which was deep to the left sternocleidal mastoid muscle.   There was resolution of the right upper lobe groundglass opacities.  The case was presented at tumor conference on February 24, 2023 and the feeling was that there was residual disease on PET scan and recurrence to be at high risk given the fact the patient was.  1 positive and the plan was to proceed with pembrolizumab.  Patient was started on pembrolizumab on March 9, 2023 under the direction of Dr. Norm Melvin cycle 3 was delayed 1 week due to apparent influenza patient had a good partial response he was seen in the emergency department at [facility] for fever and cough and the patient was treated for influenza.  We saw the patient in August 28, the plan was to initiate his next cycle of pembrolizumab here and is due to receive cycle 8 on August 30, he received that at the [place].  And then was started on cycle 9 beginning September 20 receive cycle 10 on October 11.  He was scheduled to see pulmonology at [place] tomorrow for evaluation groundglass nodules.  He was to follow-up with Dr. Melvin at the end of November with repeat imaging.  In the interim the patient became extremely ill and was seen in the emergency room on October 26 with complaints of shaking chills fever to 102 night sweats.  There was also an isolated episode of divergent vision with double vision eventually resolved.  He did note multiple tick bites in the recent past as he was out in the woods.  D-dimer was elevated at 1.25 with a CTA of the chest was ordered and came back negative for pulmonary embolus.  COVID testing was negative.  He was empirically treated for presumed tickborne illness initially with vancomycin and Zosyn admitted to [facility].  Further testing included included PCR testing for Ehrlichia as well as Anaplasma which was negative Lyme testing was negative.  Blood cultures were negative and subsequently discharged on October 28 on doxycycline he still had a temp of 102.3.  He comes in today  "still on doxycycline but still having spiking temps of over 102.  He also has shaking chills he had some loose stools this morning he did note urinary frequency approximately 2 nights ago but now has hardly any urine output he appears dehydrated denies any headaches he does report the isolated episode of double vision.  He does get an occasional cough he does note nausea and vomiting as well.  We eventually darcy labs on him and recommended IV normal saline until we get all the labs back labs came back with an elevated sed rate of 60.  CRP level that was elevated at 330.  His BUN/creatinine came back extremely elevated with BUN of 41 and a creatinine of 4.58.  Given these findings we recommended evaluation in the emergency room potential obstructive uropathy versus cytokine release syndrome which can be seen with checkpoint inhibitors and is usually associated with fever elevated sed rate elevated CRP as well as potentially renal failure therefore recommend the patient be evaluated in the emergency room we spoke to Dr. Stewart who will accept the patient.\"    Interval History:  Presently, Mr. Gallardo continues to be febrile. He states that he has been having shaking chills as well. As far as NSAIDs, he states that he has only been taking two pills daily. He has been having symptoms for the past 10 days. Has nausea but no abdominal pain.    Review of Systems: A comprehensive ROS was performed and found to be negative or non-contributory with the exception of that noted in the HPI above.    Outpatient Medications reviewed.    Physical Exam:    Temp:  [100.3  F (37.9  C)-101.8  F (38.8  C)] 101.2  F (38.4  C)  Pulse:  [] 104  Resp:  [11-26] 18  BP: ()/(59-79) 119/64  SpO2:  [91 %-96 %] 95 %   A comprehensive physical examination is deferred due to tele visit.   GENERAL: Healthy, alert and no distress  EYES: Eyes grossly normal to inspection.  No discharge or erythema, or obvious scleral/conjunctival " abnormalities.  RESP: No audible wheeze, cough, or visible cyanosis.  No visible retractions or increased work of breathing.    SKIN: Visible skin clear. No significant rash, abnormal pigmentation or lesions.  NEURO: Cranial nerves grossly intact.  Mentation and speech appropriate for age.  PSYCH: Mentation appears normal, affect normal/bright, judgement and insight intact, normal speech and appearance well-groomed.    Labs:  CMP  Recent Labs   Lab 11/01/23  0743 11/01/23  0612 10/31/23  1610 10/31/23  1414 10/28/23  0549 10/27/23  0541 10/26/23  1346   NA  --  133* 134* 133* 138 136 135   POTASSIUM  --  3.8 3.7 3.8 3.5 3.8 3.8   CHLORIDE  --  99 100 99 103 102 95*   CO2  --  23 22 22 26 24 27   ANIONGAP  --  11 12 12 9 10 13   BUN  --  46.2* 40.2* 41.0* 15.4 19.0 14.9   CR  --  5.19* 4.59* 4.58* 1.17 1.18* 1.10   GFRESTIMATED  --  12* 14* 14* 71 70 76   LAI  --  8.2* 8.4* 8.8 8.8 8.7* 9.3   PROTTOTAL  --   --  6.7 7.2  --  6.9 8.5*   ALBUMIN  --  2.6* 2.9* 3.1*  --  2.9* 4.0   BILITOTAL  --   --  0.5 0.5  --  1.3* 1.6*   ALKPHOS  --   --  83 91  --  66 82   AST  --   --  43 50*  --  27 24   ALT  --   --  19 20  --  11 11   *  --   --  314*  --   --   --      Recent Labs   Lab 11/01/23  0612 10/31/23  1610 10/31/23  1414 10/28/23  1108 10/28/23  0733   * 121* 148* 105* 89   PHOS 3.9  --   --   --   --      CBC  Recent Labs   Lab 11/01/23  0743 11/01/23  0612 10/31/23  1610 10/31/23  1414   WBC 3.6* 3.8* 4.3 4.3   RBC 3.22* 3.14* 3.14* 3.44*   HGB 9.1* 8.9* 8.6* 9.6*   HCT 27.9* 27.2* 26.8* 29.5*   MCV 87 87 85 86   MCH 28.3 28.3 27.4 27.9   MCHC 32.6 32.7 32.1 32.5   RDW 15.2* 15.4* 15.4* 15.2*   PLT 91* 97* 88* 95*     Imaging:  Renal ultrasound 10/31/2023 without obstruction.    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    JOCE with AIN/ATN 2/2 infectious vs immune-mediated nephritis  This is not likely CRS as patient has not received CART. CRS is not seen with immunotherapy. Immunotherapy can cause a nephritis picture but it is unclear if this is the etiology in this case. We suspect that this is an infectious cause considering the presentation of his symptoms. He had positive EBV titers in the past and this would warrant further investigation. Recommend ID consult to comment on EBV induced nephritis vs other infectious etiologies. Repeat EBV titers to see status of EBV. Would hold off on steroids for now. Would discuss with nephrology regarding renal biopsy.     Nausea, vomiting, fever due to infection vs medication side effect  As stated above, symptoms appear to be due infectious cause rather than pembrolizumab induced.    Pancytopenia  Unclear etiology of pancytopenia. Appears that he has had low normal WBC count for many years. Anemia appears to be worsening over time. Platelet count appeared to be normal but now worsening. Would obtain French test to rule out hemolysis as well as coags to rule out DIC.      Recommendations:   - ID consult to evaluate infectious causes of nephritis  - French test  - Fibrinogen, PT/INR, PTT, d-dimer, ferritin  - Repeat EBV titers  - Nephrology input regarding renal biopsy  - Would defer steroids for now    Patient was seen and plan of care was discussed with attending physician Dr. Trinity Lora.    The oncology service will continue to follow with you. Please do not hesitate to page with questions or concerns.     Mila Spangler MD   Heme/Onc/Transplant Fellow  Pgr #[id number]

## 2023-11-01 NOTE — H&P
[facility]    History and Physical - Hospitalist Service       Date of Admission:  11/1/2023    Assessment & Plan      Jareth Gallardo is a 63 year old male admitted on 11/1/2023 for JOCE and fevers, thought to be due to keytruda or as yet undiagnosed infection. JOCE may be a consequence of significant NSAID ingestion vs Delayed effect of pembrolizumab.    Acute Kidney Injury due to NSAIDs vs effect of pembrolizumab; complicated by proteinuria  - outpatient onc thought CRS was a possibility but it doesn't seem that Mr. Gallardo has had any hypotension or respiratory symptoms/hypoxemia which would make his CRS mild and warrant supportive therapies rather than steroids or tocilizumab - it's unclear to me the degree to which kidney failure could be a severe manifestation of CRS without hypotension or hypoxia  - will continue to run LR at 100 mL/h and repeat renal panel with urine lytes/osm and serum osm, consult nephrology + oncology    Nausea, vomiting, fever due to medication side effect vs infection  - could also be a consequence of pembrolizumab  - zofran, compazine, and tylenol available  - could consider ID consultation for help working up fever if oncology doesn't think it's from pembrolizumab  - tick borne testing was negative, I stopped his doxycycline    Hyponatremia  - suspect this is a consequence of his significant kidney injury and will add on urine Na and creatinine, urine osm and serum osm    Hypoalbuminemia  - probably nutritional and inflammatory, can continue to monitor    Normocytic anemia  - suspect anemia of chronic disease, will add on iron studies, b12, folate, peripheral smear, retic, LDH and haptoglobin    Thrombocytopenia  - platelets were normal as recently as 10/26, have been decreasing since; appears to have gotten heparin once as a flush but otherwise treated for dvt ppx with one dose lovenox prior hospital stay; concern for HIT is low but  "will hold heparin  - ordered smear    Nasopharyngeal squamous cell carcinoma, metastatic  - diagnosed in Dec 2021, positive for EBV; induced with carboplatin gemcitabine in April, May of 2022, subsequently received carboplatin and radiotherapy, left modified radical neck dissection in Dec 2022, subsequently presented at tumor board in Feb 23 and felt to be at high risk of recurrence with residual disease on PET so started on pembrolizumab (keytruda) March 9, 2023; recently received Cycle 10 on October 11 and due for cycle 11 today 11/1  - Dr. Martin saw yesterday and thought he might be a candidate for toripalimab  - his goal is to survive as long as possible to be around his grandchildren    Not otherwise specified Lung Disease on Breo Ellipta    High ascvd risk on atorvastatin          Diet:  regular  DVT Prophylaxis: scd  Arthur Catheter: Not present  Lines: PRESENT             Cardiac Monitoring: None  Code Status:  full    Clinically Significant Risk Factors Present on Admission              # Hypoalbuminemia: Lowest albumin = 2.9 g/dL at 10/31/2023  4:10 PM, will monitor as appropriate   # Thrombocytopenia: Lowest platelets = 88 in last 2 days, will monitor for bleeding  # Acute Kidney Injury, unspecified: based on a >150% or 0.3 mg/dL increase in last creatinine compared to past 90 day average, will monitor renal function       # Obesity: Estimated body mass index is 35.14 kg/m  as calculated from the following:    Height as of 10/31/23: 1.778 m (5' 10\").    Weight as of 10/31/23: 111.1 kg (244 lb 14.9 oz).                 Jace Foy DO  Hospitalist Service  [facility]  Securely message with "[employer] (more info)  Text page via [facility] Paging/Directory     ______________________________________________________________________    Chief Complaint   Fever and decreased urine output, nausea    History is obtained from the patient and chart    History of Present " Illness     Last 10-11 days nausea/vomiting in the morning, improves throughout the day but symptoms seem to be getting worse as time goes on. He had diarrhea yesterday, has been spiking fevers last few days. Admitted in [facility] 10/26-28, thought to be due to tick-borne illness but anaplasma, ehrlichia, and lyme were negative - had been on doxy since that admission. Suddenly developed spike in creatinine yesterday on presentation to the ED. Has noticed a significant decrease in urine output, has noticed it's darker than normal. Denies swelling. Endorses gaining weight - thinks he's up 10 lbs. Hasn't been able to eat/drink much last 10 days, appetite way down. Has been taking 2 ibuprofen (400 mg tabs) every 6 hours for the last couple days.        Past Medical History    Past Medical History:   Diagnosis Date    COPD (chronic obstructive pulmonary disease) (H)     Dyslipidemia     Hard to intubate 12/5/2022    Nasopharyngeal carcinoma (H)     BARRETT (obstructive sleep apnea)     Type 2 diabetes mellitus with diabetic nephropathy (H)        Past Surgical History   Past Surgical History:   Procedure Laterality Date    COLONOSCOPY      DISSECTION RADICAL NECK MODIFIED Left 12/5/2022    Procedure: left modified radical neck dissection;  Surgeon: Jesus Boston MD;  Location: [facility] OR    INSERT PORT VASCULAR ACCESS Right 03/31/2022    Procedure: SINGLE LUMEN POWER PORT INSERTION, VASCULAR ACCESS;  Surgeon: Evens Aponte MD;  Location: [facility] OR    IR CHEST PORT PLACEMENT > 5 YRS OF AGE  03/31/2022    KNEE SURGERY      LARYNGOSCOPY WITH BIOPSY(IES) N/A 12/5/2022    Procedure: LARYNGOSCOPY, WITH BIOPSY;  Surgeon: Jesus Boston MD;  Location: [facility] OR    TONSILLECTOMY         Prior to Admission Medications   Prior to Admission Medications   Prescriptions Last Dose Informant Patient Reported? Taking?   TRULICITY 1.5 MG/0.5ML pen   Yes No   Sig: INJECT 1.5 MG UNDER THE SKIN ONE TIME A WEEK.   acetaminophen (TYLENOL)  500 MG tablet   Yes No   Sig: Take 500 mg by mouth every 6 hours as needed for mild pain   atorvastatin (LIPITOR) 20 MG tablet   Yes No   Sig: Take 20 mg by mouth daily   blood glucose (NO BRAND SPECIFIED) lancets standard   No No   Sig: Use to test blood sugar 2 times daily or as directed.   blood glucose (NO BRAND SPECIFIED) test strip   No No   Sig: Use to test blood sugar 3 times daily or as directed.   blood glucose monitoring (ACCU-CHEK RANDELL PLUS) meter device kit   No No   Sig: Use to test blood sugar 2 times daily or as directed.   doxycycline hyclate (VIBRAMYCIN) 100 MG capsule   No No   Sig: Take 1 capsule (100 mg) by mouth 2 times daily   fluticasone-vilanterol (BREO ELLIPTA) 100-25 MCG/INH inhaler   Yes No   Sig: INHALE 1 PUFF INTO THE LUNGS ONE TIME A DAY. RINSE MOUTH AFTER USE.   furosemide (LASIX) 20 MG tablet   Yes No   Sig: Take 20 mg by mouth daily   ibuprofen (ADVIL/MOTRIN) 200 MG tablet   Yes No   Sig: Take 400 mg by mouth every 6 hours as needed for fever   levothyroxine (SYNTHROID/LEVOTHROID) 112 MCG tablet   No No   Sig: Take 1 tablet (112 mcg) by mouth daily   metFORMIN (GLUCOPHAGE XR) 500 MG 24 hr tablet   Yes No   Sig: Take 500 mg by mouth daily      Facility-Administered Medications: None      Quit smoking 14 years ago  Drinks alcohol a couple times a month  No illicit drugs    Physical Exam   Vital Signs:                    Weight: 0 lbs 0 oz    Sitting up edge of bed in bed in no distress  Awake, alert, answers questions and follows commands  Respiratory rate and work of breathing are normal  HRRR without murmur, extremities warm and well perfused  Abdomen is soft, non tender, non distended  Extremities are without edema or cyanosis  Brief focused cardiac ultrasound demonstrates no effusion, good left and right sided function, normal chamber sizes, tachycardia and slightly hyperdynamic left side      Medical Decision Making       70 MINUTES SPENT BY ME on the date of service doing chart  review, history, exam, documentation & further activities per the note.      Data     I have personally reviewed the following data over the past 24 hrs:    4.3  \   8.6 (L)   / 88 (L)     134 (L) 100 40.2 (H) /  121 (H)   3.7 22 4.59 (H) \     ALT: 19 AST: 43 AP: 83 TBILI: 0.5   ALB: 2.9 (L) TOT PROTEIN: 6.7 LIPASE: N/A     Procal: N/A CRP: 330.01 (H) Lactic Acid: 0.8       Ferritin:  N/A % Retic:  N/A LDH:  314 (H)       Imaging results reviewed over the past 24 hrs:   Recent Results (from the past 24 hour(s))   XR Chest Port 1 View    Narrative    PROCEDURE:  XR CHEST PORT 1 VIEW    HISTORY: Fever. .    COMPARISON:  10/26/2023    FINDINGS:    The cardiomediastinal contours are stable.  No focal consolidation, effusion or pneumothorax.      Impression    IMPRESSION:  Stable portable chest.      JUAN CARLOS MALCOLM MD         SYSTEM ID:  [id number]   US Renal Complete Non-Vascular    Narrative    PROCEDURE: US RENAL COMPLETE NON-VASCULAR    HISTORY: JOCE    TECHNIQUE:  A renal ultrasound was performed.    COMPARISON:  CT 9/13/2023    MEASUREMENTS:    Right renal length: 14 cm  Left renal length: 13 cm    RENAL FINDINGS: The kidneys are normal in size and echogenicity. There  is no hydronephrosis. No shadowing stones are seen.    BLADDER: The bladder is mildly distended and grossly unremarkable.      Impression    IMPRESSION:      Unremarkable 2-ultrasound of the kidneys.      JUAN CARLOS MALCOLM MD         SYSTEM ID:  [id number]

## 2023-11-01 NOTE — PROGRESS NOTES
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  #2: Fever of unknown origin  #3: Acute renal failure      History of Present Illness    Mr. Jareth Gallardo returns for follow-up p16 negative for nasopharyngeal carcinoma.  Patient was referred to us by Dr. Melvin at the [place] for ongoing treatment to be given locally.  Initially presented with left neck swelling and was seen by urgent care in the [place] area.  Was treated with antibiotics initially and then was ultimately treated for presumed cat scratch failure with antibiotics.  Symptoms worsened the point where he says he had swelling in his left shoulder.  CT neck was ordered and patient was referred to ENT but could not be seen immediately due to insurance reasons.  He was seen by a general surgeon by the name of Dr. 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  Patient was subsequently referred to Dr. Boston of  ENT at the [facility].  He reviewed the CT neck that was performed on December 9, 2021 and was read as extensive left-sided neck adenopathy with lymph nodes measuring up to 2.4 cm medial to the left sternocleidomastoid muscle just above the level of the hyoid bone.  A PET/CT was ordered and performed on March 9, 2022 and it was read as exuberant soft tissue thickening within the nasopharyngeal and oropharyngeal mucosa concerning for nasopharyngeal carcinoma.  Soft tissue nodular uptake was evident within the prevertebral soft tissues and about differential because of the epiglottis with extensive neck lymph node  uptake.  There was uptake within the lung parenchyma concerning for metastatic uptake of primary malignancy infectious or inflammatory cannot be excluded.  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  The case was presented at the tumor board and the feeling was that patient would require induction chemotherapy followed by chemoradiation and therapy.  It was felt that this regimen would improve recurrence free survival and overall survival as compared to chemo radiotherapy alone.  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  Tumor was also found to be PD-L1 positive at 20% using the :.  Patient was ultimately seen by Dr. Dariela Melvin medical oncology at [facility] on March 22, 2022.  Patient did have a history of hearing loss and subsequently it was decided the patient be a candidate for carboplatin instead of cisplatin as induction therapy.  The plan was to treat with carboplatin and gemcitabine for 3 cycles and then restage in terms of the lung nodules they will be reevaluated after induction.  He was started on carboplatin gemcitabine and was treated between April 4, 2021 to until May 16, 2022.  He required Neulasta support due to neutropenia PET scan performed on May 31, 2022 revealed a partial response subsequently underwent chemoradiotherapy with weekly carboplatin and received radiation therapy at the [facility] under the direction of Dr. Skaggs.  PET scan done on November 16, 2022 revealed a residual 1.5 cm level 2 lymph node with an SUV of 28.7.  A 1 cm enhancing focus in the sternocleidomastoid muscle with an SUV of 22.4 there was a level 2A lymph node that on the left with decreased in size.  With decreased FDG avidity therewas a resolved left level 5 node node.  There was a new patchy groundglass opacity and nodule in the right upper lobe.  Which was  felt to be inflammatory subsequently patient underwent a salvage left modified radical neck neck dissection for by Dr. Boston on December 5, 2022.  Pathology revealed stuff tissue mass involving the left posterior sternocleidal mastoid muscle consistent with squamous cell carcinoma moderately differentiated invading into skeletal muscle tumor size 1.1 cm in greatest dimension margins were negative angiolymphatic invasion was present.  A second soft tissue mass at level 2 revealed squamous cell carcinoma moderately differentiated invading the fibroadipose tissue.  Tumor size was 1.5 cm in greatest dimension.  Tumor focally involves the inked resection margin.  Angiolymphatic invasion was present.  All lymph nodes were negative for malignancy.  PET scan performed on February 22, 2023 revealed near complete resolution of the rest of the mild mucosal uptake within the tongue base there were at least 5 FDG avid lesions in the neck there were hypermetabolic there was a mildly FDG avid necrotic lesion inferior to which was deep to the left sternocleidal mastoid muscle.  There was resolution of the right upper lobe groundglass opacities.  The case was presented at tumor conference on February 24, 2023 and the feeling was that there was residual disease on PET scan and recurrence to be at high risk given the fact the patient was.  1 positive and the plan was to proceed with pembrolizumab.  Patient was started on pembrolizumab on March 9, 2023 under the direction of Dr. Norm Melvin cycle 3 was delayed 1 week due to apparent influenza patient had a good partial response he was seen in the emergency department at [facility] for fever and cough and the patient was treated for influenza.  We saw the patient in August 28, the plan was to initiate his next cycle of pembrolizumab here and is due to receive cycle 8 on August 30, he received that at the [place].  And then was started on cycle 9 beginning September 20  receive cycle 10 on October 11.  He was scheduled to see pulmonology at [place] tomorrow for evaluation groundglass nodules.  He was to follow-up with Dr. Melvin at the end of November with repeat imaging.  In the interim the patient became extremely ill and was seen in the emergency room on October 26 with complaints of shaking chills fever to 102 night sweats.  There was also an isolated episode of divergent vision with double vision eventually resolved.  He did note multiple tick bites in the recent past as he was out in the woods.  D-dimer was elevated at 1.25 with a CTA of the chest was ordered and came back negative for pulmonary embolus.  COVID testing was negative.  He was empirically treated for presumed tickborne illness initially with vancomycin and Zosyn admitted to [facility].  Further testing included included PCR testing for Ehrlichia as well as Anaplasma which was negative Lyme testing was negative.  Blood cultures were negative and subsequently discharged on October 28 on doxycycline he still had a temp of 102.3.  He comes in today still on doxycycline but still having spiking temps of over 102.  He also has shaking chills he had some loose stools this morning he did note urinary frequency approximately 2 nights ago but now has hardly any urine output he appears dehydrated denies any headaches he does report the isolated episode of double vision.  He does get an occasional cough he does note nausea and vomiting as well.  We eventually darcy labs on him and recommended IV normal saline until we get all the labs back labs came back with an elevated sed rate of 60.  CRP level that was elevated at 330.  His BUN/creatinine came back extremely elevated with BUN of 41 and a creatinine of 4.58.  Given these findings we recommended evaluation in the emergency room potential obstructive uropathy versus cytokine release syndrome which can be seen with checkpoint inhibitors and is usually  associated with fever elevated sed rate elevated CRP as well as potentially renal failure therefore recommend the patient be evaluated in the emergency room we spoke to Dr. Stewart who will accept the patient.          ______________________________________________________________________________    Past History    Past Medical History:   Diagnosis Date    COPD (chronic obstructive pulmonary disease) (H)     Dyslipidemia     Hard to intubate 12/5/2022    Nasopharyngeal carcinoma (H)     BARRETT (obstructive sleep apnea)     Type 2 diabetes mellitus with diabetic nephropathy (H)        Past Surgical History:   Procedure Laterality Date    COLONOSCOPY      DISSECTION RADICAL NECK MODIFIED Left 12/5/2022    Procedure: left modified radical neck dissection;  Surgeon: Jesus Boston MD;  Location: [facility] OR    INSERT PORT VASCULAR ACCESS Right 03/31/2022    Procedure: SINGLE LUMEN POWER PORT INSERTION, VASCULAR ACCESS;  Surgeon: Evens Aponte MD;  Location: [facility] OR    IR CHEST PORT PLACEMENT > 5 YRS OF AGE  03/31/2022    KNEE SURGERY      LARYNGOSCOPY WITH BIOPSY(IES) N/A 12/5/2022    Procedure: LARYNGOSCOPY, WITH BIOPSY;  Surgeon: Jesus Boston MD;  Location: [facility] OR    TONSILLECTOMY             Review of systems.  CNS: There are no headaches, positive blurred vision, no change in mental status,   ENT: There is no hearing loss.  Respiratory: There is no shortness of breath, hemoptysis, positive cough  Cardiac: There is no chest pain, orthopnea, PND, or ankle edema.  GI: There is no bright red blood per rectum, no hematemesis, no reflux, no diarrhea or constipation there are loose stools.  Musculoskeletal: There is no joint pain, or myalgias.  : There is no urinary frequency, hematuria.  There is decreased urine output.  Constitutional: There is no fevers, night sweats, weight loss.  Endocrine: There are are no hot flashes, fatigue.  Neuro: There is no tingling or numbness in the hands or feet.  Hematologic:  There is no gingival bleeding, epistaxis, or easy bruisability.  Dermatologic: There is no skin rash.  A 14 point review of systems is otherwise negative.          Physical Exam    /64   Pulse 109   Temp (!) 102.6  F (39.2  C) (Tympanic)   Resp 20   Wt 111.1 kg (245 lb)   SpO2 94%   BMI 35.15 kg/m        GENERAL: Alert and oriented to time place and person. Seated comfortably. In no distress.    HEAD: Atraumatic and normocephalic.    EYES: YON, EOMI.  No pallor.  No icterus.    Oral cavity: no mucosal lesion or tonsillar enlargement.  There is dry oral mucosa.    NECK: supple. JVP normal.  No thyroid enlargement.    LYMPH NODES: There are no palpable cervical, supraclavicular, axillary, or inguinal nodes.    CHEST: clear to auscultation bilaterally.  There are few expiratory wheezes on the right.  Resonant to percussion throughout bilaterally.  Symmetrical breath movements bilaterally.    CVS: S1 and S2 are heard. Regular rate and rhythm.  No murmur or gallop or rub heard.  No peripheral edema.    ABDOME there is periumbilical tenderness to palpation no rebound not distended.  No palpable hepatomegaly or splenomegaly.  No other mass palpable.  Bowel sounds heard.    EXTREMITIES: There is no ankle edema.  SKIN: no rash, or bruising or purpura.    NEURO: Grossly non-focal.    Lab Results:  Component      Latest Ref [facility] 10/31/2023  2:14 PM 10/31/2023  4:10 PM   WBC      4.0 - 11.0 10e3/uL 4.3     RBC Count      4.40 - 5.90 10e6/uL 3.44 (L)     Hemoglobin      13.3 - 17.7 g/dL 9.6 (L)     Hematocrit      40.0 - 53.0 % 29.5 (L)     MCV      78 - 100 fL 86     MCH      26.5 - 33.0 pg 27.9     MCHC      31.5 - 36.5 g/dL 32.5     RDW      10.0 - 15.0 % 15.2 (H)     Platelet Count      150 - 450 10e3/uL 95 (L)     % Neutrophils      % 87     % Lymphocytes      % 4     % Monocytes      % 8     % Eosinophils      % 0     % Basophils      % 0     % Immature Granulocytes      % 1     NRBCs per 100 WBC      <1 /100  0     Absolute Neutrophils      1.6 - 8.3 10e3/uL 3.7     Absolute Lymphocytes      0.8 - 5.3 10e3/uL 0.2 (L)     Absolute Monocytes      0.0 - 1.3 10e3/uL 0.3     Absolute Eosinophils      0.0 - 0.7 10e3/uL 0.0     Absolute Basophils      0.0 - 0.2 10e3/uL 0.0     Absolute Immature Granulocytes      <=0.4 10e3/uL 0.0     Absolute NRBCs      10e3/uL 0.0     Sodium      135 - 145 mmol/L 133 (L)  134 (L)    Potassium      3.4 - 5.3 mmol/L 3.8  3.7    Carbon Dioxide (CO2)      22 - 29 mmol/L 22  22    Anion Gap      7 - 15 mmol/L 12  12    Urea Nitrogen      8.0 - 23.0 mg/dL 41.0 (H)  40.2 (H)    Creatinine      0.67 - 1.17 mg/dL 4.58 (H)  4.59 (H)    GFR Estimate      >60 mL/min/1.73m2 14 (L)  14 (L)    Calcium      8.8 - 10.2 mg/dL 8.8  8.4 (L)    Chloride      98 - 107 mmol/L 99  100    Glucose      70 - 99 mg/dL 148 (H)  121 (H)    Alkaline Phosphatase      40 - 129 U/L 91  83    AST      0 - 45 U/L 50 (H)  43    ALT      0 - 70 U/L 20  19    Protein Total      6.4 - 8.3 g/dL 7.2  6.7    Albumin      3.5 - 5.2 g/dL 3.1 (L)  2.9 (L)    Bilirubin Total      <=1.2 mg/dL 0.5  0.5    Lactate Dehydrogenase      0 - 250 U/L 314 (H)     Sed Rate      0 - 20 mm/hr 60 (H)     CRP Inflammation      <5.00 mg/L 330.01 (H)        Legend:  (L) Low  (H) High    [unfilled]    Imaging    US Renal Complete Non-Vascular    Result Date: 10/31/2023  PROCEDURE: US RENAL COMPLETE NON-VASCULAR HISTORY: JOCE TECHNIQUE:  A renal ultrasound was performed. COMPARISON:  CT 9/13/2023 MEASUREMENTS: Right renal length: 14 cm Left renal length: 13 cm RENAL FINDINGS: The kidneys are normal in size and echogenicity. There is no hydronephrosis. No shadowing stones are seen. BLADDER: The bladder is mildly distended and grossly unremarkable.     IMPRESSION:  Unremarkable 2-ultrasound of the kidneys.  JUAN CARLOS MALCOLM MD   SYSTEM ID:  [id number]    XR Chest Port 1 View    Result Date: 10/31/2023  PROCEDURE:  XR CHEST PORT 1 VIEW HISTORY:  Fever. . COMPARISON:  10/26/2023 FINDINGS: The cardiomediastinal contours are stable. No focal consolidation, effusion or pneumothorax.     IMPRESSION:  Stable portable chest.  JUAN CARLOS MALCOLM MD   SYSTEM ID:  [id number]    CT Chest Pulmonary Embolism w Contrast    Result Date: 10/26/2023  CT CHEST PULMONARY EMBOLISM W CONTRAST HISTORY: 63 years Male +dimerPt to ER from home with c/o 5 days of sweats, fever, chills.  Changes noted to facial and swelling s/p nasopharyngeal cancer. Had vision changes on Sunday, resolved on own back to normal.  Has not been seen or contacted PCP. Tylenol this AM at 0800. Denies pain or nausea, however decreased appetite.  Pt states he will start to sweat profusely and this will be followed by intense chills. TECHNIQUE: Axial CT imaging of the chest was performed With intravenous contrast. Coronal and sagittal reconstructions were obtained. This exam was performed using one or more of the following dose reduction techniques: Automated exposure control, adjustment of the BIRGIT and/or KV according to patient's size, and/or use of iterative reconstruction technique. COMPARISON: 9/13/2023 FINDINGS: No evidence of pulmonary embolism. The thoracic aorta is unremarkable. There is no mediastinal or hilar or axillary lymphadenopathy. Mild dependent atelectasis. Lungs otherwise clear.      No concerning osseous lesions are identified. There is cholelithiasis. There is no biliary dilatation.     IMPRESSION: No evidence of pulmonary embolism. ARNOLD TEE MD   SYSTEM ID:  [id number]    XR Chest Port 1 View    Result Date: 10/26/2023  Exam:  XR CHEST PORT 1 VIEW HISTORY: fever unknown origin. COMPARISON:  9/13/2023, 7/24/2023 FINDINGS: There is a right chest port with the catheter terminating at the cavoatrial junction. The cardiomediastinal contours are normal.  No focal consolidation, effusion, or pneumothorax.  No acute osseous abnormality.     IMPRESSION:  No acute cardiopulmonary  process.  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 Melvin at the [place] and received at least 8 cycles of pembrolizumab there and wanted to transition treatment to us here at [facility] patient did have a partial response and went on to complete 2 more cycles of pembrolizumab here for total of 10 cycles he was due for cycle 11 tomorrow but nonetheless was recently admitted to the hospital with fever shaking chills history of tick bites he was presumably treated with doxycycline for tickborne disease but now has developed acute renal failure in the setting of fever elevated CRP elevated sed rate suspect cytokine release syndrome cultures have been negative we have contacted emergency room doctor by the name of Dr. Stewart who will see the patient today.  The patient may need to be transferred to a tertiary care center for possible treatment of CRS.  There is evidence of progression patient may be candidate for new FDA approved therapy for nasopharyngeal carcinoma called toripalimab.  We will defer to Dr. Melvin  Time spent: 95 minutes was spent on this total patient visit: We spent time reviewing multiple physician provider notes including emergency department notes, the evaluate the patient started IV fluids for dehydration discussed lab results with the patient we reviewed literature on cytokine release syndrome and that could be associated with checkpoint inhibitor therapy, performed history physical, document history physical, we discussed case with emergency  room physician and transported the patient for further evaluation particular in the setting of acute renal failure potential CRS.    Cancer Staging   No matching staging information was found for the patient.        Signed by: Chriss Martin MD    CC: SILVIANO Lewis CNP, MD

## 2023-11-01 NOTE — PLAN OF CARE
"Goal Outcome Evaluation:      Plan of Care Reviewed With: patient    Overall Patient Progress: no changeOverall Patient Progress: no change    Outcome Evaluation: Managed port au cath. Gave pt medications. Had high temperature and gave Tylenol.      VS: /64 (BP Location: Left arm, Patient Position: Sitting, Cuff Size: Adult Large)   Pulse 104   Temp (!) 101.2  F (38.4  C) (Oral)   Resp 18   Ht 1.778 m (5' 10\")   Wt 111.1 kg (244 lb 14.9 oz)   SpO2 95%   BMI 35.14 kg/m      O2: >90% on RA. Denies chest pain and SOB. LS clear and equal bilaterally.    Output: Voiding without difficulty at bathroom   Last BM: Last BM on 10/31. BS present in all x4 quadrants.   Activity: Up ad kathy.   Skin: Has scratches on bilateral legs from his dogs   Pain: Denies pain   CMS: Denies numbness and tingling   Dressing:    Diet: Regular diet   LDA: R port au cath in R chest infusing 100ml/hr   Equipment: IV pole. Personal belongings.    Plan: Continue with plan   Additional Info: Monitored for sepsis. But lactic acid came back wnl.       "

## 2023-11-01 NOTE — CONSULTS
Nephrology Initial Consult  November 1, 2023      Jareth Gallardo MRN:[id number] YOB: 1960  Date of Admission:11/1/2023  Primary care provider: Laura Guzman  Requesting physician: Refugio Reis MD    ASSESSMENT AND RECOMMENDATIONS:     #JOCE in setting of NSAID use and pembrolizumab: Pt has had acute increase in Cr over 3 days for 1.17 on 10/28 to 5 now. Multiple nephrotoxins including ibuprofen 400 mg q 6 over the 3 days pta starting 10/26.  Not dizzy but BP was low on admission, so a hemodynamic effect of NSAID plus hypotension could have contributed.  Keytruda can can ISN, TMA or vasculitis. Labs not c/w TMA  - vanc was given on 10/26, 10/27, and 10/28 but unlikely this early after starting the med  - CT chest with contrast on 10/26   - pyuria c/w interstitial nephritis.   - unlikely prerenal given volume up   - pt febrile, EBV viral load increased, ID consulted   - recommend CMV pcr (ordered)  - baseline Scr 0.7-0.9 in the past year  - Scr trend 1.17 on 10/28.  Now 4.58-> 4.59 -> 5.19   - renal US showed normal size kidneys without hydro  -  protein (up from 70 3 days ago), 10 WBC's, 6 RBC's, sp gravity 1.015  - no proteinuria in 4/2023  - urine prot/cr 1.14 mg/mg  - urine culture and blood cultures pending  - urine sodium 46, urine osmolality 267, serum osmolality 288, urine K 23.6  - no eosinophilia noted  - recent NSAID use due to fevers, taking it every 6 hours  - recent pembrolizumab - completed 10 of 11 treatments  - CRP remains elevated  - oncology recommends holding off on steroids at this time but will reconsider after ID consult  - would likely benefit from kidney biopsy   - avoid anticoagulation   - avoid hypotension  - avoid nephrotoxins  - renally dose medications  - please document strict I/O's  - no acute indication for RR  - vasculitis serology (we ordered)    #BP  - does not take any antihypertensives  - Bp 118/69, had BP of 96/59 yesterday morning, all other  "readings >115/   - avoid hypotension    #Volume  - wt on admission 111.1 kg  - feels he has gained ~ 10 lbs and is having less UOP  - had one unmeasured episode of UOP  - please document daily standing weights  - chart strict I/O's    #Anemia   - Hgb 9.1, iron sat 11%, ferritin 1,722  - management per primary/oncology      Recommendations were communicated to primary team via progress note.     Seen and discussed with Dr. Joann RAMAN, PA-C   Division of Renal Disease and Hypertension  [facility]  artem  [employer] Web Console      REASON FOR CONSULT: JOCE    HISTORY OF PRESENT ILLNESS:  Admitting provider and nursing notes reviewed  Jareth Gallardo is a 63 year old male admitted on 11/1/2023 for JOCE and fevers, thought to be due to keytruda or as yet undiagnosed infection.     He has had 10-11 days of nausea with vomiting in the morning that improves throughout the day but symptoms seem to be getting worse.He has been spiking fevers the past few days. He was admitted at [facility] 10/26-10/28 thought to be tick-borne illness, anaplasma, ehrichia, and lyme were negative. Has been taking doxycycline since that admission. He had diarrhea yesterday. None today.    He was taking ibuprofen 400 mg every 6 hours for fever, alternated with tylenol. Prior to this he used tylenol at home.     He continues to have fevers and sweats. He has poor appetite. He denies LE edema, no shortness of breath/chest pain.      He does have a history of DM 2. Baseline Scr 0.7-0.9. Not currently taking any medications for this. Managed with diet, last HgbA1c  < 6. He does not recall any history of proteinuria.     Also has history of metastatic nasopharyngeal squamous cell carcinoma. Per chart review \" diagnosed in Dec 2021, positive for EBV; induced with carboplatin gemcitabine in April, May of 2022, subsequently received carboplatin and radiotherapy, left modified radical neck dissection in Dec 2022, subsequently presented at " "tumor board in Feb 23 and felt to be at high risk of recurrence with residual disease on PET so started on pembrolizumab (keytruda) March 9, 2023; recently received Cycle 10 on October 11 and due for cycle 11 today 11/1  - Dr. Martin saw yesterday and thought he might be a candidate for toripalimab  - his goal is to survive as long as possible to be around his grandchildren\"        PAST MEDICAL HISTORY:    Past Medical History:   Diagnosis Date    COPD (chronic obstructive pulmonary disease) (H)     Dyslipidemia     Hard to intubate 12/5/2022    Nasopharyngeal carcinoma (H)     BARRETT (obstructive sleep apnea)     Type 2 diabetes mellitus with diabetic nephropathy (H)        Past Surgical History:   Procedure Laterality Date    COLONOSCOPY      DISSECTION RADICAL NECK MODIFIED Left 12/5/2022    Procedure: left modified radical neck dissection;  Surgeon: Jesus Boston MD;  Location: [facility] OR    INSERT PORT VASCULAR ACCESS Right 03/31/2022    Procedure: SINGLE LUMEN POWER PORT INSERTION, VASCULAR ACCESS;  Surgeon: Evens Aponte MD;  Location: [facility] OR    IR CHEST PORT PLACEMENT > 5 YRS OF AGE  03/31/2022    KNEE SURGERY      LARYNGOSCOPY WITH BIOPSY(IES) N/A 12/5/2022    Procedure: LARYNGOSCOPY, WITH BIOPSY;  Surgeon: Jesus oBston MD;  Location: [facility] OR    TONSILLECTOMY          MEDICATIONS:  PTA Meds  Prior to Admission medications    Medication Sig Last Dose Taking? Auth Provider Long Term End Date   acetaminophen (TYLENOL) 500 MG tablet Take 500 mg by mouth every 6 hours as needed for mild pain   Unknown, Entered By History     atorvastatin (LIPITOR) 20 MG tablet Take 20 mg by mouth daily   Reported, Patient Yes    blood glucose (NO BRAND SPECIFIED) lancets standard Use to test blood sugar 2 times daily or as directed.   Maryjo Polanco CNP     blood glucose (NO BRAND SPECIFIED) test strip Use to test blood sugar 3 times daily or as directed.   Maryjo Polanco CNP     blood glucose monitoring " (ACCU-CHEK RANDELL PLUS) meter device kit Use to test blood sugar 2 times daily or as directed.   Maryjo Polanco CNP     doxycycline hyclate (VIBRAMYCIN) 100 MG capsule Take 1 capsule (100 mg) by mouth 2 times daily   Frank Lipscomb MD     fluticasone-vilanterol (BREO ELLIPTA) 100-25 MCG/INH inhaler INHALE 1 PUFF INTO THE LUNGS ONE TIME A DAY. RINSE MOUTH AFTER USE.   Reported, Patient     furosemide (LASIX) 20 MG tablet Take 20 mg by mouth daily   Reported, Patient Yes    levothyroxine (SYNTHROID/LEVOTHROID) 112 MCG tablet Take 1 tablet (112 mcg) by mouth daily   Dariela Melvin MD Yes       Current Meds   atorvastatin  20 mg Oral Daily    fluticasone-vilanterol  1 puff Inhalation Daily    levothyroxine  112 mcg Oral Daily     Infusion Meds   lactated ringers 100 mL/hr at 23 0358       ALLERGIES:    No Known Allergies    REVIEW OF SYSTEMS:  A comprehensive of systems was negative except as noted above.    SOCIAL HISTORY:   Social History     Socioeconomic History    Marital status:      Spouse name: Not on file    Number of children: Not on file    Years of education: Not on file    Highest education level: Not on file   Occupational History    Not on file   Tobacco Use    Smoking status: Former     Packs/day: 4.00     Years: 30.00     Additional pack years: 0.00     Total pack years: 120.00     Types: Cigarettes     Quit date:      Years since quittin.8     Passive exposure: Past    Smokeless tobacco: Never    Tobacco comments:     quit smoking. Passive exposure in childhood home.    Substance and Sexual Activity    Alcohol use: Not Currently     Comment: Hx of 4 beer per month    Drug use: Never    Sexual activity: Not on file   Other Topics Concern    Not on file   Social History Narrative    Not on file     Social Determinants of Health     Financial Resource Strain: Not on file   Food Insecurity: Not on file   Transportation Needs: Not on file   Physical Activity: Not on file  "  Stress: Not on file   Social Connections: Not on file   Interpersonal Safety: Not on file   Housing Stability: Not on file       FAMILY MEDICAL HISTORY:   Family History   Problem Relation Age of Onset    Cerebrovascular Disease Mother     Leukemia Father     Melanoma Sister      No Family history of kidney disease or dialysis.       PHYSICAL EXAM:   Temp  Av.5  F (38.6  C)  Min: 100.3  F (37.9  C)  Max: 102.6  F (39.2  C)      Pulse  Av.5  Min: 85  Max: 109 Resp  Av.8  Min: 11  Max: 26  SpO2  Av.6 %  Min: 91 %  Max: 96 %       /69   Pulse 104   Temp (!) 101.8  F (38.8  C)   Resp 20   Ht 1.778 m (5' 10\")   Wt 111.1 kg (244 lb 14.9 oz)   SpO2 95%   BMI 35.14 kg/m        Admit Weight: 111.1 kg (244 lb 14.9 oz)     GENERAL APPEARANCE: no acute distress, awake  EYES: no scleral icterus, pupils equal  Pulmonary: lungs clear to auscultation   CV: regular rhythm, normal rate   - Edema none  GI: soft, nontender, normal bowel sounds  MS: no evidence of inflammation in joints, no muscle tenderness  : no miller  SKIN: no rash, warm, dry, excoriations noted to bilateral lower legs  NEURO: face symmetric, no asterixis     LABS:   I have reviewed the following labs:  CMP  Recent Labs   Lab 23  0612 10/31/23  1610 10/31/23  1414 10/28/23  1108 10/28/23  0733 10/28/23  0549 10/27/23  0754 10/27/23  0541 10/26/23  2245 10/26/23  1346   * 134* 133*  --   --  138  --  136  --  135   POTASSIUM 3.8 3.7 3.8  --   --  3.5  --  3.8  --  3.8   CHLORIDE 99 100 99  --   --  103  --  102  --  95*   CO2 23 22 22  --   --  26  --  24  --  27   ANIONGAP 11 12 12  --   --  9  --  10  --  13   * 121* 148* 105*   < > 104*   < > 112*   < > 95   BUN 46.2* 40.2* 41.0*  --   --  15.4  --  19.0  --  14.9   CR 5.19* 4.59* 4.58*  --   --  1.17  --  1.18*  --  1.10   GFRESTIMATED 12* 14* 14*  --   --  71  --  70  --  76   LAI 8.2* 8.4* 8.8  --   --  8.8  --  8.7*  --  9.3   PHOS 3.9  --   --   --   --  "  --   --   --   --   --    PROTTOTAL  --  6.7 7.2  --   --   --   --  6.9  --  8.5*   ALBUMIN 2.6* 2.9* 3.1*  --   --   --   --  2.9*  --  4.0   BILITOTAL  --  0.5 0.5  --   --   --   --  1.3*  --  1.6*   ALKPHOS  --  83 91  --   --   --   --  66  --  82   AST  --  43 50*  --   --   --   --  27  --  24   ALT  --  19 20  --   --   --   --  11  --  11    < > = values in this interval not displayed.     CBC  Recent Labs   Lab 11/01/23  0743 11/01/23  0612 10/31/23  1610 10/31/23  1414   HGB 9.1* 8.9* 8.6* 9.6*   WBC 3.6* 3.8* 4.3 4.3   RBC 3.22* 3.14* 3.14* 3.44*   HCT 27.9* 27.2* 26.8* 29.5*   MCV 87 87 85 86   MCH 28.3 28.3 27.4 27.9   MCHC 32.6 32.7 32.1 32.5   RDW 15.2* 15.4* 15.4* 15.2*   PLT 91* 97* 88* 95*     INRNo lab results found in last 7 days.  ABG  Recent Labs   Lab 10/31/23  1610   O2PER 0      URINE STUDIES  Recent Labs   Lab Test 10/31/23  1414 10/26/23  1601   COLOR Yellow Yellow   APPEARANCE Slightly Cloudy* Slightly Cloudy*   URINEGLC Negative Negative   URINEBILI Negative Negative   URINEKETONE Negative Negative   SG 1.015 1.017   UBLD Small* Negative   URINEPH 5.5 5.5   PROTEIN 300* 70*   NITRITE Negative Negative   LEUKEST Negative Negative   RBCU 6* <1   WBCU 10* 1     Recent Labs   Lab Test 03/22/22  1508   UTPG 0.21*     PTH  No lab results found.  IRON STUDIES  Recent Labs   Lab Test 11/01/23  0743   IRON 14*   *   IRONSAT 11*   MARIA L 1,722*       IMAGING: Reviewed        ELIO RAMAN PA-C    I have seen and evaluated this patient with the LEONEL as part of a shared visit.  I personally reviewed the vital signs, medications and labs, and interviewed the patient.  I have participated in the medical decision making as outlined in our joint note.    62 yo M with nasopharyngeal CA and JOCE    Prior hx of 10 day illness with N/V and fevers but Cr was 1.17 10/28 and now up to 5.    UA shows WBCs and 1g/g proteinuria     Assessment and recommendations:   # JOCE:  ketruda autoimmune reaction  causing ISN (most likely) or vasculitis (less likely). No evidence of TMA. NSAID and vanco use may have contributed  - agree with ID consult  - consider empiric steroids of OK with ID  - consider renal biopsy    Skye David MD

## 2023-11-01 NOTE — PROGRESS NOTES
Transfer Type: [facility]  Transfer Triage Note    Date of call: 10/31/23  Time of call: 8:31 PM    Current Patient Location:  [facility]  Current Level of Care: ED    Vitals: BP:116/70 HR: 102 O2 Sats: 92% on RA  T 101.1  Diagnosis: Fever unknown etiology.  Nasopharyngeal carcinoma (s/p surgery, radiation, chemo) On keytruda.  JOCE.  Reason for requested transfer: Further diagnostic work up, management, and consultation for specialized care   Isolation Needs: None    Care everywhere has been updated and reviewed: No  Necessary images have been sent through PACS: No    [facility] patient.  Requires ID, ENT, oncology.    Transfer accepted: Yes  Stability of Patient: Patient is vitally stable, with no critical labs, and will likely remain stable throughout the transfer process  Is the patient appropriate for [facility]? Yes  Level of Care Needed: Med Surg  Telemetry Needed:  None  Expected Time of Arrival for Transfer: 0-8 hours  Arrival Location:  [facility]     Recommendations for Management and Stabilization: Not needed    Additional Comments: 63 year old male with history of nasopharyngeal carcinoma, s/p surgery, chemo and radiation currently receiving Keytruda every 3 weeks presented to [facility] emergency department feeling rundown with intermittent fever and chills.  Recent hospitalization at [facility] with fever of unclear etiology.  No evidence for organ dysfunction.  Discharged on doxycycline for what was felt to represent a tickborne illness, studies for which subsequently returned negative.  Unremarkable chest x-ray, negative CTA for PE, negative COVID, influenza and RSV.  Received empiric Zosyn and vancomycin during hospitalization.  Actually had a fever to 102.3 the morning of discharge.  Denies headache, nasal pharyngeal discomfort, neck stiffness, cough, shortness of breath or abdominal pain.  Present evaluation includes vital  signs as above.  Chest x-ray no active disease.  Renal ultrasound unremarkable.  Again negative COVID, influenza and RSV.  Lactic acid 0.8.  ESR 60.  .  White blood count 4.3.  Hemoglobin 8.6 after fluid (9.6).  Venous pH 7.41.  UA 10 white cells few bacteria.  Negative nitrite.  Pro-Lg low at 0.18 during last admission.  Basis for present fever and acute kidney injury unclear.  Consider Keytruda.  Patient did have IV contrast 10/26 at time of CTA.  Potential component of volume depletion.  No subspecialty support at [facility].  According to ER physician patient aside from fatigue and fever is not acutely distressed.  Stable hemodynamics.  Felt appropriate for a med/surge bed in which case patient will be accepted to [facility] where he can receive consultation from ID, ENT (during the day) and oncology.  Antibiotics presently withheld as no clear source for infection.    Refugio Reis MD

## 2023-11-02 ENCOUNTER — PATIENT OUTREACH (OUTPATIENT)
Dept: ONCOLOGY | Facility: OTHER | Age: 63
End: 2023-11-02

## 2023-11-02 LAB
ALBUMIN SERPL BCG-MCNC: 2.6 G/DL (ref 3.5–5.2)
ALP SERPL-CCNC: 81 U/L (ref 40–129)
ALT SERPL W P-5'-P-CCNC: 17 U/L (ref 0–70)
ANION GAP SERPL CALCULATED.3IONS-SCNC: 7 MMOL/L (ref 7–15)
AST SERPL W P-5'-P-CCNC: 40 U/L (ref 0–45)
ATRIAL RATE - MUSE: 91 BPM
BACTERIA UR CULT: NO GROWTH
BASOPHILS # BLD AUTO: 0 10E3/UL (ref 0–0.2)
BASOPHILS NFR BLD AUTO: 0 %
BILIRUB SERPL-MCNC: 0.4 MG/DL
BUN SERPL-MCNC: 48.8 MG/DL (ref 8–23)
C3 SERPL-MCNC: 130 MG/DL (ref 81–157)
C4 SERPL-MCNC: 29 MG/DL (ref 13–39)
CALCIUM SERPL-MCNC: 8.1 MG/DL (ref 8.8–10.2)
CHLORIDE SERPL-SCNC: 101 MMOL/L (ref 98–107)
CMV DNA SPEC NAA+PROBE-ACNC: NOT DETECTED IU/ML
CREAT SERPL-MCNC: 4.89 MG/DL (ref 0.67–1.17)
CRP SERPL-MCNC: 294.58 MG/L
D DIMER PPP FEU-MCNC: 5.88 UG/ML FEU (ref 0–0.5)
DEPRECATED HCO3 PLAS-SCNC: 26 MMOL/L (ref 22–29)
DIASTOLIC BLOOD PRESSURE - MUSE: NORMAL MMHG
EGFRCR SERPLBLD CKD-EPI 2021: 13 ML/MIN/1.73M2
EOSINOPHIL # BLD AUTO: 0 10E3/UL (ref 0–0.7)
EOSINOPHIL NFR BLD AUTO: 0 %
ERYTHROCYTE [DISTWIDTH] IN BLOOD BY AUTOMATED COUNT: 15.3 % (ref 10–15)
FERRITIN SERPL-MCNC: 1344 NG/ML (ref 31–409)
GLUCOSE BLDC GLUCOMTR-MCNC: 127 MG/DL (ref 70–99)
GLUCOSE BLDC GLUCOMTR-MCNC: 144 MG/DL (ref 70–99)
GLUCOSE SERPL-MCNC: 126 MG/DL (ref 70–99)
HCT VFR BLD AUTO: 25.4 % (ref 40–53)
HGB BLD-MCNC: 8.3 G/DL (ref 13.3–17.7)
IMM GRANULOCYTES # BLD: 0 10E3/UL
IMM GRANULOCYTES NFR BLD: 1 %
INTERPRETATION ECG - MUSE: NORMAL
LACTATE SERPL-SCNC: 0.7 MMOL/L (ref 0.7–2)
LYMPHOCYTES # BLD AUTO: 0.3 10E3/UL (ref 0.8–5.3)
LYMPHOCYTES NFR BLD AUTO: 8 %
MCH RBC QN AUTO: 28.2 PG (ref 26.5–33)
MCHC RBC AUTO-ENTMCNC: 32.7 G/DL (ref 31.5–36.5)
MCV RBC AUTO: 86 FL (ref 78–100)
MONOCYTES # BLD AUTO: 0.3 10E3/UL (ref 0–1.3)
MONOCYTES NFR BLD AUTO: 9 %
NEUTROPHILS # BLD AUTO: 3.1 10E3/UL (ref 1.6–8.3)
NEUTROPHILS NFR BLD AUTO: 82 %
NRBC # BLD AUTO: 0 10E3/UL
NRBC BLD AUTO-RTO: 0 /100
P AXIS - MUSE: 42 DEGREES
PATH REPORT.COMMENTS IMP SPEC: NORMAL
PATH REPORT.FINAL DX SPEC: NORMAL
PATH REPORT.MICROSCOPIC SPEC OTHER STN: NORMAL
PATH REPORT.MICROSCOPIC SPEC OTHER STN: NORMAL
PATH REPORT.RELEVANT HX SPEC: NORMAL
PLATELET # BLD AUTO: 113 10E3/UL (ref 150–450)
POTASSIUM SERPL-SCNC: 3.8 MMOL/L (ref 3.4–5.3)
PR INTERVAL - MUSE: 172 MS
PROT SERPL-MCNC: 6.5 G/DL (ref 6.4–8.3)
QRS DURATION - MUSE: 164 MS
QT - MUSE: 428 MS
QTC - MUSE: 526 MS
R AXIS - MUSE: 40 DEGREES
RBC # BLD AUTO: 2.94 10E6/UL (ref 4.4–5.9)
SODIUM SERPL-SCNC: 134 MMOL/L (ref 135–145)
SYSTOLIC BLOOD PRESSURE - MUSE: NORMAL MMHG
T AXIS - MUSE: 23 DEGREES
VENTRICULAR RATE- MUSE: 91 BPM
WBC # BLD AUTO: 3.8 10E3/UL (ref 4–11)

## 2023-11-02 PROCEDURE — 250N000011 HC RX IP 250 OP 636: Performed by: INTERNAL MEDICINE

## 2023-11-02 PROCEDURE — 36591 DRAW BLOOD OFF VENOUS DEVICE: CPT | Performed by: PHYSICIAN ASSISTANT

## 2023-11-02 PROCEDURE — 99255 IP/OBS CONSLTJ NEW/EST HI 80: CPT | Mod: GC | Performed by: INTERNAL MEDICINE

## 2023-11-02 PROCEDURE — 258N000003 HC RX IP 258 OP 636: Performed by: PEDIATRICS

## 2023-11-02 PROCEDURE — 99232 SBSQ HOSP IP/OBS MODERATE 35: CPT | Mod: GC | Performed by: INTERNAL MEDICINE

## 2023-11-02 PROCEDURE — 83605 ASSAY OF LACTIC ACID: CPT | Performed by: PHYSICIAN ASSISTANT

## 2023-11-02 PROCEDURE — 80053 COMPREHEN METABOLIC PANEL: CPT | Performed by: PHYSICIAN ASSISTANT

## 2023-11-02 PROCEDURE — 250N000013 HC RX MED GY IP 250 OP 250 PS 637: Performed by: PEDIATRICS

## 2023-11-02 PROCEDURE — 85025 COMPLETE CBC W/AUTO DIFF WBC: CPT | Performed by: PHYSICIAN ASSISTANT

## 2023-11-02 PROCEDURE — 250N000012 HC RX MED GY IP 250 OP 636 PS 637: Performed by: INTERNAL MEDICINE

## 2023-11-02 PROCEDURE — 87799 DETECT AGENT NOS DNA QUANT: CPT | Performed by: PHYSICIAN ASSISTANT

## 2023-11-02 PROCEDURE — 120N000002 HC R&B MED SURG/OB UMMC

## 2023-11-02 PROCEDURE — 99232 SBSQ HOSP IP/OBS MODERATE 35: CPT | Performed by: PHYSICIAN ASSISTANT

## 2023-11-02 PROCEDURE — 99233 SBSQ HOSP IP/OBS HIGH 50: CPT | Performed by: INTERNAL MEDICINE

## 2023-11-02 PROCEDURE — 85379 FIBRIN DEGRADATION QUANT: CPT | Performed by: PHYSICIAN ASSISTANT

## 2023-11-02 PROCEDURE — 86140 C-REACTIVE PROTEIN: CPT | Performed by: PHYSICIAN ASSISTANT

## 2023-11-02 PROCEDURE — 82728 ASSAY OF FERRITIN: CPT | Performed by: PHYSICIAN ASSISTANT

## 2023-11-02 RX ORDER — DEXTROSE MONOHYDRATE 25 G/50ML
25-50 INJECTION, SOLUTION INTRAVENOUS
Status: DISCONTINUED | OUTPATIENT
Start: 2023-11-02 | End: 2023-11-07 | Stop reason: HOSPADM

## 2023-11-02 RX ORDER — HEPARIN SODIUM (PORCINE) LOCK FLUSH IV SOLN 100 UNIT/ML 100 UNIT/ML
5-10 SOLUTION INTRAVENOUS
Status: DISCONTINUED | OUTPATIENT
Start: 2023-11-02 | End: 2023-11-07 | Stop reason: HOSPADM

## 2023-11-02 RX ORDER — HEPARIN SODIUM,PORCINE 10 UNIT/ML
5-10 VIAL (ML) INTRAVENOUS EVERY 24 HOURS
Status: DISCONTINUED | OUTPATIENT
Start: 2023-11-02 | End: 2023-11-07 | Stop reason: HOSPADM

## 2023-11-02 RX ORDER — HEPARIN SODIUM,PORCINE 10 UNIT/ML
5-10 VIAL (ML) INTRAVENOUS
Status: DISCONTINUED | OUTPATIENT
Start: 2023-11-02 | End: 2023-11-07 | Stop reason: HOSPADM

## 2023-11-02 RX ORDER — NICOTINE POLACRILEX 4 MG
15-30 LOZENGE BUCCAL
Status: DISCONTINUED | OUTPATIENT
Start: 2023-11-02 | End: 2023-11-07 | Stop reason: HOSPADM

## 2023-11-02 RX ADMIN — ACETAMINOPHEN 650 MG: 325 TABLET, FILM COATED ORAL at 07:55

## 2023-11-02 RX ADMIN — LEVOTHYROXINE SODIUM 112 MCG: 112 TABLET ORAL at 07:52

## 2023-11-02 RX ADMIN — FLUTICASONE FUROATE AND VILANTEROL TRIFENATATE 1 PUFF: 100; 25 POWDER RESPIRATORY (INHALATION) at 07:54

## 2023-11-02 RX ADMIN — HEPARIN, PORCINE (PF) 10 UNIT/ML INTRAVENOUS SYRINGE 5 ML: at 11:31

## 2023-11-02 RX ADMIN — PROCHLORPERAZINE MALEATE 10 MG: 10 TABLET ORAL at 16:45

## 2023-11-02 RX ADMIN — ACETAMINOPHEN 650 MG: 325 TABLET, FILM COATED ORAL at 16:45

## 2023-11-02 RX ADMIN — SODIUM CHLORIDE, POTASSIUM CHLORIDE, SODIUM LACTATE AND CALCIUM CHLORIDE: 600; 310; 30; 20 INJECTION, SOLUTION INTRAVENOUS at 07:50

## 2023-11-02 RX ADMIN — INSULIN ASPART 1 UNITS: 100 INJECTION, SOLUTION INTRAVENOUS; SUBCUTANEOUS at 18:03

## 2023-11-02 RX ADMIN — ATORVASTATIN CALCIUM 20 MG: 20 TABLET, FILM COATED ORAL at 07:52

## 2023-11-02 ASSESSMENT — ACTIVITIES OF DAILY LIVING (ADL)
ADLS_ACUITY_SCORE: 25
ADLS_ACUITY_SCORE: 23
ADLS_ACUITY_SCORE: 25
ADLS_ACUITY_SCORE: 23
ADLS_ACUITY_SCORE: 25
ADLS_ACUITY_SCORE: 23
ADLS_ACUITY_SCORE: 23
ADLS_ACUITY_SCORE: 25
ADLS_ACUITY_SCORE: 23
ADLS_ACUITY_SCORE: 25

## 2023-11-02 NOTE — PROGRESS NOTES
[facility]    Medicine Progress Note - Hospitalist Service, GOLD TEAM 19    Date of Admission:  11/1/2023    Assessment & Plan   Jareth Gallardo is a 63-year-old history of nasopharyngeal squamous cell cancer (s/p surgery, chemoradiation and now on pembrolizumab), COPD, BARRETT, hypothyroidism, non-insulin-dependent diabetes presenting with acute onset of fever and found to have acute kidney injury in the setting of elevated inflammatory markers.        #Fever  #Pancytopenia  #Acute kidney injury  -Patient's baseline creatinine ~0.8 - 1.0   -He did report increased ingestion of NSAIDs  -He also presented with elevated inflammatory markers CRP of 330, ferritin 1700, ESR 60.  -She has also had fever, Tmax this admission 102.7.  -Additional work-up includes renal ultrasound which was largely unremarkable, portable chest x-ray without any acute findings, CT chest with IV contrast on 10/26/2023 without evidence of PE and largely clear lungs.  -Patient's acute kidney injury is likely intrinsic in nature given NSAIDs ingestion, there is also concern for vasculitis or Keytruda induced nephritis.  -Infectious disease has since been consulted recommending ongoing work-up including following up blood cultures, trend fever curve, daily CBC/CRP.  Overall, concern for underlying infection is currently lower.  -Of note, urine studies are negative, respiratory panel negative, CMV PCR negative, haptoglobin normal, C3 & C4 also normal.  Iron studies consistent with chronic disease.  -His LDH level is elevated.  -Fibrinogen activity is elevated, direct antiglobulin test is negative.  -His reticulocyte count is very low.  Follow-up blood cultures  Follow-up fungal markers, fungal culture, HIRAM, complement activity, ANCA IgE, EBV PCR  Recommend strict I's and O's, daily weights  Defer resumption of steroids to heme-onc (of note, ID is okay with steroids at this time)  Trend kidney  function  Avoid nephrotoxic medications, renally dose medications   Likely to require biopsy, defer final decision to nephrology     #Nausea, vomiting -as needed Zofran, Compazine ordered.  Patient's p.o. intake has since been improving.    #History of nasopharyngeal carcinoma   - Diagnosed in Dec 2021, positive for EBV; induced with carboplatin gemcitabine in April, May of 2022, subsequently received carboplatin and radiotherapy, left modified radical neck dissection in Dec 2022, subsequently presented at tumor board in Feb 23 and felt to be at high risk of recurrence with residual disease on PET so started on pembrolizumab (keytruda) March 9, 2023; recently received Cycle 10 on October 11 and due for cycle 11 today 11/1  - Dr. Martin thought he might be a candidate for toripalimab  - Pembrolizumab being held at this time.    Chronic issues  #Obstructive sleep apnea -  nighttime CPAP  #COPD -continue PTA Breo Ellipta.  #Hypothyroidism -continue PTA levothyroxine 112 mcg daily  #Non-insulin-dependent diabetes mellitus -PTA meds includes metformin, Trulicity both of which have been held at this time.  Management with ISS, POCT, medium intensity sliding scale with NovoLog.          Diet: Combination Diet Regular Diet Adult    DVT Prophylaxis: Pneumatic Compression Devices  Arthur Catheter: Not present  Lines: PRESENT      Port A Cath Single 03/31/22 Right Chest wall-Site Assessment: WDL      Cardiac Monitoring: None  Code Status: Full Code      Clinically Significant Risk Factors              # Hypoalbuminemia: Lowest albumin = 2.6 g/dL at 11/2/2023  6:45 AM, will monitor as appropriate  # Coagulation Defect: INR = 1.19 (Ref range: 0.85 - 1.15) and/or PTT = 41 Seconds (Ref range: 22 - 38 Seconds), will monitor for bleeding  # Thrombocytopenia: Lowest platelets = 88 in last 2 days, will monitor for bleeding  # Acute Kidney Injury, unspecified: based on a >150% or 0.3 mg/dL increase in last creatinine compared to past  "90 day average, will monitor renal function         # Obesity: Estimated body mass index is 35.14 kg/m  as calculated from the following:    Height as of this encounter: 1.778 m (5' 10\").    Weight as of this encounter: 111.1 kg (244 lb 14.9 oz)., PRESENT ON ADMISSION            Disposition Plan to be determined    Expected Discharge Date: 11/03/2023                    PARI BEATTY MD  Hospitalist Service, GOLD TEAM 19  [facility]  Securely message with [employer] (more info)  Text page via [facility] Paging/Directory   See signed in provider for up to date coverage information  ______________________________________________________________________    Interval History   -Patient has been afebrile for the last 24 hrs   -Reports feeling better today  -Required supplemental oxygen overnight because of his underlying history of sleep apnea  -Has remained hemodynamically stable as well.    Physical Exam   Vital Signs: Temp: 99.5  F (37.5  C) Temp src: Oral BP: 117/71 Pulse: 102   Resp: 18 SpO2: 96 % O2 Device: Nasal cannula Oxygen Delivery: 2 LPM  Weight: 244 lbs 14.9 oz    General Appearance: Obese male, sitting comfortably in bed, in no acute distress or discomfort.  HEENT: PERRL: EOMI; moist mucous membrane w/o lesions  Neck: No JVD  Pulmonary: Clear to auscultation bilaterally, no wheezes or crackles  CVS: Regular rhythm, no murmurs, rubs or gallops  GI: BS (+), soft nontender, no rebound or guarding   Extremities: No LE edema.   Skin: No rashes or lesions  Neurologic: A&O x3      Medical Decision Making       55 MINUTES SPENT BY ME on the date of service doing chart review, history, exam, documentation & further activities per the note.      Data   ------------------------- PAST 24 HR DATA REVIEWED -----------------------------------------------    I have personally reviewed the following data over the past 24 hrs:    3.8 (L)  \   8.3 (L)   / 113 (L)     134 (L) 101 48.8 (H) / "  126 (H)   3.8 26 4.89 (H) \     ALT: 17 AST: 40 AP: 81 TBILI: 0.4   ALB: 2.6 (L) TOT PROTEIN: 6.5 LIPASE: N/A     Procal: N/A CRP: 294.58 (H) Lactic Acid: 0.7       INR:  1.19 (H) PTT:  41 (H)   D-dimer:  5.88 (H) Fibrinogen:  675 (H)     Ferritin:  1,344 (H) % Retic:  N/A LDH:  N/A       Imaging results reviewed over the past 24 hrs:   No results found for this or any previous visit (from the past 24 hour(s)).

## 2023-11-02 NOTE — PLAN OF CARE
"Goal Outcome Evaluation:      Plan of Care Reviewed With: patient    Overall Patient Progress: improvingOverall Patient Progress: improving    Outcome Evaluation: Alert x4. Independent. Denies pain. Denies N/V. Decreased O2. Continued to monitor. LR dc'ed. Port au cath assessed; CDI.    VS: BP 92/65 (BP Location: Left arm, Patient Position: Sitting, Cuff Size: Adult Large)   Pulse 92   Temp 98.2  F (36.8  C) (Oral)   Resp 16   Ht 1.778 m (5' 10\")   Wt 111.1 kg (244 lb 14.9 oz)   SpO2 95%   BMI 35.14 kg/m      O2: Around 95% on RA. Decreased O2 flow rate during shift. O2 remains WNL when awake.   Output: Voids spontaneously, using toilet as needed   Last BM: 11-1-2023   Activity: SBA    Skin: Old scratches on BLE, dry negative discharge   Pain: Denies   CMS: A&OX4, denies numbness and tingling   Dressing: Transparent dressing on (R) chest port au cath CDI   Diet: Regular diet, with poor appetite.    LDA: Port A cath on (R) upper chest saline locked.   Equipment: Personal items, call light, IV pole   Plan: Monitor clinical condition closely.    Additional Info: Tylenol given this AM. Sepsis protocol began but lactic acid came back WNL.                "

## 2023-11-02 NOTE — PROGRESS NOTES
Oncology Note - Follow up    This patient was not seen by the oncology team today. Chart and labs were reviewed.     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    Nasopharyngeal squamous cell carcinoma   JOCE with AIN/ATN 2/2 infectious vs immune-mediated nephritis  This is not likely CRS as patient has not received any form of cell therapy, ie CART. CRS is not seen with immunotherapy. Immunotherapy can cause a nephritis picture but it is unclear if this is the etiology in this case. ID was consulted who do not suspect this is infectious in nature and more likely immunotherapy induced. EBV DNA PCR pending. His renal function seems to be improving and he is still having urine output. In terms of steroids, would defer another day to continue trending renal function. If renal function worsens tomorrow, would consider starting steroids then. Would discuss with nephrology regarding renal biopsy.     Nausea, vomiting, fever due to infection vs medication side effect  As stated above, symptoms appear to be due infectious cause rather than pembrolizumab induced.     Pancytopenia  Unclear etiology of pancytopenia. Appears that he has had low normal WBC count for many years. Anemia appears to be worsening over time. Platelet count appeared to be normal but now worsening. French test negative. Fibrinogen elevated and haptoglobin normal so not likely DIC. Peripheral blood smear without evidence of hemolysis.     Recommendations:   - Repeat EBV titers pending  - Nephrology input regarding renal biopsy  - Defer steroids for now  - Monitor renal function, strict I/O's, daily weight     Patient was seen and plan of care was discussed with attending physician Dr. Trinity Lora.      Please don't hesitate to contact us with further questions about this patient.     Mila Spangler MD  Heme/Onc/Transplant  [facility] [phone]

## 2023-11-02 NOTE — PROGRESS NOTES
CLINICAL NUTRITION SERVICES - ASSESSMENT NOTE     Nutrition Prescription    RECOMMENDATIONS FOR MDs/PROVIDERS TO ORDER:  Encourage oral intakes, small frequent meals    Malnutrition Status:    Patient does not meet two of the established criteria necessary for diagnosing malnutrition but is at risk for malnutrition    Recommendations already ordered by Registered Dietitian (RD):  Encouraged small frequent meals, adequate protein intakes, use of outside foods.     Future/Additional Recommendations  Monitor labs, intakes, and weight trends.     REASON FOR ASSESSMENT  Jareth Gallardo is a/an 63 year old male assessed by the dietitian for Admission Nutrition Screen positive for 2-13 lb wt loss and decreased appetite.    NUTRITION/MEDICAL HISTORY  History of nasopharyngeal squamous cell cancer (s/p surgery, chemoradiation and now on pembrolizumab), COPD, BARRETT, hypothyroidism, non-insulin-dependent diabetes presenting with acute onset of fever and found to have acute kidney injury in the setting of elevated inflammatory markers.     FINDINGS  RD spoke with pt and spouse at bedside. Pt reports decreased appetite when he does not feel well. Also feels foods taste bland. Pt's wife had gotten pudding from the cafe for pt since he was craving chocolate pie. Discussed option for wife to bring food from cafeteria as needed. Wife had not been to the cafeteria yet. Discussed supplement options and encouraged small frequent meals. Patient politely declined supplements at this time.     CURRENT NUTRITION ORDERS  Diet: Regular    Intake/Tolerance: % of documented meals.     LABS   11/01/23 06:12 11/01/23 07:43 11/02/23 06:45 11/03/23 10:38   Sodium 133 (L)  134 (L) 133 (L)   Potassium 3.8  3.8 3.9   Urea Nitrogen 46.2 (H)  48.8 (H) 50.9 (H)   Creatinine 5.19 (H)  4.89 (H) 4.20 (H)   Albumin 2.6 (L)  2.6 (L) 2.4 (L)   CRP Inflammation 320.58 (H)  294.58 (H)    Ferritin  1,722 (H) 1,344 (H)    Glucose 115 (H)  126 (H) 140 (H)  "  Iron  14 (L)     Iron Binding Capacity  126 (L)     Iron Sat Index  11 (L)       MEDICATIONS  Medications reviewed: insulin, levothyroxine, LR, compazine    ANTHROPOMETRICS  Height: 177.8 cm (5' 10\")  Most Recent Weight: 111.1 kg (244 lb 14.9 oz)    IBW: 75.5 kg (147% IBW)  BMI: Obesity Grade II BMI 35-39.9  Weight History: Pt weight stable over last month, with 6 lb (2%) weight gain in < 3  months, 7 lb (3%) weight gain in < 6 months.  Pt reports UBW of 235 lb. Pt's wife believes he is fluid up.   Wt Readings from Last Encounters:   11/01/23 111.1 kg (244 lb 14.9 oz)   10/31/23 111.1 kg (244 lb 14.9 oz)   10/31/23 111.1 kg (245 lb)   10/28/23 108 kg (238 lb 1.6 oz)   10/11/23 110.9 kg (244 lb 9.6 oz)   09/20/23 109.3 kg (241 lb)   09/14/23 106.6 kg (235 lb)   08/30/23 108.7 kg (239 lb 9.6 oz)   08/29/23 108.9 kg (240 lb)   08/25/23 106.1 kg (234 lb)   08/09/23 108.5 kg (239 lb 3.2 oz)   08/03/23 106.1 kg (234 lb)   06/29/23 107.9 kg (237 lb 14.4 oz)   06/08/23 109.4 kg (241 lb 3.2 oz)   05/18/23 107.8 kg (237 lb 9.6 oz)   04/27/23 106.9 kg (235 lb 11.2 oz)   03/30/23 110.5 kg (243 lb 9.6 oz)   03/09/23 109.4 kg (241 lb 3.2 oz)   02/22/23 109.4 kg (241 lb 3.2 oz)   02/22/23 109.3 kg (241 lb)     Dosing Weight: 84 kg - ABW using most recent weight and IBW of 75.5 kg.     ASSESSED NUTRITION NEEDS  Estimated Energy Needs: 0764-5991 kcals/day (25 - 30 kcals/kg)  Justification: Maintenance  Estimated Protein Needs: + grams protein/day (1 - 1.2 grams of pro/kg)  Justification: Increased needs  Estimated Fluid Needs: 1 ml/kcal or per provider pending fluid status    MALNUTRITION  % Intake: Unable to assess  % Weight Loss: None noted  Subcutaneous Fat Loss: None observed  Muscle Loss: None observed  Fluid Accumulation/Edema: Does not meet criteria   Malnutrition Diagnosis: Patient does not meet two of the established criteria necessary for diagnosing malnutrition but is at risk for malnutrition    NUTRITION " DIAGNOSIS  Predicted inadequate protein-energy intake related to variable appetite as evidenced by pt report and pt reliant on PO intakes to meet 100% of nutritional needs with potential for variation    INTERVENTIONS  Implementation  Nutrition education for nutrition relationship to health/disease     Goals  Patient to consume % of nutritionally adequate meal trays TID, or the equivalent with supplements/snacks.     Monitoring/Evaluation  Progress toward goals will be monitored and evaluated per protocol.    Kathia Velasco MS, RDN, LD  RD pager: [phone]  WB Weekend/Holiday Pager: [phone]

## 2023-11-02 NOTE — CONSULTS
[facility] ID SERVICE: NEW CONSULTATION    Patient:  Jareth Gallardo, Date of birth 1960, Medical record number [id number]  Date of Admission: 11/1/2023  Date of Visit:  11/2/2023  Requesting Provider: Refugio Reis         Assessment and Recommendations:     ID Problem List:  JOCE   Pancytopenia   Hyponatremia   N/V  Fevers (Tmax 102), for ~11 days  Recent hospitalization (Oct 26-28) for presumed tickborne illness s/p vancomycin and zosyn. 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     During this admission he now has a worsening JOCE (baseline Cr  1.10) and on presentation Cr 4.58. UA with proteinuria and hematuria. Of note patient renal ultrasound without hydronephrosis of obstruction.  No antibitiotics started on this admission.     With regard to infectious etiology, do not think his symptoms would be due to reactivation of EBV. Typically acute EBV infections resulting in JOCE, rather than simply reactivation. CMV negative. Agree that it is reasonable to check EBV levels (anticipate that these will be high given acute illness). Additionally, do not suspect that fevers are due to tick-borne illness as patient recently tested negative and has been on appropriate abx therapies without improvement.    Overall, do not favor infectious etiology and rather suspect that his  fevers/JOCE are due to Keytruda. With that in mind, from ID perspective treatment with steroids would be reasonable.     Recommendations:  From ID perspective, okay for treatment with steroids    Agree that Kidney biopsy would be helpful   Trend fever curve  Continue to trend CBC/CRP  No indication for antibiotics   Will continue to follow blood cultures     This patient was staffed with Dr. Jany Pop    Thank you for this consult. ID team will continue to follow this patient. Please reach out if any questions or concerns.     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     Patient states his symptoms began abruptly on October 22.  He states he woke up that morning and after breakfast developed symptoms of fevers, chills, rigors he did not take his temperature at this time.  He also reports his chronic left-sided neck tingling stopped (this has been ongoing since his previous surgery).  He denied any nausea, vomiting, diarrhea, hematuria, melena, hemoptysis.  No other sick contacts.  He reports fever has been ongoing from October 22 until now.  His Tmax was 102 at home.  Given taking Tylenol 2 tablets 2-3 times per day.    He then presented to the hospital in [facility] and was admitted from 10/26-28.  Illness thought to be due to tick-borne illness and he was treated with empiric therapy of Zosyn and vancomycin until blood culture resulted.  Given the blood cultures was negative this was discontinued and he was started on empiric doxycycline.  Workup was negative anaplasma, ehrlichia, and lyme.  He was discharged on October 28, he still had continuous fevers.    At home he reports he  continued to have fevers with Tmax up to 102.  He developed some nausea with vomiting starting on the 29th (1 time per day mostly regurgitated food no blood seen).  He denies any diarrhea during this time.  During this time he was alternating between Tylenol and ibuprofen for fevers.  He reports decreased p.o. intake.    He was then seen in oncology clinic on 10/30 and labs were notable for Cr 4.58. He was sent to the ED for further evaluation.  There was concern for NSAID induced AIN vs immune mediated nephritis from pembrolizumab. He was started on IVF and repeat blood and urine cultures have been negative thus far. Renal ultrasound showed no evidence of obstruction.     Labs notable for patient has leukopenia, normocytic anemia with iron deficiency and low retic count, and thrombocytopenia. Labs showing acute inflammatory reaction with elevated CRP (320), Ferritin (1,722), Fibrinogen ( 675). Hemolysis labs including elevated LDH (340) and d-dimer, with normal Haptoglobin and bilirubin, YING negative, peripheral smear pending. CMV negative. Influenza/RSV/Covid negative. Notably patients EBV viral load has increased (15K on 8/29, previously 700 2/22/2023); agree with repeat levels. Labs pending: ANCA IgG, Complement activity, Fungal/Yeast culture, 1,3 Beta D Glucan, Lymes DNA, C. Diff, EBV DNA PCR, Blood Smear     Regarding patient's exposure history, no recent travel. Patient lives in [facility]) and animal exposure limited to pets - cat & dog. Denies any livestock or nearby farms. Does drink from well-water. Had several tick bites identified within 7 days prior to symptoms. No IV drug use, alcohol, or tobacco use. And notably had recent Keytruda infusion on Oct 11. Has had recent abxs including Zosyn/Vanc/Doxy.     Labs pending: ANCA IgG, Complement activity, Fungal/Yeast culture, 1,3 Beta D Glucan, Lymes DNA, C. Diff, EBV DNA PCR, Blood Smear     PROSTHETIC JOINTS OR MATERIALS, IMPLANTS, OR DEVICES: None      FOOD/WATER:   - Raw undercooked meat or sushi: None  - Unwashed fruits or vegetables: None  - Unpasteurized milk: None  - Well water: Yes     TUBERCULOSIS:   - Known TB exposures: None  - Prior TB testing results: No results found on chart review  - Healthcare work: None : Yes - Was in the [employer]  - Homelessness: Never  - group home: Never     TRAVEL: Denies any recent travel outside of [place] in the last 6 months. He reports in March they went to the [place]. Otherwise they have remained in their house in [place]    OUTDOOR ACTIVITIES:   - Camping: None  - Cave exploring: None  - Walking barefoot on soil or grass: None  - Swimming or wading in rivers, lakes or streams:  None  - Hot tubs or Jacuzzis: None      ANIMALS: Has a new dog (8 months old) and a cat. No     Sick contact: None    SHx:  HOME/ENVIRONMENT: [place], lives in [place].      OCCUPATION: Retired. Previously worked an built furniture using reclaimed wood. Current hobbies include woodworking, hunting, fishing, and yard work -- including chopping wood.      TOBACCO/ALCOHOL/RECREATIONAL DRUGS: Denies all.            Review of Systems:     Complete 12 point review of systems negative except as noted in HPI.         Recent Antimicrobials::   Vancomycin x1 10/26  Zosyn x1 10/126  Doxycycline: 10/28 - 11/1         Past Medical History:     Past Medical History:   Diagnosis Date    COPD (chronic obstructive pulmonary disease) (H)     Dyslipidemia     Hard to intubate 12/5/2022    Nasopharyngeal carcinoma (H)     BARRETT (obstructive sleep apnea)     Type 2 diabetes mellitus with diabetic nephropathy (H)          Allergies:    No Known Allergies       Family History:   Reviewed and noncontributory.   Family History   Problem Relation Age of Onset    Cerebrovascular Disease Mother     Leukemia Father     Melanoma Sister           Social History:     Social History     Socioeconomic History    Marital status:       "Spouse name: Not on file    Number of children: Not on file    Years of education: Not on file    Highest education level: Not on file   Occupational History    Not on file   Tobacco Use    Smoking status: Former     Packs/day: 4.00     Years: 30.00     Additional pack years: 0.00     Total pack years: 120.00     Types: Cigarettes     Quit date:      Years since quittin.8     Passive exposure: Past    Smokeless tobacco: Never    Tobacco comments:     quit smoking. Passive exposure in childhood home.    Substance and Sexual Activity    Alcohol use: Not Currently     Comment: Hx of 4 beer per month    Drug use: Never    Sexual activity: Not on file   Other Topics Concern    Not on file   Social History Narrative    Not on file     Social Determinants of Health     Financial Resource Strain: Not on file   Food Insecurity: Not on file   Transportation Needs: Not on file   Physical Activity: Not on file   Stress: Not on file   Social Connections: Not on file   Interpersonal Safety: Not on file   Housing Stability: Not on file            Physical Exam:     /66 (BP Location: Left arm, Patient Position: Semi-Rosales's, Cuff Size: Adult Large)   Pulse 95   Temp 97.7  F (36.5  C) (Oral)   Resp 18   Ht 1.778 m (5' 10\")   Wt 111.1 kg (244 lb 14.9 oz)   SpO2 96%   BMI 35.14 kg/m       Exam:  GENERAL:  Well-developed, well-nourished, lying in bed   Head: normocephalic, atraumatic.   EYES: PERRLA, EOMI, conjunctiva clear, anicteric sclerae.    ENT:  Oropharynx is moist without exudates or ulcers.  NECK:  Supple.  LUNGS:  Breathing comfortably, on room air, clear to auscultation bilaterally, no w/r/r.  CARDIOVASCULAR:  Regular rate and rhythm, +S1S2 with no murmurs, gallops or rubs.  ABDOMEN:  Normal bowel sounds, soft, nontender. No appreciable hepatosplenomegaly.   : no suprapubic or flank tendterness.   EXT: Extremities warm and without edema. Scattered abrasions on legs and upper extremities.   SKIN:  No " acute rashes.    NEUROLOGIC:  Grossly nonfocal.     Lines and devices:   PIV is in place without any surrounding erythema.    Labs, Microbiology and Imaging studies are reviewed.          Laboratory Data:   Metabolic Studies       Recent Labs   Lab Test 11/02/23  0856 11/02/23  0645 11/01/23  1046 11/01/23  0612 10/28/23  0733 10/28/23  0549 02/22/23  1333 01/30/23  1401   NA  --  134*  --  133*   < > 138   < > 142   POTASSIUM  --  3.8  --  3.8   < > 3.5   < > 3.6   CHLORIDE  --  101  --  99   < > 103   < > 101   CO2  --  26  --  23   < > 26   < > 33*   ANIONGAP  --  7  --  11   < > 9   < > 8   BUN  --  48.8*  --  46.2*   < > 15.4   < > 12.0   CR  --  4.89*  --  5.19*   < > 1.17   < > 0.81   GFRESTIMATED  --  13*  --  12*   < > 71   < > >90   GLC  --  126*  --  115*   < > 104*   < > 71   LAI  --  8.1*  --  8.2*   < > 8.8   < > 9.2   PHOS  --   --   --  3.9  --   --   --   --    MAG  --   --   --   --   --   --   --  1.8   LACT 0.7  --  0.7  --    < >  --    < >  --    PCAL  --   --   --   --   --  0.37*   < >  --     < > = values in this interval not displayed.       Hepatic Studies    Recent Labs   Lab Test 11/02/23  0645 11/01/23  0743 11/01/23  0612 10/31/23  1610 10/31/23  1414 07/05/22  1036 07/01/22  1037   BILITOTAL 0.4  --   --  0.5 0.5   < > 0.9   DBIL  --   --   --   --   --   --  0.2   ALKPHOS 81  --   --  83 91   < > 83   PROTTOTAL 6.5  --   --  6.7 7.2   < > 7.7   ALBUMIN 2.6*  --  2.6* 2.9* 3.1*   < > 3.2*   AST 40  --   --  43 50*   < > 56*   ALT 17  --   --  19 20   < > 69   LDH  --  340*  --   --  314*  --   --     < > = values in this interval not displayed.       Hematology Studies      Recent Labs   Lab Test 11/02/23  0645 11/01/23  0743 11/01/23  0612 10/31/23  1610 10/31/23  1414 10/28/23  0549 05/23/22  1404 05/16/22  0714   WBC 3.8* 3.6* 3.8* 4.3 4.3 3.6*   < > 8.8   ANEU  --   --   --   --   --   --   --  7.0   ALYM  --   --   --   --   --   --   --  0.9   CHOCO  --   --   --   --   --   --    --  0.7   AEOS  --   --   --   --   --   --   --  0.0   HGB 8.3* 9.1* 8.9* 8.6* 9.6* 10.1*   < > 10.4*   HCT 25.4* 27.9* 27.2* 26.8* 29.5* 31.9*   < > 33.8*   * 91* 97* 88* 95* 99*   < > 244    < > = values in this interval not displayed.       Inflammatory Markers    Recent Labs   Lab Test 10/31/23  1414   SED 60*       Urine Studies     Recent Labs   Lab Test 10/31/23  1414 10/26/23  1601   URINEPH 5.5 5.5   NITRITE Negative Negative   LEUKEST Negative Negative   WBCU 10* 1       Imaging:  Recent Results (from the past 48 hour(s))   XR Chest Port 1 View    Narrative    PROCEDURE:  XR CHEST PORT 1 VIEW    HISTORY: Fever. .    COMPARISON:  10/26/2023    FINDINGS:    The cardiomediastinal contours are stable.  No focal consolidation, effusion or pneumothorax.      Impression    IMPRESSION:  Stable portable chest.      JUAN CARLOS MALCOLM MD         SYSTEM ID:  [id number]   US Renal Complete Non-Vascular    Narrative    PROCEDURE: US RENAL COMPLETE NON-VASCULAR    HISTORY: JOCE    TECHNIQUE:  A renal ultrasound was performed.    COMPARISON:  CT 9/13/2023    MEASUREMENTS:    Right renal length: 14 cm  Left renal length: 13 cm    RENAL FINDINGS: The kidneys are normal in size and echogenicity. There  is no hydronephrosis. No shadowing stones are seen.    BLADDER: The bladder is mildly distended and grossly unremarkable.      Impression    IMPRESSION:      Unremarkable 2-ultrasound of the kidneys.      JUAN CARLOS MALCOLM MD         SYSTEM ID:  [id number]

## 2023-11-02 NOTE — PLAN OF CARE
Goal Outcome Evaluation:      Plan of Care Reviewed With: patient    Overall Patient Progress: improvingOverall Patient Progress: improving    Outcome Evaluation: Pt AOX4 , indipendent/SBA  with c , voids well without issues , denies pain or discomfort,  on continous Pulse oxy has orders for CPAP RT informed , to install once they find one. Will continue to monitor and update.

## 2023-11-02 NOTE — PROGRESS NOTES
Per message from Dr. Melvin/ Maryjo TO He was admitted to [facility] overnight. The hospitalized put in neph & onc consults. Per Dr. Melvin note He should not have CTs next week, even if his creatinine is resolved. He had ongoing response in Sept and it seems unlikely to me that anything acutely changed, unless he has new pain or lumps, etc. Will call patient in 1 week.    Sybil TO oncology care coordinator

## 2023-11-02 NOTE — PROGRESS NOTES
Nephrology Progress Note  11/02/2023         Assessment & Recommendations:   #JOCE in setting of NSAID use and pembrolizumab: Pt has had acute increase in Cr over 3 days for 1.17 on 10/28 to 5 now. Multiple nephrotoxins including ibuprofen 400 mg q 6 over the 3 days pta starting 10/26.  Not dizzy but BP was low on admission, so a hemodynamic effect of NSAID plus hypotension could have contributed.  Keytruda can can ISN, TMA or vasculitis. Labs not c/w TMA  - vanc was given on 10/26, 10/27, and 10/28 but unlikely this early after starting the med  - CT chest with contrast on 10/26   - pyuria c/w interstitial nephritis.   - unlikely prerenal given volume up   - pt febrile, EBV viral load increased, ID consulted   - recommend CMV pcr (ordered)  - baseline Scr 0.7-0.9 in the past year  - Scr trend 1.17 on 10/28.  Now 4.58-> 4.59 -> 5.19->4.89  - renal US showed normal size kidneys without hydro  -  protein (up from 70  3 days ago), 10 WBC's, 6 RBC's, sp gravity 1.015  - no proteinuria in 4/2023  - now with urine prot/cr 1.14 mg/mg  - urine culture and blood cultures pending  - urine sodium 46, urine osmolality 267, serum osmolality 288, urine K 23.6  - no eosinophilia noted  - recent NSAID use due to fevers, taking it every 6 hours  - recent pembrolizumab - completed 10 of 11 treatments  - CRP remains elevated but improving  - CMV DNA, CMV quantitative, PCR not detected  - complements and haptoglobin are normal  - HIRAM pending  - kidney function slightly improved today  - Recommend consulting IR and plan for kidney biopsy on Monday.    - continue to monitor for recovery through the weekend  - ID consulted and feel this is not infectious in nature, more likely immunotherapy induced, steroids okay from ID standpoint  - oncology recommends holding off on steroids at this time and trending kidney function. Consider steroids if kidney function worsens tomorrow  - avoid anticoagulation   - avoid hypotension  - avoid  nephrotoxins  - renally dose medications  - please document strict I/O's  - no acute indication for RRT  - vasculitis serology (we ordered)     #BP  - does not take any antihypertensives  - Bp 92/65 most recently, usually 110's/   - avoid hypotension     #Volume  - wt on admission 111.1 kg, no wt's today  - feels he has gained ~ 10 lbs and is having less UOP  - had one unmeasured episode of UOP  - please document daily standing weights  - chart strict I/O's     #Anemia   - Hgb 8.3, iron sat 11%, ferritin 1,722  - management per primary/oncology    Discussed with Dr. David.     Recommendations were communicated to primary team via progress note and nursing staff.     ELIO RAMAN, PA-C     Interval History :   Provider and nursing notes from past 24 hours reviewed. Pt's temp is 98.2 and he has sweats now. He had some nausea and vomiting x 1 episode. He is using oxygen due to desatting overnight from sleep apnea. He is noticing increasing amounts of UOP, though still not being measured.     Review of Systems:   A 4 point review of systems was negative except as noted above.    Physical Exam:   I/O last 3 completed shifts:  In: -   Out: 50 [Emesis/NG output:50]  BP 92/65  Pulse 92  Resp 16  SpO2 95%     GENERAL APPEARANCE: alert and no distress  EYES:  no scleral icterus, pupils equal  PULM: lungs clear to auscultation  CV: regular rhythm, normal rate, no rub     -edema trace LE edema   MS: no evidence of inflammation in joints, no muscle tenderness  NEURO: mentation intact and speech normal      Labs:   All labs reviewed by me  Electrolytes/Renal -   Recent Labs   Lab Test 11/02/23  0645 11/01/23  0612 10/31/23  1610 02/22/23  1333 01/30/23  1401 12/06/22  1154 12/06/22  0851 12/05/22  0714 11/16/22  1334   * 133* 134*   < > 142  --  138  --  137   POTASSIUM 3.8 3.8 3.7   < > 3.6  --  3.8  --  3.8   CHLORIDE 101 99 100   < > 101  --  103  --  96*   CO2 26 23 22   < > 33*  --  27  --  31*   BUN 48.8* 46.2*  40.2*   < > 12.0  --  12.7  --  14.9   CR 4.89* 5.19* 4.59*   < > 0.81  --  0.76   < > 0.88   * 115* 121*   < > 71   < > 102*   < > 125*   LAI 8.1* 8.2* 8.4*   < > 9.2  --  9.0  --  9.3   MAG  --   --   --   --  1.8  --  1.9  --  1.8   PHOS  --  3.9  --   --   --   --  3.7  --   --     < > = values in this interval not displayed.       CBC -   Recent Labs   Lab Test 11/02/23  0645 11/01/23 0743 11/01/23 0612   WBC 3.8* 3.6* 3.8*   HGB 8.3* 9.1* 8.9*   * 91* 97*       LFTs -   Recent Labs   Lab Test 11/02/23  0645 11/01/23  0612 10/31/23  1610 10/31/23  1414   ALKPHOS 81  --  83 91   BILITOTAL 0.4  --  0.5 0.5   ALT 17  --  19 20   AST 40  --  43 50*   PROTTOTAL 6.5  --  6.7 7.2   ALBUMIN 2.6* 2.6* 2.9* 3.1*       Iron Panel -   Recent Labs   Lab Test 11/02/23 0645 11/01/23  0743   IRON  --  14*   IRONSAT  --  11*   MARIA L 1,344* 1,722*         Imaging: Reviewed       Current Medications:   atorvastatin  20 mg Oral Daily    fluticasone-vilanterol  1 puff Inhalation Daily    levothyroxine  112 mcg Oral Daily       ELIO RAMAN, PATristinC

## 2023-11-02 NOTE — PROGRESS NOTES
Hematology / Oncology  Daily Progress Note   Date of Service: 11/03/2023  Patient: Jareth Gallardo  MRN: [id number]  Admission Date: 11/1/2023  Hospital Day # 2  Cancer Diagnosis: Nasopharyngeal squamous cell carcinoma    Primary Outpatient Oncologist: Dr. 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    Nasopharyngeal squamous cell carcinoma   JOCE with AIN/ATN 2/2 infectious vs immune-mediated nephritis  This is not likely CRS as patient has not received any form of cell therapy, ie CART. CRS is not seen with immunotherapy. Immunotherapy can cause a nephritis picture but it is unclear if this is the etiology in this case. ID was consulted who do not suspect this is infectious in nature and more likely immunotherapy induced. EBV DNA PCR pending. In terms of steroids, would still defer today given improvement of renal function without intervention.  Nephrology planning renal biopsy with IR on 11/6/2023.     Nausea, vomiting, fever due to infection vs medication side effect  As stated above, symptoms appear to be due infectious cause rather than pembrolizumab induced.     Pancytopenia  Unclear etiology of pancytopenia. Appears that he has had low normal WBC count for many years. Anemia appears to be worsening over time. Platelet count appeared to be normal but now worsening. French test negative. Fibrinogen elevated and haptoglobin normal so not likely DIC.        Recommendations:   - Follow up EBV titers pending  - Nephrology input regarding renal biopsy  - Defer steroids for now  - Monitor renal function, strict I/O's, daily weight  - Will follow up after renal biopsy on 11/6 if renal function continues to improve     Patient was seen and plan of care was discussed with attending physician   "Trinity Lora.  ___________________________________________________________________    Subjective & Interval History:    No acute events noted overnight. Has been afebrile for past 24 hours. States that he feels better today. He wears the oxygen mask when he sleeps to not set off alarms.     Physical Exam:    Blood pressure 105/67, pulse 94, temperature 98.4  F (36.9  C), resp. rate 16, height 1.778 m (5' 10\"), weight 111.1 kg (244 lb 14.9 oz), SpO2 92%.    A comprehensive physical examination is deferred due to tele visit.   GENERAL: Healthy, alert and no distress  EYES: Eyes grossly normal to inspection.  No discharge or erythema, or obvious scleral/conjunctival abnormalities.  RESP: No audible wheeze, cough, or visible cyanosis.  No visible retractions or increased work of breathing.    SKIN: Visible skin clear. No significant rash, abnormal pigmentation or lesions.  NEURO: Cranial nerves grossly intact.  Mentation and speech appropriate for age.  PSYCH: Mentation appears normal, affect normal/bright, judgement and insight intact, normal speech and appearance well-groomed.    Labs & Studies: I personally reviewed the following studies:  ROUTINE LABS (Last four results):  [facility]  Recent Labs   Lab 11/03/23  1038 11/03/23  0838 11/02/23  2149 11/02/23  1746 11/02/23  0645 11/01/23  0612 10/31/23  1610 10/31/23  1414   *  --   --   --  134* 133* 134* 133*   POTASSIUM 3.9  --   --   --  3.8 3.8 3.7 3.8   CHLORIDE 101  --   --   --  101 99 100 99   CO2 22  --   --   --  26 23 22 22   ANIONGAP 10  --   --   --  7 11 12 12   * 116* 127* 144* 126* 115* 121* 148*   BUN 50.9*  --   --   --  48.8* 46.2* 40.2* 41.0*   CR 4.20*  --   --   --  4.89* 5.19* 4.59* 4.58*   GFRESTIMATED 15*  --   --   --  13* 12* 14* 14*   LAI 8.4*  --   --   --  8.1* 8.2* 8.4* 8.8   PHOS 4.1  --   --   --   --  3.9  --   --    PROTTOTAL  --   --   --   --  6.5  --  6.7 7.2   ALBUMIN 2.4*  --   --   --  2.6* 2.6* 2.9* 3.1*   BILITOTAL  " --   --   --   --  0.4  --  0.5 0.5   ALKPHOS  --   --   --   --  81  --  83 91   AST  --   --   --   --  40  --  43 50*   ALT  --   --   --   --  17  --  19 20     CBC  Recent Labs   Lab 11/03/23  1038 11/02/23  0645 11/01/23  0743 11/01/23  0612   WBC 4.5 3.8* 3.6* 3.8*   RBC 2.92* 2.94* 3.22* 3.14*   HGB 8.0* 8.3* 9.1* 8.9*   HCT 25.2* 25.4* 27.9* 27.2*   MCV 86 86 87 87   MCH 27.4 28.2 28.3 28.3   MCHC 31.7 32.7 32.6 32.7   RDW 15.4* 15.3* 15.2* 15.4*   * 113* 91* 97*     INR  Recent Labs   Lab 11/01/23  1944   INR 1.19*       Medications list for reference:  Current Facility-Administered Medications   Medication    acetaminophen (TYLENOL) tablet 650 mg    atorvastatin (LIPITOR) tablet 20 mg    glucose gel 15-30 g    Or    dextrose 50 % injection 25-50 mL    Or    glucagon injection 1 mg    fluticasone-vilanterol (BREO ELLIPTA) 100-25 MCG/ACT inhaler 1 puff    heparin 100 unit/mL injection 5-10 mL    heparin lock flush 10 UNIT/ML injection 5-10 mL    heparin lock flush 10 UNIT/ML injection 5-10 mL    insulin aspart (NovoLOG) injection (RAPID ACTING)    insulin aspart (NovoLOG) injection (RAPID ACTING)    levothyroxine (SYNTHROID/LEVOTHROID) tablet 112 mcg    melatonin tablet 1 mg    ondansetron (ZOFRAN ODT) ODT tab 4 mg    Or    ondansetron (ZOFRAN) injection 4 mg    oxyCODONE IR (ROXICODONE) half-tab 2.5 mg    prochlorperazine (COMPAZINE) injection 10 mg    Or    prochlorperazine (COMPAZINE) tablet 10 mg    Or    prochlorperazine (COMPAZINE) suppository 25 mg    sodium chloride (PF) 0.9% PF flush 10-20 mL    sodium chloride (PF) 0.9% PF flush 10-20 mL    sodium chloride (PF) 0.9% PF flush 10-20 mL

## 2023-11-03 ENCOUNTER — PRE VISIT (OUTPATIENT)
Dept: PULMONOLOGY | Facility: CLINIC | Age: 63
End: 2023-11-03
Payer: COMMERCIAL

## 2023-11-03 PROBLEM — N17.0 ACUTE KIDNEY FAILURE WITH TUBULAR NECROSIS (H): Status: ACTIVE | Noted: 2023-11-03

## 2023-11-03 LAB
1,3 BETA GLUCAN SER-MCNC: <31 PG/ML
ALBUMIN SERPL BCG-MCNC: 2.4 G/DL (ref 3.5–5.2)
ANA PAT SER IF-IMP: ABNORMAL
ANA SER QL IF: POSITIVE
ANA TITR SER IF: ABNORMAL {TITER}
ANCA AB PATTERN SER IF-IMP: NORMAL
ANION GAP SERPL CALCULATED.3IONS-SCNC: 10 MMOL/L (ref 7–15)
BUN SERPL-MCNC: 50.9 MG/DL (ref 8–23)
C-ANCA TITR SER IF: NORMAL {TITER}
CALCIUM SERPL-MCNC: 8.4 MG/DL (ref 8.8–10.2)
CH50 SERPL-ACNC: >95 U/ML
CHLORIDE SERPL-SCNC: 101 MMOL/L (ref 98–107)
CREAT SERPL-MCNC: 4.2 MG/DL (ref 0.67–1.17)
DEPRECATED HCO3 PLAS-SCNC: 22 MMOL/L (ref 22–29)
EBV DNA COPIES/ML, INSTRUMENT: ABNORMAL COPIES/ML
EBV DNA SPEC NAA+PROBE-LOG#: 4.2 {LOG_COPIES}/ML
EGFRCR SERPLBLD CKD-EPI 2021: 15 ML/MIN/1.73M2
ERYTHROCYTE [DISTWIDTH] IN BLOOD BY AUTOMATED COUNT: 15.4 % (ref 10–15)
GLUCOSE BLDC GLUCOMTR-MCNC: 116 MG/DL (ref 70–99)
GLUCOSE BLDC GLUCOMTR-MCNC: 122 MG/DL (ref 70–99)
GLUCOSE BLDC GLUCOMTR-MCNC: 141 MG/DL (ref 70–99)
GLUCOSE SERPL-MCNC: 140 MG/DL (ref 70–99)
HCT VFR BLD AUTO: 25.2 % (ref 40–53)
HGB BLD-MCNC: 8 G/DL (ref 13.3–17.7)
MCH RBC QN AUTO: 27.4 PG (ref 26.5–33)
MCHC RBC AUTO-ENTMCNC: 31.7 G/DL (ref 31.5–36.5)
MCV RBC AUTO: 86 FL (ref 78–100)
OBSERVATION IMP: NEGATIVE
PHOSPHATE SERPL-MCNC: 4.1 MG/DL (ref 2.5–4.5)
PLATELET # BLD AUTO: 117 10E3/UL (ref 150–450)
POTASSIUM SERPL-SCNC: 3.9 MMOL/L (ref 3.4–5.3)
RBC # BLD AUTO: 2.92 10E6/UL (ref 4.4–5.9)
SODIUM SERPL-SCNC: 133 MMOL/L (ref 135–145)
WBC # BLD AUTO: 4.5 10E3/UL (ref 4–11)

## 2023-11-03 PROCEDURE — 80069 RENAL FUNCTION PANEL: CPT | Performed by: PHYSICIAN ASSISTANT

## 2023-11-03 PROCEDURE — 99232 SBSQ HOSP IP/OBS MODERATE 35: CPT | Mod: GC | Performed by: INTERNAL MEDICINE

## 2023-11-03 PROCEDURE — 250N000013 HC RX MED GY IP 250 OP 250 PS 637: Performed by: PEDIATRICS

## 2023-11-03 PROCEDURE — 250N000011 HC RX IP 250 OP 636: Performed by: INTERNAL MEDICINE

## 2023-11-03 PROCEDURE — 120N000002 HC R&B MED SURG/OB UMMC

## 2023-11-03 PROCEDURE — 36591 DRAW BLOOD OFF VENOUS DEVICE: CPT | Performed by: PHYSICIAN ASSISTANT

## 2023-11-03 PROCEDURE — 85027 COMPLETE CBC AUTOMATED: CPT | Performed by: PHYSICIAN ASSISTANT

## 2023-11-03 PROCEDURE — 99232 SBSQ HOSP IP/OBS MODERATE 35: CPT | Performed by: PHYSICIAN ASSISTANT

## 2023-11-03 PROCEDURE — 99233 SBSQ HOSP IP/OBS HIGH 50: CPT | Mod: GC | Performed by: INTERNAL MEDICINE

## 2023-11-03 PROCEDURE — 99233 SBSQ HOSP IP/OBS HIGH 50: CPT | Performed by: INTERNAL MEDICINE

## 2023-11-03 RX ADMIN — HEPARIN, PORCINE (PF) 10 UNIT/ML INTRAVENOUS SYRINGE 5 ML: at 12:51

## 2023-11-03 RX ADMIN — FLUTICASONE FUROATE AND VILANTEROL TRIFENATATE 1 PUFF: 100; 25 POWDER RESPIRATORY (INHALATION) at 08:53

## 2023-11-03 RX ADMIN — ATORVASTATIN CALCIUM 20 MG: 20 TABLET, FILM COATED ORAL at 08:52

## 2023-11-03 RX ADMIN — LEVOTHYROXINE SODIUM 112 MCG: 112 TABLET ORAL at 08:52

## 2023-11-03 ASSESSMENT — ACTIVITIES OF DAILY LIVING (ADL)
ADLS_ACUITY_SCORE: 23

## 2023-11-03 NOTE — PROGRESS NOTES
"VS:  /67   Pulse 94   Temp 98.4  F (36.9  C)   Resp 16   Ht 1.778 m (5' 10\")   Wt 111.1 kg (244 lb 14.9 oz)   SpO2 92%   BMI 35.14 kg/m      O2: Oxy Mask 3L   Output: Voids independently using toilet   Last BM: 11/1/23   Activity: Up Independently    Skin: Insertion site of port a cath, old abrasions on BLE. No Edema present         Pain Denies pain   CMS: CMS intact No numbness, no tingling   Dressing: Transparent dressing over Port a cath on right upper chest    Diet: Regular diet   LDA: Port A Cath right upper chest wall   equipment Personal items, call light           plan Monitoring labs, creatinine is trending down.  Monitoring his urine output. Possible kidney biopsy on Monday    Additional Info:      "

## 2023-11-03 NOTE — PLAN OF CARE
"  VS: /62 (BP Location: Left arm)   Pulse 92   Temp 99.5  F (37.5  C)   Resp 16   Ht 1.778 m (5' 10\")   Wt 111.1 kg (244 lb 14.9 oz)   SpO2 95%   BMI 35.14 kg/m     O2: To room air, denes SOB   Output: Voids spontaneously using toilet   Last BM: 11-   Activity: Up independently   Skin: Insertion site of port a cath   Pain: Denies   CMS: A&Ox4, denies numbness and tingling sensation   Dressing: Transparent dressing on (R) upper chest, CDI   Diet: Regular diet, with nausea, on PRN conpazine, preferred to take compazine instead of Zofran because it is causing headache   LDA: Port a cath on (R) upper chest saline locked   Equipment: Personal items, IV pole, call light   Plan: Continue to monitor, strict I&O   Additional Info: Pt was shivering during the start of the shift, temperature was elevated, tylenol PRN was given. Rechecked after an hour and it became 99.5. On blood glucose monitoring, BG at , HS insulin not given.     "

## 2023-11-03 NOTE — PROGRESS NOTES
[facility]    Medicine Progress Note - Hospitalist Service, GOLD TEAM 19    Date of Admission:  11/1/2023    Assessment & Plan   Jareth Gallardo is a 63-year-old history of nasopharyngeal squamous cell cancer (s/p surgery, chemoradiation and now on pembrolizumab), COPD, BARRETT, hypothyroidism, non-insulin-dependent diabetes presenting with acute onset of fever and found to have acute kidney injury in the setting of elevated inflammatory markers.        #Fever  #Pancytopenia  #Acute kidney injury  -Patient's baseline creatinine ~0.8 - 1.0   -He did report increased ingestion of NSAIDs  -He also presented with elevated inflammatory markers CRP of 330, ferritin 1700, ESR 60.  -She has also had fever, Tmax this admission 102.7.  -Additional work-up includes renal ultrasound which was largely unremarkable, portable chest x-ray without any acute findings, CT chest with IV contrast on 10/26/2023 without evidence of PE and largely clear lungs.  -Patient's acute kidney injury is likely intrinsic in nature given NSAIDs ingestion, there is also concern for vasculitis or Keytruda induced nephritis.  -Infectious disease has since been consulted recommending ongoing work-up including following up blood cultures, trend fever curve, daily CBC/CRP.  Overall, concern for underlying infection is currently lower.  -Of note, urine studies are negative, respiratory panel negative, CMV PCR negative, haptoglobin normal, C3 & C4 also normal.  Iron studies consistent with chronic disease.  -His LDH level is elevated.  -Fibrinogen activity is elevated, direct antiglobulin test is negative.  -His reticulocyte count is very low.  Follow-up blood cultures  Follow-up fungal markers, fungal culture, HIRAM, complement activity, ANCA IgE, EBV PCR  Recommend strict I's and O's, daily weights  Defer resumption of steroids to heme-onc (of note, ID is okay with steroids at this time)  Trend kidney  function  Avoid nephrotoxic medications, renally dose medications   Trend kidney functions   Order placed for kidney biopsy with IR which is scheduled for Monday  NPO after MN for biopsy on 11/6/23     #EBV +  -per ID. EBV elevation likely reactivation and not primary disease    #Nausea, vomiting -as needed Zofran, Compazine ordered.  Patient's p.o. intake has since been improving.    #History of nasopharyngeal carcinoma   - Diagnosed in Dec 2021, positive for EBV; induced with carboplatin gemcitabine in April, May of 2022, subsequently received carboplatin and radiotherapy, left modified radical neck dissection in Dec 2022, subsequently presented at tumor board in Feb 23 and felt to be at high risk of recurrence with residual disease on PET so started on pembrolizumab (keytruda) March 9, 2023; recently received Cycle 10 on October 11 and due for cycle 11 today 11/1  - Dr. Martin thought he might be a candidate for toripalimab  - Pembrolizumab being held at this time.    Chronic issues  #Obstructive sleep apnea -  nighttime CPAP  #COPD -continue PTA Breo Ellipta.  #Hypothyroidism -continue PTA levothyroxine 112 mcg daily  #Non-insulin-dependent diabetes mellitus -PTA meds includes metformin, Trulicity both of which have been held at this time.  Management with ISS, POCT, medium intensity sliding scale with NovoLog.          Diet: Combination Diet Regular Diet Adult    DVT Prophylaxis: Pneumatic Compression Devices  Arthur Catheter: Not present  Lines: PRESENT      Port A Cath Single 03/31/22 Right Chest wall-Site Assessment: WDL      Cardiac Monitoring: None  Code Status: Full Code      Clinically Significant Risk Factors              # Hypoalbuminemia: Lowest albumin = 2.6 g/dL at 11/2/2023  6:45 AM, will monitor as appropriate    # Coagulation Defect: INR = 1.19 (Ref range: 0.85 - 1.15) and/or PTT = 41 Seconds (Ref range: 22 - 38 Seconds), will monitor for bleeding  # Thrombocytopenia: Lowest platelets = 113 in  "last 2 days, will monitor for bleeding  # Acute Kidney Injury, unspecified: based on a >150% or 0.3 mg/dL increase in last creatinine compared to past 90 day average, will monitor renal function         # Obesity: Estimated body mass index is 35.14 kg/m  as calculated from the following:    Height as of this encounter: 1.778 m (5' 10\").    Weight as of this encounter: 111.1 kg (244 lb 14.9 oz)., PRESENT ON ADMISSION            Disposition Plan to be determined    Expected Discharge Date: 11/06/2023                    PARI BEATTY MD  Hospitalist Service, GOLD TEAM 19  [facility]  Securely message with [employer] (more info)  Text page via [employer] Paging/Directory   See signed in provider for up to date coverage information  ______________________________________________________________________    Interval History   -Patient had a fever of 100.7 last afternoon  -Reports feeling better today  -Did not require supplemental O2 overnight   -Has remained hemodynamically stable as well.  -denies any pain today  -kidney function slowly improving   -UOP has been adequate, no dysuria     Physical Exam   Vital Signs: Temp: 98.4  F (36.9  C) Temp src: Oral BP: 105/67 Pulse: 94   Resp: 16 SpO2: 92 % O2 Device: None (Room air) Oxygen Delivery: 2 LPM  Weight: 244 lbs 14.9 oz    General Appearance: Obese male, sitting comfortably in bed, in no acute distress or discomfort.  HEENT: PERRL: EOMI; moist mucous membrane w/o lesions  Neck: No JVD  Pulmonary: Clear to auscultation bilaterally, no wheezes or crackles  CVS: Regular rhythm, no murmurs, rubs or gallops  GI: BS (+), soft nontender, no rebound or guarding   Extremities: No LE edema.   Skin: No rashes or lesions; right port-a-cath in place   Neurologic: A&O x3      Medical Decision Making       55 MINUTES SPENT BY ME on the date of service doing chart review, history, exam, documentation & further activities per the note.      Data "   ------------------------- PAST 24 HR DATA REVIEWED -----------------------------------------------    I have personally reviewed the following data over the past 24 hrs:    Procal: N/A CRP: N/A Lactic Acid: 0.7         Imaging results reviewed over the past 24 hrs:   No results found for this or any previous visit (from the past 24 hour(s)).

## 2023-11-03 NOTE — PROGRESS NOTES
Nephrology Progress Note  11/03/2023         Assessment & Recommendations:   #JOCE in setting of NSAID use and pembrolizumab: Pt has had acute increase in Cr over 3 days for 1.17 on 10/28 to 5 now. Multiple nephrotoxins including ibuprofen 400 mg q 6 over the 3 days pta starting 10/26.  Not dizzy but BP was low on admission, so a hemodynamic effect of NSAID plus hypotension could have contributed.  Keytruda can can ISN, TMA or vasculitis. Labs not c/w TMA  - vanc was given on 10/26, 10/27, and 10/28 but unlikely this early after starting the med  - CT chest with contrast on 10/26   - pyuria c/w interstitial nephritis.   - unlikely prerenal given volume up   - pt febrile, EBV viral load increased, ID consulted   - recommend CMV pcr (ordered)  - baseline Scr 0.7-0.9 in the past year  - Scr trend 1.17 on 10/28.  Now 4.58-> 4.59 -> 5.19->4.89 ->4.2  - renal US showed normal size kidneys without hydro  -  protein (up from 70  3 days ago), 10 WBC's, 6 RBC's, sp gravity 1.015  - no proteinuria in 4/2023  - now with urine prot/cr 1.14 mg/mg  - urine culture and blood cultures pending  - urine sodium 46, urine osmolality 267, serum osmolality 288, urine K 23.6  - no eosinophilia noted  - recent NSAID use due to fevers, taking it every 6 hours  - recent pembrolizumab - completed 10 of 11 treatments  - CRP remains elevated but improving  - vasculitis serology: CMV DNA, CMV quantitative, PCR not detected, complements and haptoglobin are normal  - HIRAM and ANCA pending  - kidney function improved more today  - Recommend consulting IR and plan for kidney biopsy on Monday.    - continue to monitor for recovery through the weekend  - ID consulted and feel this is not infectious in nature, more likely immunotherapy induced from keytruda,   - steroids okay from ID and nephrology standpoint.   - oncology recommends holding off on steroids at this time and trending kidney function but will consider steroids if kidney function  worsens   - recommend treating with steroids if oncology agrees, to decrease risk of developing CKD  - avoid anticoagulation   - avoid hypotension  - avoid nephrotoxins  - renally dose medications  - please document strict I/O's  - no acute indication for RRT  - vasculitis serology (we ordered) -      #BP  - does not take any antihypertensives  - Bp 105/67 most recently, usually 110's/   - avoid hypotension     #Volume  - wt on admission 111.1 kg, no wt's today  - feels he has gained ~ 10 lbs and is having increased UOP, 750 ml yesterday  - please document daily standing weights  - chart strict I/O's     #Anemia   - Hgb 8.3, iron sat 11%, ferritin 1,722  - management per primary/oncology    Discussed with Dr. David.     Recommendations were communicated to primary team via progress note.     ELOI RAMAN, PA-C     Interval History :   Provider and nursing notes from past 24 hours reviewed. T max 99.5. No nausea reported today. Still drinking water, IV fluids discontinued. He had 750 ml UOP yesterday with at least 1 unmeasured episode. BP's have been above 100/ and he denies dizziness. Minimal LE edema, no shortness of breath.     Review of Systems:   A 4 point review of systems was negative except as noted above.    Physical Exam:   I/O last 3 completed shifts:  In: 844 [P.O.:844]  Out: 750 [Urine:750]  /67  Pulse 94  Resp 16  SpO2 92%     GENERAL APPEARANCE: alert and no distress  EYES:  no scleral icterus, pupils equal  PULM: lungs clear to auscultation  CV: regular rhythm, normal rate, no rub     -edema trace LE edema   MS: no evidence of inflammation in joints, no muscle tenderness  NEURO: mentation intact and speech normal      Labs:   All labs reviewed by me  Electrolytes/Renal -   Recent Labs   Lab Test 11/03/23  0838 11/02/23  2149 11/02/23  1746 11/02/23  0645 11/01/23  0612 10/31/23  1610 02/22/23  1333 01/30/23  1401 12/06/22  1154 12/06/22  0851 12/05/22  0714 11/16/22  1334   NA  --   --    --  134* 133* 134*   < > 142  --  138  --  137   POTASSIUM  --   --   --  3.8 3.8 3.7   < > 3.6  --  3.8  --  3.8   CHLORIDE  --   --   --  101 99 100   < > 101  --  103  --  96*   CO2  --   --   --  26 23 22   < > 33*  --  27  --  31*   BUN  --   --   --  48.8* 46.2* 40.2*   < > 12.0  --  12.7  --  14.9   CR  --   --   --  4.89* 5.19* 4.59*   < > 0.81  --  0.76   < > 0.88   * 127* 144* 126* 115* 121*   < > 71   < > 102*   < > 125*   LAI  --   --   --  8.1* 8.2* 8.4*   < > 9.2  --  9.0  --  9.3   MAG  --   --   --   --   --   --   --  1.8  --  1.9  --  1.8   PHOS  --   --   --   --  3.9  --   --   --   --  3.7  --   --     < > = values in this interval not displayed.       CBC -   Recent Labs   Lab Test 11/03/23  1038 11/02/23  0645 11/01/23  0743   WBC 4.5 3.8* 3.6*   HGB 8.0* 8.3* 9.1*   * 113* 91*       LFTs -   Recent Labs   Lab Test 11/02/23  0645 11/01/23  0612 10/31/23  1610 10/31/23  1414   ALKPHOS 81  --  83 91   BILITOTAL 0.4  --  0.5 0.5   ALT 17  --  19 20   AST 40  --  43 50*   PROTTOTAL 6.5  --  6.7 7.2   ALBUMIN 2.6* 2.6* 2.9* 3.1*       Iron Panel -   Recent Labs   Lab Test 11/02/23  0645 11/01/23  0743   IRON  --  14*   IRONSAT  --  11*   MARIA L 1,344* 1,722*         Imaging: Reviewed       Current Medications:   atorvastatin  20 mg Oral Daily    fluticasone-vilanterol  1 puff Inhalation Daily    heparin  5-10 mL Intracatheter Q28 Days    heparin lock flush  5-10 mL Intracatheter Q24H    insulin aspart  1-7 Units Subcutaneous TID AC    insulin aspart  1-5 Units Subcutaneous At Bedtime    levothyroxine  112 mcg Oral Daily    sodium chloride (PF)  10-20 mL Intracatheter Q28 Days       ELIO RAMAN, PA-C

## 2023-11-03 NOTE — PROGRESS NOTES
[facility] ID SERVICE: NEW CONSULTATION    Patient:  Jareth Gallardo, Date of birth 1960, Medical record number [id number]  Date of Admission: 11/1/2023  Date of Visit:  11/2/2023  Requesting Provider: Refugio Reis         Assessment and Recommendations:     ID Problem List:  JOCE   Pancytopenia   Hyponatremia   N/V  Fevers (Tmax 102), for ~11 days  Recent hospitalization (Oct 26-28) for presumed tickborne illness s/p vancomycin and zosyn. 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     During this admission he now has a worsening JOCE (baseline Cr  1.10) and on presentation Cr 4.58. UA with proteinuria and hematuria. Of note patient renal ultrasound without hydronephrosis of obstruction.  No antibitiotics started on this admission.     With regard to infectious etiology, do not think his symptoms would be due to reactivation of EBV. Typically primary EBV infections resulting in JOCE, rather than simply reactivation. CMV negative. Agree that it is reasonable to check EBV levels (anticipate that these will be high given acute illness). Additionally, do not suspect that fevers are due to tick-borne illness as patient recently tested negative and has been on appropriate abx therapies without improvement.    Overall, do not favor infectious etiology and rather suspect that his  fevers/JOCE are due to Keytruda. With that in mind, from ID perspective treatment with steroids would be reasonable.     Recommendations:  From ID perspective, okay for treatment with steroids    Agree that Kidney biopsy would be helpful   Trend fever curve  Continue to trend CBC/CRP  No indication for antibiotics   Will continue to follow blood cultures     This patient was staffed with Dr. Jany Pop    Thank you for this consult. ID team will continue to follow this patient. Please reach out if any questions or concerns.     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     Patient states his symptoms began abruptly on October 22.  He states he woke up that morning and after breakfast developed symptoms of fevers, chills, rigors he did not take his temperature at this time.  He also reports his chronic left-sided neck tingling stopped (this has been ongoing since his previous surgery).  He denied any nausea, vomiting, diarrhea, hematuria, melena, hemoptysis.  No other sick contacts.  He reports fever has been ongoing from October 22 until now.  His Tmax was 102 at home.  Given taking Tylenol 2 tablets 2-3 times per day.    He then presented to the hospital in [facility] and was admitted from 10/26-28.  Illness thought to be due to tick-borne illness and he was treated with empiric therapy of Zosyn and vancomycin until blood culture resulted.  Given the blood cultures was negative this was discontinued and he was started on empiric doxycycline.  Workup was negative anaplasma, ehrlichia, and lyme.  He was discharged on October 28, he still had continuous fevers.    At home he reports he  continued to have fevers with Tmax up to 102.  He developed some nausea with vomiting starting on the 29th (1 time per day mostly regurgitated food no blood seen).  He denies any diarrhea during this time.  During this time he was alternating between Tylenol and ibuprofen for fevers.  He reports decreased p.o. intake.    He was then seen in oncology clinic on 10/30 and labs were notable for Cr 4.58. He was sent to the ED for further evaluation.  There was concern for NSAID induced AIN vs immune mediated nephritis from pembrolizumab. He was started on IVF and repeat blood and urine cultures have been negative thus far. Renal ultrasound showed no evidence of obstruction.     Labs notable for patient has leukopenia, normocytic anemia with iron deficiency and low retic count, and thrombocytopenia. Labs showing acute inflammatory reaction with elevated CRP (320), Ferritin (1,722), Fibrinogen ( 675). Hemolysis labs including elevated LDH (340) and d-dimer, with normal Haptoglobin and bilirubin, YING negative, peripheral smear pending. CMV negative. Influenza/RSV/Covid negative. Notably patients EBV viral load has increased (15K on 8/29, previously 700 2/22/2023); agree with repeat levels. Labs pending: ANCA IgG, Complement activity, Fungal/Yeast culture, 1,3 Beta D Glucan, Lymes DNA, C. Diff, EBV DNA PCR, Blood Smear     Regarding patient's exposure history, no recent travel. Patient lives in [facility] and animal exposure limited to pets - cat & dog. Denies any livestock or nearby farms. Does drink from well-water. Had several tick bites identified within 7 days prior to symptoms. No IV drug use, alcohol, or tobacco use. And notably had recent Keytruda infusion on Oct 11. Has had recent abxs including Zosyn/Vanc/Doxy.     Labs pending: ANCA IgG, Complement activity, Fungal/Yeast culture, 1,3 Beta D Glucan, Lymes DNA, C. Diff, EBV DNA PCR, Blood Smear     PROSTHETIC JOINTS OR MATERIALS, IMPLANTS, OR DEVICES: None      FOOD/WATER:   - Raw undercooked meat or sushi: None  - Unwashed fruits or vegetables: None  - Unpasteurized milk: None  - Well water: Yes     TUBERCULOSIS:   - Known TB exposures: None  - Prior TB testing results: No results found on chart review  - Healthcare work: None : Yes - Was in the [employer]  - Homelessness: Never  - jail: Never     TRAVEL: Denies any recent travel outside of [place] in the last 6 months. He reports in [place] they went to the [place]. Otherwise they have remained in their house in [place]    OUTDOOR ACTIVITIES:   - Camping: None  - Cave exploring: None  - Walking barefoot on soil or grass: None  - Swimming or wading in rivers, lakes or streams:  None  - Hot tubs or Jacuzzis: None      ANIMALS: Has a new dog (8 months old) and a cat. No     Sick contact: None    SHx:  HOME/ENVIRONMENT: [place], lives in [place].      OCCUPATION: Retired. Previously worked an built furniture using reclaimed wood. Current hobbies include woodworking, hunting, fishing, and yard work -- including chopping wood.      TOBACCO/ALCOHOL/RECREATIONAL DRUGS: Denies all.            Review of Systems:     Complete 12 point review of systems negative except as noted in HPI.         Recent Antimicrobials::   Vancomycin x1 10/26  Zosyn x1 10/126  Doxycycline: 10/28 - 11/1         Past Medical History:     Past Medical History:   Diagnosis Date    COPD (chronic obstructive pulmonary disease) (H)     Dyslipidemia     Hard to intubate 12/5/2022    Nasopharyngeal carcinoma (H)     BARRETT (obstructive sleep apnea)     Type 2 diabetes mellitus with diabetic nephropathy (H)          Allergies:    No Known Allergies       Family History:   Reviewed and noncontributory.   Family History   Problem Relation Age of Onset    Cerebrovascular Disease Mother     Leukemia Father     Melanoma Sister           Social History:     Social History     Socioeconomic History    Marital status:       "Spouse name: Not on file    Number of children: Not on file    Years of education: Not on file    Highest education level: Not on file   Occupational History    Not on file   Tobacco Use    Smoking status: Former     Packs/day: 4.00     Years: 30.00     Additional pack years: 0.00     Total pack years: 120.00     Types: Cigarettes     Quit date:      Years since quittin.8     Passive exposure: Past    Smokeless tobacco: Never    Tobacco comments:     quit smoking. Passive exposure in childhood home.    Substance and Sexual Activity    Alcohol use: Not Currently     Comment: Hx of 4 beer per month    Drug use: Never    Sexual activity: Not on file   Other Topics Concern    Not on file   Social History Narrative    Not on file     Social Determinants of Health     Financial Resource Strain: Not on file   Food Insecurity: Not on file   Transportation Needs: Not on file   Physical Activity: Not on file   Stress: Not on file   Social Connections: Not on file   Interpersonal Safety: Not on file   Housing Stability: Not on file            Physical Exam:     /67   Pulse 94   Temp 98.4  F (36.9  C)   Resp 16   Ht 1.778 m (5' 10\")   Wt 111.1 kg (244 lb 14.9 oz)   SpO2 92%   BMI 35.14 kg/m       Exam:  GENERAL:  Well-developed, well-nourished, lying in bed   Head: normocephalic, atraumatic.   EYES: PERRLA, EOMI, conjunctiva clear, anicteric sclerae.    ENT:  Oropharynx is moist without exudates or ulcers.  NECK:  Supple.  LUNGS:  Breathing comfortably, on room air, clear to auscultation bilaterally, no w/r/r.  CARDIOVASCULAR:  Regular rate and rhythm, +S1S2 with no murmurs, gallops or rubs.  ABDOMEN:  Normal bowel sounds, soft, nontender. No appreciable hepatosplenomegaly.   EXT: Extremities warm and without edema. Scattered abrasions on legs and upper extremities.   SKIN:  No acute rashes.    NEUROLOGIC:  Grossly nonfocal.     Lines and devices:   PIV is in place without any surrounding " erythema.    Labs, Microbiology and Imaging studies are reviewed.          Laboratory Data:   Metabolic Studies       Recent Labs   Lab Test 11/03/23  0838 11/02/23  1746 11/02/23  0856 11/02/23  0645 11/01/23  1046 11/01/23  0612 10/28/23  0733 10/28/23  0549 02/22/23  1333 01/30/23  1401   NA  --   --   --  134*  --  133*   < > 138   < > 142   POTASSIUM  --   --   --  3.8  --  3.8   < > 3.5   < > 3.6   CHLORIDE  --   --   --  101  --  99   < > 103   < > 101   CO2  --   --   --  26  --  23   < > 26   < > 33*   ANIONGAP  --   --   --  7  --  11   < > 9   < > 8   BUN  --   --   --  48.8*  --  46.2*   < > 15.4   < > 12.0   CR  --   --   --  4.89*  --  5.19*   < > 1.17   < > 0.81   GFRESTIMATED  --   --   --  13*  --  12*   < > 71   < > >90   *   < >  --  126*  --  115*   < > 104*   < > 71   LAI  --   --   --  8.1*  --  8.2*   < > 8.8   < > 9.2   PHOS  --   --   --   --   --  3.9  --   --   --   --    MAG  --   --   --   --   --   --   --   --   --  1.8   LACT  --   --  0.7  --  0.7  --    < >  --    < >  --    PCAL  --   --   --   --   --   --   --  0.37*   < >  --     < > = values in this interval not displayed.       Hepatic Studies    Recent Labs   Lab Test 11/02/23  0645 11/01/23  0743 11/01/23  0612 10/31/23  1610 10/31/23  1414 07/05/22  1036 07/01/22  1037   BILITOTAL 0.4  --   --  0.5 0.5   < > 0.9   DBIL  --   --   --   --   --   --  0.2   ALKPHOS 81  --   --  83 91   < > 83   PROTTOTAL 6.5  --   --  6.7 7.2   < > 7.7   ALBUMIN 2.6*  --  2.6* 2.9* 3.1*   < > 3.2*   AST 40  --   --  43 50*   < > 56*   ALT 17  --   --  19 20   < > 69   LDH  --  340*  --   --  314*  --   --     < > = values in this interval not displayed.       Hematology Studies      Recent Labs   Lab Test 11/02/23  0645 11/01/23  0743 11/01/23  0612 10/31/23  1610 10/31/23  1414 10/28/23  0549 05/23/22  1404 05/16/22  0714   WBC 3.8* 3.6* 3.8* 4.3 4.3 3.6*   < > 8.8   ANEU  --   --   --   --   --   --   --  7.0   ALYM  --   --   --   --    --   --   --  0.9   CHOCO  --   --   --   --   --   --   --  0.7   AEOS  --   --   --   --   --   --   --  0.0   HGB 8.3* 9.1* 8.9* 8.6* 9.6* 10.1*   < > 10.4*   HCT 25.4* 27.9* 27.2* 26.8* 29.5* 31.9*   < > 33.8*   * 91* 97* 88* 95* 99*   < > 244    < > = values in this interval not displayed.       Inflammatory Markers    Recent Labs   Lab Test 10/31/23  1414   SED 60*       Urine Studies     Recent Labs   Lab Test 10/31/23  1414 10/26/23  1601   URINEPH 5.5 5.5   NITRITE Negative Negative   LEUKEST Negative Negative   WBCU 10* 1       Imaging:  No results found for this or any previous visit (from the past 48 hour(s)).

## 2023-11-03 NOTE — PROGRESS NOTES
VS:    O2: Cont pulse ox, 3L O2 when sleeping   Output: Voids in toilet or urinal   Last BM: 11/1   Activity: indep   Skin: Intact, R chest portacath   Pain: denies   CMS: intact   Dressing: none   Diet: reg   LDA: R chest portacath   Equipment: none   Plan: Renal biopsy, monitoring creatinine, urine output   Additional Info:         Jareth remained afebrile this shift.      Monitor Jareth's urine output, day shift was 200 mls.      When he sleeps put on the oxi mask on 3L O2 with cont pulse ox.  He is a mouth breather.      He has not used a CPAP in years and prefers not to.  RT does not have any in stock at this time.

## 2023-11-03 NOTE — PLAN OF CARE
"Goal Outcome Evaluation:      Plan of Care Reviewed With: patient    Overall Patient Progress: no changeOverall Patient Progress: no change    Outcome Evaluation: Pt AOX4 , indipendent/SBA  with , voids well without issues , denies pain or discomfort,  on continous Pulse oxy. LS cleeat  on o2  2L via N/C needs CPAP  RT to  insall when available Will continue to monitor and update.  VS: /50 (BP Location: Left leg)   Pulse 101   Temp 98.8  F (37.1  C) (Oral)   Resp 16   Ht 1.778 m (5' 10\")   Wt 111.1 kg (244 lb 14.9 oz)   SpO2 95%   BMI 35.14 kg/m      O2: O2 SATS >90% denies chest pain,  or SBO   Output: Voids adequately in the bathroom   Last BM: 11/1/23 BS present all x4 quadrants    Activity: Up independently   Up for meals? N/A   Skin: Dry and intact except small scratches from dog    Pain: Denies    CMS: Intact denies numbness and tingling   Dressing: R port cath  dressing  CDI   Diet: Regular    LDA: R Port cath    Equipment IV Pole personal belongings.     Plan: Continue to monitor  and update,    Additional Info:            "

## 2023-11-03 NOTE — CONSULTS
"    Interventional Radiology  [facility] Consult Note  11/03/23   2:06 PM    Consult Requested: Kidney biopsy    Recommendations/Plan:  Patient is on IR schedule Monday, 11/6 for a random native kidney biopsy if creatinine does not continue to improve.  JOCE from NSAID and Keytruda, rising creatinine, concern for acute interstitial nephritis, thrombotic microangiopathy or vasculitis from Keytruda  Labs WNL for procedure though HGb went from 12 to 8.  NPO at midnight Monday 11/6  Medications to be held include: none  Consent will be done prior to procedure.     Please contact the IR charge RN at [phone] for estimated time of procedure.     Case and imaging discussed with IR attending, Janey. Recommendations were reviewed with nephrology    This is a 63 year old male with past medical history of nasopharyngeal cancer admitted with JOCE, fevers and pancytopenia    Diagnostic labs entered by: Joann    Pertinent Imaging Reviewed: US renal    Vitals:   /67   Pulse 94   Temp 98.4  F (36.9  C)   Resp 16   Ht 1.778 m (5' 10\")   Wt 111.1 kg (244 lb 14.9 oz)   SpO2 92%   BMI 35.14 kg/m      Pertinent Labs:   Lab Results   Component Value Date    WBC 4.5 11/03/2023    WBC 3.8 (L) 11/02/2023    WBC 3.6 (L) 11/01/2023     Lab Results   Component Value Date    HGB 8.0 11/03/2023    HGB 8.3 11/02/2023    HGB 9.1 11/01/2023     Lab Results   Component Value Date     11/03/2023     11/02/2023    PLT 91 11/01/2023     Lab Results   Component Value Date    INR 1.19 (H) 11/01/2023    PTT 41 (H) 11/01/2023     Lab Results   Component Value Date    POTASSIUM 3.9 11/03/2023    POTASSIUM 3.2 (L) 08/29/2022        COVID-19 Antibody Results, Testing for Immunity         No data to display            COVID-19 PCR Results        3/28/2022    09:39 11/30/2022    14:24 12/5/2022    06:46 10/26/2023    12:47 10/26/2023    22:27 10/31/2023    16:56   COVID-19 PCR Results   COVID-19 Virus by PCR (External Result) " Negative     Negative           SARS CoV2 PCR   Negative  Negative  Negative  Negative     Details          This result is from an external source.             Odell Julien PA-C  Interventional Radiology  Pager: [phone]

## 2023-11-03 NOTE — PLAN OF CARE
Goal Outcome Evaluation:    Monitoring labs, creatinine is trending down.  Monitoring his urine output.  Patient was afebrile today    Based on kidney function, the MD may start steroids.  Possible kidney biopsy on Monday

## 2023-11-04 LAB
ALBUMIN SERPL BCG-MCNC: 2.3 G/DL (ref 3.5–5.2)
ALP SERPL-CCNC: 84 U/L (ref 40–129)
ALT SERPL W P-5'-P-CCNC: 13 U/L (ref 0–70)
ANION GAP SERPL CALCULATED.3IONS-SCNC: 10 MMOL/L (ref 7–15)
AST SERPL W P-5'-P-CCNC: 36 U/L (ref 0–45)
BASOPHILS # BLD AUTO: 0 10E3/UL (ref 0–0.2)
BASOPHILS NFR BLD AUTO: 0 %
BILIRUB SERPL-MCNC: 0.3 MG/DL
BUN SERPL-MCNC: 51.2 MG/DL (ref 8–23)
CALCIUM SERPL-MCNC: 8.1 MG/DL (ref 8.8–10.2)
CHLORIDE SERPL-SCNC: 99 MMOL/L (ref 98–107)
CREAT SERPL-MCNC: 3.7 MG/DL (ref 0.67–1.17)
CRP SERPL-MCNC: 241.5 MG/L
DEPRECATED HCO3 PLAS-SCNC: 22 MMOL/L (ref 22–29)
EGFRCR SERPLBLD CKD-EPI 2021: 18 ML/MIN/1.73M2
EOSINOPHIL # BLD AUTO: 0 10E3/UL (ref 0–0.7)
EOSINOPHIL NFR BLD AUTO: 0 %
ERYTHROCYTE [DISTWIDTH] IN BLOOD BY AUTOMATED COUNT: 15.4 % (ref 10–15)
GLUCOSE BLDC GLUCOMTR-MCNC: 107 MG/DL (ref 70–99)
GLUCOSE BLDC GLUCOMTR-MCNC: 115 MG/DL (ref 70–99)
GLUCOSE BLDC GLUCOMTR-MCNC: 149 MG/DL (ref 70–99)
GLUCOSE BLDC GLUCOMTR-MCNC: 177 MG/DL (ref 70–99)
GLUCOSE SERPL-MCNC: 188 MG/DL (ref 70–99)
HCT VFR BLD AUTO: 25.7 % (ref 40–53)
HGB BLD-MCNC: 8.1 G/DL (ref 13.3–17.7)
IMM GRANULOCYTES # BLD: 0 10E3/UL
IMM GRANULOCYTES NFR BLD: 0 %
LYMPHOCYTES # BLD AUTO: 0.3 10E3/UL (ref 0.8–5.3)
LYMPHOCYTES NFR BLD AUTO: 6 %
MCH RBC QN AUTO: 27.9 PG (ref 26.5–33)
MCHC RBC AUTO-ENTMCNC: 31.5 G/DL (ref 31.5–36.5)
MCV RBC AUTO: 89 FL (ref 78–100)
MONOCYTES # BLD AUTO: 0.3 10E3/UL (ref 0–1.3)
MONOCYTES NFR BLD AUTO: 7 %
NEUTROPHILS # BLD AUTO: 3.9 10E3/UL (ref 1.6–8.3)
NEUTROPHILS NFR BLD AUTO: 87 %
NRBC # BLD AUTO: 0 10E3/UL
NRBC BLD AUTO-RTO: 0 /100
PLATELET # BLD AUTO: 132 10E3/UL (ref 150–450)
POTASSIUM SERPL-SCNC: 3.6 MMOL/L (ref 3.4–5.3)
PROT SERPL-MCNC: 6.4 G/DL (ref 6.4–8.3)
RBC # BLD AUTO: 2.9 10E6/UL (ref 4.4–5.9)
SODIUM SERPL-SCNC: 131 MMOL/L (ref 135–145)
WBC # BLD AUTO: 4.6 10E3/UL (ref 4–11)

## 2023-11-04 PROCEDURE — 99233 SBSQ HOSP IP/OBS HIGH 50: CPT | Performed by: INTERNAL MEDICINE

## 2023-11-04 PROCEDURE — 86140 C-REACTIVE PROTEIN: CPT | Performed by: INTERNAL MEDICINE

## 2023-11-04 PROCEDURE — 250N000011 HC RX IP 250 OP 636: Performed by: INTERNAL MEDICINE

## 2023-11-04 PROCEDURE — 85049 AUTOMATED PLATELET COUNT: CPT | Performed by: INTERNAL MEDICINE

## 2023-11-04 PROCEDURE — 120N000002 HC R&B MED SURG/OB UMMC

## 2023-11-04 PROCEDURE — 80053 COMPREHEN METABOLIC PANEL: CPT | Performed by: INTERNAL MEDICINE

## 2023-11-04 PROCEDURE — 36591 DRAW BLOOD OFF VENOUS DEVICE: CPT | Performed by: INTERNAL MEDICINE

## 2023-11-04 PROCEDURE — 250N000013 HC RX MED GY IP 250 OP 250 PS 637: Performed by: PEDIATRICS

## 2023-11-04 PROCEDURE — 99232 SBSQ HOSP IP/OBS MODERATE 35: CPT | Performed by: INTERNAL MEDICINE

## 2023-11-04 RX ADMIN — HEPARIN, PORCINE (PF) 10 UNIT/ML INTRAVENOUS SYRINGE 5 ML: at 11:09

## 2023-11-04 RX ADMIN — ATORVASTATIN CALCIUM 20 MG: 20 TABLET, FILM COATED ORAL at 08:33

## 2023-11-04 RX ADMIN — LEVOTHYROXINE SODIUM 112 MCG: 112 TABLET ORAL at 08:33

## 2023-11-04 RX ADMIN — ACETAMINOPHEN 650 MG: 325 TABLET, FILM COATED ORAL at 16:34

## 2023-11-04 RX ADMIN — FLUTICASONE FUROATE AND VILANTEROL TRIFENATATE 1 PUFF: 100; 25 POWDER RESPIRATORY (INHALATION) at 08:32

## 2023-11-04 ASSESSMENT — ACTIVITIES OF DAILY LIVING (ADL)
ADLS_ACUITY_SCORE: 23

## 2023-11-04 NOTE — PLAN OF CARE
[id number]  Plan of Care Reviewed With: patient  Overall Patient Progress: no change  Outcome Evaluation: AFVSS.  Denies pain.  Up ad kathy.  On 2LPM via Oxymask O/N with cont pulse ox.  Using bedside urinal, voiding adequately.  R chest port HL.  Possible renal biopsy on Monday.

## 2023-11-04 NOTE — PROGRESS NOTES
"  VS: /64 (BP Location: Left arm)   Pulse 102   Temp 98.1  F (36.7  C) (Oral)   Resp 16   Ht 1.778 m (5' 10\")   Wt 111.9 kg (246 lb 9.6 oz)   SpO2 94%   BMI 35.38 kg/m      O2: RA, when sleeping on 2 L O2 with oxy mask, cont pulse ox   Output: Voids in urinal   Last BM: 11/3   Activity: indep   Skin: Intact, has R chest portacath   Pain: denies   CMS: intact   Dressing: none   Diet: reg   LDA: R chest rodrigo cath   Equipment: none   Plan: Monitoring labs, creatinine, possible renal biopsy on Mon   Additional Info:       Late afternoon Jareth got a temp, tylenol was given.    Plan is for possible renal biopsy on Monday  "

## 2023-11-04 NOTE — PROGRESS NOTES
[facility]    Medicine Progress Note - Hospitalist Service, GOLD TEAM 19    Date of Admission:  11/1/2023    Course reviewed with Dr Deluna, discussed with wife who was present      Assessment & Plan     Pt is a 63-year-old history of nasopharyngeal squamous cell cancer (s/p surgery, chemoradiation and now on pembrolizumab), COPD, BARRETT, hypothyroidism, non-insulin-dependent diabetes presenting with acute onset of fever and found to have acute kidney injury in the setting of elevated inflammatory markers.        #Fever  #Pancytopenia  #Acute kidney injury  -Patient's baseline creatinine ~0.8 - 1.0   -He did report increased ingestion of NSAIDs  -He also presented with elevated inflammatory markers CRP of 330, ferritin 1700, ESR 60.  -She has also had fever, Tmax this admission 102.7.  -Additional work-up includes renal ultrasound which was largely unremarkable, portable chest x-ray without any acute findings, CT chest with IV contrast on 10/26/2023 without evidence of PE and largely clear lungs.  -Patient's acute kidney injury is likely intrinsic in nature given NSAIDs ingestion, there is also concern for vasculitis or Keytruda induced nephritis.  -Infectious disease has since been consulted recommending ongoing work-up including following up blood cultures, trend fever curve, daily CBC/CRP.  Overall, concern for underlying infection is currently lower.  -Of note, urine studies are negative, respiratory panel negative, CMV PCR negative, haptoglobin normal, C3 & C4 also normal.  Iron studies consistent with chronic disease.  -His LDH level is elevated.  -Fibrinogen activity is elevated, direct antiglobulin test is negative.  Beta-D-Glucan neg  DORINDA + speckled pattern  ANCA neg      Latest Reference Range & Units 11/02/23 06:45   EBV DNA Copies/mL <=0 copies/mL 16,685 (H)   EBV DNA Log of Copies  4.2   ID has felt EBV elevation is likely reactivation, not primary  "process    Today (11/4)  Essentially afebrile for 36 hours  CRP trending down  Cr gradually improving  Na slowly declining, likely secondary to increased oral intake in the setting of renal failure    Plan   Follow-up blood cultures  Follow-up fungal markers, fungal culture,   Defer resumption of steroids to heme-onc (of note, ID is okay with steroids at this time)  Trend kidney function  Pt instructed to decrease free water intake  (has been thirsty, felt he was dehydrated and drinking \"a lot\" of water  Avoid nephrotoxic medications, renally dose medications   Trend kidney functions   ID, oncology, nephrology follow up  kidney biopsy with IR which is scheduled for Monday  NPO after MN for biopsy on 11/6/23     #History of nasopharyngeal carcinoma   - Diagnosed in Dec 2021, positive for EBV; induced with carboplatin gemcitabine in April, May of 2022, subsequently received carboplatin and radiotherapy, left modified radical neck dissection in Dec 2022, subsequently presented at tumor board in Feb 23 and felt to be at high risk of recurrence with residual disease on PET so started on pembrolizumab (keytruda) March 9, 2023; recently received Cycle 10 on October 11 and due for cycle 11 today 11/1  - Dr. Martin thought he might be a candidate for toripalimab  - Pembrolizumab being held at this time.    Anemia  Hgb 8.1  Nl MCV  Fe/ TIBC 14/126  Stable over the last 48 hours--Hgb 12 on 10/26/23  Suspect secondary to acute illness  --continue to monitor    Chronic issues  #Obstructive sleep apnea -  nighttime CPAP  #COPD -continue PTA Breo Ellipta.  #Hypothyroidism -continue PTA levothyroxine 112 mcg daily  #Non-insulin-dependent diabetes mellitus -PTA meds includes metformin, Trulicity both of which have been held at this time.    BG stable  Management with ISS, POCT, medium intensity sliding scale with NovoLog.          Diet: Combination Diet Regular Diet Adult    DVT Prophylaxis: Pneumatic Compression Devices  Arthur " "Catheter: Not present  Lines: PRESENT      Port A Cath Single 03/31/22 Right Chest wall-Site Assessment: WDL      Cardiac Monitoring: None  Code Status: Full Code      Clinically Significant Risk Factors              # Hypoalbuminemia: Lowest albumin = 2.3 g/dL at 11/4/2023  9:29 AM, will monitor as appropriate     # Thrombocytopenia: Lowest platelets = 117 in last 2 days, will monitor for bleeding  # Acute Kidney Injury, unspecified: based on a >150% or 0.3 mg/dL increase in last creatinine compared to past 90 day average, will monitor renal function         # Obesity: Estimated body mass index is 35.38 kg/m  as calculated from the following:    Height as of this encounter: 1.778 m (5' 10\").    Weight as of this encounter: 111.9 kg (246 lb 9.6 oz)., PRESENT ON ADMISSION            Disposition Plan to be determined    Expected Discharge Date: 11/08/2023                    Blake Hyde MD  Hospitalist Service, GOLD TEAM 19  [facility]  Securely message with Viss (more info)  Text page via [facility] Paging/Directory   See signed in provider for up to date coverage information  ______________________________________________________________________    Interval History     Patient feels tired but overall improved.  Has been drinking \"a lot \"of water as he has been thirsty  Denies cough, chest pain, shortness of breath, abdominal pain.  Nausea improved.  Oral intake improved.    Physical Exam   Vital Signs: Temp: 98.1  F (36.7  C) Temp src: Oral BP: 101/64 Pulse: 102   Resp: 16 SpO2: 94 % O2 Device: None (Room air) Oxygen Delivery: 2 LPM  Weight: 246 lbs 9.6 oz    General Appearance: Obese male, sitting comfortably at the side of his bed.  He does not appear acutely or chronically ill   HEENT:  moist mucous membranes  Pulmonary: Lungs clear  CVS: RRR, no murmur  GI: BS (+), soft nontender,  Extremities: 1+ pretibial edema bilaterally  Skin: No rashes or lesions; right " port-a-cath in place   Neurologic: A&O x3      Medical Decision Making       55 MINUTES SPENT BY ME on the date of service doing chart review, history, exam, documentation & further activities per the note.      Data   ------------------------- PAST 24 HR DATA REVIEWED -----------------------------------------------    I have personally reviewed the following data over the past 24 hrs:    4.6  \   8.1 (L)   / 132 (L)     131 (L) 99 51.2 (H) /  188 (H)   3.6 22 3.70 (H) \     ALT: 13 AST: 36 AP: 84 TBILI: 0.3   ALB: 2.3 (L) TOT PROTEIN: 6.4 LIPASE: N/A     Procal: N/A CRP: 241.50 (H) Lactic Acid: N/A

## 2023-11-05 LAB
ABO/RH(D): NORMAL
ALBUMIN SERPL BCG-MCNC: 2.3 G/DL (ref 3.5–5.2)
ALP SERPL-CCNC: 86 U/L (ref 40–129)
ALT SERPL W P-5'-P-CCNC: 16 U/L (ref 0–70)
ANION GAP SERPL CALCULATED.3IONS-SCNC: 6 MMOL/L (ref 7–15)
ANTIBODY SCREEN: NEGATIVE
AST SERPL W P-5'-P-CCNC: 33 U/L (ref 0–45)
B BURGDOR DNA SPEC QL NAA+PROBE: NOT DETECTED
BACTERIA BLD CULT: NO GROWTH
BASOPHILS # BLD AUTO: 0 10E3/UL (ref 0–0.2)
BASOPHILS # BLD AUTO: 0 10E3/UL (ref 0–0.2)
BASOPHILS NFR BLD AUTO: 0 %
BASOPHILS NFR BLD AUTO: 0 %
BILIRUB SERPL-MCNC: 0.3 MG/DL
BLD PROD TYP BPU: NORMAL
BLOOD COMPONENT TYPE: NORMAL
BUN SERPL-MCNC: 51.2 MG/DL (ref 8–23)
CALCIUM SERPL-MCNC: 8 MG/DL (ref 8.8–10.2)
CHLORIDE SERPL-SCNC: 100 MMOL/L (ref 98–107)
CODING SYSTEM: NORMAL
CREAT SERPL-MCNC: 3.44 MG/DL (ref 0.67–1.17)
CROSSMATCH: NORMAL
CRP SERPL-MCNC: 175.87 MG/L
DEPRECATED HCO3 PLAS-SCNC: 26 MMOL/L (ref 22–29)
EGFRCR SERPLBLD CKD-EPI 2021: 19 ML/MIN/1.73M2
EOSINOPHIL # BLD AUTO: 0 10E3/UL (ref 0–0.7)
EOSINOPHIL # BLD AUTO: 0 10E3/UL (ref 0–0.7)
EOSINOPHIL NFR BLD AUTO: 0 %
EOSINOPHIL NFR BLD AUTO: 0 %
ERYTHROCYTE [DISTWIDTH] IN BLOOD BY AUTOMATED COUNT: 15.5 % (ref 10–15)
ERYTHROCYTE [DISTWIDTH] IN BLOOD BY AUTOMATED COUNT: 15.6 % (ref 10–15)
GLUCOSE BLDC GLUCOMTR-MCNC: 112 MG/DL (ref 70–99)
GLUCOSE BLDC GLUCOMTR-MCNC: 128 MG/DL (ref 70–99)
GLUCOSE BLDC GLUCOMTR-MCNC: 136 MG/DL (ref 70–99)
GLUCOSE BLDC GLUCOMTR-MCNC: 192 MG/DL (ref 70–99)
GLUCOSE SERPL-MCNC: 108 MG/DL (ref 70–99)
HCT VFR BLD AUTO: 23.1 % (ref 40–53)
HCT VFR BLD AUTO: 25.9 % (ref 40–53)
HGB BLD-MCNC: 7.2 G/DL (ref 13.3–17.7)
HGB BLD-MCNC: 8.3 G/DL (ref 13.3–17.7)
HOLD SPECIMEN: NORMAL
IMM GRANULOCYTES # BLD: 0 10E3/UL
IMM GRANULOCYTES # BLD: 0 10E3/UL
IMM GRANULOCYTES NFR BLD: 0 %
IMM GRANULOCYTES NFR BLD: 1 %
ISSUE DATE AND TIME: NORMAL
LDH SERPL L TO P-CCNC: 216 U/L (ref 0–250)
LYMPHOCYTES # BLD AUTO: 0.3 10E3/UL (ref 0.8–5.3)
LYMPHOCYTES # BLD AUTO: 0.3 10E3/UL (ref 0.8–5.3)
LYMPHOCYTES NFR BLD AUTO: 6 %
LYMPHOCYTES NFR BLD AUTO: 7 %
MCH RBC QN AUTO: 27.2 PG (ref 26.5–33)
MCH RBC QN AUTO: 28 PG (ref 26.5–33)
MCHC RBC AUTO-ENTMCNC: 31.2 G/DL (ref 31.5–36.5)
MCHC RBC AUTO-ENTMCNC: 32 G/DL (ref 31.5–36.5)
MCV RBC AUTO: 87 FL (ref 78–100)
MCV RBC AUTO: 88 FL (ref 78–100)
MONOCYTES # BLD AUTO: 0.3 10E3/UL (ref 0–1.3)
MONOCYTES # BLD AUTO: 0.4 10E3/UL (ref 0–1.3)
MONOCYTES NFR BLD AUTO: 8 %
MONOCYTES NFR BLD AUTO: 8 %
NEUTROPHILS # BLD AUTO: 3.9 10E3/UL (ref 1.6–8.3)
NEUTROPHILS # BLD AUTO: 4.1 10E3/UL (ref 1.6–8.3)
NEUTROPHILS NFR BLD AUTO: 85 %
NEUTROPHILS NFR BLD AUTO: 85 %
NRBC # BLD AUTO: 0 10E3/UL
NRBC # BLD AUTO: 0 10E3/UL
NRBC BLD AUTO-RTO: 0 /100
NRBC BLD AUTO-RTO: 0 /100
PLATELET # BLD AUTO: 139 10E3/UL (ref 150–450)
PLATELET # BLD AUTO: 145 10E3/UL (ref 150–450)
POTASSIUM SERPL-SCNC: 3.9 MMOL/L (ref 3.4–5.3)
PROT SERPL-MCNC: 6 G/DL (ref 6.4–8.3)
RBC # BLD AUTO: 2.65 10E6/UL (ref 4.4–5.9)
RBC # BLD AUTO: 2.96 10E6/UL (ref 4.4–5.9)
RETICS # AUTO: 0.01 10E6/UL (ref 0.03–0.1)
RETICS # AUTO: 0.01 10E6/UL (ref 0.03–0.1)
RETICS/RBC NFR AUTO: 0.2 % (ref 0.5–2)
RETICS/RBC NFR AUTO: 0.3 % (ref 0.5–2)
SODIUM SERPL-SCNC: 132 MMOL/L (ref 135–145)
SPECIMEN EXPIRATION DATE: NORMAL
UNIT ABO/RH: NORMAL
UNIT NUMBER: NORMAL
UNIT STATUS: NORMAL
UNIT TYPE ISBT: 9500
WBC # BLD AUTO: 4.6 10E3/UL (ref 4–11)
WBC # BLD AUTO: 4.9 10E3/UL (ref 4–11)

## 2023-11-05 PROCEDURE — 83615 LACTATE (LD) (LDH) ENZYME: CPT | Performed by: INTERNAL MEDICINE

## 2023-11-05 PROCEDURE — 36415 COLL VENOUS BLD VENIPUNCTURE: CPT | Performed by: INTERNAL MEDICINE

## 2023-11-05 PROCEDURE — 86850 RBC ANTIBODY SCREEN: CPT | Performed by: INTERNAL MEDICINE

## 2023-11-05 PROCEDURE — 36591 DRAW BLOOD OFF VENOUS DEVICE: CPT | Performed by: INTERNAL MEDICINE

## 2023-11-05 PROCEDURE — 85060 BLOOD SMEAR INTERPRETATION: CPT | Performed by: PATHOLOGY

## 2023-11-05 PROCEDURE — 83010 ASSAY OF HAPTOGLOBIN QUANT: CPT | Performed by: INTERNAL MEDICINE

## 2023-11-05 PROCEDURE — 85025 COMPLETE CBC W/AUTO DIFF WBC: CPT | Performed by: INTERNAL MEDICINE

## 2023-11-05 PROCEDURE — 86140 C-REACTIVE PROTEIN: CPT | Performed by: INTERNAL MEDICINE

## 2023-11-05 PROCEDURE — 250N000011 HC RX IP 250 OP 636: Performed by: INTERNAL MEDICINE

## 2023-11-05 PROCEDURE — 85004 AUTOMATED DIFF WBC COUNT: CPT | Performed by: INTERNAL MEDICINE

## 2023-11-05 PROCEDURE — 250N000013 HC RX MED GY IP 250 OP 250 PS 637: Performed by: PEDIATRICS

## 2023-11-05 PROCEDURE — P9016 RBC LEUKOCYTES REDUCED: HCPCS | Performed by: INTERNAL MEDICINE

## 2023-11-05 PROCEDURE — 80053 COMPREHEN METABOLIC PANEL: CPT | Performed by: INTERNAL MEDICINE

## 2023-11-05 PROCEDURE — 36591 DRAW BLOOD OFF VENOUS DEVICE: CPT | Mod: TC | Performed by: INTERNAL MEDICINE

## 2023-11-05 PROCEDURE — 258N000003 HC RX IP 258 OP 636: Performed by: INTERNAL MEDICINE

## 2023-11-05 PROCEDURE — 99232 SBSQ HOSP IP/OBS MODERATE 35: CPT | Performed by: INTERNAL MEDICINE

## 2023-11-05 PROCEDURE — 120N000002 HC R&B MED SURG/OB UMMC

## 2023-11-05 PROCEDURE — 86901 BLOOD TYPING SEROLOGIC RH(D): CPT | Performed by: INTERNAL MEDICINE

## 2023-11-05 PROCEDURE — 86923 COMPATIBILITY TEST ELECTRIC: CPT | Performed by: INTERNAL MEDICINE

## 2023-11-05 PROCEDURE — 85045 AUTOMATED RETICULOCYTE COUNT: CPT | Performed by: INTERNAL MEDICINE

## 2023-11-05 RX ORDER — NALOXONE HYDROCHLORIDE 0.4 MG/ML
0.2 INJECTION, SOLUTION INTRAMUSCULAR; INTRAVENOUS; SUBCUTANEOUS
Status: DISCONTINUED | OUTPATIENT
Start: 2023-11-05 | End: 2023-11-07 | Stop reason: HOSPADM

## 2023-11-05 RX ORDER — SODIUM CHLORIDE 9 MG/ML
INJECTION, SOLUTION INTRAVENOUS CONTINUOUS
Status: ACTIVE | OUTPATIENT
Start: 2023-11-05 | End: 2023-11-05

## 2023-11-05 RX ORDER — NALOXONE HYDROCHLORIDE 0.4 MG/ML
0.4 INJECTION, SOLUTION INTRAMUSCULAR; INTRAVENOUS; SUBCUTANEOUS
Status: DISCONTINUED | OUTPATIENT
Start: 2023-11-05 | End: 2023-11-07 | Stop reason: HOSPADM

## 2023-11-05 RX ADMIN — LEVOTHYROXINE SODIUM 112 MCG: 112 TABLET ORAL at 08:43

## 2023-11-05 RX ADMIN — ATORVASTATIN CALCIUM 20 MG: 20 TABLET, FILM COATED ORAL at 08:43

## 2023-11-05 RX ADMIN — HEPARIN, PORCINE (PF) 10 UNIT/ML INTRAVENOUS SYRINGE 5 ML: at 18:11

## 2023-11-05 RX ADMIN — HEPARIN, PORCINE (PF) 10 UNIT/ML INTRAVENOUS SYRINGE 5 ML: at 06:46

## 2023-11-05 RX ADMIN — SODIUM CHLORIDE: 9 INJECTION, SOLUTION INTRAVENOUS at 11:23

## 2023-11-05 RX ADMIN — HEPARIN, PORCINE (PF) 10 UNIT/ML INTRAVENOUS SYRINGE 5 ML: at 10:51

## 2023-11-05 RX ADMIN — FLUTICASONE FUROATE AND VILANTEROL TRIFENATATE 1 PUFF: 100; 25 POWDER RESPIRATORY (INHALATION) at 08:43

## 2023-11-05 ASSESSMENT — ACTIVITIES OF DAILY LIVING (ADL)
ADLS_ACUITY_SCORE: 23
DEPENDENT_IADLS:: MEDICATION MANAGEMENT
ADLS_ACUITY_SCORE: 23

## 2023-11-05 NOTE — PLAN OF CARE
Plan of Care Reviewed With: patient  Overall Patient Progress: no change  Outcome Evaluation: AFVSS.  Denies pain.  On 2LPM via Oxymask while asleep.  Adequate UOP.  R chest port HL.  Pending renal biopsy Monday.

## 2023-11-05 NOTE — PROGRESS NOTES
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        #Fever  #Pancytopenia  #Acute kidney injury  -Patient's baseline creatinine ~0.8 - 1.0   -He did report increased ingestion of NSAIDs  -He also presented with elevated inflammatory markers CRP of 330, ferritin 1700, ESR 60.  -She has also had fever, Tmax this admission 102.7.  -Additional work-up includes renal ultrasound which was largely unremarkable, portable chest x-ray without any acute findings, CT chest with IV contrast on 10/26/2023 without evidence of PE and largely clear lungs.  -Patient's acute kidney injury is likely intrinsic in nature given NSAIDs ingestion, there is also concern for vasculitis or Keytruda induced nephritis (felt to be unlikely per Heme)  -Infectious disease has since been consulted recommending ongoing work-up including following up blood cultures, Now with intermittent low grade T  Urine studies are negative, respiratory panel negative, CMV PCR negative, haptoglobin normal, C3 & C4 also normal.  Iron studies consistent with chronic disease.  -His LDH level is elevated.  -Fibrinogen activity is elevated, direct antiglobulin test is negative.  Beta-D-Glucan neg  DORINDA + speckled pattern  ANCA neg      Latest Reference Range & Units 11/02/23 06:45   EBV DNA Copies/mL <=0 copies/mL 16,685 (H)   EBV DNA Log of Copies  4.2   ID has felt EBV elevation is likely reactivation, not primary process    Today (11/4)  T max 100.5  over the last 48 hours  CRP  continues to gradually decline  Cr gradually improving  Na slowly declining, likely secondary to increased oral intake in the setting of renal failure    Plan   Follow-up blood cultures  Follow-up fungal markers, fungal culture,  Defer resumption of steroids to heme-onc (of note, ID is okay with steroids at this time)  Trend kidney function  Avoid nephrotoxic medications, renally dose medications   Trend kidney functions   ID, oncology, nephrology follow up  kidney biopsy with IR which is scheduled for Monday  NPO after MN for biopsy on 11/6/23    #History of nasopharyngeal carcinoma  - Diagnosed in Dec 2021, positive for EBV; induced with carboplatin gemcitabine in April, May of 2022, subsequently received carboplatin and radiotherapy, left modified radical neck dissection in Dec 2022, subsequently presented at tumor board in Feb 23 and felt to be at high risk of recurrence with residual disease on PET so started on pembrolizumab (keytruda) March 9, 2023; recently received Cycle 10 on October 11 and due for cycle 11 today 11/1  - Dr. Martin thought he might be a candidate for toripalimab  - Pembrolizumab being held at this time.    Anemia  Hgb decline to 7.2 from 8.1  No evidence clinically of ongoing bleeding  Suspect secondary to acute illness  Plan discussed with Hematology: Repeat LDH, retic count, peripheral smear, haptoglobin  One unit of PRBC in view of renal biopsy planned for tomorrow    Hypotension  BP 90s/  Mild dizziness when up  Possibly secondary to anemia  Goal to keep BP systolic over 100  Plan  ml this am  Transfuse 1 unit PRBC today      #Obstructive sleep apnea -intolerant to CPAP  #COPD -continue PTA Breo Ellipta.  Patient with mild intermittent hypoxia, asymptomatic  Without evidence of acute respiratory process.  Chest x-ray on admission normal  CT chest on 10/26/2023 revealed atelectasis but no evidence of PE or pneumonia  Plan: Monitor respiratory status, if persistent hypoxia,  "repeat chest x-ray  Encourage incentive spirometry, continue inhalers    Chronic issues:  #Hypothyroidism -continue PTA levothyroxine 112 mcg daily  #Non-insulin-dependent diabetes mellitus -PTA meds includes metformin, Trulicity both of which have been held at this time.    BG stable  Management with ISS, POCT, medium intensity sliding scale with NovoLog.          Diet: Combination Diet Regular Diet Adult  NPO per Anesthesia Guidelines for Procedure/Surgery Except for: Meds    DVT Prophylaxis: Pneumatic Compression Devices  Arthur Catheter: Not present  Lines: PRESENT             Cardiac Monitoring: None  Code Status: Full Code      Clinically Significant Risk Factors              # Hypoalbuminemia: Lowest albumin = 2.3 g/dL at 11/5/2023  6:47 AM, will monitor as appropriate      # Acute Kidney Injury, unspecified: based on a >150% or 0.3 mg/dL increase in last creatinine compared to past 90 day average, will monitor renal function         # Obesity: Estimated body mass index is 35.67 kg/m  as calculated from the following:    Height as of this encounter: 1.778 m (5' 10\").    Weight as of this encounter: 112.8 kg (248 lb 9.6 oz).             Disposition Plan to be determined    Expected Discharge Date: 11/08/2023                    Blake Hyde MD  Hospitalist Service, GOLD TEAM 19  [facility]  Securely message with [employer] (more info)  Text page via [employer] Paging/Directory   See signed in provider for up to date coverage information  ______________________________________________________________________    Interval History     Patient feels about the same.  Feels tired, notes some dizziness when up.  No chest pain, cough, shortness of breath.    Physical Exam   Vital Signs: Temp: 98  F (36.7  C) Temp src: Oral BP: 90/54 Pulse: 97   Resp: 16 SpO2: 92 % O2 Device: None (Room air) Oxygen Delivery: 2 LPM  Weight: 248 lbs 9.6 oz  1900 cc    General Appearance: "   Sitting comfortably in chair at the bedside  Wearing oxygen, O2 sats upper 90s  Respiratory rate 12, unlabored  Lungs clear bilaterally  CV RRR  HEENT:  moist mucous membranes  CVS: RRR, no murmur  GI: BS (+), soft nontender,  Extremities: Trace pretibial edema bilaterally  Skin: No rashes or lesions; right port-a-cath in place   Neurologic: A&O x3          Data   ------------------------- PAST 24 HR DATA REVIEWED -----------------------------------------------    I have personally reviewed the following data over the past 24 hrs:    4.6  \   7.2 (L)   / 139 (L)     132 (L) 100 51.2 (H) /  112 (H)   3.9 26 3.44 (H) \     ALT: 16 AST: 33 AP: 86 TBILI: 0.3   ALB: 2.3 (L) TOT PROTEIN: 6.0 (L) LIPASE: N/A     Procal: N/A CRP: 175.87 (H) Lactic Acid: N/A

## 2023-11-05 NOTE — PROGRESS NOTES
"  VS: BP 99/63 (BP Location: Left arm, Patient Position: Sitting, Cuff Size: Adult Large)   Pulse 88   Temp 98  F (36.7  C) (Oral)   Resp 16   Ht 1.778 m (5' 10\")   Wt 112.8 kg (248 lb 9.6 oz)   SpO2 95%   BMI 35.67 kg/m      O2: RA during the day, 2 L O2 at night with oxy mask when sleeping   Output: Voids in urinal   Last BM: 11/4   Activity: indep   Skin: Intact, has R chest portacath   Pain: denies   CMS: intact   Dressing: none   Diet: reg   LDA: R chest portacath   Equipment: none   Plan: Monitoring labs, creatinine, urine output, NPO at midnight, renal biopsy on Mon 11/6   Additional Info:         Jareth got one unit of blood this afternoon.  Monitoring labs.  Plan is for renal biopsy tomorrow between 8-9 am.    "

## 2023-11-05 NOTE — PROGRESS NOTES
Nephrology Progress Note  11/04/2023         Assessment & Recommendations:   Jareth Gallardo is a 63 year old year old man with a nasopharyngeal squamous cell cancer (s/p surgery, chemoradiation and now undergoing treatment with pembrolizumab), COPD, BARRETT, hypothyroidism, DM 2 (on metformin and delaglutide) admitted with fever, elevated inflammatory markers and JOCE.        #JOCE - Baseline Cr ~0.9 mg/dL.  Cr peak to 5.2 mg/dL but has improved to 3.7 mg/dL.  Etiology of JOCE remains unclear.  Patient was exposed to a number of nephrotoxins (e.g. NSAID, contract, vanco) that could have caused tubular injury especially in the setting of hypotension and cytokine release.  Having said that, ICI-associated JOCE due to pembrolizumab, particularly AIN, remains a concern especially since given that the etiology of the his fevers and elevated inflammatory markers remain unclear.    #Hyponatremia - Mild and largely due to free water intake in the setting of JOCE.   #Fever & elevated inflammatory markers - Etiology remains unclear.  Infectious work-up unremarkable thus far.  CRP and fever now seem to be on a downward trend.    #Nasopharyngeal sqauamous cell carcinoma - Treated with chemotherapy (carboplatin, gemcitabine, radiation and modified lef nect dissection in 2022.  Due to increasing tumor burden, pembrolizumab initiated in Feb, 2023.  Cycle 10 completed on 10/11/23.  Cycle 11 planned for 11/1/2023 now on hold).   #Pancytopenia - Etiology remains unclear.  Perhaps related to pembrolizumab.     #Positive HIRAM - Titer 1:160.  C3 and C4 normal.  ANCA negative.  Positive HIRAM of unclear significance.     Plan:  -would continue with the renal biopsy regardless of whether kidney function improves as biopsy findings may affect management of pembrolizumab.  Even if AIN is present, patient could be rechallenged with pembrolizumab especially if undergoing a course of steroid therapy.    -currently steroids on hold per  "heme/onc  -infectious work-up underway but remarkable thus far  -consider obtaining dsDNA to f/u positive HIRAM though this not necessary from a renal standpoint  -continue to hold NSAID or other agents such as PPI that may also cause AIN especially in the setting of use with check point inhibitors  -no acute indications for dialysis.  Fortunately, kidney function appears to be improving.  Will continue to follow closely.        Tay Eastman MD   Division of Renal Disease and Hypertension  Pager: [phone]    Interval History :   Nursing and provider notes from last 24 hours reviewed.  Cr and inflammatory markers seem to be on a downward trend.  Fever curve also improving.  Patient attempt to drink a lot.  He reports decent urine output (1900 ml yesterday).  He denies chest pain, dyspnea, abdominal pain, nausea and vomiting.  He continues to be agreeable to undergoing a kidney biopsy.          Review of Systems:   A 10 point ROS was obtained and unremarkable other than that mentioned in the interval history.      Physical Exam:   I/O last 3 completed shifts:  In: 800 [P.O.:800]  Out: 1850 [Urine:1850]   /68 (BP Location: Left arm)   Pulse 95   Temp (!) 100.5  F (38.1  C) (Oral)   Resp 16   Ht 1.778 m (5' 10\")   Wt 111.9 kg (246 lb 9.6 oz)   SpO2 94%   BMI 35.38 kg/m       GENERAL APPEARANCE: NAD  EYES:  no scleral icterus, pupils equal  PULM: lungs clear to auscultation bilaterally  CV: regular rhythm, normal rate, no rub     -No JVD appreciated     -trace-1+ LE edema   GI: soft, nontender, nondistended  : No CVA tenderness  INTEGUMENT: no concerning rash  NEURO:  no asterixis       Labs:   All labs reviewed by me  Electrolytes/Renal - Recent Labs   Lab Test 11/04/23  1647 11/04/23  1130 11/04/23  0929 11/03/23  2018 11/03/23  1038 11/02/23  1746 11/02/23  0645 11/01/23  0612 02/22/23  1333 01/30/23  1401 12/06/22  1154 12/06/22  0851 12/05/22  0714 11/16/22  1334   NA  --   --  131*  " --  133*  --  134* 133*   < > 142  --  138  --  137   POTASSIUM  --   --  3.6  --  3.9  --  3.8 3.8   < > 3.6  --  3.8  --  3.8   CHLORIDE  --   --  99  --  101  --  101 99   < > 101  --  103  --  96*   CO2  --   --  22  --  22  --  26 23   < > 33*  --  27  --  31*   BUN  --   --  51.2*  --  50.9*  --  48.8* 46.2*   < > 12.0  --  12.7  --  14.9   CR  --   --  3.70*  --  4.20*  --  4.89* 5.19*   < > 0.81  --  0.76   < > 0.88   * 177* 188*   < > 140*   < > 126* 115*   < > 71   < > 102*   < > 125*   LAI  --   --  8.1*  --  8.4*  --  8.1* 8.2*   < > 9.2  --  9.0  --  9.3   MAG  --   --   --   --   --   --   --   --   --  1.8  --  1.9  --  1.8   PHOS  --   --   --   --  4.1  --   --  3.9  --   --   --  3.7  --   --     < > = values in this interval not displayed.       CBC -   Recent Labs   Lab Test 11/04/23 0929 11/03/23  1038 11/02/23  0645   WBC 4.6 4.5 3.8*   HGB 8.1* 8.0* 8.3*   * 117* 113*       LFTs -   Recent Labs   Lab Test 11/04/23 0929 11/03/23  1038 11/02/23  0645 11/01/23  0612 10/31/23  1610   ALKPHOS 84  --  81  --  83   BILITOTAL 0.3  --  0.4  --  0.5   ALT 13  --  17  --  19   AST 36  --  40  --  43   PROTTOTAL 6.4  --  6.5  --  6.7   ALBUMIN 2.3* 2.4* 2.6*   < > 2.9*    < > = values in this interval not displayed.       Iron Panel -   Recent Labs   Lab Test 11/02/23  0645 11/01/23  0743   IRON  --  14*   IRONSAT  --  11*   MARIA L 1,344* 1,722*         Imaging:  Reviewed by me.   10/31/2023 Renal U/S:  MEASUREMENTS:     Right renal length: 14 cm  Left renal length: 13 cm     RENAL FINDINGS: The kidneys are normal in size and echogenicity. There  is no hydronephrosis. No shadowing stones are seen.     BLADDER: The bladder is mildly distended and grossly unremarkable.                                                                      IMPRESSION:       Unremarkable 2-ultrasound of the kidneys.      Current Medications:    atorvastatin  20 mg Oral Daily     fluticasone-vilanterol  1 puff  Inhalation Daily     heparin  5-10 mL Intracatheter Q28 Days     heparin lock flush  5-10 mL Intracatheter Q24H     insulin aspart  1-7 Units Subcutaneous TID AC     insulin aspart  1-5 Units Subcutaneous At Bedtime     levothyroxine  112 mcg Oral Daily     sodium chloride (PF)  10-20 mL Intracatheter Q28 Days       Tay Eastman MD   Pager: [phone]

## 2023-11-05 NOTE — CONSULTS
"Care Management Initial Consult    General Information  Assessment completed with: Patient,  (Jareth Gallardo)  Type of CM/SW Visit: Initial Assessment    Primary Care Provider verified and updated as needed: Yes (Face sheet updated to Dariela Melvin)   Readmission within the last 30 days: current reason for admission unrelated to previous admission   Return Category: New Diagnosis  Reason for Consult:  (Elevated Risk Score)  Advance Care Planning: Advance Care Planning Reviewed: no concerns identified          Communication Assessment  Patient's communication style: spoken language (English or Bilingual)    Hearing Difficulty or Deaf: yes   Wear Glasses or Blind: yes    Cognitive  Cognitive/Neuro/Behavioral: WDL                      Living Environment:   People in home: spouse, child(tova), dependent, child(tova), adult     Current living Arrangements: house      Able to return to prior arrangements: yes       Family/Social Support:  Care provided by: self, spouse/significant other  Provides care for: no one  Marital Status:  (Shelley Gallardo)         Description of Support System: Supportive, Involved (Wife, Sibling(s), Children )  Support Assessment: Adequate family and caregiver support, Adequate social supports    Current Resources:   Patient receiving home care services: No     Community Resources: OP Infusion ([facility])  Equipment currently used at home:  (Pt has a plastic chair that he uses for a shower chair.)  Supplies currently used at home: Diabetic Supplies    Employment/Financial:  Employment Status: disabled     Employment/ Comments:  (Pt reported he served in the [employer] for 5 years, but does not qualify for VA medical benefits.)  Financial Concerns:  (Pt expressed his frustrations with only qualifying for MNSure and hospital bills are piling up. Pt reported he is \"still in the middle of Workman's Comp case.\")   Referral to Financial Worker: No       Does the " patient's insurance plan have a 3 day qualifying hospital stay waiver?  Yes     Which insurance plan 3 day waiver is available? Alternative insurance waiver    Will the waiver be used for post-acute placement? No    Lifestyle & Psychosocial Needs:  Social Determinants of Health     Food Insecurity: Not on file   Depression: Not at risk (10/31/2023)    PHQ-2     PHQ-2 Score: 2   Housing Stability: Not on file   Tobacco Use: Medium Risk (10/31/2023)    Patient History     Smoking Tobacco Use: Former     Smokeless Tobacco Use: Never     Passive Exposure: Past   Financial Resource Strain: Not on file   Alcohol Use: Not on file   Transportation Needs: Not on file   Physical Activity: Not on file   Interpersonal Safety: Not on file   Stress: Not on file   Social Connections: Not on file       Functional Status:  Prior to admission patient needed assistance:   Dependent ADLs:: Independent  Dependent IADLs:: Medication Management  Assesssment of Functional Status: At functional baseline    Mental Health Status:  Mental Health Status: No Current Concerns       Chemical Dependency Status:  Chemical Dependency Status: No Current Concerns             Values/Beliefs:  Spiritual, Cultural Beliefs, Yazdanism Practices, Values that affect care: no               Additional Information:  Pt is a 63-year-old male, history of nasopharyngeal squamous cell cancer (s/p surgery, chemoradiation and now on pembrolizumab), COPD, BARRETT, hypothyroidism, non-insulin-dependent diabetes presenting with acute onset of fever and found to have acute kidney injury in the setting of elevated inflammatory markers.     SW met with pt at bedside for Initial CMA r/t Elevated Risk Score. Pt reported his wife, who is an LPN, assists sometimes with his medications; otherwise, pt is IND with all I/ADLs. Pt lives with his spouse, daughter, and 7 y.o. granddaughter. Pt was doing infusions every 3 weeks at [facility], reported he does not know  "if that will continue. No other prior services. Pt is on SSDI, stated \"my wife's job is supporting both of us.\" Pt declined any community resources from .     TERA and discharge recs unknown at this time. [facility] will continue to follow pt.        KATIA RomanW, LSW  [facility]   PHONE: [phone]  Pager: [phone]   "

## 2023-11-05 NOTE — PROGRESS NOTES
Patient not seen by oncology team today.  Chart and labs were reviewed.    Creatinine continues to downtrend, 3.44 mg/dL today (was 5.19 mg/dL on admission).  Urine output remains good with 1550 mL yesterday.  Hyponatremic with sodium of 132 mmol/L.    Noted to have multiple low blood pressures.    - JOCE is not secondary to pembrolizumab.  Would expect worsening renal function in the setting of ICI induced nephrotoxicity not treated with steroids.  That has not been the case here.  Other etiologies need to be considered.    - WBC and platelets are also improving without steroids.  - CRP inflammatory marker continues to decrease  - Continue to hold steroids.  - Recommend IV normal saline be administered in the setting of low blood pressures, JOCE, and hyponatremia  - Kidney biopsy planned for tomorrow.  - transfuse pRBC to keep hemoglobin >7 g/dL    Trinity Lora MD

## 2023-11-05 NOTE — PROGRESS NOTES
[facility] R.A. 537 labs are posted, his Hgb is now 8.3 should we give the unit of blood?  thanks  Alaina  [id number]

## 2023-11-06 ENCOUNTER — APPOINTMENT (OUTPATIENT)
Dept: INTERVENTIONAL RADIOLOGY/VASCULAR | Facility: CLINIC | Age: 63
DRG: 683 | End: 2023-11-06
Attending: PHYSICIAN ASSISTANT
Payer: COMMERCIAL

## 2023-11-06 LAB
ALBUMIN SERPL BCG-MCNC: 2.4 G/DL (ref 3.5–5.2)
ALP SERPL-CCNC: 84 U/L (ref 40–129)
ALT SERPL W P-5'-P-CCNC: 14 U/L (ref 0–70)
ANION GAP SERPL CALCULATED.3IONS-SCNC: 8 MMOL/L (ref 7–15)
AST SERPL W P-5'-P-CCNC: 34 U/L (ref 0–45)
BACTERIA BLD CULT: NO GROWTH
BACTERIA BLD CULT: NO GROWTH
BASOPHILS # BLD AUTO: 0 10E3/UL (ref 0–0.2)
BASOPHILS NFR BLD AUTO: 0 %
BILIRUB SERPL-MCNC: 0.4 MG/DL
BUN SERPL-MCNC: 48 MG/DL (ref 8–23)
CALCIUM SERPL-MCNC: 7.9 MG/DL (ref 8.8–10.2)
CHLORIDE SERPL-SCNC: 104 MMOL/L (ref 98–107)
CREAT SERPL-MCNC: 3 MG/DL (ref 0.67–1.17)
CRP SERPL-MCNC: 157.46 MG/L
DEPRECATED HCO3 PLAS-SCNC: 24 MMOL/L (ref 22–29)
EGFRCR SERPLBLD CKD-EPI 2021: 23 ML/MIN/1.73M2
EOSINOPHIL # BLD AUTO: 0 10E3/UL (ref 0–0.7)
EOSINOPHIL NFR BLD AUTO: 0 %
ERYTHROCYTE [DISTWIDTH] IN BLOOD BY AUTOMATED COUNT: 15.3 % (ref 10–15)
GLUCOSE BLDC GLUCOMTR-MCNC: 111 MG/DL (ref 70–99)
GLUCOSE BLDC GLUCOMTR-MCNC: 112 MG/DL (ref 70–99)
GLUCOSE BLDC GLUCOMTR-MCNC: 130 MG/DL (ref 70–99)
GLUCOSE BLDC GLUCOMTR-MCNC: 136 MG/DL (ref 70–99)
GLUCOSE BLDC GLUCOMTR-MCNC: 176 MG/DL (ref 70–99)
GLUCOSE SERPL-MCNC: 105 MG/DL (ref 70–99)
HAPTOGLOB SERPL-MCNC: 233 MG/DL (ref 32–197)
HCT VFR BLD AUTO: 25.9 % (ref 40–53)
HGB BLD-MCNC: 8.1 G/DL (ref 13.3–17.7)
IMM GRANULOCYTES # BLD: 0 10E3/UL
IMM GRANULOCYTES NFR BLD: 1 %
LYMPHOCYTES # BLD AUTO: 0.3 10E3/UL (ref 0.8–5.3)
LYMPHOCYTES NFR BLD AUTO: 7 %
MCH RBC QN AUTO: 27.4 PG (ref 26.5–33)
MCHC RBC AUTO-ENTMCNC: 31.3 G/DL (ref 31.5–36.5)
MCV RBC AUTO: 88 FL (ref 78–100)
MONOCYTES # BLD AUTO: 0.4 10E3/UL (ref 0–1.3)
MONOCYTES NFR BLD AUTO: 9 %
NEUTROPHILS # BLD AUTO: 3.7 10E3/UL (ref 1.6–8.3)
NEUTROPHILS NFR BLD AUTO: 83 %
NRBC # BLD AUTO: 0 10E3/UL
NRBC BLD AUTO-RTO: 0 /100
PATH REPORT.COMMENTS IMP SPEC: NORMAL
PATH REPORT.COMMENTS IMP SPEC: NORMAL
PATH REPORT.FINAL DX SPEC: NORMAL
PATH REPORT.MICROSCOPIC SPEC OTHER STN: NORMAL
PATH REPORT.MICROSCOPIC SPEC OTHER STN: NORMAL
PATH REPORT.RELEVANT HX SPEC: NORMAL
PLATELET # BLD AUTO: 156 10E3/UL (ref 150–450)
POTASSIUM SERPL-SCNC: 4.1 MMOL/L (ref 3.4–5.3)
PROT SERPL-MCNC: 6.1 G/DL (ref 6.4–8.3)
RBC # BLD AUTO: 2.96 10E6/UL (ref 4.4–5.9)
SODIUM SERPL-SCNC: 136 MMOL/L (ref 135–145)
WBC # BLD AUTO: 4.4 10E3/UL (ref 4–11)

## 2023-11-06 PROCEDURE — 88305 TISSUE EXAM BY PATHOLOGIST: CPT | Mod: TC | Performed by: INTERNAL MEDICINE

## 2023-11-06 PROCEDURE — 76942 ECHO GUIDE FOR BIOPSY: CPT | Mod: 26 | Performed by: PHYSICIAN ASSISTANT

## 2023-11-06 PROCEDURE — 250N000011 HC RX IP 250 OP 636

## 2023-11-06 PROCEDURE — 272N000505 HC NEEDLE CR5

## 2023-11-06 PROCEDURE — 86140 C-REACTIVE PROTEIN: CPT | Performed by: INTERNAL MEDICINE

## 2023-11-06 PROCEDURE — 88350 IMFLUOR EA ADDL 1ANTB STN PX: CPT | Mod: TC | Performed by: INTERNAL MEDICINE

## 2023-11-06 PROCEDURE — 50200 RENAL BIOPSY PERQ: CPT | Mod: RT | Performed by: PHYSICIAN ASSISTANT

## 2023-11-06 PROCEDURE — 36591 DRAW BLOOD OFF VENOUS DEVICE: CPT | Performed by: INTERNAL MEDICINE

## 2023-11-06 PROCEDURE — 88346 IMFLUOR 1ST 1ANTB STAIN PX: CPT | Mod: 26 | Performed by: PATHOLOGY

## 2023-11-06 PROCEDURE — 250N000013 HC RX MED GY IP 250 OP 250 PS 637: Performed by: PEDIATRICS

## 2023-11-06 PROCEDURE — 99152 MOD SED SAME PHYS/QHP 5/>YRS: CPT

## 2023-11-06 PROCEDURE — 88313 SPECIAL STAINS GROUP 2: CPT | Mod: 26 | Performed by: PATHOLOGY

## 2023-11-06 PROCEDURE — 99233 SBSQ HOSP IP/OBS HIGH 50: CPT | Mod: GC | Performed by: STUDENT IN AN ORGANIZED HEALTH CARE EDUCATION/TRAINING PROGRAM

## 2023-11-06 PROCEDURE — 50200 RENAL BIOPSY PERQ: CPT

## 2023-11-06 PROCEDURE — 88305 TISSUE EXAM BY PATHOLOGIST: CPT | Mod: 26 | Performed by: PATHOLOGY

## 2023-11-06 PROCEDURE — 250N000009 HC RX 250

## 2023-11-06 PROCEDURE — 99232 SBSQ HOSP IP/OBS MODERATE 35: CPT | Performed by: INTERNAL MEDICINE

## 2023-11-06 PROCEDURE — 120N000002 HC R&B MED SURG/OB UMMC

## 2023-11-06 PROCEDURE — 0TB03ZX EXCISION OF RIGHT KIDNEY, PERCUTANEOUS APPROACH, DIAGNOSTIC: ICD-10-PCS | Performed by: PHYSICIAN ASSISTANT

## 2023-11-06 PROCEDURE — 80053 COMPREHEN METABOLIC PANEL: CPT | Performed by: INTERNAL MEDICINE

## 2023-11-06 PROCEDURE — 85004 AUTOMATED DIFF WBC COUNT: CPT | Performed by: INTERNAL MEDICINE

## 2023-11-06 PROCEDURE — 88350 IMFLUOR EA ADDL 1ANTB STN PX: CPT | Mod: 26 | Performed by: PATHOLOGY

## 2023-11-06 PROCEDURE — 99233 SBSQ HOSP IP/OBS HIGH 50: CPT | Performed by: STUDENT IN AN ORGANIZED HEALTH CARE EDUCATION/TRAINING PROGRAM

## 2023-11-06 PROCEDURE — 99152 MOD SED SAME PHYS/QHP 5/>YRS: CPT | Performed by: PHYSICIAN ASSISTANT

## 2023-11-06 PROCEDURE — 250N000011 HC RX IP 250 OP 636: Performed by: INTERNAL MEDICINE

## 2023-11-06 RX ORDER — FENTANYL CITRATE 50 UG/ML
25-50 INJECTION, SOLUTION INTRAMUSCULAR; INTRAVENOUS EVERY 5 MIN PRN
Status: DISCONTINUED | OUTPATIENT
Start: 2023-11-06 | End: 2023-11-06

## 2023-11-06 RX ORDER — NALOXONE HYDROCHLORIDE 0.4 MG/ML
0.2 INJECTION, SOLUTION INTRAMUSCULAR; INTRAVENOUS; SUBCUTANEOUS
Status: DISCONTINUED | OUTPATIENT
Start: 2023-11-06 | End: 2023-11-06

## 2023-11-06 RX ORDER — NALOXONE HYDROCHLORIDE 0.4 MG/ML
0.4 INJECTION, SOLUTION INTRAMUSCULAR; INTRAVENOUS; SUBCUTANEOUS
Status: DISCONTINUED | OUTPATIENT
Start: 2023-11-06 | End: 2023-11-06

## 2023-11-06 RX ORDER — FLUMAZENIL 0.1 MG/ML
0.2 INJECTION, SOLUTION INTRAVENOUS
Status: DISCONTINUED | OUTPATIENT
Start: 2023-11-06 | End: 2023-11-06

## 2023-11-06 RX ADMIN — LIDOCAINE HYDROCHLORIDE 2 ML: 10 INJECTION, SOLUTION EPIDURAL; INFILTRATION; INTRACAUDAL; PERINEURAL at 09:15

## 2023-11-06 RX ADMIN — MIDAZOLAM 1 MG: 1 INJECTION INTRAMUSCULAR; INTRAVENOUS at 08:49

## 2023-11-06 RX ADMIN — FENTANYL CITRATE 50 MCG: 0.05 INJECTION, SOLUTION INTRAMUSCULAR; INTRAVENOUS at 08:49

## 2023-11-06 RX ADMIN — LEVOTHYROXINE SODIUM 112 MCG: 112 TABLET ORAL at 08:18

## 2023-11-06 RX ADMIN — MIDAZOLAM 1 MG: 1 INJECTION INTRAMUSCULAR; INTRAVENOUS at 08:54

## 2023-11-06 RX ADMIN — FLUTICASONE FUROATE AND VILANTEROL TRIFENATATE 1 PUFF: 100; 25 POWDER RESPIRATORY (INHALATION) at 08:18

## 2023-11-06 RX ADMIN — FENTANYL CITRATE 50 MCG: 0.05 INJECTION, SOLUTION INTRAMUSCULAR; INTRAVENOUS at 08:54

## 2023-11-06 RX ADMIN — HEPARIN, PORCINE (PF) 10 UNIT/ML INTRAVENOUS SYRINGE 5 ML: at 09:14

## 2023-11-06 RX ADMIN — INSULIN ASPART 1 UNITS: 100 INJECTION, SOLUTION INTRAVENOUS; SUBCUTANEOUS at 17:46

## 2023-11-06 RX ADMIN — ATORVASTATIN CALCIUM 20 MG: 20 TABLET, FILM COATED ORAL at 08:17

## 2023-11-06 ASSESSMENT — ACTIVITIES OF DAILY LIVING (ADL)
ADLS_ACUITY_SCORE: 23

## 2023-11-06 NOTE — PLAN OF CARE
Pt is A&Ox4. VSS. LS clear, on 2L throughout the night. BS active, LBM on 11/4/2023. Voiding well. Pt up independently. Hbg 7.2, received 1 unit RBC today. R Port a cath is patent and heparin locked. Continue to monitor.

## 2023-11-06 NOTE — PLAN OF CARE
Pt is A&Ox4. VSS. LS clear, on RA. BS active, LBM on 11/4/2023. Voiding well. Pt up independently. R Port a cath is patent and heparin locked. R flank dressing is c/d/I. Denies pain. Continue to monitor.

## 2023-11-06 NOTE — PLAN OF CARE
Goal Outcome Evaluation:      Plan of Care Reviewed With: patient    Overall Patient Progress: improving    VS: VSS. Denies CP/SOB. A/O x4.   O2: >90% on RA    Output: Voiding adequate amounts w/o pain or difficulty    Last BM: 11/4   Activity: Up with SBA, steady gait    Skin: R biopsy kidney site   Pain: Denies    CMS: Edema in all four extremities otherwise intact    Dressing: CDI    Diet: Regular, tolerating well.    LDA: Port a cath R chest wall heparin locked.    Equipment: IV pole, personal belongings    Plan: TBD   Additional Info: Went down for R side Kidney biopsy today, bedrest until 1300 today

## 2023-11-06 NOTE — PROCEDURES
[facility]    Procedure: IR renal biopsy right    Date/Time: 11/6/2023 9:43 AM    Performed by: Odell Julien PA-C  Authorized by: Odell Julien PA-C      UNIVERSAL PROTOCOL   Site Marked: No  Prior Images Obtained and Reviewed:  Yes  Required items: Required blood products, implants, devices and special equipment available    Patient identity confirmed:  Verbally with patient, arm band, provided demographic data and hospital-assigned identification number  Patient was reevaluated immediately before administering moderate or deep sedation or anesthesia  Confirmation Checklist:  Patient's identity using two indicators, relevant allergies, procedure was appropriate and matched the consent or emergent situation and correct equipment/implants were available  Time out: Immediately prior to the procedure a time out was called    Universal Protocol: the Joint Commission Universal Protocol was followed    Preparation: Patient was prepped and draped in usual sterile fashion       ANESTHESIA    Anesthesia: Local infiltration  Local Anesthetic:  Lidocaine 1% without epinephrine  Anesthetic Total (mL):  2      SEDATION  Patient Sedated: Yes    Sedation Type:  Moderate (conscious) sedation  Sedation:  Fentanyl and midazolam  Vital signs: Vital signs monitored during sedation    See dictated procedure note for full details.  Findings: 100 mcg and 2 mg, 20 minutes, tolerated well    Specimens: core needle biopsy specimens sent for pathological analysis    Complications: None    Condition: Stable    Plan: 2 hours monitored recovery.  Biopsy results pending      PROCEDURE  Describe Procedure: Random native right kidney biopsy. 4 cores from inferior pole of right kidney. Gelfoam in tract.  Patient Tolerance:  Patient tolerated the procedure well with no immediate complications  Length of time physician/provider present for 1:1 monitoring during sedation: 20

## 2023-11-06 NOTE — PROGRESS NOTES
[facility]    Medicine Progress Note - Hospitalist Service, GOLD TEAM 19    Date of Admission:  11/1/2023    Course reviewed with IR, Hematology and Nephrology      Assessment & Plan   Jareth Gallardo is a 63-year-old history of nasopharyngeal squamous cell cancer (s/p surgery, chemoradiation and now on pembrolizumab), COPD, BARRETT, hypothyroidism, non-insulin-dependent diabetes presenting with acute onset of fever and found to have acute kidney injury in the setting of elevated inflammatory markers.    #Fever  #Pancytopenia  #Acute kidney injury  -Patient's baseline creatinine ~0.8 - 1.0   -Admit cr was 5.0 -->--->---> 3.0  -He did report increased ingestion of NSAIDs  -He also presented with elevated inflammatory markers CRP of 330, ferritin 1700, ESR 60.  -She has also had fever, Tmax this admission 102.7.  -Additional work-up includes renal ultrasound which was largely unremarkable, portable chest x-ray without any acute findings, CT chest with IV contrast on 10/26/2023 without evidence of PE and largely clear lungs.  -Patient's acute kidney injury is likely intrinsic in nature given NSAIDs ingestion, there is also concern for vasculitis or Keytruda induced nephritis (felt to be unlikely per Heme)  -Infectious disease has since been consulted recommending ongoing work-up including following up blood cultures, Now with intermittent low grade T  Urine studies are negative, respiratory panel negative, CMV PCR negative, haptoglobin normal, C3 & C4 also normal.  Iron studies consistent with chronic disease.  -His LDH level is elevated.  -Fibrinogen activity is elevated, direct antiglobulin test is negative.  -Beta-D-Glucan neg  -DORINDA + speckled pattern  -ANCA neg  -Kdney biopsy performed by IR today      Latest Reference Range & Units 11/02/23 06:45   EBV DNA Copies/mL <=0 copies/mL 16,685 (H)   EBV DNA Log of Copies  4.2   ID has felt EBV elevation is likely  reactivation, not primary proces    Plan   Follow-up blood cultures  Follow-up fungal markers, fungal culture,  Defer resumption of steroids to heme-onc (of note, ID is okay with steroids at this time)  Trend kidney function  Avoid nephrotoxic medications, renally dose medications   Trend kidney functions   ID, oncology, nephrology follow up    #History of nasopharyngeal carcinoma  - Diagnosed in Dec 2021, positive for EBV; induced with carboplatin gemcitabine in April, May of 2022, subsequently received carboplatin and radiotherapy, left modified radical neck dissection in Dec 2022, subsequently presented at tumor board in Feb 23 and felt to be at high risk of recurrence with residual disease on PET so started on pembrolizumab (keytruda) March 9, 2023; recently received Cycle 10 on October 11 and due for cycle 11 today 11/1  - Dr. Martin thought he might be a candidate for toripalimab  - Pembrolizumab being held at this time.    # Anemia  Hgb decline to 8.1 from 9.1 g  S/p 1 unit PRBC on 11/5 for Hgb 7.2 g  No evidence clinically of ongoing bleeding  Suspect secondary to acute illness  Repeat  wnl, % retic count 0.2, peripheral smear pending, haptoglobin pending    # Hypotension 11/5  BP 90s/  Mild dizziness when up  Possibly secondary to anemia  Goal to keep BP systolic over 100  S/p  ml and 1 unit PRBC 11/5  Resolved    # Obstructive sleep apnea -intolerant to CPAP  # COPD -continue PTA Breo Ellipta.  Patient with mild intermittent hypoxia, asymptomatic  Without evidence of acute respiratory process.  Chest x-ray on admission normal  CT chest on 10/26/2023 revealed atelectasis but no evidence of PE or pneumonia  Plan: Monitor respiratory status, if persistent hypoxia, repeat chest x-ray  Encourage incentive spirometry, continue inhalers    Chronic issues:  # Hypothyroidism -continue PTA levothyroxine 112 mcg daily  # Non-insulin-dependent diabetes mellitus -PTA meds includes metformin, Trulicity  both of which have been held at this time.    BG stable  Management with ISS, POCT, medium intensity sliding scale with NovoLog.        Diet: Regular Diet Adult    DVT Prophylaxis: Pneumatic Compression Devices  Arthur Catheter: Not present  Lines: PRESENT      Port A Cath Single 03/31/22 Right Chest wall-Site Assessment: WDL      Cardiac Monitoring: None  Code Status: Full Code    Disposition Plan to be determined     Expected Discharge Date: 11/08/2023      Destination: home                  Enrrique Miranda MD  Hospitalist Service, GOLD TEAM 19  [facility]  Securely message with [employer] (more info)  Text page via [facility] Paging/Directory   See signed in provider for up to date coverage information  ______________________________________________________________________    Interval History   No complaints  Uneventful night  S/p kidney biopsy today    Physical Exam   Vital Signs: Temp: 98.7  F (37.1  C) Temp src: Oral BP: 101/61 Pulse: 83   Resp: 18 SpO2: 92 % O2 Device: None (Room air) Oxygen Delivery: 2 LPM  Weight: 248 lbs 9.6 oz  General: aao x 3, NAD.  HEENT:  NC/AT, PERRL, EOMI, neck supple, no thyromegaly, op clear, mmm.  CVS:  NL s 1 and s2, no m/r/g.  Lungs:  CTA B/L.   Abd:  Soft, + bs, NT, no rebound or gaurding, no fluid shift.  Ext:  No c/c.  Lymph:  No edema.  Neuro:  Nonfocal.  Musculoskeletal: No calf tenderness to palpation.    Skin:  No rash.  Psychiatry:  Mood and affect appropriate.        Data   ------------------------- PAST 24 HR DATA REVIEWED -----------------------------------------------    I have personally reviewed the following data over the past 24 hrs:    4.4  \   8.1 (L)   / 156     136 104 48.0 (H) /  112 (H)   4.1 24 3.00 (H) \     ALT: 14 AST: 34 AP: 84 TBILI: 0.4   ALB: 2.4 (L) TOT PROTEIN: 6.1 (L) LIPASE: N/A     Procal: N/A CRP: 157.46 (H) Lactic Acid: N/A

## 2023-11-06 NOTE — SEDATION DOCUMENTATION
Patient Name: Jareth Gallardo  Medical Record Number: [id number]  Today's Date: 11/6/2023    Procedure: Right renal biopsy  Proceduralist: Vladimir Julien PA-C  Pathology present: NA    Procedure Start: 0855  Procedure end: 0905  Sedation medications administered: 100mcg Fentanyl, 2mg versed     Report given to: Monserrat TO  : NELSON    Other Notes: Pt arrived to IR room 2 from . Consent reviewed. Pt denies any questions or concerns regarding procedure. Pt positioned prone and monitored per protocol. Pt tolerated procedure without any noted complications. Pt transferred back to .

## 2023-11-06 NOTE — PROGRESS NOTES
[facility] GENERAL ID SERVICE: SIGN OFF NOTE  Patient:  Jareth Gallardo, Date of birth 1960, Medical record number [id number]  Date of Admission: 11/1/2023  Date of Visit:  11/2/2023  Requesting Provider: Refugio Reis         Assessment and Recommendations:     ID Problem List:  JOCE   Anemia  N/V  Fevers (Tmax 102), for ~11 days  Recent hospitalization (Oct 26-28) for presumed tickborne illness s/p vancomycin and zosyn. 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     During this admission he now has a worsening JOCE (baseline Cr  1.10) and on presentation Cr 4.58. UA with proteinuria and hematuria. Of note patient renal ultrasound without hydronephrosis of obstruction.  No antibitiotics started on this admission.     With regard to infectious etiology, do not think his symptoms would be due to reactivation of EBV. Typically primary EBV infections resulting in JOCE, rather than simply reactivation. CMV negative. EBV levels similar to prior. Additionally, do not suspect that fevers are due to tick-borne illness as patient recently tested negative and has been on appropriate abx therapies without improvement.No indication for abxs, BCX remained negative    Overall, do not favor infectious etiology and rather suspect that his fevers/JOCE are due to Keytruda, though difficult to say definitely  regarding this explanation. With that in mind, from ID perspective treatment with steroids would be reasonable.     JOCE improving without steroids. Patient now s/p kidney biopsy. Fevers improving, was afebrile on 11/5.     Recommendations:  From ID perspective, okay for treatment with steroids if needed; however, patient appears to be improving without intervention     ID to sign off. Please reach out if any questions or concerns.     Total time spent during this encounter (chart review, documentation, MDM, coordination of care): 40 minutes     This patient was staffed with Dr. Dorys Cloud MD  Internal Medicine, PGY1  Date: 11/6/2023       History of Present Illness:   Jareth Gallardo is a 63 year old male with history of p16 negative nasopharyngeal carcinoma who was admitted on 11/1 for JOCE, fevers and pancytopenia.     Patient states his symptoms began abruptly on October 22.  He states he woke up that morning and after breakfast developed symptoms of fevers, chills, rigors he did not take his temperature at this time.  He also reports his chronic left-sided neck tingling stopped (this has been ongoing since his previous surgery).  He denied any nausea, vomiting, diarrhea, hematuria, melena, hemoptysis.  No other sick contacts.  He reports fever has been ongoing from October 22 until now.  His Tmax was 102 at home.  Given taking Tylenol 2 tablets 2-3 times per day.    He then presented to the hospital in [facility] and was admitted from 10/26-28.  Illness thought to be due to tick-borne illness and he was treated with empiric therapy of Zosyn and vancomycin until blood culture resulted.  Given the blood cultures was negative this was discontinued and he was started on empiric doxycycline.  Workup was negative anaplasma, ehrlichia, and lyme.  He was discharged on October 28, he still had continuous fevers.    At home he reports he continued to have fevers with Tmax up to 102.   He developed some nausea with vomiting starting on the 29th (1 time per day mostly regurgitated food no blood seen).  He denies any diarrhea during this time.  During this time he was alternating between Tylenol and ibuprofen for fevers.  He reports decreased p.o. intake.    He was then seen in oncology clinic on 10/30 and labs were notable for Cr 4.58. He was sent to the ED for further evaluation.  There was concern for NSAID induced AIN vs immune mediated nephritis from pembrolizumab. He was started on IVF and repeat blood and urine cultures have been negative thus far. Renal ultrasound showed no evidence of obstruction.     Labs notable for patient has leukopenia, normocytic anemia with iron deficiency and low retic count, and thrombocytopenia. Labs showing acute inflammatory reaction with elevated CRP (320), Ferritin (1,722), Fibrinogen ( 675). Hemolysis labs including elevated LDH (340) and d-dimer, with normal Haptoglobin and bilirubin, YING negative, peripheral smear pending. CMV negative. Influenza/RSV/Covid negative. Notably patients EBV viral load has increased (15K on 8/29, previously 700 2/22/2023); agree with repeat levels. Labs pending: ANCA IgG, Complement activity, Fungal/Yeast culture, 1,3 Beta D Glucan, Lymes DNA, C. Diff, EBV DNA PCR, Blood Smear     Regarding patient's exposure history, no recent travel. Patient lives in [facility] ([place]) and animal exposure limited to pets - cat & dog. Denies any livestock or nearby farms. Does drink from well-water. Had several tick bites identified within 7 days prior to symptoms. No IV drug use, alcohol, or tobacco use. And notably had recent Keytruda infusion on Oct 11. Has had recent abxs including Zosyn/Vanc/Doxy.     Labs pending: ANCA IgG, Complement activity, Fungal/Yeast culture, 1,3 Beta D Glucan, Lymes DNA, C. Diff, EBV DNA PCR, Blood Smear     PROSTHETIC JOINTS OR MATERIALS, IMPLANTS, OR DEVICES: None     FOOD/WATER:   - Raw undercooked meat or  sushi: None  - Unwashed fruits or vegetables: None  - Unpasteurized milk: None  - Well water: Yes     TUBERCULOSIS:   - Known TB exposures: None  - Prior TB testing results: No results found on chart review  - Healthcare work: None : Yes - Was in the [employer]  - Homelessness: Never  - care home: Never     TRAVEL: Denies any recent travel outside of [place] in the last 6 months. He reports in March they went to the [place]. Otherwise they have remained in their house in [place]    OUTDOOR ACTIVITIES:   - Camping: None  - Cave exploring: None  - Walking barefoot on soil or grass: None  - Swimming or wading in rivers, lakes or streams:  None  - Hot tubs or Jacuzzis: None      ANIMALS: Has a new dog (8 months old) and a cat. No     Sick contact: None    SHx:  HOME/ENVIRONMENT: [place], lives in [place].      OCCUPATION: Retired. Previously worked an built furniture using reclaimed wood. Current hobbies include woodworking, hunting, fishing, and yard work -- including chopping wood.      TOBACCO/ALCOHOL/RECREATIONAL DRUGS: Denies all.            Review of Systems:     Complete 12 point review of systems negative except as noted in HPI.         Recent Antimicrobials::   Vancomycin x1 10/26  Zosyn x1 10/126  Doxycycline: 10/28 - 11/1         Past Medical History:     Past Medical History:   Diagnosis Date    COPD (chronic obstructive pulmonary disease) (H)     Dyslipidemia     Hard to intubate 12/5/2022    Nasopharyngeal carcinoma (H)     BARRETT (obstructive sleep apnea)     Type 2 diabetes mellitus with diabetic nephropathy (H)          Allergies:    No Known Allergies       Family History:   Reviewed and noncontributory.   Family History   Problem Relation Age of Onset    Cerebrovascular Disease Mother     Leukemia Father     Melanoma Sister           Social History:     Social History     Socioeconomic History    Marital status:      Spouse name: Not on file    Number of children:  "Not on file    Years of education: Not on file    Highest education level: Not on file   Occupational History    Not on file   Tobacco Use    Smoking status: Former     Packs/day: 4.00     Years: 30.00     Additional pack years: 0.00     Total pack years: 120.00     Types: Cigarettes     Quit date:      Years since quittin.8     Passive exposure: Past    Smokeless tobacco: Never    Tobacco comments:     quit smoking. Passive exposure in childhood home.    Substance and Sexual Activity    Alcohol use: Not Currently     Comment: Hx of 4 beer per month    Drug use: Never    Sexual activity: Not on file   Other Topics Concern    Not on file   Social History Narrative    Not on file     Social Determinants of Health     Financial Resource Strain: Not on file   Food Insecurity: Not on file   Transportation Needs: Not on file   Physical Activity: Not on file   Stress: Not on file   Social Connections: Not on file   Interpersonal Safety: Not on file   Housing Stability: Not on file            Physical Exam:     /61 (BP Location: Right arm, Patient Position: Semi-Rosales's)   Pulse 83   Temp 98.7  F (37.1  C) (Oral)   Resp 18   Ht 1.778 m (5' 10\")   Wt 112.8 kg (248 lb 9.6 oz)   SpO2 92%   BMI 35.67 kg/m       Exam:  GENERAL:  Well-developed, well-nourished, lying in bed   Head: normocephalic, atraumatic.   EYES: EOMI, conjunctiva clear, anicteric sclerae.    LUNGS:  Breathing comfortably, on room air  CARDIO: Extremities well perfused.   EXT: Extremities warm and without edema.   SKIN:  No acute rashes.    NEUROLOGIC:  Grossly nonfocal.     Lines and devices:   PIV is in place without any surrounding erythema.    Labs, Microbiology and Imaging studies are reviewed.          Laboratory Data:   Metabolic Studies       Recent Labs   Lab Test 23  0718 23  0634 23  0731 23  0647 23  2018 23  1038 23  1746 23  0856 23  0645 23  1944 23  1046 " 10/28/23  0733 10/28/23  0549 02/22/23  1333 01/30/23  1401   NA  --  136  --  132*   < > 133*  --   --    < >  --   --    < > 138   < > 142   POTASSIUM  --  4.1  --  3.9   < > 3.9  --   --    < >  --   --    < > 3.5   < > 3.6   CHLORIDE  --  104  --  100   < > 101  --   --    < >  --   --    < > 103   < > 101   CO2  --  24  --  26   < > 22  --   --    < >  --   --    < > 26   < > 33*   ANIONGAP  --  8  --  6*   < > 10  --   --    < >  --   --    < > 9   < > 8   BUN  --  48.0*  --  51.2*   < > 50.9*  --   --    < >  --   --    < > 15.4   < > 12.0   CR  --  3.00*  --  3.44*   < > 4.20*  --   --    < >  --   --    < > 1.17   < > 0.81   GFRESTIMATED  --  23*  --  19*   < > 15*  --   --    < >  --   --    < > 71   < > >90   * 105*   < > 108*   < > 140*   < >  --    < >  --   --    < > 104*   < > 71   LAI  --  7.9*  --  8.0*   < > 8.4*  --   --    < >  --   --    < > 8.8   < > 9.2   PHOS  --   --   --   --   --  4.1  --   --   --   --   --    < >  --   --   --    MAG  --   --   --   --   --   --   --   --   --   --   --   --   --   --  1.8   LACT  --   --   --   --   --   --   --  0.7  --   --  0.7   < >  --    < >  --    PCAL  --   --   --   --   --   --   --   --   --   --   --   --  0.37*   < >  --    FGTL  --   --   --   --   --   --   --   --   --  <31  --   --   --   --   --     < > = values in this interval not displayed.       Hepatic Studies    Recent Labs   Lab Test 11/06/23  0634 11/05/23  0647 11/04/23  0929 11/02/23  0645 11/01/23  0743 07/05/22  1036 07/01/22  1037   BILITOTAL 0.4 0.3 0.3   < >  --    < > 0.9   DBIL  --   --   --   --   --   --  0.2   ALKPHOS 84 86 84   < >  --    < > 83   PROTTOTAL 6.1* 6.0* 6.4   < >  --    < > 7.7   ALBUMIN 2.4* 2.3* 2.3*   < >  --    < > 3.2*   AST 34 33 36   < >  --    < > 56*   ALT 14 16 13   < >  --    < > 69   LDH  --  216  --   --  340*   < >  --     < > = values in this interval not displayed.       Hematology Studies      Recent Labs   Lab Test  11/06/23  0634 11/05/23  1214 11/05/23  0647 11/04/23  0929 11/03/23  1038 11/02/23  0645 05/23/22  1404 05/16/22  0714   WBC 4.4 4.9 4.6 4.6 4.5 3.8*   < > 8.8   ANEU  --   --   --   --   --   --   --  7.0   ALYM  --   --   --   --   --   --   --  0.9   CHOCO  --   --   --   --   --   --   --  0.7   AEOS  --   --   --   --   --   --   --  0.0   HGB 8.1* 8.3* 7.2* 8.1* 8.0* 8.3*   < > 10.4*   HCT 25.9* 25.9* 23.1* 25.7* 25.2* 25.4*   < > 33.8*    145* 139* 132* 117* 113*   < > 244    < > = values in this interval not displayed.       Inflammatory Markers    Recent Labs   Lab Test 10/31/23  1414   SED 60*       Urine Studies     Recent Labs   Lab Test 10/31/23  1414 10/26/23  1601   URINEPH 5.5 5.5   NITRITE Negative Negative   LEUKEST Negative Negative   WBCU 10* 1          show

## 2023-11-06 NOTE — PRE-PROCEDURE
GENERAL PRE-PROCEDURE:   Procedure:  IR random native US-guided renal biopsy  Date/Time:  11/6/2023 8:40 AM    Written consent obtained?: Yes    Risks and benefits: Risks, benefits and alternatives were discussed    Consent given by:  Patient  Patient states understanding of procedure being performed: Yes    Patient's understanding of procedure matches consent: Yes    Procedure consent matches procedure scheduled: Yes    Expected level of sedation:  Moderate  Appropriately NPO:  Yes  ASA Class:  2  Mallampati  :  Grade 2- soft palate, base of uvula, tonsillar pillars, and portion of posterior pharyngeal wall visible  Lungs:  Lungs clear with good breath sounds bilaterally  Heart:  Normal heart sounds and rate  History & Physical reviewed:  History and physical reviewed and no updates needed  Statement of review:  I have reviewed the lab findings, diagnostic data, medications, and the plan for sedation

## 2023-11-06 NOTE — PROGRESS NOTES
Nephrology Progress Note  11/06/2023         Assessment & Recommendations:   Jareth Gallardo is a 63 year old year old man with a nasopharyngeal squamous cell cancer (s/p surgery, chemoradiation and now undergoing treatment with pembrolizumab), COPD, BARRETT, hypothyroidism, DM 2 (on metformin and delaglutide) admitted with fever, elevated inflammatory markers and JOCE.        #JOCE - Baseline Cr ~0.9 mg/dL.  Cr peak to 5.2 mg/dL but has improved to 3s.  Etiology of JOCE remains unclear.  Patient was exposed to a number of nephrotoxins (e.g. NSAID, contract, vanco) that could have caused tubular injury especially in the setting of hypotension and cytokine release.  Having said that, ICI-associated JOCE due to pembrolizumab, particularly AIN, remains a concern especially since given that the etiology of the his fevers and elevated inflammatory markers remain unclear.    -renal biopsy done 11/6  -patient can discharge from nephrology perspective if (post-bx) Hgb is stable in AM 11/7, and we (renal team) will arrange nephrology clinic follow-up  -can resume Lasix, but would double from PTA dose to 40mg daily    # hypervolemia/edema - can resume Lasix, though would increase to 40mg PO from 20mg at this time  #Hyponatremia - Mild and largely due to free water intake in the setting of JOCE. Lasix will help.  #Fever & elevated inflammatory markers - Etiology remains unclear.  Infectious work-up unremarkable thus far.  CRP and fever now seem to be on a downward trend.    #Nasopharyngeal sqauamous cell carcinoma - Treated with chemotherapy (carboplatin, gemcitabine, radiation and modified lef nect dissection in 2022.  Due to increasing tumor burden, pembrolizumab initiated in Feb, 2023.  Cycle 10 completed on 10/11/23.  Cycle 11 planned for 11/1/2023 now on hold).   #Pancytopenia - Etiology remains unclear.  Perhaps related to pembrolizumab.     #Positive HIRAM - Titer 1:160.  C3 and C4 normal.  ANCA negative.  Positive HIRAM of  "unclear significance.     Plan:   -currently steroids on hold per heme/onc  -infectious work-up underway but remarkable thus far  -consider obtaining dsDNA to f/u positive HIRAM though this not necessary from a renal standpoint  -continue to hold NSAID or other agents such as PPI that may also cause AIN especially in the setting of use with check point inhibitors  -resume PO Lasix, but double PTA dose to 40mg daily       Blake Lynn MD   Division of Renal Disease and Hypertension  Pager: [phone]      Interval History :   Nursing and provider notes from last 24 hours reviewed.  Creatinine continues to improve. Patient had his renal bx with IR earlier today and now complaints when seen on rounds. He does note that his LE edema is significant and not improving.     Review of Systems:   A 4 point ROS was obtained and unremarkable other than that mentioned in the interval history.      Physical Exam:   I/O last 3 completed shifts:  In: 625 [P.O.:300]  Out: 800 [Urine:800]   /63   Pulse 95   Temp 99.6  F (37.6  C) (Oral)   Resp 20   Ht 1.778 m (5' 10\")   Wt 112.8 kg (248 lb 9.6 oz)   SpO2 96%   BMI 35.67 kg/m       GENERAL APPEARANCE: NAD  EYES:  no scleral icterus   PULM: lungs clear to auscultation bilaterally  CV: regular rhythm, normal rate, no rub     -No JVD appreciated     -1+ LE edema bilat   GI: soft, nontender, non-distended  INTEGUMENT: no rashes on exposed skin  NEURO: conversant       Labs:   All labs reviewed by me  Electrolytes/Renal -   Recent Labs   Lab Test 11/06/23  1110 11/06/23  0718 11/06/23  0634 11/05/23  0731 11/05/23  0647 11/04/23  1130 11/04/23  0929 11/03/23  2018 11/03/23  1038 11/02/23  0645 11/01/23  0612 02/22/23  1333 01/30/23  1401 12/06/22  1154 12/06/22  0851 12/05/22  0714 11/16/22  1334   NA  --   --  136  --  132*  --  131*  --  133*   < > 133*   < > 142  --  138  --  137   POTASSIUM  --   --  4.1  --  3.9  --  3.6  --  3.9   < > 3.8   < > 3.6  --  3.8  " --  3.8   CHLORIDE  --   --  104  --  100  --  99  --  101   < > 99   < > 101  --  103  --  96*   CO2  --   --  24  --  26  --  22  --  22   < > 23   < > 33*  --  27  --  31*   BUN  --   --  48.0*  --  51.2*  --  51.2*  --  50.9*   < > 46.2*   < > 12.0  --  12.7  --  14.9   CR  --   --  3.00*  --  3.44*  --  3.70*  --  4.20*   < > 5.19*   < > 0.81  --  0.76   < > 0.88   * 111* 105*   < > 108*   < > 188*   < > 140*   < > 115*   < > 71   < > 102*   < > 125*   LAI  --   --  7.9*  --  8.0*  --  8.1*  --  8.4*   < > 8.2*   < > 9.2  --  9.0  --  9.3   MAG  --   --   --   --   --   --   --   --   --   --   --   --  1.8  --  1.9  --  1.8   PHOS  --   --   --   --   --   --   --   --  4.1  --  3.9  --   --   --  3.7  --   --     < > = values in this interval not displayed.       CBC -   Recent Labs   Lab Test 11/06/23  0634 11/05/23  1214 11/05/23  0647   WBC 4.4 4.9 4.6   HGB 8.1* 8.3* 7.2*    145* 139*       LFTs -   Recent Labs   Lab Test 11/06/23  0634 11/05/23  0647 11/04/23  0929   ALKPHOS 84 86 84   BILITOTAL 0.4 0.3 0.3   ALT 14 16 13   AST 34 33 36   PROTTOTAL 6.1* 6.0* 6.4   ALBUMIN 2.4* 2.3* 2.3*       Iron Panel -   Recent Labs   Lab Test 11/02/23  0645 11/01/23  0743   IRON  --  14*   IRONSAT  --  11*   MARIA L 1,344* 1,722*         Imaging:  Reviewed by me.   10/31/2023 Renal U/S:  MEASUREMENTS:     Right renal length: 14 cm  Left renal length: 13 cm     RENAL FINDINGS: The kidneys are normal in size and echogenicity. There  is no hydronephrosis. No shadowing stones are seen.     BLADDER: The bladder is mildly distended and grossly unremarkable.                                                                      IMPRESSION:       Unremarkable 2-ultrasound of the kidneys.      Current Medications:   atorvastatin  20 mg Oral Daily    fluticasone-vilanterol  1 puff Inhalation Daily    heparin  5-10 mL Intracatheter Q28 Days    heparin lock flush  5-10 mL Intracatheter Q24H    insulin aspart  1-7 Units  Subcutaneous TID AC    insulin aspart  1-5 Units Subcutaneous At Bedtime    levothyroxine  112 mcg Oral Daily    sodium chloride (PF)  10-20 mL Intracatheter Q28 Days       Blake Lynn MD

## 2023-11-07 ENCOUNTER — TELEPHONE (OUTPATIENT)
Dept: NEPHROLOGY | Facility: CLINIC | Age: 63
End: 2023-11-07
Payer: COMMERCIAL

## 2023-11-07 VITALS
WEIGHT: 245.7 LBS | OXYGEN SATURATION: 94 % | DIASTOLIC BLOOD PRESSURE: 58 MMHG | BODY MASS INDEX: 35.18 KG/M2 | SYSTOLIC BLOOD PRESSURE: 96 MMHG | HEIGHT: 70 IN | HEART RATE: 82 BPM | RESPIRATION RATE: 16 BRPM | TEMPERATURE: 98.4 F

## 2023-11-07 PROBLEM — E11.9 TYPE 2 DIABETES, HBA1C GOAL < 7% (H): Chronic | Status: ACTIVE | Noted: 2023-11-07

## 2023-11-07 LAB
ALBUMIN SERPL BCG-MCNC: 2.4 G/DL (ref 3.5–5.2)
ALP SERPL-CCNC: 92 U/L (ref 40–129)
ALT SERPL W P-5'-P-CCNC: 16 U/L (ref 0–70)
ANION GAP SERPL CALCULATED.3IONS-SCNC: 9 MMOL/L (ref 7–15)
AST SERPL W P-5'-P-CCNC: 41 U/L (ref 0–45)
BASOPHILS # BLD AUTO: 0 10E3/UL (ref 0–0.2)
BASOPHILS NFR BLD AUTO: 0 %
BILIRUB SERPL-MCNC: 0.5 MG/DL
BUN SERPL-MCNC: 39.5 MG/DL (ref 8–23)
CALCIUM SERPL-MCNC: 8.1 MG/DL (ref 8.8–10.2)
CHLORIDE SERPL-SCNC: 103 MMOL/L (ref 98–107)
CREAT SERPL-MCNC: 2.35 MG/DL (ref 0.67–1.17)
CRP SERPL-MCNC: 130.6 MG/L
DEPRECATED HCO3 PLAS-SCNC: 25 MMOL/L (ref 22–29)
DSDNA AB SER-ACNC: 3 IU/ML
EGFRCR SERPLBLD CKD-EPI 2021: 31 ML/MIN/1.73M2
EOSINOPHIL # BLD AUTO: 0 10E3/UL (ref 0–0.7)
EOSINOPHIL NFR BLD AUTO: 0 %
ERYTHROCYTE [DISTWIDTH] IN BLOOD BY AUTOMATED COUNT: 15.4 % (ref 10–15)
GLUCOSE BLDC GLUCOMTR-MCNC: 96 MG/DL (ref 70–99)
GLUCOSE SERPL-MCNC: 107 MG/DL (ref 70–99)
HCT VFR BLD AUTO: 27.9 % (ref 40–53)
HGB BLD-MCNC: 9 G/DL (ref 13.3–17.7)
IMM GRANULOCYTES # BLD: 0 10E3/UL
IMM GRANULOCYTES NFR BLD: 1 %
LYMPHOCYTES # BLD AUTO: 0.4 10E3/UL (ref 0.8–5.3)
LYMPHOCYTES NFR BLD AUTO: 8 %
MAGNESIUM SERPL-MCNC: 1.4 MG/DL (ref 1.7–2.3)
MCH RBC QN AUTO: 28.4 PG (ref 26.5–33)
MCHC RBC AUTO-ENTMCNC: 32.3 G/DL (ref 31.5–36.5)
MCV RBC AUTO: 88 FL (ref 78–100)
MONOCYTES # BLD AUTO: 0.5 10E3/UL (ref 0–1.3)
MONOCYTES NFR BLD AUTO: 9 %
NEUTROPHILS # BLD AUTO: 4.1 10E3/UL (ref 1.6–8.3)
NEUTROPHILS NFR BLD AUTO: 82 %
NRBC # BLD AUTO: 0 10E3/UL
NRBC BLD AUTO-RTO: 0 /100
PLATELET # BLD AUTO: 163 10E3/UL (ref 150–450)
POTASSIUM SERPL-SCNC: 4.3 MMOL/L (ref 3.4–5.3)
PROT SERPL-MCNC: 6.6 G/DL (ref 6.4–8.3)
RBC # BLD AUTO: 3.17 10E6/UL (ref 4.4–5.9)
SODIUM SERPL-SCNC: 137 MMOL/L (ref 135–145)
WBC # BLD AUTO: 5 10E3/UL (ref 4–11)

## 2023-11-07 PROCEDURE — 80053 COMPREHEN METABOLIC PANEL: CPT | Performed by: INTERNAL MEDICINE

## 2023-11-07 PROCEDURE — 99233 SBSQ HOSP IP/OBS HIGH 50: CPT | Performed by: STUDENT IN AN ORGANIZED HEALTH CARE EDUCATION/TRAINING PROGRAM

## 2023-11-07 PROCEDURE — 85025 COMPLETE CBC W/AUTO DIFF WBC: CPT | Performed by: INTERNAL MEDICINE

## 2023-11-07 PROCEDURE — 86225 DNA ANTIBODY NATIVE: CPT | Performed by: INTERNAL MEDICINE

## 2023-11-07 PROCEDURE — 36415 COLL VENOUS BLD VENIPUNCTURE: CPT | Performed by: INTERNAL MEDICINE

## 2023-11-07 PROCEDURE — 86140 C-REACTIVE PROTEIN: CPT | Performed by: INTERNAL MEDICINE

## 2023-11-07 PROCEDURE — 250N000011 HC RX IP 250 OP 636: Mod: JZ | Performed by: INTERNAL MEDICINE

## 2023-11-07 PROCEDURE — 99239 HOSP IP/OBS DSCHRG MGMT >30: CPT | Performed by: INTERNAL MEDICINE

## 2023-11-07 PROCEDURE — 250N000013 HC RX MED GY IP 250 OP 250 PS 637: Performed by: PEDIATRICS

## 2023-11-07 PROCEDURE — 83735 ASSAY OF MAGNESIUM: CPT | Performed by: INTERNAL MEDICINE

## 2023-11-07 PROCEDURE — 250N000013 HC RX MED GY IP 250 OP 250 PS 637: Performed by: INTERNAL MEDICINE

## 2023-11-07 RX ORDER — MAGNESIUM OXIDE 400 MG/1
400 TABLET ORAL DAILY
Qty: 7 TABLET | Refills: 0 | Status: SHIPPED | OUTPATIENT
Start: 2023-11-07 | End: 2023-11-10

## 2023-11-07 RX ORDER — METFORMIN HCL 500 MG
500 TABLET, EXTENDED RELEASE 24 HR ORAL DAILY
Start: 2023-12-07 | End: 2024-05-14

## 2023-11-07 RX ORDER — FUROSEMIDE 20 MG
40 TABLET ORAL DAILY
Status: DISCONTINUED | OUTPATIENT
Start: 2023-11-07 | End: 2023-11-07 | Stop reason: HOSPADM

## 2023-11-07 RX ORDER — MAGNESIUM OXIDE 400 MG/1
400 TABLET ORAL DAILY
Status: DISCONTINUED | OUTPATIENT
Start: 2023-11-07 | End: 2023-11-07 | Stop reason: HOSPADM

## 2023-11-07 RX ORDER — FUROSEMIDE 20 MG
40 TABLET ORAL DAILY
Start: 2023-11-07 | End: 2024-04-09

## 2023-11-07 RX ADMIN — MAGNESIUM OXIDE TAB 400 MG (241.3 MG ELEMENTAL MG) 400 MG: 400 (241.3 MG) TAB at 12:48

## 2023-11-07 RX ADMIN — LEVOTHYROXINE SODIUM 112 MCG: 112 TABLET ORAL at 08:56

## 2023-11-07 RX ADMIN — FUROSEMIDE 40 MG: 20 TABLET ORAL at 08:56

## 2023-11-07 RX ADMIN — HEPARIN 5 ML: 100 SYRINGE at 12:49

## 2023-11-07 RX ADMIN — ATORVASTATIN CALCIUM 20 MG: 20 TABLET, FILM COATED ORAL at 08:56

## 2023-11-07 ASSESSMENT — ACTIVITIES OF DAILY LIVING (ADL)
ADLS_ACUITY_SCORE: 23

## 2023-11-07 NOTE — DISCHARGE SUMMARY
"[facility]  Hospitalist Discharge Summary      Date of Admission:  11/1/2023  Date of Discharge:  11/7/2023  Discharging Provider: PARI BEATTY MD  Discharge Service: Hospitalist Service, GOLD TEAM 19    Discharge Diagnoses   #Fever  #Elevated inflammatory markers  #Pancytopenia; thrombocytopenia resolve, neutropenia resolved, anemia is stable  #Acute kidney injury  #History of nasopharyngeal carcinoma  #Episode of hypotension  #Obstructive sleep apnea  #COPD  #Hypovolemia/edema  #Hyponatremia, mild  #Positive HIRAM  #Hypomagnesemia     Clinically Significant Risk Factors     # Obesity: Estimated body mass index is 35.25 kg/m  as calculated from the following:    Height as of this encounter: 1.778 m (5' 10\").    Weight as of this encounter: 111.4 kg (245 lb 11.2 oz).       Follow-ups Needed After Discharge   Follow-up Appointments     Adult [facility] Follow-up and recommended labs and tests      Follow up with primary care provider, Dariela Melvin, within 7 days for   hospital follow- up.  The following labs/tests are recommended: CMP (to   monitor kidney function), Kidney biopsy.      Appointments on [place] and/or [facility] (with [facility] or [facility]   provider or service). Call [phone] if you haven't heard regarding   these appointments within 7 days of discharge.            Unresulted Labs Ordered in the Past 30 Days of this Admission       Date and Time Order Name Status Description    11/7/2023  7:58 AM DNA double stranded antibodies In process     11/6/2023  8:59 AM Surgical Pathology Exam In process     11/1/2023  6:23 PM Fungal or Yeast Culture Routine Preliminary         These results will be followed up by outpatient providers    Discharge Disposition   Discharged to home  Condition at discharge: Good    Hospital Course   Jareth Gallardo is a 63-year-old history of nasopharyngeal squamous cell cancer (s/p surgery, chemoradiation and now on " pembrolizumab), COPD, BARRETT, hypothyroidism, non-insulin-dependent diabetes presenting with acute onset of fever and found to have acute kidney injury in the setting of elevated inflammatory markers.     #Fever  #Pancytopenia; thrombocytopenia resolve, neutropenia resolved, anemia is stable  #Acute kidney injury  -Patient's baseline creatinine ~0.8 - 1.0   -Admit cr was 5.0 -->--->---> 3.0  -He did report increased ingestion of NSAIDs  -He also presented with elevated inflammatory markers CRP of 330, ferritin 1700, ESR 60.  -She has also had fever, Tmax this admission 102.7.  -Additional work-up includes renal ultrasound which was largely unremarkable, portable chest x-ray without any acute findings, CT chest with IV contrast on 10/26/2023 without evidence of PE and largely clear lungs.  -Patient's acute kidney injury is likely intrinsic in nature given NSAIDs ingestion, there is also concern for vasculitis or Keytruda induced nephritis (felt to be unlikely per Heme)  -Infectious disease has since been consulted recommending ongoing work-up including following up blood cultures, Now with intermittent low grade T  Urine studies are negative, respiratory panel negative, CMV PCR negative, haptoglobin normal, C3 & C4 also normal.  Iron studies consistent with chronic disease.  -His LDH level is elevated.  -Fibrinogen activity is elevated, direct antiglobulin test is negative.  -Beta-D-Glucan neg  -DORINDA + speckled pattern  -ANCA neg  -Patient had kidney biopsy on 11/6/23; Hgb post-biopsy was 9.0  F/up dsDNA   Outpatient providers to follow-up kidney biopsy results   Nephrology to follow-up with patient accordingly.        Latest Reference Range & Units 11/02/23 06:45   EBV DNA Copies/mL <=0 copies/mL 16,685 (H)   EBV DNA Log of Copies   4.2   ID has felt EBV elevation is likely reactivation, not primary process    Blood cultures obtained were negative.  Fungal markers were also negative  Urine studies were admitted  negative  Respiratory panel also negative  - Following consultation with various care team, patient was not initiated on steroids.  Inflammatory markers improved significantly.  Kidney function also improved prior to discharge.  With regards pancytopenia, neutropenia resolved, thrombocytopenia also resolved.  Hemoglobin was stable.  - Kidney biopsy was obtained on 11/6/2023.  Postbiopsy patient being was unremarkable stable.  - Patient did not have any further episodes of fever at least 3 days prior to discharge.  Patient will need to follow-up with nephrology following discharge (referral has been sent) for review of his pathology reports  CMP has been ordered in 2-3 days to be obtained by patient to monitor kidney function.       #History of nasopharyngeal carcinoma  - Diagnosed in Dec 2021, positive for EBV; induced with carboplatin gemcitabine in April, May of 2022, subsequently received carboplatin and radiotherapy, left modified radical neck dissection in Dec 2022, subsequently presented at tumor board in Feb 23 and felt to be at high risk of recurrence with residual disease on PET so started on pembrolizumab (keytruda) March 9, 2023; recently received Cycle 10 on October 11 and due for cycle 11 today 11/1  - Dr. Martin thought he might be a candidate for toripalimab  - Pembrolizumab being held at this time.  Oncology will follow-up with patient ~11/22/23      ##Hypervolemia  -Patient evaluated by nephrology who recommended doubling PTA furosemide to 40 mg daily.  This dose may need to be adjusted by outpatient provider.     # Anemia  -Hgb decline to 8.1 from 9.1 g  -S/p 1 unit PRBC on 11/5 for Hgb 7.2 g  -No evidence clinically of ongoing bleeding  -Suspect secondary to acute illness  -Repeat  wnl, % retic count 0.2, peripheral smear pending, haptoglobin pending     # Hypotension 11/5  BP 90s/  Mild dizziness when up  Possibly secondary to anemia  Goal to keep BP systolic over 100  S/p  ml and  1 unit PRBC 11/5  Resolved     # Obstructive sleep apnea -intolerant to CPAP  # COPD -continue PTA Breo Ellipta.  Patient with mild intermittent hypoxia, asymptomatic  Without evidence of acute respiratory process.  Chest x-ray on admission normal  CT chest on 10/26/2023 revealed atelectasis but no evidence of PE or pneumonia  Plan: Monitor respiratory status, if persistent hypoxia, repeat chest x-ray  Encourage incentive spirometry, continue inhalers     Chronic issues:  # Hypothyroidism -continue PTA levothyroxine 112 mcg daily  # Non-insulin-dependent diabetes mellitus -PTA meds includes metformin, Trulicity both of which have been held at this time.    BG stable  Management with ISS, POCT, medium intensity sliding scale with NovoLog.    Consultations This Hospital Stay   ONCOLOGY ADULT IP CONSULT  NEPHROLOGY GENERAL ADULT IP CONSULT  INFECTIOUS DISEASE [facility] ADULT IP CONSULT  INTERVENTIONAL RADIOLOGY ADULT/PEDS IP CONSULT  CARE MANAGEMENT / SOCIAL WORK IP CONSULT    Code Status   Full Code    Time Spent on this Encounter   IPARI MD, personally saw the patient today and spent greater than 30 minutes discharging this patient.       PARI BEATTY MD  [facility] MED SURG ORTHOPEDIC  [address] [place] [id number]  Phone: [phone]  Fax: [phone]  ______________________________________________________________________    Physical Exam   Vital Signs: Temp: 98.4  F (36.9  C) Temp src: Oral BP: 96/58 Pulse: 82   Resp: 16 SpO2: 94 % O2 Device: None (Room air)    Weight: 245 lbs 11.2 oz    General Appearance: Obese male, sitting comfortably in bed, in no acute distress or discomfort.  HEENT: PERRL: EOMI; moist mucous membrane w/o lesions  Neck: No JVD  Pulmonary: Clear to auscultation bilaterally, no wheezes or crackles  CVS: Regular rhythm, no murmurs, rubs or gallops  GI: BS (+), soft nontender, no rebound or guarding   Extremities: No LE edema.   Skin: No rashes or  lesions; right port-a-cath in place   Neurologic: A&O x3          Primary Care Physician   Dariela Melvin    Discharge Orders      Comprehensive metabolic panel (BMP + Alb, Alk Phos, ALT, AST, Total. Bili, TP)     Adult Nephrology  Referral      Reason for your hospital stay    Patient presented to the hospital from clinic due to abnormal labs where he was found to have acute kidney injury, elevated inflammatory markers, pancytopenia.  He is also noted to have recurrent fever.  Patient evaluated by infectious disease, heme-onc and nephrology.  There were no acute intervention.  He did undergo biopsy of his kidney.  Patient was afebrile several days prior to discharge.  Inflammatory markers improved significantly. Neutropenia and Thrombocytopenia resolved. Patient will follow-up with Nephrology, heme/onc and PCP following discharge. Patient advised to hold Metformin until cleared by outpatient providers.     Activity    Your activity upon discharge: activity as tolerated     Adult [facility] Follow-up and recommended labs and tests    Follow up with primary care provider, Dariela Melvin, within 7 days for hospital follow- up.  The following labs/tests are recommended: CMP (to monitor kidney function), Kidney biopsy.      Appointments on [place] and/or [facility] (with [facility] or [facility] provider or service). Call [phone] if you haven't heard regarding these appointments within 7 days of discharge.     Diet    Follow this diet upon discharge: Orders Placed This Encounter      Regular Diet Adult       Significant Results and Procedures   Most Recent 3 CBC's:  Recent Labs   Lab Test 11/07/23  0701 11/06/23  0634 11/05/23  1214   WBC 5.0 4.4 4.9   HGB 9.0* 8.1* 8.3*   MCV 88 88 88    156 145*     Most Recent 3 BMP's:  Recent Labs   Lab Test 11/07/23  0701 11/06/23  2135 11/06/23  1717 11/06/23  0718 11/06/23  0634 11/05/23  0731 11/05/23  0647     --   --   --  136  --  132*   POTASSIUM 4.3  --    --   --  4.1  --  3.9   CHLORIDE 103  --   --   --  104  --  100   CO2 25  --   --   --  24  --  26   BUN 39.5*  --   --   --  48.0*  --  51.2*   CR 2.35*  --   --   --  3.00*  --  3.44*   ANIONGAP 9  --   --   --  8  --  6*   LAI 8.1*  --   --   --  7.9*  --  8.0*   * 130* 176*   < > 105*   < > 108*    < > = values in this interval not displayed.     Most Recent 2 LFT's:  Recent Labs   Lab Test 11/07/23  0701 11/06/23  0634   AST 41 34   ALT 16 14   ALKPHOS 92 84   BILITOTAL 0.5 0.4     Most Recent 3 INR's:  Recent Labs   Lab Test 11/01/23  1944 07/01/22  1037 03/31/22  0818   INR 1.19* 1.07 1.10   ,   Results for orders placed or performed during the hospital encounter of 11/01/23   IR Renal Biopsy Right    Narrative    PROCEDURE: Ultrasound-guided biopsy    Procedural Personnel  Advanced practice provider(s): Vladimir Julien PA-C    Procedure Date (mm/dd/yyyy): 11/6/2023    Pre-procedure diagnosis: JOCE  Post-procedure diagnosis: Same  Indication: Organ dysfunction  Previous biopsy of same target (QCDR): No  Additional clinical history: Nasopharyngeal cancer, on Keytruda,  rapidly worsening kidney function. IR consulted for kidney biopsy.    Complications: No immediate complications.      Impression    IMPRESSION:    Ultrasound-guided biopsy of random native right kidney.    Plan:     Specimen(s) sent for evaluation.  _______________________________________________________________    PROCEDURE SUMMARY:  - Percutaneous US-guided coaxial core needle biopsy  - Additional procedure(s): None    PROCEDURE DETAILS:    Pre-procedure  Reference imaging for biopsy target: None  Consent: Informed consent for the procedure including risks, benefits  and alternatives was obtained and time-out was performed prior to the  procedure.  Preparation: The site was prepared and draped using maximal sterile  barrier technique including cutaneous antisepsis.    Anesthesia/sedation  Level of anesthesia/sedation: Moderate sedation  (conscious sedation)  Anesthesia/sedation administered by: Independent trained observer  under attending supervision with continuous monitoring of the  patient?s level of consciousness and physiologic status  Sedation medications: 2 mg midazolam IV, 100 mcg fentanyl IV  Total intra-service face to face sedation time (minutes): 20    Imaging prior to biopsy  The patient was positioned prone. Initial imaging was performed.  Biopsy target:  - Organ or target location: Kidney  - Laterality: Right  Other findings: None    Biopsy   Local anesthesia was administered. Under US guidance, the biopsy  needle was advanced to the target and biopsy was performed.  Coaxial needle: 17 gauge    Core needle biopsy device: ToonTime  Core needle size: 18 gauge  Number of core specimens: 4    On-site assessment of biopsy adequacy: No      Needle removal  The biopsy needle was removed and a sterile dressing was applied.  Tract embolization: Gelfoam pledgets    Imaging following biopsy  Immediate post-biopsy ultrasound was performed.  Post-biopsy imaging findings: Adequate    Additional Details  Additional description of procedure: None  Device used: None  Equipment details: None  Unique Device Identifiers: Not available  Specimens removed: Biopsy samples as detailed above  Estimated blood loss (mL): Less than 10  Standardized report: [email or web address]_v3.1    ARCENIO CORREA PA-C         SYSTEM ID:  [id number]       Discharge Medications   Current Discharge Medication List        CONTINUE these medications which have CHANGED    Details   furosemide (LASIX) 20 MG tablet Take 2 tablets (40 mg) by mouth daily    Associated Diagnoses: Hypervolemia, unspecified hypervolemia type      metFORMIN (GLUCOPHAGE XR) 500 MG 24 hr tablet Take 1 tablet (500 mg) by mouth daily    Comments: Please follow-up with your providers when you can resume Metformin given acute kidney injury.  Associated Diagnoses: Type 2 diabetes, HbA1c goal < 7% (H)           CONTINUE these  medications which have NOT CHANGED    Details   acetaminophen (TYLENOL) 500 MG tablet Take 500 mg by mouth every 6 hours as needed for mild pain      atorvastatin (LIPITOR) 20 MG tablet Take 20 mg by mouth daily      dulaglutide (TRULICITY) 1.5 MG/0.5ML pen Inject 1.5 mg Subcutaneous every 7 days mondays      fluticasone-vilanterol (BREO ELLIPTA) 100-25 MCG/INH inhaler INHALE 1 PUFF INTO THE LUNGS ONE TIME A DAY. RINSE MOUTH AFTER USE.      levothyroxine (SYNTHROID/LEVOTHROID) 112 MCG tablet Take 1 tablet (112 mcg) by mouth daily  Qty: 60 tablet, Refills: 6    Associated Diagnoses: Hypothyroidism, unspecified type      blood glucose (NO BRAND SPECIFIED) lancets standard Use to test blood sugar 2 times daily or as directed.  Qty: 90 lancet, Refills: 1    Associated Diagnoses: Nasopharyngeal cancer (H); Type 2 diabetes mellitus with microalbuminuria, without long-term current use of insulin (H)      blood glucose (NO BRAND SPECIFIED) test strip Use to test blood sugar 3 times daily or as directed.  Qty: 90 strip, Refills: 0    Associated Diagnoses: Nasopharyngeal cancer (H); Type 2 diabetes mellitus with microalbuminuria, without long-term current use of insulin (H)      blood glucose monitoring (ACCU-CHEK RANDELL PLUS) meter device kit Use to test blood sugar 2 times daily or as directed.  Qty: 1 kit, Refills: 0    Associated Diagnoses: Nasopharyngeal cancer (H); Type 2 diabetes mellitus with microalbuminuria, without long-term current use of insulin (H)           STOP taking these medications       doxycycline hyclate (VIBRAMYCIN) 100 MG capsule Comments:   Reason for Stopping:             Allergies   No Known Allergies

## 2023-11-07 NOTE — PLAN OF CARE
"/73 (BP Location: Right arm)   Pulse 100   Temp 99  F (37.2  C) (Oral)   Resp 20   Ht 1.778 m (5' 10\")   Wt 112.8 kg (248 lb 9.6 oz)   SpO2 94%   BMI 35.67 kg/m      Patient alert and oriented. Slept through this shift. Denied pain or discomfort. Voiding adequately in the bathroom. Right port-a-Cath patent with heparin lock. Dressing C/D/I. Resting in bed with no acute distress noted. Call light within reach. Continue with plan of care.  "

## 2023-11-07 NOTE — PROGRESS NOTES
Per chart review, no home care needs indicated. Anticipate discharge to home today. RNCC available as needed.    Alaina Pino RN, BSN  Care Coordinator, [facility] Orth[facility]  Phone [phone]  Pager [phone]

## 2023-11-07 NOTE — TELEPHONE ENCOUNTER
MARIBEL and sent My Chart message to inform of appointment that was scheduled per in basket message from Dr. Lynn on 11.17.2023 8:30am for virtual // 11.07.2023 MCE

## 2023-11-07 NOTE — DISCHARGE INSTRUCTIONS
Please do not take Metformin until cleared by your outpatient providers.   Labs ordered for 11/10/2023    Avoid taking Nonsteroidal anti-inflammatory drugs (NSAIDs). Some examples of over-the-counter NSAIDs are:  Ibuprofen (Advil, Motrin).  Naproxen (Aleve).  Okay to take tylenol for pain or fever

## 2023-11-07 NOTE — PLAN OF CARE
VS: VSS, pt denied CP or SOB.   O2: Room air sat. > 90%.   Output: Voiding adequate amount in the bathroom.    Last BM: 11/04/23 passing gas.    Activity: Up independent in the room.    Skin: Intact.    Pain: Denied pain or discomfort.    Neuro: CMS and neuro intact to baseline.   Dressing: None.    Diet: Regular tolerating okay.    LDA: R chest port, will de access before discharge.    Equipment: IV pole, personal belongings.    Plan: Discharge home today.    Additional Info:       DISCHARGE SUMMARY    Pt discharging to: Home  Transportation: Family.  AVS given and discussed: yes.  Stoplight Tool given and discussed: NA  Medications given: prescription sent to pt home pharmacy.   Belongings returned: yes.   Comments:   Pt understand discharge plan.

## 2023-11-08 ENCOUNTER — PATIENT OUTREACH (OUTPATIENT)
Dept: CARE COORDINATION | Facility: CLINIC | Age: 63
End: 2023-11-08
Payer: COMMERCIAL

## 2023-11-08 DIAGNOSIS — N17.9 ACUTE KIDNEY INJURY (H): Primary | ICD-10-CM

## 2023-11-08 NOTE — PROGRESS NOTES
"Clinic Care Coordination Contact  [facility]: Post-Discharge Note  SITUATION                                                      Admission:    Admission Date: 11/01/23   Reason for Admission: JOCE, Fevers  Discharge:   Discharge Date: 11/07/23  Discharge Diagnosis: Type 2 diabetes, HbA1c goal < 7% (H),  Acute kidney injury (H24),  Acute kidney failure with tubular necrosis (H)    BACKGROUND                                                      Per hospital discharge summary and inpatient provider notes:    Last 10-11 days nausea/vomiting in the morning, improves throughout the day but symptoms seem to be getting worse as time goes on. He had diarrhea yesterday, has been spiking fevers last few days. Admitted in [facility] 10/26-28, thought to be due to tick-borne illness but anaplasma, ehrlichia, and lyme were negative - had been on doxy since that admission. Suddenly developed spike in creatinine yesterday on presentation to the ED. Has noticed a significant decrease in urine output, has noticed it's darker than normal. Denies swelling. Endorses gaining weight - thinks he's up 10 lbs. Hasn't been able to eat/drink much last 10 days, appetite way down. Has been taking 2 ibuprofen (400 mg tabs) every 6 hours for the last couple days.       ASSESSMENT      Discharge Assessment  How are you doing now that you are home?: \"Alright. I think I'm pretty good right now.\"  How are your symptoms? (Red Flag symptoms escalate to triage hotline per guidelines): Improved  Do you feel your condition is stable enough to be safe at home until your provider visit?: Yes  Does the patient have their discharge instructions? : Yes  Does the patient have questions regarding their discharge instructions? : No  Were you started on any new medications or were there changes to any of your previous medications? : Yes  Does the patient have all of their medications?: Yes  Do you have questions regarding any of your medications? : No  Do " you have all of your needed medical supplies or equipment (DME)?  (i.e. oxygen tank, CPAP, cane, etc.): Yes  Discharge follow-up appointment scheduled within 14 calendar days? : Yes  Discharge Follow Up Appointment Date: 11/17/23  Discharge Follow Up Appointment Scheduled with?: Specialty Care Provider (Nephrology appt)    Post-op ([facility] CTA Only)  If the patient had a surgery or procedure, do they have any questions for a nurse?: No      PLAN                                                      Outpatient Plan:      Adult [facility] Follow-up and recommended labs and tests    Follow up with primary care provider, Dariela Melvin, within 7 days for  hospital follow- up. The following labs/tests are recommended: CMP  (to monitor kidney function), Kidney biopsy.    Appointments on [place] and/or [facility] (with [facility] or  [facility] provider or service). Call [phone] if you haven't heard  regarding these appointments within 7 days of discharge.    Additional Services:   Adult Nephrology  Referral  Please be aware that coverage of these services is subject to the terms and limitations of your health insurance plan.  Call member services at your health plan with any benefit or coverage questions.  [employer] will call you to coordinate your care as prescribed by the provider. If you don t hear from a representative within 2 business days, please call [phone].    Nephrology  Reason for Referral: Acute Kidney Injury/Decreased eGFR  Change in eGFR: Acute  Is the patient diabetic?: Yes  Priority: Urgent  Scheduling Instructions: [employer] will call you to coordinate your care as prescribed by the provider. If you don t hear from a representative within 2 business days, please call [phone].    Future Appointments   Date Time Provider Department Center   11/9/2023  7:30 AM GHCT1 [facility] [place]   11/17/2023  8:30 AM Blake Lynn MD [facility]   11/22/2023  8:30 AM Jany Mtz,  CNP [facility]   11/22/2023  1:00 PM GH INFUSION CHAIR 5 GHINF [place]   11/27/2023 10:30 AM GHCT1 [facility] [place]   11/27/2023 11:15 AM GHCT1 [facility] [place]   11/30/2023  9:00 AM Denise Jones GC [facility]   12/7/2023  8:10 AM Dariela Melvin MD [facility]   12/13/2023  1:00 PM GH INFUSION CHAIR 1 [facility] [place]   12/20/2023 11:30 AM Chriss Martin MD [facility] [place]         For any urgent concerns, please contact our 24 hour nurse triage line: [phone] ([phone])         HERRERA Marcial  [phone]  [facility]

## 2023-11-09 ENCOUNTER — HOSPITAL ENCOUNTER (OUTPATIENT)
Dept: CT IMAGING | Facility: OTHER | Age: 63
Discharge: HOME | End: 2023-11-09
Attending: INTERNAL MEDICINE | Admitting: INTERNAL MEDICINE
Payer: COMMERCIAL

## 2023-11-09 DIAGNOSIS — C11.9 NASOPHARYNGEAL CANCER (H): ICD-10-CM

## 2023-11-09 LAB
PATH REPORT.COMMENTS IMP SPEC: NORMAL
PATH REPORT.FINAL DX SPEC: NORMAL
PATH REPORT.GROSS SPEC: NORMAL
PATH REPORT.MICROSCOPIC SPEC OTHER STN: NORMAL
PATH REPORT.RELEVANT HX SPEC: NORMAL
PHOTO IMAGE: NORMAL

## 2023-11-09 PROCEDURE — 74176 CT ABD & PELVIS W/O CONTRAST: CPT

## 2023-11-10 ENCOUNTER — VIRTUAL VISIT (OUTPATIENT)
Dept: ONCOLOGY | Facility: CLINIC | Age: 63
End: 2023-11-10
Attending: INTERNAL MEDICINE
Payer: COMMERCIAL

## 2023-11-10 ENCOUNTER — LAB (OUTPATIENT)
Dept: LAB | Facility: OTHER | Age: 63
End: 2023-11-10
Attending: INTERNAL MEDICINE
Payer: COMMERCIAL

## 2023-11-10 ENCOUNTER — PATIENT OUTREACH (OUTPATIENT)
Dept: ONCOLOGY | Facility: CLINIC | Age: 63
End: 2023-11-10
Payer: COMMERCIAL

## 2023-11-10 ENCOUNTER — TELEPHONE (OUTPATIENT)
Dept: ONCOLOGY | Facility: OTHER | Age: 63
End: 2023-11-10
Payer: COMMERCIAL

## 2023-11-10 VITALS
WEIGHT: 240 LBS | HEIGHT: 70 IN | BODY MASS INDEX: 34.36 KG/M2 | DIASTOLIC BLOOD PRESSURE: 62 MMHG | SYSTOLIC BLOOD PRESSURE: 100 MMHG

## 2023-11-10 DIAGNOSIS — C11.9 NASOPHARYNGEAL CANCER (H): ICD-10-CM

## 2023-11-10 DIAGNOSIS — M79.601 PAIN OF RIGHT UPPER EXTREMITY: ICD-10-CM

## 2023-11-10 DIAGNOSIS — N17.9 AKI (ACUTE KIDNEY INJURY) (H): Primary | ICD-10-CM

## 2023-11-10 DIAGNOSIS — N17.9 AKI (ACUTE KIDNEY INJURY) (H): ICD-10-CM

## 2023-11-10 DIAGNOSIS — C11.9 NASOPHARYNGEAL CANCER (H): Primary | ICD-10-CM

## 2023-11-10 DIAGNOSIS — E83.42 HYPOMAGNESEMIA: ICD-10-CM

## 2023-11-10 DIAGNOSIS — R22.31 LOCALIZED SWELLING OF RIGHT UPPER EXTREMITY: ICD-10-CM

## 2023-11-10 DIAGNOSIS — R73.9 HYPERGLYCEMIA: ICD-10-CM

## 2023-11-10 LAB
ALBUMIN SERPL BCG-MCNC: 2.6 G/DL (ref 3.5–5.2)
ALP SERPL-CCNC: 74 U/L (ref 40–129)
ALT SERPL W P-5'-P-CCNC: 15 U/L (ref 0–70)
ANION GAP SERPL CALCULATED.3IONS-SCNC: 8 MMOL/L (ref 7–15)
AST SERPL W P-5'-P-CCNC: 28 U/L (ref 0–45)
BASOPHILS # BLD AUTO: 0 10E3/UL (ref 0–0.2)
BASOPHILS NFR BLD AUTO: 0 %
BILIRUB SERPL-MCNC: 0.5 MG/DL
BUN SERPL-MCNC: 20 MG/DL (ref 8–23)
CALCIUM SERPL-MCNC: 7.9 MG/DL (ref 8.8–10.2)
CHLORIDE SERPL-SCNC: 98 MMOL/L (ref 98–107)
CREAT SERPL-MCNC: 1.45 MG/DL (ref 0.67–1.17)
CRP SERPL-MCNC: 78.78 MG/L
DEPRECATED HCO3 PLAS-SCNC: 27 MMOL/L (ref 22–29)
EGFRCR SERPLBLD CKD-EPI 2021: 55 ML/MIN/1.73M2
EOSINOPHIL # BLD AUTO: 0 10E3/UL (ref 0–0.7)
EOSINOPHIL NFR BLD AUTO: 0 %
ERYTHROCYTE [DISTWIDTH] IN BLOOD BY AUTOMATED COUNT: 15.4 % (ref 10–15)
GLUCOSE SERPL-MCNC: 249 MG/DL (ref 70–99)
HCT VFR BLD AUTO: 26.3 % (ref 40–53)
HGB BLD-MCNC: 8.4 G/DL (ref 13.3–17.7)
IMM GRANULOCYTES # BLD: 0 10E3/UL
IMM GRANULOCYTES NFR BLD: 1 %
LYMPHOCYTES # BLD AUTO: 0.3 10E3/UL (ref 0.8–5.3)
LYMPHOCYTES NFR BLD AUTO: 9 %
MAGNESIUM SERPL-MCNC: 1.1 MG/DL (ref 1.7–2.3)
MCH RBC QN AUTO: 27.9 PG (ref 26.5–33)
MCHC RBC AUTO-ENTMCNC: 31.9 G/DL (ref 31.5–36.5)
MCV RBC AUTO: 87 FL (ref 78–100)
MONOCYTES # BLD AUTO: 0.3 10E3/UL (ref 0–1.3)
MONOCYTES NFR BLD AUTO: 8 %
NEUTROPHILS # BLD AUTO: 3 10E3/UL (ref 1.6–8.3)
NEUTROPHILS NFR BLD AUTO: 82 %
NRBC # BLD AUTO: 0 10E3/UL
NRBC BLD AUTO-RTO: 0 /100
PLATELET # BLD AUTO: 166 10E3/UL (ref 150–450)
POTASSIUM SERPL-SCNC: 3.7 MMOL/L (ref 3.4–5.3)
PROT SERPL-MCNC: 6.5 G/DL (ref 6.4–8.3)
RBC # BLD AUTO: 3.01 10E6/UL (ref 4.4–5.9)
SODIUM SERPL-SCNC: 133 MMOL/L (ref 135–145)
WBC # BLD AUTO: 3.6 10E3/UL (ref 4–11)

## 2023-11-10 PROCEDURE — 36415 COLL VENOUS BLD VENIPUNCTURE: CPT | Mod: ZL

## 2023-11-10 PROCEDURE — 85025 COMPLETE CBC W/AUTO DIFF WBC: CPT | Mod: ZL

## 2023-11-10 PROCEDURE — 80053 COMPREHEN METABOLIC PANEL: CPT | Mod: ZL

## 2023-11-10 PROCEDURE — 99215 OFFICE O/P EST HI 40 MIN: CPT | Mod: VID

## 2023-11-10 PROCEDURE — 83735 ASSAY OF MAGNESIUM: CPT | Mod: ZL

## 2023-11-10 PROCEDURE — 86140 C-REACTIVE PROTEIN: CPT | Mod: ZL

## 2023-11-10 RX ORDER — MAGNESIUM OXIDE 400 MG/1
400 TABLET ORAL 2 TIMES DAILY
Qty: 28 TABLET | Refills: 0 | Status: SHIPPED | OUTPATIENT
Start: 2023-11-10 | End: 2023-11-24

## 2023-11-10 ASSESSMENT — PAIN SCALES - GENERAL: PAINLEVEL: SEVERE PAIN (6)

## 2023-11-10 NOTE — Clinical Note
"    11/10/2023         RE: Jareth Gallardo  [address] [place] [address]        Dear Colleague,    Thank you for referring your patient, Jareth Gallardo, to the [facility]. Please see a copy of my visit note below.    Virtual Visit Details    Type of service:  Video Visit     Originating Location (pt. Location): {video visit patient location:868933::\"Home\"}  {PROVIDER LOCATION On-site should be selected for visits conducted from your clinic location or adjoining [facility] hospital, academic office, or other nearby [facility] building. Off-site should be selected for all other provider locations, including home:195405}  Distant Location (provider location):  {virtual location provider:980956}  Platform used for Video Visit: {Virtual Visit Platforms:447169::\"[employer]\"}       [facility]CER [facility]    PATIENT NAME: Jareth Gallardo  MRN # [id number]   DATE OF VISIT: September 14, 2023  YOB: 1960     Referring Provider: Dr. Jesus Boston  Radiation Oncology: Dr. Mane Razo  Primary Care Provider: Dr. Beni Gallardo at [facility] in [facility]    CANCER TYPE: Nasopharyngeal carcinoma, p16 negative, SAM positive  STAGE: [id number] (DENAE)  ECOG PS: 0    PD-L1: 20% using  clone locally; TPS 70% at [facility]   NGS:     SUMMARY  12/11/21 UC for L neck mass, selling  12/9/21 CT neck ([place]). 2.4 cm L neck node medial to SCM just above the hyoid, additional LNs surround, scattered R neck and L supraclavicular LNs  2/15/22 US bx L cervical node. Path: SCC, moderately differentiated,   3/4/22 PET/CT. Nasopharyngeal and oropharyngeal mucosal thickening. 2.6 cm BERNARDO nodular GGO, SUV avid, some additional lingular uptake  4/4~5/16/22/22 C1-3 carboplatin gemcitabine. Not a candidate for cisplatin due to hearing loss on audiogram. Neulasta 5/3 after C2D8, ANC 1000   5/31/22 PET/CT. [facility]  6/22~8/10/22 Chemoradiation with weekly carboplatin  11/16/22 PET/CT. Residual 1.5 cm " 2B LN (SUV 28.7), 1 cm enhancing focus in SCM (SUV 22.4), small mildly avid residual enhancing nodes in the L level 2A decreased in size and FDG avidity than before, resolved L level 5 node, small but mildly FDG avid R level 2B node, smaller mildly avid R level 3 nodes, unchanged in ize. NI-RADS 2a in the primary site - FDG uptake along the BOT at the midline, increased from prior. New patchy GGO and nodules in the RUL and L base with mild FDG uptake, likely inflammatory, recommend short interval CT  12/5/22 Salvage L modified radical neck dissection (Dr. Boston). Path: L posterior SCM excision - SCC, 1.1 cm, invading into skeletal muscle, negative margins, +PNI/LVI. Level 2 node 1.5 cm SCC, moderately differentiated, invading into fibroadipose tissue, focally involving the inked resection margin.   1/30/23 CT chest. Patchy LLL GGO, decreased from prior.   2/22/23 PET/CT. Focal updake along the tongue base at midline, decreased from prior, likely inflammatory (SUV 14.8-->8.5), near complete resolution of the rest of the mild mucosal uptake within the tongue base. At least 5 FDG avid lesions in the L deep neck. 2 nodes at the C2 level, posterior to the surgical clips in the L lateral paravertebral region, one measuring 8 mm (SUV 15.9), the other more posteriorly measuring 7 mm. The other 2 adjacent ones are deep to the residual L SCM; 9.6 mm, and a smaller one immediately below in the SCM. There is another mildly FDG avid necrotic lesion inferior to the above 3 foci, again deep to the L SCM. 0.9 cm R level 2b LN (SUV 3.1), likely reactive. Resolution of RUL GGO, stable sub-6 mm nodules.  3/9/23~current Pembrolizumab . C3 delayed 1 week due to parainfluenza. [place]  4/18/23 ED at [facility] for fever and cough. CXR negative. Viral panel showed parainfluenza.    ASSESSMENT AND PLAN  Nasopharyngeal carcinoma, SAM +julia: Ongoing deepening response. Continue pembro. PET/CT sometime in early 2024 to get a  sense of whether there's residual FDG avidity. Consider aggressive approach - potentially surgery down the road if deemed resectable. Remains to be seen. Continue to trend EBV levels.     BERNARDO/lung nodules, new RUL nodules in 11/2022, LLL GGO 6/2023: Resolving c/w inflammatory changes from mild aspiration. Now new GGOs in the LLL on CT 7/24, better, c/w inflammation from likely aspiration. Still has some haziness in the LLL that is persistent.    Lymphedema: Doing massages daily now in the shower. Stretching throughout the day.    Hypothyroidism: Levothyroxine 50 mcg daily started in mid May. Base dose adjustments on levels in Aug or Sept.    FH: Many members of his family have cancers. Appt with Denise Jones, LINDSEY 11/30/23    DM2, steroid-induced hyperglycemia: Per PCP. A1c 8/28 5.6     Mild peripheral neuropathy: R foot numbness, very mild, from DM2. Not discussed today    *** 10 am Magnesium replacement.   - US R upper extremity    [phone]     SUBJECTIVE  - yesterday R and L hand. R hand is very swollen compared to L. Bilateral knee pain. He did have edema of both  - peripheral edema-- 40 mg lasix daily.   - Energy, low. Weak, hard to move.   - no issues with urination. No blood in the urine.   - highest temp at home in the 100 F and 99 F.   - he had a bowel movement yesterday with bright red blood.   - No light headedness or dizziness.   - has been using wheel chair to get around.   - drinking plenty of fluids. 8 oz x 3-4 per day, 2-3 glasses of ginger ale, and couple glasses of apple juice.   - AM a pancake and a banana with glass of juice, hot ham and cheese last night, lunch wild rice soup.   - taking Mag oxide x 7 days.   - no abdominal pain. No nausea or vomiting.   - 100/60.     Jareth returns for follow up of nasopharyngeal carcinoma on pembrolizumab.   Doing ok  Tightness in L neck. Trying different topicals -   Doing lymphedema massages  No new issues   Wife just got back from a road trip with her  "sister.     PAST MEDICAL HISTORY  Nasopharyngeal carcinoma as above  DM2  COPD??  Hiatal hernia. Sleeps with his head elevated  LVH on TTE 2009, EF50%, LA Dilation  Dyslipidemia  BARRETT in 2015. Not using CPAP due to developing tooth abscesses when he tried it in 2015  Venous insufficiency in both legs being in an accident involving a mower about 10 years ago, on chronic furosemide. LE US negative 12/15/2009  Cholelithiasis  Hyperplastic colon polyp, due 2020  Tonsillectomy  Baker cyst RLE US 2013    Neuropathy from DM - R foot numbness chronically    Exposed to lots of wood dust in his occupation    CURRENT OUTPATIENT MEDICATIONS  Reviewed    ALLERGIES  No Known Allergies     PHYSICAL EXAM  /62   Ht 1.778 m (5' 10\")   Wt 108.9 kg (240 lb)   BMI 34.44 kg/m    GEN: NAD  HEENT: EOMI, no icterus, injection or pallor. Obvious lymphedema, R > L  NEURO: alert  SKIN: no rashes    Remainder of physical exam deferred due to public health emergency and limitations of video visit.    LABORATORY AND IMAGING STUDIES    Labs 8/29/23, 8/9/23 were independently reviewed and interpreted by me    CT Abdomen Pelvis w/o Contrast  Narrative: PROCEDURE:  CT ABDOMEN PELVIS W/O CONTRAST    HISTORY:  Nasopharyngeal cancer (H) on keytruda.    TECHNIQUE:  Helical CT of the abdomen and pelvis was performed without  intravenous contrast. This CT exam was performed using one or more the  following dose reduction techniques: automated exposure control,  adjustment of the mA and/or kV according to patient size, and/or  iterative reconstruction technique.    COMPARISON:  9/13/2023    FINDINGS:      Evaluation of the solid organs is somewhat limited due to the lack of  intravenous contrast.    Limited views through the lung bases demonstrate trace pleural fluid.    There is new adenopathy and stranding within the SMA mesentery. A  reference dominant node measures 1.5 x 2.1 cm on series 2 image 38.  The spleen is enlarged at 15.5 cm in " length.    There is a calcified gallstone. The noncontrast appearance of the  liver and adrenal glands is unremarkable. Multiple duodenal  diverticula are redemonstrated. There is no hydronephrosis. The small  bowel is normal in caliber. Trace ascites is present.    The aorta is normal in size with scattered atherosclerotic  calcifications.      No suspicious osseous lesions are identified.  Impression: IMPRESSION:      New adenopathy and stranding of the SMA mesentery. Splenomegaly.  Reactive change, lymphoma and immunotherapy induced sarcoid-like  reaction (SLR) are in the differential. Rapid development argues  against but does not exclude classic sclerosing mesenteritis.     Trace pleural fluid.    MANE MALCOLM MD         SYSTEM ID:  [id number]     Imaging was personally reviewed and interpreted by me - the LLL changes are clearly better. See above.   The LNs in the left neck are getting noticeably smaller and are losing their distinct borders.     Virtual Visit Details  Type of service:  Video Visit  Originating Location (pt. Location): Home    Distant Location (provider location):  On-site  Platform used for Video Visit: [facility]       Virtual Visit Details    Type of service:  Video Visit     Originating Location (pt. Location): Home  {PROVIDER LOCATION On-site should be selected for visits conducted from your clinic location or adjoining [facility] hospital, academic office, or other nearby [facility] building. Off-site should be selected for all other provider locations, including home:[id number]}  Distant Location (provider location):  On-site  Platform used for Video Visit: [facility]    PATIENT NAME: Jareth Gallardo  MRN # [id number]   DATE OF VISIT: September 14, 2023  YOB: 1960     Referring Provider: Dr. Jesus Boston  Radiation Oncology: Dr. Mane Razo  Primary Care Provider: Dr. Beni Gallardo at [facility] in [facility]    CANCER TYPE: Nasopharyngeal  carcinoma, p16 negative, SAM positive  STAGE: [id number] (DENAE)  ECOG PS: 0    PD-L1: 20% using  clone locally; TPS 70% at [facility]   NGS:     SUMMARY  12/11/21 UC for L neck mass, selling  12/9/21 CT neck ([place]). 2.4 cm L neck node medial to SCM just above the hyoid, additional LNs surround, scattered R neck and L supraclavicular LNs  2/15/22 US bx L cervical node. Path: SCC, moderately differentiated,   3/4/22 PET/CT. Nasopharyngeal and oropharyngeal mucosal thickening. 2.6 cm BERNARDO nodular GGO, SUV avid, some additional lingular uptake  4/4~5/16/22/22 C1-3 carboplatin gemcitabine. Not a candidate for cisplatin due to hearing loss on audiogram. Neulasta 5/3 after C2D8, ANC 1000   5/31/22 PET/CT. [place]  6/22~8/10/22 Chemoradiation with weekly carboplatin  11/16/22 PET/CT. Residual 1.5 cm 2B LN (SUV 28.7), 1 cm enhancing focus in SCM (SUV 22.4), small mildly avid residual enhancing nodes in the L level 2A decreased in size and FDG avidity than before, resolved L level 5 node, small but mildly FDG avid R level 2B node, smaller mildly avid R level 3 nodes, unchanged in ize. NI-RADS 2a in the primary site - FDG uptake along the BOT at the midline, increased from prior. New patchy GGO and nodules in the RUL and L base with mild FDG uptake, likely inflammatory, recommend short interval CT  12/5/22 Salvage L modified radical neck dissection (Dr. Boston). Path: L posterior SCM excision - SCC, 1.1 cm, invading into skeletal muscle, negative margins, +PNI/LVI. Level 2 node 1.5 cm SCC, moderately differentiated, invading into fibroadipose tissue, focally involving the inked resection margin.   1/30/23 CT chest. Patchy LLL GGO, decreased from prior.   2/22/23 PET/CT. Focal updake along the tongue base at midline, decreased from prior, likely inflammatory (SUV 14.8-->8.5), near complete resolution of the rest of the mild mucosal uptake within the tongue base. At least 5 FDG avid lesions in the L deep neck. 2 nodes at  the C2 level, posterior to the surgical clips in the L lateral paravertebral region, one measuring 8 mm (SUV 15.9), the other more posteriorly measuring 7 mm. The other 2 adjacent ones are deep to the residual L SCM; 9.6 mm, and a smaller one immediately below in the SCM. There is another mildly FDG avid necrotic lesion inferior to the above 3 foci, again deep to the L SCM. 0.9 cm R level 2b LN (SUV 3.1), likely reactive. Resolution of RUL GGO, stable sub-6 mm nodules.  3/9/23~current Pembrolizumab . C3 delayed 1 week due to parainfluenza. Good [place]  4/18/23 ED at [facility] for fever and cough. CXR negative. Viral panel showed parainfluenza.    ASSESSMENT AND PLAN  Nasopharyngeal carcinoma, SAM +julia: Ongoing deepening response. Continue pembro. PET/CT sometime in early 2024 to get a sense of whether there's residual FDG avidity. Consider aggressive approach - potentially surgery down the road if deemed resectable. Remains to be seen. Continue to trend EBV levels.     BERNARDO/lung nodules, new RUL nodules in 11/2022, LLL GGO 6/2023: Resolving c/w inflammatory changes from mild aspiration. Now new GGOs in the LLL on CT 7/24, better, c/w inflammation from likely aspiration. Still has some haziness in the LLL that is persistent.    Lymphedema: Doing massages daily now in the shower. Stretching throughout the day.    Hypothyroidism: Levothyroxine 50 mcg daily started in mid May. Base dose adjustments on levels in Aug or Sept.    FH: Many members of his family have cancers. Appt with Denise Jones, LINDSEY 11/30/23    DM2, steroid-induced hyperglycemia: Per PCP. A1c 8/28 5.6     Mild peripheral neuropathy: R foot numbness, very mild, from DM2. Not discussed today    *** 10 am Magnesium replacement.   - US R upper extremity    [phone]     SUBJECTIVE  - yesterday R and L hand. R hand is very swollen compared to L. Bilateral knee pain. He did have edema of both. Tylenol 1,000 mg once a day.   - peripheral edema-- 40 mg  "lasix daily.   - Energy, low. Weak, hard to move.   - no issues with urination. No blood in the urine.   - highest temp at home in the 100 F and 99 F.   - he had a bowel movement yesterday with bright red blood.   - No light headedness or dizziness.   - has been using wheel chair to get around.   - drinking plenty of fluids. 8 oz x 3-4 per day, 2-3 glasses of ginger ale, and couple glasses of apple juice.   - AM a pancake and a banana with glass of juice, hot ham and cheese last night, lunch wild rice soup.   - taking Mag oxide x 7 days.   - no abdominal pain. No nausea or vomiting.   - 100/60.     Jareth returns for follow up of nasopharyngeal carcinoma on pembrolizumab.   Doing ok  Tightness in L neck. Trying different topicals -   Doing lymphedema massages  No new issues   Wife just got back from a road trip with her sister.     PAST MEDICAL HISTORY  Nasopharyngeal carcinoma as above  DM2  COPD??  Hiatal hernia. Sleeps with his head elevated  LVH on TTE 2009, EF50%, LA Dilation  Dyslipidemia  BARRETT in 2015. Not using CPAP due to developing tooth abscesses when he tried it in 2015  Venous insufficiency in both legs being in an accident involving a mower about 10 years ago, on chronic furosemide. LE US negative 12/15/2009  Cholelithiasis  Hyperplastic colon polyp, due 2020  Tonsillectomy  Baker cyst RLE US 2013    Neuropathy from DM - R foot numbness chronically    Exposed to lots of wood dust in his occupation    CURRENT OUTPATIENT MEDICATIONS  Reviewed    ALLERGIES  No Known Allergies     PHYSICAL EXAM  /62   Ht 1.778 m (5' 10\")   Wt 108.9 kg (240 lb)   BMI 34.44 kg/m    GEN: NAD  HEENT: EOMI, no icterus, injection or pallor. Obvious lymphedema, R > L  NEURO: alert  SKIN: no rashes    Remainder of physical exam deferred due to public health emergency and limitations of video visit.    LABORATORY AND IMAGING STUDIES    Labs 8/29/23, 8/9/23 were independently reviewed and interpreted by me    CT Abdomen " Pelvis w/o Contrast  Narrative: PROCEDURE:  CT ABDOMEN PELVIS W/O CONTRAST    HISTORY:  Nasopharyngeal cancer (H) on keytruda.    TECHNIQUE:  Helical CT of the abdomen and pelvis was performed without  intravenous contrast. This CT exam was performed using one or more the  following dose reduction techniques: automated exposure control,  adjustment of the mA and/or kV according to patient size, and/or  iterative reconstruction technique.    COMPARISON:  9/13/2023    FINDINGS:      Evaluation of the solid organs is somewhat limited due to the lack of  intravenous contrast.    Limited views through the lung bases demonstrate trace pleural fluid.    There is new adenopathy and stranding within the SMA mesentery. A  reference dominant node measures 1.5 x 2.1 cm on series 2 image 38.  The spleen is enlarged at 15.5 cm in length.    There is a calcified gallstone. The noncontrast appearance of the  liver and adrenal glands is unremarkable. Multiple duodenal  diverticula are redemonstrated. There is no hydronephrosis. The small  bowel is normal in caliber. Trace ascites is present.    The aorta is normal in size with scattered atherosclerotic  calcifications.      No suspicious osseous lesions are identified.  Impression: IMPRESSION:      New adenopathy and stranding of the SMA mesentery. Splenomegaly.  Reactive change, lymphoma and immunotherapy induced sarcoid-like  reaction (SLR) are in the differential. Rapid development argues  against but does not exclude classic sclerosing mesenteritis.     Trace pleural fluid.    JUAN CARLOS MALCOLM MD         SYSTEM ID:  [id number]     Imaging was personally reviewed and interpreted by me - the LLL changes are clearly better. See above.   The LNs in the left neck are getting noticeably smaller and are losing their distinct borders.     Virtual Visit Details  Type of service:  Video Visit  Originating Location (pt. Location): Home    Distant Location (provider location):   On-site  Platform used for Video Visit: [employer]         Again, thank you for allowing me to participate in the care of your patient.        Sincerely,        Amanda Villagomez PA-C

## 2023-11-10 NOTE — PROGRESS NOTES
Virtual Visit Details    Type of service:  Video Visit     Originating Location (pt. Location): Home    Distant Location (provider location):  On-site  Platform used for Video Visit: [facility]    PATIENT NAME: Jareth Gallardo  MRN # [id number]   DATE OF VISIT: September 14, 2023  YOB: 1960     Referring Provider: Dr. Jesus Boston  Radiation Oncology: Dr. Mnae Razo  Primary Care Provider: Dr. Beni Gallardo at [facility] in [facility]    CANCER TYPE: Nasopharyngeal carcinoma, p16 negative, SAM positive  STAGE: hC8P8Lb (DENAE)  ECOG PS: 0    PD-L1: 20% using  clone locally; TPS 70% at [facility]   NGS:     SUMMARY  12/11/21 UC for L neck mass, selling  12/9/21 CT neck ([place]). 2.4 cm L neck node medial to SCM just above the hyoid, additional LNs surround, scattered R neck and L supraclavicular LNs  2/15/22 US bx L cervical node. Path: SCC, moderately differentiated,   3/4/22 PET/CT. Nasopharyngeal and oropharyngeal mucosal thickening. 2.6 cm BERNARDO nodular GGO, SUV avid, some additional lingular uptake  4/4~5/16/22/22 C1-3 carboplatin gemcitabine. Not a candidate for cisplatin due to hearing loss on audiogram. Neulasta 5/3 after C2D8, ANC 1000   5/31/22 PET/CT. VT  6/22~8/10/22 Chemoradiation with weekly carboplatin  11/16/22 PET/CT. Residual 1.5 cm 2B LN (SUV 28.7), 1 cm enhancing focus in SCM (SUV 22.4), small mildly avid residual enhancing nodes in the L level 2A decreased in size and FDG avidity than before, resolved L level 5 node, small but mildly FDG avid R level 2B node, smaller mildly avid R level 3 nodes, unchanged in ize. NI-RADS 2a in the primary site - FDG uptake along the BOT at the midline, increased from prior. New patchy GGO and nodules in the RUL and L base with mild FDG uptake, likely inflammatory, recommend short interval CT  12/5/22 Salvage L modified radical neck dissection (Dr. Boston). Path: L posterior SCM excision - SCC, 1.1  cm, invading into skeletal muscle, negative margins, +PNI/LVI. Level 2 node 1.5 cm SCC, moderately differentiated, invading into fibroadipose tissue, focally involving the inked resection margin.   1/30/23 CT chest. Patchy LLL GGO, decreased from prior.   2/22/23 PET/CT. Focal updake along the tongue base at midline, decreased from prior, likely inflammatory (SUV 14.8-->8.5), near complete resolution of the rest of the mild mucosal uptake within the tongue base. At least 5 FDG avid lesions in the L deep neck. 2 nodes at the C2 level, posterior to the surgical clips in the L lateral paravertebral region, one measuring 8 mm (SUV 15.9), the other more posteriorly measuring 7 mm. The other 2 adjacent ones are deep to the residual L SCM; 9.6 mm, and a smaller one immediately below in the SCM. There is another mildly FDG avid necrotic lesion inferior to the above 3 foci, again deep to the L SCM. 0.9 cm R level 2b LN (SUV 3.1), likely reactive. Resolution of RUL GGO, stable sub-6 mm nodules.  3/9/23~current Pembrolizumab . C3 delayed 1 week due to parainfluenza. Good PA  4/18/23 ED at [facility] for fever and cough. CXR negative. Viral panel showed parainfluenza.  11/01/23-11/07/23   [facility] admission for fever, elevated CRP, pancytopenia, JOCE, edema, and hyponatremia.     ASSESSMENT AND PLAN  Nasopharyngeal carcinoma, SAM +julia: Ongoing deepening response. Has been on pembro last dose on 10/11/23. Please see recent notes from Dr. Martin and Dr. Melvin for overall plans. Seen today for acute add on for joint pains and fatigue/weakness.   - Reviewed recs from recent hospitalization where he was admitted for an JOCE and fevers found to have an elevated CRP. Real biopsy demonstrates Acute interstitial nephritis, moderate to severe and Focal acute tubular necrosis. No initiation of prednisone/steroids due to improving creatinine. Unclear if findings are related to keytruda vs. NSAIDS, etc. No fevers since  discharge.   - CRP down trending from 130.60 upon discharge --> 78.78. Despite down trending CRP, he continues to have significant new joint pain. Recommend that he continues to avoid NSAIDs including topical volteran gel. Can increase tylenol max dose of 4 g per day and trial topical methanol treatments. Recommend an US of the R upper extremity to be performed to rule out new DVT.  - CBC with continued but overall stable anemia and mild leukocytopenia. Normal platelet level.     Fatigue: suspect multifactorial from recent hospitalization along with deconditioning. Could consider a cortisol level in the future if continues.     FEN:   - eating well at home. However, continues to have hypomagnesia on labs today. Recommend increase oral mag from 400 mg daily to 800 mg daily. Will set up IV mag as well given value at 1.1.   - mild hyponatremia, continue to monitor.  - mild hyperglycemia today, has diabetes continue to monitor.     Splenomegaly:  SMA stranding and adenopathy:  - CT abdomen pelvis with new SMA mesentery stranding and new adenopathy along with Splenomegaly. Given no acute GI symptoms recommend he follows up with Dr. Martin early next week to discuss. Counseled on red flags and reasons to present to the ER sooner.       Follow up:  - Replace IV mag tomorrow.   - Will arrange R upper extremity US.   - recommend repeat labs early next week- CBC, CMP, and Mag  - Present back to the ER sooner if any new GI complaints, persistent fevers, worsening joint pain/swelling, or profound fatigue/weakness over the weekend.     100 minutes spent on the date of the encounter doing chart review, review of test results, patient visit, and documentation     PATT Brock  Jareth is seen today for an acute add on virtually. He was recently discharged from [facility] after he was admitted for fevers, JOCE, and pancytopenia.   - He reports significant joint pains, particularly bothersome yesterday  in both his R and L hands/wrists. Today, his R hand is very swollen compared to L to the point where he cannot make a full fist with his right hand. He endorses bilateral knee pain. He did have edema of both his upper extremities and lower extremities in patient which have otherwise been improved with lasix 40 mg daily.  - He is taking Tylenol 1,000 mg once a day which does provide relief of his joint pains. Avoiding NSAID's per nephrology's guidance.    - His energy is low but stable since discharge on 11/07/23. He feels weak but this too is stable. No falls.   - He reports no current issues with urination. No blood in the urine.   - He has been monitoring for recurrent fevers at home. His highest temp at home in the 100 F and 99 F since his recent discharge.   - He has been having daily, normal bowel movements but does report a some blood in the toilet and with wiping. He has a history of hemorrhoids and this is consistent with findings he has had in the past related to them.   - No light headedness or dizziness. No chest pain, shortness of breath, or cough. No URI symptoms.   - Reports he is drinking plenty of fluids. 8 oz x 3-4 per day, 2-3 glasses of ginger ale, and couple glasses of apple juice.   - For food intake within the past 48 hours, he ate pancake and a banana with glass of juice this am, hot ham and cheese last night, and wild rice soup for lunch.   - He confirms he has been taking Mag oxide 400 mg since his discharge from the hospital.   - No abdominal pain. No nausea or vomiting. No diarrhea or constipation.      ROS: 14 point ROS neg other than the symptoms noted above in the HPI.    PAST MEDICAL HISTORY  Nasopharyngeal carcinoma as above  DM2  COPD??  Hiatal hernia. Sleeps with his head elevated  LVH on TTE 2009, EF50%, LA Dilation  Dyslipidemia  BARRETT in 2015. Not using CPAP due to developing tooth abscesses when he tried it in 2015  Venous insufficiency in both legs being in an accident involving  "a mower about 10 years ago, on chronic furosemide. LE US negative 12/15/2009  Cholelithiasis  Hyperplastic colon polyp, due 2020  Tonsillectomy  Baker cyst RLE US 2013    Neuropathy from DM - R foot numbness chronically    Exposed to lots of wood dust in his occupation    CURRENT OUTPATIENT MEDICATIONS  Reviewed    ALLERGIES  No Known Allergies     PHYSICAL EXAM  /62   Ht 1.778 m (5' 10\")   Wt 108.9 kg (240 lb)   BMI 34.44 kg/m    Video physical exam  General: Patient appears well in no acute distress.   Skin: No visualized rash or lesions on visualized skin  Eyes: EOMI, no erythema, sclera icterus or discharge noted  Resp: Appears to be breathing comfortably without accessory muscle usage, speaking in full sentences, no cough  MSK: Appears to have normal range of motion based on visualized movements  Neurologic: No apparent tremors, facial movements symmetric  Psych: affect bright, alert and oriented      Remainder of physical exam deferred due to public health emergency and limitations of video visit.    LABORATORY AND IMAGING STUDIES  Most Recent 3 CBC's:  Recent Labs   Lab Test 11/10/23  1329 11/07/23  0701 11/06/23  0634   WBC 3.6* 5.0 4.4   HGB 8.4* 9.0* 8.1*   MCV 87 88 88    163 156   ANEUTAUTO 3.0 4.1 3.7     Most Recent 3 BMP's:  Recent Labs   Lab Test 11/10/23  1329 11/07/23  1243 11/07/23  0701 11/06/23  0718 11/06/23  0634   *  --  137  --  136   POTASSIUM 3.7  --  4.3  --  4.1   CHLORIDE 98  --  103  --  104   CO2 27  --  25  --  24   BUN 20.0  --  39.5*  --  48.0*   CR 1.45*  --  2.35*  --  3.00*   ANIONGAP 8  --  9  --  8   LAI 7.9*  --  8.1*  --  7.9*   * 96 107*   < > 105*   PROTTOTAL 6.5  --  6.6  --  6.1*   ALBUMIN 2.6*  --  2.4*  --  2.4*    < > = values in this interval not displayed.    Most Recent 3 LFT's:  Recent Labs   Lab Test 11/10/23  1329 11/07/23  0701 11/06/23  0634   AST 28 41 34   ALT 15 16 14   ALKPHOS 74 92 84   BILITOTAL 0.5 0.5 0.4    Most Recent 2 " TSH and T4:  Recent Labs   Lab Test 10/11/23  1054 09/20/23  1336 08/29/23  1306   TSH 3.45 6.16* 4.91*   T4  --  0.85* 1.04     CT Abdomen/pelvis 11/09/23:  FINDINGS:       Evaluation of the solid organs is somewhat limited due to the lack of  intravenous contrast.     Limited views through the lung bases demonstrate trace pleural fluid.     There is new adenopathy and stranding within the SMA mesentery. A  reference dominant node measures 1.5 x 2.1 cm on series 2 image 38.  The spleen is enlarged at 15.5 cm in length.     There is a calcified gallstone. The noncontrast appearance of the  liver and adrenal glands is unremarkable. Multiple duodenal  diverticula are redemonstrated. There is no hydronephrosis. The small  bowel is normal in caliber. Trace ascites is present.     The aorta is normal in size with scattered atherosclerotic  calcifications.       No suspicious osseous lesions are identified.                                                                      IMPRESSION:       New adenopathy and stranding of the SMA mesentery. Splenomegaly.  Reactive change, lymphoma and immunotherapy induced sarcoid-like  reaction (SLR) are in the differential. Rapid development argues  against but does not exclude classic sclerosing mesenteritis.      Trace pleural fluid.  I reviewed the above labs and imaging today.

## 2023-11-10 NOTE — NURSING NOTE
Is the patient currently in the state of [place]? YES    Visit mode:VIDEO    If the visit is dropped, the patient can be reconnected by: VIDEO VISIT: Send to e-mail at: [email or web address]    Will anyone else be joining the visit? NO  (If patient encounters technical issues they should call [phone]    How would you like to obtain your AVS? MyChart    Are changes needed to the allergy or medication list? No    Reason for visit: Video Visit (Follow Up )    Debra ATKINS

## 2023-11-10 NOTE — TELEPHONE ENCOUNTER
Message left by Shelley stating Jareth was discharged from the hospital recently and he is still not feeling well.    He was in [place] due to high creatinine. Lost 8# since coming home but still retaining fluid. Joints, hands and wrists are extremely painful. He is very weak and his wife is wondering if he should be seen again.     Advised coming to ER within the next hour or two if necessary, but I am going to have the RN call her back when she comes in.  Renée Larios CMA([facility])..................11/10/2023   8:15 AM

## 2023-11-10 NOTE — PATIENT INSTRUCTIONS
For joint pain:   - okay to increase tylenol to 1,000 mg (1 g) every 6 hours (roughly 4 times per day). Max dose is 4g in a 24 hour period.   -trial topical methanol products for joint pain such as icy hot or tiger balm. Avoid topical NSAIDs such as voltaren gel (generic name diclofenac).    Magnesium:  - increase from 400 mg once a day to 400 mg twice a day.   - IV mag scheduled for tomorrow.     Monitor for any new GI complaints, persistent fevers, worsening joint pain/swelling, or profound fatigue/weakness over the weekend. Recommend an US of your right upper extremity to be performed this weekend.

## 2023-11-10 NOTE — TELEPHONE ENCOUNTER
Patient will come for labs today and be seen virtually by LEONEL with Dr. Melvin's office.  Sybil Zamora RN...........11/10/2023 11:22 AM

## 2023-11-10 NOTE — TELEPHONE ENCOUNTER
Chriss Martin MD is out of office today. Patient does not feel like he needs to come in. States that Dr. Melvin may be calling for him to have labs today. He is having joint pain. Did lose some weight, but feels most likely water weight.   Sybil Zamora RN...........11/10/2023 10:53 AM

## 2023-11-10 NOTE — PROGRESS NOTES
[facility]: Cancer Care Follow-Up Note                                    Discussion with Patient:                                                      Was planning on reaching out to Jareth for post-hospitalization discharge call and noted that per chart review, he had reached out to [facility] clinic with concerns this morning for feeling unwell, joint pain, etc.    Reached out to Jareth to check in. He is reporting weakness, fatigue, and poor oral intake. Additionally complaining of new onset joint pain, particularly in hands and wrists. Jareth was recently hospitalized with JOCE in the setting of elevated inflammatory markers. He reports that his edema has decreased significantly since discharging (down 8 lb) and he is voiding frequently on lasix.    Discussed patient reported symptoms with Amanda Villagomez PA-C in clinic. Recommendations for labs today (CBC, CMP, Mg, CRP) followed by video visit with her.    Confirmed this plan with patient and wife via phone and they are agreeable. Sybil VICTOR in [facility] is setting up his lab appointment this afternoon. He is set for a video appointment with Amanda VALENCIA afterwards. RNCC will follow up as needed.    Dates of Treatment:                                                      Infusion given in last 28 days       None            Assessment:                                                      RNCC Short Symptom Review:     Assessment completed with:: Patient    General/Short Assessment  Does the patient have all their medications?: Yes  Is the patient experiencing any new or worsening symptoms?: Yes, See comments (weakness, fatigue, poor appetite, joint tenderness)  Discussion with patient: Answered patient's questions and addressed concerns;Reviewed how and when to contact clinic;Reviewed patient's future appointments    Pain  Patient Reported Pain?: Yes, but is new or different pain (joint pain)  Pain Score: Moderate Pain (5)  Pain Loc:   (joints)    Patient Coping  Open/discussion    Clinic Utilization  Patient Aware of Next Appointment?: Yes    Other Patient Concerns  Other Patient Reported Concerns: Yes, see notes    Intervention/Education provided during outreach:                                                       Confirmed patient has clinic and triage numbers    Maryjo Ugalde, RN, BSN  RN Care Coordinator  [facility]

## 2023-11-11 ENCOUNTER — HOSPITAL ENCOUNTER (OUTPATIENT)
Facility: OTHER | Age: 63
Discharge: HOME | End: 2023-11-11
Attending: FAMILY MEDICINE | Admitting: FAMILY MEDICINE
Payer: COMMERCIAL

## 2023-11-11 PROCEDURE — 96365 THER/PROPH/DIAG IV INF INIT: CPT

## 2023-11-11 PROCEDURE — 250N000011 HC RX IP 250 OP 636: Mod: JZ

## 2023-11-11 PROCEDURE — 96366 THER/PROPH/DIAG IV INF ADDON: CPT

## 2023-11-11 PROCEDURE — 250N000011 HC RX IP 250 OP 636: Mod: JZ | Performed by: FAMILY MEDICINE

## 2023-11-11 RX ORDER — MAGNESIUM SULFATE HEPTAHYDRATE 40 MG/ML
4 INJECTION, SOLUTION INTRAVENOUS ONCE
Status: COMPLETED | OUTPATIENT
Start: 2023-11-11 | End: 2023-11-11

## 2023-11-11 RX ORDER — HEPARIN SODIUM (PORCINE) LOCK FLUSH IV SOLN 100 UNIT/ML 100 UNIT/ML
5 SOLUTION INTRAVENOUS ONCE
Status: COMPLETED | OUTPATIENT
Start: 2023-11-11 | End: 2023-11-11

## 2023-11-11 RX ADMIN — MAGNESIUM SULFATE HEPTAHYDRATE 4 G: 40 INJECTION, SOLUTION INTRAVENOUS at 10:53

## 2023-11-11 RX ADMIN — HEPARIN 5 ML: 100 SYRINGE at 12:44

## 2023-11-11 ASSESSMENT — ACTIVITIES OF DAILY LIVING (ADL): ADLS_ACUITY_SCORE: 35

## 2023-11-13 ENCOUNTER — HOSPITAL ENCOUNTER (OUTPATIENT)
Dept: ULTRASOUND IMAGING | Facility: OTHER | Age: 63
Discharge: HOME | End: 2023-11-13
Payer: COMMERCIAL

## 2023-11-13 ENCOUNTER — PATIENT OUTREACH (OUTPATIENT)
Dept: ONCOLOGY | Facility: CLINIC | Age: 63
End: 2023-11-13
Payer: COMMERCIAL

## 2023-11-13 DIAGNOSIS — M79.601 PAIN OF RIGHT UPPER EXTREMITY: ICD-10-CM

## 2023-11-13 PROCEDURE — 93971 EXTREMITY STUDY: CPT | Mod: RT

## 2023-11-13 NOTE — PROGRESS NOTES
Patient has PCP elsewhere, no follow-up here. No TCM call required per policy.      Suzanne Hui RN on 11/13/2023 at 10:01 AM

## 2023-11-14 ENCOUNTER — PATIENT OUTREACH (OUTPATIENT)
Dept: ONCOLOGY | Facility: OTHER | Age: 63
End: 2023-11-14

## 2023-11-14 ENCOUNTER — ONCOLOGY VISIT (OUTPATIENT)
Dept: ONCOLOGY | Facility: OTHER | Age: 63
End: 2023-11-14
Attending: INTERNAL MEDICINE
Payer: COMMERCIAL

## 2023-11-14 VITALS
OXYGEN SATURATION: 90 % | SYSTOLIC BLOOD PRESSURE: 100 MMHG | BODY MASS INDEX: 34.01 KG/M2 | DIASTOLIC BLOOD PRESSURE: 54 MMHG | RESPIRATION RATE: 16 BRPM | TEMPERATURE: 98.8 F | HEART RATE: 102 BPM | WEIGHT: 237 LBS

## 2023-11-14 DIAGNOSIS — C11.9 NASOPHARYNGEAL CANCER (H): Primary | ICD-10-CM

## 2023-11-14 DIAGNOSIS — D89.839 CYTOKINE RELEASE SYNDROME: ICD-10-CM

## 2023-11-14 DIAGNOSIS — E27.40 ADRENAL INSUFFICIENCY (H): Primary | ICD-10-CM

## 2023-11-14 DIAGNOSIS — D50.9 IRON DEFICIENCY ANEMIA, UNSPECIFIED IRON DEFICIENCY ANEMIA TYPE: ICD-10-CM

## 2023-11-14 DIAGNOSIS — N17.9 ACUTE KIDNEY INJURY (H): ICD-10-CM

## 2023-11-14 DIAGNOSIS — D64.9 ANEMIA, UNSPECIFIED TYPE: ICD-10-CM

## 2023-11-14 DIAGNOSIS — E27.40 ADRENAL INSUFFICIENCY (H): ICD-10-CM

## 2023-11-14 DIAGNOSIS — E83.42 HYPOMAGNESEMIA: ICD-10-CM

## 2023-11-14 LAB
ALBUMIN MFR UR ELPH: 56.4 MG/DL
ALBUMIN SERPL BCG-MCNC: 2.6 G/DL (ref 3.5–5.2)
ALBUMIN UR-MCNC: 50 MG/DL
ANION GAP SERPL CALCULATED.3IONS-SCNC: 8 MMOL/L (ref 7–15)
APPEARANCE UR: CLEAR
BASOPHILS # BLD AUTO: 0 10E3/UL (ref 0–0.2)
BASOPHILS NFR BLD AUTO: 0 %
BILIRUB UR QL STRIP: NEGATIVE
BUN SERPL-MCNC: 16.4 MG/DL (ref 8–23)
CALCIUM SERPL-MCNC: 8.2 MG/DL (ref 8.8–10.2)
CHLORIDE SERPL-SCNC: 95 MMOL/L (ref 98–107)
COLOR UR AUTO: YELLOW
CREAT SERPL-MCNC: 1.05 MG/DL (ref 0.67–1.17)
CREAT UR-MCNC: 163.5 MG/DL
CRP SERPL-MCNC: 46.52 MG/L
DEPRECATED HCO3 PLAS-SCNC: 30 MMOL/L (ref 22–29)
EGFRCR SERPLBLD CKD-EPI 2021: 81 ML/MIN/1.73M2
EOSINOPHIL # BLD AUTO: 0 10E3/UL (ref 0–0.7)
EOSINOPHIL NFR BLD AUTO: 0 %
ERYTHROCYTE [DISTWIDTH] IN BLOOD BY AUTOMATED COUNT: 15.1 % (ref 10–15)
ERYTHROCYTE [DISTWIDTH] IN BLOOD BY AUTOMATED COUNT: 15.2 % (ref 10–15)
FERRITIN SERPL-MCNC: 714 NG/ML (ref 31–409)
GLUCOSE SERPL-MCNC: 233 MG/DL (ref 70–99)
GLUCOSE UR STRIP-MCNC: NEGATIVE MG/DL
HCT VFR BLD AUTO: 25.5 % (ref 40–53)
HCT VFR BLD AUTO: 25.6 % (ref 40–53)
HGB BLD-MCNC: 8.2 G/DL (ref 13.3–17.7)
HGB BLD-MCNC: 8.3 G/DL (ref 13.3–17.7)
HGB UR QL STRIP: ABNORMAL
IMM GRANULOCYTES # BLD: 0 10E3/UL
IMM GRANULOCYTES NFR BLD: 1 %
IRON BINDING CAPACITY (ROCHE): 177 UG/DL (ref 240–430)
IRON SATN MFR SERPL: 17 % (ref 15–46)
IRON SERPL-MCNC: 30 UG/DL (ref 61–157)
KETONES UR STRIP-MCNC: NEGATIVE MG/DL
LEUKOCYTE ESTERASE UR QL STRIP: NEGATIVE
LYMPHOCYTES # BLD AUTO: 0.4 10E3/UL (ref 0.8–5.3)
LYMPHOCYTES NFR BLD AUTO: 11 %
MAGNESIUM SERPL-MCNC: 1.5 MG/DL (ref 1.7–2.3)
MCH RBC QN AUTO: 27.6 PG (ref 26.5–33)
MCH RBC QN AUTO: 28 PG (ref 26.5–33)
MCHC RBC AUTO-ENTMCNC: 32 G/DL (ref 31.5–36.5)
MCHC RBC AUTO-ENTMCNC: 32.5 G/DL (ref 31.5–36.5)
MCV RBC AUTO: 86 FL (ref 78–100)
MCV RBC AUTO: 86 FL (ref 78–100)
MONOCYTES # BLD AUTO: 0.3 10E3/UL (ref 0–1.3)
MONOCYTES NFR BLD AUTO: 8 %
MUCOUS THREADS #/AREA URNS LPF: PRESENT /LPF
NEUTROPHILS # BLD AUTO: 2.7 10E3/UL (ref 1.6–8.3)
NEUTROPHILS NFR BLD AUTO: 80 %
NITRATE UR QL: NEGATIVE
NRBC # BLD AUTO: 0 10E3/UL
NRBC BLD AUTO-RTO: 0 /100
PH UR STRIP: 5.5 [PH] (ref 5–9)
PHOSPHATE SERPL-MCNC: 2.4 MG/DL (ref 2.5–4.5)
PLATELET # BLD AUTO: 163 10E3/UL (ref 150–450)
PLATELET # BLD AUTO: 174 10E3/UL (ref 150–450)
POTASSIUM SERPL-SCNC: 3.6 MMOL/L (ref 3.4–5.3)
PROT/CREAT 24H UR: 0.34 MG/MG CR (ref 0–0.2)
PTH-INTACT SERPL-MCNC: 51 PG/ML (ref 15–65)
RBC # BLD AUTO: 2.96 10E6/UL (ref 4.4–5.9)
RBC # BLD AUTO: 2.97 10E6/UL (ref 4.4–5.9)
RBC URINE: 3 /HPF
RETICS # AUTO: 0.05 10E6/UL (ref 0.03–0.1)
RETICS/RBC NFR AUTO: 1.6 % (ref 0.5–2)
SODIUM SERPL-SCNC: 133 MMOL/L (ref 135–145)
SP GR UR STRIP: 1.02 (ref 1–1.03)
UROBILINOGEN UR STRIP-MCNC: NORMAL MG/DL
VIT B12 SERPL-MCNC: 199 PG/ML (ref 232–1245)
WBC # BLD AUTO: 3.3 10E3/UL (ref 4–11)
WBC # BLD AUTO: 3.4 10E3/UL (ref 4–11)
WBC URINE: 4 /HPF

## 2023-11-14 PROCEDURE — 82306 VITAMIN D 25 HYDROXY: CPT | Mod: ZL | Performed by: INTERNAL MEDICINE

## 2023-11-14 PROCEDURE — 81001 URINALYSIS AUTO W/SCOPE: CPT | Mod: ZL | Performed by: INTERNAL MEDICINE

## 2023-11-14 PROCEDURE — 85014 HEMATOCRIT: CPT | Mod: ZL | Performed by: INTERNAL MEDICINE

## 2023-11-14 PROCEDURE — 82607 VITAMIN B-12: CPT | Mod: ZL | Performed by: INTERNAL MEDICINE

## 2023-11-14 PROCEDURE — 82728 ASSAY OF FERRITIN: CPT | Mod: ZL | Performed by: INTERNAL MEDICINE

## 2023-11-14 PROCEDURE — 83735 ASSAY OF MAGNESIUM: CPT | Mod: ZL | Performed by: INTERNAL MEDICINE

## 2023-11-14 PROCEDURE — 84156 ASSAY OF PROTEIN URINE: CPT | Mod: ZL | Performed by: INTERNAL MEDICINE

## 2023-11-14 PROCEDURE — 99215 OFFICE O/P EST HI 40 MIN: CPT | Performed by: INTERNAL MEDICINE

## 2023-11-14 PROCEDURE — 82533 TOTAL CORTISOL: CPT | Mod: ZL | Performed by: INTERNAL MEDICINE

## 2023-11-14 PROCEDURE — 36415 COLL VENOUS BLD VENIPUNCTURE: CPT | Mod: ZL | Performed by: INTERNAL MEDICINE

## 2023-11-14 PROCEDURE — 83550 IRON BINDING TEST: CPT | Mod: ZL | Performed by: INTERNAL MEDICINE

## 2023-11-14 PROCEDURE — 85045 AUTOMATED RETICULOCYTE COUNT: CPT | Mod: ZL | Performed by: INTERNAL MEDICINE

## 2023-11-14 PROCEDURE — 85027 COMPLETE CBC AUTOMATED: CPT | Mod: ZL | Performed by: INTERNAL MEDICINE

## 2023-11-14 PROCEDURE — 99417 PROLNG OP E/M EACH 15 MIN: CPT | Performed by: INTERNAL MEDICINE

## 2023-11-14 PROCEDURE — 86140 C-REACTIVE PROTEIN: CPT | Mod: ZL | Performed by: INTERNAL MEDICINE

## 2023-11-14 PROCEDURE — 85048 AUTOMATED LEUKOCYTE COUNT: CPT | Mod: ZL | Performed by: INTERNAL MEDICINE

## 2023-11-14 PROCEDURE — 80069 RENAL FUNCTION PANEL: CPT | Mod: ZL | Performed by: INTERNAL MEDICINE

## 2023-11-14 PROCEDURE — 83970 ASSAY OF PARATHORMONE: CPT | Mod: ZL | Performed by: INTERNAL MEDICINE

## 2023-11-14 RX ORDER — FERROUS SULFATE 325(65) MG
325 TABLET ORAL
Qty: 30 TABLET | Refills: 1 | Status: SHIPPED | OUTPATIENT
Start: 2023-11-14 | End: 2023-12-06

## 2023-11-14 RX ORDER — PREDNISONE 10 MG/1
10 TABLET ORAL 2 TIMES DAILY
Qty: 30 TABLET | Refills: 0 | Status: SHIPPED | OUTPATIENT
Start: 2023-11-14 | End: 2023-11-29

## 2023-11-14 ASSESSMENT — PAIN SCALES - GENERAL: PAINLEVEL: MILD PAIN (2)

## 2023-11-14 NOTE — PROGRESS NOTES
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 Melvin at the [place] for ongoing treatment to be given locally.  Initially presented with left neck swelling and was seen by urgent care in the [place] area.  Was treated with antibiotics initially and then was ultimately treated for presumed cat scratch failure with antibiotics.  Symptoms worsened the point where he says he had swelling in his left shoulder.  CT neck was ordered and patient was referred to ENT but could not be seen immediately due to insurance reasons.  He was seen by a general surgeon by the name of Dr. 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  Patient was subsequently referred to Dr. Boston of  ENT at the [facility] [place], [place].  He reviewed the CT neck that was performed on December 9, 2021 and was read as extensive left-sided neck adenopathy with lymph nodes measuring up to 2.4 cm medial to the left sternocleidomastoid muscle just above the level of the hyoid bone.  A PET/CT was ordered and performed on March 9, 2022 and it was read as exuberant soft tissue thickening within the nasopharyngeal and oropharyngeal mucosa concerning for nasopharyngeal carcinoma.  Soft tissue nodular uptake was evident within the  prevertebral soft tissues and about differential because of the epiglottis with extensive neck lymph node uptake.  There was uptake within the lung parenchyma concerning for metastatic uptake of primary malignancy infectious or inflammatory cannot be excluded.  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  The case was presented at the tumor board and the feeling was that patient would require induction chemotherapy followed by chemoradiation and therapy.  It was felt that this regimen would improve recurrence free survival and overall survival as compared to chemo radiotherapy alone.  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  Tumor was also found to be PD-L1 positive at 20% using the :.  Patient was ultimately seen by Dr. Dariela Melvin medical oncology at [facility] on March 22, 2022.  Patient did have a history of hearing loss and subsequently it was decided the patient be a candidate for carboplatin instead of cisplatin as induction therapy.  The plan was to treat with carboplatin and gemcitabine for 3 cycles and then restage in terms of the lung nodules they will be reevaluated after induction.  He was started on carboplatin gemcitabine and was treated between April 4, 2021 to until May 16, 2022.  He required Neulasta support due to neutropenia PET scan performed on May 31, 2022 revealed a partial response subsequently underwent chemoradiotherapy with weekly carboplatin and received radiation therapy at the [facility] under the direction of Dr. Skaggs.  PET scan done on November 16, 2022 revealed a residual 1.5 cm level 2 lymph node with an SUV of 28.7.  A 1 cm enhancing focus in the sternocleidomastoid muscle with an SUV of 22.4 there was a level 2A lymph node that on the left with decreased in size.  With decreased FDG avidity therewas a resolved left level  5 node node.  There was a new patchy groundglass opacity and nodule in the right upper lobe.  Which was felt to be inflammatory subsequently patient underwent a salvage left modified radical neck neck dissection for by Dr. Boston on December 5, 2022.  Pathology revealed stuff tissue mass involving the left posterior sternocleidal mastoid muscle consistent with squamous cell carcinoma moderately differentiated invading into skeletal muscle tumor size 1.1 cm in greatest dimension margins were negative angiolymphatic invasion was present.  A second soft tissue mass at level 2 revealed squamous cell carcinoma moderately differentiated invading the fibroadipose tissue.  Tumor size was 1.5 cm in greatest dimension.  Tumor focally involves the inked resection margin.  Angiolymphatic invasion was present.  All lymph nodes were negative for malignancy.  PET scan performed on February 22, 2023 revealed near complete resolution of the rest of the mild mucosal uptake within the tongue base there were at least 5 FDG avid lesions in the neck there were hypermetabolic there was a mildly FDG avid necrotic lesion inferior to which was deep to the left sternocleidal mastoid muscle.  There was resolution of the right upper lobe groundglass opacities.  The case was presented at tumor conference on February 24, 2023 and the feeling was that there was residual disease on PET scan and recurrence to be at high risk given the fact the patient was.  1 positive and the plan was to proceed with pembrolizumab.  Patient was started on pembrolizumab on March 9, 2023 under the direction of Dr. Norm Melvin cycle 3 was delayed 1 week due to apparent influenza patient had a good partial response he was seen in the emergency department at [facility] for fever and cough and the patient was treated for influenza.  We saw the patient in August 28, the plan was to initiate his next cycle of pembrolizumab here and is due to receive cycle 8  on August 30, he received that at the [facility].  And then was started on cycle 9 beginning September 20 receive cycle 10 on October 11.  He was scheduled to see pulmonology at [facility] tomorrow for evaluation groundglass nodules.  He was to follow-up with Dr. Melvin at the end of November with repeat imaging.  In the interim the patient became extremely ill and was seen in the emergency room on October 26 with complaints of shaking chills fever to 102 night sweats.  There was also an isolated episode of divergent vision with double vision eventually resolved.  He did note multiple tick bites in the recent past as he was out in the woods.  D-dimer was elevated at 1.25 with a CTA of the chest was ordered and came back negative for pulmonary embolus.  COVID testing was negative.  He was empirically treated forpresumed tickborne illness initially with vancomycin and Zosyn admitted to [facility].  Further testing included included PCR testing for Ehrlichia as well as Anaplasma which was negative Lyme testing was negative.  Blood cultures were negative and subsequently discharged on October 28 on doxycycline he still had a temp of 102.3.  He comes in today still on doxycycline but still having spiking temps of over 102.  He also has shaking chills he had some loose stools this morning he did note urinary frequency approximately 2 nights ago but now has hardly any urine output he appears dehydrated denies any headaches he does report the isolated episode of double vision.  He does get an occasional cough he did note nausea and vomiting as well.  We saw him last in October 31.  Ordered labs and he was found to have BUN 41 creatinine 4.58 his CRP was elevated at 330 his sed rate was elevated 60 given the fact that he had acute renal failure refer the patient to emergency room to rule out obstructive uropathy versus checkpoint inhibitor toxicity renal ultrasound revealed no evidence of obstruction.  His  "creatinine remained elevated at 4.59.  He was subsequently transferred to the [place] and admitted on November 1 on admission his temp was 102.7. He underwent a renal biopsy on November 6, 2023 was a limited sample with only 4 glomeruli noted was read as acute interstitial nephritis moderate to severe, there was also focal acute tubular necrosis.  He was seen by nephrology by Dr. Blake Lynn.  The etiology of his renal failure could be possibly related to nephrotoxins NSAID vancomycin we could not rule out checkpoint inhibitor associated acute kidney injury due to pembrolizumab particularly in the setting of acute interstitial nephritis in the setting of fever and elevated inflammatory markers.  Infectious disease performed a work-up but there was no evidence of infection temperature did improve over time.  Fungal cultures were negative, blood cultures were negative, respiratory panel was negative his neutropenia did resolve as well as thrombocytopenia his hemoglobin was low patient was noted to be hypotensive and was treated with normal saline was also noted to have a hemoglobin of 7.2 and required 1 unit of packed red cells CRP did improve nephrology felt patient was hypovolemic and recommended Lasix 40 mg daily patient was eventually discharged without a fever on November 7.  He was seen in follow-up by NATHAN Brock via via telehealth/video visit on November 10.She noted that his CRP was down to 78 but noted that he was having ongoing joint pains and was using any NSAIDs she noted complaints of swelling of the right upper extremity and up Doppler venous ultrasound was negative for DVT.  She also noted that his magnesium was 1.1 and she is set up IV magnesium to be given in the emergency room which was given on November 11 she recommended continuing oral magnesium oxide.  Patient comes in today performance status is still relatively poor at 2 \"improved since his last visit.  He says his biggest problem " right now states having joint pain and swelling of all his digits as well as the wrist of both hands.  He also has ongoing fatigue.  He denies any urinary symptoms.  His fevers have resolved.  He is rather frustrated as she does not want to travel to [place] for regular visits and would like to stay with our clinic on a regular basis.  Nonetheless overall he feels somewhat better since stopping the pembrolizumab.  He has had at least 10 to 15 pound weight loss.  He still is anorexic but tries to eat at least 2 meals a day.  He denies any cough hemoptysis or dyspnea.  Denies any change in bowel habits although he states he gets occasional blood on the toilet paper.  Denies any hematemesis or melena.  He states he is somewhat deconditioned from staying hospital in a rather sedentary state.  He is reluctant to resume pembrolizumab.  He was scheduled to have scans that were ordered by Dr. Melvin November 27.      ______________________________________________________________________________    Past History    Past Medical History:   Diagnosis Date    COPD (chronic obstructive pulmonary disease) (H)     Dyslipidemia     Hard to intubate 12/5/2022    Nasopharyngeal carcinoma (H)     BARRETT (obstructive sleep apnea)     Type 2 diabetes mellitus with diabetic nephropathy (H)        Past Surgical History:   Procedure Laterality Date    COLONOSCOPY      DISSECTION RADICAL NECK MODIFIED Left 12/5/2022    Procedure: left modified radical neck dissection;  Surgeon: Jesus Boston MD;  Location: [facility] OR    INSERT PORT VASCULAR ACCESS Right 03/31/2022    Procedure: SINGLE LUMEN POWER PORT INSERTION, VASCULAR ACCESS;  Surgeon: Evnes Aponte MD;  Location: [facility] OR    IR CHEST PORT PLACEMENT > 5 YRS OF AGE  03/31/2022    IR RENAL BIOPSY RIGHT  11/6/2023    KNEE SURGERY      LARYNGOSCOPY WITH BIOPSY(IES) N/A 12/5/2022    Procedure: LARYNGOSCOPY, WITH BIOPSY;  Surgeon: Jesus Boston MD;  Location: [facility] OR    TONSILLECTOMY              Review of systems.  CNS: There are no headaches, no blurred vision, no change in mental status,   ENT: There is no hearing loss.  Respiratory: There is mild dyspnea on exertion no cough or hemoptysis.  Cardiac: There is no chest pain, orthopnea, PND, or ankle edema.  GI: There is  occasional bright red blood per rectum, no hematemesis, no reflux, no diarrhea or constipation  Musculoskeletal: There is occasional proximal lower extremity myalgias.  There is diffuse chronic pain involving the wrists and all digits of the hand as well as the knees.  : There is no urinary frequency, hematuria.  Constitutional: There is no fevers, night sweats, there is a 10 pound weight loss.  Endocrine: There is chronic fatigue  Neuro: There is no tingling or numbness in the hands or feet.  Hematologic: There is no gingival bleeding, epistaxis, or easy bruisability.  Dermatologic: There is no skin rash.  A 14 point review of systems is otherwise negative.          Physical Exam    /54 (BP Location: Right arm, Patient Position: Sitting, Cuff Size: Adult Regular)   Pulse 102   Temp 98.8  F (37.1  C) (Tympanic)   Resp 16   Wt 107.5 kg (237 lb)   SpO2 90%   BMI 34.01 kg/m        GENERAL: Alert and oriented to time place and person. Seated comfortably. In no distress.    HEAD: Atraumatic and normocephalic.    EYES: YON, EOMI.  No pallor.  No icterus.    Oral cavity: no mucosal lesion or tonsillar enlargement.    NECK: supple. JVP normal.  No thyroid enlargement.    LYMPH NODES: There are no palpable cervical, supraclavicular, axillary, or inguinal nodes.    LUNGS: clear to auscultation bilaterally.  Resonant to percussion throughout bilaterally.  Symmetrical breath movements bilaterally.    HEART: S1 and S2 are heard. Regular rate and rhythm.  No murmur or gallop or rub heard.  No peripheral edema.    ABDOMEN: Soft. Not tender. Not distended.  No palpable hepatomegaly or splenomegaly.  No other mass  palpable.  Bowel sounds heard.    EXTREMITIES: There is there is fusiform swelling of the MCPs PIPs of both hands with tenderness to palpation there is also mild swelling of both wrists.  There is trace  ankle edema.  SKIN: no rash, or bruising or purpura.    NEURO: Grossly non-focal.    Lab Results    Recent Results (from the past 240 hour(s))   Glucose by meter    Collection Time: 11/04/23  9:29 PM   Result Value Ref Range    GLUCOSE BY METER POCT 149 (H) 70 - 99 mg/dL   Comprehensive metabolic panel    Collection Time: 11/05/23  6:47 AM   Result Value Ref Range    Sodium 132 (L) 135 - 145 mmol/L    Potassium 3.9 3.4 - 5.3 mmol/L    Carbon Dioxide (CO2) 26 22 - 29 mmol/L    Anion Gap 6 (L) 7 - 15 mmol/L    Urea Nitrogen 51.2 (H) 8.0 - 23.0 mg/dL    Creatinine 3.44 (H) 0.67 - 1.17 mg/dL    GFR Estimate 19 (L) >60 mL/min/1.73m2    Calcium 8.0 (L) 8.8 - 10.2 mg/dL    Chloride 100 98 - 107 mmol/L    Glucose 108 (H) 70 - 99 mg/dL    Alkaline Phosphatase 86 40 - 129 U/L    AST 33 0 - 45 U/L    ALT 16 0 - 70 U/L    Protein Total 6.0 (L) 6.4 - 8.3 g/dL    Albumin 2.3 (L) 3.5 - 5.2 g/dL    Bilirubin Total 0.3 <=1.2 mg/dL   CRP inflammation    Collection Time: 11/05/23  6:47 AM   Result Value Ref Range    CRP Inflammation 175.87 (H) <5.00 mg/L   CBC with platelets and differential    Collection Time: 11/05/23  6:47 AM   Result Value Ref Range    WBC Count 4.6 4.0 - 11.0 10e3/uL    RBC Count 2.65 (L) 4.40 - 5.90 10e6/uL    Hemoglobin 7.2 (L) 13.3 - 17.7 g/dL    Hematocrit 23.1 (L) 40.0 - 53.0 %    MCV 87 78 - 100 fL    MCH 27.2 26.5 - 33.0 pg    MCHC 31.2 (L) 31.5 - 36.5 g/dL    RDW 15.6 (H) 10.0 - 15.0 %    Platelet Count 139 (L) 150 - 450 10e3/uL    % Neutrophils 85 %    % Lymphocytes 6 %    % Monocytes 8 %    % Eosinophils 0 %    % Basophils 0 %    % Immature Granulocytes 1 %    NRBCs per 100 WBC 0 <1 /100    Absolute Neutrophils 3.9 1.6 - 8.3 10e3/uL    Absolute Lymphocytes 0.3 (L) 0.8 - 5.3 10e3/uL    Absolute Monocytes  0.3 0.0 - 1.3 10e3/uL    Absolute Eosinophils 0.0 0.0 - 0.7 10e3/uL    Absolute Basophils 0.0 0.0 - 0.2 10e3/uL    Absolute Immature Granulocytes 0.0 <=0.4 10e3/uL    Absolute NRBCs 0.0 10e3/uL   Lactate Dehydrogenase    Collection Time: 11/05/23  6:47 AM   Result Value Ref Range    Lactate Dehydrogenase 216 0 - 250 U/L   Reticulocyte count    Collection Time: 11/05/23  6:47 AM   Result Value Ref Range    % Reticulocyte 0.2 (L) 0.5 - 2.0 %    Absolute Reticulocyte 0.006 (L) 0.025 - 0.095 10e6/uL   Adult Type and Screen    Collection Time: 11/05/23  6:47 AM   Result Value Ref Range    ABO/RH(D) O NEG     Antibody Screen Negative Negative    SPECIMEN EXPIRATION DATE [id number]    Glucose by meter    Collection Time: 11/05/23  7:31 AM   Result Value Ref Range    GLUCOSE BY METER POCT 112 (H) 70 - 99 mg/dL   Prepare red blood cells (unit)    Collection Time: 11/05/23 10:52 AM   Result Value Ref Range    Blood Component Type Red Blood Cells     Product Code [id number]     Unit Status Transfused     Unit Number [id number]     CROSSMATCH Compatible     CODING SYSTEM [id number]     ISSUE DATE AND TIME [id number]     UNIT ABO/RH O-     UNIT TYPE ISBT [id number]    Glucose by meter    Collection Time: 11/05/23 11:42 AM   Result Value Ref Range    GLUCOSE BY METER POCT 128 (H) 70 - 99 mg/dL   Haptoglobin    Collection Time: 11/05/23 12:14 PM   Result Value Ref Range    Haptoglobin 233 (H) 32 - 197 mg/dL   Bld morphology pathology review    Collection Time: 11/05/23 12:14 PM   Result Value Ref Range    Final Diagnosis       Peripheral Blood Smear:  -Marked normochromic, normocytic anemia; no increase in erythrocyte regeneration  -Lymphopenia  -Slight thrombocytopenia      Comment       The findings are similar to those reported on recent peripheral smear 11/1/2023. The etiology of this patient's anemia is not apparent from this peripheral blood smear. There is no definitive morphologic evidence of hemolysis, however if clinical  suspicion is high for hemolysis, serial determinations of D-dimer, fibrinogen, LDH, and haptoglobin may be helpful. Correlation with clinical context and all other ancillary studies is recommended.    There is a lymphopenia, differential considerations include viral infections, medication effect (including steroids), nutritional deficiencies, autoimmune disorders, sarcoidosis, and some neoplastic conditions.  Clinical correlation is recommended.    Thrombocytopenia is present.  This peripheral smear does not indicate a specific etiology for thrombocytopenia, some common causes of thrombocytopenia include infection, medication effect, alcohol use, nutritional deficiencies, immune mediated mechanisms, consumptive processes, and splenic sequestration. Clinical correlation is recommended.      Clinical Information       From Epic electronic medical record; 63-year-old male with a history of nasopharyngeal carcinoma status postsurgery, chemotherapy and radiation, currently receiving Keytruda every 3 weeks was admitted with fever of unknown etiology and acute kidney injury. Peripheral smear review requested for progressive anemia and acute kidney injury.       Peripheral Smear       The red blood cells appear normochromic.  Poikilocytosis includes rare echinocytes and rare elliptocytes.  Polychromasia is not increased.  Rouleaux formation is not increased.   The morphology of the platelets is normal.  Lymphocytes are polymorphous. Neutrophils display predominantly normal cytoplasmic granulation and unremarkable nuclear morphology.      Peripheral Hematologic Data       CBC WITH DIFFERENTIAL(11/05/2023 12:27 PM CST):     RESULT VALUE REF. RANGE UNITS   WBC Count   Hemoglobin    Hematocrit    Platelet Count    RBC Count   MCV  MCH  MCHC   RDW  4.9 (NORMAL)     8.3  ( L )      25.9  ( L )   145  ( L )   2.96  ( L )       88 (NORMAL)     28.0 (NORMAL)     32.0 (NORMAL)      15.5  ( H )  4.0-11.0  13.3-17.7  40.0-53.0  150-450  4.40-5.90    26.5-33.0  31.5-36.5  10.0-15.0 10e3/uL  g/dL  %  10e3/uL  10e6/uL  fL  pg  g/dL  %   % Neutrophils  % Lymphocytes  % Monocytes  % Eosinophils  % Basophils  % Immature Granulocytes  Absolute Neutrophils   Absolute Lymphocytes   Absolute Monocytes  Absolute Eosinophils  Absolute Basophils  Absolute Immature Granulocytes  NRBCs per 100 WBC  Absolute NRBCs 85  7  8  0  0  0  4.1 (NORMAL)   0.3  ( L )  0.4 (NORMAL)  0.0 (NORMAL)  0.0 (NORMAL)  0.0 (NORMAL)  0 (NORMAL)  0.0 () N/A  N/A  N/A  N/A  N/A  N/A  1.6-8.3  0.8-5.3  0.0-1.3  0.0-0.7  0.0-0.2  <=0.4  <1  <=0.0 %  %  %  %  %  %  10e3/uL  10e3/uL  10e3/uL  10e3/uL  10e3/uL  10e3/uL  /100  10e3/uL   RETICULOCYTE COUNT (11/05/2023 12:27 PM CST):  RESULT VALUE REF. RANGE UNITS    % Reticulocyte    Absolute Reticulocyte   0.3  ( L )   0.007  ( L ) 0.5-2.0  0.025-0.095 %  10e6/uL         Performing Labs       The technical component of this testing was completed at [facility]     CBC with platelets and differential    Collection Time: 11/05/23 12:14 PM   Result Value Ref Range    WBC Count 4.9 4.0 - 11.0 10e3/uL    RBC Count 2.96 (L) 4.40 - 5.90 10e6/uL    Hemoglobin 8.3 (L) 13.3 - 17.7 g/dL    Hematocrit 25.9 (L) 40.0 - 53.0 %    MCV 88 78 - 100 fL    MCH 28.0 26.5 - 33.0 pg    MCHC 32.0 31.5 - 36.5 g/dL    RDW 15.5 (H) 10.0 - 15.0 %    Platelet Count 145 (L) 150 - 450 10e3/uL    % Neutrophils 85 %    % Lymphocytes 7 %    % Monocytes 8 %    % Eosinophils 0 %    % Basophils 0 %    % Immature Granulocytes 0 %    NRBCs per 100 WBC 0 <1 /100    Absolute Neutrophils 4.1 1.6 - 8.3 10e3/uL    Absolute Lymphocytes 0.3 (L) 0.8 - 5.3 10e3/uL    Absolute Monocytes 0.4 0.0 - 1.3 10e3/uL    Absolute Eosinophils 0.0 0.0 - 0.7 10e3/uL    Absolute Basophils 0.0 0.0 - 0.2 10e3/uL    Absolute Immature Granulocytes 0.0 <=0.4 10e3/uL    Absolute NRBCs 0.0  10e3/uL   Reticulocyte count    Collection Time: 11/05/23 12:14 PM   Result Value Ref Range    % Reticulocyte 0.3 (L) 0.5 - 2.0 %    Absolute Reticulocyte 0.007 (L) 0.025 - 0.095 10e6/uL   Extra Purple Top Tube    Collection Time: 11/05/23 12:14 PM   Result Value Ref Range    Hold Specimen JIC    Glucose by meter    Collection Time: 11/05/23  4:45 PM   Result Value Ref Range    GLUCOSE BY METER POCT 192 (H) 70 - 99 mg/dL   Glucose by meter    Collection Time: 11/05/23 10:18 PM   Result Value Ref Range    GLUCOSE BY METER POCT 136 (H) 70 - 99 mg/dL   Glucose by meter    Collection Time: 11/06/23  1:44 AM   Result Value Ref Range    GLUCOSE BY METER POCT 136 (H) 70 - 99 mg/dL   Comprehensive metabolic panel    Collection Time: 11/06/23  6:34 AM   Result Value Ref Range    Sodium 136 135 - 145 mmol/L    Potassium 4.1 3.4 - 5.3 mmol/L    Carbon Dioxide (CO2) 24 22 - 29 mmol/L    Anion Gap 8 7 - 15 mmol/L    Urea Nitrogen 48.0 (H) 8.0 - 23.0 mg/dL    Creatinine 3.00 (H) 0.67 - 1.17 mg/dL    GFR Estimate 23 (L) >60 mL/min/1.73m2    Calcium 7.9 (L) 8.8 - 10.2 mg/dL    Chloride 104 98 - 107 mmol/L    Glucose 105 (H) 70 - 99 mg/dL    Alkaline Phosphatase 84 40 - 129 U/L    AST 34 0 - 45 U/L    ALT 14 0 - 70 U/L    Protein Total 6.1 (L) 6.4 - 8.3 g/dL    Albumin 2.4 (L) 3.5 - 5.2 g/dL    Bilirubin Total 0.4 <=1.2 mg/dL   CRP inflammation    Collection Time: 11/06/23  6:34 AM   Result Value Ref Range    CRP Inflammation 157.46 (H) <5.00 mg/L   CBC with platelets and differential    Collection Time: 11/06/23  6:34 AM   Result Value Ref Range    WBC Count 4.4 4.0 - 11.0 10e3/uL    RBC Count 2.96 (L) 4.40 - 5.90 10e6/uL    Hemoglobin 8.1 (L) 13.3 - 17.7 g/dL    Hematocrit 25.9 (L) 40.0 - 53.0 %    MCV 88 78 - 100 fL    MCH 27.4 26.5 - 33.0 pg    MCHC 31.3 (L) 31.5 - 36.5 g/dL    RDW 15.3 (H) 10.0 - 15.0 %    Platelet Count 156 150 - 450 10e3/uL    % Neutrophils 83 %    % Lymphocytes 7 %    % Monocytes 9 %    % Eosinophils 0 %     % Basophils 0 %    % Immature Granulocytes 1 %    NRBCs per 100 WBC 0 <1 /100    Absolute Neutrophils 3.7 1.6 - 8.3 10e3/uL    Absolute Lymphocytes 0.3 (L) 0.8 - 5.3 10e3/uL    Absolute Monocytes 0.4 0.0 - 1.3 10e3/uL    Absolute Eosinophils 0.0 0.0 - 0.7 10e3/uL    Absolute Basophils 0.0 0.0 - 0.2 10e3/uL    Absolute Immature Granulocytes 0.0 <=0.4 10e3/uL    Absolute NRBCs 0.0 10e3/uL   Glucose by meter    Collection Time: 11/06/23  7:18 AM   Result Value Ref Range    GLUCOSE BY METER POCT 111 (H) 70 - 99 mg/dL   Surgical Pathology Exam    Collection Time: 11/06/23  9:03 AM   Result Value Ref Range    Case Report       Surgical Pathology Report                         Case: [id number]                                  Authorizing Provider:  Mikie Guillen MD        Collected:           11/06/2023 09:03 AM          Ordering Location:     [facility] Received:            11/06/2023 10:48 AM                                 Surg Orthopedic                                                              Pathologist:           Pablito Farrar MD                                                              Specimen:    Kidney, Right, Kidney biopsy                                                               Final Diagnosis       A. kidney biopsy (needle):    Limited sample with 4 glomeruli in total    Acute interstitial nephritis, moderate to severe    Focal acute tubular necrosis    No significant chronic changes of the limited parenchyma, including:   - no global glomerulosclerosis    - no significant tubular atrophy and interstitial fibrosis    - mild arterial and arteriolar sclerosis      Comment       Dr. Lynn was notified of the preliminary findings on 11/7/2023 at 11:40 AM.      Clinical Information       a 63 year old year old man with a nasopharyngeal squamous cell cancer (s/p surgery, chemoradiation and now undergoing treatment with pembrolizumab), COPD, BARRETT, hypothyroidism, DM 2 (on metformin and  "delaglutide) admitted with fever, elevated inflammatory markers and JOCE.  Baseline Cr ~0.9 mg/dL.  Cr increased about 1 week ago, and peaked to 5.2 mg/dL.  Positive HIRAM - Titer 1:160.  C3 and C4 normal.  ANCA negative.        Gross Description       A(A). Kidney, Right, Kidney biopsy:  The specimen is received in formalin with proper patient identification, labeled \"kidney biopsy\".  The specimen consists of 2 tan needle core biopsies 0.7-1.5 cm in length and each 0.1 cm in diameter.  The specimen is wrapped and is entirely submitted in cassette 1.    A separate specimen is received in Mohan fixative and consists of a single tan core biopsy, 0.5 cm in length and 0.1 cm in diameter which is processed for immunofluorescence studies.    A separate specimen is in glutaraldehyde and consists of a single tan core biopsy, 0.2 in length and 0.1 cm in diameter which is processed for electron microscopic studies.        Microscopic Description       LIGHT MICROSCOPY:  Sections of formalin-fixed, paraffin embedded tissue were evaluated using H&E, PAS, Rosario, and trichrome stains with the appropriate controls. An H&E-stained frozen section taken from the tissue allocated for immunofluorescence microscopy and methylene blue-stained epoxy sections of the tissue processed for electron microscopy were also evaluated using light microscopy.     The sample consists of kidney cortex and medulla, and it includes 4 glomeruli (LM-2; IF-2; EM-0), of which none are globally sclerosed. The glomeruli are of normal size, architecture and cellularity. The glomerular capillary loops are of normal thickness and texture. No basement membrane spikes, craters, or double contours are seen in the glomeruli. Significant endocapillary proliferation or cellular crescents are not seen in the glomeruli. The mesangium appears unremarkable. Tubules show degenerative changes characterized by focal loss of the brush border.  Isolated tubules contain necrotic " cellular debris.  Non- polarizable calcium phosphate crystals are seen in isolated tubules.  The sample reveals patchy interstitial inflammation, associated with tubulitis in some tubules; the infiltrates are composed of predominantly mononuclear cells; there are few neutrophils and no significant eosinophils in the interstitium.  There are no granulomas or obvious signs of a viral cytopathic effect seen in the sample. Isolated foci of Tamm-Horsfall protein inspissation are present in the interstitium. No oxalate or urate crystals are present in the sample.  The renal cortex shows no significant tubular atrophy and interstitial fibrosis. Arteries and arterioles show focal mild sclerosis.  Vasculitis is not seen in the sample    IMMUNOFLUORESCENCE MICROSCOPY:   The sections of the sample submitted for immunofluorescence studies were incubated with antibodies specific for the heavy chains of IgG, IgA, and IgM, for kappa and lambda light chains, fibrin, albumin, and complement components C3 and C1q.    The sample contains 2 glomeruli. Glomeruli with global sclerosis are not seen. No significant immune deposits are seen in the glomeruli. Tubules contain few intraluminal casts reactive for polyclonal IgA. The interstitium reveals scattered fibrin deposits. Arterioles reveal focal and strong reactivity for C3. There is no difference in reactivity between kappa and lambda light chains in the glomeruli, tubular casts and background of the tissue.     ELECTRON MICROSCOPY:   The sample submitted for electron microscopy examination contains medullary tissue with no glomeruli; ultrastructural examination is not performed.     I have personally reviewed all specimens and slides, including the listed special stains, and used them with my medical judgement to determine the final diagnosis.      Performing Labs       The technical component of this testing was completed at [facility]      Case Images     Glucose by meter    Collection Time: 11/06/23 11:10 AM   Result Value Ref Range    GLUCOSE BY METER POCT 112 (H) 70 - 99 mg/dL   Glucose by meter    Collection Time: 11/06/23  5:17 PM   Result Value Ref Range    GLUCOSE BY METER POCT 176 (H) 70 - 99 mg/dL   Glucose by meter    Collection Time: 11/06/23  9:35 PM   Result Value Ref Range    GLUCOSE BY METER POCT 130 (H) 70 - 99 mg/dL   Comprehensive metabolic panel    Collection Time: 11/07/23  7:01 AM   Result Value Ref Range    Sodium 137 135 - 145 mmol/L    Potassium 4.3 3.4 - 5.3 mmol/L    Carbon Dioxide (CO2) 25 22 - 29 mmol/L    Anion Gap 9 7 - 15 mmol/L    Urea Nitrogen 39.5 (H) 8.0 - 23.0 mg/dL    Creatinine 2.35 (H) 0.67 - 1.17 mg/dL    GFR Estimate 31 (L) >60 mL/min/1.73m2    Calcium 8.1 (L) 8.8 - 10.2 mg/dL    Chloride 103 98 - 107 mmol/L    Glucose 107 (H) 70 - 99 mg/dL    Alkaline Phosphatase 92 40 - 129 U/L    AST 41 0 - 45 U/L    ALT 16 0 - 70 U/L    Protein Total 6.6 6.4 - 8.3 g/dL    Albumin 2.4 (L) 3.5 - 5.2 g/dL    Bilirubin Total 0.5 <=1.2 mg/dL   CRP inflammation    Collection Time: 11/07/23  7:01 AM   Result Value Ref Range    CRP Inflammation 130.60 (H) <5.00 mg/L   Magnesium    Collection Time: 11/07/23  7:01 AM   Result Value Ref Range    Magnesium 1.4 (L) 1.7 - 2.3 mg/dL   CBC with platelets and differential    Collection Time: 11/07/23  7:01 AM   Result Value Ref Range    WBC Count 5.0 4.0 - 11.0 10e3/uL    RBC Count 3.17 (L) 4.40 - 5.90 10e6/uL    Hemoglobin 9.0 (L) 13.3 - 17.7 g/dL    Hematocrit 27.9 (L) 40.0 - 53.0 %    MCV 88 78 - 100 fL    MCH 28.4 26.5 - 33.0 pg    MCHC 32.3 31.5 - 36.5 g/dL    RDW 15.4 (H) 10.0 - 15.0 %    Platelet Count 163 150 - 450 10e3/uL    % Neutrophils 82 %    % Lymphocytes 8 %    % Monocytes 9 %    % Eosinophils 0 %    % Basophils 0 %    % Immature Granulocytes 1 %    NRBCs per 100 WBC 0 <1 /100    Absolute Neutrophils 4.1 1.6 - 8.3 10e3/uL    Absolute Lymphocytes 0.4 (L)  0.8 - 5.3 10e3/uL    Absolute Monocytes 0.5 0.0 - 1.3 10e3/uL    Absolute Eosinophils 0.0 0.0 - 0.7 10e3/uL    Absolute Basophils 0.0 0.0 - 0.2 10e3/uL    Absolute Immature Granulocytes 0.0 <=0.4 10e3/uL    Absolute NRBCs 0.0 10e3/uL   DNA double stranded antibodies    Collection Time: 11/07/23  7:01 AM   Result Value Ref Range    DNA (ds) Antibody 3.0 <10.0 IU/mL   Glucose by meter    Collection Time: 11/07/23 12:43 PM   Result Value Ref Range    GLUCOSE BY METER POCT 96 70 - 99 mg/dL   Comprehensive metabolic panel    Collection Time: 11/10/23  1:29 PM   Result Value Ref Range    Sodium 133 (L) 135 - 145 mmol/L    Potassium 3.7 3.4 - 5.3 mmol/L    Carbon Dioxide (CO2) 27 22 - 29 mmol/L    Anion Gap 8 7 - 15 mmol/L    Urea Nitrogen 20.0 8.0 - 23.0 mg/dL    Creatinine 1.45 (H) 0.67 - 1.17 mg/dL    GFR Estimate 55 (L) >60 mL/min/1.73m2    Calcium 7.9 (L) 8.8 - 10.2 mg/dL    Chloride 98 98 - 107 mmol/L    Glucose 249 (H) 70 - 99 mg/dL    Alkaline Phosphatase 74 40 - 129 U/L    AST 28 0 - 45 U/L    ALT 15 0 - 70 U/L    Protein Total 6.5 6.4 - 8.3 g/dL    Albumin 2.6 (L) 3.5 - 5.2 g/dL    Bilirubin Total 0.5 <=1.2 mg/dL   Magnesium    Collection Time: 11/10/23  1:29 PM   Result Value Ref Range    Magnesium 1.1 (L) 1.7 - 2.3 mg/dL   CRP inflammation    Collection Time: 11/10/23  1:29 PM   Result Value Ref Range    CRP Inflammation 78.78 (H) <5.00 mg/L   CBC with platelets and differential    Collection Time: 11/10/23  1:29 PM   Result Value Ref Range    WBC Count 3.6 (L) 4.0 - 11.0 10e3/uL    RBC Count 3.01 (L) 4.40 - 5.90 10e6/uL    Hemoglobin 8.4 (L) 13.3 - 17.7 g/dL    Hematocrit 26.3 (L) 40.0 - 53.0 %    MCV 87 78 - 100 fL    MCH 27.9 26.5 - 33.0 pg    MCHC 31.9 31.5 - 36.5 g/dL    RDW 15.4 (H) 10.0 - 15.0 %    Platelet Count 166 150 - 450 10e3/uL    % Neutrophils 82 %    % Lymphocytes 9 %    % Monocytes 8 %    % Eosinophils 0 %    % Basophils 0 %    % Immature Granulocytes 1 %    NRBCs per 100 WBC 0 <1 /100     Absolute Neutrophils 3.0 1.6 - 8.3 10e3/uL    Absolute Lymphocytes 0.3 (L) 0.8 - 5.3 10e3/uL    Absolute Monocytes 0.3 0.0 - 1.3 10e3/uL    Absolute Eosinophils 0.0 0.0 - 0.7 10e3/uL    Absolute Basophils 0.0 0.0 - 0.2 10e3/uL    Absolute Immature Granulocytes 0.0 <=0.4 10e3/uL    Absolute NRBCs 0.0 10e3/uL   UA with Microscopic    Collection Time: 11/14/23  9:22 AM   Result Value Ref Range    Color Urine Yellow Colorless, Straw, Light Yellow, Yellow    Appearance Urine Clear Clear    Glucose Urine Negative Negative mg/dL    Bilirubin Urine Negative Negative    Ketones Urine Negative Negative mg/dL    Specific Gravity Urine 1.019 1.000 - 1.030    Blood Urine Trace (A) Negative    pH Urine 5.5 5.0 - 9.0    Protein Albumin Urine 50 (A) Negative mg/dL    Urobilinogen Urine Normal Normal, 2.0 mg/dL    Nitrite Urine Negative Negative    Leukocyte Esterase Urine Negative Negative    Mucus Urine Present (A) None Seen /LPF    RBC Urine 3 (H) <=2 /HPF    WBC Urine 4 <=5 /HPF   Protein  random urine    Collection Time: 11/14/23  9:22 AM   Result Value Ref Range    Total Protein Urine mg/dL 56.4   mg/dL    Total Protein Urine mg/mg Creat 0.34 (H) 0.00 - 0.20 mg/mg Cr    Creatinine Urine mg/dL 163.5 mg/dL   CBC with platelets    Collection Time: 11/14/23  9:22 AM   Result Value Ref Range    WBC Count 3.3 (L) 4.0 - 11.0 10e3/uL    RBC Count 2.97 (L) 4.40 - 5.90 10e6/uL    Hemoglobin 8.2 (L) 13.3 - 17.7 g/dL    Hematocrit 25.6 (L) 40.0 - 53.0 %    MCV 86 78 - 100 fL    MCH 27.6 26.5 - 33.0 pg    MCHC 32.0 31.5 - 36.5 g/dL    RDW 15.2 (H) 10.0 - 15.0 %    Platelet Count 163 150 - 450 10e3/uL   Renal panel    Collection Time: 11/14/23  9:22 AM   Result Value Ref Range    Sodium 133 (L) 135 - 145 mmol/L    Potassium 3.6 3.4 - 5.3 mmol/L    Chloride 95 (L) 98 - 107 mmol/L    Carbon Dioxide (CO2) 30 (H) 22 - 29 mmol/L    Anion Gap 8 7 - 15 mmol/L    Glucose 233 (H) 70 - 99 mg/dL    Urea Nitrogen 16.4 8.0 - 23.0 mg/dL    Creatinine  1.05 0.67 - 1.17 mg/dL    GFR Estimate 81 >60 mL/min/1.73m2    Calcium 8.2 (L) 8.8 - 10.2 mg/dL    Albumin 2.6 (L) 3.5 - 5.2 g/dL    Phosphorus 2.4 (L) 2.5 - 4.5 mg/dL   Ferritin    Collection Time: 11/14/23  9:22 AM   Result Value Ref Range    Ferritin 714 (H) 31 - 409 ng/mL   Vitamin B12    Collection Time: 11/14/23  9:22 AM   Result Value Ref Range    Vitamin B12 199 (L) 232 - 1,245 pg/mL   Iron & Iron Binding Capacity    Collection Time: 11/14/23  9:22 AM   Result Value Ref Range    Iron 30 (L) 61 - 157 ug/dL    Iron Binding Capacity 177 (L) 240 - 430 ug/dL    Iron Sat Index 17 15 - 46 %   Magnesium    Collection Time: 11/14/23  9:22 AM   Result Value Ref Range    Magnesium 1.5 (L) 1.7 - 2.3 mg/dL   CRP inflammation    Collection Time: 11/14/23  9:22 AM   Result Value Ref Range    CRP Inflammation 46.52 (H) <5.00 mg/L   CBC with platelets and differential    Collection Time: 11/14/23  9:22 AM   Result Value Ref Range    WBC Count 3.4 (L) 4.0 - 11.0 10e3/uL    RBC Count 2.96 (L) 4.40 - 5.90 10e6/uL    Hemoglobin 8.3 (L) 13.3 - 17.7 g/dL    Hematocrit 25.5 (L) 40.0 - 53.0 %    MCV 86 78 - 100 fL    MCH 28.0 26.5 - 33.0 pg    MCHC 32.5 31.5 - 36.5 g/dL    RDW 15.1 (H) 10.0 - 15.0 %    Platelet Count 174 150 - 450 10e3/uL    % Neutrophils 80 %    % Lymphocytes 11 %    % Monocytes 8 %    % Eosinophils 0 %    % Basophils 0 %    % Immature Granulocytes 1 %    NRBCs per 100 WBC 0 <1 /100    Absolute Neutrophils 2.7 1.6 - 8.3 10e3/uL    Absolute Lymphocytes 0.4 (L) 0.8 - 5.3 10e3/uL    Absolute Monocytes 0.3 0.0 - 1.3 10e3/uL    Absolute Eosinophils 0.0 0.0 - 0.7 10e3/uL    Absolute Basophils 0.0 0.0 - 0.2 10e3/uL    Absolute Immature Granulocytes 0.0 <=0.4 10e3/uL    Absolute NRBCs 0.0 10e3/uL   Reticulocyte count    Collection Time: 11/14/23  9:22 AM   Result Value Ref Range    % Reticulocyte 1.6 0.5 - 2.0 %    Absolute Reticulocyte 0.047 0.025 - 0.095 10e6/uL       Imaging    US Upper Extremity Venous Duplex  Right    Result Date: 11/13/2023  US UPPER EXTREMITY VENOUS DUPLEX RIGHT 11/13/2023 10:21 AM History:Male, age 63 years; ; Pain of right upper extremity Comparison: No relevant prior imaging. Technique: Grayscale and color Doppler ultrasound of the right  upper extremity venous structures. Findings: The deep venous structures are patent and fully compressible. There is normal augmentation of flow.     Impression: No evidence of DVT. SERGIO HANNA MD   SYSTEM ID:  [id number]    CT Abdomen Pelvis w/o Contrast    Result Date: 11/9/2023  PROCEDURE:  CT ABDOMEN PELVIS W/O CONTRAST HISTORY:  Nasopharyngeal cancer (H) on keytruda. TECHNIQUE:  Helical CT of the abdomen and pelvis was performed without intravenous contrast. This CT exam was performed using one or more the following dose reduction techniques: automated exposure control, adjustment of the mA and/or kV according to patient size, and/or iterative reconstruction technique. COMPARISON:  9/13/2023 FINDINGS:  Evaluation of the solid organs is somewhat limited due to the lack of intravenous contrast. Limited views through the lung bases demonstrate trace pleural fluid. There is new adenopathy and stranding within the SMA mesentery. A reference dominant node measures 1.5 x 2.1 cm on series 2 image 38. The spleen is enlarged at 15.5 cm in length. There is a calcified gallstone. The noncontrast appearance of the liver and adrenal glands is unremarkable. Multiple duodenal diverticula are redemonstrated. There is no hydronephrosis. The small bowel is normal in caliber. Trace ascites is present. The aorta is normal in size with scattered atherosclerotic calcifications.  No suspicious osseous lesions are identified.     IMPRESSION:  New adenopathy and stranding of the SMA mesentery. Splenomegaly. Reactive change, lymphoma and immunotherapy induced sarcoid-like reaction (SLR) are in the differential. Rapid development argues against but does not exclude classic sclerosing  mesenteritis. Trace pleural fluid. JUAN CARLOS MALCOLM MD   SYSTEM ID:  [id number]    IR Renal Biopsy Right    Result Date: 11/6/2023  PROCEDURE: Ultrasound-guided biopsy Procedural Personnel Advanced practice provider(s): Vladimir Julien PA-C Procedure Date (mm/dd/yyyy): 11/6/2023 Pre-procedure diagnosis: JOCE Post-procedure diagnosis: Same Indication: Organ dysfunction Previous biopsy of same target (QCDR): No Additional clinical history: Nasopharyngeal cancer, on Keytruda, rapidly worsening kidney function. IR consulted for kidney biopsy. Complications: No immediate complications.     IMPRESSION: Ultrasound-guided biopsy of random native right kidney. Plan: Specimen(s) sent for evaluation. _______________________________________________________________ PROCEDURE SUMMARY: - Percutaneous US-guided coaxial core needle biopsy - Additional procedure(s): None PROCEDURE DETAILS: Pre-procedure Reference imaging for biopsy target: None Consent: Informed consent for the procedure including risks, benefits and alternatives was obtained and time-out was performed prior to the procedure. Preparation: The site was prepared and draped using maximal sterile barrier technique including cutaneous antisepsis. Anesthesia/sedation Level of anesthesia/sedation: Moderate sedation (conscious sedation) Anesthesia/sedation administered by: Independent trained observer under attending supervision with continuous monitoring of the patient?s level of consciousness and physiologic status Sedation medications: 2 mg midazolam IV, 100 mcg fentanyl IV Total intra-service face to face sedation time (minutes): 20 Imaging prior to biopsy The patient was positioned prone. Initial imaging was performed. Biopsy target: - Organ or target location: Kidney - Laterality: Right Other findings: None Biopsy Local anesthesia was administered. Under US guidance, the biopsy needle was advanced to the target and biopsy was performed. Coaxial needle: 17 gauge Core needle  biopsy device: [employer] Core needle size: 18 gauge Number of core specimens: 4 On-site assessment of biopsy adequacy: No  Needle removal The biopsy needle was removed and a sterile dressing was applied. Tract embolization: Gelfoam pledgets Imaging following biopsy Immediate post-biopsy ultrasound was performed. Post-biopsy imaging findings: Adequate Additional Details Additional description of procedure: None Device used: None Equipment details: None Unique Device Identifiers: Not available Specimens removed: Biopsy samples as detailed above Estimated blood loss (mL): Less than 10 Standardized report: [email or web address] ARCENIO CORREA PA-C   SYSTEM ID:  [id number]    US Renal Complete Non-Vascular    Result Date: 10/31/2023  PROCEDURE: US RENAL COMPLETE NON-VASCULAR HISTORY: JOCE TECHNIQUE:  A renal ultrasound was performed. COMPARISON:  CT 9/13/2023 MEASUREMENTS: Right renal length: 14 cm Left renal length: 13 cm RENAL FINDINGS: The kidneys are normal in size and echogenicity. There is no hydronephrosis. No shadowing stones are seen. BLADDER: The bladder is mildly distended and grossly unremarkable.     IMPRESSION:  Unremarkable 2-ultrasound of the kidneys.  JUAN CARLOS MALCOLM MD   SYSTEM ID:  [id number]    XR Chest Port 1 View    Result Date: 10/31/2023  PROCEDURE:  XR CHEST PORT 1 VIEW HISTORY: Fever. . COMPARISON:  10/26/2023 FINDINGS: The cardiomediastinal contours are stable. No focal consolidation, effusion or pneumothorax.     IMPRESSION:  Stable portable chest.  JUAN CARLOS MALCOLM MD   SYSTEM ID:  [id number]    CT Chest Pulmonary Embolism w Contrast    Result Date: 10/26/2023  CT CHEST PULMONARY EMBOLISM W CONTRAST HISTORY: 63 years Male +dimerPt to ER from home with c/o 5 days of sweats, fever, chills.  Changes noted to facial and swelling s/p nasopharyngeal cancer. Had vision changes on Sunday, resolved on own back to normal.  Has not been seen or contacted PCP. Tylenol this AM at 0800. Denies pain or nausea,  however decreased appetite.  Pt states he will start to sweat profusely and this will be followed by intense chills. TECHNIQUE: Axial CT imaging of the chest was performed With intravenous contrast. Coronal and sagittal reconstructions were obtained. This exam was performed using one or more of the following dose reduction techniques: Automated exposure control, adjustment of the BIRGIT and/or KV according to patient's size, and/or use of iterative reconstruction technique. COMPARISON: 9/13/2023 FINDINGS: No evidence of pulmonary embolism. The thoracic aorta is unremarkable. There is no mediastinal or hilar or axillary lymphadenopathy. Mild dependent atelectasis. Lungs otherwise clear.      No concerning osseous lesions are identified. There is cholelithiasis. There is no biliary dilatation.     IMPRESSION: No evidence of pulmonary embolism. ARNOLD TEE MD   SYSTEM ID:  [id number]    XR Chest Port 1 View    Result Date: 10/26/2023  Exam:  XR CHEST PORT 1 VIEW HISTORY: fever unknown origin. COMPARISON:  9/13/2023, 7/24/2023 FINDINGS: There is a right chest port with the catheter terminating at the cavoatrial junction. The cardiomediastinal contours are normal.  No focal consolidation, effusion, or pneumothorax.  No acute osseous abnormality.     IMPRESSION:  No acute cardiopulmonary process.  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  Although not common with immune checkpoint inhibitors has been reported in the literature with pembrolizumab as well as nivolumab.  She is currently treated with steroids acutely.  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 twice daily for 2 weeks.  He will follow-up with nephrology concerning acute interstitial nephritis which likely is related to immune checkpoint inhibitor toxicity.  Patient is reluctant to resume pembrolizumab at this point which probably reasonable.  2.  Anemia: Likely multifactorial including chronic disease patient is B12 deficient we will empirically treat with vitamin B12 1000 mcg IM monthly  3.:  Nasopharyngeal carcinoma EBV positive:Patient is status post induction chemotherapy with gemcitabine/carboplatin x3 cycles followed by chemoradiation therapy with weekly carboplatin followed by salvage left modified radical neck dissection with residual disease on posttreatment PET scan.  Patient was PD-1 positive and was started on pembrolizumab and received 8 cycles at the [facility] under the direction of Dr. Melvin with partial response.  He went on to transition his care locally and was treated with 2 more cycles of pembrolizumab for total of 10 cycles.  Patient would like to resume most of his care here as he cannot make the trip to the [facility].  We would like to obtain a PET scan to assess disease status on November 22 he has a near complete response he may not need further therapy.  We will consult with Dr. Melvin for ongoing treatment of this patient as well.  Time spent 98 minutes was spent on this patient visit: We reviewed multiple physician provider notes, reviewed hospital admission notes and discharge summaries, we reviewed pathology reports, we discussed results of imaging with the patient as well as lab results with performing history physical, with documented history physical, as well and ordered follow-up appointments and PET scan as well as low-dose prednisone he also recommended resuming metformin as well as Trulicity as his glucose readings were high.    Cancer Staging   No matching staging information was found for the patient.        Signed by: Chriss MAYERS  MD Mario    CC: Dariela Melvin MD

## 2023-11-14 NOTE — NURSING NOTE
Chief Complaint   Patient presents with    Oncology Clinic Visit     Nasopharyngeal CA, follow up from Dr Melvin's ([facility]) visit 11/10/23     Medication Reconciliation: complete    Renée Larios CMA ([facility]) 11/14/2023 8:54 AM

## 2023-11-14 NOTE — PATIENT INSTRUCTIONS
PET scan STAT  Radiology scheduling will contact you to arrange the scans that have been ordered.  If you have not received a call in the next 2 weeks, please call them at [phone].     Labs prior to follow up    Follow up with Chriss Martin MD after PET scan    Hold all Keytruda infusions-cancel all existing infusions    Start Ferrous sulfate (iron) and Prednisone 10 mg twice daily    Restart both Trulicity and Metformin    Establish care at [facility] with a PCP

## 2023-11-14 NOTE — PROGRESS NOTES
[facility]: Cancer Care Follow-Up Note                                    Discussion with Patient:                                                      Patient seen in clinic. He is very weak and fatigued. Hands are very swollen and stiff. He is unable to close his hands. He does have some lower abdominal tenderness. He feels this is due to him using the muscles more to move.He denies shortness of breath or cough. He is eating 3 meals a day and snacking although less portions than previously. He is trying to drink plenty of fluids.       Steroid use/side effect: Chriss Martin MD ordered prednisone 10 mg twice per day until he is seen again. They will pick this up today and start ASAP.         Dates of Treatment:                                                      Infusion given in last 28 days       None            Assessment:                                                        RNCC Short Symptom Review:     Assessment completed with:: Patient    General/Short Assessment  Does the patient have all their medications?: Yes  Is the patient experiencing any new or worsening symptoms?: Yes, See comments  Discussion with patient: Answered patient's questions and addressed concerns;Reviewed how and when to contact clinic;Reviewed patient's future appointments    Pain       Patient Coping  Accepting    Clinic Utilization  Patient Aware of Next Appointment?: Yes    Other Patient Concerns  Other Patient Reported Concerns: Yes, see notes    Intervention/Education provided during outreach:                                                       PET scan STAT     Labs prior to follow up     Follow up with Chriss Martin MD after PET scan     Hold all Keytruda infusions     Start Ferrous sulfate (iron) and Prednisone 10 mg twice daily     Restart both Trulicity and Metformin     Establish care at [facility] with a PCP    Patient to follow up as scheduled at next appt  Patient to call/MyChart message with  updates  Confirmed patient has clinic and triage numbers    Signature:  Sybil Zamora, RN

## 2023-11-14 NOTE — PATIENT INSTRUCTIONS
PET scan STAT  Radiology scheduling will contact you to arrange the scans that have been ordered.  If you have not received a call in the next 2 weeks, please call them at [phone].      Labs prior to follow up     Follow up with Chriss Martin MD after PET scan     Hold all Keytruda infusions-cancel all existing infusions     Start Ferrous sulfate (iron) and Prednisone 10 mg twice daily     Restart both Trulicity and Metformin     Establish care at [facility] with a PCP

## 2023-11-15 DIAGNOSIS — R79.89 LOW VITAMIN B12 LEVEL: Primary | ICD-10-CM

## 2023-11-15 RX ORDER — EPINEPHRINE 1 MG/ML
0.3 INJECTION, SOLUTION, CONCENTRATE INTRAVENOUS EVERY 5 MIN PRN
Status: CANCELLED | OUTPATIENT
Start: 2023-11-15

## 2023-11-15 RX ORDER — CYANOCOBALAMIN 1000 UG/ML
1000 INJECTION, SOLUTION INTRAMUSCULAR; SUBCUTANEOUS
Status: CANCELLED
Start: 2023-11-15

## 2023-11-15 RX ORDER — CYANOCOBALAMIN 1000 UG/ML
1 INJECTION, SOLUTION INTRAMUSCULAR; SUBCUTANEOUS
COMMUNITY
Start: 2023-11-15

## 2023-11-15 RX ORDER — MEPERIDINE HYDROCHLORIDE 50 MG/ML
25 INJECTION INTRAMUSCULAR; INTRAVENOUS; SUBCUTANEOUS EVERY 30 MIN PRN
Status: CANCELLED | OUTPATIENT
Start: 2023-11-15

## 2023-11-15 RX ORDER — DIPHENHYDRAMINE HYDROCHLORIDE 50 MG/ML
50 INJECTION INTRAMUSCULAR; INTRAVENOUS
Status: CANCELLED
Start: 2023-11-15

## 2023-11-15 RX ORDER — ALBUTEROL SULFATE 0.83 MG/ML
2.5 SOLUTION RESPIRATORY (INHALATION)
Status: CANCELLED | OUTPATIENT
Start: 2023-11-15

## 2023-11-15 RX ORDER — ALBUTEROL SULFATE 90 UG/1
1-2 AEROSOL, METERED RESPIRATORY (INHALATION)
Status: CANCELLED
Start: 2023-11-15

## 2023-11-15 RX ORDER — METHYLPREDNISOLONE SODIUM SUCCINATE 125 MG/2ML
125 INJECTION, POWDER, LYOPHILIZED, FOR SOLUTION INTRAMUSCULAR; INTRAVENOUS
Status: CANCELLED
Start: 2023-11-15

## 2023-11-15 NOTE — PROGRESS NOTES
Per Dr. Martin patient has low Vitamin B12 level and needs to start monthly Vitamin B12 1000 mcg injections..    Called and informed patient of above and patient verbalized understanding.    Order set up will route to Dr. Martin to review and sign if appropriate  Aundrea Parks RN on 11/15/2023 at 9:34 AM

## 2023-11-16 LAB
CORTIS SERPL-MCNC: 11.1 UG/DL
VIT D+METAB SERPL-MCNC: 9 NG/ML (ref 20–50)

## 2023-11-17 ENCOUNTER — VIRTUAL VISIT (OUTPATIENT)
Dept: NEPHROLOGY | Facility: CLINIC | Age: 63
End: 2023-11-17
Attending: STUDENT IN AN ORGANIZED HEALTH CARE EDUCATION/TRAINING PROGRAM
Payer: COMMERCIAL

## 2023-11-17 VITALS — WEIGHT: 230 LBS | BODY MASS INDEX: 33 KG/M2

## 2023-11-17 DIAGNOSIS — N10 AIN (ACUTE INTERSTITIAL NEPHRITIS): ICD-10-CM

## 2023-11-17 DIAGNOSIS — N17.9 ACUTE KIDNEY INJURY (H): ICD-10-CM

## 2023-11-17 DIAGNOSIS — R60.9 EDEMA, UNSPECIFIED TYPE: ICD-10-CM

## 2023-11-17 DIAGNOSIS — E55.9 HYPOVITAMINOSIS D: Primary | ICD-10-CM

## 2023-11-17 DIAGNOSIS — E83.42 HYPOMAGNESEMIA: ICD-10-CM

## 2023-11-17 LAB — PATH REPORT.FINAL DX SPEC: NORMAL

## 2023-11-17 PROCEDURE — 99214 OFFICE O/P EST MOD 30 MIN: CPT | Mod: 95 | Performed by: STUDENT IN AN ORGANIZED HEALTH CARE EDUCATION/TRAINING PROGRAM

## 2023-11-17 NOTE — NURSING NOTE
Is the patient currently in the state of [place]? YES    Visit mode:VIDEO    If the visit is dropped, the patient can be reconnected by: VIDEO VISIT: Text to cell phone:   Telephone Information:   Mobile [phone]       Will anyone else be joining the visit? NO  (If patient encounters technical issues they should call [phone]    How would you like to obtain your AVS? MyChart    Are changes needed to the allergy or medication list? No, Pt stated no changes to allergies, and Pt stated no med changes    Reason for visit: BRII ATKINS

## 2023-11-17 NOTE — LETTER
"11/17/2023       RE: Jareth Gallardo  [id number]  [facility] [address]     Dear Colleague,    Thank you for referring your patient, Jareth Gallardo, to the [facility] [place] at [facility]. Please see a copy of my visit note below.    Nephrology Clinic    Video Visit    SUBJECTIVE:   Jareth Gallardo is a 63 year old year old male with nasopharyngeal squamous cell carcinoma, COPD, BARRETT, DM2 and recently discharged from hospitalization for JOCE and fever after treatment with Keytruda with occasional NSAID use as well here for: JOCE follow-up    The patient presents to renal clinic to follow up his discharge from hospital on 11/7. His creatinine had peaked at 5.2 mg/dL. He was NOT given steroids while admitted, though earlier this week he started taking prednisone 10mg for arthritis. His creatinine downtrended without steroids, though a kidney bx was still done to determine diagnosis and possibly impact future cancer treatment. The Bx showed AIN with tubulitis. His creatinine today is down to 1.05 mg/dL. Pre-JOCE baseline creat was 0.9.     He still has 0.34 g/g proteinuria, down from 1.3 g/g. He notes that his edema is \"98% gone\" with improving GFR and resuming his initially held Lasix 40mg PO daily, which he notes is the same as his PTA dose. No SOB or CP.    His BP has been lower lately, 100/54. He is not on any other BP meds. No lightheadedness. Cortisol (from 9am) checked by his Onc team three days ago was 11.1, not low.     Review of systems:  4 point ROS negative except as noted above.    Past Medical History:   Diagnosis Date     COPD (chronic obstructive pulmonary disease) (H)      Dyslipidemia      Hard to intubate 12/5/2022     Nasopharyngeal carcinoma (H)      BARRETT (obstructive sleep apnea)      Type 2 diabetes mellitus with diabetic nephropathy (H)         acetaminophen (TYLENOL) 500 MG tablet, Take 500 mg by mouth every 6 " hours as needed for mild pain  atorvastatin (LIPITOR) 20 MG tablet, Take 20 mg by mouth daily  blood glucose (NO BRAND SPECIFIED) lancets standard, Use to test blood sugar 2 times daily or as directed.  blood glucose (NO BRAND SPECIFIED) test strip, Use to test blood sugar 3 times daily or as directed.  blood glucose monitoring (ACCU-CHEK RANDELL PLUS) meter device kit, Use to test blood sugar 2 times daily or as directed.  cyanocobalamin (CYANOCOBALAMIN) 1000 MCG/ML injection, Inject 1 mL (1,000 mcg) into the muscle every 30 days  dulaglutide (TRULICITY) 1.5 MG/0.5ML pen, Inject 1.5 mg Subcutaneous every 7 days mondays (Patient not taking: Reported on 11/14/2023)  ferrous sulfate (FEROSUL) 325 (65 Fe) MG tablet, Take 1 tablet (325 mg) by mouth daily (with breakfast)  fluticasone-vilanterol (BREO ELLIPTA) 100-25 MCG/INH inhaler, INHALE 1 PUFF INTO THE LUNGS ONE TIME A DAY. RINSE MOUTH AFTER USE.  furosemide (LASIX) 20 MG tablet, Take 2 tablets (40 mg) by mouth daily  levothyroxine (SYNTHROID/LEVOTHROID) 112 MCG tablet, Take 1 tablet (112 mcg) by mouth daily  magnesium oxide (MAG-OX) 400 MG tablet, Take 1 tablet (400 mg) by mouth 2 times daily for 14 days  [START ON 12/7/2023] metFORMIN (GLUCOPHAGE XR) 500 MG 24 hr tablet, Take 1 tablet (500 mg) by mouth daily (Patient not taking: Reported on 11/14/2023)  predniSONE (DELTASONE) 10 MG tablet, Take 1 tablet (10 mg) by mouth 2 times daily for 15 days    No current facility-administered medications on file prior to visit.       OBJECTIVE:  Physical exam:  Wt 104.3 kg (230 lb)   BMI 33.00 kg/m    Body mass index is 33 kg/m .   Gen: Well-developed, well-nourished and in no apparent distress  HEENT: normocephalic, anicteric, swollen neck  Resp: normal respiratory effort  Skin: warm and dry, no rashes or ecchymoses on exposed skin  Psych: normal mood, normal affect  Neuro:  alert and oriented x3  Remainder of exam deferred due to virtual visit.    Renal panel, CBC, UPCR, UA,  "PTH, vitamin D reviewed.   Recent Labs   Lab Test 11/14/23  0922 11/10/23  1329   * 133*   POTASSIUM 3.6 3.7   CHLORIDE 95* 98   CO2 30* 27   ANIONGAP 8 8   * 249*   BUN 16.4 20.0   CR 1.05 1.45*   LAI 8.2* 7.9*       Lab Results   Component Value Date    WBC 3.3 11/14/2023    WBC 3.4 11/14/2023     Lab Results   Component Value Date    RBC 2.97 11/14/2023    RBC 2.96 11/14/2023     Lab Results   Component Value Date    HGB 8.2 11/14/2023    HGB 8.3 11/14/2023     Lab Results   Component Value Date    HCT 25.6 11/14/2023    HCT 25.5 11/14/2023     No components found for: \"MCT\"  Lab Results   Component Value Date    MCV 86 11/14/2023    MCV 86 11/14/2023     Lab Results   Component Value Date    MCH 27.6 11/14/2023    MCH 28.0 11/14/2023     Lab Results   Component Value Date    MCHC 32.0 11/14/2023    MCHC 32.5 11/14/2023     Lab Results   Component Value Date    RDW 15.2 11/14/2023    RDW 15.1 11/14/2023     Lab Results   Component Value Date     11/14/2023     11/14/2023       Color Urine (no units)   Date Value   11/14/2023 Yellow     Appearance Urine (no units)   Date Value   11/14/2023 Clear     Glucose Urine (mg/dL)   Date Value   11/14/2023 Negative     Bilirubin Urine (no units)   Date Value   11/14/2023 Negative     Ketones Urine (mg/dL)   Date Value   11/14/2023 Negative     Specific Gravity Urine (no units)   Date Value   11/14/2023 1.019     pH Urine (no units)   Date Value   11/14/2023 5.5     Protein Albumin Urine (mg/dL)   Date Value   11/14/2023 50 (A)     Nitrite Urine (no units)   Date Value   11/14/2023 Negative     Leukocyte Esterase Urine (no units)   Date Value   11/14/2023 Negative           ASSESSMENT and PLAN:   Jareth was seen today for recheck.    Diagnoses and all orders for this visit:    Acute kidney injury (H24)  -     Adult Nephrology  Referral  AIN (acute interstitial nephritis)  I suspect this was from Keytruda given the tubulitis seen on biopsy " (tubulitis is actually a marker of kidney rejection we seen in kidney transplants in addition to these immune checkpoint inhibitors). He has recovered well without steroids (he is only now on a low dose, for arthritis) and simply holding Keytruda. I recommended to the patient to continue to get close lab monitoring with his oncologist as they determine next steps for his cancer treatment, whether that be re-challenging with a PDL1 agonist or otherwise.     Edema, unspecified type  Much improved. He can remain on his PTA Lasix, but if BP drops further or he becomes lightheaded, he should stop, which he and I discussed. We also discussed a Lasix holiday if he were to need a CT scan with IV contrast.     Hypovitaminosis D  Recommend starting daily vit D. Sent patient a message in epic to decide on OTC vs Rx vit D.     Hypomagnesemia   Followed and managed by oncology (it appears from notes he has also been treated with carboplatin).    Hypophosphatemia  Not to level requiring treatment, phos of 2.4.     Hyponatremia  Mild, at baseline.    Return to clinic as needed.     Blake Lynn MD  Nephrology  Pager: [phone]      Virtual Visit Details    Type of service:  Video Visit     Originating Location (pt. Location): Home    Distant Location (provider location):  On-site  Platform used for Video Visit: LaloWell      Again, thank you for allowing me to participate in the care of your patient.      Sincerely,    Blake Lynn MD

## 2023-11-17 NOTE — PROGRESS NOTES
"Nephrology Clinic    Video Visit    SUBJECTIVE:   Jareth Gallardo is a 63 year old year old male with nasopharyngeal squamous cell carcinoma, COPD, BARRETT, DM2 and recently discharged from hospitalization for JOCE and fever after treatment with Keytruda with occasional NSAID use as well here for: JOCE follow-up    The patient presents to renal clinic to follow up his discharge from hospital on 11/7. His creatinine had peaked at 5.2 mg/dL. He was NOT given steroids while admitted, though earlier this week he started taking prednisone 10mg for arthritis. His creatinine downtrended without steroids, though a kidney bx was still done to determine diagnosis and possibly impact future cancer treatment. The Bx showed AIN with tubulitis. His creatinine today is down to 1.05 mg/dL. Pre-JOCE baseline creat was 0.9.     He still has 0.34 g/g proteinuria, down from 1.3 g/g. He notes that his edema is \"98% gone\" with improving GFR and resuming his initially held Lasix 40mg PO daily, which he notes is the same as his PTA dose. No SOB or CP.    His BP has been lower lately, 100/54. He is not on any other BP meds. No lightheadedness. Cortisol (from 9am) checked by his Onc team three days ago was 11.1, not low.     Review of systems:  4 point ROS negative except as noted above.    Past Medical History:   Diagnosis Date    COPD (chronic obstructive pulmonary disease) (H)     Dyslipidemia     Hard to intubate 12/5/2022    Nasopharyngeal carcinoma (H)     BARRETT (obstructive sleep apnea)     Type 2 diabetes mellitus with diabetic nephropathy (H)         acetaminophen (TYLENOL) 500 MG tablet, Take 500 mg by mouth every 6 hours as needed for mild pain  atorvastatin (LIPITOR) 20 MG tablet, Take 20 mg by mouth daily  blood glucose (NO BRAND SPECIFIED) lancets standard, Use to test blood sugar 2 times daily or as directed.  blood glucose (NO BRAND SPECIFIED) test strip, Use to test blood sugar 3 times daily or as directed.  blood glucose " monitoring (ACCU-CHEK RANDELL PLUS) meter device kit, Use to test blood sugar 2 times daily or as directed.  cyanocobalamin (CYANOCOBALAMIN) 1000 MCG/ML injection, Inject 1 mL (1,000 mcg) into the muscle every 30 days  dulaglutide (TRULICITY) 1.5 MG/0.5ML pen, Inject 1.5 mg Subcutaneous every 7 days mondays (Patient not taking: Reported on 11/14/2023)  ferrous sulfate (FEROSUL) 325 (65 Fe) MG tablet, Take 1 tablet (325 mg) by mouth daily (with breakfast)  fluticasone-vilanterol (BREO ELLIPTA) 100-25 MCG/INH inhaler, INHALE 1 PUFF INTO THE LUNGS ONE TIME A DAY. RINSE MOUTH AFTER USE.  furosemide (LASIX) 20 MG tablet, Take 2 tablets (40 mg) by mouth daily  levothyroxine (SYNTHROID/LEVOTHROID) 112 MCG tablet, Take 1 tablet (112 mcg) by mouth daily  magnesium oxide (MAG-OX) 400 MG tablet, Take 1 tablet (400 mg) by mouth 2 times daily for 14 days  [START ON 12/7/2023] metFORMIN (GLUCOPHAGE XR) 500 MG 24 hr tablet, Take 1 tablet (500 mg) by mouth daily (Patient not taking: Reported on 11/14/2023)  predniSONE (DELTASONE) 10 MG tablet, Take 1 tablet (10 mg) by mouth 2 times daily for 15 days    No current facility-administered medications on file prior to visit.       OBJECTIVE:  Physical exam:  Wt 104.3 kg (230 lb)   BMI 33.00 kg/m    Body mass index is 33 kg/m .   Gen: Well-developed, well-nourished and in no apparent distress  HEENT: normocephalic, anicteric, swollen neck  Resp: normal respiratory effort  Skin: warm and dry, no rashes or ecchymoses on exposed skin  Psych: normal mood, normal affect  Neuro:  alert and oriented x3  Remainder of exam deferred due to virtual visit.    Renal panel, CBC, UPCR, UA, PTH, vitamin D reviewed.   Recent Labs   Lab Test 11/14/23  0922 11/10/23  1329   * 133*   POTASSIUM 3.6 3.7   CHLORIDE 95* 98   CO2 30* 27   ANIONGAP 8 8   * 249*   BUN 16.4 20.0   CR 1.05 1.45*   LAI 8.2* 7.9*       Lab Results   Component Value Date    WBC 3.3 11/14/2023    WBC 3.4 11/14/2023     Lab  "Results   Component Value Date    RBC 2.97 11/14/2023    RBC 2.96 11/14/2023     Lab Results   Component Value Date    HGB 8.2 11/14/2023    HGB 8.3 11/14/2023     Lab Results   Component Value Date    HCT 25.6 11/14/2023    HCT 25.5 11/14/2023     No components found for: \"MCT\"  Lab Results   Component Value Date    MCV 86 11/14/2023    MCV 86 11/14/2023     Lab Results   Component Value Date    MCH 27.6 11/14/2023    MCH 28.0 11/14/2023     Lab Results   Component Value Date    MCHC 32.0 11/14/2023    MCHC 32.5 11/14/2023     Lab Results   Component Value Date    RDW 15.2 11/14/2023    RDW 15.1 11/14/2023     Lab Results   Component Value Date     11/14/2023     11/14/2023       Color Urine (no units)   Date Value   11/14/2023 Yellow     Appearance Urine (no units)   Date Value   11/14/2023 Clear     Glucose Urine (mg/dL)   Date Value   11/14/2023 Negative     Bilirubin Urine (no units)   Date Value   11/14/2023 Negative     Ketones Urine (mg/dL)   Date Value   11/14/2023 Negative     Specific Gravity Urine (no units)   Date Value   11/14/2023 1.019     pH Urine (no units)   Date Value   11/14/2023 5.5     Protein Albumin Urine (mg/dL)   Date Value   11/14/2023 50 (A)     Nitrite Urine (no units)   Date Value   11/14/2023 Negative     Leukocyte Esterase Urine (no units)   Date Value   11/14/2023 Negative           ASSESSMENT and PLAN:   Jareth was seen today for recheck.    Diagnoses and all orders for this visit:    Acute kidney injury (H24)  -     Adult Nephrology  Referral  AIN (acute interstitial nephritis)  I suspect this was from Keytruda given the tubulitis seen on biopsy (tubulitis is actually a marker of kidney rejection we seen in kidney transplants in addition to these immune checkpoint inhibitors). He has recovered well without steroids (he is only now on a low dose, for arthritis) and simply holding Keytruda. I recommended to the patient to continue to get close lab monitoring " with his oncologist as they determine next steps for his cancer treatment, whether that be re-challenging with a PDL1 agonist or otherwise.     Edema, unspecified type  Much improved. He can remain on his PTA Lasix, but if BP drops further or he becomes lightheaded, he should stop, which he and I discussed. We also discussed a Lasix holiday if he were to need a CT scan with IV contrast.     Hypovitaminosis D  Recommend starting daily vit D. Sent patient a message in epic to decide on OTC vs Rx vit D.     Hypomagnesemia   Followed and managed by oncology (it appears from notes he has also been treated with carboplatin).    Hypophosphatemia  Not to level requiring treatment, phos of 2.4.     Hyponatremia  Mild, at baseline.    Return to clinic as needed.     Blake Lynn MD  Nephrology  Pager: [phone]      Virtual Visit Details    Type of service:  Video Visit     Originating Location (pt. Location): Home    Distant Location (provider location):  On-site  Platform used for Video Visit: Ilana

## 2023-11-18 DIAGNOSIS — E03.9 HYPOTHYROIDISM, UNSPECIFIED TYPE: ICD-10-CM

## 2023-11-20 ENCOUNTER — PATIENT OUTREACH (OUTPATIENT)
Dept: ONCOLOGY | Facility: OTHER | Age: 63
End: 2023-11-20
Payer: COMMERCIAL

## 2023-11-20 RX ORDER — LEVOTHYROXINE SODIUM 112 UG/1
112 TABLET ORAL DAILY
Qty: 90 TABLET | Refills: 5 | OUTPATIENT
Start: 2023-11-20

## 2023-11-22 ENCOUNTER — HOSPITAL ENCOUNTER (OUTPATIENT)
Dept: PET IMAGING | Facility: OTHER | Age: 63
Discharge: HOME | End: 2023-11-22
Attending: INTERNAL MEDICINE | Admitting: INTERNAL MEDICINE
Payer: COMMERCIAL

## 2023-11-22 DIAGNOSIS — C11.9 NASOPHARYNGEAL CANCER (H): ICD-10-CM

## 2023-11-22 PROCEDURE — 343N000001 HC RX 343: Performed by: INTERNAL MEDICINE

## 2023-11-22 PROCEDURE — A9552 F18 FDG: HCPCS | Performed by: RADIOLOGY

## 2023-11-22 PROCEDURE — A9552 F18 FDG: HCPCS | Performed by: INTERNAL MEDICINE

## 2023-11-22 PROCEDURE — 78816 PET IMAGE W/CT FULL BODY: CPT | Mod: PS

## 2023-11-22 PROCEDURE — 78815 PET IMAGE W/CT SKULL-THIGH: CPT | Mod: PS

## 2023-11-22 PROCEDURE — 343N000001 HC RX 343: Performed by: RADIOLOGY

## 2023-11-22 RX ADMIN — FLUDEOXYGLUCOSE F-18 13.25 MILLICURIE: 500 INJECTION, SOLUTION INTRAVENOUS at 17:42

## 2023-11-23 PROCEDURE — 343N000001 HC RX 343: Performed by: INTERNAL MEDICINE

## 2023-11-23 PROCEDURE — A9552 F18 FDG: HCPCS | Performed by: INTERNAL MEDICINE

## 2023-11-23 RX ADMIN — FLUDEOXYGLUCOSE F-18 13.25 MILLICURIE: 500 INJECTION, SOLUTION INTRAVENOUS at 15:43

## 2023-11-24 ENCOUNTER — PATIENT OUTREACH (OUTPATIENT)
Dept: ONCOLOGY | Facility: OTHER | Age: 63
End: 2023-11-24
Payer: COMMERCIAL

## 2023-11-24 NOTE — PROGRESS NOTES
Patient added to RNCC. Will contact patient next week after holiday to see how is doing after 11/27/23 treatment..  Sybil Zamora RN...........11/24/2023 9:13 AM

## 2023-11-27 ENCOUNTER — HOSPITAL ENCOUNTER (OUTPATIENT)
Dept: INFUSION THERAPY | Facility: OTHER | Age: 63
Discharge: HOME | End: 2023-11-27
Attending: INTERNAL MEDICINE | Admitting: INTERNAL MEDICINE
Payer: COMMERCIAL

## 2023-11-27 DIAGNOSIS — R79.89 LOW VITAMIN B12 LEVEL: Primary | ICD-10-CM

## 2023-11-27 PROCEDURE — 96372 THER/PROPH/DIAG INJ SC/IM: CPT | Performed by: INTERNAL MEDICINE

## 2023-11-27 PROCEDURE — 250N000011 HC RX IP 250 OP 636: Performed by: INTERNAL MEDICINE

## 2023-11-27 RX ORDER — DIPHENHYDRAMINE HYDROCHLORIDE 50 MG/ML
50 INJECTION INTRAMUSCULAR; INTRAVENOUS
Status: CANCELLED
Start: 2023-12-15

## 2023-11-27 RX ORDER — CYANOCOBALAMIN 1000 UG/ML
1000 INJECTION, SOLUTION INTRAMUSCULAR; SUBCUTANEOUS ONCE
Status: COMPLETED | OUTPATIENT
Start: 2023-11-27 | End: 2023-11-27

## 2023-11-27 RX ORDER — CYANOCOBALAMIN 1000 UG/ML
1000 INJECTION, SOLUTION INTRAMUSCULAR; SUBCUTANEOUS
Status: CANCELLED
Start: 2023-12-15

## 2023-11-27 RX ORDER — ALBUTEROL SULFATE 0.83 MG/ML
2.5 SOLUTION RESPIRATORY (INHALATION)
Status: CANCELLED | OUTPATIENT
Start: 2023-12-15

## 2023-11-27 RX ORDER — MEPERIDINE HYDROCHLORIDE 50 MG/ML
25 INJECTION INTRAMUSCULAR; INTRAVENOUS; SUBCUTANEOUS EVERY 30 MIN PRN
Status: CANCELLED | OUTPATIENT
Start: 2023-12-15

## 2023-11-27 RX ORDER — ALBUTEROL SULFATE 90 UG/1
1-2 AEROSOL, METERED RESPIRATORY (INHALATION)
Status: CANCELLED
Start: 2023-12-15

## 2023-11-27 RX ORDER — EPINEPHRINE 1 MG/ML
0.3 INJECTION, SOLUTION, CONCENTRATE INTRAVENOUS EVERY 5 MIN PRN
Status: CANCELLED | OUTPATIENT
Start: 2023-12-15

## 2023-11-27 RX ORDER — METHYLPREDNISOLONE SODIUM SUCCINATE 125 MG/2ML
125 INJECTION, POWDER, LYOPHILIZED, FOR SOLUTION INTRAMUSCULAR; INTRAVENOUS
Status: CANCELLED
Start: 2023-12-15

## 2023-11-27 RX ADMIN — CYANOCOBALAMIN 1000 MCG: 1000 INJECTION, SOLUTION INTRAMUSCULAR; SUBCUTANEOUS at 10:00

## 2023-11-27 NOTE — NURSING NOTE
Infusion Nursing Note:  Jareth Gallardo presents today for B12.    Patient seen by provider today: No   present during visit today: Not Applicable.    Note: N/A.    Intravenous Access:  No Intravenous access/labs at this visit.    Treatment Conditions:  Not Applicable.    Post Infusion Assessment:  Patient tolerated injection without incident.     Discharge Plan:   Discharge instructions reviewed with: Patient.  Patient and/or family verbalized understanding of discharge instructions and all questions answered.  Copy of AVS declined by patient and/or family.  Patient will return 12/27/23 for next appointment.  AVS to patient via [employer].   Patient discharged in stable condition accompanied by: self.  Departure Mode: Ambulatory.      Marilynn De León RN

## 2023-11-28 NOTE — PROGRESS NOTES
[facility] CANCER [facility]    PATIENT NAME: Jareth Gallardo  MRN # [id number]   DATE OF VISIT: November 29, 2023  YOB: 1960     Otolaryngology: Dr. Jesus Boston  Radiation Oncology: Dr. Mane Razo  Primary Care Provider: Dr. Beni Gallardo at [facility] in [facility]    CANCER TYPE: Nasopharyngeal carcinoma, p16 negative, SAM positive  STAGE: [id number] (DENAE)  ECOG PS: 0    PD-L1: 20% using  clone locally; TPS 70% at [facility]   NGS:     SUMMARY  12/11/21 UC for L neck mass, selling  12/9/21 CT neck ([place]). 2.4 cm L neck node medial to SCM just above the hyoid, additional LNs surround, scattered R neck and L supraclavicular LNs  2/15/22 US bx L cervical node. Path: SCC, moderately differentiated,   3/4/22 PET/CT. Nasopharyngeal and oropharyngeal mucosal thickening. 2.6 cm BERNARDO nodular GGO, SUV avid, some additional lingular uptake  4/4~5/16/22/22 C1-3 carboplatin gemcitabine. Not a candidate for cisplatin due to hearing loss on audiogram. Neulasta 5/3 after C2D8, ANC 1000   5/31/22 PET/CT. [place]  6/22~8/10/22 Chemoradiation with weekly carboplatin  11/16/22 PET/CT. Residual 1.5 cm 2B LN (SUV 28.7), 1 cm enhancing focus in SCM (SUV 22.4), small mildly avid residual enhancing nodes in the L level 2A decreased in size and FDG avidity than before, resolved L level 5 node, small but mildly FDG avid R level 2B node, smaller mildly avid R level 3 nodes, unchanged in ize. NI-RADS 2a in the primary site - FDG uptake along the BOT at the midline, increased from prior. New patchy GGO and nodules in the RUL and L base with mild FDG uptake, likely inflammatory, recommend short interval CT  12/5/22 Salvage L modified radical neck dissection (Dr. Boston). Path: L posterior SCM excision - SCC, 1.1 cm, invading into skeletal muscle, negative margins, +PNI/LVI. Level 2 node 1.5 cm SCC, moderately differentiated, invading into fibroadipose tissue, focally involving the inked resection margin.    1/30/23 CT chest. Patchy LLL GGO, decreased from prior.   2/22/23 PET/CT. Focal updake along the tongue base at midline, decreased from prior, likely inflammatory (SUV 14.8-->8.5), near complete resolution of the rest of the mild mucosal uptake within the tongue base. At least 5 FDG avid lesions in the L deep neck. 2 nodes at the C2 level, posterior to the surgical clips in the L lateral paravertebral region, one measuring 8 mm (SUV 15.9), the other more posteriorly measuring 7 mm. The other 2 adjacent ones are deep to the residual L SCM; 9.6 mm, and a smaller one immediately below in the SCM. There is another mildly FDG avid necrotic lesion inferior to the above 3 foci, again deep to the L SCM. 0.9 cm R level 2b LN (SUV 3.1), likely reactive. Resolution of RUL GGO, stable sub-6 mm nodules.  3/9/23~current Pembrolizumab . C3 delayed 1 week due to parainfluenza. Good NE. C11 delayed due to JOCE.  4/18/23 ED at [facility] for fever and cough. CXR negative. Viral panel showed parainfluenza.  11/1~11/7/23 [facility] for JOCE, fever, pancytopenia. Cr 5.0 on admission in setting of nsaid use, recent contrast, vanco 2 g. 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 Cr improved without steroids. Infectious eval unremarkable. R hand swelling 11/10 after discharge. Appetite loss, fatigue. Pred 10 mg bid started in clinic ~ 11/14/23. CT AP 11/9 non contrast: 1.5 x 2.1 mesenteric node, new adenopathy and stranding within the SMA mesentery. 15.5 cm spleen.     ASSESSMENT AND PLAN  Nasopharyngeal carcinoma, SAM +julia: Radiographic CR by PET after about 7 months of pembrolizumab. EBV titer still somewhat high, interestingly. Typically would monitor at this point without considering additional treatment. Jareth is not wanting to rechallenge with pembrolizumab in the future. We briefly discussed today but we would have a discussion about pros and cons, options, in the  future if/when the cancer starts to grow again with the idea that other agents are not likely to produce a durable response. Restage with CT neck and chest/abd in 2-3 months.     JOCE: Likely multifactorial, including contrast, vancomycin, possibly relative hypotension. He denies NSAID use. But felt to be also likely immune-mediated, although it improved without prednisone. Creatinine almost back to baseline of 0.8-0.9. Labs in [place] or [place] in the next week or so.     B12 deficiency: B12 injections through Dr. Martin.    Inflammatory arthropathy: Asked him to extend pred taper to 10 mg daily for total of 5 days then stop.     Neck tightness, positional pre-syncope: Recommend US L carotid     LE swelling: Right now a little worse than usual because of prednisone and recently resolved JOCE. Now that these acute events are coming to a resolution, encouraged him to try stopping the lasix next week, seeing what happens. Takes lasix 40 mg daily prn for leg swelling - ideally would get back to that.     BERNARDO/lung nodules, new RUL nodules in 11/2022, LLL GGO 6/2023: Resolving c/w inflammatory changes from mild aspiration. New GGOs in the LLL on CT 7/24, better, c/w inflammation from likely aspiration. Still has some haziness in the LLL that is persistent, largely unchanged compared to the two scans since Sept 2023 on my review.    Lymphedema: Continue daily massage and stretches.    Hypothyroidism: Levothyroxine 50 mcg daily started in mid May.    FH: Many members of his family have cancers. Appt with Denise Jones GC 11/30/23 but that appt seems to have fallen off.     DM2, steroid-induced hyperglycemia: Per PCP. A1c 8/28 5.6     Mild peripheral neuropathy: R foot numbness, very mild, from DM2. Not discussed today    60 minutes spent by me on the date of the encounter doing extensive chart review, history and exam, documentation and further activities per the note     Dariela Melvin MD   of  Medicine  Hematology, Oncology and Transplantation      SUBJECTIVE  Jareth returns for follow up of nasopharyngeal carcinoma on pembrolizumab.   Hospitalized at [facility] since our last visit for JOCE, resolved - see etiologies above.   Doing better overall  L neck tightness, has felt like he's going to pass out a couple of times over the weekend.   Tired, weaker than before but building strength back up. Still able to throw some hay astrid onto the truck over this past weekend  Wrist/finger swelling and pain, also toes - prednisone 10 mg BID for about 2 weeks   Urinating ok  Says he actually was not taking nsaids PTA  Does not want pembrolizumab now or in the future. Was very frightened by what happened.    PAST MEDICAL HISTORY  Nasopharyngeal carcinoma as above  DM2  COPD??  Hiatal hernia. Sleeps with his head elevated  LVH on TTE 2009, EF50%, LA Dilation  Dyslipidemia  BARRETT in 2015. Not using CPAP due to developing tooth abscesses when he tried it in 2015  Venous insufficiency in both legs being in an accident involving a mower about 10 years ago, on chronic furosemide. LE US negative 12/15/2009  Cholelithiasis  Hyperplastic colon polyp, due 2020  Tonsillectomy  Baker cyst RLE US 2013    Neuropathy from DM - R foot numbness chronically    Exposed to lots of wood dust in his occupation    CURRENT OUTPATIENT MEDICATIONS  Reviewed    ALLERGIES  No Known Allergies     PHYSICAL EXAM  There were no vitals taken for this visit.  GEN: NAD  HEENT: EOMI, no icterus, injection or pallor    Remainder of physical exam deferred due to public health emergency and limitations of video visit.    LABORATORY AND IMAGING STUDIES    Numerous labs from the hospitalization in Oct and most recently 11/14/23 were independently reviewed and interpreted by me    PET Oncology Whole Body  Narrative: Procedure: PET ONCOLOGY (EYES TO THIGHS), PET ONCOLOGY WHOLE BODY    Subtype: Subsequent     Radiopharmaceutical: F-18 FDG     Dose: 13.25 mCi  IV    Serum Glucose: 94 mg/dl    History: response to treatment; Nasopharyngeal cancer (H)    Comparison: PET/CT 2/22/2023, CT abdomen pelvis 11/9/2023    Technique:  A low dose CT examination was performed for the purpose of  attenuation correction and localization. Attenuation corrected PET  images from the skull base to the mid thigh were acquired  approximately one hour following intravenous administration of the  dose, and displayed in transaxial, sagittal, and coronal projections.    Uptake time: 50 minutes.    Findings:     Background uptake: Blood pool 3.3, liver 3.9.    Neck: The sites of residual hypermetabolism described on the prior  resolved. No new sites of suspicious hypermetabolism is seen.    Thorax: No sites of suspicious hypermetabolism.    Abdomen/Pelvis: No sites of suspicious hypermetabolism. The increased  soft tissue seen within the SMA mesentery on the immediate prior is  slightly improved in volume although still slightly increased when  compared to the initial PET/CT dated 2/22/2023. This soft tissue is  not hypermetabolic.    Thighs: No sites of suspicious hypermetabolism.    Bony structures: No sites of suspicious hypermetabolism.  Impression: IMPRESSION:  No evidence of hypermetabolic metastatic disease.  Resolved hypermetabolism within the left upper neck.    Increased soft tissue within the SMA mesentery seen on the most recent  prior CT is improved in volume and not hypermetabolic, although still  increased when compared to 2/22/2023, possibly reactive.    JUAN CARLOS MALCOLM MD         SYSTEM ID:  [id number]     Imaging was personally reviewed and interpreted by me and compared to serial imaging going back to Sept 13 2023.     Virtual Visit Details  Type of service:  Video Visit   Originating Location (pt. Location): Home    Distant Location (provider location):  Off-site  Platform used for Video Visit: Ilana

## 2023-11-29 ENCOUNTER — VIRTUAL VISIT (OUTPATIENT)
Dept: ONCOLOGY | Facility: CLINIC | Age: 63
End: 2023-11-29
Attending: INTERNAL MEDICINE
Payer: COMMERCIAL

## 2023-11-29 VITALS — HEIGHT: 70 IN | BODY MASS INDEX: 31.98 KG/M2 | WEIGHT: 223.38 LBS

## 2023-11-29 DIAGNOSIS — C11.9 NASOPHARYNGEAL CANCER (H): Primary | ICD-10-CM

## 2023-11-29 DIAGNOSIS — M19.90 INFLAMMATORY ARTHROPATHY: ICD-10-CM

## 2023-11-29 DIAGNOSIS — N17.9 ACUTE KIDNEY INJURY (H): ICD-10-CM

## 2023-11-29 DIAGNOSIS — R55 PRE-SYNCOPE: ICD-10-CM

## 2023-11-29 LAB — BACTERIA BLD CULT: NO GROWTH

## 2023-11-29 PROCEDURE — 99417 PROLNG OP E/M EACH 15 MIN: CPT | Mod: 95 | Performed by: INTERNAL MEDICINE

## 2023-11-29 PROCEDURE — 99215 OFFICE O/P EST HI 40 MIN: CPT | Mod: 95 | Performed by: INTERNAL MEDICINE

## 2023-11-29 ASSESSMENT — PAIN SCALES - GENERAL: PAINLEVEL: NO PAIN (1)

## 2023-11-29 NOTE — LETTER
11/29/2023         RE: Jareth Gallardo  [address] [place] [address]        Dear Colleague,    Thank you for referring your patient, Jareth Gallardo, to the [facility] CANCER [facility]INIC. Please see a copy of my visit note below.         [facility] CANCER [facility]    PATIENT NAME: Jareth Gallardo  MRN # [id number]   DATE OF VISIT: November 29, 2023  YOB: 1960     Otolaryngology: Dr. Jesus Boston  Radiation Oncology: Dr. Mane Razo  Primary Care Provider: Dr. Beni Gallardo at [facility] in [facility]    CANCER TYPE: Nasopharyngeal carcinoma, p16 negative, SAM positive  STAGE: [id number] (DENAE)  ECOG PS: 0    PD-L1: 20% using  clone locally; TPS 70% at [facility]   NGS:     SUMMARY  12/11/21 UC for L neck mass, selling  12/9/21 CT neck ([place]). 2.4 cm L neck node medial to SCM just above the hyoid, additional LNs surround, scattered R neck and L supraclavicular LNs  2/15/22 US bx L cervical node. Path: SCC, moderately differentiated,   3/4/22 PET/CT. Nasopharyngeal and oropharyngeal mucosal thickening. 2.6 cm BERNARDO nodular GGO, SUV avid, some additional lingular uptake  4/4~5/16/22/22 C1-3 carboplatin gemcitabine. Not a candidate for cisplatin due to hearing loss on audiogram. Neulasta 5/3 after C2D8, ANC 1000   5/31/22 PET/CT. [place]  6/22~8/10/22 Chemoradiation with weekly carboplatin  11/16/22 PET/CT. Residual 1.5 cm 2B LN (SUV 28.7), 1 cm enhancing focus in SCM (SUV 22.4), small mildly avid residual enhancing nodes in the L level 2A decreased in size and FDG avidity than before, resolved L level 5 node, small but mildly FDG avid R level 2B node, smaller mildly avid R level 3 nodes, unchanged in ize. NI-RADS 2a in the primary site - FDG uptake along the BOT at the midline, increased from prior. New patchy GGO and nodules in the RUL and L base with mild FDG uptake, likely inflammatory, recommend short interval CT  12/5/22 Salvage L modified radical neck  dissection (Dr. Boston). Path: L posterior SCM excision - SCC, 1.1 cm, invading into skeletal muscle, negative margins, +PNI/LVI. Level 2 node 1.5 cm SCC, moderately differentiated, invading into fibroadipose tissue, focally involving the inked resection margin.   1/30/23 CT chest. Patchy LLL GGO, decreased from prior.   2/22/23 PET/CT. Focal updake along the tongue base at midline, decreased from prior, likely inflammatory (SUV 14.8-->8.5), near complete resolution of the rest of the mild mucosal uptake within the tongue base. At least 5 FDG avid lesions in the L deep neck. 2 nodes at the C2 level, posterior to the surgical clips in the L lateral paravertebral region, one measuring 8 mm (SUV 15.9), the other more posteriorly measuring 7 mm. The other 2 adjacent ones are deep to the residual L SCM; 9.6 mm, and a smaller one immediately below in the SCM. There is another mildly FDG avid necrotic lesion inferior to the above 3 foci, again deep to the L SCM. 0.9 cm R level 2b LN (SUV 3.1), likely reactive. Resolution of RUL GGO, stable sub-6 mm nodules.  3/9/23~current Pembrolizumab . C3 delayed 1 week due to parainfluenza. Good CA. C11 delayed due to JOCE.  4/18/23 ED at [facility] for fever and cough. CXR negative. Viral panel showed parainfluenza.  11/1~11/7/23 [facility] for JOCE, fever, pancytopenia. Cr 5.0 on admission in setting of nsaid use, recent contrast, vanco 2 g. 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 Cr improved without steroids. Infectious eval unremarkable. R hand swelling 11/10 after discharge. Appetite loss, fatigue. Pred 10 mg bid started in clinic ~ 11/14/23. CT AP 11/9 non contrast: 1.5 x 2.1 mesenteric node, new adenopathy and stranding within the SMA mesentery. 15.5 cm spleen.     ASSESSMENT AND PLAN  Nasopharyngeal carcinoma, SAM +julia: Radiographic CR by PET after about 7 months of pembrolizumab. EBV titer still somewhat  high, interestingly. Typically would monitor at this point without considering additional treatment. Jareth is not wanting to rechallenge with pembrolizumab in the future. We briefly discussed today but we would have a discussion about pros and cons, options, in the future if/when the cancer starts to grow again with the idea that other agents are not likely to produce a durable response. Restage with CT neck and chest/abd in 2-3 months.     JOCE: Likely multifactorial, including contrast, vancomycin, possibly relative hypotension. He denies NSAID use. But felt to be also likely immune-mediated, although it improved without prednisone. Creatinine almost back to baseline of 0.8-0.9. Labs in [place] or [place] in the next week or so.     B12 deficiency: B12 injections through Dr. Martin.    Inflammatory arthropathy: Asked him to extend pred taper to 10 mg daily for total of 5 days then stop.     Neck tightness, positional pre-syncope: Recommend US L carotid     LE swelling: Right now a little worse than usual because of prednisone and recently resolved JOCE. Now that these acute events are coming to a resolution, encouraged him to try stopping the lasix next week, seeing what happens. Takes lasix 40 mg daily prn for leg swelling - ideally would get back to that.     BERNARDO/lung nodules, new RUL nodules in 11/2022, LLL GGO 6/2023: Resolving c/w inflammatory changes from mild aspiration. New GGOs in the LLL on CT 7/24, better, c/w inflammation from likely aspiration. Still has some haziness in the LLL that is persistent, largely unchanged compared to the two scans since Sept 2023 on my review.    Lymphedema: Continue daily massage and stretches.    Hypothyroidism: Levothyroxine 50 mcg daily started in mid May.    FH: Many members of his family have cancers. Appt with Denise Jones GC 11/30/23 but that appt seems to have fallen off.     DM2, steroid-induced hyperglycemia: Per PCP. A1c 8/28 5.6     Mild peripheral  neuropathy: R foot numbness, very mild, from DM2. Not discussed today    60 minutes spent by me on the date of the encounter doing extensive chart review, history and exam, documentation and further activities per the note     Dariela Melvin MD  Associate Professor of Medicine  Hematology, Oncology and Transplantation      SHAHANA Templeton returns for follow up of nasopharyngeal carcinoma on pembrolizumab.   Hospitalized at [facility] since our last visit for JOCE, resolved - see etiologies above.   Doing better overall  L neck tightness, has felt like he's going to pass out a couple of times over the weekend.   Tired, weaker than before but building strength back up. Still able to throw some hay astrid onto the truck over this past weekend  Wrist/finger swelling and pain, also toes - prednisone 10 mg BID for about 2 weeks   Urinating ok  Says he actually was not taking nsaids PTA  Does not want pembrolizumab now or in the future. Was very frightened by what happened.    PAST MEDICAL HISTORY  Nasopharyngeal carcinoma as above  DM2  COPD??  Hiatal hernia. Sleeps with his head elevated  LVH on TTE 2009, EF50%, LA Dilation  Dyslipidemia  BARRETT in 2015. Not using CPAP due to developing tooth abscesses when he tried it in 2015  Venous insufficiency in both legs being in an accident involving a mower about 10 years ago, on chronic furosemide. LE US negative 12/15/2009  Cholelithiasis  Hyperplastic colon polyp, due 2020  Tonsillectomy  Baker cyst RLE US 2013    Neuropathy from DM - R foot numbness chronically    Exposed to lots of wood dust in his occupation    CURRENT OUTPATIENT MEDICATIONS  Reviewed    ALLERGIES  No Known Allergies     PHYSICAL EXAM  There were no vitals taken for this visit.  GEN: NAD  HEENT: EOMI, no icterus, injection or pallor    Remainder of physical exam deferred due to public health emergency and limitations of video visit.    LABORATORY AND IMAGING STUDIES    Numerous labs from the hospitalization in  Oct and most recently 11/14/23 were independently reviewed and interpreted by me    PET Oncology Whole Body  Narrative: Procedure: PET ONCOLOGY (EYES TO THIGHS), PET ONCOLOGY WHOLE BODY    Subtype: Subsequent     Radiopharmaceutical: F-18 FDG     Dose: 13.25 mCi IV    Serum Glucose: 94 mg/dl    History: response to treatment; Nasopharyngeal cancer (H)    Comparison: PET/CT 2/22/2023, CT abdomen pelvis 11/9/2023    Technique:  A low dose CT examination was performed for the purpose of  attenuation correction and localization. Attenuation corrected PET  images from the skull base to the mid thigh were acquired  approximately one hour following intravenous administration of the  dose, and displayed in transaxial, sagittal, and coronal projections.    Uptake time: 50 minutes.    Findings:     Background uptake: Blood pool 3.3, liver 3.9.    Neck: The sites of residual hypermetabolism described on the prior  resolved. No new sites of suspicious hypermetabolism is seen.    Thorax: No sites of suspicious hypermetabolism.    Abdomen/Pelvis: No sites of suspicious hypermetabolism. The increased  soft tissue seen within the SMA mesentery on the immediate prior is  slightly improved in volume although still slightly increased when  compared to the initial PET/CT dated 2/22/2023. This soft tissue is  not hypermetabolic.    Thighs: No sites of suspicious hypermetabolism.    Bony structures: No sites of suspicious hypermetabolism.  Impression: IMPRESSION:  No evidence of hypermetabolic metastatic disease.  Resolved hypermetabolism within the left upper neck.    Increased soft tissue within the SMA mesentery seen on the most recent  prior CT is improved in volume and not hypermetabolic, although still  increased when compared to 2/22/2023, possibly reactive.    JUAN CARLOS MALCOLM MD       SYSTEM ID:  [id number]     Imaging was personally reviewed and interpreted by me and compared to serial imaging going back to Sept 13 2023.      Virtual Visit Details  Type of service:  Video Visit   Originating Location (pt. Location): Home    Distant Location (provider location):  Off-site  Platform used for Video Visit: Ilana Melvin MD

## 2023-11-29 NOTE — NURSING NOTE
Is the patient currently in the state of [place]? YES    Visit mode:VIDEO    If the visit is dropped, the patient can be reconnected by: VIDEO VISIT: Send to e-mail at: [email or web address]    Will anyone else be joining the visit? NO  (If patient encounters technical issues they should call [phone]    How would you like to obtain your AVS? MyChart    Are changes needed to the allergy or medication list? Pt stated no changes to allergies and Pt stated no med changes    Reason for visit: BRII Rose LPN

## 2023-12-04 ENCOUNTER — PATIENT OUTREACH (OUTPATIENT)
Dept: ONCOLOGY | Facility: OTHER | Age: 63
End: 2023-12-04
Payer: COMMERCIAL

## 2023-12-04 ENCOUNTER — HOSPITAL ENCOUNTER (OUTPATIENT)
Dept: ULTRASOUND IMAGING | Facility: OTHER | Age: 63
Discharge: HOME | End: 2023-12-04
Attending: INTERNAL MEDICINE | Admitting: INTERNAL MEDICINE
Payer: COMMERCIAL

## 2023-12-04 DIAGNOSIS — R55 PRE-SYNCOPE: ICD-10-CM

## 2023-12-04 DIAGNOSIS — C11.9 NASOPHARYNGEAL CANCER (H): ICD-10-CM

## 2023-12-04 PROCEDURE — 93880 EXTRACRANIAL BILAT STUDY: CPT

## 2023-12-04 NOTE — PROGRESS NOTES
[facility]: Cancer Care Follow-Up Note                                    Discussion with Patient:                                                      Patient reports that his neck has been very tight and this causes extreme dizziness. Any overexertion causes neck to become tight feeling.He states that he blacked out twice last week.              Dates of Treatment:                                                      Infusion given in last 28 days       None            Assessment:                                                          RNCC Short Symptom Review:     Assessment completed with:: Patient    General/Short Assessment  Does the patient have all their medications?: Yes  Is the patient experiencing any new or worsening symptoms?: No  Discussion with patient: Answered patient's questions and addressed concerns;Reviewed how and when to contact clinic;Reviewed patient's future appointments    Pain  Patient Reported Pain?: No    Patient Coping  Accepting    Clinic Utilization  Patient Aware of Next Appointment?: Yes    Other Patient Concerns  Other Patient Reported Concerns: No    Intervention/Education provided during outreach:                                                       Will see patient this week to address new concerns    Patient to follow up as scheduled at next appt  Confirmed patient has clinic and triage numbers    Signature:  Sybil Zamora RN

## 2023-12-06 DIAGNOSIS — C11.9 NASOPHARYNGEAL CANCER (H): ICD-10-CM

## 2023-12-06 DIAGNOSIS — D50.9 IRON DEFICIENCY ANEMIA, UNSPECIFIED IRON DEFICIENCY ANEMIA TYPE: ICD-10-CM

## 2023-12-06 DIAGNOSIS — D89.839 CYTOKINE RELEASE SYNDROME: ICD-10-CM

## 2023-12-06 RX ORDER — FERROUS SULFATE 325(65) MG
325 TABLET ORAL
Qty: 90 TABLET | Refills: 1 | Status: SHIPPED | OUTPATIENT
Start: 2023-12-06 | End: 2024-06-05

## 2023-12-07 ENCOUNTER — ONCOLOGY VISIT (OUTPATIENT)
Dept: ONCOLOGY | Facility: OTHER | Age: 63
End: 2023-12-07
Attending: INTERNAL MEDICINE
Payer: COMMERCIAL

## 2023-12-07 ENCOUNTER — HOSPITAL ENCOUNTER (OUTPATIENT)
Dept: INFUSION THERAPY | Facility: OTHER | Age: 63
Discharge: HOME | End: 2023-12-07
Attending: INTERNAL MEDICINE
Payer: COMMERCIAL

## 2023-12-07 VITALS
SYSTOLIC BLOOD PRESSURE: 108 MMHG | OXYGEN SATURATION: 93 % | BODY MASS INDEX: 32.71 KG/M2 | WEIGHT: 228 LBS | DIASTOLIC BLOOD PRESSURE: 68 MMHG | HEART RATE: 93 BPM | TEMPERATURE: 99 F

## 2023-12-07 DIAGNOSIS — M19.90 INFLAMMATORY ARTHROPATHY: ICD-10-CM

## 2023-12-07 DIAGNOSIS — C11.9 NASOPHARYNGEAL CANCER (H): ICD-10-CM

## 2023-12-07 DIAGNOSIS — M54.2 NECK PAIN: ICD-10-CM

## 2023-12-07 DIAGNOSIS — R79.89 LOW VITAMIN B12 LEVEL: ICD-10-CM

## 2023-12-07 DIAGNOSIS — D89.839 CYTOKINE RELEASE SYNDROME: ICD-10-CM

## 2023-12-07 DIAGNOSIS — E86.0 DEHYDRATION: Primary | ICD-10-CM

## 2023-12-07 DIAGNOSIS — R55 SYNCOPE, UNSPECIFIED SYNCOPE TYPE: Primary | ICD-10-CM

## 2023-12-07 LAB
ALBUMIN SERPL BCG-MCNC: 3.4 G/DL (ref 3.5–5.2)
ALP SERPL-CCNC: 90 U/L (ref 40–150)
ALT SERPL W P-5'-P-CCNC: 18 U/L (ref 0–70)
ANION GAP SERPL CALCULATED.3IONS-SCNC: 8 MMOL/L (ref 7–15)
AST SERPL W P-5'-P-CCNC: 28 U/L (ref 0–45)
BASOPHILS # BLD AUTO: 0 10E3/UL (ref 0–0.2)
BASOPHILS NFR BLD AUTO: 0 %
BILIRUB SERPL-MCNC: 0.7 MG/DL
BUN SERPL-MCNC: 15.1 MG/DL (ref 8–23)
CALCIUM SERPL-MCNC: 8.9 MG/DL (ref 8.8–10.2)
CHLORIDE SERPL-SCNC: 102 MMOL/L (ref 98–107)
CREAT SERPL-MCNC: 0.88 MG/DL (ref 0.67–1.17)
CRP SERPL-MCNC: 6.61 MG/L
DEPRECATED HCO3 PLAS-SCNC: 29 MMOL/L (ref 22–29)
EGFRCR SERPLBLD CKD-EPI 2021: >90 ML/MIN/1.73M2
EOSINOPHIL # BLD AUTO: 0.1 10E3/UL (ref 0–0.7)
EOSINOPHIL NFR BLD AUTO: 2 %
ERYTHROCYTE [DISTWIDTH] IN BLOOD BY AUTOMATED COUNT: 16.6 % (ref 10–15)
ERYTHROCYTE [SEDIMENTATION RATE] IN BLOOD BY WESTERGREN METHOD: 37 MM/HR (ref 0–20)
FERRITIN SERPL-MCNC: 440 NG/ML (ref 31–409)
GLUCOSE SERPL-MCNC: 134 MG/DL (ref 70–99)
HCT VFR BLD AUTO: 31.7 % (ref 40–53)
HGB BLD-MCNC: 10.3 G/DL (ref 13.3–17.7)
HOLD SPECIMEN: NORMAL
IMM GRANULOCYTES # BLD: 0 10E3/UL
IMM GRANULOCYTES NFR BLD: 0 %
IRON BINDING CAPACITY (ROCHE): 236 UG/DL (ref 240–430)
IRON SATN MFR SERPL: 22 % (ref 15–46)
IRON SERPL-MCNC: 52 UG/DL (ref 61–157)
LDH SERPL L TO P-CCNC: 149 U/L (ref 0–250)
LYMPHOCYTES # BLD AUTO: 0.7 10E3/UL (ref 0.8–5.3)
LYMPHOCYTES NFR BLD AUTO: 15 %
MCH RBC QN AUTO: 28.7 PG (ref 26.5–33)
MCHC RBC AUTO-ENTMCNC: 32.5 G/DL (ref 31.5–36.5)
MCV RBC AUTO: 88 FL (ref 78–100)
MONOCYTES # BLD AUTO: 0.4 10E3/UL (ref 0–1.3)
MONOCYTES NFR BLD AUTO: 9 %
NEUTROPHILS # BLD AUTO: 3.3 10E3/UL (ref 1.6–8.3)
NEUTROPHILS NFR BLD AUTO: 74 %
NRBC # BLD AUTO: 0 10E3/UL
NRBC BLD AUTO-RTO: 0 /100
PLATELET # BLD AUTO: 132 10E3/UL (ref 150–450)
POTASSIUM SERPL-SCNC: 3.6 MMOL/L (ref 3.4–5.3)
PROT SERPL-MCNC: 7.1 G/DL (ref 6.4–8.3)
RBC # BLD AUTO: 3.59 10E6/UL (ref 4.4–5.9)
SODIUM SERPL-SCNC: 139 MMOL/L (ref 135–145)
WBC # BLD AUTO: 4.5 10E3/UL (ref 4–11)

## 2023-12-07 PROCEDURE — 36415 COLL VENOUS BLD VENIPUNCTURE: CPT | Mod: ZL | Performed by: INTERNAL MEDICINE

## 2023-12-07 PROCEDURE — 85652 RBC SED RATE AUTOMATED: CPT | Mod: ZL | Performed by: INTERNAL MEDICINE

## 2023-12-07 PROCEDURE — 86140 C-REACTIVE PROTEIN: CPT

## 2023-12-07 PROCEDURE — 83550 IRON BINDING TEST: CPT | Mod: ZL | Performed by: INTERNAL MEDICINE

## 2023-12-07 PROCEDURE — 82728 ASSAY OF FERRITIN: CPT | Mod: ZL | Performed by: INTERNAL MEDICINE

## 2023-12-07 PROCEDURE — 85025 COMPLETE CBC W/AUTO DIFF WBC: CPT | Performed by: INTERNAL MEDICINE

## 2023-12-07 PROCEDURE — 99417 PROLNG OP E/M EACH 15 MIN: CPT | Performed by: INTERNAL MEDICINE

## 2023-12-07 PROCEDURE — 36591 DRAW BLOOD OFF VENOUS DEVICE: CPT

## 2023-12-07 PROCEDURE — 87799 DETECT AGENT NOS DNA QUANT: CPT | Mod: ZL | Performed by: INTERNAL MEDICINE

## 2023-12-07 PROCEDURE — 99215 OFFICE O/P EST HI 40 MIN: CPT | Performed by: INTERNAL MEDICINE

## 2023-12-07 PROCEDURE — 80053 COMPREHEN METABOLIC PANEL: CPT

## 2023-12-07 PROCEDURE — 83615 LACTATE (LD) (LDH) ENZYME: CPT | Mod: ZL | Performed by: INTERNAL MEDICINE

## 2023-12-07 PROCEDURE — 250N000011 HC RX IP 250 OP 636: Mod: JZ | Performed by: INTERNAL MEDICINE

## 2023-12-07 RX ORDER — EPINEPHRINE 1 MG/ML
0.3 INJECTION, SOLUTION, CONCENTRATE INTRAVENOUS EVERY 5 MIN PRN
Status: CANCELLED | OUTPATIENT
Start: 2023-12-07

## 2023-12-07 RX ORDER — ALBUTEROL SULFATE 90 UG/1
1-2 AEROSOL, METERED RESPIRATORY (INHALATION)
Status: CANCELLED
Start: 2023-12-07

## 2023-12-07 RX ORDER — ALBUTEROL SULFATE 0.83 MG/ML
2.5 SOLUTION RESPIRATORY (INHALATION)
Status: CANCELLED | OUTPATIENT
Start: 2023-12-07

## 2023-12-07 RX ORDER — METHYLPREDNISOLONE SODIUM SUCCINATE 125 MG/2ML
125 INJECTION, POWDER, LYOPHILIZED, FOR SOLUTION INTRAMUSCULAR; INTRAVENOUS
Status: CANCELLED
Start: 2023-12-07

## 2023-12-07 RX ORDER — CYANOCOBALAMIN 1000 UG/ML
1000 INJECTION, SOLUTION INTRAMUSCULAR; SUBCUTANEOUS
Status: CANCELLED
Start: 2023-12-27

## 2023-12-07 RX ORDER — HEPARIN SODIUM (PORCINE) LOCK FLUSH IV SOLN 100 UNIT/ML 100 UNIT/ML
5 SOLUTION INTRAVENOUS
Status: DISCONTINUED | OUTPATIENT
Start: 2023-12-07 | End: 2023-12-08 | Stop reason: HOSPADM

## 2023-12-07 RX ORDER — ALBUTEROL SULFATE 90 UG/1
1-2 AEROSOL, METERED RESPIRATORY (INHALATION)
Status: CANCELLED
Start: 2023-12-27

## 2023-12-07 RX ORDER — DIPHENHYDRAMINE HYDROCHLORIDE 50 MG/ML
50 INJECTION INTRAMUSCULAR; INTRAVENOUS
Status: CANCELLED
Start: 2023-12-27

## 2023-12-07 RX ORDER — CHOLECALCIFEROL (VITAMIN D3) 50 MCG
1 TABLET ORAL DAILY
COMMUNITY

## 2023-12-07 RX ORDER — DIPHENHYDRAMINE HYDROCHLORIDE 50 MG/ML
50 INJECTION INTRAMUSCULAR; INTRAVENOUS
Status: CANCELLED
Start: 2023-12-07

## 2023-12-07 RX ORDER — METHYLPREDNISOLONE SODIUM SUCCINATE 125 MG/2ML
125 INJECTION, POWDER, LYOPHILIZED, FOR SOLUTION INTRAMUSCULAR; INTRAVENOUS
Status: CANCELLED
Start: 2023-12-27

## 2023-12-07 RX ORDER — MEPERIDINE HYDROCHLORIDE 50 MG/ML
25 INJECTION INTRAMUSCULAR; INTRAVENOUS; SUBCUTANEOUS EVERY 30 MIN PRN
Status: CANCELLED | OUTPATIENT
Start: 2023-12-27

## 2023-12-07 RX ORDER — ALBUTEROL SULFATE 0.83 MG/ML
2.5 SOLUTION RESPIRATORY (INHALATION)
Status: CANCELLED | OUTPATIENT
Start: 2023-12-27

## 2023-12-07 RX ORDER — EPINEPHRINE 1 MG/ML
0.3 INJECTION, SOLUTION, CONCENTRATE INTRAVENOUS EVERY 5 MIN PRN
Status: CANCELLED | OUTPATIENT
Start: 2023-12-27

## 2023-12-07 RX ORDER — HEPARIN SODIUM,PORCINE 10 UNIT/ML
5-20 VIAL (ML) INTRAVENOUS DAILY PRN
Status: CANCELLED | OUTPATIENT
Start: 2023-12-07

## 2023-12-07 RX ORDER — HEPARIN SODIUM (PORCINE) LOCK FLUSH IV SOLN 100 UNIT/ML 100 UNIT/ML
5 SOLUTION INTRAVENOUS
Status: CANCELLED | OUTPATIENT
Start: 2023-12-07

## 2023-12-07 RX ORDER — MEPERIDINE HYDROCHLORIDE 50 MG/ML
25 INJECTION INTRAMUSCULAR; INTRAVENOUS; SUBCUTANEOUS EVERY 30 MIN PRN
Status: CANCELLED | OUTPATIENT
Start: 2023-12-07

## 2023-12-07 RX ADMIN — HEPARIN 5 ML: 100 SYRINGE at 09:06

## 2023-12-07 NOTE — NURSING NOTE
Patients port accessed using power non-coring, 19 gauge, 3/4 inch needle. Port accessed per facility protocol.    Hand hygiene performed: yes   Mask donned by caregiver: yes   Site prepped with CHG: yes   Labs drawn: yes   Dressing applied using aseptic technique: yes   Port flushed easily, without resistance. Flushed with 10 cc normal saline. Immediate blood return noted. 10 cc blood discarded. 2 vials blood draw and sent to lab for results. Port flushed with 20 cc 0.9% normal saline and 5 cc heparinized saline. Needle removed intact, sterile dressing applied.  Slight pressure applied for 30 seconds.   Patient tolerated port flush well, denies pain nor discomfort at this time. Patient instructed to leave dressing intact for a minimum of one hour and to call with questions or concerns.  Patient states understanding and is in agreement with this plan. Patient discharged ambulatory.     Laquita Lawson RN

## 2023-12-07 NOTE — PROGRESS NOTES
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 Jareth Gallardo returns for follow up of nasopharyngeal carcinoma p16 negative stage Shannan.Patient was referred to us by Dr. Melvin at the [facility] for ongoing treatment to be given locally.  Initially presented with left neck swelling and was seen by urgent care in the [place] area.  Was treated with antibiotics initially and then was ultimately treated for presumed cat scratch failure with antibiotics.  Symptoms worsened the point where he says he had swelling in his left shoulder.  CT neck was ordered and patient was referred to ENT but could not be seen immediately due to insurance reasons.  He was seen by a general surgeon by the name of Dr. 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  Patient was subsequently referred to Dr. Boston of  ENT at the [facility].  He reviewed the CT neck that was performed on December 9, 2021 and was read as extensive left-sided neck adenopathy with lymph nodes measuring up to 2.4 cm medial to the left sternocleidomastoid muscle just above the level of the hyoid bone.  A PET/CT was ordered and performed on March 9, 2022 and it was read as exuberant soft tissue thickening within the nasopharyngeal and oropharyngeal mucosa concerning for nasopharyngeal carcinoma.  Soft tissue nodular uptake was evident within the prevertebral soft tissues and about differential because of the epiglottis with extensive neck lymph node uptake.  There was uptake within the  lung parenchyma concerning for metastatic uptake of primary malignancy infectious or inflammatory cannot be excluded.  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  The case was presented at the tumor board and the feeling was that patient would require induction chemotherapy followed by chemoradiation and therapy.  It was felt that this regimen would improve recurrence free survival and overall survival as compared to chemo radiotherapy alone.  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  Tumor was also found to be PD-L1 positive at 20% using the :.  Patient was ultimately seen by Dr. Dariela Melvin medical oncology at [facility] on March 22, 2022.  Patient did have a history of hearing loss and subsequently it was decided the patient be a candidate for carboplatin instead of cisplatin as induction therapy.  The plan was to treat with carboplatin and gemcitabine for 3 cycles and then restage in terms of the lung nodules they will be reevaluated after induction.  He was started on carboplatin gemcitabine and was treated between April 4, 2021 to until May 16, 2022.  He required Neulasta support due to neutropenia PET scan performed on May 31, 2022 revealed a partial response subsequently underwent chemoradiotherapy with weekly carboplatin and received radiation therapy at the [facility] under the direction of Dr. Skaggs.  PET scan done on November 16, 2022 revealed a residual 1.5 cm level 2 lymph node with an SUV of 28.7.  A 1 cm enhancing focus in the sternocleidomastoid muscle with an SUV of 22.4 there was a level 2A lymph node that on the left with decreased in size.  With decreased FDG avidity therewas a resolved left level 5 node node.  There was a new patchy groundglass opacity and nodule in the right upper lobe.  Which was felt to be inflammatory subseqre was residual disease on PET scan and recurrence to be at high  risk given the fact the patient was.  1 positive and the plan was to proceed with pembrolizumab.  Patient was started on pembrolizumab on March 9, 2023 under the direction of Dr. Norm Melvin cycle 3 was delayed 1 week due to apparent influenza patient had a good partial response he was seen in the emergency department at [facility] for fever and cough and the patient was treated for influenza.  We saw the patient in August 28, the plan was to initiate his next cycle of pembrolizumab here and is due to receive cycle 8 on August 30, he received that at the [facility].  And then was started on cycle 9 beginning September 20 receive cycle 10 on October 11.  He was scheduled to see pulmonology at [facility] tomorrow for evaluation groundglass nodules.  He was to follow-up with Dr. Melvin at the end of November with repeat imaging.  In the interim the patient became extremely ill and was seen in the emergency room on October 26 with complaints of shaking chills fever to 102 night sweats.  There was also an isolated episode of divergent vision with double vision eventually resolved.  He did note multiple tick bites in the recent past as he was out in the woods.  D-dimer was elevated at 1.25 with a CTA of the chest was ordered and came back negative for pulmonary embolus.  COVID testing was negative.  He was empirically treated forpresumed tickborne illness initially with vancomycin and Zosyn admitted to [facility].  Further testing included included PCR testing for Ehrlichia as well as Anaplasma which was negative Lyme testing was negative.  Blood cultures were negative and subsequently discharged on October 28 on doxycycline he still had a temp of 102.3.  He comes in today still on doxycycline but still having spiking temps of over 102.  He also has shaking chills he had some loose stools this morning he did note urinary frequency  but now has hardly any urine output he appears dehydrated denies  any headaches he does report the isolated episode of double vision.  He does get an occasional cough he did note nausea and vomiting as well.  We saw him last in October 31.  Ordered labs and he was found to have BUN 41 creatinine 4.58 his CRP was elevated at 330 his sed rate was elevated 60 given the fact that he had acute renal failure refer the patient to emergency room to rule out obstructive uropathy versus checkpoint inhibitor toxicity renal ultrasound revealed no evidence of obstruction.  His creatinine remained elevated at 4.59.  He was subsequently transferred to the [facility] and admitted on November 1 on admission his temp was 102.7. He underwent a renal biopsy on November 6, 2023 was a limited sample with only 4 glomeruli noted was read as acute interstitial nephritis moderate to severe, there was also focal acute tubular necrosis.  He was seen by nephrology by Dr. Blake Lynn.  The etiology of his renal failure could be possibly related to nephrotoxins NSAID vancomycin we could not rule out checkpoint inhibitor associated acute kidney injury due to pembrolizumab particularly in the setting of acute interstitial nephritis in the setting of fever and elevated inflammatory markers.  Infectious disease performed a work-up but there was no evidence of infection temperature did improve over time.  Fungal cultures were negative, blood cultures were negative, respiratory panel was negative his neutropenia did resolve as well as thrombocytopenia his hemoglobin was low patient was noted to be hypotensive and was treated with normal saline was also noted to have a hemoglobin of 7.2 and required 1 unit of packed red cells CRP did improve nephrology felt patient was hypovolemic and recommended Lasix 40 mg daily patient was eventually discharged without a fever on November 7.  He was seen in follow-up by NATHAN Brock via via telehealth/video visit on November 10.She noted that his CRP was down to 78 but noted  "that he was having ongoing joint pains and was using any NSAIDs she noted complaints of swelling of the right upper extremity and up Doppler venous ultrasound was negative for DVT.  She also noted that his magnesium was 1.1 and she is set up IV magnesium to be given in the emergency room which was given on November 11 she recommended continuing oral magnesium oxide.  Patient comes in today performance status is still relatively poor at 2 \"improved since his last visit.  He says his biggest problem right now states having joint pain and swelling of all his digits as well as the wrist of both hands.  He also has ongoing fatigue.  He denies any urinary symptoms.  His fevers have resolved.  He is rather frustrated as she [place] and would like to stay with our clinic on a regular basis. We had seen the patient on November 14.  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  Has CRP and sed rate was dropping off pembrolizumab he did have ongoing autoimmune arthritis we elected to start the patient on low-dose prednisone 10 mg p.o. twice daily for 2 weeks.  He said almost immediately on the next day  Prednisone his joint symptoms improved and he felt overall much better with less fatigue.  We elected to restage the patient with a PET scan that was done on on November 22, 2023 and the findings were that there was no evidence of metastatic or recurrent disease involving head neck chest abdomen pelvis and thighs hypermetabolism that was in the left upper neck have resolved.  There is nonspecific soft tissue and estimate mesentery that was not hypermetabolic and was overall improved.  Patient recently also seen Dr. Melvin via telehealth on November 29 she noticed the patient did have a positive response on PET scan and felt that with could monitor the patient off pembrolizumab she recommended repeat imaging in 2 to " 3 months she also agreed to manage from a distance with the patient would like to receive care locally here.  In terms of anemia we will start the patient on B12 1000 mcg IM monthly.  Did note some unusual complaints of stiffness in his left neck with potential presyncope/syncope with blackout lasting less than 2 seconds Dr. Tripathi ordered bilateral carotid ultrasounds were negative for stenosis.  He said he also had an episode where he had some potential vision loss in the left eye that was transient.  But otherwise is doing much better he has gained weight he has more energy he is back to normal no complaints of rash diarrhea hematuria dysphagia odynophagia he says he pretty much he can eat what he wants including solids.  He states he would be willing to go to [place] as opposed to [place] for specialty appointments.    ______________________________________________________________________________    Past History    Past Medical History:   Diagnosis Date    COPD (chronic obstructive pulmonary disease) (H)     Dyslipidemia     Hard to intubate 12/5/2022    Nasopharyngeal carcinoma (H)     BARRETT (obstructive sleep apnea)     Type 2 diabetes mellitus with diabetic nephropathy (H)        Past Surgical History:   Procedure Laterality Date    COLONOSCOPY      DISSECTION RADICAL NECK MODIFIED Left 12/5/2022    Procedure: left modified radical neck dissection;  Surgeon: Jesus Boston MD;  Location: [facility] OR    INSERT PORT VASCULAR ACCESS Right 03/31/2022    Procedure: SINGLE LUMEN POWER PORT INSERTION, VASCULAR ACCESS;  Surgeon: Evens Aponte MD;  [place] [facility] [place]    IR CHEST PORT PLACEMENT > 5 YRS OF AGE  03/31/2022    IR RENAL BIOPSY RIGHT  11/6/2023    KNEE SURGERY      LARYNGOSCOPY WITH BIOPSY(IES) N/A 12/5/2022    Procedure: LARYNGOSCOPY, WITH BIOPSY;  Surgeon: Jesus Boston MD;  Location: [facility] OR    TONSILLECTOMY             Review of systems.  CNS: There are no headaches, no blurred vision, no  change in mental status,   ENT: There is no hearing loss.  Respiratory: There is no cough, dyspnea or hemoptysis  Cardiac: There is no chest pain, orthopnea, PND, or ankle edema.  GI: There is no bright red blood per rectum, no hematemesis, no reflux, no diarrhea or constipation  Musculoskeletal: There is a left neck stiffness/question spasm  : There is no urinary frequency, hematuria.  Constitutional: There is no fevers, night sweats, weight loss.  Endocrine: There is no fatigue  Neuro: There is no tingling or numbness in the hands or feet.    Transient isolated blackout spell with associated left transient vision loss  Hematologic: There is no gingival bleeding, epistaxis, or easy bruisability.  Dermatologic: There is no skin rash.  A 14 point review of systems is otherwise negative.          Physical Exam    /68 (BP Location: Right arm, Patient Position: Sitting, Cuff Size: Adult Regular)   Pulse 93   Temp 99  F (37.2  C) (Tympanic)   Wt 103.4 kg (228 lb)   SpO2 93%   BMI 32.71 kg/m        GENERAL: Alert and oriented to time place and person. Seated comfortably. In no distress.    HEAD: Atraumatic and normocephalic.    EYES: YON, EOMI.  No pallor.  No icterus.    Oral cavity: no mucosal lesion or tonsillar enlargement.    NECK: Firmness in the left neck secondary to multiple surgeries no obvious palpable lymphadenopathy JVP normal.  No thyroid enlargement.    LYMPH NODES: There are no palpable cervical, supraclavicular, axillary, or inguinal nodes.    LUNGS: clear to auscultation bilaterally.  Resonant to percussion throughout bilaterally.  Symmetrical breath movements bilaterally.    HEART: S1 and S2 are heard. Regular rate and rhythm.  No murmur or gallop or rub heard.  No peripheral edema.    ABDOMEN: Soft. Not tender. Not distended.  No palpable hepatomegaly or splenomegaly.  No other mass palpable.  Bowel sounds heard.    EXTREMITIES: There is trace ankle edema.  SKIN: no rash, or bruising or  purpura.    NEURO: Grossly non-focal.    Lab Results    Recent Results (from the past 240 hour(s))   Comprehensive metabolic panel    Collection Time: 12/07/23  9:12 AM   Result Value Ref Range    Sodium 139 135 - 145 mmol/L    Potassium 3.6 3.4 - 5.3 mmol/L    Carbon Dioxide (CO2) 29 22 - 29 mmol/L    Anion Gap 8 7 - 15 mmol/L    Urea Nitrogen 15.1 8.0 - 23.0 mg/dL    Creatinine 0.88 0.67 - 1.17 mg/dL    GFR Estimate >90 >60 mL/min/1.73m2    Calcium 8.9 8.8 - 10.2 mg/dL    Chloride 102 98 - 107 mmol/L    Glucose 134 (H) 70 - 99 mg/dL    Alkaline Phosphatase 90 40 - 150 U/L    AST 28 0 - 45 U/L    ALT 18 0 - 70 U/L    Protein Total 7.1 6.4 - 8.3 g/dL    Albumin 3.4 (L) 3.5 - 5.2 g/dL    Bilirubin Total 0.7 <=1.2 mg/dL   CRP inflammation    Collection Time: 12/07/23  9:12 AM   Result Value Ref Range    CRP Inflammation 6.61 (H) <5.00 mg/L   CBC with platelets and differential    Collection Time: 12/07/23  9:12 AM   Result Value Ref Range    WBC Count 4.5 4.0 - 11.0 10e3/uL    RBC Count 3.59 (L) 4.40 - 5.90 10e6/uL    Hemoglobin 10.3 (L) 13.3 - 17.7 g/dL    Hematocrit 31.7 (L) 40.0 - 53.0 %    MCV 88 78 - 100 fL    MCH 28.7 26.5 - 33.0 pg    MCHC 32.5 31.5 - 36.5 g/dL    RDW 16.6 (H) 10.0 - 15.0 %    Platelet Count 132 (L) 150 - 450 10e3/uL    % Neutrophils 74 %    % Lymphocytes 15 %    % Monocytes 9 %    % Eosinophils 2 %    % Basophils 0 %    % Immature Granulocytes 0 %    NRBCs per 100 WBC 0 <1 /100    Absolute Neutrophils 3.3 1.6 - 8.3 10e3/uL    Absolute Lymphocytes 0.7 (L) 0.8 - 5.3 10e3/uL    Absolute Monocytes 0.4 0.0 - 1.3 10e3/uL    Absolute Eosinophils 0.1 0.0 - 0.7 10e3/uL    Absolute Basophils 0.0 0.0 - 0.2 10e3/uL    Absolute Immature Granulocytes 0.0 <=0.4 10e3/uL    Absolute NRBCs 0.0 10e3/uL   Lactate Dehydrogenase    Collection Time: 12/07/23 10:14 AM   Result Value Ref Range    Lactate Dehydrogenase 149 0 - 250 U/L   Iron & Iron Binding Capacity    Collection Time: 12/07/23 10:14 AM   Result Value  Ref Range    Iron 52 (L) 61 - 157 ug/dL    Iron Binding Capacity 236 (L) 240 - 430 ug/dL    Iron Sat Index 22 15 - 46 %   Ferritin    Collection Time: 12/07/23 10:14 AM   Result Value Ref Range    Ferritin 440 (H) 31 - 409 ng/mL   Erythrocyte sedimentation rate auto    Collection Time: 12/07/23 10:14 AM   Result Value Ref Range    Erythrocyte Sedimentation Rate 37 (H) 0 - 20 mm/hr   Extra Serum Separator Tube (SST)    Collection Time: 12/07/23 10:14 AM   Result Value Ref Range    Hold Specimen [facility]        Imaging    US Carotid Bilateral    Result Date: 12/4/2023  US CAROTID BILATERAL HISTORY: nasopharyngeal carcinoma, s/p chemoradiation. Pre-syncope. Evaluate for stenosis.; Nasopharyngeal cancer (H); Pre-syncope TECHNIQUE: Grayscale, color Doppler and spectral Doppler assessment of the major cervical arteries was performed. COMPARISON: None. FINDINGS: The caliber of the common carotid arteries is preserved. There is minimal atherosclerotic plaque at the carotid bifurcations. Peak systolic velocity within the proximal ICAs measures up to 67 cm/s on the right and 76 cm/s on the left. No downstream tardus parvus waveforms are identified. Antegrade flow is seen in the vertebral and external carotid arteries.     IMPRESSION: No evidence of flow-limiting atherosclerotic disease of the neck.  Normal     ICA PSV < 125 cm/sec                  Plaque Estimate None                  ICA/CCA PSV Ratio < 2.0                  ICA EDV < 40 cm/sec < 50%       ICA PSV < 125 cm/sec                   Plaque Estimate < 50%                   ICA/CCA PSV Ratio < 2.0                   ICA EDV < 40 cm/sec 50-69%     ICA -230 cm/sec                   Plaque Estimate > or = 50%                   ICA/CCA PSV Ratio 2.0 - 4.0                   ICA EDV 40 - 100 cm/sec > or = 70% ICA PSV > 230 cm/sec                  Plaque Estimate > or = 50%                  ICA/CCA Ratio  > 4.0                  ICA EDV > 100 up to 120 cm/sec (required in  combination with PSV per vascular surgery) Additional criteria from vascular surgery service > 80%      ICA EDV > 120 cm/sec JUAN CARLOS MALCOLM MD   SYSTEM ID:  [id number]    PET Oncology Whole Body    Result Date: 11/24/2023  Procedure: PET ONCOLOGY (EYES TO THIGHS), PET ONCOLOGY WHOLE BODY Subtype: Subsequent Radiopharmaceutical: F-18 FDG Dose: 13.25 mCi IV Serum Glucose: 94 mg/dl History: response to treatment; Nasopharyngeal cancer (H) Comparison: PET/CT 2/22/2023, CT abdomen pelvis 11/9/2023 Technique:  A low dose CT examination was performed for the purpose of attenuation correction and localization. Attenuation corrected PET images from the skull base to the mid thigh were acquired approximately one hour following intravenous administration of the dose, and displayed in transaxial, sagittal, and coronal projections. Uptake time: 50 minutes. Findings: Background uptake: Blood pool 3.3, liver 3.9. Neck: The sites of residual hypermetabolism described on the prior resolved. No new sites of suspicious hypermetabolism is seen. Thorax: No sites of suspicious hypermetabolism. Abdomen/Pelvis: No sites of suspicious hypermetabolism. The increased soft tissue seen within the SMA mesentery on the immediate prior is slightly improved in volume although still slightly increased when compared to the initial PET/CT dated 2/22/2023. This soft tissue is not hypermetabolic. Thighs: No sites of suspicious hypermetabolism. Bony structures: No sites of suspicious hypermetabolism.     IMPRESSION:  No evidence of hypermetabolic metastatic disease. Resolved hypermetabolism within the left upper neck. Increased soft tissue within the SMA mesentery seen on the most recent prior CT is improved in volume and not hypermetabolic, although still increased when compared to 2/22/2023, possibly reactive. JUAN CARLOS MALCOLM MD   SYSTEM ID:  [id number]    US Upper Extremity Venous Duplex Right    Result Date: 11/13/2023  US UPPER EXTREMITY VENOUS  DUPLEX RIGHT 11/13/2023 10:21 AM History:Male, age 63 years; ; Pain of right upper extremity Comparison: No relevant prior imaging. Technique: Grayscale and color Doppler ultrasound of the right  upper extremity venous structures. Findings: The deep venous structures are patent and fully compressible. There is normal augmentation of flow.     Impression: No evidence of DVT. SERGIO HANNA MD   SYSTEM ID:  [id number]    CT Abdomen Pelvis w/o Contrast    Result Date: 11/9/2023  PROCEDURE:  CT ABDOMEN PELVIS W/O CONTRAST HISTORY:  Nasopharyngeal cancer (H) on keytruda. TECHNIQUE:  Helical CT of the abdomen and pelvis was performed without intravenous contrast. This CT exam was performed using one or more the following dose reduction techniques: automated exposure control, adjustment of the mA and/or kV according to patient size, and/or iterative reconstruction technique. COMPARISON:  9/13/2023 FINDINGS:  Evaluation of the solid organs is somewhat limited due to the lack of intravenous contrast. Limited views through the lung bases demonstrate trace pleural fluid. There is new adenopathy and stranding within the SMA mesentery. A reference dominant node measures 1.5 x 2.1 cm on series 2 image 38. The spleen is enlarged at 15.5 cm in length. There is a calcified gallstone. The noncontrast appearance of the liver and adrenal glands is unremarkable. Multiple duodenal diverticula are redemonstrated. There is no hydronephrosis. The small bowel is normal in caliber. Trace ascites is present. The aorta is normal in size with scattered atherosclerotic calcifications.  No suspicious osseous lesions are identified.     IMPRESSION:  New adenopathy and stranding of the SMA mesentery. Splenomegaly. Reactive change, lymphoma and immunotherapy induced sarcoid-like reaction (SLR) are in the differential. Rapid development argues against but does not exclude classic sclerosing mesenteritis. Trace pleural fluid. JUAN CARLOS MALCOLM MD    SYSTEM ID:  [id number]     Assessment and Plan: #1:Nasopharyngeal carcinoma p16 negative stage Shannan SAM positive: Status post induction chemotherapy with gemcitabine/carboplatin x 3 cycles followed by chemoradiation therapy with weekly carboplatin followed by salvage left modified radical neck dissection performed at the  with residual disease on posttreatment PET scan.  He was PD-L1 positive: And started therefore pembrolizumab and received at least 7 cycles at the [facility] under the direction of Dr. Melvin with partial response.  He transitioned his care to planned [place] and was treated locally with 1 more cycles of pembrolizumab.  Course complicated by cytokine release syndrome likely due to pembrolizumab with elevation of CRP sed rate fevers renal failure anemia as well as autoimmune arthritis pembrolizumab was stopped patient required a trial of steroids with improvement in symptoms his CRP continues to drop his near normal.  His PET scan essentially shows a complete response we will therefore continue to monitor off pembrolizumab and continue vitamin B12 for underlying anemia.  We like to confirm remission by obtaining blind biopsies of the nasopharynx to confirm remission of nasopharyngeal carcinoma patient like to be seen in [place] will therefore refer the patient Dr. [facility] for consult/exam/biopsy will continue to monitor thyroid function CRP and sed rate.  #2:  Presyncope/syncope with blackout transient left eye vision loss: Carotid artery studies were negative plan at this point is to obtain MRI brain/neck will also obtain echocardiogram and I will see patient after the above testing approximately 1 month: Time spent: 88 minutes was spent on this patient visit with significant amount of time reviewing notes from [facility], reviewing imaging results with the patient, reviewing other physician prior notes, performing history physical, documenting physical, and ordering MRI of the  brain MRI of the neck echocardiogram follow-up labs the patient will need a repeat PET scan in approximately 3 months.    Cancer Staging   No matching staging information was found for the patient.        Signed by: Chriss Martin MD    CC: Chriss Martin MD

## 2023-12-07 NOTE — PATIENT INSTRUCTIONS
B12 monthly-due 12/27    MRI brain and neck    See Dr. Bran ENT consult ASAP    Follow up with Chriss Martin MD 1 week after Dr. Bran with labs week prior

## 2023-12-07 NOTE — NURSING NOTE
Chief Complaint   Patient presents with    Oncology Clinic Visit     Nasopharyngeal CA   Follow up after PET scan.     Medication Reconciliation: complete    Renée Larios CMA ([facility]) 12/7/2023 10:35 AM

## 2023-12-08 LAB
EBV DNA COPIES/ML, INSTRUMENT: ABNORMAL COPIES/ML
EBV DNA SPEC NAA+PROBE-LOG#: 4.3 {LOG_COPIES}/ML

## 2023-12-11 ENCOUNTER — HOSPITAL ENCOUNTER (OUTPATIENT)
Dept: CARDIOLOGY | Facility: OTHER | Age: 63
Discharge: HOME | End: 2023-12-11
Attending: INTERNAL MEDICINE | Admitting: INTERNAL MEDICINE
Payer: COMMERCIAL

## 2023-12-11 LAB
BI-PLANE LVEF ECHO: NORMAL
LVEF ECHO: NORMAL

## 2023-12-11 PROCEDURE — 93306 TTE W/DOPPLER COMPLETE: CPT | Mod: 26 | Performed by: INTERNAL MEDICINE

## 2023-12-11 PROCEDURE — 999N000208 ECHOCARDIOGRAM COMPLETE

## 2023-12-11 PROCEDURE — 255N000002 HC RX 255 OP 636: Performed by: INTERNAL MEDICINE

## 2023-12-11 RX ADMIN — PERFLUTREN 4 ML: 6.52 INJECTION, SUSPENSION INTRAVENOUS at 11:35

## 2023-12-20 ENCOUNTER — HOSPITAL ENCOUNTER (OUTPATIENT)
Dept: MRI IMAGING | Facility: OTHER | Age: 63
Discharge: HOME | End: 2023-12-20
Attending: INTERNAL MEDICINE
Payer: COMMERCIAL

## 2023-12-20 DIAGNOSIS — R55 SYNCOPE, UNSPECIFIED SYNCOPE TYPE: ICD-10-CM

## 2023-12-20 DIAGNOSIS — M54.2 NECK PAIN: ICD-10-CM

## 2023-12-20 PROCEDURE — A9575 INJ GADOTERATE MEGLUMI 0.1ML: HCPCS | Mod: JZ | Performed by: INTERNAL MEDICINE

## 2023-12-20 PROCEDURE — 255N000002 HC RX 255 OP 636: Mod: JZ | Performed by: INTERNAL MEDICINE

## 2023-12-20 PROCEDURE — 70553 MRI BRAIN STEM W/O & W/DYE: CPT

## 2023-12-20 PROCEDURE — 70543 MRI ORBT/FAC/NCK W/O &W/DYE: CPT

## 2023-12-20 RX ORDER — GADOTERATE MEGLUMINE 376.9 MG/ML
20 INJECTION INTRAVENOUS ONCE
Status: COMPLETED | OUTPATIENT
Start: 2023-12-20 | End: 2023-12-20

## 2023-12-20 RX ADMIN — GADOTERATE MEGLUMINE 20 ML: 376.9 INJECTION INTRAVENOUS at 19:44

## 2023-12-27 ENCOUNTER — HOSPITAL ENCOUNTER (OUTPATIENT)
Dept: INFUSION THERAPY | Facility: OTHER | Age: 63
Discharge: HOME | End: 2023-12-27
Attending: INTERNAL MEDICINE | Admitting: INTERNAL MEDICINE
Payer: COMMERCIAL

## 2023-12-27 DIAGNOSIS — R79.89 LOW VITAMIN B12 LEVEL: Primary | ICD-10-CM

## 2023-12-27 PROCEDURE — 96372 THER/PROPH/DIAG INJ SC/IM: CPT | Performed by: INTERNAL MEDICINE

## 2023-12-27 PROCEDURE — 250N000011 HC RX IP 250 OP 636: Performed by: INTERNAL MEDICINE

## 2023-12-27 RX ORDER — CYANOCOBALAMIN 1000 UG/ML
1000 INJECTION, SOLUTION INTRAMUSCULAR; SUBCUTANEOUS ONCE
Status: COMPLETED | OUTPATIENT
Start: 2023-12-27 | End: 2023-12-27

## 2023-12-27 RX ORDER — MEPERIDINE HYDROCHLORIDE 50 MG/ML
25 INJECTION INTRAMUSCULAR; INTRAVENOUS; SUBCUTANEOUS EVERY 30 MIN PRN
Status: CANCELLED | OUTPATIENT
Start: 2024-01-15

## 2023-12-27 RX ORDER — EPINEPHRINE 1 MG/ML
0.3 INJECTION, SOLUTION, CONCENTRATE INTRAVENOUS EVERY 5 MIN PRN
Status: CANCELLED | OUTPATIENT
Start: 2024-01-15

## 2023-12-27 RX ORDER — METHYLPREDNISOLONE SODIUM SUCCINATE 125 MG/2ML
125 INJECTION, POWDER, LYOPHILIZED, FOR SOLUTION INTRAMUSCULAR; INTRAVENOUS
Status: CANCELLED
Start: 2024-01-15

## 2023-12-27 RX ORDER — ALBUTEROL SULFATE 90 UG/1
1-2 AEROSOL, METERED RESPIRATORY (INHALATION)
Status: CANCELLED
Start: 2024-01-15

## 2023-12-27 RX ORDER — ALBUTEROL SULFATE 0.83 MG/ML
2.5 SOLUTION RESPIRATORY (INHALATION)
Status: CANCELLED | OUTPATIENT
Start: 2024-01-15

## 2023-12-27 RX ORDER — CYANOCOBALAMIN 1000 UG/ML
1000 INJECTION, SOLUTION INTRAMUSCULAR; SUBCUTANEOUS
Status: CANCELLED
Start: 2024-01-15

## 2023-12-27 RX ORDER — DIPHENHYDRAMINE HYDROCHLORIDE 50 MG/ML
50 INJECTION INTRAMUSCULAR; INTRAVENOUS
Status: CANCELLED
Start: 2024-01-15

## 2023-12-27 RX ADMIN — CYANOCOBALAMIN 1000 MCG: 1000 INJECTION, SOLUTION INTRAMUSCULAR; SUBCUTANEOUS at 08:54

## 2023-12-27 NOTE — NURSING NOTE
Infusion Nursing Note:  Jareth Gallardo presents today for Vitamin B12 Injection.    Patient seen by provider today: No   present during visit today: Not Applicable.    Note: N/A.      Intravenous Access:  No Intravenous access/labs at this visit.    Treatment Conditions:  Not Applicable.      Post Infusion Assessment:  Patient tolerated injection without incident to L deltoid.       Discharge Plan:   Discharge instructions reviewed with: Patient.  Patient and/or family verbalized understanding of discharge instructions and all questions answered.  AVS to patient via [employer].  Patient will return 1/24/24 for next appointment.   Patient discharged in stable condition accompanied by: self.  Departure Mode: Ambulatory.      Jany Jenkins RN

## 2024-01-09 ENCOUNTER — TRANSFERRED RECORDS (OUTPATIENT)
Dept: HEALTH INFORMATION MANAGEMENT | Facility: CLINIC | Age: 64
End: 2024-01-09

## 2024-01-09 ENCOUNTER — HOSPITAL ENCOUNTER (OUTPATIENT)
Dept: INFUSION THERAPY | Facility: OTHER | Age: 64
Discharge: HOME | End: 2024-01-09
Attending: INTERNAL MEDICINE
Payer: COMMERCIAL

## 2024-01-09 ENCOUNTER — OFFICE VISIT (OUTPATIENT)
Dept: OTOLARYNGOLOGY | Facility: OTHER | Age: 64
End: 2024-01-09
Payer: COMMERCIAL

## 2024-01-09 DIAGNOSIS — C11.9 NASOPHARYNGEAL CANCER (H): ICD-10-CM

## 2024-01-09 DIAGNOSIS — E86.0 DEHYDRATION: Primary | ICD-10-CM

## 2024-01-09 DIAGNOSIS — C11.9 NASOPHARYNGEAL CANCER (H): Primary | ICD-10-CM

## 2024-01-09 DIAGNOSIS — Z13.29 SCREENING FOR HYPOTHYROIDISM: ICD-10-CM

## 2024-01-09 DIAGNOSIS — D89.839 CYTOKINE RELEASE SYNDROME: ICD-10-CM

## 2024-01-09 LAB
ALBUMIN SERPL BCG-MCNC: 4 G/DL (ref 3.5–5.2)
ALP SERPL-CCNC: 75 U/L (ref 40–150)
ALT SERPL W P-5'-P-CCNC: 21 U/L (ref 0–70)
ANION GAP SERPL CALCULATED.3IONS-SCNC: 9 MMOL/L (ref 7–15)
AST SERPL W P-5'-P-CCNC: 26 U/L (ref 0–45)
BASOPHILS # BLD AUTO: 0 10E3/UL (ref 0–0.2)
BASOPHILS NFR BLD AUTO: 1 %
BILIRUB SERPL-MCNC: 0.7 MG/DL
BUN SERPL-MCNC: 16.2 MG/DL (ref 8–23)
CALCIUM SERPL-MCNC: 9.1 MG/DL (ref 8.8–10.2)
CHLORIDE SERPL-SCNC: 103 MMOL/L (ref 98–107)
CREAT SERPL-MCNC: 0.82 MG/DL (ref 0.67–1.17)
CRP SERPL-MCNC: <3 MG/L
DEPRECATED HCO3 PLAS-SCNC: 29 MMOL/L (ref 22–29)
EGFRCR SERPLBLD CKD-EPI 2021: >90 ML/MIN/1.73M2
EOSINOPHIL # BLD AUTO: 0.1 10E3/UL (ref 0–0.7)
EOSINOPHIL NFR BLD AUTO: 3 %
ERYTHROCYTE [DISTWIDTH] IN BLOOD BY AUTOMATED COUNT: 14.9 % (ref 10–15)
ERYTHROCYTE [SEDIMENTATION RATE] IN BLOOD BY WESTERGREN METHOD: 27 MM/HR (ref 0–20)
FERRITIN SERPL-MCNC: 258 NG/ML (ref 31–409)
FOLATE SERPL-MCNC: 7.4 NG/ML (ref 4.6–34.8)
GLUCOSE SERPL-MCNC: 181 MG/DL (ref 70–99)
HCT VFR BLD AUTO: 36.7 % (ref 40–53)
HGB BLD-MCNC: 11.9 G/DL (ref 13.3–17.7)
IMM GRANULOCYTES # BLD: 0 10E3/UL
IMM GRANULOCYTES NFR BLD: 0 %
IRON BINDING CAPACITY (ROCHE): 230 UG/DL (ref 240–430)
IRON SATN MFR SERPL: 29 % (ref 15–46)
IRON SERPL-MCNC: 67 UG/DL (ref 61–157)
LDH SERPL L TO P-CCNC: 135 U/L (ref 0–250)
LYMPHOCYTES # BLD AUTO: 0.9 10E3/UL (ref 0.8–5.3)
LYMPHOCYTES NFR BLD AUTO: 21 %
MCH RBC QN AUTO: 28.5 PG (ref 26.5–33)
MCHC RBC AUTO-ENTMCNC: 32.4 G/DL (ref 31.5–36.5)
MCV RBC AUTO: 88 FL (ref 78–100)
MONOCYTES # BLD AUTO: 0.3 10E3/UL (ref 0–1.3)
MONOCYTES NFR BLD AUTO: 6 %
NEUTROPHILS # BLD AUTO: 2.9 10E3/UL (ref 1.6–8.3)
NEUTROPHILS NFR BLD AUTO: 69 %
NRBC # BLD AUTO: 0 10E3/UL
NRBC BLD AUTO-RTO: 0 /100
PLATELET # BLD AUTO: 143 10E3/UL (ref 150–450)
POTASSIUM SERPL-SCNC: 3.5 MMOL/L (ref 3.4–5.3)
PROT SERPL-MCNC: 7.4 G/DL (ref 6.4–8.3)
RBC # BLD AUTO: 4.18 10E6/UL (ref 4.4–5.9)
SODIUM SERPL-SCNC: 141 MMOL/L (ref 135–145)
T4 FREE SERPL-MCNC: 0.95 NG/DL (ref 0.9–1.7)
TSH SERPL DL<=0.005 MIU/L-ACNC: 9.25 UIU/ML (ref 0.3–4.2)
VIT B12 SERPL-MCNC: 287 PG/ML (ref 232–1245)
WBC # BLD AUTO: 4.2 10E3/UL (ref 4–11)

## 2024-01-09 PROCEDURE — 86140 C-REACTIVE PROTEIN: CPT | Performed by: INTERNAL MEDICINE

## 2024-01-09 PROCEDURE — 82607 VITAMIN B-12: CPT | Performed by: INTERNAL MEDICINE

## 2024-01-09 PROCEDURE — 84443 ASSAY THYROID STIM HORMONE: CPT | Performed by: INTERNAL MEDICINE

## 2024-01-09 PROCEDURE — 85004 AUTOMATED DIFF WBC COUNT: CPT | Performed by: INTERNAL MEDICINE

## 2024-01-09 PROCEDURE — 83550 IRON BINDING TEST: CPT | Performed by: INTERNAL MEDICINE

## 2024-01-09 PROCEDURE — 82728 ASSAY OF FERRITIN: CPT | Performed by: INTERNAL MEDICINE

## 2024-01-09 PROCEDURE — G0463 HOSPITAL OUTPT CLINIC VISIT: HCPCS

## 2024-01-09 PROCEDURE — 84439 ASSAY OF FREE THYROXINE: CPT | Performed by: INTERNAL MEDICINE

## 2024-01-09 PROCEDURE — 36591 DRAW BLOOD OFF VENOUS DEVICE: CPT | Performed by: INTERNAL MEDICINE

## 2024-01-09 PROCEDURE — 250N000011 HC RX IP 250 OP 636: Performed by: INTERNAL MEDICINE

## 2024-01-09 PROCEDURE — 83615 LACTATE (LD) (LDH) ENZYME: CPT | Performed by: INTERNAL MEDICINE

## 2024-01-09 PROCEDURE — 83540 ASSAY OF IRON: CPT | Performed by: INTERNAL MEDICINE

## 2024-01-09 PROCEDURE — 80053 COMPREHEN METABOLIC PANEL: CPT | Performed by: INTERNAL MEDICINE

## 2024-01-09 PROCEDURE — 82746 ASSAY OF FOLIC ACID SERUM: CPT | Performed by: INTERNAL MEDICINE

## 2024-01-09 PROCEDURE — 85652 RBC SED RATE AUTOMATED: CPT | Performed by: INTERNAL MEDICINE

## 2024-01-09 RX ORDER — DIPHENHYDRAMINE HYDROCHLORIDE 50 MG/ML
50 INJECTION INTRAMUSCULAR; INTRAVENOUS
Status: CANCELLED
Start: 2024-01-09

## 2024-01-09 RX ORDER — EPINEPHRINE 1 MG/ML
0.3 INJECTION, SOLUTION, CONCENTRATE INTRAVENOUS EVERY 5 MIN PRN
Status: CANCELLED | OUTPATIENT
Start: 2024-01-09

## 2024-01-09 RX ORDER — ALBUTEROL SULFATE 0.83 MG/ML
2.5 SOLUTION RESPIRATORY (INHALATION)
Status: CANCELLED | OUTPATIENT
Start: 2024-01-09

## 2024-01-09 RX ORDER — MEPERIDINE HYDROCHLORIDE 50 MG/ML
25 INJECTION INTRAMUSCULAR; INTRAVENOUS; SUBCUTANEOUS EVERY 30 MIN PRN
Status: CANCELLED | OUTPATIENT
Start: 2024-01-09

## 2024-01-09 RX ORDER — ALBUTEROL SULFATE 90 UG/1
1-2 AEROSOL, METERED RESPIRATORY (INHALATION)
Status: CANCELLED
Start: 2024-01-09

## 2024-01-09 RX ORDER — METHYLPREDNISOLONE SODIUM SUCCINATE 125 MG/2ML
125 INJECTION, POWDER, LYOPHILIZED, FOR SOLUTION INTRAMUSCULAR; INTRAVENOUS
Status: CANCELLED
Start: 2024-01-09

## 2024-01-09 RX ORDER — HEPARIN SODIUM (PORCINE) LOCK FLUSH IV SOLN 100 UNIT/ML 100 UNIT/ML
5 SOLUTION INTRAVENOUS
Status: DISCONTINUED | OUTPATIENT
Start: 2024-01-09 | End: 2024-01-10 | Stop reason: HOSPADM

## 2024-01-09 RX ORDER — HEPARIN SODIUM (PORCINE) LOCK FLUSH IV SOLN 100 UNIT/ML 100 UNIT/ML
5 SOLUTION INTRAVENOUS
Status: CANCELLED | OUTPATIENT
Start: 2024-01-09

## 2024-01-09 RX ORDER — HEPARIN SODIUM,PORCINE 10 UNIT/ML
5-20 VIAL (ML) INTRAVENOUS DAILY PRN
Status: CANCELLED | OUTPATIENT
Start: 2024-01-09

## 2024-01-09 RX ADMIN — HEPARIN 5 ML: 100 SYRINGE at 09:00

## 2024-01-09 NOTE — NURSING NOTE
Patients port accessed using non-coring, 19 gauge, 3/4 inch needle. Port accessed per facility protocol.    Hand hygiene performed: yes   Mask donned by caregiver: yes   Site prepped with CHG: yes   Labs drawn: yes   Dressing applied using aseptic technique: yes   Port flushed easily, without resistance. Flushed with 10 cc's normal saline.   Immediate blood return noted. 10 cc blood discarded.  5 vials blood drawn and sent to lab for results.  Port flushed with 20 cc 0.9% normal saline and 5 cc heparinized saline.  Needle removed intact, sterile dressing applied.  Slight pressure applied for 30 seconds.   Patient tolerated port flush well, denies pain or discomfort at this time. Patient instructed to leave dressing intact for a minimum of one hour, and to call with questions or concerns.  Patient states understanding and is in agreement with this plan. Patient discharged ambulatory. Jany Jenkins RN

## 2024-01-10 ENCOUNTER — PATIENT OUTREACH (OUTPATIENT)
Dept: ONCOLOGY | Facility: OTHER | Age: 64
End: 2024-01-10
Payer: COMMERCIAL

## 2024-01-10 NOTE — PROGRESS NOTES
[facility] do [facility] Next week  Sybil Zamora RN...........1/10/2024 1:35 PM

## 2024-01-16 ENCOUNTER — PATIENT OUTREACH (OUTPATIENT)
Dept: ONCOLOGY | Facility: OTHER | Age: 64
End: 2024-01-16
Payer: COMMERCIAL

## 2024-01-16 ENCOUNTER — ONCOLOGY VISIT (OUTPATIENT)
Dept: ONCOLOGY | Facility: OTHER | Age: 64
End: 2024-01-16
Attending: INTERNAL MEDICINE
Payer: COMMERCIAL

## 2024-01-16 VITALS
HEART RATE: 76 BPM | BODY MASS INDEX: 34.72 KG/M2 | OXYGEN SATURATION: 97 % | TEMPERATURE: 98.1 F | RESPIRATION RATE: 16 BRPM | DIASTOLIC BLOOD PRESSURE: 76 MMHG | SYSTOLIC BLOOD PRESSURE: 118 MMHG | WEIGHT: 242 LBS

## 2024-01-16 DIAGNOSIS — R79.89 LOW VITAMIN B12 LEVEL: ICD-10-CM

## 2024-01-16 DIAGNOSIS — C11.9 NASOPHARYNGEAL CANCER (H): Primary | ICD-10-CM

## 2024-01-16 PROCEDURE — 99417 PROLNG OP E/M EACH 15 MIN: CPT | Performed by: INTERNAL MEDICINE

## 2024-01-16 PROCEDURE — 99215 OFFICE O/P EST HI 40 MIN: CPT | Performed by: INTERNAL MEDICINE

## 2024-01-16 RX ORDER — CYANOCOBALAMIN 1000 UG/ML
1000 INJECTION, SOLUTION INTRAMUSCULAR; SUBCUTANEOUS
Status: CANCELLED
Start: 2024-01-16

## 2024-01-16 RX ORDER — LANOLIN ALCOHOL/MO/W.PET/CERES
100 CREAM (GRAM) TOPICAL DAILY
Qty: 90 TABLET | Refills: 3 | Status: SHIPPED | OUTPATIENT
Start: 2024-01-16 | End: 2024-06-04

## 2024-01-16 ASSESSMENT — PAIN SCALES - GENERAL: PAINLEVEL: MILD PAIN (2)

## 2024-01-16 NOTE — NURSING NOTE
Chief Complaint   Patient presents with    Oncology Clinic Visit     Nasopharyngeal CA     Medication Reconciliation: complete    Renée Larios CMA ([facility]) 1/16/2024 3:06 PM

## 2024-01-16 NOTE — PROGRESS NOTES
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  He underwent chemotherapy and radiation therapy at the [facility].  In December of 2022 underwent salvage neck dissection.  There apparently was some viable disease present in his specimen.  Subsequent imaging revealed additional positive lymph nodes and he was started on Keytruda.  This past fall he had to come off of his Keytruda because of substantial medical complications.  He had a PET scan performed as recently as September which was unremarkable.  He is presently being followed by Dr. Martin of Hematology Oncology here in [place].  He has no new complaints at present.  It has been sometime since he had been seen by his ear nose and throat doctor at the [facility].  He denies nasal obstruction or bloody nasal secretions.    Exam  Oral cavity oropharynx-free of lesions or inflammation   Nasal-no anterior obstruction or purulence   Flexible fiberoptic exam fails to reveal any evidence of recurrent nasopharyngeal mass.  Neck-his left neck is very hard and Woody.  There is no palpable adenopathy that I can appreciate  Head and neck integument-free of lesion   General-the patient appears well and in no distress   Neuro-there are no focal cranial nerve deficits  PET CT-I personally reviewed his PET-CT from November and see no suspicious lesions in the nasopharynx or  neck          Allergies    No Known Allergies Allergy (Verified 01/10/24 09:01)    PFSH  PFSH:     Past Medical History: (Updated 01/10/24 @ 09:02 by Josi Mccoy, Med Assist)    Diabetes  Dizziness  Tinnitus  Thyroid disorder  Hearing loss      Social History: (Updated 01/10/24 @ 09:03 by Josi Mccoy, Med Assist)  Smoking Status:  Never smoker   alcohol intake:  former   substance use type:  does not use   marital status:       Vital Signs      01/10/24  09:01  Height   5 ft 10 in  Height (cm)   177.8  Weight   103.419 kg  Weight (lb)   228 lbs and  0.0  ozs   Weight Measurement Method   Stated by Patient  BMI   32.7  BSA   2.21    A&P  Assessment & Plan  (1) Nasopharyngeal cancer:         Status: Acute        Code(s):  C11.9 - Malignant neoplasm of nasopharynx, unspecified  I would be happy to follow along with Dr. Koenig.  I would typically be seeing the patient every other month in the 2nd ear following treatment.  We will call the date of diagnosis June of 2021.                Lexx Bran MD    Filed: 01/10/24 0903     <Electronically signed by Lexx Bran MD> 01/10/24 2458

## 2024-01-16 NOTE — PATIENT INSTRUCTIONS
Vitamin B12 injection every 2 weeks    Labs, PET scan and MRI in 1 month    Radiology scheduling will contact you to arrange the scans that have been ordered.  If you have not received a call in the next 2 weeks, please call them at [phone].     [facility] scheduling will contact you to arrange the MRI that has been ordered.  If you have not received a call in the next 2 weeks, please call them at [phone].      Follow up with Chriss Martin MD 1 week after scans

## 2024-01-16 NOTE — PROGRESS NOTES
[facility]: Cancer Care Follow-Up Note                                    Discussion with Patient:                                                      Jareth is doing well. Seen in clinic today.    Dates of Treatment:                                                      Infusion given in last 28 days       None            Assessment:                                                          RNCC Short Symptom Review:     Assessment completed with:: Patient    General/Short Assessment  Does the patient have all their medications?: Yes  Is the patient experiencing any new or worsening symptoms?: No  Discussion with patient: Answered patient's questions and addressed concerns;Reviewed how and when to contact clinic;Reviewed patient's future appointments    Pain  Patient Reported Pain?: No    Patient Coping  Accepting    Clinic Utilization  Patient Aware of Next Appointment?: Yes    Other Patient Concerns  Other Patient Reported Concerns: No    Intervention/Education provided during outreach:                                                       Will plan on PET scan,MRI,and labs in 4 weeks and then follow up with Chriss Martin MD again.    Patient to follow up as scheduled at next appt  Patient to call/[employer] message with updates  Confirmed patient has clinic and triage numbers    Signature:  Sybil Zamora RN

## 2024-01-17 NOTE — PROGRESS NOTES
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 Melvin at the [facility] for ongoing treatment to be given locally.  Initially presented with left neck swelling and was seen by urgent care in the [place] area.  Was treated with antibiotics initially and then was ultimately treated for presumed cat scratch failure with antibiotics.  Symptoms worsened the point where he says he had swelling in his left shoulder.  CT neck was ordered and patient was referred to ENT but could not be seen immediately due to insurance reasons.  He was seen by a general surgeon by the name of Dr. 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  Patient was subsequently referred to Dr. Boston of  ENT at the [facility].  He reviewed the CT neck that was performed on December 9, 2021 and was read as extensive left-sided neck adenopathy with lymph nodes measuring up to 2.4 cm medial to the left sternocleidomastoid muscle just above the level of the hyoid bone.  A PET/CT was ordered and performed on March 9, 2022 and it was read as exuberant soft tissue thickening within the nasopharyngeal and oropharyngeal mucosa concerning for nasopharyngeal carcinoma.  Soft tissue nodular uptake was evident within the prevertebral soft tissues and about differential because of the epiglottis with extensive neck lymph node uptake.  There was uptake within the  lung parenchyma concerning for metastatic uptake of primary malignancy infectious or inflammatory cannot be excluded.  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  The case was presented at the tumor board and the feeling was that patient would require induction chemotherapy followed by chemoradiation and therapy.  It was felt that this regimen would improve recurrence free survival and overall survival as compared to chemo radiotherapy alone.  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  Tumor was also found to be PD-L1 positive at 20% using the :.  Patient was ultimately seen by Dr. Dariela Melvin medical oncology at [facility] on March 22, 2022.  Patient did have a history of hearing loss and subsequently it was decided the patient be a candidate for carboplatin instead of cisplatin as induction therapy.  The plan was to treat with carboplatin and gemcitabine for 3 cycles and then restage in terms of the lung nodules they will be reevaluated after induction.  He was started on carboplatin gemcitabine and was treated between April 4, 2021 to until May 16, 2022.  He required Neulasta support due to neutropenia PET scan performed on May 31, 2022 revealed a partial response subsequently underwent chemoradiotherapy with weekly carboplatin and received radiation therapy at the [facility] under the direction of Dr. Skaggs.  PET scan done on November 16, 2022 revealed a residual 1.5 cm level 2 lymph node with an SUV of 28.7.  A 1 cm enhancing focus in the sternocleidomastoid muscle with an SUV of 22.4 there was a level 2A lymph node that on the left with decreased in size.  With decreased FDG avidity therewas a resolved left level 5 node node.  There was a new patchy groundglass opacity and nodule in the right upper lobe.  Which was felt to be inflammatory subseqre was residual disease on PET scan and recurrence to be at high  risk given the fact the patient was.  1 positive and the plan was to proceed with pembrolizumab.  Patient was started on pembrolizumab on March 9, 2023 under the direction of Dr. Norm Melvin cycle 3 was delayed 1 week due to apparent influenza patient had a good partial response he was seen in the emergency department at [facility] for fever and cough and the patient was treated for influenza.  We saw the patient in August 28, the plan was to initiate his next cycle of pembrolizumab here and is due to receive cycle 8 on August 30, he received that at the [facility].  And then was started on cycle 9 beginning September 20 receive cycle 10 on October 11.  He was scheduled to see pulmonology at [facility] tomorrow for evaluation groundglass nodules.  He was to follow-up with Dr. Melvin at the end of November with repeat imaging.  In the interim the patient became extremely ill and was seen in the emergency room on October 26 with complaints of shaking chills fever to 102 night sweats.  There was also an isolated episode of divergent vision with double vision eventually resolved.  He did note multiple tick bites in the recent past as he was out in the woods.  D-dimer was elevated at 1.25 with a CTA of the chest was ordered and came back negative for pulmonary embolus.  COVID testing was negative.  He was empirically treated forpresumed tickborne illness initially with vancomycin and Zosyn admitted to [facility]  Further testing included included PCR testing for Ehrlichia as well as Anaplasma which was negative Lyme testing was negative.  Blood cultures were negative and subsequently discharged on October 28 on doxycycline he still had a temp of 102.3.  He comes in today still on doxycycline but still having spiking temps of over 102.  He also has shaking chills he had some loose stools this morning he did note urinary frequency  but now has hardly any urine output he appears dehydrated denies  any headaches he does report the isolated episode of double vision.  He does get an occasional cough he did note nausea and vomiting as well.  We saw him last in October 31.  Ordered labs and he was found to have BUN 41 creatinine 4.58 his CRP was elevated at 330 his sed rate was elevated 60 given the fact that he had acute renal failure refer the patient to emergency room to rule out obstructive uropathy versus checkpoint inhibitor toxicity renal ultrasound revealed no evidence of obstruction.  His creatinine remained elevated at 4.59.  He was subsequently transferred to the [facility] and admitted on November 1 on admission his temp was 102.7. He underwent a renal biopsy on November 6, 2023 was a limited sample with only 4 glomeruli noted was read as acute interstitial nephritis moderate to severe, there was also focal acute tubular necrosis.  He was seen by nephrology by Dr. Blake Lynn.  The etiology of his renal failure could be possibly related to nephrotoxins NSAID vancomycin we could not rule out checkpoint inhibitor associated acute kidney injury due to pembrolizumab particularly in the setting of acute interstitial nephritis in the setting of fever and elevated inflammatory markers.  Infectious disease performed a work-up but there was no evidence of infection temperature did improve over time.  Fungal cultures were negative, blood cultures were negative, respiratory panel was negative his neutropenia did resolve as well as thrombocytopenia his hemoglobin was low patient was noted to be hypotensive and was treated with normal saline was also noted to have a hemoglobin of 7.2 and required 1 unit of packed red cells CRP did improve nephrology felt patient was hypovolemic and recommended Lasix 40 mg daily patient was eventually discharged without a fever on November 7.  He was seen in follow-up by NATHAN Brock via via telehealth/video visit on November 10.She noted that his CRP was down to 78 but noted  "that he was having ongoing joint pains and was using any NSAIDs she noted complaints of swelling of the right upper extremity and up Doppler venous ultrasound was negative for DVT.  She also noted that his magnesium was 1.1 and she is set up IV magnesium to be given in the emergency room which was given on November 11 she recommended continuing oral magnesium oxide.  Patient comes in today performance status is still relatively poor at 2 \"improved since his last visit.  He says his biggest problem right now states having joint pain and swelling of all his digits as well as the wrist of both hands.  He also has ongoing fatigue.  He denies any urinary symptoms.  His fevers have resolved.  He is rather frustrated as she does not want to travel to [place] for regular visits and would like to stay with our clinic on a regular basis. We had seen the patient on November 14.  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  Has CRP and sed rate was dropping off pembrolizumab he did have ongoing autoimmune arthritis we elected to start the patient on low-dose prednisone 10 mg p.o. twice daily for 2 weeks.  He said almost immediately on the next day     his joint symptoms improved and he felt overall much better with less fatigue.  We elected to restage the patient with a PET scan that was done on on November 22, 2023 and the findings were that there was no evidence of metastatic or recurrent disease involving head neck chest abdomen pelvis and thighs hypermetabolism that was in the left upper neck have resolved.  There is nonspecific soft tissue and estimate mesentery that was not hypermetabolic and was overall improved.  Patient recently also seen Dr. Melvin via telehealth on November 29 she noticed the patient did have a positive response on PET scan and felt that We could monitor the patient off therapy with pembrolizumab recommend repeat imaging in 2 to 3 " months.  She also agreed to have us manage patient locally but she would manage from a distance for further consultation.  Patient was B12 deficient we will start the patient on B12 1000 mcg IM monthly patient did complain of stiffness in the left neck with potential presyncope/syncope with blackout episode lasting 2 seconds Dr. Melvin ordered bilateral carotid ultrasound liver negative for stenosis.  Complaint of an episode with transient left vision loss but otherwise is doing much better off pembrolizumab.  Recommend the patient be seen by ENT by Dr. Aparicio to confirm remission via endoscopic exam.  He did see the patient on January 9 and performed an endoscopic exam including flexible fiberoptic exam.  Did not find any evidence of recurrent nasopharyngeal mass there was no palpable adenopathy he felt the left neck was very hard and woody from previous surgery radiation and recommended an ENT exam every 2 months to monitor for recurrence.  We had also ordered a MRI of the neck was performed December 20 and the findings were that there was no acute abnormality there was no evidence of local recurrence of nasopharyngeal malignancy no evidence of metastatic disease.  Postsurgical changes of left neck cervical lymph node dissection but no pathologically enlarged lymph nodes.  MRI of the brain was also ordered and this was essentially negative there was some small volume of fluid within the mastoid cells that were nonspecific.  Patient otherwise states he is doing much better although he says he still has fatigue in the morning does not have any ambition in terms of getting things done.  But is doing much better overall biggest complaint is muscle deconditioning.  Denies any fevers, night sweats, weight loss he has no further complaints of diarrhea or joint pains.  Overall he is doing much better continues on oral iron daily denies any dysphagia to solids or liquids.  He does complain of numbness from his left  neck down to his left shoulder which has been present since the surgery.  .      ______________________________________________________________________________    Past History    Past Medical History:   Diagnosis Date    COPD (chronic obstructive pulmonary disease) (H)     Dyslipidemia     Hard to intubate 12/5/2022    Nasopharyngeal carcinoma (H)     BARRETT (obstructive sleep apnea)     Type 2 diabetes mellitus with diabetic nephropathy (H)        Past Surgical History:   Procedure Laterality Date    COLONOSCOPY      DISSECTION RADICAL NECK MODIFIED Left 12/5/2022    Procedure: left modified radical neck dissection;  Surgeon: Jesus Boston MD;  Location: [facility] OR    INSERT PORT VASCULAR ACCESS Right 03/31/2022    Procedure: SINGLE LUMEN POWER PORT INSERTION, VASCULAR ACCESS;  Surgeon: Evens Aponte MD;  Location: [facility] OR    IR CHEST PORT PLACEMENT > 5 YRS OF AGE  03/31/2022    IR RENAL BIOPSY RIGHT  11/6/2023    KNEE SURGERY      LARYNGOSCOPY WITH BIOPSY(IES) N/A 12/5/2022    Procedure: LARYNGOSCOPY, WITH BIOPSY;  Surgeon: Jesus Boston MD;  Location: [facility] OR    TONSILLECTOMY             Review of systems.  CNS: There are no headaches, no blurred vision, no change in mental status,   ENT: There is no hearing loss.  Respiratory: There is no dyspnea, cough or hemoptysis  Cardiac: There is no chest pain, orthopnea, PND, or ankle edema.  GI: There is no bright red blood per rectum, no hematemesis, no reflux, no diarrhea or constipation  Musculoskeletal: There is neuropathic pain involving the left neck radiating to left shoulder  : There is no urinary frequency, hematuria.  Constitutional: There is no fevers, night sweats, weight loss.  Endocrine: There is moderate fatigue  Neuro: There is no tingling or numbness in the hands or feet.  Hematologic: There is no gingival bleeding, epistaxis, or easy bruisability.  Dermatologic: There is no skin rash.  A 14 point review of systems is otherwise  negative.          Physical Exam    /76 (BP Location: Right arm, Patient Position: Sitting, Cuff Size: Adult Regular)   Pulse 76   Temp 98.1  F (36.7  C) (Tympanic)   Resp 16   Wt 109.8 kg (242 lb)   SpO2 97%   BMI 34.72 kg/m        GENERAL: Alert and oriented to time place and person. Seated comfortably. In no distress.    HEAD: Atraumatic and normocephalic.    EYES: YON, EOMI.  No pallor.        Oral cavity: no mucosal lesion or tonsillar enlargement.    NECK: There is postsurgical changes in the left neck with significant scarring with some limited range of motion.  JVP normal.  No thyroid enlargement.    LYMPH NODES: There are no palpable cervical, supraclavicular, axillary, or inguinal nodes.    LUNGS: clear to auscultation bilaterally.  Resonant to percussion throughout bilaterally.  Symmetrical breath movements bilaterally.    HEART: S1 and S2 are heard. Regular rate and rhythm.  No murmur or gallop or rub heard.  No peripheral edema.    ABDOMEN: Soft. Not tender. Not distended.  No palpable hepatomegaly or splenomegaly.  No other mass palpable.  Bowel sounds heard.    EXTREMITIES: There is no ankle edema.  SKIN: no rash, or bruising or purpura.    NEURO: Grossly non-focal.    Lab Results    Recent Results (from the past 240 hour(s))   Comprehensive metabolic panel    Collection Time: 01/09/24  8:50 AM   Result Value Ref Range    Sodium 141 135 - 145 mmol/L    Potassium 3.5 3.4 - 5.3 mmol/L    Carbon Dioxide (CO2) 29 22 - 29 mmol/L    Anion Gap 9 7 - 15 mmol/L    Urea Nitrogen 16.2 8.0 - 23.0 mg/dL    Creatinine 0.82 0.67 - 1.17 mg/dL    GFR Estimate >90 >60 mL/min/1.73m2    Calcium 9.1 8.8 - 10.2 mg/dL    Chloride 103 98 - 107 mmol/L    Glucose 181 (H) 70 - 99 mg/dL    Alkaline Phosphatase 75 40 - 150 U/L    AST 26 0 - 45 U/L    ALT 21 0 - 70 U/L    Protein Total 7.4 6.4 - 8.3 g/dL    Albumin 4.0 3.5 - 5.2 g/dL    Bilirubin Total 0.7 <=1.2 mg/dL   Lactate Dehydrogenase    Collection Time:  01/09/24  8:50 AM   Result Value Ref Range    Lactate Dehydrogenase 135 0 - 250 U/L   CRP inflammation    Collection Time: 01/09/24  8:50 AM   Result Value Ref Range    CRP Inflammation <3.00 <5.00 mg/L   Erythrocyte sedimentation rate auto    Collection Time: 01/09/24  8:50 AM   Result Value Ref Range    Erythrocyte Sedimentation Rate 27 (H) 0 - 20 mm/hr   Iron & Iron Binding Capacity    Collection Time: 01/09/24  8:50 AM   Result Value Ref Range    Iron 67 61 - 157 ug/dL    Iron Binding Capacity 230 (L) 240 - 430 ug/dL    Iron Sat Index 29 15 - 46 %   Ferritin    Collection Time: 01/09/24  8:50 AM   Result Value Ref Range    Ferritin 258 31 - 409 ng/mL   Vitamin B12    Collection Time: 01/09/24  8:50 AM   Result Value Ref Range    Vitamin B12 287 232 - 1,245 pg/mL   Folate    Collection Time: 01/09/24  8:50 AM   Result Value Ref Range    Folic Acid 7.4 4.6 - 34.8 ng/mL   TSH with free T4 reflex    Collection Time: 01/09/24  8:50 AM   Result Value Ref Range    TSH 9.25 (H) 0.30 - 4.20 uIU/mL   CBC with platelets and differential    Collection Time: 01/09/24  8:50 AM   Result Value Ref Range    WBC Count 4.2 4.0 - 11.0 10e3/uL    RBC Count 4.18 (L) 4.40 - 5.90 10e6/uL    Hemoglobin 11.9 (L) 13.3 - 17.7 g/dL    Hematocrit 36.7 (L) 40.0 - 53.0 %    MCV 88 78 - 100 fL    MCH 28.5 26.5 - 33.0 pg    MCHC 32.4 31.5 - 36.5 g/dL    RDW 14.9 10.0 - 15.0 %    Platelet Count 143 (L) 150 - 450 10e3/uL    % Neutrophils 69 %    % Lymphocytes 21 %    % Monocytes 6 %    % Eosinophils 3 %    % Basophils 1 %    % Immature Granulocytes 0 %    NRBCs per 100 WBC 0 <1 /100    Absolute Neutrophils 2.9 1.6 - 8.3 10e3/uL    Absolute Lymphocytes 0.9 0.8 - 5.3 10e3/uL    Absolute Monocytes 0.3 0.0 - 1.3 10e3/uL    Absolute Eosinophils 0.1 0.0 - 0.7 10e3/uL    Absolute Basophils 0.0 0.0 - 0.2 10e3/uL    Absolute Immature Granulocytes 0.0 <=0.4 10e3/uL    Absolute NRBCs 0.0 10e3/uL   T4 free    Collection Time: 01/09/24  8:50 AM   Result Value  Ref Range    Free T4 0.95 0.90 - 1.70 ng/dL       Imaging    MR Soft Tissue Neck w/o & w Contrast    Result Date: 12/21/2023  MR SOFT TISSUE NECK W/O & W CONTRAST 12/20/2023 7:45 PM History:  Syncope, unspecified syncope type; Neck pain; nasopharyngeal cancer status post treatment Comparison:  PET CT 11/20/2023; MR face 6/10/2022; MR neck 4/1/2022; CT neck 12/9/2021 Technique: Multiplanar, multisequence images of the structures of the face and neck without intravenous contrast. Following intravenous administration of gadolinium, multiplanar postcontrast T1-weighted images with fat saturation of the face were also obtained. Contrast: Dotarem 20 mL Findings: No evidence of recurrent disease at the primary site.  Expected post-treatment changes are noted including effacement of fat planes, supraglottic mucosal edema and thickening of the skin and subcutaneous soft tissues. There are no findings to suggest a second primary in the imaged aerodigestive tract. Postsurgical changes of left neck cervical lymph node dissection. No pathologically enlarged, necrotic, or otherwise abnormal lymph nodes. The major salivary glands appear unremarkable. The major arterial vascular structures in the neck demonstrate normal flow voids. Postsurgical changes of ligation of the left internal jugular vein. Stable 1.3 cm cyst in the right lobe of the thyroid. Evaluation of the osseous structures demonstrates no worrisome osseous lesion. No severe spinal canal narrowing. The paranasal sinuses, and orbits appear unremarkable. Small right mastoid effusion. Limited intracranial evaluation demonstrates no mass effect, midline shift or evidence of acute intracranial hemorrhage. The ventricles are proportionate to the cerebral sulci. Normal major vascular intracranial flow voids.     Impression: 1.  No acute abnormality. 2.  No evidence of local recurrence of nasopharyngeal malignancy. 3.  No evidence of metastatic disease. KIRIT FREEDMAN MD    SYSTEM ID:  [id number]    MR Brain w/o & w Contrast    Result Date: 12/21/2023  MR BRAIN W/O & W CONTRAST 12/20/2023 7:44 PM History: Male, age 63 years, Syncope, unspecified syncope type; Neck pain Comparison: PET CT 11/22/2023 Technique: Sagittal T1, axial T2, T2 FLAIR, diffusion, gradient echo, ADC mapping, T1 and 3 plane T1 fat saturation postcontrast 3-D. . Findings: Ventricles and sulci: Normal in size and shape. Gray and white matter: Normal differentiation. Cerebellopontine angles: Clear Extra-axial spaces: Clear. Enhancement: Normal. Midline structures: Normal in contour. Globes: Normal. Orbits: Normal. Intraconal and extraconal fat unremarkable. Extraocular muscles: Normal. Paranasal sinuses: Normal. Mastoid air cells: Moderate volume of fluid within the mastoid air cells of the right temporal bone, likely unchanged dating back to the previous PET CT Diffusion: Normal.     Impression: No acute abnormality.  Small volume of fluid within the mastoid air cells of the right temporal bone. This is a nonspecific finding however can be associated with otitis media. This finding is unchanged dating back to the PET CT from 11/22/2023. 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  Patient was PD-L1 positive and was started on pembrolizumab and received at least 7 cycles and was direction Dr. Melvin at [facility] with partial response He Transitioned His Care to Us Here at [facility] and Was Treated Locally with 1 More Cycle of Pembrolizumab He Subsequently Developed Cytokine Release Syndrome Felt to Be Secondary Pembrolizumab Manifested by  Elevation of CRP, Fever Renal Failure Anemia Autoimmune Arthritis.  Pembrolizumab was stopped and patient was treated with steroids with improvement in his CRP as well as symptoms PET scan recently showed a complete response with no evidence of recurrence or metastatic disease.  Patient also was seen by ENT at [facility] who performed a nasal pharyngeal exam via flexible fiberoptic examination there was no evidence of recurrence of his primary tumor.  There was no evidence of lymphadenopathy.  MRI of the neck also confirmed this.  The plan at this point is to continue surveillance we will repeat PET scan and MRI of the brain and 1 month and reassess  2.:  B12 deficiency patient has not responded to monthly dosing no increased B12 2000 mcg q. 14 days continue to monitor B12 levels.  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 daily  5: Iron deficient anemia essentially resolved nonetheless we will continue oral iron but decrease the dose to 325 mg p.o. every other day.    Time spent: 84 minutes was spent on this patient visit: We spent time reviewing multiple imaging results including MRI of the brain MRI of the neck multiple physician providing notes including ENT notes, performing history physical, documented history physical  Follow-up labs and appointments    Cancer Staging   No matching staging information was found for the patient.        Signed by: Chriss Martin MD    CC: Chriss Martin MD

## 2024-01-24 ENCOUNTER — HOSPITAL ENCOUNTER (OUTPATIENT)
Dept: INFUSION THERAPY | Facility: OTHER | Age: 64
Discharge: HOME | End: 2024-01-24
Attending: INTERNAL MEDICINE | Admitting: INTERNAL MEDICINE
Payer: COMMERCIAL

## 2024-01-24 VITALS — DIASTOLIC BLOOD PRESSURE: 77 MMHG | HEART RATE: 81 BPM | SYSTOLIC BLOOD PRESSURE: 120 MMHG

## 2024-01-24 DIAGNOSIS — R79.89 LOW VITAMIN B12 LEVEL: Primary | ICD-10-CM

## 2024-01-24 PROCEDURE — 96372 THER/PROPH/DIAG INJ SC/IM: CPT | Performed by: INTERNAL MEDICINE

## 2024-01-24 PROCEDURE — 250N000011 HC RX IP 250 OP 636: Performed by: INTERNAL MEDICINE

## 2024-01-24 RX ORDER — DIPHENHYDRAMINE HYDROCHLORIDE 50 MG/ML
50 INJECTION INTRAMUSCULAR; INTRAVENOUS
Status: CANCELLED
Start: 2024-01-30

## 2024-01-24 RX ORDER — MEPERIDINE HYDROCHLORIDE 50 MG/ML
25 INJECTION INTRAMUSCULAR; INTRAVENOUS; SUBCUTANEOUS EVERY 30 MIN PRN
Status: CANCELLED | OUTPATIENT
Start: 2024-01-30

## 2024-01-24 RX ORDER — ALBUTEROL SULFATE 90 UG/1
1-2 AEROSOL, METERED RESPIRATORY (INHALATION)
Status: CANCELLED
Start: 2024-01-30

## 2024-01-24 RX ORDER — ALBUTEROL SULFATE 0.83 MG/ML
2.5 SOLUTION RESPIRATORY (INHALATION)
Status: CANCELLED | OUTPATIENT
Start: 2024-01-30

## 2024-01-24 RX ORDER — CYANOCOBALAMIN 1000 UG/ML
1000 INJECTION, SOLUTION INTRAMUSCULAR; SUBCUTANEOUS
Status: DISCONTINUED | OUTPATIENT
Start: 2024-01-24 | End: 2024-01-25 | Stop reason: HOSPADM

## 2024-01-24 RX ORDER — CYANOCOBALAMIN 1000 UG/ML
1000 INJECTION, SOLUTION INTRAMUSCULAR; SUBCUTANEOUS
Status: CANCELLED
Start: 2024-02-08

## 2024-01-24 RX ORDER — METHYLPREDNISOLONE SODIUM SUCCINATE 125 MG/2ML
125 INJECTION, POWDER, LYOPHILIZED, FOR SOLUTION INTRAMUSCULAR; INTRAVENOUS
Status: CANCELLED
Start: 2024-01-30

## 2024-01-24 RX ORDER — EPINEPHRINE 1 MG/ML
0.3 INJECTION, SOLUTION, CONCENTRATE INTRAVENOUS EVERY 5 MIN PRN
Status: CANCELLED | OUTPATIENT
Start: 2024-01-30

## 2024-01-24 RX ADMIN — CYANOCOBALAMIN 1000 MCG: 1000 INJECTION, SOLUTION INTRAMUSCULAR; SUBCUTANEOUS at 09:08

## 2024-01-24 NOTE — NURSING NOTE

## 2024-02-01 ENCOUNTER — PATIENT OUTREACH (OUTPATIENT)
Dept: ONCOLOGY | Facility: OTHER | Age: 64
End: 2024-02-01
Payer: COMMERCIAL

## 2024-02-01 NOTE — PROGRESS NOTES
[facility]: Cancer Care Follow-Up Note                                    Discussion with Patient:                                                      Jace came in for labs today and his amylase and lipase were elevated. He has 2 pills left of cycle 2. He is feeling much better. He is able to swallow normally now and is eating well.            Dates of Treatment:                                                      Infusion given in last 28 days       None            Assessment:                                                          RNCC Short Symptom Review:     Assessment completed with:: Patient    General/Short Assessment  Does the patient have all their medications?: Yes  Is the patient experiencing any new or worsening symptoms?: No  Discussion with patient: Answered patient's questions and addressed concerns;Reviewed how and when to contact clinic;Reviewed patient's future appointments    Pain  Patient Reported Pain?: No    Patient Coping  Accepting    Clinic Utilization  Patient Aware of Next Appointment?: Yes    Other Patient Concerns  Other Patient Reported Concerns: No    Intervention/Education provided during outreach:                                                       Dr. Martin recommends that Jace take remaining 2 pills and then return for scheduled labs in 2 weeks and then hopefully be able to restart Tabrecta.    Follow up call in 1-2 weeks  Patient to follow up as scheduled at next appt  Patient to call/Managed System[employer] message with updates  Confirmed patient has clinic and triage numbers    Signature:  Sybil Zamora RN

## 2024-02-08 ENCOUNTER — OFFICE VISIT (OUTPATIENT)
Dept: FAMILY MEDICINE | Facility: OTHER | Age: 64
End: 2024-02-08
Attending: FAMILY MEDICINE
Payer: COMMERCIAL

## 2024-02-08 ENCOUNTER — HOSPITAL ENCOUNTER (OUTPATIENT)
Dept: INFUSION THERAPY | Facility: OTHER | Age: 64
Discharge: HOME | End: 2024-02-08
Attending: INTERNAL MEDICINE
Payer: COMMERCIAL

## 2024-02-08 VITALS
RESPIRATION RATE: 18 BRPM | OXYGEN SATURATION: 98 % | HEART RATE: 83 BPM | WEIGHT: 242 LBS | SYSTOLIC BLOOD PRESSURE: 106 MMHG | BODY MASS INDEX: 33.88 KG/M2 | DIASTOLIC BLOOD PRESSURE: 70 MMHG | TEMPERATURE: 98 F | HEIGHT: 71 IN

## 2024-02-08 DIAGNOSIS — R79.89 LOW VITAMIN B12 LEVEL: Primary | ICD-10-CM

## 2024-02-08 DIAGNOSIS — E11.9 TYPE 2 DIABETES, HBA1C GOAL < 7% (H): Primary | Chronic | ICD-10-CM

## 2024-02-08 DIAGNOSIS — H10.13 ALLERGIC CONJUNCTIVITIS, BILATERAL: ICD-10-CM

## 2024-02-08 LAB
CHOLEST SERPL-MCNC: 144 MG/DL
CREAT UR-MCNC: 88.1 MG/DL
FASTING STATUS PATIENT QL REPORTED: NO
HBA1C MFR BLD: 5.4 % (ref 4–6.2)
HDLC SERPL-MCNC: 39 MG/DL
LDLC SERPL CALC-MCNC: 66 MG/DL
MICROALBUMIN UR-MCNC: <12 MG/L
MICROALBUMIN/CREAT UR: NORMAL MG/G{CREAT}
NONHDLC SERPL-MCNC: 105 MG/DL
TRIGL SERPL-MCNC: 195 MG/DL
TSH SERPL DL<=0.005 MIU/L-ACNC: 2.27 UIU/ML (ref 0.3–4.2)

## 2024-02-08 PROCEDURE — 99214 OFFICE O/P EST MOD 30 MIN: CPT | Performed by: FAMILY MEDICINE

## 2024-02-08 PROCEDURE — 36415 COLL VENOUS BLD VENIPUNCTURE: CPT | Mod: ZL | Performed by: FAMILY MEDICINE

## 2024-02-08 PROCEDURE — 96372 THER/PROPH/DIAG INJ SC/IM: CPT | Performed by: INTERNAL MEDICINE

## 2024-02-08 PROCEDURE — 250N000011 HC RX IP 250 OP 636: Performed by: INTERNAL MEDICINE

## 2024-02-08 PROCEDURE — 83036 HEMOGLOBIN GLYCOSYLATED A1C: CPT | Mod: ZL | Performed by: FAMILY MEDICINE

## 2024-02-08 PROCEDURE — 82043 UR ALBUMIN QUANTITATIVE: CPT | Mod: ZL | Performed by: FAMILY MEDICINE

## 2024-02-08 PROCEDURE — 84443 ASSAY THYROID STIM HORMONE: CPT | Mod: ZL | Performed by: FAMILY MEDICINE

## 2024-02-08 PROCEDURE — 80061 LIPID PANEL: CPT | Mod: ZL | Performed by: FAMILY MEDICINE

## 2024-02-08 RX ORDER — ALBUTEROL SULFATE 0.83 MG/ML
2.5 SOLUTION RESPIRATORY (INHALATION)
Status: CANCELLED | OUTPATIENT
Start: 2024-02-08

## 2024-02-08 RX ORDER — EPINEPHRINE 1 MG/ML
0.3 INJECTION, SOLUTION, CONCENTRATE INTRAVENOUS EVERY 5 MIN PRN
Status: CANCELLED | OUTPATIENT
Start: 2024-02-21

## 2024-02-08 RX ORDER — DIPHENHYDRAMINE HYDROCHLORIDE 50 MG/ML
50 INJECTION INTRAMUSCULAR; INTRAVENOUS
Status: CANCELLED
Start: 2024-02-08

## 2024-02-08 RX ORDER — HEPARIN SODIUM,PORCINE 10 UNIT/ML
5-20 VIAL (ML) INTRAVENOUS DAILY PRN
Status: CANCELLED | OUTPATIENT
Start: 2024-02-08

## 2024-02-08 RX ORDER — OLOPATADINE HYDROCHLORIDE 1 MG/ML
1 SOLUTION/ DROPS OPHTHALMIC 2 TIMES DAILY
Qty: 5 ML | Refills: 0 | Status: SHIPPED | OUTPATIENT
Start: 2024-02-08 | End: 2024-02-08

## 2024-02-08 RX ORDER — ALBUTEROL SULFATE 90 UG/1
1-2 AEROSOL, METERED RESPIRATORY (INHALATION)
Status: CANCELLED
Start: 2024-02-08

## 2024-02-08 RX ORDER — EPINEPHRINE 1 MG/ML
0.3 INJECTION, SOLUTION, CONCENTRATE INTRAVENOUS EVERY 5 MIN PRN
Status: CANCELLED | OUTPATIENT
Start: 2024-02-08

## 2024-02-08 RX ORDER — CYANOCOBALAMIN 1000 UG/ML
1000 INJECTION, SOLUTION INTRAMUSCULAR; SUBCUTANEOUS
Status: CANCELLED
Start: 2024-02-21

## 2024-02-08 RX ORDER — METHYLPREDNISOLONE SODIUM SUCCINATE 125 MG/2ML
125 INJECTION, POWDER, LYOPHILIZED, FOR SOLUTION INTRAMUSCULAR; INTRAVENOUS
Status: CANCELLED
Start: 2024-02-08

## 2024-02-08 RX ORDER — METHYLPREDNISOLONE SODIUM SUCCINATE 125 MG/2ML
125 INJECTION, POWDER, LYOPHILIZED, FOR SOLUTION INTRAMUSCULAR; INTRAVENOUS
Status: CANCELLED
Start: 2024-02-21

## 2024-02-08 RX ORDER — DIPHENHYDRAMINE HYDROCHLORIDE 50 MG/ML
50 INJECTION INTRAMUSCULAR; INTRAVENOUS
Status: CANCELLED
Start: 2024-02-21

## 2024-02-08 RX ORDER — MEPERIDINE HYDROCHLORIDE 50 MG/ML
25 INJECTION INTRAMUSCULAR; INTRAVENOUS; SUBCUTANEOUS EVERY 30 MIN PRN
Status: CANCELLED | OUTPATIENT
Start: 2024-02-08

## 2024-02-08 RX ORDER — ALBUTEROL SULFATE 0.83 MG/ML
2.5 SOLUTION RESPIRATORY (INHALATION)
Status: CANCELLED | OUTPATIENT
Start: 2024-02-21

## 2024-02-08 RX ORDER — CYANOCOBALAMIN 1000 UG/ML
1000 INJECTION, SOLUTION INTRAMUSCULAR; SUBCUTANEOUS
Status: DISCONTINUED | OUTPATIENT
Start: 2024-02-08 | End: 2024-02-09 | Stop reason: HOSPADM

## 2024-02-08 RX ORDER — ALBUTEROL SULFATE 90 UG/1
1-2 AEROSOL, METERED RESPIRATORY (INHALATION)
Status: CANCELLED
Start: 2024-02-21

## 2024-02-08 RX ORDER — MEPERIDINE HYDROCHLORIDE 50 MG/ML
25 INJECTION INTRAMUSCULAR; INTRAVENOUS; SUBCUTANEOUS EVERY 30 MIN PRN
Status: CANCELLED | OUTPATIENT
Start: 2024-02-21

## 2024-02-08 RX ORDER — HEPARIN SODIUM (PORCINE) LOCK FLUSH IV SOLN 100 UNIT/ML 100 UNIT/ML
5 SOLUTION INTRAVENOUS
Status: CANCELLED | OUTPATIENT
Start: 2024-02-08

## 2024-02-08 RX ORDER — OLOPATADINE HYDROCHLORIDE 1 MG/ML
1 SOLUTION/ DROPS OPHTHALMIC 2 TIMES DAILY
Qty: 5 ML | Refills: 0 | Status: SHIPPED | OUTPATIENT
Start: 2024-02-08 | End: 2024-05-14

## 2024-02-08 RX ADMIN — CYANOCOBALAMIN 1000 MCG: 1000 INJECTION, SOLUTION INTRAMUSCULAR; SUBCUTANEOUS at 13:33

## 2024-02-08 ASSESSMENT — PAIN SCALES - GENERAL: PAINLEVEL: NO PAIN (1)

## 2024-02-08 NOTE — NURSING NOTE
Patient is here to discuss establishing care. States is getting treatment in oncology and would like to have care in one place.  States his eyes have been sore and red.       Medication Reconciliation: complete    Rebeca Minaya LPN 2/8/2024 2:17 PM       Advance care directive on file? no  Advance care directive provided to patient? no       Rebeca Minaya LPN

## 2024-02-08 NOTE — PROGRESS NOTES
"  Assessment & Plan     Type 2 diabetes, HbA1c goal < 7% (H)  A1c is within goal, he reports being off his diabetic medications for the past month.  He will stay off his meds and repeat an A1c in 3 months.  Continue on statin  - Hemoglobin A1c; Future  - TSH Reflex GH; Future  - Lipid Profile; Future  - Albumin Random Urine Quantitative with Creat Ratio; Future  - Hemoglobin A1c  - TSH Reflex GH  - Lipid Profile  - Albumin Random Urine Quantitative with Creat Ratio    Allergic conjunctivitis, bilateral  Recommend trial of Patanol.  Due for dilated eye exam.  - olopatadine (PATANOL) 0.1 % ophthalmic solution; Place 1 drop into both eyes 2 times daily      He will continue follow-up with oncology.  Support and encouragement provided.  Currently involved in a lawsuit against former employer for possible workplace exposure.      BMI  Estimated body mass index is 33.75 kg/m  as calculated from the following:    Height as of this encounter: 1.803 m (5' 11\").    Weight as of this encounter: 109.8 kg (242 lb).   Weight management plan: Discussed healthy diet and exercise guidelines      There are no Patient Instructions on file for this visit.    No follow-ups on file.    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 He underwent chemotherapy and radiation therapy at the [facility]. In December of 2022 underwent salvage neck dissection.  He is being followed by Dr. Martin.  He decided to establish care with a physician at [facility] due to ease of travel.    He lives in [place] and had previously gotten his care in [place].  Like to establish care in [place].    Type II diabetic.  Recently discontinued metformin and Trulicity within the last 2 weeks.  Reports his A1c was well within goal.  He does not check his " "blood sugars.    Acute concern of bilateral red eyes, itching with clear drainage.      History of Present Illness       Diabetes:   He presents for follow up of diabetes.    He is not checking blood glucose.        He is concerned about low blood sugar, several less than 70 in the past few weeks.   He is having excessive thirst.  The patient has not had a diabetic eye exam in the last 12 months.          He eats 2-3 servings of fruits and vegetables daily.He consumes 2 sweetened beverage(s) daily.He exercises with enough effort to increase his heart rate 30 to 60 minutes per day.  He exercises with enough effort to increase his heart rate 6 days per week.   He is taking medications regularly.           Diabetes Follow-up    How often are you checking your blood sugar? One time daily  What time of day are you checking your blood sugars (select all that apply)?  Before meals  Have you had any blood sugars above 200?  No  Have you had any blood sugars below 70?  No  What symptoms do you notice when your blood sugar is low?  None  What concerns do you have today about your diabetes? None   Do you have any of these symptoms? (Select all that apply)  No numbness or tingling in feet.  No redness, sores or blisters on feet.  No complaints of excessive thirst.  No reports of blurry vision.  No significant changes to weight.  Have you had a diabetic eye exam in the last 12 months? No        BP Readings from Last 2 Encounters:   02/08/24 106/70   01/24/24 120/77     Hemoglobin A1C (%)   Date Value   02/08/2024 5.4     LDL Cholesterol Calculated (mg/dL)   Date Value   02/08/2024 66             Review of Systems  Constitutional, HEENT, cardiovascular, pulmonary, gi and gu systems are negative, except as otherwise noted.      Objective    /70   Pulse 83   Temp 98  F (36.7  C) (Tympanic)   Resp 18   Ht 1.803 m (5' 11\")   Wt 109.8 kg (242 lb)   SpO2 98%   BMI 33.75 kg/m    Body mass index is 33.75 kg/m .  Physical " Exam   GENERAL: alert and no distress  EYES: bilateral redness, clear drainage  NECK: no adenopathy, no asymmetry, masses, or scars  RESP: lungs clear to auscultation - no rales, rhonchi or wheezes  CV: regular rate and rhythm, normal S1 S2, no S3 or S4, no murmur, click or rub, no peripheral edema  PSYCH: mentation appears normal, affect normal/bright    Results for orders placed or performed in visit on 02/08/24   Hemoglobin A1c     Status: Normal   Result Value Ref Range    Hemoglobin A1C 5.4 4.0 - 6.2 %   TSH Reflex GH     Status: Normal   Result Value Ref Range    TSH 2.27 0.30 - 4.20 uIU/mL   Lipid Profile     Status: Abnormal   Result Value Ref Range    Cholesterol 144 <200 mg/dL    Triglycerides 195 (H) <150 mg/dL    Direct Measure HDL 39 (L) >=40 mg/dL    LDL Cholesterol Calculated 66 <=100 mg/dL    Non HDL Cholesterol 105 <130 mg/dL    Patient Fasting > 8hrs? No     Narrative    Cholesterol  Desirable:  <200 mg/dL    Triglycerides  Normal:  Less than 150 mg/dL  Borderline High:  150-199 mg/dL  High:  200-499 mg/dL  Very High:  Greater than or equal to 500 mg/dL    Direct Measure HDL  Female:  Greater than or equal to 50 mg/dL   Male:  Greater than or equal to 40 mg/dL    LDL Cholesterol  Desirable:  <100mg/dL  Above Desirable:  100-129 mg/dL   Borderline High:  130-159 mg/dL   High:  160-189 mg/dL   Very High:  >= 190 mg/dL    Non HDL Cholesterol  Desirable:  130 mg/dL  Above Desirable:  130-159 mg/dL  Borderline High:  160-189 mg/dL  High:  190-219 mg/dL  Very High:  Greater than or equal to 220 mg/dL   Albumin Random Urine Quantitative with Creat Ratio     Status: None   Result Value Ref Range    Creatinine Urine mg/dL 88.1 mg/dL    Albumin Urine mg/L <12.0 mg/L    Albumin Urine mg/g Cr             Signed Electronically by: Inga Guillermo MD

## 2024-02-14 ENCOUNTER — HOSPITAL ENCOUNTER (OUTPATIENT)
Dept: MRI IMAGING | Facility: OTHER | Age: 64
Discharge: HOME | End: 2024-02-14
Attending: INTERNAL MEDICINE
Payer: COMMERCIAL

## 2024-02-14 ENCOUNTER — HOSPITAL ENCOUNTER (OUTPATIENT)
Dept: PET IMAGING | Facility: OTHER | Age: 64
Discharge: HOME | End: 2024-02-14
Attending: INTERNAL MEDICINE
Payer: COMMERCIAL

## 2024-02-14 ENCOUNTER — LAB (OUTPATIENT)
Dept: LAB | Facility: OTHER | Age: 64
End: 2024-02-14
Attending: INTERNAL MEDICINE
Payer: COMMERCIAL

## 2024-02-14 DIAGNOSIS — C11.9 NASOPHARYNGEAL CANCER (H): ICD-10-CM

## 2024-02-14 LAB
ALBUMIN SERPL BCG-MCNC: 4.1 G/DL (ref 3.5–5.2)
ALP SERPL-CCNC: 87 U/L (ref 40–150)
ALT SERPL W P-5'-P-CCNC: 22 U/L (ref 0–70)
ANION GAP SERPL CALCULATED.3IONS-SCNC: 7 MMOL/L (ref 7–15)
AST SERPL W P-5'-P-CCNC: 30 U/L (ref 0–45)
BASOPHILS # BLD AUTO: 0 10E3/UL (ref 0–0.2)
BASOPHILS NFR BLD AUTO: 0 %
BILIRUB SERPL-MCNC: 0.7 MG/DL
BUN SERPL-MCNC: 15 MG/DL (ref 8–23)
CALCIUM SERPL-MCNC: 9.6 MG/DL (ref 8.8–10.2)
CHLORIDE SERPL-SCNC: 102 MMOL/L (ref 98–107)
CREAT SERPL-MCNC: 0.88 MG/DL (ref 0.67–1.17)
DEPRECATED HCO3 PLAS-SCNC: 31 MMOL/L (ref 22–29)
EGFRCR SERPLBLD CKD-EPI 2021: >90 ML/MIN/1.73M2
EOSINOPHIL # BLD AUTO: 0.2 10E3/UL (ref 0–0.7)
EOSINOPHIL NFR BLD AUTO: 3 %
ERYTHROCYTE [DISTWIDTH] IN BLOOD BY AUTOMATED COUNT: 14 % (ref 10–15)
GLUCOSE SERPL-MCNC: 92 MG/DL (ref 70–99)
HCT VFR BLD AUTO: 38.3 % (ref 40–53)
HGB BLD-MCNC: 12.5 G/DL (ref 13.3–17.7)
IMM GRANULOCYTES # BLD: 0 10E3/UL
IMM GRANULOCYTES NFR BLD: 0 %
LDH SERPL L TO P-CCNC: 140 U/L (ref 0–250)
LYMPHOCYTES # BLD AUTO: 1.4 10E3/UL (ref 0.8–5.3)
LYMPHOCYTES NFR BLD AUTO: 27 %
MCH RBC QN AUTO: 28 PG (ref 26.5–33)
MCHC RBC AUTO-ENTMCNC: 32.6 G/DL (ref 31.5–36.5)
MCV RBC AUTO: 86 FL (ref 78–100)
MONOCYTES # BLD AUTO: 0.5 10E3/UL (ref 0–1.3)
MONOCYTES NFR BLD AUTO: 10 %
NEUTROPHILS # BLD AUTO: 3.1 10E3/UL (ref 1.6–8.3)
NEUTROPHILS NFR BLD AUTO: 60 %
NRBC # BLD AUTO: 0 10E3/UL
NRBC BLD AUTO-RTO: 0 /100
PLATELET # BLD AUTO: 156 10E3/UL (ref 150–450)
POTASSIUM SERPL-SCNC: 3.9 MMOL/L (ref 3.4–5.3)
PROT SERPL-MCNC: 7.6 G/DL (ref 6.4–8.3)
RBC # BLD AUTO: 4.46 10E6/UL (ref 4.4–5.9)
SODIUM SERPL-SCNC: 140 MMOL/L (ref 135–145)
WBC # BLD AUTO: 5.2 10E3/UL (ref 4–11)

## 2024-02-14 PROCEDURE — 85025 COMPLETE CBC W/AUTO DIFF WBC: CPT | Mod: ZL

## 2024-02-14 PROCEDURE — A9575 INJ GADOTERATE MEGLUMI 0.1ML: HCPCS | Mod: JZ | Performed by: INTERNAL MEDICINE

## 2024-02-14 PROCEDURE — 78815 PET IMAGE W/CT SKULL-THIGH: CPT | Mod: PS

## 2024-02-14 PROCEDURE — 343N000001 HC RX 343: Performed by: INTERNAL MEDICINE

## 2024-02-14 PROCEDURE — 255N000002 HC RX 255 OP 636: Mod: JZ | Performed by: INTERNAL MEDICINE

## 2024-02-14 PROCEDURE — 80053 COMPREHEN METABOLIC PANEL: CPT | Mod: ZL

## 2024-02-14 PROCEDURE — 70553 MRI BRAIN STEM W/O & W/DYE: CPT

## 2024-02-14 PROCEDURE — 83615 LACTATE (LD) (LDH) ENZYME: CPT | Mod: ZL

## 2024-02-14 PROCEDURE — A9552 F18 FDG: HCPCS | Performed by: INTERNAL MEDICINE

## 2024-02-14 PROCEDURE — 36415 COLL VENOUS BLD VENIPUNCTURE: CPT | Mod: ZL

## 2024-02-14 RX ADMIN — GADOTERATE MEGLUMINE 20 ML: 376.9 INJECTION INTRAVENOUS at 11:42

## 2024-02-14 RX ADMIN — FLUDEOXYGLUCOSE F-18 13.21 MILLICURIE: 500 INJECTION, SOLUTION INTRAVENOUS at 15:30

## 2024-02-15 ENCOUNTER — PATIENT OUTREACH (OUTPATIENT)
Dept: ONCOLOGY | Facility: OTHER | Age: 64
End: 2024-02-15
Payer: COMMERCIAL

## 2024-02-15 NOTE — PROGRESS NOTES
[facility]: Cancer Care Follow-Up Note                                    Discussion with Patient:                                                      Jareth is doing well. He does have pain in his legs, which is chronic. He had his MRI and PET scan yesterday.             Dates of Treatment:                                                      Infusion given in last 28 days       None            Assessment:                                                          RNCC Short Symptom Review:     Assessment completed with:: Patient    General/Short Assessment  Does the patient have all their medications?: Yes  Is the patient experiencing any new or worsening symptoms?: No  Discussion with patient: Answered patient's questions and addressed concerns;Reviewed how and when to contact clinic;Reviewed patient's future appointments    Pain  Patient Reported Pain?: Yes, is currently well-managed  Pain Score: Severe Pain (7)  Pain Loc:  (legs)    Patient Coping  Accepting    Clinic Utilization  Patient Aware of Next Appointment?: Yes    Other Patient Concerns  Other Patient Reported Concerns: No    Intervention/Education provided during outreach:                                                         Patient to follow up as scheduled at next appt  Patient to call/[employer] message with updates  Confirmed patient has clinic and triage numbers    Signature:  Sybil Zamora RN

## 2024-02-21 ENCOUNTER — ONCOLOGY VISIT (OUTPATIENT)
Dept: ONCOLOGY | Facility: OTHER | Age: 64
End: 2024-02-21
Attending: INTERNAL MEDICINE
Payer: COMMERCIAL

## 2024-02-21 ENCOUNTER — HOSPITAL ENCOUNTER (OUTPATIENT)
Dept: INFUSION THERAPY | Facility: OTHER | Age: 64
Discharge: HOME | End: 2024-02-21
Attending: INTERNAL MEDICINE
Payer: COMMERCIAL

## 2024-02-21 VITALS
SYSTOLIC BLOOD PRESSURE: 102 MMHG | TEMPERATURE: 98.2 F | DIASTOLIC BLOOD PRESSURE: 72 MMHG | RESPIRATION RATE: 12 BRPM | WEIGHT: 247 LBS | HEIGHT: 71 IN | BODY MASS INDEX: 34.58 KG/M2 | HEART RATE: 75 BPM | OXYGEN SATURATION: 97 %

## 2024-02-21 DIAGNOSIS — C11.9 NASOPHARYNGEAL CANCER (H): Primary | ICD-10-CM

## 2024-02-21 DIAGNOSIS — R79.89 LOW VITAMIN B12 LEVEL: Primary | ICD-10-CM

## 2024-02-21 PROCEDURE — 96372 THER/PROPH/DIAG INJ SC/IM: CPT | Performed by: INTERNAL MEDICINE

## 2024-02-21 PROCEDURE — 99215 OFFICE O/P EST HI 40 MIN: CPT | Performed by: INTERNAL MEDICINE

## 2024-02-21 PROCEDURE — 99417 PROLNG OP E/M EACH 15 MIN: CPT | Performed by: INTERNAL MEDICINE

## 2024-02-21 PROCEDURE — 250N000011 HC RX IP 250 OP 636: Performed by: INTERNAL MEDICINE

## 2024-02-21 RX ORDER — DIPHENHYDRAMINE HYDROCHLORIDE 50 MG/ML
50 INJECTION INTRAMUSCULAR; INTRAVENOUS
Status: CANCELLED
Start: 2024-02-22

## 2024-02-21 RX ORDER — CYANOCOBALAMIN 1000 UG/ML
1000 INJECTION, SOLUTION INTRAMUSCULAR; SUBCUTANEOUS
Status: DISCONTINUED | OUTPATIENT
Start: 2024-02-21 | End: 2024-02-22 | Stop reason: HOSPADM

## 2024-02-21 RX ORDER — EPINEPHRINE 1 MG/ML
0.3 INJECTION, SOLUTION, CONCENTRATE INTRAVENOUS EVERY 5 MIN PRN
Status: CANCELLED | OUTPATIENT
Start: 2024-02-22

## 2024-02-21 RX ORDER — CYANOCOBALAMIN 1000 UG/ML
1000 INJECTION, SOLUTION INTRAMUSCULAR; SUBCUTANEOUS
Status: CANCELLED
Start: 2024-02-22

## 2024-02-21 RX ORDER — ALBUTEROL SULFATE 0.83 MG/ML
2.5 SOLUTION RESPIRATORY (INHALATION)
Status: CANCELLED | OUTPATIENT
Start: 2024-02-22

## 2024-02-21 RX ORDER — ALBUTEROL SULFATE 90 UG/1
1-2 AEROSOL, METERED RESPIRATORY (INHALATION)
Status: CANCELLED
Start: 2024-02-22

## 2024-02-21 RX ORDER — METHYLPREDNISOLONE SODIUM SUCCINATE 125 MG/2ML
125 INJECTION, POWDER, LYOPHILIZED, FOR SOLUTION INTRAMUSCULAR; INTRAVENOUS
Status: CANCELLED
Start: 2024-02-22

## 2024-02-21 RX ORDER — MEPERIDINE HYDROCHLORIDE 50 MG/ML
25 INJECTION INTRAMUSCULAR; INTRAVENOUS; SUBCUTANEOUS EVERY 30 MIN PRN
Status: CANCELLED | OUTPATIENT
Start: 2024-02-22

## 2024-02-21 RX ADMIN — CYANOCOBALAMIN 1000 MCG: 1000 INJECTION, SOLUTION INTRAMUSCULAR; SUBCUTANEOUS at 12:59

## 2024-02-21 ASSESSMENT — PAIN SCALES - GENERAL: PAINLEVEL: NO PAIN (0)

## 2024-02-21 NOTE — NURSING NOTE
Infusion Nursing Note:  Jareth Gallardo presents today for B12 injection.    Patient seen by provider today: No   present during visit today: Not Applicable.    Note: N/A.    Intravenous Access:  No Intravenous access/labs at this visit.    Treatment Conditions:  Not Applicable.    Post Infusion Assessment:  Patient tolerated injection without incident.  Site patent and intact, free from redness, edema or discomfort.     Discharge Plan:   Discharge instructions reviewed with: Patient.  Patient and/or family verbalized understanding of discharge instructions and all questions answered.  Copy of AVS reviewed with patient and/or family.  Patient will return 3/6/24 for next appointment.  AVS to patient via [employer].    Patient discharged in stable condition accompanied by: self.  Departure Mode: Ambulatory.      Marilynn De León RN

## 2024-02-21 NOTE — PROGRESS NOTES
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 Melvin at the [facility] for ongoing treatment to be given locally.  Initially presented with left neck swelling and was seen by urgent care in the [place] area.  Was treated with antibiotics initially and then was ultimately treated for presumed cat scratch failure with antibiotics.  Symptoms worsened the point where he says he had swelling in his left shoulder.  CT neck was ordered and patient was referred to ENT but could not be seen immediately due to insurance reasons.  He was seen by a general surgeon by the name of Dr. 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  Patient was subsequently referred to Dr. Boston of  ENT at the [facility].  He reviewed the CT neck that was performed on December 9, 2021 and was read as extensive left-sided neck adenopathy with lymph nodes measuring up to 2.4 cm medial to the left sternocleidomastoid muscle just above the level of the hyoid bone.  A PET/CT was ordered and performed on March 9, 2022 and it was read as exuberant soft tissue thickening within the nasopharyngeal and oropharyngeal mucosa concerning for nasopharyngeal carcinoma.  Soft tissue nodular uptake was evident within the prevertebral soft tissues and about differential because of the epiglottis with extensive neck lymph node uptake.  There was uptake within the lung  parenchyma concerning for metastatic uptake of primary malignancy infectious or inflammatory cannot be excluded.  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  The case was presented at the tumor board and the feeling was that patient would require induction chemotherapy followed by chemoradiation and therapy.  It was felt that this regimen would improve recurrence free survival and overall survival as compared to chemo radiotherapy alone.  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  Tumor was also found to be PD-L1 positive at 20% using the :.  Patient was ultimately seen by Dr. Dariela Melvin medical oncology at [facility] on March 22, 2022.  Patient did have a history of hearing loss and subsequently it was decided the patient be a candidate for carboplatin instead of cisplatin as induction therapy.  The plan was to treat with carboplatin and gemcitabine for 3 cycles and then restage in terms of the lung nodules they will be reevaluated after induction.  He was started on carboplatin gemcitabine and was treated between April 4, 2021 to until May 16, 2022.  He required Neulasta support due to neutropenia PET scan performed on May 31, 2022 revealed a partial response subsequently underwent chemoradiotherapy with weekly carboplatin and received radiation therapy at the [facility] under the direction of Dr. Skaggs.  PET scan done on November 16, 2022 revealed a residual 1.5 cm level 2 lymph node with an SUV of 28.7.  A 1 cm enhancing focus in the sternocleidomastoid muscle with an SUV of 22.4 there was a level 2A lymph node that on the left with decreased in size.  With decreased FDG avidity therewas a resolved left level 5 node node.  There was a new patchy groundglass opacity and nodule in the right upper lobe.  Which was felt to be inflammatory subseqre was residual disease on PET scan and recurrence to be at high risk  given the fact the patient was.  1 positive and the plan was to proceed with pembrolizumab.  Patient was started on pembrolizumab on March 9, 2023 under the direction of Dr. Norm Melvin cycle 3 was delayed 1 week due to apparent influenza patient had a good partial response he was seen in the emergency department at [facility] for fever and cough and the patient was treated for influenza.  We saw the patient in August 28, the plan was to initiate his next cycle of pembrolizumab here and is due to receive cycle 8 on August 30, he received that at the [facility].  And then was started on cycle 9 beginning September 20 receive cycle 10 on October 11.  He was scheduled to see pulmonology at [facility] tomorrow for evaluation groundglass nodules.  He was to follow-up with Dr. Melvin at the end of November with repeat imaging.  In the interim the patient became extremely ill and was seen in the emergency room on October 26 with complaints of shaking chills fever to 102 night sweats.  There was also an isolated episode of divergent vision with double vision eventually resolved.  He did note multiple tick bites in the recent past as he was out in the woods.  D-dimer was elevated at 1.25 with a CTA of the chest was ordered and came back negative for pulmonary embolus.  COVID testing was negative.  He was empirically treated forpresumed tickborne illness initially with vancomycin and Zosyn admitted to [facility].  Further testing included included PCR testing for Ehrlichia as well as Anaplasma which was negative Lyme testing was negative.  Blood cultures were negative and subsequently discharged on October 28 on doxycycline he still had a temp of 102.3.  He comes in today still on doxycycline but still having spiking temps of over 102.  He also has shaking chills he had some loose stools this morning he did note urinary frequency  but now has hardly any urine output he appears dehydrated denies any  headaches he does report the isolated episode of double vision.  He does get an occasional cough he did note nausea and vomiting as well.  We saw him last in October 31.  Ordered labs and he was found to have BUN 41 creatinine 4.58 his CRP was elevated at 330 his sed rate was elevated 60 given the fact that he had acute renal failure refer the patient to emergency room to rule out obstructive uropathy versus checkpoint inhibitor toxicity renal ultrasound revealed n o evidence of obstruction.  His creatinine remained elevated at 4.59.  He was subsequently transferred to the [facility] and admitted on November 1 on admission his temp was 102.7. He underwent a renal biopsy on November 6, 2023 was a limited sample with only 4 glomeruli noted was read as acute interstitial nephritis moderate to severe, there was also focal acute tubular necrosis.  He was seen by nephrology by Dr. Blake Lynn.  The etiology of his renal failure could be possibly related to nephrotoxins NSAID vancomycin we could not rule out checkpoint inhibitor associated acute kidney injury due to pembrolizumab particularly in the setting of acute interstitial nephritis in the setting of fever and elevated inflammatory markers.  Infectious disease performed a work-up but there was no evidence of infection temperature did improve over time.  Fungal cultures were negative, blood cultures were negative, respiratory panel was negative his neutropenia did resolve as well as thrombocytopenia his hemoglobin was low patient was noted to be hypotensive and was treated with normal saline was also noted to have a hemoglobin of 7.2 and required 1 unit of packed red cells CRP did improve nephrology felt patient was hypovolemic and recommended Lasix 40 mg daily patient was eventually discharged without a fever on November 7.  He was seen in follow-up by NATHAN Brock via via telehealth/video visit on November 10.She noted that his CRP was down to 78 but noted  "that he was having ongoin g joint pains and was using any NSAIDs she noted complaints of swelling of the right upper extremity and up Doppler venous ultrasound was negative for DVT.  She also noted that his magnesium was 1.1 and she is set up IV magnesium to be given in the emergency room which was given on November 11 she recommended continuing oral magnesium oxide.  Patient comes in today performance status is still relatively poor at 2 \"improved since his last visit.  He says his biggest problem right now states having joint pain and swelling of all his digits as well as the wrist of both hands.  He also has ongoing fatigue.  He denies any urinary symptoms.  His fevers have resolved.  He is rather frustrated as she does not want to travel to [place] for regular visits and would like to stay with our clinic on a regular basis. We had seen the patient on November 14.  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  Has CRP and sed rate was dropping off pembrolizumab he did have ongoing autoimmune arthritis we elected to start the patient on low-dose prednisone 10 mg p.o. twice daily for 2 weeks.  He said almost immediately on the next day      his joint symptoms improved and he felt overall much better with less fatigue.  We elected to restage the patient with a PET scan that was done on on November 22, 2023 and the findings were that there was no evidence of metastatic or recurrent disease involving head neck chest abdomen pelvis and thighs hypermetabolism that was in the left upper neck have resolved.  There is nonspecific soft tissue and estimate mesentery that was not hypermetabolic and was overall improved.  Patient recently also seen Dr. Melvin via telehealth on November 29 she noticed the patient did have a positive response on PET scan and felt that We could monitor the patient off therapy with pembrolizumab recommend repeat imaging in 2 to " 3 months.  She also agreed to have us manage patient locally but she would manage from a distance for further consultation.  Patient was B12 deficient we will start the patient on B12 1000 mcg IM monthly patient did complain of stiffness in the left neck with potential presyncope/syncope with blackout episode lasting 2 seconds Dr. Melvin ordered bilateral carotid ultrasound liver negative for stenosis.  Complaint of an episode with transient left vision loss but otherwise is doing much better off pembrolizumab.  Recommend the patient be seen by ENT by Dr. Bran  to confirm remission via endoscopic exam.  He did see the patient on January 9 and performed an endoscopic exam including flexible fiberoptic exam.  Did not find any evidence of recurrent nasopharyngeal mass there was no palpable adenopathy he felt the left neck was very hard and woody from previous surgery radiation and recommended an ENT exam every 2 months to monitor for recurrence.  We had also ordered a MRI of the neck was performed December 20 and the findings were that there was no acute abnormality there was no evidence of local recurrence of nasopharyngeal malignancy no evidence of metastatic disease.  Postsurgical changes of left neck cervical lymph node dissection but no pathologically enlarged lymph nodes.  MRI of the brain was also ordered and this was essentially negative there was some small volume of fluid within the mastoid cells that were nonspecific.Patient comes in today and is doing relatively well.  He may have had a recent upper respiratory infection manifested by nasal congestion possibly sore throat.  Otherwise he states he eats what he wants and has no problem with dysphagia or odynophagia.  Denies fevers, night sweats or weight loss.  Denies any abdominal pain denies any change in bowel habits or bright red blood per rectum hematemesis or melena.  Denies any reflux symptoms denies any headaches or change in mental status denies  any skin rash.  He states he plans to see Dr. Bran in March for evaluation by ENT.  He did have a PET scan performed on February 14 and the findings were there was a nonspecific single mildly hypermetabolic normal caliber left level 2B lymph node that may be reactive versus suspicious for early leny disease recurrence.      ______________________________________________________________________________    Past History    Past Medical History:   Diagnosis Date    COPD (chronic obstructive pulmonary disease) (H)     Dyslipidemia     Nasopharyngeal carcinoma (H)     BARRETT (obstructive sleep apnea)     Type 2 diabetes mellitus with diabetic nephropathy (H)        Past Surgical History:   Procedure Laterality Date    COLONOSCOPY      DISSECTION RADICAL NECK MODIFIED Left 12/5/2022    Procedure: left modified radical neck dissection;  Surgeon: Jesus Boston MD;  Location: [facility] OR    INSERT PORT VASCULAR ACCESS Right 03/31/2022    Procedure: SINGLE LUMEN POWER PORT INSERTION, VASCULAR ACCESS;  Surgeon: Evens Aponte MD;  Location: [facility] OR    IR CHEST PORT PLACEMENT > 5 YRS OF AGE  03/31/2022    IR RENAL BIOPSY RIGHT  11/6/2023    KNEE SURGERY      LARYNGOSCOPY WITH BIOPSY(IES) N/A 12/5/2022    Procedure: LARYNGOSCOPY, WITH BIOPSY;  Surgeon: Jesus Boston MD;  Location: [facility] OR    TONSILLECTOMY             Review of systems.  CNS: There are no headaches, no blurred vision, no change in mental status,   ENT: There is no hearing loss.  Respiratory: There is no cough or hemoptysis or shortness of breath  Cardiac: There is no chest pain, orthopnea, PND, or ankle edema.  GI: There is no bright red blood per rectum, no hematemesis, no reflux, no diarrhea or constipation  Musculoskeletal: There are some mild neuropathy in his feet with associated discomfort when walking.  : There is no urinary frequency, hematuria.  Constitutional: There is no fevers, night sweats, weight loss.  Endocrine: There is mild  "fatigue  Neuro: There is no tingling or numbness in the hands or feet.  Hematologic: There is no gingival bleeding, epistaxis, or easy bruisability.  Dermatologic: There is no skin rash.  A 14 point review of systems is otherwise negative.          Physical Exam    /72 (BP Location: Right arm, Patient Position: Chair, Cuff Size: Adult Large)   Pulse 75   Temp 98.2  F (36.8  C) (Tympanic)   Resp 12   Ht 1.803 m (5' 11\")   Wt 112 kg (247 lb)   SpO2 97%   BMI 34.45 kg/m        GENERAL: Alert and oriented to time place and person. Seated comfortably. In no distress.    HEAD: Atraumatic and normocephalic.    EYES: YON, EOMI.  No pallor.  No icterus.    Oral cavity: no mucosal lesion or tonsillar enlargement.    NECK: There are postsurgical changes in the left neck with significant scarring limited range of motion bilaterally no obvious palpable nodes.. JVP normal.  No thyroid enlargement.    LYMPH NODES: There are no palpable cervical, supraclavicular, axillary, or inguinal nodes.    LUNGS: clear to auscultation bilaterally.  Resonant to percussion throughout bilaterally.  Symmetrical breath movements bilaterally.    HEART: S1 and S2 are heard. Regular rate and rhythm.  No murmur or gallop or rub heard.  No peripheral edema.    ABDOMEN: Soft. Not tender. Not distended.  No palpable hepatomegaly or splenomegaly.  No other mass palpable.  Bowel sounds heard.    EXTREMITIES: There is no ankle edema.  SKIN: no rash, or bruising or purpura.    NEURO: Grossly non-focal.    Lab Results    Recent Results (from the past 240 hour(s))   Comprehensive metabolic panel    Collection Time: 02/14/24  3:41 PM   Result Value Ref Range    Sodium 140 135 - 145 mmol/L    Potassium 3.9 3.4 - 5.3 mmol/L    Carbon Dioxide (CO2) 31 (H) 22 - 29 mmol/L    Anion Gap 7 7 - 15 mmol/L    Urea Nitrogen 15.0 8.0 - 23.0 mg/dL    Creatinine 0.88 0.67 - 1.17 mg/dL    GFR Estimate >90 >60 mL/min/1.73m2    Calcium 9.6 8.8 - 10.2 mg/dL    " Chloride 102 98 - 107 mmol/L    Glucose 92 70 - 99 mg/dL    Alkaline Phosphatase 87 40 - 150 U/L    AST 30 0 - 45 U/L    ALT 22 0 - 70 U/L    Protein Total 7.6 6.4 - 8.3 g/dL    Albumin 4.1 3.5 - 5.2 g/dL    Bilirubin Total 0.7 <=1.2 mg/dL   Lactate Dehydrogenase    Collection Time: 02/14/24  3:41 PM   Result Value Ref Range    Lactate Dehydrogenase 140 0 - 250 U/L   CBC with platelets and differential    Collection Time: 02/14/24  3:41 PM   Result Value Ref Range    WBC Count 5.2 4.0 - 11.0 10e3/uL    RBC Count 4.46 4.40 - 5.90 10e6/uL    Hemoglobin 12.5 (L) 13.3 - 17.7 g/dL    Hematocrit 38.3 (L) 40.0 - 53.0 %    MCV 86 78 - 100 fL    MCH 28.0 26.5 - 33.0 pg    MCHC 32.6 31.5 - 36.5 g/dL    RDW 14.0 10.0 - 15.0 %    Platelet Count 156 150 - 450 10e3/uL    % Neutrophils 60 %    % Lymphocytes 27 %    % Monocytes 10 %    % Eosinophils 3 %    % Basophils 0 %    % Immature Granulocytes 0 %    NRBCs per 100 WBC 0 <1 /100    Absolute Neutrophils 3.1 1.6 - 8.3 10e3/uL    Absolute Lymphocytes 1.4 0.8 - 5.3 10e3/uL    Absolute Monocytes 0.5 0.0 - 1.3 10e3/uL    Absolute Eosinophils 0.2 0.0 - 0.7 10e3/uL    Absolute Basophils 0.0 0.0 - 0.2 10e3/uL    Absolute Immature Granulocytes 0.0 <=0.4 10e3/uL    Absolute NRBCs 0.0 10e3/uL       Imaging    PET Oncology (Eyes to Thighs)    Result Date: 2/15/2024  Procedure: PET ONCOLOGY (EYES TO THIGHS) Subtype: Subsequent Radiopharmaceutical: F-18 FDG Dose: 13.21 mCi IV Serum Glucose: 100 mg/dl History: nasopharyngeal cancer; Nasopharyngeal cancer (H) Comparison: 11/22/23 Technique:  A low dose CT examination was performed for the purpose of attenuation correction and localization. Attenuation corrected PET images from the skull base to the mid thigh were acquired approximately one hour following intravenous administration of the dose, and displayed in transaxial, sagittal, and coronal projections. Uptake time: 52 minutes. Findings: Background uptake: Blood pool 3.0, liver 3.2. Neck:  New hypermetabolism is seen within the high posterior left neck, possibly within a normal caliber left level IIb lymph node, with a maximum SUV of 6.8. No other sites of suspicious hypermetabolism are seen in the soft tissues of the neck. There is minimally asymmetric activity within the lingual tonsils. There is increased activity along the surface of the tongue that is most likely related to salivary excretion. Thorax: No sites of suspicious hypermetabolism. Dilation of the mid ascending thoracic aorta up to 3.9 cm. Abdomen/Pelvis: No sites of suspicious hypermetabolism. Calcified gallstone. Right renal cyst without hypermetabolism. Mildly increased density of the urine within the bladder, nonspecific. Enlarged prostate. Thighs: No sites of suspicious hypermetabolism. Bony structures: No sites of suspicious hypermetabolism.     IMPRESSION:  Single mildly hypermetabolic normal caliber left level IIb lymph node, suspicious for a single site of early leny disease recurrence. Reactive change is also in the differential. Consider short interval follow-up CT neck with contrast. JUAN CARLOS MALCOLM MD   SYSTEM ID:  [id number]    MR Brain w/o & w Contrast    Result Date: 2/14/2024  EXAM:  MR BRAIN W/O & W CONTRAST HISTORY:  nasopharyngeal cancer; Nasopharyngeal cancer (H). . TECHNIQUE:  Sagittal T1, axial T1, T2, FLAIR, diffusion, gradient as well as axial and 3D postcontrast imaging of the whole brain was performed. MEDS/CONTRAST: 20 ML DOTAREM COMPARISON:  12/20/2023 FINDINGS:  The ventricles and sulci are stable. No abnormal extra-axial collection, hydrocephalus, mass effect or midline shift is seen. The basal cisterns are preserved. No abnormal intracranial enhancement or concerning T2* gradient susceptibility is identified. No restricted diffusion is present. The major intracranial vascular flow voids are preserved. The T1 marrow signal is unremarkable. A partial right mastoid effusion is seen.     IMPRESSION:  Unchanged MR appearance of the brain. No evidence of intracranial metastatic disease. JUAN CARLOS MALCOLM MD   SYSTEM ID:  [id number]     Assessment and Plan: #1:Stage Shannan nasal pharyngeal carcinoma p16 negative EBV ER positive: Status post induction chemotherapy with gemcitabine/carboplatin x 3 cycles followed by chemoradiation therapy with weekly carboplatin followed by salvage left modified radical dissection performed in the emergency with residual disease noted on posttreatment PET scan.  Patient was PD-1 positive will start on pembrolizumab and received at least 7 cycles under direction of Dr. Melvin at the [facility] with partial response.  Elected to transition his care to us from the oncology department in [facility].  He was treated with 1 more cycle of pembrolizumab and then subsequent developed cytokine release syndrome manifested by fever, renal failure anemia, autoimmune arthritis elevated CRP.  We stopped pembrolizumab and treated the patient with steroids with improvement in CRP as well as symptoms PET scan subsequently performed revealed a complete response with no evidence of recurrence.  This was also confirmed by ENT at [facility] by Dr. Bran who performed a flexible fiberoptic exam/nasopharyngeal exam with no evidence of recurrence of primary tumor.  No lymphadenopathy.  PET scan indicates mildly avid left posterior cervical node that is of normal caliber which is likely reactive although malignancy cannot be ruled out.  Will proceed with CT neck with IV contrast and have the patient follow-up with Dr. Bran of ENT for potential biopsy versus close monitoring.  Will see the patient approxi-2 weeks after he sees Dr. Bran and pulm exam and obtain CBC CMP LDH.    Time spent: 86 minutes was spent on this total patient visit: This was an extensive visit with multiple decision making including reviewing results of the PET scan with radiology as well as the patient, recommending  further evaluation by ENT as well as further imaging including CT neck with IV contrast, reviewed multiple physician provider notes, with performed history physical, with documented history physical, reordered CT neck as well as follow-up labs and appointments and referral to Dr. Bran of ENT.    Cancer Staging   No matching staging information was found for the patient.        Signed by: Chriss Martin MD    CC: Dr. Lexx Melvin

## 2024-02-21 NOTE — PATIENT INSTRUCTIONS
CT neck ordered to be done prior to ENT visit. See Dr Bran as scheduled on 3/12, then see Dr Martin the following week.

## 2024-02-21 NOTE — NURSING NOTE
"February 21, 2024 2:37 PM    Chief Complaint   Patient presents with    Oncology Clinic Visit     Nasopharyngeal CA     Initial Vitals: /72 (BP Location: Right arm, Patient Position: Chair, Cuff Size: Adult Large)   Pulse 75   Temp 98.2  F (36.8  C) (Tympanic)   Resp 12   Ht 1.803 m (5' 11\")   Wt 112 kg (247 lb)   SpO2 97%   BMI 34.45 kg/m   Estimated body mass index is 34.45 kg/m  as calculated from the following:    Height as of this encounter: 1.803 m (5' 11\").    Weight as of this encounter: 112 kg (247 lb). Body surface area is 2.37 meters squared.  No Pain (0) Comment: Data Unavailable  No LMP for male patient.  Allergies reviewed: Yes  Medications reviewed: Yes    Medications: Medication refills not needed today.  Pharmacy name entered into [facility]: [facility] [id number] [place] [address]    Frailty Screening:  Is the patient here for a new oncology consult visit in cancer care? 2. No      Clinical Concerns: None at this time.      Iva Jones LPN      "

## 2024-03-01 ENCOUNTER — HOSPITAL ENCOUNTER (OUTPATIENT)
Dept: CT IMAGING | Facility: OTHER | Age: 64
Discharge: HOME | End: 2024-03-01
Attending: INTERNAL MEDICINE | Admitting: INTERNAL MEDICINE
Payer: COMMERCIAL

## 2024-03-01 ENCOUNTER — PATIENT OUTREACH (OUTPATIENT)
Dept: ONCOLOGY | Facility: OTHER | Age: 64
End: 2024-03-01
Payer: COMMERCIAL

## 2024-03-01 DIAGNOSIS — C11.9 NASOPHARYNGEAL CANCER (H): ICD-10-CM

## 2024-03-01 DIAGNOSIS — I82.890 LEFT JUGULAR VEIN THROMBOSIS: Primary | ICD-10-CM

## 2024-03-01 PROCEDURE — 250N000011 HC RX IP 250 OP 636: Performed by: INTERNAL MEDICINE

## 2024-03-01 PROCEDURE — 250N000011 HC RX IP 250 OP 636: Performed by: RADIOLOGY

## 2024-03-01 PROCEDURE — 70491 CT SOFT TISSUE NECK W/DYE: CPT

## 2024-03-01 RX ORDER — IOPAMIDOL 755 MG/ML
100 INJECTION, SOLUTION INTRAVASCULAR ONCE
Status: COMPLETED | OUTPATIENT
Start: 2024-03-01 | End: 2024-03-01

## 2024-03-01 RX ORDER — HEPARIN SODIUM (PORCINE) LOCK FLUSH IV SOLN 100 UNIT/ML 100 UNIT/ML
500 SOLUTION INTRAVENOUS EVERY 8 HOURS
Status: COMPLETED | OUTPATIENT
Start: 2024-03-01 | End: 2024-03-01

## 2024-03-01 RX ADMIN — IOPAMIDOL 100 ML: 755 INJECTION, SOLUTION INTRAVENOUS at 13:20

## 2024-03-01 RX ADMIN — HEPARIN 500 UNITS: 100 SYRINGE at 13:27

## 2024-03-01 NOTE — TELEPHONE ENCOUNTER
Received call from Chriss Martin MD he received call from radiologist that Jareth has a clot that appears to be coming from his port. Needs to start Eliquis 5 mg twice daily today. Patient notified.  Sybil Zamora RN...........3/1/2024 2:09 PM

## 2024-03-01 NOTE — PROGRESS NOTES
Patient's implanted port was accessed with a non-coring simms needle by a trained RN per hospital policy/procedure. Blood return normal. Site noted to be Normal. Patient tolerated well.     Following imaging exam, implanted port was flushed with 10 mL sterile normal saline and 500 units (5 mLs) heparin. Site was deaccessed by a trained RN per hospital policy/procedure. Site noted to be Normal. Patient tolerated well.     Site covered with dry, sterile dressing.     See flowsheet and MAR for details.     Annamarie Hannah RN on 3/1/2024 at 1:36 PM

## 2024-03-06 ENCOUNTER — HOSPITAL ENCOUNTER (OUTPATIENT)
Dept: INFUSION THERAPY | Facility: OTHER | Age: 64
Discharge: HOME | End: 2024-03-06
Attending: INTERNAL MEDICINE | Admitting: INTERNAL MEDICINE
Payer: COMMERCIAL

## 2024-03-06 DIAGNOSIS — R79.89 LOW VITAMIN B12 LEVEL: Primary | ICD-10-CM

## 2024-03-06 PROCEDURE — 96372 THER/PROPH/DIAG INJ SC/IM: CPT | Performed by: INTERNAL MEDICINE

## 2024-03-06 PROCEDURE — 250N000011 HC RX IP 250 OP 636: Performed by: INTERNAL MEDICINE

## 2024-03-06 RX ORDER — ALBUTEROL SULFATE 0.83 MG/ML
2.5 SOLUTION RESPIRATORY (INHALATION)
Status: CANCELLED | OUTPATIENT
Start: 2024-03-07

## 2024-03-06 RX ORDER — CYANOCOBALAMIN 1000 UG/ML
1000 INJECTION, SOLUTION INTRAMUSCULAR; SUBCUTANEOUS ONCE
Status: COMPLETED | OUTPATIENT
Start: 2024-03-06 | End: 2024-03-06

## 2024-03-06 RX ORDER — DIPHENHYDRAMINE HYDROCHLORIDE 50 MG/ML
50 INJECTION INTRAMUSCULAR; INTRAVENOUS
Status: CANCELLED
Start: 2024-03-07

## 2024-03-06 RX ORDER — EPINEPHRINE 1 MG/ML
0.3 INJECTION, SOLUTION, CONCENTRATE INTRAVENOUS EVERY 5 MIN PRN
Status: CANCELLED | OUTPATIENT
Start: 2024-03-07

## 2024-03-06 RX ORDER — METHYLPREDNISOLONE SODIUM SUCCINATE 125 MG/2ML
125 INJECTION, POWDER, LYOPHILIZED, FOR SOLUTION INTRAMUSCULAR; INTRAVENOUS
Status: CANCELLED
Start: 2024-03-07

## 2024-03-06 RX ORDER — ALBUTEROL SULFATE 90 UG/1
1-2 AEROSOL, METERED RESPIRATORY (INHALATION)
Status: CANCELLED
Start: 2024-03-07

## 2024-03-06 RX ORDER — CYANOCOBALAMIN 1000 UG/ML
1000 INJECTION, SOLUTION INTRAMUSCULAR; SUBCUTANEOUS
Status: CANCELLED
Start: 2024-03-07

## 2024-03-06 RX ORDER — MEPERIDINE HYDROCHLORIDE 50 MG/ML
25 INJECTION INTRAMUSCULAR; INTRAVENOUS; SUBCUTANEOUS EVERY 30 MIN PRN
Status: CANCELLED | OUTPATIENT
Start: 2024-03-07

## 2024-03-06 RX ADMIN — CYANOCOBALAMIN 1000 MCG: 1000 INJECTION, SOLUTION INTRAMUSCULAR; SUBCUTANEOUS at 13:06

## 2024-03-06 NOTE — NURSING NOTE
Infusion Nursing Note:  Jareth Gallardo presents today for B12 injection.    Patient seen by provider today: No   present during visit today: Not Applicable.    Note: N/A.      Intravenous Access:  No Intravenous access/labs at this visit.    Treatment Conditions:  Not Applicable.      Post Infusion Assessment:  Patient tolerated injection without incident.  Site patent and intact, free from redness, edema or discomfort.  No evidence of extravasations.       Discharge Plan:   Patient and/or family verbalized understanding of discharge instructions and all questions answered.  AVS to patient via [employer].  Patient will return in 2 weeks  for next appointment.   Patient discharged in stable condition accompanied by: self.  Departure Mode: Ambulatory.      Dariela Small RN

## 2024-03-10 ENCOUNTER — HEALTH MAINTENANCE LETTER (OUTPATIENT)
Age: 64
End: 2024-03-10

## 2024-03-12 ENCOUNTER — OFFICE VISIT (OUTPATIENT)
Dept: OTOLARYNGOLOGY | Facility: OTHER | Age: 64
End: 2024-03-12
Attending: OTOLARYNGOLOGY
Payer: COMMERCIAL

## 2024-03-12 DIAGNOSIS — C11.9 NASOPHARYNGEAL CANCER (H): Primary | ICD-10-CM

## 2024-03-12 PROCEDURE — G0463 HOSPITAL OUTPT CLINIC VISIT: HCPCS

## 2024-03-14 NOTE — PROGRESS NOTES
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  He was treated at the [facility] with chemotherapy and radiation therapy.  In December of 2022 he underwent a salvage neck dissection.  Subsequent imaging revealed further likely positive lymph nodes and he was started on Keytruda.  He eventually had to stop this medication because of complicating renal failure.  He has been followed by Dr. Harvey of Hematology Oncology.  I have offered to follow him as well.  He had a PET scan performed last November that looked fairly clear.  A repeat PET scan on the 1st of March showed some activity in his cervical lymph node along the posterior margin of his sternocleidomastoid muscle in the left neck.  This is the side that had previously been operated on.    Exam   Oral cavity oropharynx-free of lesions or inflammation   Neck-his left neck is very [place] and hard.  I can not palpate any discrete masses.  Nasal-free of obstruction or purulence   Flexible fiberoptic laryngoscopy reveals clear nasopharynx hypopharynx, and larynx  Head and neck integument-Clear  General-the patient appears well and in no distress  Neuro-there are no focal cranial nerve deficits  PET CT-I reviewed his recent PET-CT and compared it to his PET from November.  He does have some new activity in a very small, less than 7 mm lymph node in his mid neck along the posterior margin of the sternocleidomastoid  muscle.    Allergies    No Known Allergies Allergy (Verified 01/10/24 09:01)    PFSH  PFSH:     Past Medical History: (Updated 01/10/24 @ 09:02 by Josi Mccoy, Med Assist)    Diabetes  Dizziness  Tinnitus  Thyroid disorder  Hearing loss      Social History: (Updated 01/10/24 @ 09:03 by Josi Mccoy, Med Assist)  Smoking Status:  Never smoker   alcohol intake:  former   substance use type:  does not use   marital status:       A&P  Assessment & Plan  (1) Nasopharyngeal cancer:         Status: Acute        Code(s):  C11.9 - Malignant neoplasm of nasopharynx, unspecified  The patient has recently been started on a blood thinner medication because of clotting at his port site.  The PET avid lesion is quite small and would be difficult to accurately hit with an ultrasound-directed needle biopsy.  I would recommend a follow-up PET scan in 3 months.  If there is progression of the lesion, then we should consider proceeding with an ultrasound-directed needle biopsy.  He will see me in 3 months.                Lexx Bran MD    Filed: 03/13/24 1004     <Electronically signed by Lexx Bran MD> 03/13/24 [id number]

## 2024-03-20 ENCOUNTER — HOSPITAL ENCOUNTER (OUTPATIENT)
Dept: INFUSION THERAPY | Facility: OTHER | Age: 64
Discharge: HOME | End: 2024-03-20
Attending: INTERNAL MEDICINE | Admitting: INTERNAL MEDICINE
Payer: COMMERCIAL

## 2024-03-20 ENCOUNTER — PATIENT OUTREACH (OUTPATIENT)
Dept: ONCOLOGY | Facility: OTHER | Age: 64
End: 2024-03-20
Payer: COMMERCIAL

## 2024-03-20 DIAGNOSIS — R79.89 LOW VITAMIN B12 LEVEL: Primary | ICD-10-CM

## 2024-03-20 PROCEDURE — 96372 THER/PROPH/DIAG INJ SC/IM: CPT | Performed by: INTERNAL MEDICINE

## 2024-03-20 PROCEDURE — 250N000011 HC RX IP 250 OP 636: Performed by: INTERNAL MEDICINE

## 2024-03-20 RX ORDER — CYANOCOBALAMIN 1000 UG/ML
1000 INJECTION, SOLUTION INTRAMUSCULAR; SUBCUTANEOUS
Status: CANCELLED
Start: 2024-03-21

## 2024-03-20 RX ORDER — DIPHENHYDRAMINE HYDROCHLORIDE 50 MG/ML
50 INJECTION INTRAMUSCULAR; INTRAVENOUS
Status: CANCELLED
Start: 2024-03-21

## 2024-03-20 RX ORDER — EPINEPHRINE 1 MG/ML
0.3 INJECTION, SOLUTION, CONCENTRATE INTRAVENOUS EVERY 5 MIN PRN
Status: CANCELLED | OUTPATIENT
Start: 2024-03-21

## 2024-03-20 RX ORDER — CYANOCOBALAMIN 1000 UG/ML
1000 INJECTION, SOLUTION INTRAMUSCULAR; SUBCUTANEOUS ONCE
Status: COMPLETED | OUTPATIENT
Start: 2024-03-20 | End: 2024-03-20

## 2024-03-20 RX ORDER — ALBUTEROL SULFATE 90 UG/1
1-2 AEROSOL, METERED RESPIRATORY (INHALATION)
Status: CANCELLED
Start: 2024-03-21

## 2024-03-20 RX ORDER — MEPERIDINE HYDROCHLORIDE 50 MG/ML
25 INJECTION INTRAMUSCULAR; INTRAVENOUS; SUBCUTANEOUS EVERY 30 MIN PRN
Status: CANCELLED | OUTPATIENT
Start: 2024-03-21

## 2024-03-20 RX ORDER — METHYLPREDNISOLONE SODIUM SUCCINATE 125 MG/2ML
125 INJECTION, POWDER, LYOPHILIZED, FOR SOLUTION INTRAMUSCULAR; INTRAVENOUS
Status: CANCELLED
Start: 2024-03-21

## 2024-03-20 RX ORDER — ALBUTEROL SULFATE 0.83 MG/ML
2.5 SOLUTION RESPIRATORY (INHALATION)
Status: CANCELLED | OUTPATIENT
Start: 2024-03-21

## 2024-03-20 RX ADMIN — CYANOCOBALAMIN 1000 MCG: 1000 INJECTION, SOLUTION INTRAMUSCULAR; SUBCUTANEOUS at 13:31

## 2024-03-20 NOTE — PROGRESS NOTES
[facility]: Cancer Care Follow-Up Note                                    Discussion with Patient:                                                      Jareth states that he is feeling really good. No complaints or concerns.             Dates of Treatment:                                                      Infusion given in last 28 days       None            Assessment:                                                          RNCC Short Symptom Review:     Assessment completed with:: Patient    General/Short Assessment  Does the patient have all their medications?: Yes  Is the patient experiencing any new or worsening symptoms?: No  Discussion with patient: Answered patient's questions and addressed concerns;Reviewed how and when to contact clinic;Reviewed patient's future appointments    Pain  Patient Reported Pain?: No    Patient Coping  Accepting    Clinic Utilization  Patient Aware of Next Appointment?: Yes    Other Patient Concerns  Other Patient Reported Concerns: No    Intervention/Education provided during outreach:                                                         Patient to follow up as scheduled at next appt  Patient to call/[employer] message with updates    Signature:  Sybil Zamora RN

## 2024-03-26 ENCOUNTER — MYC MEDICAL ADVICE (OUTPATIENT)
Dept: ONCOLOGY | Facility: OTHER | Age: 64
End: 2024-03-26
Payer: COMMERCIAL

## 2024-03-26 DIAGNOSIS — I82.890 LEFT JUGULAR VEIN THROMBOSIS: ICD-10-CM

## 2024-04-03 ENCOUNTER — ONCOLOGY VISIT (OUTPATIENT)
Dept: ONCOLOGY | Facility: OTHER | Age: 64
End: 2024-04-03
Attending: INTERNAL MEDICINE
Payer: COMMERCIAL

## 2024-04-03 ENCOUNTER — LAB (OUTPATIENT)
Dept: LAB | Facility: OTHER | Age: 64
End: 2024-04-03
Attending: INTERNAL MEDICINE
Payer: COMMERCIAL

## 2024-04-03 ENCOUNTER — HOSPITAL ENCOUNTER (OUTPATIENT)
Dept: INFUSION THERAPY | Facility: OTHER | Age: 64
Discharge: HOME | End: 2024-04-03
Attending: INTERNAL MEDICINE
Payer: COMMERCIAL

## 2024-04-03 VITALS
TEMPERATURE: 99.1 F | WEIGHT: 244 LBS | HEART RATE: 92 BPM | RESPIRATION RATE: 16 BRPM | DIASTOLIC BLOOD PRESSURE: 72 MMHG | OXYGEN SATURATION: 93 % | BODY MASS INDEX: 34.03 KG/M2 | SYSTOLIC BLOOD PRESSURE: 105 MMHG

## 2024-04-03 DIAGNOSIS — C11.9 NASOPHARYNGEAL CANCER (H): ICD-10-CM

## 2024-04-03 DIAGNOSIS — C11.9 NASOPHARYNGEAL CANCER (H): Primary | ICD-10-CM

## 2024-04-03 DIAGNOSIS — I82.890 LEFT JUGULAR VEIN THROMBOSIS: ICD-10-CM

## 2024-04-03 DIAGNOSIS — R79.89 LOW VITAMIN B12 LEVEL: Primary | ICD-10-CM

## 2024-04-03 LAB
ALBUMIN SERPL BCG-MCNC: 3.9 G/DL (ref 3.5–5.2)
ALP SERPL-CCNC: 80 U/L (ref 40–150)
ALT SERPL W P-5'-P-CCNC: 30 U/L (ref 0–70)
ANION GAP SERPL CALCULATED.3IONS-SCNC: 10 MMOL/L (ref 7–15)
AST SERPL W P-5'-P-CCNC: 36 U/L (ref 0–45)
BASOPHILS # BLD AUTO: 0 10E3/UL (ref 0–0.2)
BASOPHILS NFR BLD AUTO: 0 %
BILIRUB SERPL-MCNC: 0.6 MG/DL
BUN SERPL-MCNC: 14.5 MG/DL (ref 8–23)
CALCIUM SERPL-MCNC: 9.2 MG/DL (ref 8.8–10.2)
CHLORIDE SERPL-SCNC: 102 MMOL/L (ref 98–107)
CREAT SERPL-MCNC: 0.85 MG/DL (ref 0.67–1.17)
CRP SERPL-MCNC: <3 MG/L
D DIMER PPP FEU-MCNC: 0.38 UG/ML FEU (ref 0–0.5)
DEPRECATED HCO3 PLAS-SCNC: 28 MMOL/L (ref 22–29)
EGFRCR SERPLBLD CKD-EPI 2021: >90 ML/MIN/1.73M2
EOSINOPHIL # BLD AUTO: 0.1 10E3/UL (ref 0–0.7)
EOSINOPHIL NFR BLD AUTO: 3 %
ERYTHROCYTE [DISTWIDTH] IN BLOOD BY AUTOMATED COUNT: 13.9 % (ref 10–15)
ERYTHROCYTE [SEDIMENTATION RATE] IN BLOOD BY WESTERGREN METHOD: 27 MM/HR (ref 0–20)
GLUCOSE SERPL-MCNC: 162 MG/DL (ref 70–99)
HCT VFR BLD AUTO: 40.3 % (ref 40–53)
HGB BLD-MCNC: 12.7 G/DL (ref 13.3–17.7)
IMM GRANULOCYTES # BLD: 0 10E3/UL
IMM GRANULOCYTES NFR BLD: 0 %
LDH SERPL L TO P-CCNC: 172 U/L (ref 0–250)
LYMPHOCYTES # BLD AUTO: 1 10E3/UL (ref 0.8–5.3)
LYMPHOCYTES NFR BLD AUTO: 21 %
MCH RBC QN AUTO: 27.1 PG (ref 26.5–33)
MCHC RBC AUTO-ENTMCNC: 31.5 G/DL (ref 31.5–36.5)
MCV RBC AUTO: 86 FL (ref 78–100)
MONOCYTES # BLD AUTO: 0.4 10E3/UL (ref 0–1.3)
MONOCYTES NFR BLD AUTO: 9 %
NEUTROPHILS # BLD AUTO: 3.2 10E3/UL (ref 1.6–8.3)
NEUTROPHILS NFR BLD AUTO: 66 %
NRBC # BLD AUTO: 0 10E3/UL
NRBC BLD AUTO-RTO: 0 /100
PLATELET # BLD AUTO: 157 10E3/UL (ref 150–450)
POTASSIUM SERPL-SCNC: 3.8 MMOL/L (ref 3.4–5.3)
PROT SERPL-MCNC: 7.2 G/DL (ref 6.4–8.3)
RBC # BLD AUTO: 4.68 10E6/UL (ref 4.4–5.9)
SODIUM SERPL-SCNC: 140 MMOL/L (ref 135–145)
WBC # BLD AUTO: 4.8 10E3/UL (ref 4–11)

## 2024-04-03 PROCEDURE — 85379 FIBRIN DEGRADATION QUANT: CPT | Mod: ZL | Performed by: INTERNAL MEDICINE

## 2024-04-03 PROCEDURE — 99417 PROLNG OP E/M EACH 15 MIN: CPT | Performed by: INTERNAL MEDICINE

## 2024-04-03 PROCEDURE — 86140 C-REACTIVE PROTEIN: CPT | Mod: ZL | Performed by: INTERNAL MEDICINE

## 2024-04-03 PROCEDURE — 36415 COLL VENOUS BLD VENIPUNCTURE: CPT | Mod: ZL

## 2024-04-03 PROCEDURE — 85652 RBC SED RATE AUTOMATED: CPT | Mod: ZL | Performed by: INTERNAL MEDICINE

## 2024-04-03 PROCEDURE — 250N000011 HC RX IP 250 OP 636: Performed by: INTERNAL MEDICINE

## 2024-04-03 PROCEDURE — 85025 COMPLETE CBC W/AUTO DIFF WBC: CPT | Mod: ZL

## 2024-04-03 PROCEDURE — 80053 COMPREHEN METABOLIC PANEL: CPT | Mod: ZL

## 2024-04-03 PROCEDURE — 83615 LACTATE (LD) (LDH) ENZYME: CPT | Mod: ZL

## 2024-04-03 PROCEDURE — 36415 COLL VENOUS BLD VENIPUNCTURE: CPT | Mod: ZL | Performed by: INTERNAL MEDICINE

## 2024-04-03 PROCEDURE — 99215 OFFICE O/P EST HI 40 MIN: CPT | Performed by: INTERNAL MEDICINE

## 2024-04-03 PROCEDURE — 96372 THER/PROPH/DIAG INJ SC/IM: CPT | Performed by: INTERNAL MEDICINE

## 2024-04-03 RX ORDER — ALBUTEROL SULFATE 0.83 MG/ML
2.5 SOLUTION RESPIRATORY (INHALATION)
Status: CANCELLED | OUTPATIENT
Start: 2024-04-04

## 2024-04-03 RX ORDER — EPINEPHRINE 1 MG/ML
0.3 INJECTION, SOLUTION, CONCENTRATE INTRAVENOUS EVERY 5 MIN PRN
Status: CANCELLED | OUTPATIENT
Start: 2024-04-04

## 2024-04-03 RX ORDER — CYANOCOBALAMIN 1000 UG/ML
1000 INJECTION, SOLUTION INTRAMUSCULAR; SUBCUTANEOUS ONCE
Status: COMPLETED | OUTPATIENT
Start: 2024-04-03 | End: 2024-04-03

## 2024-04-03 RX ORDER — MEPERIDINE HYDROCHLORIDE 50 MG/ML
25 INJECTION INTRAMUSCULAR; INTRAVENOUS; SUBCUTANEOUS EVERY 30 MIN PRN
Status: CANCELLED | OUTPATIENT
Start: 2024-04-04

## 2024-04-03 RX ORDER — CYANOCOBALAMIN 1000 UG/ML
1000 INJECTION, SOLUTION INTRAMUSCULAR; SUBCUTANEOUS ONCE
Status: CANCELLED
Start: 2024-04-17 | End: 2024-04-04

## 2024-04-03 RX ORDER — ALBUTEROL SULFATE 90 UG/1
1-2 AEROSOL, METERED RESPIRATORY (INHALATION)
Status: CANCELLED
Start: 2024-04-04

## 2024-04-03 RX ORDER — DIPHENHYDRAMINE HYDROCHLORIDE 50 MG/ML
50 INJECTION INTRAMUSCULAR; INTRAVENOUS
Status: CANCELLED
Start: 2024-04-04

## 2024-04-03 RX ORDER — METHYLPREDNISOLONE SODIUM SUCCINATE 125 MG/2ML
125 INJECTION, POWDER, LYOPHILIZED, FOR SOLUTION INTRAMUSCULAR; INTRAVENOUS
Status: CANCELLED
Start: 2024-04-04

## 2024-04-03 RX ORDER — CYANOCOBALAMIN 1000 UG/ML
1000 INJECTION, SOLUTION INTRAMUSCULAR; SUBCUTANEOUS ONCE
Status: CANCELLED
Start: 2024-04-03 | End: 2024-04-03

## 2024-04-03 RX ADMIN — CYANOCOBALAMIN 1000 MCG: 1000 INJECTION, SOLUTION INTRAMUSCULAR; SUBCUTANEOUS at 12:44

## 2024-04-03 ASSESSMENT — PAIN SCALES - GENERAL: PAINLEVEL: NO PAIN (1)

## 2024-04-03 NOTE — PROGRESS NOTES
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 Jareth Gallardo returns for follow-up of stage Shannan nasopharyngeal carcinoma p16 negativePatient was referred to us by Dr. Melvin at the [place] for ongoing treatment to be given locally.  Initially presented with left neck swelling and was seen by urgent care in the [place] area.  Was treated with antibiotics initially and then was ultimately treated for presumed cat scratch failure with antibiotics.  Symptoms worsened the point where he says he had swelling in his left shoulder.  CT neck was ordered and patient was referred to ENT but could not be seen immediately due to insurance reasons.  He was seen by a general surgeon by the name of Dr. 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  Patient was subsequently referred to Dr. Boston of  ENT at the [facility].  He reviewed the CT neck that was performed on December 9, 2021 and was read as extensive left-sided neck adenopathy with lymph nodes measuring up to 2.4 cm medial to the left sternocleidomastoid muscle just above the level of the hyoid bone.  A PET/CT was ordered and performed on March 9, 2022 and it was read as exuberant soft tissue thickening within the nasopharyngeal and oropharyngeal mucosa concerning for nasopharyngeal carcinoma.  Soft tissue nodular uptake was evident within the prevertebral soft tissues and about differential because of the epiglottis with extensive neck lymph node uptake.  There was uptake within the lung  parenchyma concerning for metastatic uptake of primary malignancy infectious or inflammatory cannot be excluded.  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  The case was presented at the tumor board and the feeling was that patient would require induction chemotherapy followed by chemoradiation and therapy.  It was felt that this regimen would improve recurrence free survival and overall survival as compared to chemo radiotherapy alone.  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  Tumor was also found to be PD-L1 positive at 20% using the :.  Patient was ultimately seen by Dr. Dariela Melvin medical oncology at [facility] on March 22, 2022.  Patient did have a history of hearing loss and subsequently it was decided the patient be a candidate for arboplatin instead of cisplatin as induction therapy.  The plan was to treat with carboplatin and gemcitabine for 3 cycles and then restage in terms of the lung nodules they will be reevaluated after induction.  He was started on carboplatin gemcitabine and was treated between April 4, 2021 to until May 16, 2022.  He required Neulasta support due to neutropenia PET scan performed on May 31, 2022 revealed a partial response subsequently underwent chemoradiotherapy with weekly carboplatin and received radiation therapy at the [facility] under the direction of Dr. Skaggs.  PET scan done on November 16, 2022 revealed a residual 1.5 cm level 2 lymph node with an SUV of 28.7.  A 1 cm enhancing focus in the sternocleidomastoid muscle with an SUV of 22.4 there was a level 2A lymph node that on the left with decreased in size.  With decreased FDG avidity therewas a resolved left level 5 node node.  There was a new patchy groundglass opacity and nodule in the right upper lobe.  Which was felt to be inflammatory subseqre was residual disease on PET scan and recurrence to be at high risk  given the fact the patient was.  1 positive and the plan was to proceed with pembrolizumab.  Patient was started on pembrolizumab on March 9, 2023 under the direction of Dr. Norm Melvin cycle 3 was delayed 1 week due to apparent influenza patient had a good partial response he was seen in the emergency department at [facility] for fever and cough and the patient was treated for influenza.  We saw the patient in August 28, the plan was to initiate his next cycle of pembrolizumab here and is due to receive cycle 8 on August 30, he received that at the [facility].  And then was started on cycle 9 beginning September 20 receive cycle 10 on October 11.  He was scheduled to see pulmonology at [facility] tomorrow for evaluation groundglass nodules.  He was to follow-up with Dr. Melvin at the end of November with repeat imaging.  In the interim the patient became extremely ill and was seen in the emergency room on October 26 with complaints of shaking chills fever to 102 night sweats.  There was also an isolated episode of divergent vision with double vision ev entually resolved.  He did note multiple tick bites in the recent past as he was out in the woods.  D-dimer was elevated at 1.25 with a CTA of the chest was ordered and came back negative for pulmonary embolus.  COVID testing was negative.  He was empirically treated forpresumed tickborne illness initially with vancomycin and Zosyn admitted to [facility].  Further testing included included PCR testing for Ehrlichia as well as Anaplasma which was negative Lyme testing was negative.  Blood cultures were negative and subsequently discharged on October 28 on doxycycline he still had a temp of 102.3.  He comes in today still on doxycycline but still having spiking temps of over 102.  He also has shaking chills he had some loose stools this morning he did note urinary frequency  but now has hardly any urine output he appears dehydrated denies any  headaches he does report the isolated episode of double vision.  He does get an occasional cough he did note nausea and vomiting as well.  We saw him last in October 31.  Ordered labs and he was found to have BUN 41 creatinine 4.58 his CRP was elevated at 330 his sed rate was elevated 60 given the fact that he had acute renal failure refer the patient to emergency room to rule out obstructive uropathy versus checkpoint inhibitor toxicity renal ultrasound revealed n o evidence of obstruction.  His creatinine remained elevated at 4.59.  He was subsequently transferred to the [facility] and admitted on November 1 on admission his temp was 102.7. He underwent a renal biopsy on November 6, 2023 was a limited sample with only 4 glomeruli noted was read as acute interstitial nephritis moderate to severe, there was also focal acute tubular necrosis.  He was seen by nephrology by Dr. Blake Lynn.  The etiology of his renal failure could be possibly related to nephrotoxins NSAID vancomycin we could not rule out checkpoint inhibitor associated acute kidney injury due to pembrolizumab particularly in the setting of acute interstitial nephritis in the setting of fever and elevated inflammatory markers.  Infectious  disease performed a work-up but there was no evidence of infection temperature did improve over time.  Fungal cultures were negative, blood cultures were negative, respiratory panel was negative his neutropenia did resolve as well as thrombocytopenia his hemoglobin was low patient was noted to be hypotensive and was treated with normal saline was also noted to have a hemoglobin of 7.2 and required 1 unit of packed red cells CRP did improve nephrology felt patient was hypovolemic and recommended Lasix 40 mg daily patient was eventually discharged without a fever on November 7.  He was seen in follow-up by NATHAN Brock via via telehealth/video visit on November 10.She noted that his CRP was down to 78 but noted  "that he was having ongoin g joint pains and was using any NSAIDs she noted complaints of swelling of the right upper extremity and up Doppler venous ultrasound was negative for DVT.  She also noted that his magnesium was 1.1 and she is set up IV magnesium to be given in the emergency room which was given on November 11 she recommended continuing oral magnesium oxide.  Patient chyna rodriguez in today performance status is still relatively poor at 2 \"improved since his last visit.  He says his biggest problem right now states having joint pain and swelling of all his digits as well as the wrist of both hands.  He also has ongoing fatigue.  He denies any urinary symptoms.  His fevers have resolved.  He is rather frustrated as she does not want to travel to [place] for regular visits and would like to stay with our clinic on a regular basis. We had seen the patient on November 14.  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  Has CRP and sed rate was dropping off pembrolizumab he did have ongoing autoimmune arthritis we elected to start the patient on low-dose prednisone 10 mg p.o. twice daily for 2 weeks.  He said almost immediately on the next day      his joint symptoms improved and he felt overall much better with less fatigue.  We elected to estage the patient with a PET scan that was done on on November 22, 2023 and the findings were that there was no evidence of metastatic or recurrent disease involving head neck chest abdomen pelvis and thighs hypermetabolism that was in the left upper neck have resolved.  There is nonspecific soft tissue and estimate mesentery that was not hypermetabolic and was overall improved.  Patient recently also seen Dr. Melvin via telehealth on November 29 she noticed the patient did have a positive response on PET scan and felt that We could monitor the patient off therapy with pembrolizumab recommend repeat imaging in 2 to " 3 months.  She also agreed to have us manage patient locally but she would manage from a distance for further consultation.  Patient was B12 deficient we will start the patient on B12 1000 mcg IM monthly patient did complain of stiffness in the left neck with potential presyncope/syncope with blackout episode lasting 2 seconds Dr. Melvin ordered bilateral carotid ultrasound liver negative for stenosis.  Complaint of an episode with transient left vision loss but otherwise is doing much better off pembrolizumab.  Recommend the patient be seen by ENT by Dr. Bran  to confirm remission via endoscopic exam.  He did see the patient on January 9 and performed an endoscopic exam including flexible fiberoptic exam.  Did not find any evidence of recurrent nasopharyngeal mass there was no palpable adenopathy he felt the left neck was very hard and woody from previous surgery radiation and recommended an ENT exam every 2 months to monitor for recurrence.  We had also ordered a MRI of the neck was performed December 20 and the findings were that there was no acute abnormality there was no evidence of local recurrence of nasopharyngeal malignancy no evidence of metastatic disease.  Postsurgical changes of left neck cervical lymph node dissection but no pathologically enlarged lymph nodes.  MRI of the brain was also ordered and this was essentially negative there was some small volume of fluid within the mastoid cells that were nonspecific. He did have a PET scan performed on February 14 and the findings were there was a nonspecific single mildly hypermetabolic normal caliber left level 2B lymph node which may be reactive versus suspicious for early leny disease recurrence.  CT of the neck revealed stable enhancing necrotic lymph node deep to left sternocleidomastoid muscle measuring 6.5 mm there is unchanged compared to September 13, 2023 CT scan of neck.  There was also evidence of a partially occlusive thrombus involving  the right internal jugular superior to the intraluminal catheter.  We referred the patient to Dr. Bran for evaluation of PET scan findings.  On his exam he was unable to palpate a lymph node involving the left neck.  He felt the PET avid lesion was very small and would be difficult to actually hit with ultrasound directed needle biopsy felt we should repeat the PET scan in 3 months if there was progression of this lesion then we will consider proceeding with ultrasound-guided needle biopsy.  Patient comes in today he is essentially asymptomatic he says its at times he may get a twinge or discomfort in the left posterior neck.  He denies any palpable lymphadenopathy.  He still has dry oral mucosa.  He also is being followed by an optometrist and recently tried Visine eyedrops for his red eyes with this is not improving and advised to follow-up with his optometrist.  This has been a chronic problem for him.  Denies any change in bowel habits, no diarrhea or constipation.  There is no hematemesis melena or hematochezia.  There is no dysphagia or odynophagia.  There is no shortness of breath cough or hemoptysis.  He is in the second month of treatment of his DVT of the right internal jugular vein.  He is currently on Eliquis 5 mg p.o. twice daily.  Denies any leg swelling or pleuritic chest pain.      ______________________________________________________________________________    Past History    Past Medical History:   Diagnosis Date    COPD (chronic obstructive pulmonary disease) (H)     Dyslipidemia     Nasopharyngeal carcinoma (H)     BARRETT (obstructive sleep apnea)     Type 2 diabetes mellitus with diabetic nephropathy (H)        Past Surgical History:   Procedure Laterality Date    COLONOSCOPY      DISSECTION RADICAL NECK MODIFIED Left 12/5/2022    Procedure: left modified radical neck dissection;  Surgeon: Jesus Boston MD;  Location: [facility] OR    INSERT PORT VASCULAR ACCESS Right 03/31/2022    Procedure:  SINGLE LUMEN POWER PORT INSERTION, VASCULAR ACCESS;  Surgeon: Evens Aponte MD;  Location: [facility] OR    IR CHEST PORT PLACEMENT > 5 YRS OF AGE  03/31/2022    IR RENAL BIOPSY RIGHT  11/6/2023    KNEE SURGERY      LARYNGOSCOPY WITH BIOPSY(IES) N/A 12/5/2022    Procedure: LARYNGOSCOPY, WITH BIOPSY;  Surgeon: Jesus Boston MD;  Location: [facility] OR    TONSILLECTOMY             Review of systems.  CNS: There are no headaches, no blurred vision, no change in mental status,   ENT: There is no hearing loss.  Respiratory: There is no dyspnea cough or hemoptysis  Cardiac: There is no chest pain, orthopnea, PND, or ankle edema.  GI: There is no bright red blood per rectum, no hematemesis, no reflux, no diarrhea or constipation  Musculoskeletal: There are no specific joint pains or myalgias  : There is no urinary frequency, hematuria.  Constitutional: There is no fevers, night sweats, weight loss.  Endocrine: There is minimal fatigue  Neuro: There is no tingling or numbness in the hands or feet.  Hematologic: There is no gingival bleeding, epistaxis, or easy bruisability.  Dermatologic: There is no skin rash.  A 14 point review of systems is otherwise negative.          Physical Exam    /72   Pulse 92   Temp 99.1  F (37.3  C) (Tympanic)   Resp 16   Wt 110.7 kg (244 lb)   SpO2 93%   BMI 34.03 kg/m        GENERAL: Alert and oriented to time place and person. Seated comfortably. In no distress.    HEAD: Atraumatic and normocephalic.    EYES: YON, EOMI.  There is bilateral conjunctival erythema right greater than left.    Oral cavity: no mucosal lesion or tonsillar enlargement.    NECK: supple. JVP normal.  No thyroid enlargement.  There are postsurgical changes in the left neck with significant scarring limited range of motion with no obvious palpable lymph nodes    LYMPH NODES: There are no palpable cervical, supraclavicular, axillary, or inguinal nodes.    LUNGS: clear to auscultation bilaterally.  Resonant  to percussion throughout bilaterally.  Symmetrical breath movements bilaterally.    HEART: S1 and S2 are heard. Regular rate and rhythm.  No murmur or gallop or rub heard.  No peripheral edema.    ABDOMEN: Soft. Not tender. Not distended.  No palpable hepatomegaly or splenomegaly.  No other mass palpable.  Bowel sounds heard.    EXTREMITIES: There is no ankle edema.  SKIN: no rash, or bruising or purpura.    NEURO: Grossly non-focal.    Lab Results    Recent Results (from the past 240 hour(s))   Comprehensive metabolic panel    Collection Time: 04/03/24 12:42 PM   Result Value Ref Range    Sodium 140 135 - 145 mmol/L    Potassium 3.8 3.4 - 5.3 mmol/L    Carbon Dioxide (CO2) 28 22 - 29 mmol/L    Anion Gap 10 7 - 15 mmol/L    Urea Nitrogen 14.5 8.0 - 23.0 mg/dL    Creatinine 0.85 0.67 - 1.17 mg/dL    GFR Estimate >90 >60 mL/min/1.73m2    Calcium 9.2 8.8 - 10.2 mg/dL    Chloride 102 98 - 107 mmol/L    Glucose 162 (H) 70 - 99 mg/dL    Alkaline Phosphatase 80 40 - 150 U/L    AST 36 0 - 45 U/L    ALT 30 0 - 70 U/L    Protein Total 7.2 6.4 - 8.3 g/dL    Albumin 3.9 3.5 - 5.2 g/dL    Bilirubin Total 0.6 <=1.2 mg/dL   Lactate Dehydrogenase    Collection Time: 04/03/24 12:42 PM   Result Value Ref Range    Lactate Dehydrogenase 172 0 - 250 U/L   CBC with platelets and differential    Collection Time: 04/03/24 12:42 PM   Result Value Ref Range    WBC Count 4.8 4.0 - 11.0 10e3/uL    RBC Count 4.68 4.40 - 5.90 10e6/uL    Hemoglobin 12.7 (L) 13.3 - 17.7 g/dL    Hematocrit 40.3 40.0 - 53.0 %    MCV 86 78 - 100 fL    MCH 27.1 26.5 - 33.0 pg    MCHC 31.5 31.5 - 36.5 g/dL    RDW 13.9 10.0 - 15.0 %    Platelet Count 157 150 - 450 10e3/uL    % Neutrophils 66 %    % Lymphocytes 21 %    % Monocytes 9 %    % Eosinophils 3 %    % Basophils 0 %    % Immature Granulocytes 0 %    NRBCs per 100 WBC 0 <1 /100    Absolute Neutrophils 3.2 1.6 - 8.3 10e3/uL    Absolute Lymphocytes 1.0 0.8 - 5.3 10e3/uL    Absolute Monocytes 0.4 0.0 - 1.3 10e3/uL     Absolute Eosinophils 0.1 0.0 - 0.7 10e3/uL    Absolute Basophils 0.0 0.0 - 0.2 10e3/uL    Absolute Immature Granulocytes 0.0 <=0.4 10e3/uL    Absolute NRBCs 0.0 10e3/uL       Imaging    No results found.    Assessment and Plan: #1: Stage Shannan nasopharyngeal carcinoma p16 negative EBV ER positive:Status post induction chemotherapy with gemcitabine/carboplatin x 3 cycles followed by chemo radiation therapy with weekly carboplatin followed by salvage left modified radical dissection performed at the [facility] with residual disease noted on posttreatment PET scan patient was PD-L1 positive.  Pembrolizumab under direction of Dr. Dariela Melvin at [facility] status post 7 cycles of pembrolizumab with at least a partial response on imaging.  He was treated with 1 more cycle of pembrolizumab here in [place].  Developed subsequent cytokine release syndrome manifested by fever, renal failure, anemia autoimmune arthritis with elevated CRP.  Pembrolizumab was stopped and he  was treated with steroids with improvement in CRP PET scan subsequently indicated complete response .  ENT exam was negative.  PET scan indicated uptake in a 7 mm left cervical node thought to be reactive. CT neck confirmed a 6 mm necrotic lymph node that was stable compared to previous CT done in September 2023 with incidental finding of left internal jugular vein partial occlusive thrombosis currently on Eliquis. patient was seen by Dr. Bran of ENT who felt this lymph node could not be palpated he felt would be of difficult to biopsy at this point and recommended repeat PET scan in3 months nonetheless the patient would like to get off Eliquis We will obtain D-dimer as well as as a CT of the neck in approximately 6 weeks,we will also obtain MRI of the neck to further define this lymph node if above workup negative then  we would likely stop the Eliquis probablyin 7 weeks and order a PET scan to be done subsequently in June.  Time  spent: 81 minutes was spent on this patient visit: We spent a voluminous amount of time reviewing imaging results with radiology with radiologist Dr. Maria ,we discussed imaging results as well as lab results with the patient, performing history/physical, with documented history/physical and ordered follow-up labs as well as imaging including CT neck and MRI of the neck.  As well as lab work and follow-up appointments.    Cancer Staging   No matching staging information was found for the patient.        Signed by: Chriss Martin MD    CC: Chriss Martin MD

## 2024-04-03 NOTE — NURSING NOTE
"Oncology Rooming Note    April 3, 2024 12:50 PM   Jareth Gallardo is a 63 year old male who presents for:    Chief Complaint   Patient presents with    Oncology Clinic Visit     F/U Nasopharyngeal cancer     Initial Vitals: There were no vitals taken for this visit. Estimated body mass index is 34.45 kg/m  as calculated from the following:    Height as of 2/21/24: 1.803 m (5' 11\").    Weight as of 2/21/24: 112 kg (247 lb). There is no height or weight on file to calculate BSA.  Data Unavailable Comment: Data Unavailable   No LMP for male patient.  Allergies reviewed: Yes  Medications reviewed: Yes    Medications: Medication refills not needed today.  Pharmacy name entered into [employer]: [facility] [id number] [place] [address]    Frailty Screening:   Is the patient here for a new oncology consult visit in cancer care? 2. No      Clinical concerns: Review problem of clot in port from last visit.  Dr Martin was notified.      Ange Rizzo CMA ([facility])  "

## 2024-04-03 NOTE — NURSING NOTE
Infusion Nursing Note:  Jareth Gallardo presents today for Vitamin B12 Injection.    Patient seen by provider today: Yes: Chriss Martin MD   present during visit today: Not Applicable.    Note: N/A.      Intravenous Access:  Labs drawn without difficulty by .    Treatment Conditions:  Not Applicable.      Post Infusion Assessment:  Patient tolerated injection without incident to R deltoid.       Discharge Plan:   Discharge instructions reviewed with: Patient.  Patient and/or family verbalized understanding of discharge instructions and all questions answered.  Copy of AVS declined.  Patient will return 4/17/24 for next appointment. Scheduling request sent to .  AVS to patient via [employer].    Patient discharged in stable condition accompanied by: self.  Departure Mode: Ambulatory.      Jany Jenkins RN

## 2024-04-08 ENCOUNTER — MYC MEDICAL ADVICE (OUTPATIENT)
Dept: FAMILY MEDICINE | Facility: OTHER | Age: 64
End: 2024-04-08
Payer: COMMERCIAL

## 2024-04-08 DIAGNOSIS — E87.70 HYPERVOLEMIA, UNSPECIFIED HYPERVOLEMIA TYPE: ICD-10-CM

## 2024-04-08 NOTE — TELEPHONE ENCOUNTER
Lasix historically reported.     Clarifying question(s) sent to patient.      Herminia Silva RN on 4/8/2024 at 3:35 PM

## 2024-04-09 RX ORDER — FUROSEMIDE 20 MG
TABLET ORAL
Qty: 135 TABLET | Refills: 3 | Status: SHIPPED | OUTPATIENT
Start: 2024-04-09

## 2024-04-09 NOTE — TELEPHONE ENCOUNTER
Lasix john paul'd up with instructions from patient.     TYP out until 5/7.  Sending to CP.     Herminia Silva RN on 4/9/2024 at 9:52 AM

## 2024-04-11 ENCOUNTER — TRANSFERRED RECORDS (OUTPATIENT)
Dept: MULTI SPECIALTY CLINIC | Facility: CLINIC | Age: 64
End: 2024-04-11

## 2024-04-11 LAB — RETINOPATHY: NORMAL

## 2024-04-17 ENCOUNTER — HOSPITAL ENCOUNTER (OUTPATIENT)
Dept: INFUSION THERAPY | Facility: OTHER | Age: 64
Discharge: HOME | End: 2024-04-17
Attending: INTERNAL MEDICINE | Admitting: INTERNAL MEDICINE
Payer: COMMERCIAL

## 2024-04-17 DIAGNOSIS — R79.89 LOW VITAMIN B12 LEVEL: Primary | ICD-10-CM

## 2024-04-17 PROCEDURE — 96372 THER/PROPH/DIAG INJ SC/IM: CPT | Performed by: INTERNAL MEDICINE

## 2024-04-17 PROCEDURE — 250N000011 HC RX IP 250 OP 636: Performed by: INTERNAL MEDICINE

## 2024-04-17 RX ORDER — ALBUTEROL SULFATE 90 UG/1
1-2 AEROSOL, METERED RESPIRATORY (INHALATION)
Status: CANCELLED
Start: 2024-04-18

## 2024-04-17 RX ORDER — MEPERIDINE HYDROCHLORIDE 50 MG/ML
25 INJECTION INTRAMUSCULAR; INTRAVENOUS; SUBCUTANEOUS EVERY 30 MIN PRN
Status: CANCELLED | OUTPATIENT
Start: 2024-04-18

## 2024-04-17 RX ORDER — DIPHENHYDRAMINE HYDROCHLORIDE 50 MG/ML
50 INJECTION INTRAMUSCULAR; INTRAVENOUS
Status: CANCELLED
Start: 2024-04-18

## 2024-04-17 RX ORDER — METHYLPREDNISOLONE SODIUM SUCCINATE 125 MG/2ML
125 INJECTION, POWDER, LYOPHILIZED, FOR SOLUTION INTRAMUSCULAR; INTRAVENOUS
Status: CANCELLED
Start: 2024-04-18

## 2024-04-17 RX ORDER — CYANOCOBALAMIN 1000 UG/ML
1000 INJECTION, SOLUTION INTRAMUSCULAR; SUBCUTANEOUS ONCE
Status: CANCELLED
Start: 2024-04-18 | End: 2024-04-18

## 2024-04-17 RX ORDER — EPINEPHRINE 1 MG/ML
0.3 INJECTION, SOLUTION, CONCENTRATE INTRAVENOUS EVERY 5 MIN PRN
Status: CANCELLED | OUTPATIENT
Start: 2024-04-18

## 2024-04-17 RX ORDER — ALBUTEROL SULFATE 0.83 MG/ML
2.5 SOLUTION RESPIRATORY (INHALATION)
Status: CANCELLED | OUTPATIENT
Start: 2024-04-18

## 2024-04-17 RX ORDER — CYANOCOBALAMIN 1000 UG/ML
1000 INJECTION, SOLUTION INTRAMUSCULAR; SUBCUTANEOUS ONCE
Status: COMPLETED | OUTPATIENT
Start: 2024-04-17 | End: 2024-04-17

## 2024-04-17 RX ADMIN — CYANOCOBALAMIN 1000 MCG: 1000 INJECTION, SOLUTION INTRAMUSCULAR; SUBCUTANEOUS at 13:19

## 2024-04-17 NOTE — NURSING NOTE
Infusion Nursing Note:  Jareth Gallardo presents today for B-12 injection.    Patient seen by provider today: No   present during visit today: Not Applicable.    Note: N/A.      Intravenous Access:  No Intravenous access/labs at this visit.    Treatment Conditions:  Not Applicable.      Post Infusion Assessment:  Patient tolerated injection without incident.  The following medication was given:     MEDICATION: Vitamin B12  1000mcg  ROUTE: IM  SITE: Deltoid - Right  DOSE: 1,000 mcg  LOT #: [id number]  :  [employer]  EXPIRATION DATE:  10/31/2025.       Discharge Plan:   Discharge instructions reviewed with: Patient.  Patient and/or family verbalized understanding of discharge instructions and all questions answered.  AVS to patient via [employer].  Patient will return 2 weeks for next appointment.   Patient discharged in stable condition accompanied by: self.  Departure Mode: Ambulatory.      Dariela Small RN

## 2024-05-02 ENCOUNTER — LAB (OUTPATIENT)
Dept: LAB | Facility: OTHER | Age: 64
End: 2024-05-02
Attending: INTERNAL MEDICINE
Payer: COMMERCIAL

## 2024-05-02 ENCOUNTER — HOSPITAL ENCOUNTER (OUTPATIENT)
Dept: MRI IMAGING | Facility: OTHER | Age: 64
Discharge: HOME | End: 2024-05-02
Attending: INTERNAL MEDICINE
Payer: COMMERCIAL

## 2024-05-02 ENCOUNTER — HOSPITAL ENCOUNTER (OUTPATIENT)
Dept: INFUSION THERAPY | Facility: OTHER | Age: 64
Discharge: HOME | End: 2024-05-02
Attending: INTERNAL MEDICINE
Payer: COMMERCIAL

## 2024-05-02 DIAGNOSIS — C11.9 NASOPHARYNGEAL CANCER (H): ICD-10-CM

## 2024-05-02 DIAGNOSIS — I82.890 LEFT JUGULAR VEIN THROMBOSIS: ICD-10-CM

## 2024-05-02 DIAGNOSIS — R79.89 LOW VITAMIN B12 LEVEL: Primary | ICD-10-CM

## 2024-05-02 LAB
ALBUMIN SERPL BCG-MCNC: 4.2 G/DL (ref 3.5–5.2)
ALP SERPL-CCNC: 85 U/L (ref 40–150)
ALT SERPL W P-5'-P-CCNC: 27 U/L (ref 0–70)
ANION GAP SERPL CALCULATED.3IONS-SCNC: 10 MMOL/L (ref 7–15)
AST SERPL W P-5'-P-CCNC: 41 U/L (ref 0–45)
BASOPHILS # BLD AUTO: 0 10E3/UL (ref 0–0.2)
BASOPHILS NFR BLD AUTO: 0 %
BILIRUB SERPL-MCNC: 0.7 MG/DL
BUN SERPL-MCNC: 18.7 MG/DL (ref 8–23)
CALCIUM SERPL-MCNC: 9.2 MG/DL (ref 8.8–10.2)
CHLORIDE SERPL-SCNC: 100 MMOL/L (ref 98–107)
CREAT SERPL-MCNC: 0.89 MG/DL (ref 0.67–1.17)
CRP SERPL-MCNC: 5.02 MG/L
D DIMER PPP FEU-MCNC: 0.34 UG/ML FEU (ref 0–0.5)
DEPRECATED HCO3 PLAS-SCNC: 30 MMOL/L (ref 22–29)
EGFRCR SERPLBLD CKD-EPI 2021: >90 ML/MIN/1.73M2
EOSINOPHIL # BLD AUTO: 0.1 10E3/UL (ref 0–0.7)
EOSINOPHIL NFR BLD AUTO: 2 %
ERYTHROCYTE [DISTWIDTH] IN BLOOD BY AUTOMATED COUNT: 14.6 % (ref 10–15)
ERYTHROCYTE [SEDIMENTATION RATE] IN BLOOD BY WESTERGREN METHOD: 20 MM/HR (ref 0–20)
GLUCOSE SERPL-MCNC: 92 MG/DL (ref 70–99)
HCT VFR BLD AUTO: 40.3 % (ref 40–53)
HGB BLD-MCNC: 12.8 G/DL (ref 13.3–17.7)
HOLD SPECIMEN: NORMAL
IMM GRANULOCYTES # BLD: 0 10E3/UL
IMM GRANULOCYTES NFR BLD: 0 %
LDH SERPL L TO P-CCNC: 175 U/L (ref 0–250)
LYMPHOCYTES # BLD AUTO: 1.4 10E3/UL (ref 0.8–5.3)
LYMPHOCYTES NFR BLD AUTO: 29 %
MCH RBC QN AUTO: 27.4 PG (ref 26.5–33)
MCHC RBC AUTO-ENTMCNC: 31.8 G/DL (ref 31.5–36.5)
MCV RBC AUTO: 86 FL (ref 78–100)
MONOCYTES # BLD AUTO: 0.5 10E3/UL (ref 0–1.3)
MONOCYTES NFR BLD AUTO: 9 %
NEUTROPHILS # BLD AUTO: 2.9 10E3/UL (ref 1.6–8.3)
NEUTROPHILS NFR BLD AUTO: 59 %
NRBC # BLD AUTO: 0 10E3/UL
NRBC BLD AUTO-RTO: 0 /100
PLATELET # BLD AUTO: 192 10E3/UL (ref 150–450)
POTASSIUM SERPL-SCNC: 4 MMOL/L (ref 3.4–5.3)
PROT SERPL-MCNC: 7.8 G/DL (ref 6.4–8.3)
RBC # BLD AUTO: 4.68 10E6/UL (ref 4.4–5.9)
SODIUM SERPL-SCNC: 140 MMOL/L (ref 135–145)
WBC # BLD AUTO: 4.9 10E3/UL (ref 4–11)

## 2024-05-02 PROCEDURE — 86140 C-REACTIVE PROTEIN: CPT | Mod: ZL

## 2024-05-02 PROCEDURE — 250N000011 HC RX IP 250 OP 636: Performed by: INTERNAL MEDICINE

## 2024-05-02 PROCEDURE — 85025 COMPLETE CBC W/AUTO DIFF WBC: CPT | Mod: ZL

## 2024-05-02 PROCEDURE — 85652 RBC SED RATE AUTOMATED: CPT | Mod: ZL

## 2024-05-02 PROCEDURE — 82040 ASSAY OF SERUM ALBUMIN: CPT | Mod: ZL

## 2024-05-02 PROCEDURE — 83615 LACTATE (LD) (LDH) ENZYME: CPT | Mod: ZL

## 2024-05-02 PROCEDURE — 70543 MRI ORBT/FAC/NCK W/O &W/DYE: CPT

## 2024-05-02 PROCEDURE — 96372 THER/PROPH/DIAG INJ SC/IM: CPT | Performed by: INTERNAL MEDICINE

## 2024-05-02 PROCEDURE — 85379 FIBRIN DEGRADATION QUANT: CPT | Mod: ZL

## 2024-05-02 PROCEDURE — 255N000002 HC RX 255 OP 636: Mod: JZ | Performed by: INTERNAL MEDICINE

## 2024-05-02 PROCEDURE — 36415 COLL VENOUS BLD VENIPUNCTURE: CPT | Mod: ZL

## 2024-05-02 PROCEDURE — A9575 INJ GADOTERATE MEGLUMI 0.1ML: HCPCS | Mod: JZ | Performed by: INTERNAL MEDICINE

## 2024-05-02 RX ORDER — METHYLPREDNISOLONE SODIUM SUCCINATE 125 MG/2ML
125 INJECTION, POWDER, LYOPHILIZED, FOR SOLUTION INTRAMUSCULAR; INTRAVENOUS
Status: CANCELLED
Start: 2024-05-15

## 2024-05-02 RX ORDER — ALBUTEROL SULFATE 0.83 MG/ML
2.5 SOLUTION RESPIRATORY (INHALATION)
Status: CANCELLED | OUTPATIENT
Start: 2024-05-15

## 2024-05-02 RX ORDER — CYANOCOBALAMIN 1000 UG/ML
1000 INJECTION, SOLUTION INTRAMUSCULAR; SUBCUTANEOUS ONCE
Status: COMPLETED | OUTPATIENT
Start: 2024-05-02 | End: 2024-05-02

## 2024-05-02 RX ORDER — MEPERIDINE HYDROCHLORIDE 50 MG/ML
25 INJECTION INTRAMUSCULAR; INTRAVENOUS; SUBCUTANEOUS EVERY 30 MIN PRN
Status: CANCELLED | OUTPATIENT
Start: 2024-05-15

## 2024-05-02 RX ORDER — ALBUTEROL SULFATE 90 UG/1
1-2 AEROSOL, METERED RESPIRATORY (INHALATION)
Status: CANCELLED
Start: 2024-05-15

## 2024-05-02 RX ORDER — EPINEPHRINE 1 MG/ML
0.3 INJECTION, SOLUTION, CONCENTRATE INTRAVENOUS EVERY 5 MIN PRN
Status: CANCELLED | OUTPATIENT
Start: 2024-05-15

## 2024-05-02 RX ORDER — GADOTERATE MEGLUMINE 376.9 MG/ML
20 INJECTION INTRAVENOUS ONCE
Status: COMPLETED | OUTPATIENT
Start: 2024-05-02 | End: 2024-05-02

## 2024-05-02 RX ORDER — CYANOCOBALAMIN 1000 UG/ML
1000 INJECTION, SOLUTION INTRAMUSCULAR; SUBCUTANEOUS ONCE
Status: CANCELLED
Start: 2024-05-15 | End: 2024-05-15

## 2024-05-02 RX ORDER — DIPHENHYDRAMINE HYDROCHLORIDE 50 MG/ML
50 INJECTION INTRAMUSCULAR; INTRAVENOUS
Status: CANCELLED
Start: 2024-05-15

## 2024-05-02 RX ADMIN — GADOTERATE MEGLUMINE 20 ML: 376.9 INJECTION INTRAVENOUS at 13:13

## 2024-05-02 RX ADMIN — CYANOCOBALAMIN 1000 MCG: 1000 INJECTION, SOLUTION INTRAMUSCULAR; SUBCUTANEOUS at 13:39

## 2024-05-08 ENCOUNTER — HOSPITAL ENCOUNTER (OUTPATIENT)
Dept: CT IMAGING | Facility: OTHER | Age: 64
Discharge: HOME | End: 2024-05-08
Attending: INTERNAL MEDICINE | Admitting: INTERNAL MEDICINE
Payer: COMMERCIAL

## 2024-05-08 DIAGNOSIS — I82.890 LEFT JUGULAR VEIN THROMBOSIS: ICD-10-CM

## 2024-05-08 DIAGNOSIS — C11.9 NASOPHARYNGEAL CANCER (H): ICD-10-CM

## 2024-05-08 PROCEDURE — 250N000011 HC RX IP 250 OP 636: Performed by: INTERNAL MEDICINE

## 2024-05-08 PROCEDURE — 70491 CT SOFT TISSUE NECK W/DYE: CPT

## 2024-05-08 PROCEDURE — 250N000011 HC RX IP 250 OP 636: Performed by: RADIOLOGY

## 2024-05-08 RX ORDER — IOPAMIDOL 755 MG/ML
100 INJECTION, SOLUTION INTRAVASCULAR ONCE
Status: COMPLETED | OUTPATIENT
Start: 2024-05-08 | End: 2024-05-08

## 2024-05-08 RX ORDER — HEPARIN SODIUM (PORCINE) LOCK FLUSH IV SOLN 100 UNIT/ML 100 UNIT/ML
500 SOLUTION INTRAVENOUS ONCE
Status: COMPLETED | OUTPATIENT
Start: 2024-05-08 | End: 2024-05-08

## 2024-05-08 RX ADMIN — HEPARIN 500 UNITS: 100 SYRINGE at 09:37

## 2024-05-08 RX ADMIN — IOPAMIDOL 100 ML: 755 INJECTION, SOLUTION INTRAVENOUS at 09:45

## 2024-05-08 NOTE — PROGRESS NOTES
Patient's implanted port was accessed with a non-coring simms needle by a trained RN per hospital policy/procedure. Blood return normal. Site noted to be Normal. Patient tolerated well.     Following imaging exam, implanted port was flushed with 10 mL sterile normal saline and 500 units (5 mLs) heparin. Site was deaccessed by a trained RN per hospital policy/procedure. Site noted to be Normal. Patient tolerated well.     Site covered with dry, sterile dressing.     See flowsheet and MAR for details.     Annamarie Hannah RN on 5/8/2024 at 9:43 AM

## 2024-05-14 ENCOUNTER — OFFICE VISIT (OUTPATIENT)
Dept: FAMILY MEDICINE | Facility: OTHER | Age: 64
End: 2024-05-14
Attending: FAMILY MEDICINE
Payer: COMMERCIAL

## 2024-05-14 ENCOUNTER — HOSPITAL ENCOUNTER (OUTPATIENT)
Dept: INFUSION THERAPY | Facility: OTHER | Age: 64
Discharge: HOME | End: 2024-05-14
Attending: INTERNAL MEDICINE
Payer: COMMERCIAL

## 2024-05-14 VITALS
DIASTOLIC BLOOD PRESSURE: 66 MMHG | SYSTOLIC BLOOD PRESSURE: 114 MMHG | BODY MASS INDEX: 35.59 KG/M2 | WEIGHT: 254.2 LBS | HEIGHT: 71 IN | TEMPERATURE: 97.9 F | HEART RATE: 80 BPM | OXYGEN SATURATION: 96 % | RESPIRATION RATE: 18 BRPM
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
## 2024-05-14 DIAGNOSIS — C11.9 NASOPHARYNGEAL CANCER (H): ICD-10-CM

## 2024-05-14 DIAGNOSIS — E11.9 TYPE 2 DIABETES, HBA1C GOAL < 7% (H): Primary | Chronic | ICD-10-CM

## 2024-05-14 DIAGNOSIS — R79.89 LOW VITAMIN B12 LEVEL: Primary | ICD-10-CM

## 2024-05-14 PROBLEM — R00.0 SINUS TACHYCARDIA: Status: RESOLVED | Noted: 2023-10-26 | Resolved: 2024-05-14

## 2024-05-14 PROBLEM — R50.9 FEVER OF UNKNOWN ORIGIN: Status: RESOLVED | Noted: 2023-10-26 | Resolved: 2024-05-14

## 2024-05-14 PROBLEM — N17.0 ACUTE KIDNEY FAILURE WITH TUBULAR NECROSIS (H): Status: RESOLVED | Noted: 2023-11-03 | Resolved: 2024-05-14

## 2024-05-14 PROBLEM — N17.9 ACUTE KIDNEY INJURY (H): Status: RESOLVED | Noted: 2023-11-01 | Resolved: 2024-05-14

## 2024-05-14 LAB — HBA1C MFR BLD: 6 % (ref 4–6.2)

## 2024-05-14 PROCEDURE — 99213 OFFICE O/P EST LOW 20 MIN: CPT | Performed by: FAMILY MEDICINE

## 2024-05-14 PROCEDURE — 36415 COLL VENOUS BLD VENIPUNCTURE: CPT | Mod: ZL | Performed by: FAMILY MEDICINE

## 2024-05-14 PROCEDURE — 83036 HEMOGLOBIN GLYCOSYLATED A1C: CPT | Mod: ZL | Performed by: FAMILY MEDICINE

## 2024-05-14 PROCEDURE — 250N000011 HC RX IP 250 OP 636: Performed by: INTERNAL MEDICINE

## 2024-05-14 PROCEDURE — 96372 THER/PROPH/DIAG INJ SC/IM: CPT | Performed by: INTERNAL MEDICINE

## 2024-05-14 RX ORDER — METHYLPREDNISOLONE SODIUM SUCCINATE 125 MG/2ML
125 INJECTION, POWDER, LYOPHILIZED, FOR SOLUTION INTRAMUSCULAR; INTRAVENOUS
Status: CANCELLED
Start: 2024-05-16

## 2024-05-14 RX ORDER — DIPHENHYDRAMINE HYDROCHLORIDE 50 MG/ML
50 INJECTION INTRAMUSCULAR; INTRAVENOUS
Status: CANCELLED
Start: 2024-05-16

## 2024-05-14 RX ORDER — NEOMYCIN SULFATE, POLYMYXIN B SULFATE, AND DEXAMETHASONE 3.5; 10000; 1 MG/G; [USP'U]/G; MG/G
OINTMENT OPHTHALMIC
COMMUNITY
Start: 2024-04-11

## 2024-05-14 RX ORDER — MEPERIDINE HYDROCHLORIDE 50 MG/ML
25 INJECTION INTRAMUSCULAR; INTRAVENOUS; SUBCUTANEOUS EVERY 30 MIN PRN
Status: CANCELLED | OUTPATIENT
Start: 2024-05-16

## 2024-05-14 RX ORDER — CYANOCOBALAMIN 1000 UG/ML
1000 INJECTION, SOLUTION INTRAMUSCULAR; SUBCUTANEOUS ONCE
Status: CANCELLED
Start: 2024-05-16 | End: 2024-05-16

## 2024-05-14 RX ORDER — EPINEPHRINE 1 MG/ML
0.3 INJECTION, SOLUTION, CONCENTRATE INTRAVENOUS EVERY 5 MIN PRN
Status: CANCELLED | OUTPATIENT
Start: 2024-05-16

## 2024-05-14 RX ORDER — ALBUTEROL SULFATE 0.83 MG/ML
2.5 SOLUTION RESPIRATORY (INHALATION)
Status: CANCELLED | OUTPATIENT
Start: 2024-05-16

## 2024-05-14 RX ORDER — CYANOCOBALAMIN 1000 UG/ML
1000 INJECTION, SOLUTION INTRAMUSCULAR; SUBCUTANEOUS ONCE
Status: COMPLETED | OUTPATIENT
Start: 2024-05-14 | End: 2024-05-14

## 2024-05-14 RX ORDER — ALBUTEROL SULFATE 90 UG/1
1-2 AEROSOL, METERED RESPIRATORY (INHALATION)
Status: CANCELLED
Start: 2024-05-16

## 2024-05-14 RX ADMIN — CYANOCOBALAMIN 1000 MCG: 1000 INJECTION, SOLUTION INTRAMUSCULAR; SUBCUTANEOUS at 12:12

## 2024-05-14 ASSESSMENT — PAIN SCALES - GENERAL: PAINLEVEL: NO PAIN (1)

## 2024-05-14 NOTE — NURSING NOTE
Patient is here for a follow up on diabetes, and would also like to have area on left side back/ under arm checked, found a tick attached Sunday.  Has not been taking any diabetic medication since last appt.     Previous A1C is at goal of <8  Lab Results   Component Value Date    A1C 5.4 02/08/2024     Urine Microalbumin/Creat:    Albumin Urine mg/L   Date Value Ref Range Status   02/08/2024 <12.0 mg/L Final     Comment:     The reference ranges have not been established in urine albumin. The results should be integrated into the clinical context for interpretation.     Albumin Urine mg/g Cr   Date Value Ref Range Status   02/08/2024   Final     Comment:     Unable to calculate, urine albumin and/or urine creatinine is outside detectable limits.  Microalbuminuria is defined as an albumin:creatinine ratio of 17 to 299 for males and 25 to 299 for females. A ratio of albumin:creatinine of 300 or higher is indicative of overt proteinuria.  Due to biologic variability, positive results should be confirmed by a second, first-morning random or 24-hour timed urine specimen. If there is discrepancy, a third specimen is recommended. When 2 out of 3 results are in the microalbuminuria range, this is evidence for incipient nephropathy and warrants increased efforts at glucose control, blood pressure control, and institution of therapy with an angiotensin-converting-enzyme (ACE) inhibitor (if the patient can tolerate it).       Creatinine Urine mg/dL   Date Value Ref Range Status   02/08/2024 88.1 mg/dL Final     Comment:     The reference ranges have not been established in urine creatinine. The results should be integrated into the clinical context for interpretation.   03/22/2022 206 mg/dL Final     Foot exam 2/2024  Eye exam 3/2023    Tobacco User no  Patient is not on a daily aspirin  Patient is on a Statin.  Blood pressure today of:     BP Readings from Last 1 Encounters:   05/14/24 114/66      is at the goal of <139/89  for diabetics.    Rebeca Minaya LPN on 5/14/2024 at 11:28 AM

## 2024-05-15 ENCOUNTER — ONCOLOGY VISIT (OUTPATIENT)
Dept: ONCOLOGY | Facility: OTHER | Age: 64
End: 2024-05-15
Attending: INTERNAL MEDICINE
Payer: COMMERCIAL

## 2024-05-15 VITALS
BODY MASS INDEX: 35.43 KG/M2 | RESPIRATION RATE: 16 BRPM | DIASTOLIC BLOOD PRESSURE: 74 MMHG | WEIGHT: 254 LBS | SYSTOLIC BLOOD PRESSURE: 118 MMHG | OXYGEN SATURATION: 97 % | HEART RATE: 78 BPM | TEMPERATURE: 98.6 F
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
## 2024-05-15 DIAGNOSIS — C11.9 NASOPHARYNGEAL CANCER (H): ICD-10-CM

## 2024-05-15 DIAGNOSIS — R79.89 LOW VITAMIN B12 LEVEL: ICD-10-CM

## 2024-05-15 DIAGNOSIS — D89.839 CYTOKINE RELEASE SYNDROME: ICD-10-CM

## 2024-05-15 DIAGNOSIS — I82.890 LEFT JUGULAR VEIN THROMBOSIS: ICD-10-CM

## 2024-05-15 DIAGNOSIS — D50.9 IRON DEFICIENCY ANEMIA, UNSPECIFIED IRON DEFICIENCY ANEMIA TYPE: ICD-10-CM

## 2024-05-15 PROCEDURE — 36415 COLL VENOUS BLD VENIPUNCTURE: CPT | Mod: ZL | Performed by: INTERNAL MEDICINE

## 2024-05-15 PROCEDURE — 99417 PROLNG OP E/M EACH 15 MIN: CPT | Performed by: INTERNAL MEDICINE

## 2024-05-15 PROCEDURE — 99215 OFFICE O/P EST HI 40 MIN: CPT | Performed by: INTERNAL MEDICINE

## 2024-05-15 PROCEDURE — 86618 LYME DISEASE ANTIBODY: CPT | Mod: ZL | Performed by: INTERNAL MEDICINE

## 2024-05-15 ASSESSMENT — PAIN SCALES - GENERAL: PAINLEVEL: NO PAIN (0)

## 2024-05-15 NOTE — NURSING NOTE
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    Weight as of this encounter: 115.2 kg (254 lb). Body surface area is 2.4 meters squared.  No Pain (0) Comment: Data Unavailable   No LMP for male patient.  Allergies reviewed: Yes  Medications reviewed: Yes    Medications: Medication refills not needed today.  Pharmacy name entered into [facility]: [id number] [place] [address]    Frailty Screening:   Is the patient here for a new oncology consult visit in cancer care? 2. No      Clinical concerns: No       Ange Rizzo CMA (AAMA)  "

## 2024-05-15 NOTE — PATIENT INSTRUCTIONS
PET scan - Can be done in [place].  Schedule this first, but do it later in the morning as the MR Brain needs to be done first.  PET scans require you to follow a diet plan starting 24 hours before the appointment - See handout.     [facility] scheduling will contact you to arrange the MRI that has been ordered.  If you have not received a call in the next 2 weeks, please call them at [phone].  This MUST be done before the PET if you are doing them the same day.  Plan to have labs done on the day of your MRI.  You will need a lab only  appointment.    Follow up with Dr Martin about 1 week after all the above have been done.

## 2024-05-15 NOTE — PROGRESS NOTES
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 Melvin at the [facility] for ongoing treatment to be given locally.  Initially presented with left neck swelling and was seen by urgent care in the [place] area.  Was treated with antibiotics initially and then was ultimately treated for presumed cat scratch failure with antibiotics.  Symptoms worsened the point where he says he had swelling in his left shoulder.  CT neck was ordered and patient was referred to ENT but could not be seen immediately due to insurance reasons.  He was seen by a general surgeon by the name of Dr. 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  Patient was subsequently referred to Dr. Boston of  ENT at the [facility].  He reviewed the CT neck that was performed on December 9, 2021 and was read as extensive left-sided neck adenopathy with lymph nodes measuring up to 2.4 cm medial to the left sternocleidomastoid muscle just above the level of the hyoid bone.  A PET/CT was ordered and performed on March 9, 2022 and it was read as exuberant soft tissue thickening within the nasopharyngeal and oropharyngeal mucosa concerning for nasopharyngeal carcinoma.  Soft tissue nodular uptake was evident within the prevertebral soft tissues and about differential because of the epiglottis with extensive neck lymph node uptake.  There was  uptake within the lung parenchyma concerning for metastatic uptake of primary malignancy infectious or inflammatory cannot be excluded.  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  The case was presented at the tumor board and the feeling was that patient would require induction chemotherapy followed by chemoradiation and therapy.  It was felt that this regimen would improve recurrence free survival and overall survival as compared to chemo radiotherapy alone.  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  Tumor was also found to be PD-L1 positive at 20% using the :.  Patient was ultimately seen by Dr. Dariela Melvin medical oncology at [place] on March 22, 2022.  Patient did have a history of hearing loss and subsequently it was decided the patient be a candidate for arboplatin instead of cisplatin as induction therapy.  The plan was to treat with carboplatin and gemcitabine for 3 cycles and then restage in terms of the lung nodules they will be reevaluated after induction.  He was started on carboplatin gemcitabine and was treated between April 4, 2021 to until May 16, 2022.  He required Neulasta support due to neutropenia PET scan performed on May 31, 2022 revealed a partial response subsequently underwent chemoradiotherapy with weekly carboplatin and received radiation therapy at the [facility] under the direction of Dr. Skaggs.  PET scan done on November 16, 2022 revealed a residual 1.5 cm level 2 lymph node with an SUV of 28.7.  A 1 cm enhancing focus in the sternocleidomastoid muscle with an SUV of 22.4 there was a level 2A lymph node that on the left with decreased in size.  With decreased FDG avidity therewas a resolved left level 5 node node.  There was a new patchy groundglass opacity and nodule in the right upper lobe.  Which was felt to be inflammatory subseqre was residual disease on PET scan and  recurrence to be at high risk given the fact the patient was.  1 positive and the plan was to proceed with pembrolizumab.  Patient was started on pembrolizumab on March 9, 2023 under the direction of Dr. Norm Melvin cycle 3 was delayed 1 week due to apparent influenza patient had a good partial response he was seen in the emergency department at [facility] for fever and cough and the patient was treated for influenza.  We saw the patient in August 28, the plan was to initiate his next cycle of pembrolizumab here and is due to receive cycle 8 on August 30, he received that at the [facility].  And then was started on cycle 9 beginning September 20 receive cycle 10 on October 11.  He was scheduled to see pulmonology at [facility] tomorrow for evaluation groundglass nodules.  He was to follow-up with Dr. Melvin at the end of November with repeat imaging.  In the interim the patient became extremely ill and was seen in the emergency room on October 26 with complaints of shaking chills fever to 102 night sweats.  There was also an isolated episode of divergent vision with double vision ev entually resolved.  He did note multiple tick bites in the recent past as he was out in the woods.  D-dimer was elevated at 1.25 with a CTA of the chest was ordered and came back negative for pulmonary embolus.  COVID testing was negative.  He was empirically treated forpresumed tickborne illness initially with vancomycin and Zosyn admitted to [facility].  Further testing included included PCR testing for Ehrlichia as well as Anaplasma which was negative Lyme testing was negative.  Blood cultures were negative and subsequently discharged on October 28 on doxycycline he still had a temp of 102.3.  He comes in today still on doxycycline but still having spiking temps of over 102.  He also has shaking chills he had some loose stools this morning he did note urinary frequency  but now has hardly any urine output he  appears dehydrated denies any headaches he does report the isolated episode of double vision.  He does get an occasional cough he did note nausea and vomiting as well.  We saw him last in October 31.  Ordered labs and he was found to have BUN 41 creatinine 4.58 his CRP was elevated at 330 his sed rate was elevated 60 given the fact that he had acute renal failure refer the patient to emergency room to rule out obstructive uropathy versus checkpoint inhibitor toxicity renal ultrasound revealed n o evidence of obstruction.  His creatinine remained elevated at 4.59.  He was subsequently transferred to the [facility] and admitted on November 1 on admission his temp was 102.7. He underwent a renal biopsy on November 6, 2023 was a limited sample with only 4 glomeruli noted was read as acute interstitial nephritis moderate to severe, there was also focal acute tubular necrosis.  He was seen by nephrology by Dr. Blake Lynn.  The etiology of his renal failure could be possibly related to nephrotoxins NSAID vancomycin we could not rule out checkpoint inhibitor associated acute kidney injury due to pembrolizumab particularly in the setting of acute interstitial nephritis in the setting of fever and elevated inflammatory markers.  Infectious  disease performed a work-up but there was no evidence of infection temperature did improve over time.  Fungal cultures were negative, blood cultures were negative, respiratory panel was negative his neutropenia did resolve as well as thrombocytopenia his hemoglobin was low patient was noted to be hypotensive and was treated with normal saline was also noted to have a hemoglobin of 7.2 and required 1 unit of packed red cells CRP did improve nephrology felt patient was hypovolemic and recommended Lasix 40 mg daily patient was eventually discharged without a fever on November 7.  He was seen in follow-up by NATHAN Brock via via telehealth/video visit on November 10.She noted that his  "CRP was down to 78 but noted that he was having ongoin g joint pains and was using any NSAIDs she noted complaints of swelling of the right upper extremity and up Doppler venous ultrasound was negative for DVT.  She also noted that his magnesium was 1.1 and she is set up IV magnesium to be given in the emergency room which was given on November 11 she recommended continuing oral magnesium oxide.  Patient chyna rodriguez in today performance status is still relatively poor at 2 \"improved since his last visit.  He says his biggest problem right now states having joint pain and swelling of all his digits as well as the wrist of both hands.  He also has ongoing fatigue.  He denies any urinary symptoms.  His fevers have resolved.  He is rather frustrated as she [place] and would like to stay with our clinic on a regular basis. We had seen the patient on November 14.  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  Has CRP and sed rate was dropping off pembrolizumab he did have ongoing autoimmune arthritis we elected to start the patient on low-dose prednisone 10 mg p.o. twice daily for 2 weeks.  He said almost immediately on the next day      his joint symptoms improved and he felt overall much better with less fatigue.  We elected to estage the patient with a PET scan that was done on on November 22, 2023 and the findings were that there was no evidence of metastatic or recurrent disease involving head neck chest abdomen pelvis and thighs hypermetabolism that was in the left upper neck have resolved.  There is nonspecific soft tissue and estimate mesentery that was not hypermetabolic and was overall improved.  Patient recently also seen Dr. Melvin via telehealth on November 29 she noticed the patient did have a positive response on PET scan and felt that We could monitor the patient off therapy with pembrolizumab " recommend repeat imaging in 2 to 3 months.  She also agreed to have us manage patient locally but she would manage from a distance for further consultation.  Patient was B12 deficient we will start the patient on B12 1000 mcg IM monthly patient did complain of stiffness in the left neck with potential presyncope/syncope with blackout episode lasting 2 seconds Dr. Melvin ordered bilateral carotid ultrasound liver negative for stenosis.  Complaint of an episode with transient left vision loss but otherwise is doing much better off pembrolizumab.  Recommend the patient be seen by ENT by Dr. Bran  to confirm remission via endoscopic exam.  He did see the patient on January 9 and performed an endoscopic exam including flexible fiberoptic exam.  Did not find any evidence of recurrent nasopharyngeal mass there was no palpable adenopathy he felt the left neck was very hard and woody from previous surgery radiation and recommended an ENT exam every 2 months to monitor for recurrence.  We had also ordered a MRI of the neck was performed December 20 and the findings were that there was no acute abnormality there was no evidence of local recurrence of nasopharyngeal malignancy no evidence of metastatic disease.  Postsurgical changes of left neck cervical lymph node dissection but no pathologically enlarged lymph nodes.  MRI of the brain was also ordered and this was essentially negative there was some small volume of fluid within the mastoid cells that were nonspecific. He did have a PET scan performed on February 14 and the findings were there was a nonspecific single mildly hypermetabolic normal caliber left level 2B lymph node which may be reactive versus suspicious for early leny disease recurrence.  CT of the neck revealed stable enhancing necrotic lymph node deep to left sternocleidomastoid muscle measuring 6.5 mm there is unchanged compared to September 13, 2023 CT scan of neck.  There was also evidence of a  partially occlusive thrombus involving the right internal jugular superior to the intraluminal catheter.  We referred the patient to Dr. Bran for evaluation of PET scan findings.  On his exam he was unable to palpate a lymph node involving the left neck.  He felt the PET avid lesion was very small and would be difficult to actually hit with ultrasound directed needle biopsy felt we should repeat the PET scan in 3 months if there was progression of this lesion then we will consider proceeding with ultrasound-guided needle biopsy.  Patient comes in today he is essentially asymptomatic he says its at times he may get a twinge or discomfort in the left posterior neck.  He denies any palpable lymphadenopathy.  He still has dry oral mucosa.  He also is being followed by an optometrist and recently tried Visine eyedrops for his red eyes with this is not improving and advised to follow-up with his optometrist.  This has been a chronic problem for him.  Denies any change in bowel habits, no diarrhea or constipation.  There is no hematemesis melena or hematochezia.  There is no dysphagia or odynophagia.  There is no shortness of breath cough or hemoptysis.  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  He is currently on Eliquis 5 mg p.o. twice daily. Elected to pursue further imaging to evaluate his left cervical node noted on PET scan and also to assess jugular vein thrombosis.  MRI of the soft tissues of the neck was performed on May 2 and the findings were that there was a stable nodular hyperenhancement measuring 6.2 x 5.8 mm close no evidence of recurrent residual nasopharyngeal mass identified.  We also performed a CT of the neck on May 8, 2024 and the findings were that there was resolution of the right internal jugular deep vein thrombosis there was a stable small enhancing left cervical node measuring 6 mm that was unchanged compared to imaging performed in September 2023.   There are posttreatment changes in the neck without evidence of recurrent disease at the primary site.  Patient otherwise is doing well he says he is enjoying the outdoors and recently went on a 3 mile hike.  He also been seen by his primary care provider with complaints of a deer tick bite.  Conservative treatment was recommended with tick bite had occurred on May 12 otherwise the patient denies any significant fevers, night sweats or weight loss denies any abdominal pain any lymphadenopathy any abnormal or dysphagia overall he is doing well.              ______________________________________________________________________________    Past History    Past Medical History:   Diagnosis Date    COPD (chronic obstructive pulmonary disease) (H)     Dyslipidemia     Nasopharyngeal carcinoma (H)     BARRETT (obstructive sleep apnea)     Type 2 diabetes mellitus with diabetic nephropathy (H)        Past Surgical History:   Procedure Laterality Date    COLONOSCOPY      DISSECTION RADICAL NECK MODIFIED Left 12/5/2022    Procedure: left modified radical neck dissection;  Surgeon: Jesus Boston MD;  Location: [facility] OR    INSERT PORT VASCULAR ACCESS Right 03/31/2022    Procedure: SINGLE LUMEN POWER PORT INSERTION, VASCULAR ACCESS;  Surgeon: Evens Aponte MD;  [place] [facility] [place]    IR CHEST PORT PLACEMENT > 5 YRS OF AGE  03/31/2022    IR RENAL BIOPSY RIGHT  11/6/2023    KNEE SURGERY      LARYNGOSCOPY WITH BIOPSY(IES) N/A 12/5/2022    Procedure: LARYNGOSCOPY, WITH BIOPSY;  Surgeon: Jesus Boston MD;  Location: [facility] OR    TONSILLECTOMY             Review of systems.  CNS: There are no headaches, no blurred vision, no change in mental status,   ENT: There is no hearing loss.  Respiratory: There is no cough shortness of breath or hemoptysis  Cardiac: There is no chest pain, orthopnea, PND, or ankle edema.  GI: There is no bright red blood per rectum, no hematemesis, no reflux, no diarrhea or constipation  Musculoskeletal:  There is no fatigue or joint pains  : There is no urinary frequency, hematuria.  Constitutional: There is no fevers, night sweats, weight loss.  Endocrine: No fatigue  Neuro: No neuropathy  Hematologic: There is no gingival bleeding, epistaxis, or easy bruisability.  Dermatologic: There is no skin rash.  There is a tick Bite in the left upper back  A 14 point review of systems is otherwise negative.          Physical Exam    /74   Pulse 78   Temp 98.6  F (37  C) (Tympanic)   Resp 16   Wt 115.2 kg (254 lb)   SpO2 97%   BMI 35.43 kg/m        GENERAL: Alert and oriented to time place and person. Seated comfortably. In no distress.    HEAD: Atraumatic and normocephalic.    EYES: YON, EOMI.  No pallor.  No icterus.    Oral cavity: no mucosal lesion or tonsillar enlargement.    NECK: supple. JVP normal.  No thyroid enlargement.  There is postsurgical changes in the left neck with significant scarring limited range of motion of the neck there are no palpable lymph nodes    LYMPH NODES: There are no palpable cervical, supraclavicular, axillary, or inguinal nodes.    LUNGS: clear to auscultation bilaterally.    HEART: S1 and S2 are heard. Regular rate and rhythm.  No murmur or gallop or rub heard.    ABDOMEN: Soft. Not tender. Not distended.  No palpable hepatomegaly or splenomegaly.  No other mass palpable.  Bowel sounds heard.    EXTREMITIES: There is trace ankle edema    SKIN: no rash, or bruising or purpura.  There is evidence of a tick bite involving the left upper back just adjacent to the left axilla with minimal erythema noted no evidence of bull's-eye rash.    NEURO: Grossly non-focal.    Lab Results    Recent Results (from the past 240 hour(s))   Hemoglobin A1c    Collection Time: 05/14/24 11:52 AM   Result Value Ref Range    Hemoglobin A1C 6.0 4.0 - 6.2 %       Imaging    CT Soft Tissue Neck w Contrast    Result Date: 5/9/2024  EXAM: CT SOFT TISSUE NECK W CONTRAST 5/8/2024 9:44 AM HISTORY:  Nasopharyngeal cancer (H); Left jugular vein thrombosis   COMPARISON: CT neck 3/1/2024, 9/13/2024; CT neck PET 5/31/2022 Technique: Following intravenous administration of nonionic iodinated contrast medium, thin section helical CT images were obtained from the skull base down to the level of the aortic arch.  Axial, coronal and sagittal reformations were performed. Images were reviewed in soft tissue, lung and bone windows. CONTRAST: 100 ml isovue 370 FINDINGS: No evidence of recurrent disease at the primary site. There are no findings to suggest a second primary in the imaged aerodigestive tract. Postsurgical changes of the left neck with partial resection of left sternocleidomastoid and left neck dissection. Stable thickening of the left sternocleidomastoid and left platysma. Expected post-treatment changes are noted including effacement of fat planes, left greater than right, supraglottic mucosal edema and thickening of the skin and subcutaneous soft tissues. Stable size of enhancing left cervical level IIb focus deep measuring 6 x 5, this is unchanged dating back to CT neck 9/13/2023. No pathologically enlarged lymph nodes. Evaluation of the visualized portions of the brain, orbits, spine, and lungs show no aggressive lesion suspicious for metastatic involvement. The major salivary glands are unremarkable. Stable subcentimeter thyroid nodules. Left jugular vein is surgically absent. Right Port-A-Cath courses inferior to the field-of-view. Resolution of right internal jugular intraluminal thrombus located superior to the Port-A-Cath. Mild paranasal sinus mucosal thickening. The mastoid air cells are clear. The visualized lung apices are clear.     Impression: 1.  Expected post-treatment changes in the neck without evidence of recurrent disease at the primary site. 2.  Stable small enhancing left cervical lymph node deep to the left sternocleidomastoid, unchanged since 9/13/2023. Findings remain suspicious for  focus of residual or recurrent leny disease. Recommend continued attention on follow-up. 3.  Resolution of right internal jugular deep venous thrombosis. KIRIT FREEDMAN MD   SYSTEM ID:  [id number]    MR Soft Tissue Neck w/o & w Contrast    Result Date: 5/2/2024  PRE-AND POSTCONTRAST MR NECK CLINICAL HISTORY: Nasopharyngeal cancer (H); Left jugular vein thrombosis TECHNIQUE: Axial, coronal, and sagittal T1, axial and coronal T2 fat saturated, axial diffusion, and postcontrast axial, coronal, and sagittal T1 fat saturated images of the neck were obtained. 20 ML DOTAREM COMPARISON: PET/CT 2/14/2024 FINDINGS: In the area of hypermetabolism on the recent PET/CT, there is nodular hyperenhancement spanning 6.2 x 6.2 x 5.8 mm on series 13 image 25 and series 15 image 30. There is adjacent more plaque-like and ill-defined hyperenhancement spanning up to 16 x 17 x 11 mm. Mild edema is seen throughout the left jugulodigastric space extending anteriorly into the carotid space, nonspecific and potential a posttherapeutic. Multiple small right cervical lymph nodes are seen. An enhancing right thyroid nodule is chronic. No recurrent or residual nasopharyngeal mass is identified. The oropharynx, hypopharynx, and larynx are patent without asymmetric soft tissue.  The proximal trachea and cervical esophagus are unremarkable. Evaluation of the oral cavity is limited by artifact from dental amalgam.  The visualized portions of the oral tongue and floor of mouth are intact. No acute abnormalities are identified in the visualized portions of the brain parenchyma. Right mastoid fluid is noted. There are mild degenerative changes in the cervical spine.      IMPRESSION: 6 mm hyperenhancing nodule of the left neck, level IIb, corresponding with the focus of hypermetabolism on the prior PET/CT. This remains suspicious for a focus of residual or recurrent leny disease. Recommend follow-up. JUAN CARLOS MALCOLM MD   SYSTEM ID:  [id number]      Assessment and Plan: #1: Stage Shannan nasopharyngeal carcinoma p16 negative EBV ER positive:Status post induction chemotherapy with gemcitabine/carboplatin x 3 cycles followed by chemoradiation with weekly carboplatin followed by salvage left modified radical neck dissection performed [facility] with residual disease noted on posttreatment PET scan with PD-L1 positivity pembrolizumab was administered and directed by Dr. Dariela Melvin the emergency [place] status post 7 cycles of pembrolizumab with at least a partial response and imaging was treated with 1 more cycle of pembrolizumab here in [place] and developed subsequent cytokine release syndrome manifested by fever, Bauman failure autoimmune arthritis anemia elevated CRP pembrolizumab stopped and was treated with steroids but improvement in CRP levels PET scan subsequently indicated complete response ENT exam was negative PET scan indicated minimal uptake in a 7 mm left cervical node felt to be reactive recent imaging including CT neck indicates that this note was stable since September 2023 and likely is a reactive node ENT could not palpate her attempt a biopsy of this node as this was felt to be reactive therefore patient is currently in complete remission nonetheless would like to restage the patient with a PET scan at the end of June as well as MRI of the brain close follow-up with Dr. Bran after PET scan is performed.  2.  Left internal jugular vein thrombosis: Status post 3 months of Eliquis with resolution with negative D-dimer the plan is to discontinue Eliquis as he had a appropriate therapeutic response.  3.  Tick bite: Do not suspect Lyme's no evidence of cellulitis the plan at this point is to obtain Lyme screen today  Time spent: 80 minutes was spent on this patient visit, spent voluminous amount of time reviewing multiple imaging results MRI of the neck CT of the neck reviewed lab results, reviewed physician provider notes,  performing history and physical, with documented history physical, and ordered follow-up labs and imaging including PET scan and MRI brain.    Cancer Staging   No matching staging information was found for the patient.        Signed by: Chriss Martin MD    CC: Chriss Martin MD

## 2024-05-17 LAB — B BURGDOR IGG+IGM SER QL: 0.45

## 2024-05-29 ENCOUNTER — HOSPITAL ENCOUNTER (OUTPATIENT)
Dept: INFUSION THERAPY | Facility: OTHER | Age: 64
Discharge: HOME | End: 2024-05-29
Attending: INTERNAL MEDICINE | Admitting: INTERNAL MEDICINE
Payer: COMMERCIAL

## 2024-05-29 DIAGNOSIS — R79.89 LOW VITAMIN B12 LEVEL: Primary | ICD-10-CM

## 2024-05-29 PROCEDURE — 250N000011 HC RX IP 250 OP 636: Performed by: INTERNAL MEDICINE

## 2024-05-29 PROCEDURE — 96372 THER/PROPH/DIAG INJ SC/IM: CPT | Performed by: INTERNAL MEDICINE

## 2024-05-29 RX ORDER — ALBUTEROL SULFATE 90 UG/1
1-2 AEROSOL, METERED RESPIRATORY (INHALATION)
Status: CANCELLED
Start: 2024-05-30

## 2024-05-29 RX ORDER — EPINEPHRINE 1 MG/ML
0.3 INJECTION, SOLUTION, CONCENTRATE INTRAVENOUS EVERY 5 MIN PRN
Status: CANCELLED | OUTPATIENT
Start: 2024-05-30

## 2024-05-29 RX ORDER — CYANOCOBALAMIN 1000 UG/ML
1000 INJECTION, SOLUTION INTRAMUSCULAR; SUBCUTANEOUS ONCE
Status: COMPLETED | OUTPATIENT
Start: 2024-05-29 | End: 2024-05-29

## 2024-05-29 RX ORDER — MEPERIDINE HYDROCHLORIDE 50 MG/ML
25 INJECTION INTRAMUSCULAR; INTRAVENOUS; SUBCUTANEOUS EVERY 30 MIN PRN
Status: CANCELLED | OUTPATIENT
Start: 2024-05-30

## 2024-05-29 RX ORDER — CYANOCOBALAMIN 1000 UG/ML
1000 INJECTION, SOLUTION INTRAMUSCULAR; SUBCUTANEOUS ONCE
Status: CANCELLED
Start: 2024-05-30 | End: 2024-05-30

## 2024-05-29 RX ORDER — DIPHENHYDRAMINE HYDROCHLORIDE 50 MG/ML
50 INJECTION INTRAMUSCULAR; INTRAVENOUS
Status: CANCELLED
Start: 2024-05-30

## 2024-05-29 RX ORDER — ALBUTEROL SULFATE 0.83 MG/ML
2.5 SOLUTION RESPIRATORY (INHALATION)
Status: CANCELLED | OUTPATIENT
Start: 2024-05-30

## 2024-05-29 RX ORDER — METHYLPREDNISOLONE SODIUM SUCCINATE 125 MG/2ML
125 INJECTION, POWDER, LYOPHILIZED, FOR SOLUTION INTRAMUSCULAR; INTRAVENOUS
Status: CANCELLED
Start: 2024-05-30

## 2024-05-29 RX ADMIN — CYANOCOBALAMIN 1000 MCG: 1000 INJECTION, SOLUTION INTRAMUSCULAR; SUBCUTANEOUS at 13:06

## 2024-06-01 DIAGNOSIS — C11.9 NASOPHARYNGEAL CANCER (H): ICD-10-CM

## 2024-06-03 DIAGNOSIS — D50.9 IRON DEFICIENCY ANEMIA, UNSPECIFIED IRON DEFICIENCY ANEMIA TYPE: ICD-10-CM

## 2024-06-03 DIAGNOSIS — D89.839 CYTOKINE RELEASE SYNDROME: ICD-10-CM

## 2024-06-03 DIAGNOSIS — C11.9 NASOPHARYNGEAL CANCER (H): ICD-10-CM

## 2024-06-03 NOTE — TELEPHONE ENCOUNTER
Vitamin B6      Last Written Prescription Date:  1/16/24  Last Fill Quantity: 90,   # refills: 3  Last Office Visit: 5/15/24  Future Office visit:       Routing refill request to provider for review/approval because:

## 2024-06-03 NOTE — TELEPHONE ENCOUNTER
Ferosul      Last Written Prescription Date:  12/6/23  Last Fill Quantity: 90,   # refills: 1  Last Office Visit: 5/15/24  Future Office visit:       Routing refill request to provider for review/approval because:

## 2024-06-04 RX ORDER — LANOLIN ALCOHOL/MO/W.PET/CERES
100 CREAM (GRAM) TOPICAL DAILY
Qty: 180 TABLET | Refills: 4 | Status: SHIPPED | OUTPATIENT
Start: 2024-06-04

## 2024-06-05 RX ORDER — FERROUS SULFATE 325(65) MG
325 TABLET ORAL
Qty: 90 TABLET | Refills: 1 | Status: SHIPPED | OUTPATIENT
Start: 2024-06-05 | End: 2024-09-10

## 2024-06-11 ENCOUNTER — PATIENT OUTREACH (OUTPATIENT)
Dept: ONCOLOGY | Facility: OTHER | Age: 64
End: 2024-06-11
Payer: COMMERCIAL

## 2024-06-11 ENCOUNTER — OFFICE VISIT (OUTPATIENT)
Dept: OTOLARYNGOLOGY | Facility: OTHER | Age: 64
End: 2024-06-11
Attending: OTOLARYNGOLOGY
Payer: COMMERCIAL

## 2024-06-11 DIAGNOSIS — C11.9 NASOPHARYNGEAL CANCER (H): Primary | ICD-10-CM

## 2024-06-11 PROCEDURE — G0463 HOSPITAL OUTPT CLINIC VISIT: HCPCS

## 2024-06-11 NOTE — PROGRESS NOTES
[facility]: Cancer Care Follow-Up Note                                    Discussion with Patient:                                                      Jareth is doing well. He has no complaints. He would like someone to call his  at [phone]  He was seen today by Dr. Bran and told everything looks good.             Dates of Treatment:                                                      Infusion given in last 28 days       None            Assessment:                                                          RNCC Short Symptom Review:     Assessment completed with:: Patient    General/Short Assessment  Does the patient have all their medications?: Yes  Is the patient experiencing any new or worsening symptoms?: No  Discussion with patient: Answered patient's questions and addressed concerns;Reviewed how and when to contact clinic;Reviewed patient's future appointments    Pain  Patient Reported Pain?: No    Patient Coping  Accepting    Clinic Utilization  Patient Aware of Next Appointment?: Yes    Other Patient Concerns  Other Patient Reported Concerns: No    Intervention/Education provided during outreach:                                                           Patient to follow up as scheduled at next appt  Patient to call/[employer] message with updates  Confirmed patient has clinic and triage numbers    Signature:  Sybil Zamora RN

## 2024-06-12 ENCOUNTER — HOSPITAL ENCOUNTER (OUTPATIENT)
Dept: INFUSION THERAPY | Facility: OTHER | Age: 64
Discharge: HOME | End: 2024-06-12
Attending: INTERNAL MEDICINE
Payer: COMMERCIAL

## 2024-06-12 ENCOUNTER — HOSPITAL ENCOUNTER (OUTPATIENT)
Dept: MRI IMAGING | Facility: OTHER | Age: 64
Discharge: HOME | End: 2024-06-12
Attending: INTERNAL MEDICINE
Payer: COMMERCIAL

## 2024-06-12 DIAGNOSIS — C11.9 NASOPHARYNGEAL CANCER (H): ICD-10-CM

## 2024-06-12 DIAGNOSIS — R79.89 LOW VITAMIN B12 LEVEL: Primary | ICD-10-CM

## 2024-06-12 DIAGNOSIS — D89.839 CYTOKINE RELEASE SYNDROME: ICD-10-CM

## 2024-06-12 DIAGNOSIS — D50.9 IRON DEFICIENCY ANEMIA, UNSPECIFIED IRON DEFICIENCY ANEMIA TYPE: ICD-10-CM

## 2024-06-12 PROCEDURE — 250N000011 HC RX IP 250 OP 636: Mod: JZ | Performed by: INTERNAL MEDICINE

## 2024-06-12 PROCEDURE — 70553 MRI BRAIN STEM W/O & W/DYE: CPT

## 2024-06-12 PROCEDURE — 96372 THER/PROPH/DIAG INJ SC/IM: CPT | Performed by: INTERNAL MEDICINE

## 2024-06-12 PROCEDURE — 250N000011 HC RX IP 250 OP 636: Performed by: INTERNAL MEDICINE

## 2024-06-12 PROCEDURE — A9575 INJ GADOTERATE MEGLUMI 0.1ML: HCPCS | Mod: JZ | Performed by: INTERNAL MEDICINE

## 2024-06-12 PROCEDURE — 255N000002 HC RX 255 OP 636: Mod: JZ | Performed by: INTERNAL MEDICINE

## 2024-06-12 RX ORDER — HEPARIN SODIUM (PORCINE) LOCK FLUSH IV SOLN 100 UNIT/ML 100 UNIT/ML
500 SOLUTION INTRAVENOUS
Status: COMPLETED | OUTPATIENT
Start: 2024-06-12 | End: 2024-06-12

## 2024-06-12 RX ORDER — CYANOCOBALAMIN 1000 UG/ML
1000 INJECTION, SOLUTION INTRAMUSCULAR; SUBCUTANEOUS ONCE
Status: COMPLETED | OUTPATIENT
Start: 2024-06-12 | End: 2024-06-12

## 2024-06-12 RX ORDER — ALBUTEROL SULFATE 90 UG/1
1-2 AEROSOL, METERED RESPIRATORY (INHALATION)
Status: CANCELLED
Start: 2024-06-13

## 2024-06-12 RX ORDER — EPINEPHRINE 1 MG/ML
0.3 INJECTION, SOLUTION, CONCENTRATE INTRAVENOUS EVERY 5 MIN PRN
Status: CANCELLED | OUTPATIENT
Start: 2024-06-13

## 2024-06-12 RX ORDER — ALBUTEROL SULFATE 0.83 MG/ML
2.5 SOLUTION RESPIRATORY (INHALATION)
Status: CANCELLED | OUTPATIENT
Start: 2024-06-13

## 2024-06-12 RX ORDER — DIPHENHYDRAMINE HYDROCHLORIDE 50 MG/ML
50 INJECTION INTRAMUSCULAR; INTRAVENOUS
Status: CANCELLED
Start: 2024-06-13

## 2024-06-12 RX ORDER — MEPERIDINE HYDROCHLORIDE 50 MG/ML
25 INJECTION INTRAMUSCULAR; INTRAVENOUS; SUBCUTANEOUS EVERY 30 MIN PRN
Status: CANCELLED | OUTPATIENT
Start: 2024-06-13

## 2024-06-12 RX ORDER — CYANOCOBALAMIN 1000 UG/ML
1000 INJECTION, SOLUTION INTRAMUSCULAR; SUBCUTANEOUS ONCE
Status: CANCELLED
Start: 2024-06-13 | End: 2024-06-13

## 2024-06-12 RX ORDER — METHYLPREDNISOLONE SODIUM SUCCINATE 125 MG/2ML
125 INJECTION, POWDER, LYOPHILIZED, FOR SOLUTION INTRAMUSCULAR; INTRAVENOUS
Status: CANCELLED
Start: 2024-06-13

## 2024-06-12 RX ADMIN — CYANOCOBALAMIN 1000 MCG: 1000 INJECTION, SOLUTION INTRAMUSCULAR at 13:34

## 2024-06-12 RX ADMIN — HEPARIN 500 UNITS: 100 SYRINGE at 13:24

## 2024-06-12 RX ADMIN — GADOTERATE MEGLUMINE 20 ML: 376.9 INJECTION INTRAVENOUS at 13:27

## 2024-06-12 NOTE — NURSING NOTE
Infusion Nursing Note:  Jareth Gallardo presents today for Vitamin B12 Injection.    Patient seen by provider today: No   present during visit today: Not Applicable.    Note: Pt had port flushed today at MRI visit.      Intravenous Access:  No Intravenous access/labs at this visit.    Treatment Conditions:  Not Applicable.      Post Infusion Assessment:  Patient tolerated injection without incident.       Discharge Plan:   Discharge instructions reviewed with: Patient.  Patient and/or family verbalized understanding of discharge instructions and all questions answered.  Copy of AVS declined. Patient will return 6-26-24 for next appointment. Scheduling request sent to [facility] to make appt.  AVS to patient via [employer].   Patient discharged in stable condition accompanied by: self.  Departure Mode: Ambulatory.      Jany Jenkins RN

## 2024-06-12 NOTE — PROGRESS NOTES
Patient's implanted port was accessed with a non-coring simms needle by a trained RN per hospital policy/procedure. Blood return brisk. Site noted to be Normal. Patient tolerated well.     Following imaging exam, implanted port was flushed with 10 mL sterile normal saline and 500 units (5 mLs) heparin. Site was deaccessed by a trained RN per hospital policy/procedure. Site noted to be Normal. Patient tolerated well.     Site covered with dry, sterile dressing.     See flowsheet and MAR for details.

## 2024-06-13 ENCOUNTER — TELEPHONE (OUTPATIENT)
Dept: ONCOLOGY | Facility: OTHER | Age: 64
End: 2024-06-13
Payer: COMMERCIAL

## 2024-06-13 NOTE — TELEPHONE ENCOUNTER
Dr Martin spoke to patient's  today and they requested another letter be sent.    Per Dr Martin, it is dictated and printed.      Renée Larios CMA([facility])..................6/13/2024   4:02 PM

## 2024-06-13 NOTE — LETTER
RE: Jareth Gallardo  : 1960    To Whom it May Concern:      2024          Jareth Gallardo is under my care for nasopharyngeal cancer.   His employment history includes working around wood dust.     Wood dust is considered a carcinogen. Case reports and epidemiological studies have found a strong association between exposure to wood dust and cancer of the nasal cavity. Other types of nasal cancer, including cancer of the nasopharynx have been associated with exposure to wood dust.     It is my professional, medical opinion that his wood dust exposure is more likely than not a contributing factor of his nasopharyngeal carcinoma.      Sincerely,            Chriss Martin MD   Director of Hematology/Medical Oncology  Clinical Professor [facility]

## 2024-06-14 NOTE — TELEPHONE ENCOUNTER
Spoke with Tiffani at  office and they would like letter faxed to them at [phone].  Sybil Zamora RN...........6/14/2024 10:46 AM

## 2024-06-18 ENCOUNTER — TELEPHONE (OUTPATIENT)
Dept: PET IMAGING | Facility: HOSPITAL | Age: 64
End: 2024-06-18

## 2024-06-18 NOTE — TELEPHONE ENCOUNTER
Made an appointment reminder call regarding NM Petscan scheduled on 6/19/2024 @ 6695.  Reminded pt to have a low carbohydrate/ high protein evening meal. Patient can have plain water up to the time of the scan, good hydration is encouraged. Test duration is about 2 hours.

## 2024-06-19 ENCOUNTER — HOSPITAL ENCOUNTER (OUTPATIENT)
Dept: PET IMAGING | Facility: HOSPITAL | Age: 64
Discharge: HOME | End: 2024-06-19
Attending: INTERNAL MEDICINE | Admitting: INTERNAL MEDICINE
Payer: COMMERCIAL

## 2024-06-19 DIAGNOSIS — C11.9 NASOPHARYNGEAL CANCER (H): ICD-10-CM

## 2024-06-19 DIAGNOSIS — D89.839 CYTOKINE RELEASE SYNDROME: ICD-10-CM

## 2024-06-19 DIAGNOSIS — D50.9 IRON DEFICIENCY ANEMIA, UNSPECIFIED IRON DEFICIENCY ANEMIA TYPE: ICD-10-CM

## 2024-06-19 PROCEDURE — A9552 F18 FDG: HCPCS | Performed by: INTERNAL MEDICINE

## 2024-06-19 PROCEDURE — 343N000001 HC RX 343: Performed by: INTERNAL MEDICINE

## 2024-06-19 PROCEDURE — 78815 PET IMAGE W/CT SKULL-THIGH: CPT | Mod: PS

## 2024-06-19 RX ORDER — FLUDEOXYGLUCOSE F 18 200 MCI/ML
13.3 INJECTION, SOLUTION INTRAVENOUS ONCE
Status: COMPLETED | OUTPATIENT
Start: 2024-06-19 | End: 2024-06-19

## 2024-06-19 RX ADMIN — FLUDEOXYGLUCOSE F 18 13.3 MILLICURIE: 200 INJECTION, SOLUTION INTRAVENOUS at 10:26

## 2024-06-20 ENCOUNTER — TELEPHONE (OUTPATIENT)
Dept: ONCOLOGY | Facility: OTHER | Age: 64
End: 2024-06-20
Payer: COMMERCIAL

## 2024-06-20 DIAGNOSIS — C11.9 NASOPHARYNGEAL CANCER (H): Primary | ICD-10-CM

## 2024-06-20 NOTE — TELEPHONE ENCOUNTER
Chriss Martin MD called and spoke with patient and reviewed PET scan with him. Jareth did inform him that he had a wood tick in left axilla(area that slightly light up on PET scan). No evidence of cancer per Chriss Martin MD   PET scan in 3 months ordered. He will see Anyi Campbell NP to follow up on upcoming labs.  Sybil Zamora RN...........6/20/2024 11:13 AM

## 2024-06-21 ENCOUNTER — LAB (OUTPATIENT)
Dept: LAB | Facility: OTHER | Age: 64
End: 2024-06-21
Attending: INTERNAL MEDICINE
Payer: COMMERCIAL

## 2024-06-21 DIAGNOSIS — D89.839 CYTOKINE RELEASE SYNDROME: ICD-10-CM

## 2024-06-21 DIAGNOSIS — R79.89 LOW VITAMIN B12 LEVEL: ICD-10-CM

## 2024-06-21 DIAGNOSIS — C11.9 NASOPHARYNGEAL CANCER (H): ICD-10-CM

## 2024-06-21 DIAGNOSIS — D50.9 IRON DEFICIENCY ANEMIA, UNSPECIFIED IRON DEFICIENCY ANEMIA TYPE: ICD-10-CM

## 2024-06-21 LAB
ALBUMIN SERPL BCG-MCNC: 4 G/DL (ref 3.5–5.2)
ALP SERPL-CCNC: 83 U/L (ref 40–150)
ALT SERPL W P-5'-P-CCNC: 27 U/L (ref 0–70)
ANION GAP SERPL CALCULATED.3IONS-SCNC: 8 MMOL/L (ref 7–15)
AST SERPL W P-5'-P-CCNC: 32 U/L (ref 0–45)
BASOPHILS # BLD AUTO: 0 10E3/UL (ref 0–0.2)
BASOPHILS NFR BLD AUTO: 0 %
BILIRUB SERPL-MCNC: 0.7 MG/DL
BUN SERPL-MCNC: 19.1 MG/DL (ref 8–23)
CALCIUM SERPL-MCNC: 9.4 MG/DL (ref 8.8–10.2)
CHLORIDE SERPL-SCNC: 103 MMOL/L (ref 98–107)
CREAT SERPL-MCNC: 0.9 MG/DL (ref 0.67–1.17)
CRP SERPL-MCNC: <3 MG/L
DEPRECATED HCO3 PLAS-SCNC: 29 MMOL/L (ref 22–29)
EGFRCR SERPLBLD CKD-EPI 2021: >90 ML/MIN/1.73M2
EOSINOPHIL # BLD AUTO: 0.1 10E3/UL (ref 0–0.7)
EOSINOPHIL NFR BLD AUTO: 2 %
ERYTHROCYTE [DISTWIDTH] IN BLOOD BY AUTOMATED COUNT: 14.5 % (ref 10–15)
ERYTHROCYTE [SEDIMENTATION RATE] IN BLOOD BY WESTERGREN METHOD: 27 MM/HR (ref 0–20)
FERRITIN SERPL-MCNC: 228 NG/ML (ref 31–409)
FOLATE SERPL-MCNC: <2 NG/ML (ref 4.6–34.8)
GLUCOSE SERPL-MCNC: 100 MG/DL (ref 70–99)
HCT VFR BLD AUTO: 39.8 % (ref 40–53)
HGB BLD-MCNC: 12.7 G/DL (ref 13.3–17.7)
IMM GRANULOCYTES # BLD: 0 10E3/UL
IMM GRANULOCYTES NFR BLD: 0 %
IRON BINDING CAPACITY (ROCHE): 233 UG/DL (ref 240–430)
IRON SATN MFR SERPL: 26 % (ref 15–46)
IRON SERPL-MCNC: 61 UG/DL (ref 61–157)
LDH SERPL L TO P-CCNC: 146 U/L (ref 0–250)
LYMPHOCYTES # BLD AUTO: 1.3 10E3/UL (ref 0.8–5.3)
LYMPHOCYTES NFR BLD AUTO: 25 %
MCH RBC QN AUTO: 27.4 PG (ref 26.5–33)
MCHC RBC AUTO-ENTMCNC: 31.9 G/DL (ref 31.5–36.5)
MCV RBC AUTO: 86 FL (ref 78–100)
MONOCYTES # BLD AUTO: 0.5 10E3/UL (ref 0–1.3)
MONOCYTES NFR BLD AUTO: 9 %
NEUTROPHILS # BLD AUTO: 3.3 10E3/UL (ref 1.6–8.3)
NEUTROPHILS NFR BLD AUTO: 64 %
NRBC # BLD AUTO: 0 10E3/UL
NRBC BLD AUTO-RTO: 0 /100
PLATELET # BLD AUTO: 172 10E3/UL (ref 150–450)
POTASSIUM SERPL-SCNC: 4.1 MMOL/L (ref 3.4–5.3)
PROT SERPL-MCNC: 7.7 G/DL (ref 6.4–8.3)
RBC # BLD AUTO: 4.64 10E6/UL (ref 4.4–5.9)
SODIUM SERPL-SCNC: 140 MMOL/L (ref 135–145)
VIT B12 SERPL-MCNC: 629 PG/ML (ref 232–1245)
WBC # BLD AUTO: 5.1 10E3/UL (ref 4–11)

## 2024-06-21 PROCEDURE — 84155 ASSAY OF PROTEIN SERUM: CPT | Mod: ZL

## 2024-06-21 PROCEDURE — 85652 RBC SED RATE AUTOMATED: CPT | Mod: ZL

## 2024-06-21 PROCEDURE — 83615 LACTATE (LD) (LDH) ENZYME: CPT | Mod: ZL

## 2024-06-21 PROCEDURE — 36415 COLL VENOUS BLD VENIPUNCTURE: CPT | Mod: ZL

## 2024-06-21 PROCEDURE — 86140 C-REACTIVE PROTEIN: CPT | Mod: ZL

## 2024-06-21 PROCEDURE — 83550 IRON BINDING TEST: CPT | Mod: ZL

## 2024-06-21 PROCEDURE — 82374 ASSAY BLOOD CARBON DIOXIDE: CPT | Mod: ZL

## 2024-06-21 PROCEDURE — 82607 VITAMIN B-12: CPT | Mod: ZL

## 2024-06-21 PROCEDURE — 82746 ASSAY OF FOLIC ACID SERUM: CPT | Mod: ZL

## 2024-06-21 PROCEDURE — 82728 ASSAY OF FERRITIN: CPT | Mod: ZL

## 2024-06-21 PROCEDURE — 82247 BILIRUBIN TOTAL: CPT | Mod: ZL

## 2024-06-21 PROCEDURE — 85048 AUTOMATED LEUKOCYTE COUNT: CPT | Mod: ZL

## 2024-06-25 ENCOUNTER — ONCOLOGY VISIT (OUTPATIENT)
Dept: ONCOLOGY | Facility: OTHER | Age: 64
End: 2024-06-25
Attending: INTERNAL MEDICINE
Payer: COMMERCIAL

## 2024-06-25 VITALS
WEIGHT: 260 LBS | TEMPERATURE: 96.8 F | BODY MASS INDEX: 36.26 KG/M2 | HEART RATE: 78 BPM | RESPIRATION RATE: 18 BRPM | SYSTOLIC BLOOD PRESSURE: 99 MMHG | DIASTOLIC BLOOD PRESSURE: 61 MMHG | OXYGEN SATURATION: 95 %

## 2024-06-25 DIAGNOSIS — D50.9 IRON DEFICIENCY ANEMIA, UNSPECIFIED IRON DEFICIENCY ANEMIA TYPE: ICD-10-CM

## 2024-06-25 DIAGNOSIS — C11.9 NASOPHARYNGEAL CANCER (H): ICD-10-CM

## 2024-06-25 PROCEDURE — 96372 THER/PROPH/DIAG INJ SC/IM: CPT | Performed by: NURSE PRACTITIONER

## 2024-06-25 PROCEDURE — 99215 OFFICE O/P EST HI 40 MIN: CPT | Performed by: NURSE PRACTITIONER

## 2024-06-25 PROCEDURE — 250N000011 HC RX IP 250 OP 636: Performed by: NURSE PRACTITIONER

## 2024-06-25 RX ORDER — CYANOCOBALAMIN 1000 UG/ML
1000 INJECTION, SOLUTION INTRAMUSCULAR; SUBCUTANEOUS ONCE
Status: COMPLETED | OUTPATIENT
Start: 2024-06-25 | End: 2024-06-25

## 2024-06-25 RX ORDER — FOLIC ACID 1 MG/1
1 TABLET ORAL DAILY
Qty: 90 TABLET | Refills: 1 | Status: SHIPPED | OUTPATIENT
Start: 2024-06-25

## 2024-06-25 RX ADMIN — CYANOCOBALAMIN 1000 MCG: 1000 INJECTION, SOLUTION INTRAMUSCULAR at 09:31

## 2024-06-25 ASSESSMENT — PAIN SCALES - GENERAL: PAINLEVEL: NO PAIN (0)

## 2024-06-25 NOTE — PROGRESS NOTES
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 Patient was referred to us by Dr. Melvin at the [facility] for ongoing treatment to be given locally.  For complete history, please see Dr Martin note dated 5/15/24. Initially, patient presented with left neck swelling and was seen by urgent care in the [place] area. CT neck was ordered and patient was referred to ENT but could not be seen immediately due to insurance reasons. He was seen by a general surgeon by the name of Dr. 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  Patient was subsequently referred to Dr. Boston of  ENT at the [facility].  He reviewed the CT neck that was performed on December 9, 2021 and was read as extensive left-sided neck adenopathy with lymph nodes measuring up to 2.4 cm medial to the left sternocleidomastoid muscle just above the level of the hyoid bone.  A PET/CT was ordered and performed on March 9, 2022 and it was read as exuberant soft tissue thickening within the nasopharyngeal and oropharyngeal mucosa concerning for nasopharyngeal carcinoma.  Soft tissue nodular uptake was evident within the prevertebral soft tissues and about differential because of the epiglottis with extensive neck lymph node uptake. There was uptake within the lung parenchyma concerning for metastatic uptake of primary malignancy infectious or inflammatory cannot be excluded.  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  The case was presented at the tumor board and the feeling was that patient would require induction chemotherapy followed by chemoradiation and therapy.  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  Tumor  was also found to be PD-L1 positive at 20% using the :.  Patient was ultimately seen by Dr. Dariela Melvin medical oncology at [facility] on March 22, 2022. The plan was to treat with carboplatin and gemcitabine for 3 cycles and then restage in terms of the lung nodules they will be reevaluated after induction.  He was started on carboplatin gemcitabine and was treated between April 4, 2021 to until May 16, 2022.  PET scan performed on May 31, 2022 revealed a partial response subsequently underwent chemoradiotherapy with weekly carboplatin and received radiation therapy at the [facility] under the direction of Dr. Skaggs.  PET scan done on November 16, 2022 revealed a residual 1.5 cm level 2 lymph node with an SUV of 28.7.  A 1 cm enhancing focus in the sternocleidomastoid muscle with an SUV of 22.4 there was a level 2A lymph node that on the left with decreased in size.  With decreased FDG avidity there was a resolved left level 5 node node. There was a new patchy groundglass opacity and nodule in the right upper lobe.  Which was felt to be inflammatory subseqre was residual disease on PET scan and recurrence to be at high risk given the fact the patient was.  1 positive and the plan was to proceed with pembrolizumab.  Patient was started on pembrolizumab on March 9, 2023 under the direction of Dr. Norm Melvin. Patient was treated with 7 cycles of pembrolizumab with at least a partial response on imaging. 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 The pembrolizumab was stopped and was treated with steroids with improvement in CRP levels. ENT exam was negative. PET scan indicated minimal uptake in a 7 mm left cervical node felt to be reactive recent imaging including CT neck indicates that this note was stable since September 2023 and likely is a reactive node. ENT  could not palpate or attempt a biopsy of this node as this was felt to be reactive therefore patient is currently in complete remission. Patient did have a PET scan and MRI brain on 6/12/24 and 6/19/24. Dr Martin did speak with patient and reviewed results with him stating there is no evidence of cancer on imaging. Labs done on 6/21/24 show normal iron and ferritin levels. CRP is normal. ESR is slightly elevated. Folic acid is decreased. B12 has improved. All other labs are stable. Patient is feeling well and has no new concerns at this time.       Review Of Systems:  Review Of Systems  Eyes/Ears/Nose/Throat: denies new vision changes  Respiratory: No shortness of breath, dyspnea on exertion, cough  Cardiovascular: denies chest pain   Gastrointestinal: reports occasional loose stools, denies abdominal pain  Genitourinary: denies dysuria  Musculoskeletal: reports chronic bilateral knee pain, no new bone pain  Neurologic: denies headaches, no dizziness  Hematologic/Lymphatic/Immunologic: denies fevers, no recent illness        There are no exam notes on file for this visit.    Past medical, social, surgical, and family histories reviewed.    Allergies:  Allergies as of 06/25/2024 - Reviewed 06/19/2024   Allergen Reaction Noted    Keytruda [pembrolizumab] Other (See Comments) 04/03/2024       Current Medications:  Current Outpatient Medications   Medication Sig Dispense Refill    acetaminophen (TYLENOL) 500 MG tablet Take 500 mg by mouth every 6 hours as needed for mild pain      atorvastatin (LIPITOR) 20 MG tablet Take 20 mg by mouth daily      cyanocobalamin (CYANOCOBALAMIN) 1000 MCG/ML injection Inject 1 mL into the muscle every 14 days      ferrous sulfate (FEROSUL) 325 (65 Fe) MG tablet TAKE 1 TABLET BY MOUTH EVERY DAY WITH BREAKFAST 90 tablet 1    furosemide (LASIX) 20 MG tablet Take 20mg (1 tablet) every other day and on the other days take 40mg (2 tablets) 135 tablet 3    levothyroxine (SYNTHROID/LEVOTHROID)  112 MCG tablet Take 1 tablet (112 mcg) by mouth daily 60 tablet 6    neomycin-polymyxin-dexAMETHasone (MAXITROL) 3.5-30852-2.1 ophthalmic ointment APPLY AT BEDTIME TO EYELASHES OF BOTH EYES      vitamin B6 (PYRIDOXINE) 50 MG TABS TAKE 2 TABLETS BY MOUTH EVERY  tablet 4    vitamin D3 (CHOLECALCIFEROL) 50 mcg (2000 units) tablet Take 1 tablet by mouth daily          Physical Exam:  There were no vitals taken for this visit.    GENERAL APPEARANCE: 63  year old male, alert and no distress     LYMPHATICS: No cervical, supraclavicular, axillary lymphadenopathy     RESP: lungs clear to auscultation - no rales, rhonchi or wheezes     CARDIOVASCULAR: regular rates and rhythm, normal S1 S2     ABDOMEN:  soft, nontender, bowel sounds normal     MUSCULOSKELETAL: extremities normal- no gross deformities noted, No edema b/l LE.     SKIN: no suspicious lesions or rashes on exposed skin     PSYCHIATRIC: mentation appears normal and affect normal    Laboratory/Imaging Studies  Lab on 06/21/2024   Component Date Value Ref Range Status    Sodium 06/21/2024 140  135 - 145 mmol/L Final    Reference intervals for this test were updated on 09/26/2023 to more accurately reflect our healthy population. There may be differences in the flagging of prior results with similar values performed with this method. Interpretation of those prior results can be made in the context of the updated reference intervals.     Potassium 06/21/2024 4.1  3.4 - 5.3 mmol/L Final    Carbon Dioxide (CO2) 06/21/2024 29  22 - 29 mmol/L Final    Anion Gap 06/21/2024 8  7 - 15 mmol/L Final    Urea Nitrogen 06/21/2024 19.1  8.0 - 23.0 mg/dL Final    Creatinine 06/21/2024 0.90  0.67 - 1.17 mg/dL Final    GFR Estimate 06/21/2024 >90  >60 mL/min/1.73m2 Final    eGFR calculated using 2021 CKD-EPI equation.    Calcium 06/21/2024 9.4  8.8 - 10.2 mg/dL Final    Chloride 06/21/2024 103  98 - 107 mmol/L Final    Glucose 06/21/2024 100 (H)  70 - 99 mg/dL Final    Alkaline  Phosphatase 06/21/2024 83  40 - 150 U/L Final    AST 06/21/2024 32  0 - 45 U/L Final    Reference intervals for this test were updated on 6/12/2023 to more accurately reflect our healthy population. There may be differences in the flagging of prior results with similar values performed with this method. Interpretation of those prior results can be made in the context of the updated reference intervals.    ALT 06/21/2024 27  0 - 70 U/L Final    Reference intervals for this test were updated on 6/12/2023 to more accurately reflect our healthy population. There may be differences in the flagging of prior results with similar values performed with this method. Interpretation of those prior results can be made in the context of the updated reference intervals.      Protein Total 06/21/2024 7.7  6.4 - 8.3 g/dL Final    Albumin 06/21/2024 4.0  3.5 - 5.2 g/dL Final    Bilirubin Total 06/21/2024 0.7  <=1.2 mg/dL Final    Lactate Dehydrogenase 06/21/2024 146  0 - 250 U/L Final    CRP Inflammation 06/21/2024 <3.00  <5.00 mg/L Final    Iron 06/21/2024 61  61 - 157 ug/dL Final    Iron Binding Capacity 06/21/2024 233 (L)  240 - 430 ug/dL Final    Iron Sat Index 06/21/2024 26  15 - 46 % Final    Ferritin 06/21/2024 228  31 - 409 ng/mL Final    Folic Acid 06/21/2024 <2.0 (L)  4.6 - 34.8 ng/mL Final    Vitamin B12 06/21/2024 629  232 - 1,245 pg/mL Final    Erythrocyte Sedimentation Rate 06/21/2024 27 (H)  0 - 20 mm/hr Final    WBC Count 06/21/2024 5.1  4.0 - 11.0 10e3/uL Final    RBC Count 06/21/2024 4.64  4.40 - 5.90 10e6/uL Final    Hemoglobin 06/21/2024 12.7 (L)  13.3 - 17.7 g/dL Final    Hematocrit 06/21/2024 39.8 (L)  40.0 - 53.0 % Final    MCV 06/21/2024 86  78 - 100 fL Final    MCH 06/21/2024 27.4  26.5 - 33.0 pg Final    MCHC 06/21/2024 31.9  31.5 - 36.5 g/dL Final    RDW 06/21/2024 14.5  10.0 - 15.0 % Final    Platelet Count 06/21/2024 172  150 - 450 10e3/uL Final    % Neutrophils 06/21/2024 64  % Final    % Lymphocytes  06/21/2024 25  % Final    % Monocytes 06/21/2024 9  % Final    % Eosinophils 06/21/2024 2  % Final    % Basophils 06/21/2024 0  % Final    % Immature Granulocytes 06/21/2024 0  % Final    NRBCs per 100 WBC 06/21/2024 0  <1 /100 Final    Absolute Neutrophils 06/21/2024 3.3  1.6 - 8.3 10e3/uL Final    Absolute Lymphocytes 06/21/2024 1.3  0.8 - 5.3 10e3/uL Final    Absolute Monocytes 06/21/2024 0.5  0.0 - 1.3 10e3/uL Final    Absolute Eosinophils 06/21/2024 0.1  0.0 - 0.7 10e3/uL Final    Absolute Basophils 06/21/2024 0.0  0.0 - 0.2 10e3/uL Final    Absolute Immature Granulocytes 06/21/2024 0.0  <=0.4 10e3/uL Final    Absolute NRBCs 06/21/2024 0.0  10e3/uL Final        ASSESSMENT/PLAN:  Stage Shannan nasopharyngeal carcinoma p16 negative EBV ER positive:Status post induction chemotherapy with gemcitabine/carboplatin x 3 cycles followed by chemoradiation with weekly carboplatin followed by salvage left modified radical neck dissection performed [facility] with residual disease noted on posttreatment PET scan with PD-L1 positivity pembrolizumab was administered and directed by Dr. Dariela Melvin the emergency [place] status post 7 cycles of pembrolizumab with at least a partial response and imaging was treated with 1 more cycle of pembrolizumab here in [place] and developed subsequent cytokine release syndrome. The pembrolizumab stopped and was treated with steroids with improvement in CRP levels. PET scan indicated minimal uptake in a 7 mm left cervical node felt to be reactive. CT neck indicated that this node was stable since September 2023 and likely is a reactive node. Patient did have a PET scan and MRI brain on 6/12/24 and 6/19/24. Dr Martin did speak with patient and reviewed results with him stating there is no evidence of cancer on imaging. Labs done on 6/21/24 show normal iron and ferritin levels. CRP is normal. ESR is slightly elevated. Folic acid is decreased. B12 has improved. All other  labs are stable. Will send in prescription for folic acid. Patient instructed how to take this. Patient will see Dr Bran in September 2024. Will see patient back in 3 months with repeat PET and lab work.    Forty two minutes spent with this encounter with time spent reviewing patient records, counseling patient regarding disease process, interpretation and review of labs with patient, discussing recent imaging, discussing plan for follow up, obtaining a review of systems, performing exam, ordering labs, documenting in EHR and coordination of care

## 2024-06-25 NOTE — NURSING NOTE
"Oncology Rooming Note    June 25, 2024 8:51 AM   Jareth Gallardo is a 63 year old male who presents for:    Chief Complaint   Patient presents with    Oncology Clinic Visit     F/U Esophageal cancer     Initial Vitals: BP 99/61   Pulse 78   Temp 96.8  F (36  C) (Tympanic)   Resp 18   Wt 117.9 kg (260 lb)   SpO2 95%   BMI 36.26 kg/m   Estimated body mass index is 36.26 kg/m  as calculated from the following:    Height as of 5/14/24: 1.803 m (5' 11\").    Weight as of this encounter: 117.9 kg (260 lb). Body surface area is 2.43 meters squared.  No Pain (0) Comment: Data Unavailable   No LMP for male patient.  Allergies reviewed: Yes  Medications reviewed: Yes    Medications: Medication refills not needed today.  Pharmacy name entered into [employer]: [id number] [place] [address]    Frailty Screening:   Is the patient here for a new oncology consult visit in cancer care? 2. No      Clinical concerns: None       Ange Rizzo CMA (AAMA)  "

## 2024-06-28 NOTE — PROGRESS NOTES
document embedded image                                        [facility]                                                                                                                                                       Patient Name: Jareth Gallardo   Address: [address] [place] [address]    YOB: 1960   [place] [address]   MR Number: [id number]   Phone: [phone]  PCP: Dariela Melvin MD           Appointment Date: 06/11/24  Visit Provider: Lexx Bran MD    cc: ~    ENT Progress Note        Intake  Visit Reasons: Cancer check    HPI  History of Present Illness  Chief complaint:  Follow-up head neck cancer    History  The patient is a 63-year-old male who in June of 2021 was diagnosed with a T2 N3 M0 stage DENAE nasopharyngeal carcinoma.  He was treated at the [facility] with chemotherapy and radiation therapy.  In December of 2022 he underwent a salvage left neck dissection.  Subsequent imaging revealed lingering positive nodes and he was started on Keytruda.  He eventually had to come off the Keytruda because of medical complications, specifically renal failure.  He has been followed conservatively since here by Dr. Harvey and more recently by myself.  He had a PET scan in March of this year that showed some likely new activity in a lymph node along the posterior edge of the sternocleidomastoid muscle in his left mid neck.  This was not seen on a previous PET in November.  He was not noticed any progressive neck masses or nasopharyngeal symptoms since last being seen 3 months ago.      Exam   Nasal-no anterior obstruction or purulence noted   Oral cavity oropharynx-free of mucosal lesion   Indirect nasopharyngoscopy-no obvious lesions noted in the nasopharynx   Neck-quite Woody from his previous surgery and treatment.  I can feel no evidence of progressive mass at this time  Head neck integument-Clear  General-the patient appears well and in no distress    Neuro-there are no new cranial nerve deficits appreciable at this time    Allergies    No Known Allergies Allergy (Verified 01/10/24 09:01)    PFSH  PFSH:     Past Medical History: (Updated 01/10/24 @ 09:02 by Josi Mccoy, Med Assist)    Diabetes  Dizziness  Tinnitus  Thyroid disorder  Hearing loss      Social History: (Updated 01/10/24 @ 09:03 by Josi Mccoy, Med Assist)  Smoking Status:  Never smoker   alcohol intake:  former   substance use type:  does not use   marital status:       A&P  Assessment & Plan  (1) Nasopharyngeal cancer:         Status: Acute        Code(s):  C11.9 - Malignant neoplasm of nasopharynx, unspecified  He does have a PET scan scheduled for next week.  He will make sure to have the images and reports forwarded to me in [place].  He will otherwise see me in 3 months for routine follow up.                Lexx Bran MD    Filed: 06/11/24 0915      <Electronically signed by Lexx Bran MD> 06/11/24 0915

## 2024-07-24 ENCOUNTER — HOSPITAL ENCOUNTER (OUTPATIENT)
Dept: INFUSION THERAPY | Facility: OTHER | Age: 64
Discharge: HOME | End: 2024-07-24
Attending: INTERNAL MEDICINE | Admitting: INTERNAL MEDICINE
Payer: COMMERCIAL

## 2024-07-24 DIAGNOSIS — R79.89 LOW VITAMIN B12 LEVEL: Primary | ICD-10-CM

## 2024-07-24 PROCEDURE — 250N000011 HC RX IP 250 OP 636: Performed by: INTERNAL MEDICINE

## 2024-07-24 PROCEDURE — 96372 THER/PROPH/DIAG INJ SC/IM: CPT | Performed by: INTERNAL MEDICINE

## 2024-07-24 RX ORDER — CYANOCOBALAMIN 1000 UG/ML
1000 INJECTION, SOLUTION INTRAMUSCULAR; SUBCUTANEOUS ONCE
Status: COMPLETED | OUTPATIENT
Start: 2024-07-24 | End: 2024-07-24

## 2024-07-24 RX ORDER — DIPHENHYDRAMINE HYDROCHLORIDE 50 MG/ML
50 INJECTION INTRAMUSCULAR; INTRAVENOUS
Status: CANCELLED
Start: 2024-07-25

## 2024-07-24 RX ORDER — ALBUTEROL SULFATE 90 UG/1
1-2 AEROSOL, METERED RESPIRATORY (INHALATION)
Status: CANCELLED
Start: 2024-07-25

## 2024-07-24 RX ORDER — CYANOCOBALAMIN 1000 UG/ML
1000 INJECTION, SOLUTION INTRAMUSCULAR; SUBCUTANEOUS ONCE
Status: CANCELLED
Start: 2024-07-25 | End: 2024-07-25

## 2024-07-24 RX ORDER — ALBUTEROL SULFATE 0.83 MG/ML
2.5 SOLUTION RESPIRATORY (INHALATION)
Status: CANCELLED | OUTPATIENT
Start: 2024-07-25

## 2024-07-24 RX ORDER — MEPERIDINE HYDROCHLORIDE 50 MG/ML
25 INJECTION INTRAMUSCULAR; INTRAVENOUS; SUBCUTANEOUS EVERY 30 MIN PRN
Status: CANCELLED | OUTPATIENT
Start: 2024-07-25

## 2024-07-24 RX ORDER — EPINEPHRINE 1 MG/ML
0.3 INJECTION, SOLUTION, CONCENTRATE INTRAVENOUS EVERY 5 MIN PRN
Status: CANCELLED | OUTPATIENT
Start: 2024-07-25

## 2024-07-24 RX ORDER — METHYLPREDNISOLONE SODIUM SUCCINATE 125 MG/2ML
125 INJECTION, POWDER, LYOPHILIZED, FOR SOLUTION INTRAMUSCULAR; INTRAVENOUS
Status: CANCELLED
Start: 2024-07-25

## 2024-07-24 RX ADMIN — CYANOCOBALAMIN 1000 MCG: 1000 INJECTION, SOLUTION INTRAMUSCULAR at 12:59

## 2024-07-24 NOTE — NURSING NOTE
Infusion Nursing Note:  Jareth Gallardo presents today for B12.    Patient seen by provider today: No   present during visit today: Not Applicable.    Note: N/A.      Intravenous Access:  No Intravenous access/labs at this visit.    Treatment Conditions:  Not Applicable.      Post Infusion Assessment:  Patient tolerated injection without incident.  Site patent and intact, free from redness, edema or discomfort.       Discharge Plan:   Patient and/or family verbalized understanding of discharge instructions and all questions answered.  AVS to patient via [employer].  Patient will return 8/7 for next appointment.   Patient discharged in stable condition accompanied by: self.  Departure Mode: Ambulatory.      Jean S. Hammann, RN

## 2024-08-07 ENCOUNTER — HOSPITAL ENCOUNTER (OUTPATIENT)
Dept: INFUSION THERAPY | Facility: OTHER | Age: 64
Discharge: HOME | End: 2024-08-07
Attending: INTERNAL MEDICINE | Admitting: INTERNAL MEDICINE
Payer: COMMERCIAL

## 2024-08-07 DIAGNOSIS — R79.89 LOW VITAMIN B12 LEVEL: Primary | ICD-10-CM

## 2024-08-07 PROCEDURE — 96372 THER/PROPH/DIAG INJ SC/IM: CPT | Performed by: INTERNAL MEDICINE

## 2024-08-07 PROCEDURE — 250N000011 HC RX IP 250 OP 636: Performed by: INTERNAL MEDICINE

## 2024-08-07 RX ORDER — EPINEPHRINE 1 MG/ML
0.3 INJECTION, SOLUTION, CONCENTRATE INTRAVENOUS EVERY 5 MIN PRN
OUTPATIENT
Start: 2024-08-07

## 2024-08-07 RX ORDER — MEPERIDINE HYDROCHLORIDE 50 MG/ML
25 INJECTION INTRAMUSCULAR; INTRAVENOUS; SUBCUTANEOUS EVERY 30 MIN PRN
OUTPATIENT
Start: 2024-08-07

## 2024-08-07 RX ORDER — ALBUTEROL SULFATE 90 UG/1
1-2 AEROSOL, METERED RESPIRATORY (INHALATION)
Start: 2024-08-07

## 2024-08-07 RX ORDER — HEPARIN SODIUM,PORCINE 10 UNIT/ML
5-20 VIAL (ML) INTRAVENOUS DAILY PRN
OUTPATIENT
Start: 2024-08-07

## 2024-08-07 RX ORDER — HEPARIN SODIUM (PORCINE) LOCK FLUSH IV SOLN 100 UNIT/ML 100 UNIT/ML
5 SOLUTION INTRAVENOUS
Status: CANCELLED | OUTPATIENT
Start: 2024-08-07

## 2024-08-07 RX ORDER — METHYLPREDNISOLONE SODIUM SUCCINATE 125 MG/2ML
125 INJECTION, POWDER, LYOPHILIZED, FOR SOLUTION INTRAMUSCULAR; INTRAVENOUS
Start: 2024-08-07

## 2024-08-07 RX ORDER — EPINEPHRINE 1 MG/ML
0.3 INJECTION, SOLUTION, CONCENTRATE INTRAVENOUS EVERY 5 MIN PRN
Status: CANCELLED | OUTPATIENT
Start: 2024-08-08

## 2024-08-07 RX ORDER — ALBUTEROL SULFATE 90 UG/1
1-2 AEROSOL, METERED RESPIRATORY (INHALATION)
Status: CANCELLED
Start: 2024-08-08

## 2024-08-07 RX ORDER — CYANOCOBALAMIN 1000 UG/ML
1000 INJECTION, SOLUTION INTRAMUSCULAR; SUBCUTANEOUS ONCE
Status: CANCELLED
Start: 2024-08-08 | End: 2024-08-08

## 2024-08-07 RX ORDER — DIPHENHYDRAMINE HYDROCHLORIDE 50 MG/ML
50 INJECTION INTRAMUSCULAR; INTRAVENOUS
Status: CANCELLED
Start: 2024-08-08

## 2024-08-07 RX ORDER — DIPHENHYDRAMINE HYDROCHLORIDE 50 MG/ML
50 INJECTION INTRAMUSCULAR; INTRAVENOUS
Start: 2024-08-07

## 2024-08-07 RX ORDER — ALBUTEROL SULFATE 0.83 MG/ML
2.5 SOLUTION RESPIRATORY (INHALATION)
Status: CANCELLED | OUTPATIENT
Start: 2024-08-08

## 2024-08-07 RX ORDER — ALBUTEROL SULFATE 0.83 MG/ML
2.5 SOLUTION RESPIRATORY (INHALATION)
OUTPATIENT
Start: 2024-08-07

## 2024-08-07 RX ORDER — METHYLPREDNISOLONE SODIUM SUCCINATE 125 MG/2ML
125 INJECTION, POWDER, LYOPHILIZED, FOR SOLUTION INTRAMUSCULAR; INTRAVENOUS
Status: CANCELLED
Start: 2024-08-08

## 2024-08-07 RX ORDER — MEPERIDINE HYDROCHLORIDE 50 MG/ML
25 INJECTION INTRAMUSCULAR; INTRAVENOUS; SUBCUTANEOUS EVERY 30 MIN PRN
Status: CANCELLED | OUTPATIENT
Start: 2024-08-08

## 2024-08-07 RX ORDER — CYANOCOBALAMIN 1000 UG/ML
1000 INJECTION, SOLUTION INTRAMUSCULAR; SUBCUTANEOUS ONCE
Status: COMPLETED | OUTPATIENT
Start: 2024-08-07 | End: 2024-08-07

## 2024-08-07 RX ADMIN — CYANOCOBALAMIN 1000 MCG: 1000 INJECTION, SOLUTION INTRAMUSCULAR; SUBCUTANEOUS at 13:08

## 2024-08-07 NOTE — NURSING NOTE
Infusion Nursing Note:  Jareth RAE Sami presents today for B12.    Patient seen by provider today: No   present during visit today: Not Applicable.    Note: N/A.      Intravenous Access:  No Intravenous access/labs at this visit.    Treatment Conditions:  Not Applicable.      Post Infusion Assessment:  Patient tolerated injection without incident.  Site patent and intact, free from redness, edema or discomfort.       Discharge Plan:   Discharge instructions reviewed with: Patient.  Patient and/or family verbalized understanding of discharge instructions and all questions answered.  AVS to patient via [employer].  Patient will return 8/21 for next appointment.   Patient discharged in stable condition accompanied by: self.      Jean S. Hammann, RN

## 2024-08-21 ENCOUNTER — HOSPITAL ENCOUNTER (OUTPATIENT)
Dept: INFUSION THERAPY | Facility: OTHER | Age: 64
Discharge: HOME | End: 2024-08-21
Attending: INTERNAL MEDICINE | Admitting: INTERNAL MEDICINE
Payer: COMMERCIAL

## 2024-08-21 DIAGNOSIS — R79.89 LOW VITAMIN B12 LEVEL: Primary | ICD-10-CM

## 2024-08-21 DIAGNOSIS — E86.0 DEHYDRATION: ICD-10-CM

## 2024-08-21 PROCEDURE — 250N000011 HC RX IP 250 OP 636: Performed by: INTERNAL MEDICINE

## 2024-08-21 PROCEDURE — 96372 THER/PROPH/DIAG INJ SC/IM: CPT | Performed by: INTERNAL MEDICINE

## 2024-08-21 RX ORDER — EPINEPHRINE 1 MG/ML
0.3 INJECTION, SOLUTION, CONCENTRATE INTRAVENOUS EVERY 5 MIN PRN
OUTPATIENT
Start: 2024-08-21

## 2024-08-21 RX ORDER — DIPHENHYDRAMINE HYDROCHLORIDE 50 MG/ML
50 INJECTION INTRAMUSCULAR; INTRAVENOUS
Status: CANCELLED
Start: 2024-08-22

## 2024-08-21 RX ORDER — CYANOCOBALAMIN 1000 UG/ML
1000 INJECTION, SOLUTION INTRAMUSCULAR; SUBCUTANEOUS ONCE
Status: COMPLETED | OUTPATIENT
Start: 2024-08-21 | End: 2024-08-21

## 2024-08-21 RX ORDER — ALBUTEROL SULFATE 0.83 MG/ML
2.5 SOLUTION RESPIRATORY (INHALATION)
OUTPATIENT
Start: 2024-08-21

## 2024-08-21 RX ORDER — MEPERIDINE HYDROCHLORIDE 50 MG/ML
25 INJECTION INTRAMUSCULAR; INTRAVENOUS; SUBCUTANEOUS EVERY 30 MIN PRN
Status: CANCELLED | OUTPATIENT
Start: 2024-08-22

## 2024-08-21 RX ORDER — HEPARIN SODIUM (PORCINE) LOCK FLUSH IV SOLN 100 UNIT/ML 100 UNIT/ML
5 SOLUTION INTRAVENOUS
Status: DISCONTINUED | OUTPATIENT
Start: 2024-08-21 | End: 2024-08-22 | Stop reason: HOSPADM

## 2024-08-21 RX ORDER — METHYLPREDNISOLONE SODIUM SUCCINATE 125 MG/2ML
125 INJECTION, POWDER, LYOPHILIZED, FOR SOLUTION INTRAMUSCULAR; INTRAVENOUS
Start: 2024-08-21

## 2024-08-21 RX ORDER — ALBUTEROL SULFATE 0.83 MG/ML
2.5 SOLUTION RESPIRATORY (INHALATION)
Status: CANCELLED | OUTPATIENT
Start: 2024-08-22

## 2024-08-21 RX ORDER — ALBUTEROL SULFATE 90 UG/1
1-2 AEROSOL, METERED RESPIRATORY (INHALATION)
Start: 2024-08-21

## 2024-08-21 RX ORDER — HEPARIN SODIUM (PORCINE) LOCK FLUSH IV SOLN 100 UNIT/ML 100 UNIT/ML
5 SOLUTION INTRAVENOUS
OUTPATIENT
Start: 2024-08-21

## 2024-08-21 RX ORDER — EPINEPHRINE 1 MG/ML
0.3 INJECTION, SOLUTION, CONCENTRATE INTRAVENOUS EVERY 5 MIN PRN
Status: CANCELLED | OUTPATIENT
Start: 2024-08-22

## 2024-08-21 RX ORDER — DIPHENHYDRAMINE HYDROCHLORIDE 50 MG/ML
50 INJECTION INTRAMUSCULAR; INTRAVENOUS
Start: 2024-08-21

## 2024-08-21 RX ORDER — HEPARIN SODIUM,PORCINE 10 UNIT/ML
5-20 VIAL (ML) INTRAVENOUS DAILY PRN
OUTPATIENT
Start: 2024-08-21

## 2024-08-21 RX ORDER — CYANOCOBALAMIN 1000 UG/ML
1000 INJECTION, SOLUTION INTRAMUSCULAR; SUBCUTANEOUS ONCE
Status: CANCELLED
Start: 2024-08-22 | End: 2024-08-22

## 2024-08-21 RX ORDER — METHYLPREDNISOLONE SODIUM SUCCINATE 125 MG/2ML
125 INJECTION, POWDER, LYOPHILIZED, FOR SOLUTION INTRAMUSCULAR; INTRAVENOUS
Status: CANCELLED
Start: 2024-08-22

## 2024-08-21 RX ORDER — ALBUTEROL SULFATE 90 UG/1
1-2 AEROSOL, METERED RESPIRATORY (INHALATION)
Status: CANCELLED
Start: 2024-08-22

## 2024-08-21 RX ORDER — MEPERIDINE HYDROCHLORIDE 50 MG/ML
25 INJECTION INTRAMUSCULAR; INTRAVENOUS; SUBCUTANEOUS EVERY 30 MIN PRN
OUTPATIENT
Start: 2024-08-21

## 2024-08-21 RX ADMIN — CYANOCOBALAMIN 1000 MCG: 1000 INJECTION, SOLUTION INTRAMUSCULAR; SUBCUTANEOUS at 13:15

## 2024-08-21 RX ADMIN — HEPARIN 5 ML: 100 SYRINGE at 13:28

## 2024-08-21 NOTE — NURSING NOTE
Infusion Nursing Note:  Jareth Gallardo presents today for B12 and port flush.    Patient seen by provider today: No   present during visit today: Not Applicable.    Note: N/A.      Intravenous Access:  No Intravenous access/labs at this visit.  Implanted Port.    Treatment Conditions:  Not Applicable.      Post Infusion Assessment:  Patient tolerated injection without incident.  Blood return noted pre and post infusion.  Site patent and intact, free from redness, edema or discomfort.  No evidence of extravasations.  Access discontinued per protocol.       Discharge Plan:   Patient and/or family verbalized understanding of discharge instructions and all questions answered.  AVS to patient via [employer].  Patient will return 9/4 for next appointment.   Patient discharged in stable condition accompanied by: self.  Departure Mode: Ambulatory.      Jean S. Hammann, RN

## 2024-09-04 ENCOUNTER — HOSPITAL ENCOUNTER (OUTPATIENT)
Dept: INFUSION THERAPY | Facility: OTHER | Age: 64
Discharge: HOME | End: 2024-09-04
Attending: INTERNAL MEDICINE | Admitting: INTERNAL MEDICINE
Payer: COMMERCIAL

## 2024-09-04 DIAGNOSIS — R79.89 LOW VITAMIN B12 LEVEL: Primary | ICD-10-CM

## 2024-09-04 PROCEDURE — 250N000011 HC RX IP 250 OP 636: Performed by: INTERNAL MEDICINE

## 2024-09-04 PROCEDURE — 96372 THER/PROPH/DIAG INJ SC/IM: CPT | Performed by: INTERNAL MEDICINE

## 2024-09-04 RX ORDER — DIPHENHYDRAMINE HYDROCHLORIDE 50 MG/ML
50 INJECTION INTRAMUSCULAR; INTRAVENOUS
Status: CANCELLED
Start: 2024-09-05

## 2024-09-04 RX ORDER — EPINEPHRINE 1 MG/ML
0.3 INJECTION, SOLUTION, CONCENTRATE INTRAVENOUS EVERY 5 MIN PRN
Status: CANCELLED | OUTPATIENT
Start: 2024-09-05

## 2024-09-04 RX ORDER — ALBUTEROL SULFATE 90 UG/1
1-2 AEROSOL, METERED RESPIRATORY (INHALATION)
Status: CANCELLED
Start: 2024-09-05

## 2024-09-04 RX ORDER — ALBUTEROL SULFATE 0.83 MG/ML
2.5 SOLUTION RESPIRATORY (INHALATION)
Status: CANCELLED | OUTPATIENT
Start: 2024-09-05

## 2024-09-04 RX ORDER — CYANOCOBALAMIN 1000 UG/ML
1000 INJECTION, SOLUTION INTRAMUSCULAR; SUBCUTANEOUS ONCE
Status: CANCELLED
Start: 2024-09-18 | End: 2024-09-05

## 2024-09-04 RX ORDER — CYANOCOBALAMIN 1000 UG/ML
1000 INJECTION, SOLUTION INTRAMUSCULAR; SUBCUTANEOUS ONCE
Status: COMPLETED | OUTPATIENT
Start: 2024-09-04 | End: 2024-09-04

## 2024-09-04 RX ORDER — METHYLPREDNISOLONE SODIUM SUCCINATE 125 MG/2ML
125 INJECTION, POWDER, LYOPHILIZED, FOR SOLUTION INTRAMUSCULAR; INTRAVENOUS
Status: CANCELLED
Start: 2024-09-05

## 2024-09-04 RX ORDER — MEPERIDINE HYDROCHLORIDE 50 MG/ML
25 INJECTION INTRAMUSCULAR; INTRAVENOUS; SUBCUTANEOUS EVERY 30 MIN PRN
Status: CANCELLED | OUTPATIENT
Start: 2024-09-05

## 2024-09-04 RX ADMIN — CYANOCOBALAMIN 1000 MCG: 1000 INJECTION, SOLUTION INTRAMUSCULAR; SUBCUTANEOUS at 13:04

## 2024-09-04 NOTE — NURSING NOTE
Infusion Nursing Note:  Jareth Gallardo presents today for B 12 injection.    Patient seen by provider today: No   present during visit today: Not Applicable.    Note: N/A.    Intravenous Access:  No Intravenous access/labs at this visit.    Treatment Conditions:  Not Applicable.    Post Infusion Assessment:  Patient tolerated injection without incident.     Discharge Plan:   Discharge instructions reviewed with: Patient.  Patient and/or family verbalized understanding of discharge instructions and all questions answered.  Copy of AVS declined by patient and/or family.  Patient will return 9/18/24 for next appointment.  AVS to patient via [facility].    Patient discharged in stable condition accompanied by: self.  Departure Mode: Ambulatory.      Marilynn De León RN

## 2024-09-05 DIAGNOSIS — D50.9 IRON DEFICIENCY ANEMIA, UNSPECIFIED IRON DEFICIENCY ANEMIA TYPE: ICD-10-CM

## 2024-09-05 DIAGNOSIS — C11.9 NASOPHARYNGEAL CANCER (H): ICD-10-CM

## 2024-09-05 DIAGNOSIS — D89.839 CYTOKINE RELEASE SYNDROME: ICD-10-CM

## 2024-09-06 NOTE — TELEPHONE ENCOUNTER
Ferrous sulfate 325 mg       Last Written Prescription Date:  6-5-24  Last Fill Quantity: 90,   # refills: 1  Last Office Visit: 6-5-24  Future Office visit:    Next 5 appointments (look out 90 days)      Sep 25, 2024 9:00 AM  Return Visit with Chriss Martin MD  [facility] ([facility] ) [address]  [place] [id number] [phone]

## 2024-09-10 ENCOUNTER — APPOINTMENT (OUTPATIENT)
Dept: CT IMAGING | Facility: OTHER | Age: 64
End: 2024-09-10
Payer: COMMERCIAL

## 2024-09-10 ENCOUNTER — HOSPITAL ENCOUNTER (EMERGENCY)
Facility: OTHER | Age: 64
Discharge: HOME | End: 2024-09-10
Payer: COMMERCIAL

## 2024-09-10 VITALS
WEIGHT: 260 LBS | TEMPERATURE: 100.7 F | BODY MASS INDEX: 36.4 KG/M2 | DIASTOLIC BLOOD PRESSURE: 79 MMHG | HEART RATE: 79 BPM | RESPIRATION RATE: 22 BRPM | SYSTOLIC BLOOD PRESSURE: 126 MMHG | HEIGHT: 71 IN | OXYGEN SATURATION: 93 %

## 2024-09-10 DIAGNOSIS — L03.90 CELLULITIS: ICD-10-CM

## 2024-09-10 DIAGNOSIS — R50.9 FEVER: ICD-10-CM

## 2024-09-10 PROBLEM — J44.9 CHRONIC OBSTRUCTIVE PULMONARY DISEASE, UNSPECIFIED COPD TYPE (H): Status: ACTIVE | Noted: 2019-01-24

## 2024-09-10 LAB
ALBUMIN SERPL BCG-MCNC: 4.3 G/DL (ref 3.5–5.2)
ALBUMIN UR-MCNC: 20 MG/DL
ALP SERPL-CCNC: 81 U/L (ref 40–150)
ALT SERPL W P-5'-P-CCNC: 24 U/L (ref 0–70)
ANION GAP SERPL CALCULATED.3IONS-SCNC: 11 MMOL/L (ref 7–15)
APPEARANCE UR: CLEAR
AST SERPL W P-5'-P-CCNC: 34 U/L (ref 0–45)
BASE EXCESS BLDV CALC-SCNC: 4.2 MMOL/L (ref -3–3)
BASOPHILS # BLD AUTO: 0 10E3/UL (ref 0–0.2)
BASOPHILS NFR BLD AUTO: 0 %
BILIRUB SERPL-MCNC: 1.2 MG/DL
BILIRUB UR QL STRIP: NEGATIVE
BUN SERPL-MCNC: 14.9 MG/DL (ref 8–23)
CALCIUM SERPL-MCNC: 9.1 MG/DL (ref 8.8–10.4)
CHLORIDE SERPL-SCNC: 98 MMOL/L (ref 98–107)
COLOR UR AUTO: ABNORMAL
CREAT SERPL-MCNC: 1.2 MG/DL (ref 0.67–1.17)
EGFRCR SERPLBLD CKD-EPI 2021: 68 ML/MIN/1.73M2
EOSINOPHIL # BLD AUTO: 0 10E3/UL (ref 0–0.7)
EOSINOPHIL NFR BLD AUTO: 0 %
ERYTHROCYTE [DISTWIDTH] IN BLOOD BY AUTOMATED COUNT: 14.7 % (ref 10–15)
FLUAV RNA SPEC QL NAA+PROBE: NEGATIVE
FLUBV RNA RESP QL NAA+PROBE: NEGATIVE
GLUCOSE SERPL-MCNC: 100 MG/DL (ref 70–99)
GLUCOSE UR STRIP-MCNC: NEGATIVE MG/DL
HCO3 BLDV-SCNC: 30 MMOL/L (ref 21–28)
HCO3 SERPL-SCNC: 27 MMOL/L (ref 22–29)
HCT VFR BLD AUTO: 39.2 % (ref 40–53)
HGB BLD-MCNC: 12.7 G/DL (ref 13.3–17.7)
HGB UR QL STRIP: NEGATIVE
HOLD SPECIMEN: NORMAL
HOLD SPECIMEN: NORMAL
IMM GRANULOCYTES # BLD: 0 10E3/UL
IMM GRANULOCYTES NFR BLD: 1 %
KETONES UR STRIP-MCNC: NEGATIVE MG/DL
LACTATE SERPL-SCNC: 0.7 MMOL/L (ref 0.7–2)
LEUKOCYTE ESTERASE UR QL STRIP: NEGATIVE
LYMPHOCYTES # BLD AUTO: 0.5 10E3/UL (ref 0.8–5.3)
LYMPHOCYTES NFR BLD AUTO: 7 %
MAGNESIUM SERPL-MCNC: 1.9 MG/DL (ref 1.7–2.3)
MCH RBC QN AUTO: 28.2 PG (ref 26.5–33)
MCHC RBC AUTO-ENTMCNC: 32.4 G/DL (ref 31.5–36.5)
MCV RBC AUTO: 87 FL (ref 78–100)
MONOCYTES # BLD AUTO: 0.4 10E3/UL (ref 0–1.3)
MONOCYTES NFR BLD AUTO: 7 %
NEUTROPHILS # BLD AUTO: 5.4 10E3/UL (ref 1.6–8.3)
NEUTROPHILS NFR BLD AUTO: 85 %
NITRATE UR QL: NEGATIVE
NRBC # BLD AUTO: 0 10E3/UL
NRBC BLD AUTO-RTO: 0 /100
O2/TOTAL GAS SETTING VFR VENT: 21 %
OXYHGB MFR BLDV: 41 % (ref 70–75)
PCO2 BLDV: 47 MM HG (ref 40–50)
PH BLDV: 7.41 [PH] (ref 7.32–7.43)
PH UR STRIP: 8 [PH] (ref 5–9)
PLATELET # BLD AUTO: 143 10E3/UL (ref 150–450)
PO2 BLDV: 27 MM HG (ref 25–47)
POTASSIUM SERPL-SCNC: 3.9 MMOL/L (ref 3.4–5.3)
PROCALCITONIN SERPL IA-MCNC: 0.13 NG/ML
PROT SERPL-MCNC: 7.9 G/DL (ref 6.4–8.3)
RBC # BLD AUTO: 4.5 10E6/UL (ref 4.4–5.9)
RBC URINE: 0 /HPF
RSV RNA SPEC NAA+PROBE: NEGATIVE
SAO2 % BLDV: 41.6 % (ref 70–75)
SARS-COV-2 RNA RESP QL NAA+PROBE: NEGATIVE
SODIUM SERPL-SCNC: 136 MMOL/L (ref 135–145)
SP GR UR STRIP: 1.02 (ref 1–1.03)
UROBILINOGEN UR STRIP-MCNC: NORMAL MG/DL
WBC # BLD AUTO: 6.3 10E3/UL (ref 4–11)
WBC URINE: <1 /HPF

## 2024-09-10 PROCEDURE — 96375 TX/PRO/DX INJ NEW DRUG ADDON: CPT | Mod: XU

## 2024-09-10 PROCEDURE — 99285 EMERGENCY DEPT VISIT HI MDM: CPT | Mod: 25

## 2024-09-10 PROCEDURE — 96367 TX/PROPH/DG ADDL SEQ IV INF: CPT

## 2024-09-10 PROCEDURE — 258N000003 HC RX IP 258 OP 636

## 2024-09-10 PROCEDURE — 87637 SARSCOV2&INF A&B&RSV AMP PRB: CPT

## 2024-09-10 PROCEDURE — 250N000011 HC RX IP 250 OP 636

## 2024-09-10 PROCEDURE — 85025 COMPLETE CBC W/AUTO DIFF WBC: CPT

## 2024-09-10 PROCEDURE — 87040 BLOOD CULTURE FOR BACTERIA: CPT

## 2024-09-10 PROCEDURE — 82040 ASSAY OF SERUM ALBUMIN: CPT

## 2024-09-10 PROCEDURE — 81001 URINALYSIS AUTO W/SCOPE: CPT

## 2024-09-10 PROCEDURE — 84145 PROCALCITONIN (PCT): CPT

## 2024-09-10 PROCEDURE — 87798 DETECT AGENT NOS DNA AMP: CPT

## 2024-09-10 PROCEDURE — 83735 ASSAY OF MAGNESIUM: CPT

## 2024-09-10 PROCEDURE — 71260 CT THORAX DX C+: CPT

## 2024-09-10 PROCEDURE — 83605 ASSAY OF LACTIC ACID: CPT

## 2024-09-10 PROCEDURE — 82805 BLOOD GASES W/O2 SATURATION: CPT

## 2024-09-10 PROCEDURE — 96365 THER/PROPH/DIAG IV INF INIT: CPT

## 2024-09-10 PROCEDURE — 70491 CT SOFT TISSUE NECK W/DYE: CPT

## 2024-09-10 PROCEDURE — 96361 HYDRATE IV INFUSION ADD-ON: CPT

## 2024-09-10 PROCEDURE — 96366 THER/PROPH/DIAG IV INF ADDON: CPT

## 2024-09-10 PROCEDURE — 99284 EMERGENCY DEPT VISIT MOD MDM: CPT

## 2024-09-10 PROCEDURE — 86618 LYME DISEASE ANTIBODY: CPT

## 2024-09-10 PROCEDURE — 36415 COLL VENOUS BLD VENIPUNCTURE: CPT

## 2024-09-10 PROCEDURE — 250N000009 HC RX 250

## 2024-09-10 RX ORDER — ACETAMINOPHEN 10 MG/ML
1000 INJECTION, SOLUTION INTRAVENOUS ONCE
Status: COMPLETED | OUTPATIENT
Start: 2024-09-10 | End: 2024-09-10

## 2024-09-10 RX ORDER — IOPAMIDOL 755 MG/ML
100 INJECTION, SOLUTION INTRAVASCULAR ONCE
Status: COMPLETED | OUTPATIENT
Start: 2024-09-10 | End: 2024-09-10

## 2024-09-10 RX ORDER — VANCOMYCIN 2 GRAM/500 ML IN 0.9 % SODIUM CHLORIDE INTRAVENOUS
2000 ONCE
Status: COMPLETED | OUTPATIENT
Start: 2024-09-10 | End: 2024-09-10

## 2024-09-10 RX ORDER — FERROUS SULFATE 325(65) MG
325 TABLET ORAL
Qty: 90 TABLET | Refills: 1 | Status: SHIPPED | OUTPATIENT
Start: 2024-09-10

## 2024-09-10 RX ORDER — DOXYCYCLINE 100 MG/1
100 CAPSULE ORAL 2 TIMES DAILY
Qty: 14 CAPSULE | Refills: 0 | Status: SHIPPED | OUTPATIENT
Start: 2024-09-10 | End: 2024-09-25

## 2024-09-10 RX ORDER — SODIUM CHLORIDE 9 MG/ML
INJECTION, SOLUTION INTRAVENOUS CONTINUOUS
Status: DISCONTINUED | OUTPATIENT
Start: 2024-09-10 | End: 2024-09-11 | Stop reason: HOSPADM

## 2024-09-10 RX ORDER — PIPERACILLIN SODIUM, TAZOBACTAM SODIUM 4; .5 G/20ML; G/20ML
4.5 INJECTION, POWDER, LYOPHILIZED, FOR SOLUTION INTRAVENOUS ONCE
Status: COMPLETED | OUTPATIENT
Start: 2024-09-10 | End: 2024-09-10

## 2024-09-10 RX ADMIN — IOPAMIDOL 100 ML: 755 INJECTION, SOLUTION INTRAVENOUS at 17:03

## 2024-09-10 RX ADMIN — ACETAMINOPHEN 1000 MG: 10 INJECTION, SOLUTION INTRAVENOUS at 16:11

## 2024-09-10 RX ADMIN — SODIUM CHLORIDE 60 ML: 9 INJECTION, SOLUTION INTRAVENOUS at 17:03

## 2024-09-10 RX ADMIN — Medication 2000 MG: at 18:45

## 2024-09-10 RX ADMIN — SODIUM CHLORIDE: 9 INJECTION, SOLUTION INTRAVENOUS at 18:48

## 2024-09-10 RX ADMIN — SODIUM CHLORIDE 3537 ML: 9 INJECTION, SOLUTION INTRAVENOUS at 17:50

## 2024-09-10 RX ADMIN — SODIUM CHLORIDE 1000 ML: 9 INJECTION, SOLUTION INTRAVENOUS at 16:17

## 2024-09-10 RX ADMIN — PIPERACILLIN AND TAZOBACTAM 4.5 G: 4; .5 INJECTION, POWDER, FOR SOLUTION INTRAVENOUS at 17:35

## 2024-09-10 ASSESSMENT — ENCOUNTER SYMPTOMS
SHORTNESS OF BREATH: 0
WEAKNESS: 1
FATIGUE: 1
FEVER: 1
CHILLS: 1
NAUSEA: 0
ABDOMINAL PAIN: 0
VOMITING: 0

## 2024-09-10 ASSESSMENT — ACTIVITIES OF DAILY LIVING (ADL)
ADLS_ACUITY_SCORE: 39

## 2024-09-10 ASSESSMENT — COLUMBIA-SUICIDE SEVERITY RATING SCALE - C-SSRS
2. HAVE YOU ACTUALLY HAD ANY THOUGHTS OF KILLING YOURSELF IN THE PAST MONTH?: NO
1. IN THE PAST MONTH, HAVE YOU WISHED YOU WERE DEAD OR WISHED YOU COULD GO TO SLEEP AND NOT WAKE UP?: NO
6. HAVE YOU EVER DONE ANYTHING, STARTED TO DO ANYTHING, OR PREPARED TO DO ANYTHING TO END YOUR LIFE?: NO

## 2024-09-10 NOTE — ED TRIAGE NOTES
"Pt presents to ED via private car with his wife. Pt started feeling weak and febrile this morning. Pt's wife says he was hypotensive 90s/40s at home. /76   Pulse 110   Temp (!) 103.6  F (39.8  C) (Tympanic)   Resp 24   Ht 1.791 m (5' 10.5\")   Wt 117.9 kg (260 lb)   SpO2 94%   BMI 36.78 kg/m         Triage Assessment (Adult)       Row Name 09/10/24 1524          Triage Assessment    Airway WDL WDL        Respiratory WDL    Respiratory WDL WDL        Skin Circulation/Temperature WDL    Skin Circulation/Temperature WDL X;temperature     Skin Temperature warm        Cardiac WDL    Cardiac WDL X;rhythm     Pulse Rate & Regularity tachycardic        Peripheral/Neurovascular WDL    Peripheral Neurovascular WDL WDL        Cognitive/Neuro/Behavioral WDL    Cognitive/Neuro/Behavioral WDL WDL                     "

## 2024-09-10 NOTE — PROGRESS NOTES
1.  Has the patient had a previous reaction to IV contrast? No    2.  Does the patient have kidney disease? Yes GFR today 68    3.  Is the patient on dialysis? No    If YES to any of these questions, exam will be reviewed with a Radiologist before administering contrast.    IV Contrast- Discharge Instructions After Your CT Scan      The IV contrast you received today will be filtered from your bloodstream by your kidneys during the next 24 hours and pass from the body in urine.  You will not be aware of this process and your urine will not change in color.  To help this process you should drink at least 4 additional glasses of water or juice today.  This reduces stress on your kidneys.    Most contrast reactions are immediate.  Should you develop symptoms of concern after discharge, contact the department at the number below.  After hours you should contact your personal physician.  If you develop breathing distress or wheezing, call 911.

## 2024-09-10 NOTE — ED PROVIDER NOTES
History     Chief Complaint   Patient presents with    Fever    Generalized Weakness     The history is provided by the patient, the spouse and medical records.     Jareth Gallardo is a 64 year old male who presents to the ET today with wife. Patient reports chills this morning, he took a hot shower to help himself warmup. He went to work and felt increasingly weak throughout his day.  Wife had to help him out of his vehicle when he got home.    Denies chest pain, shortness of breath, body aches, nausea or vomiting, diarrhea, abdominal pain, or urine symptoms.  Denies rash.  He does have swelling and redness to the right side of his neck which he noticed this morning and he says it just feels tight. No ear or jaw pain.  He is able to swallow without difficulty, denies any throat discomfort, pain.  Reports that he is able to breathe without difficulty.    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 He has a power port that was flushed about 1 month ago.  PET/brain MRI scan in June showed no cancer on imaging.       Allergies:  Allergies   Allergen Reactions    Keytruda [Pembrolizumab] Other (See Comments)     Renal failure, double vision, and high fever       Problem List:    Patient Active Problem List    Diagnosis Date Noted    Low vitamin B12 level 11/15/2023     Priority: Medium    Type 2 diabetes, HbA1c goal < 7% (H) 11/07/2023     Priority: Medium    Dehydration 10/31/2023     Priority: Medium    Sepsis without acute organ dysfunction, due to unspecified organism (H) 10/26/2023     Priority: Medium    Encounter for screening for other viral diseases 12/05/2022     Priority: Medium    Hard to intubate 12/05/2022     Priority: Medium    Morbid obesity (H) 06/30/2022     Priority: Medium     Chemotherapy-induced neutropenia (H24) 2022     Priority: Medium    Nasopharyngeal cancer (H) 2022     Priority: Medium    Chronic obstructive pulmonary disease, unspecified COPD type (H) 2019     Priority: Medium     Formatting of this note might be different from the original.   Per patient history    No symptoms on breo      Last Assessment & Plan:    Formatting of this note might be different from the original.   Continue breo          Past Medical History:    Past Medical History:   Diagnosis Date    COPD (chronic obstructive pulmonary disease) (H)     Dyslipidemia     Nasopharyngeal carcinoma (H)     BARRETT (obstructive sleep apnea)     Type 2 diabetes mellitus with diabetic nephropathy (H)        Past Surgical History:    Past Surgical History:   Procedure Laterality Date    COLONOSCOPY      DISSECTION RADICAL NECK MODIFIED Left 2022    Procedure: left modified radical neck dissection;  Surgeon: Jesus Boston MD;  Location: [facility] OR    INSERT PORT VASCULAR ACCESS Right 2022    Procedure: SINGLE LUMEN POWER PORT INSERTION, VASCULAR ACCESS;  Surgeon: Evens Aponte MD;  Location: [facility] OR    IR CHEST PORT PLACEMENT > 5 YRS OF AGE  2022    IR RENAL BIOPSY RIGHT  2023    KNEE SURGERY      LARYNGOSCOPY WITH BIOPSY(IES) N/A 2022    Procedure: LARYNGOSCOPY, WITH BIOPSY;  Surgeon: Jesus Boston MD;  Location: [facility] OR    TONSILLECTOMY         Family History:    Family History   Problem Relation Age of Onset    Cerebrovascular Disease Mother     Leukemia Father     Melanoma Sister        Social History:  Marital Status:   [2]  Social History     Tobacco Use    Smoking status: Former     Current packs/day: 0.00     Average packs/day: 4.0 packs/day for 30.0 years (120.0 ttl pk-yrs)     Types: Cigarettes     Start date:      Quit date: 2010     Years since quittin.7     Passive exposure: Past    Smokeless tobacco: Never    Tobacco comments:     quit  "smoking. Passive exposure in childhood home.    Vaping Use    Vaping status: Never Used   Substance Use Topics    Alcohol use: Not Currently     Comment: Hx of 4 beer per month    Drug use: Never        Medications:    amoxicillin-clavulanate (AUGMENTIN) 875-125 MG tablet  doxycycline hyclate (VIBRAMYCIN) 100 MG capsule  acetaminophen (TYLENOL) 500 MG tablet  atorvastatin (LIPITOR) 20 MG tablet  cyanocobalamin (CYANOCOBALAMIN) 1000 MCG/ML injection  ferrous sulfate (FEROSUL) 325 (65 Fe) MG tablet  folic acid (FOLVITE) 1 MG tablet  furosemide (LASIX) 20 MG tablet  levothyroxine (SYNTHROID/LEVOTHROID) 112 MCG tablet  neomycin-polymyxin-dexAMETHasone (MAXITROL) 3.5-69481-1.1 ophthalmic ointment  vitamin B6 (PYRIDOXINE) 50 MG TABS  vitamin D3 (CHOLECALCIFEROL) 50 mcg (2000 units) tablet          Review of Systems   Constitutional:  Positive for chills, fatigue and fever.   HENT:  Negative for sore throat.         Neck swelling   Respiratory:  Negative for shortness of breath.    Cardiovascular:  Negative for chest pain.   Gastrointestinal:  Negative for abdominal pain, nausea and vomiting.   Genitourinary: Negative.    Musculoskeletal:  Positive for neck pain. Negative for back pain and myalgias.        Right sided   Skin:  Negative for rash.   Neurological:  Positive for weakness.   All other systems reviewed and are negative.  See HPI    Physical Exam   BP: 130/76  Pulse: 110  Temp: (!) 103.6  F (39.8  C)  Resp: 24  Height: 179.1 cm (5' 10.5\")  Weight: 117.9 kg (260 lb)  SpO2: 94 %      Physical Exam  Vitals and nursing note reviewed.   Constitutional:       General: He is not in acute distress.     Appearance: He is obese. He is ill-appearing. He is not toxic-appearing.   HENT:      Head: Normocephalic.      Jaw: There is normal jaw occlusion.        Comments: Tinea capitis behind right ear, erythema, tenderness warmth      Right Ear: Tympanic membrane, ear canal and external ear normal. No mastoid tenderness.      " Left Ear: Tympanic membrane, ear canal and external ear normal.      Nose: Nose normal.      Mouth/Throat:      Lips: Pink. No lesions.      Mouth: Mucous membranes are moist.      Tongue: Tongue does not deviate from midline.      Palate: No mass and lesions.      Pharynx: Oropharynx is clear. Uvula midline. No pharyngeal swelling, oropharyngeal exudate, posterior oropharyngeal erythema or uvula swelling.      Tonsils: No tonsillar exudate or tonsillar abscesses.   Neck:      Trachea: Trachea normal.        Comments: Right-sided neck edema with erythema  Cardiovascular:      Rate and Rhythm: Regular rhythm. Tachycardia present.      Pulses: Normal pulses.   Pulmonary:      Effort: Pulmonary effort is normal.   Abdominal:      General: Bowel sounds are normal.      Palpations: Abdomen is soft.      Tenderness: There is no abdominal tenderness. There is no right CVA tenderness or left CVA tenderness.   Musculoskeletal:         General: Normal range of motion.      Cervical back: Edema and erythema present.   Skin:     General: Skin is warm.      Capillary Refill: Capillary refill takes less than 2 seconds.   Neurological:      General: No focal deficit present.      Mental Status: He is alert.   Psychiatric:         Mood and Affect: Mood normal.         ED Course         Results for orders placed or performed during the hospital encounter of 09/10/24 (from the past 24 hour(s))   Coleman Draw *Canceled*    Narrative    The following orders were created for panel order Coleman Draw.  Procedure                               Abnormality         Status                     ---------                               -----------         ------                       Please view results for these tests on the individual orders.   CBC with platelets differential    Narrative    The following orders were created for panel order CBC with platelets differential.  Procedure                               Abnormality         Status                      ---------                               -----------         ------                     CBC with platelets and d...[[id number]]  Abnormal            Final result                 Please view results for these tests on the individual orders.   Comprehensive metabolic panel   Result Value Ref Range    Sodium 136 135 - 145 mmol/L    Potassium 3.9 3.4 - 5.3 mmol/L    Carbon Dioxide (CO2) 27 22 - 29 mmol/L    Anion Gap 11 7 - 15 mmol/L    Urea Nitrogen 14.9 8.0 - 23.0 mg/dL    Creatinine 1.20 (H) 0.67 - 1.17 mg/dL    GFR Estimate 68 >60 mL/min/1.73m2    Calcium 9.1 8.8 - 10.4 mg/dL    Chloride 98 98 - 107 mmol/L    Glucose 100 (H) 70 - 99 mg/dL    Alkaline Phosphatase 81 40 - 150 U/L    AST 34 0 - 45 U/L    ALT 24 0 - 70 U/L    Protein Total 7.9 6.4 - 8.3 g/dL    Albumin 4.3 3.5 - 5.2 g/dL    Bilirubin Total 1.2 <=1.2 mg/dL   Lactic Acid Whole Blood with 1X Repeat in 2 HR when >2   Result Value Ref Range    Lactic Acid, Initial 0.7 0.7 - 2.0 mmol/L   Blood gas venous   Result Value Ref Range    pH Venous 7.41 7.32 - 7.43    pCO2 Venous 47 40 - 50 mm Hg    pO2 Venous 27 25 - 47 mm Hg    Bicarbonate Venous 30 (H) 21 - 28 mmol/L    Base Excess/Deficit Venous 4.2 (H) -3.0 - 3.0 mmol/L    FIO2 21     Oxyhemoglobin Venous 41 (L) 70 - 75 %    O2 Sat, Venous 41.6 (L) 70.0 - 75.0 %    Narrative    In healthy individuals, oxyhemoglobin (O2Hb) and oxygen saturation (SO2) are approximately equal. In the presence of dyshemoglobins, oxyhemoglobin can be considerably lower than oxygen saturation.   Procalcitonin   Result Value Ref Range    Procalcitonin 0.13 <0.50 ng/mL   Extra Tube    Narrative    The following orders were created for panel order Extra Tube.  Procedure                               Abnormality         Status                     ---------                               -----------         ------                     Extra Blue Top Tube[[id number]]                              Final result               Extra  Red Top Tube[[id number]]                               Final result                 Please view results for these tests on the individual orders.   Extra Blue Top Tube   Result Value Ref Range    Hold Specimen x    Extra Red Top Tube   Result Value Ref Range    Hold Specimen x    CBC with platelets and differential   Result Value Ref Range    WBC Count 6.3 4.0 - 11.0 10e3/uL    RBC Count 4.50 4.40 - 5.90 10e6/uL    Hemoglobin 12.7 (L) 13.3 - 17.7 g/dL    Hematocrit 39.2 (L) 40.0 - 53.0 %    MCV 87 78 - 100 fL    MCH 28.2 26.5 - 33.0 pg    MCHC 32.4 31.5 - 36.5 g/dL    RDW 14.7 10.0 - 15.0 %    Platelet Count 143 (L) 150 - 450 10e3/uL    % Neutrophils 85 %    % Lymphocytes 7 %    % Monocytes 7 %    % Eosinophils 0 %    % Basophils 0 %    % Immature Granulocytes 1 %    NRBCs per 100 WBC 0 <1 /100    Absolute Neutrophils 5.4 1.6 - 8.3 10e3/uL    Absolute Lymphocytes 0.5 (L) 0.8 - 5.3 10e3/uL    Absolute Monocytes 0.4 0.0 - 1.3 10e3/uL    Absolute Eosinophils 0.0 0.0 - 0.7 10e3/uL    Absolute Basophils 0.0 0.0 - 0.2 10e3/uL    Absolute Immature Granulocytes 0.0 <=0.4 10e3/uL    Absolute NRBCs 0.0 10e3/uL   Magnesium   Result Value Ref Range    Magnesium 1.9 1.7 - 2.3 mg/dL   Symptomatic Influenza A/B, RSV, & SARS-CoV2 PCR (COVID-19) Nose    Specimen: Nose; Swab   Result Value Ref Range    Influenza A PCR Negative Negative    Influenza B PCR Negative Negative    RSV PCR Negative Negative    SARS CoV2 PCR Negative Negative    Narrative    Testing was performed using the Xpert Xpress CoV2/Flu/RSV Assay on the Cepheid GeneXpert Instrument. This test should be ordered for the detection of SARS-CoV2, influenza, and RSV viruses in individuals with signs and symptoms of respiratory tract infection. This test is for in vitro diagnostic use under the [place] FDA for laboratories certified under CLIA to perform high or moderate complexity testing. This test has been [place] FDA cleared. A negative result does not rule out the presence of PCR  inhibitors in the specimen or target RNA in concentration below the limit of detection for the assay. If only one viral target is positive but coinfection with multiple targets is suspected, the sample should be re-tested with another FDA cleared, approved, or authorized test, if coninfection would change clinical management. This test was validated by the [facility]. These laboratories are certified under the Clinical Laboratory Improvement Amendments of 1988 (CLIA-88) as qualified to perfom high complexity laboratory testing.   UA with Microscopic reflex to Culture    Specimen: Urine, Midstream   Result Value Ref Range    Color Urine Light Yellow Colorless, Straw, Light Yellow, Yellow    Appearance Urine Clear Clear    Glucose Urine Negative Negative mg/dL    Bilirubin Urine Negative Negative    Ketones Urine Negative Negative mg/dL    Specific Gravity Urine 1.023 1.000 - 1.030    Blood Urine Negative Negative    pH Urine 8.0 5.0 - 9.0    Protein Albumin Urine 20 (A) Negative mg/dL    Urobilinogen Urine Normal Normal, 2.0 mg/dL    Nitrite Urine Negative Negative    Leukocyte Esterase Urine Negative Negative    RBC Urine 0 <=2 /HPF    WBC Urine <1 <=5 /HPF    Narrative    Urine Culture not indicated   CT Soft Tissue Neck w Contrast    Narrative    EXAM: CT SOFT TISSUE NECK W CONTRAST 9/10/2024 5:32 PM    HISTORY: right sided neck swelling, fever, ? abscess?      COMPARISON: PET CT 6/19/2024; CT neck 5/8/2024    Technique: Following intravenous administration of nonionic iodinated  contrast medium, thin section helical CT images were obtained from the  skull base down to the level of the aortic arch.  Axial, coronal and  sagittal reformations were performed. Images were reviewed in soft  tissue, lung and bone windows.    CONTRAST: isovue 370 100ml    FINDINGS:     No suspicious aerodigestive tract lesions.    Postsurgical changes of the left neck with partial resection of  left  sternocleidomastoid and left neck dissection. Decreased thickening of  the left sternocleidomastoid and left platysma. Expected  post-treatment changes are noted including effacement of fat planes,  left greater than right, supraglottic mucosal edema and thickening of  the skin and subcutaneous soft tissues.    Decreased size of enhancing left cervical level IIb, not well  characterized on today's study. No pathologically enlarged lymph  nodes. Mild right neck subcutaneous stranding, grossly similar to  comparison. Mild asymmetric posterior right neck skin thickening.    Evaluation of the visualized portions of the brain, orbits, spine, and  lungs show no aggressive lesion suspicious for metastatic involvement.    The major salivary glands are unremarkable. Stable subcentimeter  thyroid nodules.    Left jugular vein is surgically absent. Right Port-A-Cath courses  inferior to the field-of-view.     Mild paranasal sinus mucosal thickening. The mastoid air cells are  clear.     The visualized lung apices are clear.      Impression    IMPRESSION:  Posterior images of the neck with no evident mass, abscess or  pathologically enlarged lymph nodes.     In the area of posterior right neck skin marker there is slight  asymmetric skin thickening, could represent cellulitis or  posttreatment change. Recommend clinical correlation.    KIRIT FREEDMAN MD         SYSTEM ID:  [id number]   CT Chest w Contrast    Narrative    EXAMINATION: CT CHEST W CONTRAST, 9/10/2024 5:33 PM    HISTORY: fever, sepsis    COMPARISON: CT chest 10/26/2023    TECHNIQUE:  Imaging protocol: Computed tomography images of the chest with  intravenous contrast. Contrast: isovue 370 100ml.  Acquisition: This CT exam was performed using one or more the  following dose reduction techniques: automated exposure control,  adjustment of the mA and/or kV according to patient size, and/or  iterative reconstruction technique.  Processing: 3D rendering on  independent workstation using Maximum  Intensity Projection (MIP) was performed and archived to PACS. 3D  reconstructions are interpreted and reported by supervising  radiologist.    FINDINGS:    CHEST:  LUNGS: Central airways are clear. No pulmonary mass. Scattered discoid  and subpleural atelectasis. No acute airspace opacities or  consolidation.  PLEURA: Within normal limits.  VESSELS: Atherosclerotic calcification of the aorta without aneurysmal  dilation. Right chest Port-A-Cath with tip terminating at the superior  atriocaval junction. Main pulmonary artery is dilated up to 3.7 cm,  suggestive of underlying pulmonary hypertension.  HEART: No cardiomegaly. Mild coronary calcification.  LYMPH NODES: There are no pathologically enlarged lymph nodes.  THYROID: Diminutive thyroid without suspicious nodularity.    BONES AND SOFT TISSUES:  No suspicious osseous lesions. Degenerative periarticular changes.    UPPER ABDOMEN:  Limited evaluation of the upper abdomen demonstrates no acute  parenchymal abnormalities, nonobstructive bowel gas pattern, and no  free fluid or free air.       Impression    IMPRESSION:    No acute abnormality.    KIRIT FREEDMAN MD         SYSTEM ID:  [id number]       Medications   sodium chloride 0.9% BOLUS 3,537 mL (0 mLs Intravenous Stopped 9/10/24 1845)   acetaminophen (OFIRMEV) infusion 1,000 mg (0 mg Intravenous Stopped 9/10/24 1624)   piperacillin-tazobactam (ZOSYN) 4.5 g vial to attach to  mL bag (0 g Intravenous Stopped 9/10/24 1816)   Vancomycin (VANCOCIN) 2,000 mg in 0.9% NaCl 500 mL intermittent infusion (0 mg Intravenous Stopped 9/10/24 2104)   iopamidol (ISOVUE-370) solution 100 mL (100 mLs Intravenous $Given 9/10/24 1703)   sodium chloride 0.9 % bag 500 mL for CT scan flush use (60 mLs Intravenous $Given 9/10/24 1703)       Assessments & Plan (with Medical Decision Making)  Initial vital signs concerning for sepsis: temp 103.6,  130/76, , RR 24, 94% on room air  Patient  "is alert and oriented but appears ill, with unilateral neck swelling fever, tachycardia; I initiated sepsis workup with a concern for parapharyngeal or retropharyngeal abscess- He is managing his airway, secretions, no stridor.  No obvious peritonsillar abscess or oropharyngeal swelling. He reports mild pain in his right neck, states it feels \"tight\".  Denies other complaints of pain or discomfort.  NPO initiated.   Labs & Radiology results interpreted by radiologist:   ED Course as of 09/11/24 0102   Tue Sep 10, 2024   1630 Lactic Acid Whole Blood with 1X Repeat in 2 HR when >2  normal   1630 CBC with platelets differential(!)  No leukocytosis- stable. Platelets 143   1650 Comprehensive metabolic panel(!)  Elevation in creatinine 1.20- other wise stable   1656 Procalcitonin  negative   1718 Symptomatic Influenza A/B, RSV, & SARS-CoV2 PCR (COVID-19) Nose  Negative   1737 UA with Microscopic reflex to Culture(!)  unremarkable   1756 CT Soft Tissue Neck w Contrast  IMPRESSION:  Posterior images of the neck with no evident mass, abscess or  pathologically enlarged lymph nodes.      In the area of posterior right neck skin marker there is slight  asymmetric skin thickening, could represent cellulitis or  posttreatment change. Recommend clinical correlation.     1756 CT Chest w Contrast  IMPRESSION:     No acute abnormality.        Meds: IVF bolus for sepsis, zosyn, vanco  Blood culture and tickborne panel in process  Patient was up ambulating in the emergency department and was feeling better and was steady on his feet.  Vitals:    09/10/24 1905 09/10/24 1935 09/10/24 2005 09/10/24 2103   BP: 98/61 110/67 109/73 126/79   Pulse: 81 80 80 79   Resp: 20 19 19 22   Temp:       TempSrc:       SpO2:       Weight:       Height:          Vitals improved today, lab work is stable-no leukocytosis, normal lactic, normal procalcitonin.   Patient has a history of MRSA, given concern for cellulitis today we will treat with " doxycycline which will also cover tickborne illness, and Augmentin. Close follow-up in the clinic in two days (9/13) advised to return if new or worsening concerns. Temp 100.4 at discharge. He is feeling better. Patient discharging home in stable condition verbal understanding of his discharge instruction given.      I have reviewed the nursing notes.    I have reviewed the findings, diagnosis, plan and need for follow up with the patient.    Medical Decision Making  The patient's presentation was of high complexity (concern for sepsis).    The patient's evaluation involved:  review of external note(s) from 1 sources (see separate area of note for details)  review of 1 test result(s) ordered prior to this encounter (see separate area of note for details)  ordering and/or review of 3+ test(s) in this encounter (see separate area of note for details)    The patient's management necessitated moderate risk (prescription drug management including medications given in the ED) and moderate risk (IV contrast administration).        Discharge Medication List as of 9/10/2024 10:00 PM        START taking these medications    Details   amoxicillin-clavulanate (AUGMENTIN) 875-125 MG tablet Take 1 tablet by mouth 2 times daily for 7 days., Disp-14 tablet, R-0, E-Prescribe      doxycycline hyclate (VIBRAMYCIN) 100 MG capsule Take 1 capsule (100 mg) by mouth 2 times daily., Disp-14 capsule, R-0, E-Prescribe             Final diagnoses:   Fever   Cellulitis       9/10/2024   [facility] AND [facility] Elena, APRN CNP  09/11/24 0105

## 2024-09-10 NOTE — PHARMACY-VANCOMYCIN DOSING SERVICE
"Pharmacy Vancomycin Initial Note  Date of Service September 10, 2024  Patient's  1960  64 year old, male    Indication: Sepsis    Current estimated CrCl = Estimated Creatinine Clearance: 110.4 mL/min (based on SCr of 0.9 mg/dL).    Creatinine for last 3 days  No results found for requested labs within last 3 days.    Recent Vancomycin Level(s) for last 3 days  No results found for requested labs within last 3 days.      Vancomycin IV Administrations (past 72 hours)        No vancomycin orders with administrations in past 72 hours.                    Nephrotoxins and other renal medications (From now, onward)      Start     Dose/Rate Route Frequency Ordered Stop    09/10/24 1615  Vancomycin (VANCOCIN) 2,000 mg in 0.9% NaCl 500 mL intermittent infusion         2,000 mg  250 mL/hr over 2 Hours Intravenous ONCE 09/10/24 1612      09/10/24 1610  piperacillin-tazobactam (ZOSYN) 4.5 g vial to attach to  mL bag        Note to Pharmacy: For [facility], [facility] and [facility]: For Zosyn-naive patients, use the \"Zosyn initial dose + extended infusion\" order panel.    4.5 g  over 30 Minutes Intravenous ONCE 09/10/24 1605              Contrast Orders - past 72 hours (72h ago, onward)      None                    Plan:  Start vancomycin  2000 mg IV Once.   Vancomycin monitoring method: AUC  Vancomycin therapeutic monitoring goal: 400-600 mg*h/L  Pharmacy will check vancomycin levels as appropriate in 1-3 Days.    Serum creatinine levels will be ordered daily for the first week of therapy and at least twice weekly for subsequent weeks.      Bob Sam, [facility]   "

## 2024-09-11 ENCOUNTER — PATIENT OUTREACH (OUTPATIENT)
Dept: ONCOLOGY | Facility: OTHER | Age: 64
End: 2024-09-11
Payer: COMMERCIAL

## 2024-09-11 ASSESSMENT — ENCOUNTER SYMPTOMS
NECK PAIN: 1
BACK PAIN: 0
MYALGIAS: 0
SORE THROAT: 0

## 2024-09-11 NOTE — PROGRESS NOTES
[facility]: Cancer Care Follow-Up Note                                    Discussion with Patient:                                                      Jareth was seen in ED last night for fever, chills. He had an area behind his ear that was itchy and the right side of his neck was sore and hard. He was diagnosed with cellulitis. He was given IV antibiotics and prescription was sent to pharmacy for additional antibiotics. Wife states that fever broke last night.             Dates of Treatment:                                                      Infusion given in last 28 days       None            Assessment:                                                          RNCC Short Symptom Review:     Assessment completed with:: Patient    General/Short Assessment  Does the patient have all their medications?: Yes  Is the patient experiencing any new or worsening symptoms?: Yes, See comments  Discussion with patient: Answered patient's questions and addressed concerns;Reviewed how and when to contact clinic;Reviewed patient's future appointments    Pain  Patient Reported Pain?: Yes, but is new or different pain  Pain Loc: Neck    Patient Coping  Accepting    Clinic Utilization  Patient Aware of Next Appointment?: Yes    Other Patient Concerns  Other Patient Reported Concerns: No    Intervention/Education provided during outreach:                                                       Follow up with provider in clinic on Friday     Patient to follow up as scheduled at next appt  Patient to call/[employer] message with updates  Confirmed patient has clinic and triage numbers    Signature:  Sybil Zamora RN

## 2024-09-11 NOTE — DISCHARGE INSTRUCTIONS
Appointment made for follow up for a recheck.  Cultures are pending you will receive a phone call if these are positive and you need to come in.  Antibiotics were sent to your pharmacy in [place] per your request.  Tickborne illness lab work was sent out also pending you should see these in your MyChart as well as we will receive a phone call regarding this as well  Return to be seen if you have new or worsening symptoms  Make sure you are getting plenty of rest.

## 2024-09-12 LAB
A PHAGOCYTOPH DNA BLD QL NAA+PROBE: NOT DETECTED
B BURGDOR IGG+IGM SER QL: 0.42
BABESIA DNA BLD QL NAA+PROBE: NOT DETECTED
EHRLICHIA DNA SPEC QL NAA+PROBE: NOT DETECTED

## 2024-09-13 ENCOUNTER — OFFICE VISIT (OUTPATIENT)
Dept: FAMILY MEDICINE | Facility: OTHER | Age: 64
End: 2024-09-13
Payer: COMMERCIAL

## 2024-09-13 VITALS
HEART RATE: 73 BPM | TEMPERATURE: 97.5 F | OXYGEN SATURATION: 96 % | BODY MASS INDEX: 37.34 KG/M2 | WEIGHT: 264 LBS | DIASTOLIC BLOOD PRESSURE: 78 MMHG | SYSTOLIC BLOOD PRESSURE: 108 MMHG | RESPIRATION RATE: 16 BRPM

## 2024-09-13 DIAGNOSIS — L40.9 PSORIASIS OF SCALP: ICD-10-CM

## 2024-09-13 DIAGNOSIS — L03.221 CELLULITIS OF NECK: Primary | ICD-10-CM

## 2024-09-13 PROCEDURE — 99214 OFFICE O/P EST MOD 30 MIN: CPT

## 2024-09-13 PROCEDURE — G0463 HOSPITAL OUTPT CLINIC VISIT: HCPCS

## 2024-09-13 RX ORDER — DOXYCYCLINE 100 MG/1
100 CAPSULE ORAL 2 TIMES DAILY
Qty: 6 CAPSULE | Refills: 0 | Status: SHIPPED | OUTPATIENT
Start: 2024-09-13 | End: 2024-09-16

## 2024-09-13 RX ORDER — CLOBETASOL PROPIONATE 0.05 G/100ML
SHAMPOO TOPICAL
Qty: 118 ML | Refills: 0 | Status: SHIPPED | OUTPATIENT
Start: 2024-09-13

## 2024-09-13 ASSESSMENT — PAIN SCALES - GENERAL: PAINLEVEL: MILD PAIN (3)

## 2024-09-13 NOTE — PROGRESS NOTES
Assessment & Plan   Problem List Items Addressed This Visit    None  Visit Diagnoses       Cellulitis of neck    -  Primary    Relevant Medications    doxycycline hyclate (VIBRAMYCIN) 100 MG capsule    amoxicillin-clavulanate (AUGMENTIN) 875-125 MG tablet    Psoriasis of scalp        Relevant Medications    clobetasol propionate (CLOBEX) 0.05 % external shampoo    Other Relevant Orders    Adult Dermatology [facility] Referral           Patient presents for emergency room follow-up, he was seen on 9/10/2024 and diagnosed with cellulitis of the neck.  He did have a fever upon admission.  Blood cultures have been negative.  He was treated with IV antibiotics and sent home on oral doxycycline and Augmentin.  He reports that he is much improved and that the swelling and redness in his neck has greatly improved.  On exam today he is afebrile, vital signs are stable, Well-demarcated, erythematous, scaly plaque below right ear, right side of neck with erythema, warmth, and swelling.  We did opt to extend antibiotic therapy to complete a 10-day course of oral antibiotics as there is remaining swelling and erythema at this point.  He is agreeable to this plan and has been tolerating antibiotics well.  He did report that he will use the infection was introduced through scaling patch behind right ear, and reports it is highly pruritic.  Exam and history of this patch are consistent with scalp psoriasis, recommend treating with Clobex.  If this treatment is not effective he will follow-up with dermatology.    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    Overall patient is much improved, we will continue on oral therapy. Strict return to care parameters were reviewed with patient and he verbalizes understanding.       MED REC REQUIRED  Post Medication Reconciliation Status:     BMI  Estimated body mass index is 37.34  "kg/m  as calculated from the following:    Height as of 9/10/24: 1.791 m (5' 10.5\").    Weight as of this encounter: 119.7 kg (264 lb).       No follow-ups on file.      Tonja Templeton is a 64 year old, presenting for the following health issues:  No chief complaint on file.    [facility]     ED/UC Followup:    Facility:  [facility] ER  Date of visit: 9/10/24  Reason for visit: Fever, cellulitis  Current Status: better    Patient was seen in the ED on 9/10/2024 and treated with IV antibiotics for cellulitis to right side of his neck, sent home on doxycycline and Augmentin.  So far blood cultures have been negative.  Patient is reporting that he is much improved, they note that the swelling has gone down significantly and he is feeling much better.  He believes that  infection was introduced from an area behind his right ear that is itchy and flaky and he has been scratching it quite a bit.    Patient follows with oncology due to history of stage DENAE nasopharyngeal carcinoma.  This was treated with surgical intervention and chemo and radiation.  In June 2024 PET scan was negative.  CT scan in the ED on 9/10/2024 was negative for any masses, abscess, or other findings other than the cellulitis.    Review of Systems  Constitutional, HEENT, cardiovascular, pulmonary, GI, , musculoskeletal, neuro, skin, endocrine and psych systems are negative, except as otherwise noted.      Objective    There were no vitals taken for this visit.  There is no height or weight on file to calculate BMI.  Physical Exam   GENERAL: alert and no distress  EYES: Eyes grossly normal to inspection, PERRL and conjunctivae and sclerae normal  HENT: ear canals and TM's normal, nose and mouth without ulcers or lesions  NECK: Neck with swelling, warmth, and erythema to right side, no masses palpable.   RESP: lungs clear to auscultation - no rales, rhonchi or wheezes  CV: regular rate and rhythm, normal S1 S2, no S3 or S4, no murmur, click or rub, no " peripheral edema  ABDOMEN: soft, nontender, no hepatosplenomegaly, no masses and bowel sounds normal  MS: no gross musculoskeletal defects noted, no edema  SKIN: Well-demarcated, erythematous, scaly plaque below right ear.   NEURO: Normal strength and tone, mentation intact and speech normal  PSYCH: mentation appears normal, affect normal/bright        Signed Electronically by: SILVIANO MIJARES CNP

## 2024-09-15 LAB — BACTERIA BLD CULT: NO GROWTH

## 2024-09-18 ENCOUNTER — LAB (OUTPATIENT)
Dept: LAB | Facility: OTHER | Age: 64
End: 2024-09-18
Attending: INTERNAL MEDICINE
Payer: COMMERCIAL

## 2024-09-18 ENCOUNTER — HOSPITAL ENCOUNTER (OUTPATIENT)
Dept: PET IMAGING | Facility: OTHER | Age: 64
Discharge: HOME | End: 2024-09-18
Attending: INTERNAL MEDICINE
Payer: COMMERCIAL

## 2024-09-18 ENCOUNTER — HOSPITAL ENCOUNTER (OUTPATIENT)
Dept: INFUSION THERAPY | Facility: OTHER | Age: 64
Discharge: HOME | End: 2024-09-18
Attending: INTERNAL MEDICINE
Payer: COMMERCIAL

## 2024-09-18 DIAGNOSIS — C11.9 NASOPHARYNGEAL CANCER (H): ICD-10-CM

## 2024-09-18 DIAGNOSIS — R79.89 LOW VITAMIN B12 LEVEL: Primary | ICD-10-CM

## 2024-09-18 DIAGNOSIS — D50.9 IRON DEFICIENCY ANEMIA, UNSPECIFIED IRON DEFICIENCY ANEMIA TYPE: ICD-10-CM

## 2024-09-18 LAB
ALBUMIN SERPL BCG-MCNC: 4 G/DL (ref 3.5–5.2)
ALP SERPL-CCNC: 73 U/L (ref 40–150)
ALT SERPL W P-5'-P-CCNC: 20 U/L (ref 0–70)
ANION GAP SERPL CALCULATED.3IONS-SCNC: 9 MMOL/L (ref 7–15)
AST SERPL W P-5'-P-CCNC: 32 U/L (ref 0–45)
BASOPHILS # BLD AUTO: 0 10E3/UL (ref 0–0.2)
BASOPHILS NFR BLD AUTO: 0 %
BILIRUB SERPL-MCNC: 0.6 MG/DL
BUN SERPL-MCNC: 15.4 MG/DL (ref 8–23)
CALCIUM SERPL-MCNC: 9 MG/DL (ref 8.8–10.4)
CHLORIDE SERPL-SCNC: 105 MMOL/L (ref 98–107)
CREAT SERPL-MCNC: 0.84 MG/DL (ref 0.67–1.17)
CRP SERPL-MCNC: 14.58 MG/L
EGFRCR SERPLBLD CKD-EPI 2021: >90 ML/MIN/1.73M2
EOSINOPHIL # BLD AUTO: 0.1 10E3/UL (ref 0–0.7)
EOSINOPHIL NFR BLD AUTO: 2 %
ERYTHROCYTE [DISTWIDTH] IN BLOOD BY AUTOMATED COUNT: 14.4 % (ref 10–15)
ERYTHROCYTE [SEDIMENTATION RATE] IN BLOOD BY WESTERGREN METHOD: 44 MM/HR (ref 0–20)
FERRITIN SERPL-MCNC: 282 NG/ML (ref 31–409)
FOLATE SERPL-MCNC: >20 NG/ML (ref 4.6–34.8)
GLUCOSE SERPL-MCNC: 89 MG/DL (ref 70–99)
HCO3 SERPL-SCNC: 27 MMOL/L (ref 22–29)
HCT VFR BLD AUTO: 35.1 % (ref 40–53)
HGB BLD-MCNC: 11.3 G/DL (ref 13.3–17.7)
IMM GRANULOCYTES # BLD: 0 10E3/UL
IMM GRANULOCYTES NFR BLD: 0 %
IRON BINDING CAPACITY (ROCHE): 209 UG/DL (ref 240–430)
IRON SATN MFR SERPL: 23 % (ref 15–46)
IRON SERPL-MCNC: 49 UG/DL (ref 61–157)
LDH SERPL L TO P-CCNC: 162 U/L (ref 0–250)
LYMPHOCYTES # BLD AUTO: 1.1 10E3/UL (ref 0.8–5.3)
LYMPHOCYTES NFR BLD AUTO: 25 %
MCH RBC QN AUTO: 27.7 PG (ref 26.5–33)
MCHC RBC AUTO-ENTMCNC: 32.2 G/DL (ref 31.5–36.5)
MCV RBC AUTO: 86 FL (ref 78–100)
MONOCYTES # BLD AUTO: 0.3 10E3/UL (ref 0–1.3)
MONOCYTES NFR BLD AUTO: 7 %
NEUTROPHILS # BLD AUTO: 3 10E3/UL (ref 1.6–8.3)
NEUTROPHILS NFR BLD AUTO: 65 %
NRBC # BLD AUTO: 0 10E3/UL
NRBC BLD AUTO-RTO: 0 /100
PLATELET # BLD AUTO: 203 10E3/UL (ref 150–450)
POTASSIUM SERPL-SCNC: 3.9 MMOL/L (ref 3.4–5.3)
PROT SERPL-MCNC: 7.6 G/DL (ref 6.4–8.3)
RBC # BLD AUTO: 4.08 10E6/UL (ref 4.4–5.9)
SODIUM SERPL-SCNC: 141 MMOL/L (ref 135–145)
VIT B12 SERPL-MCNC: 672 PG/ML (ref 232–1245)
WBC # BLD AUTO: 4.6 10E3/UL (ref 4–11)

## 2024-09-18 PROCEDURE — 250N000011 HC RX IP 250 OP 636: Performed by: INTERNAL MEDICINE

## 2024-09-18 PROCEDURE — 86140 C-REACTIVE PROTEIN: CPT | Mod: ZL

## 2024-09-18 PROCEDURE — 80053 COMPREHEN METABOLIC PANEL: CPT | Mod: ZL

## 2024-09-18 PROCEDURE — 82746 ASSAY OF FOLIC ACID SERUM: CPT | Mod: ZL

## 2024-09-18 PROCEDURE — 83550 IRON BINDING TEST: CPT | Mod: ZL

## 2024-09-18 PROCEDURE — 36415 COLL VENOUS BLD VENIPUNCTURE: CPT | Mod: ZL

## 2024-09-18 PROCEDURE — A9552 F18 FDG: HCPCS | Mod: GZ | Performed by: INTERNAL MEDICINE

## 2024-09-18 PROCEDURE — 96372 THER/PROPH/DIAG INJ SC/IM: CPT | Mod: XU | Performed by: INTERNAL MEDICINE

## 2024-09-18 PROCEDURE — 82728 ASSAY OF FERRITIN: CPT | Mod: ZL

## 2024-09-18 PROCEDURE — 85652 RBC SED RATE AUTOMATED: CPT | Mod: ZL

## 2024-09-18 PROCEDURE — 82607 VITAMIN B-12: CPT | Mod: ZL

## 2024-09-18 PROCEDURE — 83615 LACTATE (LD) (LDH) ENZYME: CPT | Mod: ZL

## 2024-09-18 PROCEDURE — 343N000001 HC RX 343: Mod: GZ | Performed by: INTERNAL MEDICINE

## 2024-09-18 PROCEDURE — 85041 AUTOMATED RBC COUNT: CPT | Mod: ZL

## 2024-09-18 PROCEDURE — 78815 PET IMAGE W/CT SKULL-THIGH: CPT | Mod: PS

## 2024-09-18 RX ORDER — ALBUTEROL SULFATE 90 UG/1
1-2 AEROSOL, METERED RESPIRATORY (INHALATION)
Start: 2024-09-19

## 2024-09-18 RX ORDER — CYANOCOBALAMIN 1000 UG/ML
1000 INJECTION, SOLUTION INTRAMUSCULAR; SUBCUTANEOUS ONCE
Status: CANCELLED
Start: 2024-10-02 | End: 2024-09-19

## 2024-09-18 RX ORDER — METHYLPREDNISOLONE SODIUM SUCCINATE 125 MG/2ML
125 INJECTION, POWDER, LYOPHILIZED, FOR SOLUTION INTRAMUSCULAR; INTRAVENOUS
Start: 2024-09-19

## 2024-09-18 RX ORDER — ALBUTEROL SULFATE 0.83 MG/ML
2.5 SOLUTION RESPIRATORY (INHALATION)
OUTPATIENT
Start: 2024-09-19

## 2024-09-18 RX ORDER — MEPERIDINE HYDROCHLORIDE 50 MG/ML
25 INJECTION INTRAMUSCULAR; INTRAVENOUS; SUBCUTANEOUS EVERY 30 MIN PRN
OUTPATIENT
Start: 2024-09-19

## 2024-09-18 RX ORDER — CYANOCOBALAMIN 1000 UG/ML
1000 INJECTION, SOLUTION INTRAMUSCULAR; SUBCUTANEOUS ONCE
Status: COMPLETED | OUTPATIENT
Start: 2024-09-18 | End: 2024-09-18

## 2024-09-18 RX ORDER — FLUDEOXYGLUCOSE F 18 200 MCI/ML
13.47 INJECTION, SOLUTION INTRAVENOUS ONCE
Status: COMPLETED | OUTPATIENT
Start: 2024-09-18 | End: 2024-09-18

## 2024-09-18 RX ORDER — DIPHENHYDRAMINE HYDROCHLORIDE 50 MG/ML
50 INJECTION INTRAMUSCULAR; INTRAVENOUS
Start: 2024-09-19

## 2024-09-18 RX ORDER — EPINEPHRINE 1 MG/ML
0.3 INJECTION, SOLUTION, CONCENTRATE INTRAVENOUS EVERY 5 MIN PRN
OUTPATIENT
Start: 2024-09-19

## 2024-09-18 RX ADMIN — CYANOCOBALAMIN 1000 MCG: 1000 INJECTION, SOLUTION INTRAMUSCULAR; SUBCUTANEOUS at 13:59

## 2024-09-18 RX ADMIN — FLUDEOXYGLUCOSE F 18 13.47 MILLICURIE: 200 INJECTION, SOLUTION INTRAVENOUS at 14:52

## 2024-09-18 NOTE — NURSING NOTE
Infusion Nursing Note:  Jareth Gallardo presents today for B-12 injection.    Patient seen by provider today: No   present during visit today: Not Applicable.    Note: N/A.      Intravenous Access:  Labs drawn without difficulty per .    Treatment Conditions:  Not Applicable.      Post Infusion Assessment:  Patient tolerated injection to right deltoid without incident.  Site intact, free from redness, edema or discomfort.       Discharge Plan:   Discharge instructions reviewed with: Patient.  Patient and/or family verbalized understanding of discharge instructions and all questions answered.  AVS to patient via [employer].  Patient will return 10/2/2024 for next appointment.   Patient discharged in stable condition accompanied by: self.  Departure Mode: Ambulatory.      Laquita Lawson RN

## 2024-09-25 ENCOUNTER — ONCOLOGY VISIT (OUTPATIENT)
Dept: ONCOLOGY | Facility: OTHER | Age: 64
End: 2024-09-25
Attending: NURSE PRACTITIONER
Payer: COMMERCIAL

## 2024-09-25 ENCOUNTER — PATIENT OUTREACH (OUTPATIENT)
Dept: ONCOLOGY | Facility: OTHER | Age: 64
End: 2024-09-25

## 2024-09-25 VITALS
TEMPERATURE: 98 F | BODY MASS INDEX: 37.34 KG/M2 | SYSTOLIC BLOOD PRESSURE: 102 MMHG | WEIGHT: 264 LBS | DIASTOLIC BLOOD PRESSURE: 68 MMHG | OXYGEN SATURATION: 95 % | RESPIRATION RATE: 12 BRPM | HEART RATE: 79 BPM

## 2024-09-25 DIAGNOSIS — D89.839 CYTOKINE RELEASE SYNDROME: ICD-10-CM

## 2024-09-25 DIAGNOSIS — C11.9 NASOPHARYNGEAL CANCER (H): Primary | ICD-10-CM

## 2024-09-25 DIAGNOSIS — R91.8 PULMONARY NODULES: ICD-10-CM

## 2024-09-25 LAB
CRP SERPL-MCNC: 3.21 MG/L
ERYTHROCYTE [SEDIMENTATION RATE] IN BLOOD BY WESTERGREN METHOD: 31 MM/HR (ref 0–20)

## 2024-09-25 PROCEDURE — G2211 COMPLEX E/M VISIT ADD ON: HCPCS | Performed by: INTERNAL MEDICINE

## 2024-09-25 PROCEDURE — 85652 RBC SED RATE AUTOMATED: CPT | Mod: ZL | Performed by: INTERNAL MEDICINE

## 2024-09-25 PROCEDURE — 86140 C-REACTIVE PROTEIN: CPT | Mod: ZL | Performed by: INTERNAL MEDICINE

## 2024-09-25 PROCEDURE — 99215 OFFICE O/P EST HI 40 MIN: CPT | Performed by: INTERNAL MEDICINE

## 2024-09-25 PROCEDURE — 99417 PROLNG OP E/M EACH 15 MIN: CPT | Performed by: INTERNAL MEDICINE

## 2024-09-25 PROCEDURE — 36415 COLL VENOUS BLD VENIPUNCTURE: CPT | Mod: ZL | Performed by: INTERNAL MEDICINE

## 2024-09-25 PROCEDURE — G0463 HOSPITAL OUTPT CLINIC VISIT: HCPCS

## 2024-09-25 ASSESSMENT — ENCOUNTER SYMPTOMS
PSYCHIATRIC NEGATIVE: 1
RESPIRATORY NEGATIVE: 1
ALLERGIC/IMMUNOLOGIC NEGATIVE: 1
ENDOCRINE NEGATIVE: 1
NEUROLOGICAL NEGATIVE: 1
MUSCULOSKELETAL NEGATIVE: 1
HEMATOLOGIC/LYMPHATIC NEGATIVE: 1
EYES NEGATIVE: 1
GASTROINTESTINAL NEGATIVE: 1
CARDIOVASCULAR NEGATIVE: 1
CONSTITUTIONAL NEGATIVE: 1

## 2024-09-25 ASSESSMENT — PAIN SCALES - GENERAL: PAINLEVEL: MILD PAIN (3)

## 2024-09-25 NOTE — PROGRESS NOTES
[facility]: Cancer Care Follow-Up Note                                    Discussion with Patient:                                                      Jareth was seen today by Chriss Martin MD. He is here for follow up scan and labs.       Dates of Treatment:                                                      Infusion given in last 28 days       None            Assessment:                                                          RNCC Short Symptom Review:     Assessment completed with:: Patient    General/Short Assessment  Does the patient have all their medications?: Yes  Is the patient experiencing any new or worsening symptoms?: No  Discussion with patient: Answered patient's questions and addressed concerns;Reviewed how and when to contact clinic;Reviewed patient's future appointments    Pain  Patient Reported Pain?: Yes, is currently well-managed  Pain Score: Mild Pain (3)  Pain Loc: Neck    Patient Coping  Accepting    Clinic Utilization  Patient Aware of Next Appointment?: Yes    Other Patient Concerns  Other Patient Reported Concerns: No    Intervention/Education provided during outreach:                                                         Patient to follow up as scheduled at next appt  Patient to call/[employer] message with updates  Confirmed patient has clinic and triage numbers    Signature:  Sybil Zamora RN

## 2024-09-25 NOTE — PATIENT INSTRUCTIONS
Follow up with Dr Martin in 3 months.      Please come prior for lab work and imaging. Radiology scheduling will contact you to arrange these scans.  If you have not received a call in the next 2 weeks, please call them at [phone].   PET scans require you to follow a diet plan starting 24 hours before the appointment - See handout.

## 2024-09-25 NOTE — NURSING NOTE
"Oncology Rooming Note    September 25, 2024 9:20 AM   Jareth Gallardo is a 64 year old male who presents for:    Chief Complaint   Patient presents with    Oncology Clinic Visit     Nasopharyngeal CA     Initial Vitals: /68 (BP Location: Right arm, Patient Position: Sitting, Cuff Size: Adult Large)   Pulse 79   Temp 98  F (36.7  C) (Tympanic)   Resp 12   Wt 119.7 kg (264 lb)   SpO2 95%   PF 98 L/min   BMI 37.34 kg/m   Estimated body mass index is 37.34 kg/m  as calculated from the following:    Height as of 9/10/24: 1.791 m (5' 10.5\").    Weight as of this encounter: 119.7 kg (264 lb). Body surface area is 2.44 meters squared.  Mild Pain (3) Comment: Data Unavailable   No LMP for male patient.  Allergies reviewed: Yes  Medications reviewed: Yes    Medications: Medication refills not needed today.  Pharmacy name entered into [employer]: [facility] [id number] [place] [address]    Frailty Screening:   Is the patient here for a new oncology consult visit in cancer care? 2. No      Clinical concerns: follow up on PET & labs.      Renée Larios CMA (AAMA) 9/25/2024 9:20 AM  "

## 2024-09-25 NOTE — PROGRESS NOTES
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 Melvin at the [place] for ongoing treatment to be given locally.  Initially presented with left neck swelling and was seen by urgent care in the [place] area.  Was treated with antibiotics initially and then was ultimately treated for presumed cat scratch failure with antibiotics.  Symptoms worsened the point where he says he had swelling in his left shoulder.  CT neck was ordered and patient was referred to ENT but could not be seen immediately due to insurance reasons.  He was seen by a general surgeon by the name of Dr. 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  Patient was subsequently referred to Dr. Boston of  ENT at the [facility].  He reviewed the CT neck that was performed on December 9, 2021 and was read as extensive left-sided neck adenopathy with lymph nodes measuring up to 2.4 cm medial to the left sternocleidomastoid muscle just above the level of the hyoid bone.  A PET/CT was ordered and performed on March 9, 2022 and it was read as exuberant soft tissue thickening within the nasopharyngeal and oropharyngeal mucosa concerning for nasopharyngeal carcinoma.  Soft tissue nodular uptake was evident within the prevertebral soft tissues and about differential because of the epiglottis with extensive neck lymph node uptake.  There was uptake within the lung parenchyma  concerning for metastatic uptake of primary malignancy infectious or inflammatory cannot be excluded.  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  The case was presented at the tumor board and the feeling was that patient would require induction chemotherapy followed by chemoradiation and therapy.  It was felt that this regimen would improve recurrence free survival and overall survival as compared to chemo radiotherapy alone.  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  Tumor was also found to be PD-L1 positive at 20% using the :.  Patient was ultimately seen by Dr. Dariela Melvin medical oncology at [place] on March 22, 2022.  Patient did have a history of hearing loss and subsequently it was decided the patient be a candidate for arboplatin instead of cisplatin as induction therapy.  The plan was to treat with carboplatin and gemcitabine for 3 cycles and then restage in terms of the lung nodules they will be reevaluated after induction.  He was startedon carboplatin gemcitabine and was treated between April 4, 2021 to until May 16, 2022.  He required Neulasta support due to neutropenia PET scan performed on May 31, 2022 revealed a partial response subsequently underwent chemoradiotherapy with weekly carboplatin and received radiation therapy at the [place] under the direction of Dr. Skaggs.  PET scan done on November 16, 2022 revealed a residual 1.5 cm level 2 lymph node with an SUV of 28.7.  A 1 cm enhancing focus in the sternocleidomastoid muscle with an SUV of 22.4 there was a level 2A lymph node that on the left with decreased in size.  With decreased FDG avidity therewas a resolved left level 5 node node.  There was a new patchy groundglass opacity and nodule in the right upper lobe.  Which was felt to be inflammatory subseqre was residual disease on PET scan and recurrence to be at high risk given the fact  the patient was.  1 positive and the plan was to proceed with pembrolizumab.  Patient was started on pembrolizumab on March 9, 2023 under the direction of Dr. Norm Melvin cycle 3 was delayed 1 week due to apparent influenza patient had a good partial response he was seen in the emergency department at [facility] for fever and cough and the patient was treated for influenza.  We saw the patient in August 28, the plan was to initiate his next cycle of pembrolizumab here and is due to receive cycle 8 on August 30, he received that at the [place].  And then was started on cycle 9 beginning September 20 receive cycle 10 on October 11.  He was scheduled to see pulmonology at [place] tomorrow for evaluation groundglass nodules.  He was to follow-up with Dr. Melvin at the end of November with repeat imaging.  In the interim the patient became extremely ill and was seen in the emergency room on October 26 with complaints of shaking chills fever to 102 night sweats.  There was also an isolated episode of divergent vision with double vision ev entually resolveHe did note multiple tick bites in the recent past as he was out in the woods.  D-dimer was elevated at 1.25 with a CTA of the chest was ordered and came back negative for pulmonary embolus.  COVID testing was negative.  He was empirically treated forpresumed tickborne illness initially with vancomycin and Zosyn admitted to [facility].  Further testing included included PCR testing for Ehrlichia as well as Anaplasma which was negative Lyme testing was negative.  Blood cultures were negative and subsequently discharged on October 28 on doxycycline he still had a temp of 102.3.  He comes in today still on doxycycline but still having spiking temps of over 102.  He also has shaking chills he had some loose stools this morning he did note urinary frequency  but now has hardly any urine output he appears dehydrated denies any headaches he does  report the isolated episode of double vision.  He does get an occasional cough he did note nausea and vomiting as well.  We saw him last in October 31.  Ordered labs and he was found to have BUN 41 creatinine 4.58 his CRP was elevated at 330 his sed rate was elevated 60 given the fact that he had acute renal failure refer the patient to emergency room to rule out obstructive uropathy versus checkpoint inhibitor toxicity renal ultrasound revealed n o evidence of obstruction.  His creatinine remained elevated at 4.59.  He was subsequently transferred to the [place] and admitted on November 1 on admission his temp was 102.7. He underwent a renal biopsy on November 6, 2023 was a limited sample with only 4 glomeruli noted was read as acute interstitial nephritis moderate to severe, there was also focal acute tubular necrosis.  He was seen by nephrology by Dr. Blake Lynn.  The etiology of his renal failure could be possibly related to nephrotoxins NSAID vancomycin we could not rule out checkpoint inhibitor associated acute kidney injury due to pembrolizumab particularly in the setting of acute interstitial nephritis in the setting of fever and elevated inflammatory markers.  Infectious  disease performed a work-up but there was no evidence of infection temperature did improve over time.  Fungal cultures were negative, blood cultures were negative, respiratory panel was negative his neutropenia did resolve as well as thrombocytopenia his hemoglobin was low patient was noted to be hypotensive and was treated with normal saline was also noted to have a hemoglobin of 7.2 and required 1 unit of packed red cells CRP did improve nephrology felt patient was hypovolemic and recommended Lasix 40 mg daily patient was eventually discharged without a fever on November 7.  He was seen in follow-up by NATHAN Brock via via telehealth/video visit on November 10.She noted that his CRP was down to 78 but noted that he was having  "ongoin g joint pains and was using any NSAIDs she noted complaints of swelling of the right upper extremity and up Doppler venous ultrasound was negative for DVT.  She also noted that his magnesium was 1.1 and she is set up IV magnesium to be given in the emergency room which was given on November 11 she recommended continuing oral magnesium oxide.  Patient chyna rodriguez in today performance status is still relatively poor at 2 \"improved since his last visit.  He says  his biggest problem right now states having joint pain and swelling of all his digits as well as the wrist of both hands.  He also has ongoing fatigue.  He denies any urinary symptoms.  His fevers have resolved.  He is rather frustrated as she does not want to travel to [place] for regular visits and would like to stay with our clinic on a regular basis. We had seen the patient on November 14.  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  Has CRP and sed rate was dropping off pembrolizumab he did have ongoing autoimmune arthritis we elected to start the patient on low-dose prednisone 10 mg p.o. twice da  karen for 2 weeks.  He said almost immediately on the next day      his joint symptoms improved and he felt overall much better with less fatigue.  We elected to estage the patient with a PET scan that was done on on November 22, 2023 and the findings were that there was no evidence of metastatic or recurrent disease involving head neck chest abdomen pelvis and thighs hypermetabolism that was in the left upper neck have resolved.  There is nonspecific soft tissue and estimate mesentery that was not hypermetabolic and was overall improved.  Patient recently also seen Dr. Melvin via telehealth on November 29 she noticed the patient did have a positive response on PET scan and felt that We could monitor the patient off therapy with pembrolizumab recommend repeat imaging in 2 to 3 months.  She " also agreed to have us manage patient locally but she would manage from a distance for further consultation.  Patient was B12 deficient we will start the patient on B12 1000 mcg IM monthly patient did complain of stiffness in the left neck with potential presyncope/syncope with blackout episode lasting 2 seconds Dr. Melvin ordered bilateral carotid ultrasound liver negative for stenosis.  Complaint of an episode with transient left vision loss but otherwise is doing much better off pembrolizumab.  Recommend the patient be seen by ENT by Dr. Bran  to confirm remission via endoscopic exam.  He did see the patient on January 9 and performed an endoscopic exam including flexible fiberoptic exam.  Did not find any evidence of recurrent nasopharyngeal mass there was no palpable adenopathy he felt the left neck was very hard and woody from previous surgery radiation and recommended an ENT exam every 2 months to monitor for recurrence.  We had also ordered a MRI of the neck was performed December 20 and the findings were that there was no acute abnormality there was no evidence of local recurrence of nasopharyngeal malignancy no evidence of metastatic disease.  Postsurgical changes of left neck cervical lymph node dissection but no pathologically enlarged lymph nodes.  MRI of the brain was also ordered and this was essentially negative there was some small volume of fluid within the mastoid cells that were nonspecific. He did have a PET scan performed on February 14 and the findings were there was a nonspecific single mildly hypermetabolic normal caliber left level 2B lymph node which may be reactive versus suspicious for early leny disease recurrence.  CT of the neck revealed stable enhancing necrotic lymph node deep to left sternocleidomastoid muscle measuring 6.5 mm there is unchanged compared to September 13, 2023 CT scan of neck.  There was also evidence of a partially occlusive thrombus involving the right  internal jugular superior to the intraluminal catheter.  We referred the patient to Dr. Bran for evaluation of PET scan findings.  On his exam he was unable to palpate a lymph node involving the  left neck.  He felt the PET avid lesion was very small and would be difficult to actually hit with ultrasound directed needle biopsy felt we should repeat the PET scan in 3 months if there was progression of this lesion then we will consider proceeding with ultrasound-guided needle biopsy.  Patient comes in today he is essentially asymptomatic he says its at times he may get a twinge or discomfort in the left posterior neck.  He denies any palpable lymphadenopathy.  He still has dry oral mucosa.  He also is being followed by an optometrist and recently tried Visine eyedrops for his red eyes with this is not improving and advised to follow-up with his optometrist.  This has been a chronic problem for him.  Denies any change in bowel habits, no diarrhea or constipation.  There is no hematemesis melena or hematochezia.  There is no dysphagia or odynophagia.  There is no shortness of breath cough or hemoptysis.  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  He is currently on Eliquis 5 mg p.o. twice daily. Elected to pursue further imaging to evaluate his left cervical node noted on PET scan and also to assess jugul lar vein thrombosis. MRI of the soft tissues of the neck was performed on May 2 and the findings were that there was a stable nodular hyperenhancement measuring 6.2 x 5.8 mm close no evidence of recurrent residual nasopharyngeal mass identified. We also performed a CT of the neck on May 8, 2024 and the findings were that there was resolution of the right internal jugular deep vein thrombosis there was a stable small enhancing left cervical node measuring 6 mm that was unchanged compared to imaging performed in September 2023. There are posttreatment changes in the neck  without evidence of recurrent disease at the primary site. Patient otherwise is doing well he says he is enjoying the outdoors and recently went on a 3 mile hike.He was seen by Dr. Bran of ENT on June 11 who performed an ENT exam and there was no evidence of recurrence on exam there was no evidence of palpable lymphadenopathy on exam.  Plan to follow-up in 3 to 4 months.  Did have a PET scan on June 19.  The FDG avid lymph node seen previously in the neck was no longer identified.  There was a normal size left axillary node demonstrating slightly increased FDG uptake of 3.5. Had noted that he had a with tach in the left axilla we felt this was likely reactive axillary lymph node and not a malignancy.  Most recently he had been seen in the emergency department on September 10 with complaints of right neck swelling with an episode of chills noted.  His temp was 103.6 he was noted to have right-sided neck edema and erythema.  CT of the neck revealed slight asymmetric skin thickening which could represent cellulitis.  CT chest was unremarkable.  Patient was treated with IV fluids and started on IV Zosyn and IV vancomycin tickborne panel was negative blood cultures were negative patient was treated for cellulitis with doxycycline and Augmentin for 10-day course.  His temp improved upon discharge to the emergency department and currently he has no more symptoms with only residual swelling in the right neck he denies fevers, night sweats or weight loss he is never felt better in his life.  He says he is working part-time for [employer] company.  He continues on parenteral B12 for B12 deficiency as well as folic acid 1 mg p.o. daily for folate deficiency.  Denies any lymphadenopathy denies any dysphagia/a dry mouth denies any shortness of breath cough hemoptysis.  She denies any abdominal pain change in bowel habits.  Denies bright red blood per rectum hematemesis or melena.  Denies hematuria or flank pain.  PET scan  done on September 18Revealed no evidence of hypermetabolic disease consistent with complete response.      ______________________________________________________________________________    Past History    Past Medical History:   Diagnosis Date    COPD (chronic obstructive pulmonary disease) (H)     Dyslipidemia     Nasopharyngeal carcinoma (H)     BARRETT (obstructive sleep apnea)     Type 2 diabetes mellitus with diabetic nephropathy (H)        Past Surgical History:   Procedure Laterality Date    COLONOSCOPY      DISSECTION RADICAL NECK MODIFIED Left 12/5/2022    Procedure: left modified radical neck dissection;  Surgeon: Jesus Boston MD;  Location: [facility] OR    INSERT PORT VASCULAR ACCESS Right 03/31/2022    Procedure: SINGLE LUMEN POWER PORT INSERTION, VASCULAR ACCESS;  Surgeon: Evens Aponte MD;  Location: [facility] OR    IR CHEST PORT PLACEMENT > 5 YRS OF AGE  03/31/2022    IR RENAL BIOPSY RIGHT  11/6/2023    KNEE SURGERY      LARYNGOSCOPY WITH BIOPSY(IES) N/A 12/5/2022    Procedure: LARYNGOSCOPY, WITH BIOPSY;  Surgeon: Jesus Boston MD;  Location: [facility] OR    TONSILLECTOMY         Review of Systems   Constitutional: Negative.    HENT: Negative.          Positive dry mouth   Eyes: Negative.    Respiratory: Negative.     Cardiovascular: Negative.    Gastrointestinal: Negative.    Endocrine: Negative.    Genitourinary: Negative.    Musculoskeletal: Negative.    Skin: Negative.    Allergic/Immunologic: Negative.    Neurological: Negative.    Hematological: Negative.    Psychiatric/Behavioral: Negative.     All other systems reviewed and are negative.         Physical Exam    /68 (BP Location: Right arm, Patient Position: Sitting, Cuff Size: Adult Large)   Pulse 79   Temp 98  F (36.7  C) (Tympanic)   Resp 12   Wt 119.7 kg (264 lb)   SpO2 95%   PF 98 L/min   BMI 37.34 kg/m      Physical Exam  Vitals and nursing note reviewed.   Constitutional:       Appearance: Normal appearance. He is obese.    HENT:      Head: Normocephalic and atraumatic.      Nose: Nose normal.      Mouth/Throat:      Mouth: Mucous membranes are dry.      Pharynx: Oropharynx is clear.   Eyes:      Extraocular Movements: Extraocular movements intact.      Conjunctiva/sclera: Conjunctivae normal.      Pupils: Pupils are equal, round, and reactive to light.   Neck:      Comments: He has limited range of motion on lateral rotation of the neck to the left due to multiple scarring postsurgical complications with some stiffness involved particularly on the left side of his neck.  There is nontender swelling/erythema of the right neck  Cardiovascular:      Rate and Rhythm: Normal rate and regular rhythm.      Pulses: Normal pulses.      Heart sounds: Normal heart sounds. No murmur heard.  Pulmonary:      Effort: Pulmonary effort is normal.      Breath sounds: Normal breath sounds.   Abdominal:      General: Bowel sounds are normal. There is no distension.      Palpations: Abdomen is soft. There is no mass.      Tenderness: There is no abdominal tenderness.   Musculoskeletal:         General: Normal range of motion.      Cervical back: Normal range of motion. Rigidity present.      Comments: No ankle edema.   Lymphadenopathy:      Cervical: No cervical adenopathy.   Skin:     General: Skin is warm.   Neurological:      General: No focal deficit present.      Mental Status: He is alert and oriented to person, place, and time.   Psychiatric:         Mood and Affect: Mood normal.          Lab Results    Recent Results (from the past 240 hour(s))   Comprehensive metabolic panel    Collection Time: 09/18/24  1:42 PM   Result Value Ref Range    Sodium 141 135 - 145 mmol/L    Potassium 3.9 3.4 - 5.3 mmol/L    Carbon Dioxide (CO2) 27 22 - 29 mmol/L    Anion Gap 9 7 - 15 mmol/L    Urea Nitrogen 15.4 8.0 - 23.0 mg/dL    Creatinine 0.84 0.67 - 1.17 mg/dL    GFR Estimate >90 >60 mL/min/1.73m2    Calcium 9.0 8.8 - 10.4 mg/dL    Chloride 105 98 - 107 mmol/L     Glucose 89 70 - 99 mg/dL    Alkaline Phosphatase 73 40 - 150 U/L    AST 32 0 - 45 U/L    ALT 20 0 - 70 U/L    Protein Total 7.6 6.4 - 8.3 g/dL    Albumin 4.0 3.5 - 5.2 g/dL    Bilirubin Total 0.6 <=1.2 mg/dL   Lactate Dehydrogenase    Collection Time: 09/18/24  1:42 PM   Result Value Ref Range    Lactate Dehydrogenase 162 0 - 250 U/L   Ferritin    Collection Time: 09/18/24  1:42 PM   Result Value Ref Range    Ferritin 282 31 - 409 ng/mL   Iron & Iron Binding Capacity    Collection Time: 09/18/24  1:42 PM   Result Value Ref Range    Iron 49 (L) 61 - 157 ug/dL    Iron Binding Capacity 209 (L) 240 - 430 ug/dL    Iron Sat Index 23 15 - 46 %   Vitamin B12    Collection Time: 09/18/24  1:42 PM   Result Value Ref Range    Vitamin B12 672 232 - 1,245 pg/mL   Folate    Collection Time: 09/18/24  1:42 PM   Result Value Ref Range    Folic Acid >20.0 4.6 - 34.8 ng/mL   Erythrocyte sedimentation rate auto    Collection Time: 09/18/24  1:42 PM   Result Value Ref Range    Erythrocyte Sedimentation Rate 44 (H) 0 - 20 mm/hr   CRP inflammation    Collection Time: 09/18/24  1:42 PM   Result Value Ref Range    CRP Inflammation 14.58 (H) <5.00 mg/L   CBC with platelets and differential    Collection Time: 09/18/24  1:42 PM   Result Value Ref Range    WBC Count 4.6 4.0 - 11.0 10e3/uL    RBC Count 4.08 (L) 4.40 - 5.90 10e6/uL    Hemoglobin 11.3 (L) 13.3 - 17.7 g/dL    Hematocrit 35.1 (L) 40.0 - 53.0 %    MCV 86 78 - 100 fL    MCH 27.7 26.5 - 33.0 pg    MCHC 32.2 31.5 - 36.5 g/dL    RDW 14.4 10.0 - 15.0 %    Platelet Count 203 150 - 450 10e3/uL    % Neutrophils 65 %    % Lymphocytes 25 %    % Monocytes 7 %    % Eosinophils 2 %    % Basophils 0 %    % Immature Granulocytes 0 %    NRBCs per 100 WBC 0 <1 /100    Absolute Neutrophils 3.0 1.6 - 8.3 10e3/uL    Absolute Lymphocytes 1.1 0.8 - 5.3 10e3/uL    Absolute Monocytes 0.3 0.0 - 1.3 10e3/uL    Absolute Eosinophils 0.1 0.0 - 0.7 10e3/uL    Absolute Basophils 0.0 0.0 - 0.2 10e3/uL     Absolute Immature Granulocytes 0.0 <=0.4 10e3/uL    Absolute NRBCs 0.0 10e3/uL   CRP inflammation    Collection Time: 09/25/24 10:27 AM   Result Value Ref Range    CRP Inflammation 3.21 <5.00 mg/L   Erythrocyte sedimentation rate auto    Collection Time: 09/25/24 10:27 AM   Result Value Ref Range    Erythrocyte Sedimentation Rate 31 (H) 0 - 20 mm/hr       Imaging    PET Oncology (Eyes to Thighs)    Result Date: 9/19/2024  Procedure: PET ONCOLOGY (EYES TO THIGHS) Subtype: Subsequent Radiopharmaceutical: F-18 FDG Dose: 13.47 mCi IV Serum Glucose: 93 mg/dl History: nasopharyngeal carcinoma response to tx; Nasopharyngeal cancer (H) Comparison: 6/19/24 Technique:  A low dose CT examination was performed for the purpose of attenuation correction and localization. Attenuation corrected PET images from the skull base to the mid thigh were acquired approximately one hour following intravenous administration of the dose, and displayed in transaxial, sagittal, and coronal projections. Uptake time: 58 minutes. Findings: Background uptake: Blood pool 3.4, liver 3.7. Neck: Allowing for streak artifact from surgical clips, no hypermetabolic adenopathy or soft tissue mass is identified. Thorax: No suspicious hypermetabolism. No adenopathy or pulmonary mass. Abdomen/Pelvis: No sites of suspicious hypermetabolism. Calcified gallstone. Posterior right and anterior left renal cortical cysts. Duodenal diverticulum. Thighs: No sites of suspicious hypermetabolism. Symmetric upper normal caliber femoral nodes. Bony structures: No sites of suspicious hypermetabolism.     IMPRESSION:  No evidence of hypermetabolic metastatic disease. JUAN CARLOS MALCOLM MD   SYSTEM ID:  [id number]    CT Chest w Contrast    Result Date: 9/10/2024  EXAMINATION: CT CHEST W CONTRAST, 9/10/2024 5:33 PM HISTORY: fever, sepsis COMPARISON: CT chest 10/26/2023 TECHNIQUE: Imaging protocol: Computed tomography images of the chest with intravenous contrast. Contrast:  isovue 370 100ml. Acquisition: This CT exam was performed using one or more the following dose reduction techniques: automated exposure control, adjustment of the mA and/or kV according to patient size, and/or iterative reconstruction technique. Processing: 3D rendering on independent workstation using Maximum Intensity Projection (MIP) was performed and archived to PACS. 3D reconstructions are interpreted and reported by supervising radiologist. FINDINGS: CHEST: LUNGS: Central airways are clear. No pulmonary mass. Scattered discoid and subpleural atelectasis. No acute airspace opacities or consolidation. PLEURA: Within normal limits. VESSELS: Atherosclerotic calcification of the aorta without aneurysmal dilation. Right chest Port-A-Cath with tip terminating at the superior atriocaval junction. Main pulmonary artery is dilated up to 3.7 cm, suggestive of underlying pulmonary hypertension. HEART: No cardiomegaly. Mild coronary calcification. LYMPH NODES: There are no pathologically enlarged lymph nodes. THYROID: Diminutive thyroid without suspicious nodularity. BONES AND SOFT TISSUES: No suspicious osseous lesions. Degenerative periarticular changes. UPPER ABDOMEN: Limited evaluation of the upper abdomen demonstrates no acute parenchymal abnormalities, nonobstructive bowel gas pattern, and no free fluid or free air.     IMPRESSION: No acute abnormality. KIRIT FREEDMAN MD   SYSTEM ID:  [id number]    CT Soft Tissue Neck w Contrast    Result Date: 9/10/2024  EXAM: CT SOFT TISSUE NECK W CONTRAST 9/10/2024 5:32 PM HISTORY: right sided neck swelling, fever, ? abscess?   COMPARISON: PET CT 6/19/2024; CT neck 5/8/2024 Technique: Following intravenous administration of nonionic iodinated contrast medium, thin section helical CT images were obtained from the skull base down to the level of the aortic arch.  Axial, coronal and sagittal reformations were performed. Images were reviewed in soft tissue, lung and bone windows.  CONTRAST: isovue 370 100ml FINDINGS: No suspicious aerodigestive tract lesions. Postsurgical changes of the left neck with partial resection of left sternocleidomastoid and left neck dissection. Decreased thickening of the left sternocleidomastoid and left platysma. Expected post-treatment changes are noted including effacement of fat planes, left greater than right, supraglottic mucosal edema and thickening of the skin and subcutaneous soft tissues. Decreased size of enhancing left cervical level IIb, not well characterized on today's study. No pathologically enlarged lymph nodes. Mild right neck subcutaneous stranding, grossly similar to comparison. Mild asymmetric posterior right neck skin thickening. Evaluation of the visualized portions of the brain, orbits, spine, and lungs show no aggressive lesion suspicious for metastatic involvement. The major salivary glands are unremarkable. Stable subcentimeter thyroid nodules. Left jugular vein is surgically absent. Right Port-A-Cath courses inferior to the field-of-view. Mild paranasal sinus mucosal thickening. The mastoid air cells are clear. The visualized lung apices are clear.     IMPRESSION: Posterior images of the neck with no evident mass, abscess or pathologically enlarged lymph nodes. In the area of posterior right neck skin marker there is slight asymmetric skin thickening, could represent cellulitis or posttreatment change. Recommend clinical correlation. KIRIT FREEDMAN MD   SYSTEM ID:  [id number]     Assessment and Plan: #1 :.  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 Lyle  Olegario at the [facility] status post 7 cycles of pembrolizumab with at least partial response noted on imaging status post 1 more cycle of pembrolizumab given here in [place] with subsequent development of cytokine release syndrome manifested by fever, renal failure, autoimmune arthritis, anemia, elevated CRP.  Status posttreatment with steroids with improvement in CRP levels PET scan subsequently indicated complete response patient was deemed not a candidate for further immune checkpoint inhibitor therapy.  Subsequent PET scans indicated likely reactive left cervical node which resolved ENT exam has been negative most recent PET scan was negative for recurrence of metastatic disease.  The plan is to see the patient in 3 months with repeat PET scan repeat CBC CMP LDH as well as CRP and sed rate.  2.  :.  Cellulitis of the left neck: Treated with IV antibiotics and Augmentin/doxycycline x 10 days with clinical improvement suspect mildly elevated CRP and sed rate is due to residual inflammation still present post therapy for cellulitis.  Time spent: 70 minutes was spent on this patient visit : reviewed PET scan results with the patient, we reviewed lab results with the patient, reviewed multiple physician provider notes including ENT notes, emergency physician notes, we performed history/physical, documented history/physical, and ordered follow-up labs and appointments as well as a PET scan to be performed in 3 months  The longitudinal plan of care for the diagnosis(es)/condition(s) as documented were addressed during this visit. Due to the added complexity in care, I will continue to support Jareth in the subsequent management and with ongoing continuity of care.    Cancer Staging   No matching staging information was found for the patient.        Signed by: Chriss Martin MD    CC: Chriss Martin MD

## 2024-10-01 ENCOUNTER — OFFICE VISIT (OUTPATIENT)
Dept: OTOLARYNGOLOGY | Facility: OTHER | Age: 64
End: 2024-10-01
Attending: OTOLARYNGOLOGY
Payer: COMMERCIAL

## 2024-10-01 DIAGNOSIS — C11.9 NASOPHARYNGEAL CANCER (H): Primary | ICD-10-CM

## 2024-10-01 PROCEDURE — G0463 HOSPITAL OUTPT CLINIC VISIT: HCPCS

## 2024-10-02 ENCOUNTER — HOSPITAL ENCOUNTER (OUTPATIENT)
Dept: INFUSION THERAPY | Facility: OTHER | Age: 64
Discharge: HOME | End: 2024-10-02
Attending: INTERNAL MEDICINE | Admitting: INTERNAL MEDICINE
Payer: COMMERCIAL

## 2024-10-02 DIAGNOSIS — R79.89 LOW VITAMIN B12 LEVEL: Primary | ICD-10-CM

## 2024-10-02 PROCEDURE — 250N000011 HC RX IP 250 OP 636: Performed by: INTERNAL MEDICINE

## 2024-10-02 PROCEDURE — 96372 THER/PROPH/DIAG INJ SC/IM: CPT | Performed by: INTERNAL MEDICINE

## 2024-10-02 RX ORDER — METHYLPREDNISOLONE SODIUM SUCCINATE 125 MG/2ML
125 INJECTION INTRAMUSCULAR; INTRAVENOUS
Status: CANCELLED
Start: 2024-10-03

## 2024-10-02 RX ORDER — CYANOCOBALAMIN 1000 UG/ML
1000 INJECTION, SOLUTION INTRAMUSCULAR; SUBCUTANEOUS ONCE
Status: COMPLETED | OUTPATIENT
Start: 2024-10-02 | End: 2024-10-02

## 2024-10-02 RX ORDER — CYANOCOBALAMIN 1000 UG/ML
1000 INJECTION, SOLUTION INTRAMUSCULAR; SUBCUTANEOUS ONCE
Status: CANCELLED
Start: 2024-10-03 | End: 2024-10-03

## 2024-10-02 RX ORDER — EPINEPHRINE 1 MG/ML
0.3 INJECTION, SOLUTION, CONCENTRATE INTRAVENOUS EVERY 5 MIN PRN
Status: CANCELLED | OUTPATIENT
Start: 2024-10-03

## 2024-10-02 RX ORDER — MEPERIDINE HYDROCHLORIDE 50 MG/ML
25 INJECTION INTRAMUSCULAR; INTRAVENOUS; SUBCUTANEOUS EVERY 30 MIN PRN
Status: CANCELLED | OUTPATIENT
Start: 2024-10-03

## 2024-10-02 RX ORDER — ALBUTEROL SULFATE 90 UG/1
1-2 INHALANT RESPIRATORY (INHALATION)
Status: CANCELLED
Start: 2024-10-03

## 2024-10-02 RX ORDER — DIPHENHYDRAMINE HYDROCHLORIDE 50 MG/ML
50 INJECTION INTRAMUSCULAR; INTRAVENOUS
Status: CANCELLED
Start: 2024-10-03

## 2024-10-02 RX ORDER — ALBUTEROL SULFATE 0.83 MG/ML
2.5 SOLUTION RESPIRATORY (INHALATION)
Status: CANCELLED | OUTPATIENT
Start: 2024-10-03

## 2024-10-02 RX ADMIN — CYANOCOBALAMIN 1000 MCG: 1000 INJECTION, SOLUTION INTRAMUSCULAR; SUBCUTANEOUS at 13:46

## 2024-10-02 NOTE — NURSING NOTE
Infusion Nursing Note:  Jareth Gallardo presents today for Vitamin B12  injection.    Patient seen by provider today: No   present during visit today: Not Applicable.    Note: N/A.      Intravenous Access:  No Intravenous access/labs at this visit.    Treatment Conditions:  Not Applicable.      Post Infusion Assessment:  Patient tolerated injection without incident to right arm without concern.       Discharge Plan:   Patient and/or family verbalized understanding of discharge instructions and all questions answered.  Copy of AVS reviewed with patient and/or family.  Patient will return 2 weeks for next appointment.  Patient discharged in stable condition accompanied by: self.  Departure Mode: Ambulatory.      Michelle Palma RN

## 2024-10-05 ENCOUNTER — HEALTH MAINTENANCE LETTER (OUTPATIENT)
Age: 64
End: 2024-10-05

## 2024-10-14 NOTE — PROGRESS NOTES
document embedded image                                   [facility]                                                                                                                                         Patient Name: Jareth Gallardo   Address: [address] [place] [address]    YOB: 1960   [place] [address]   MR Number: [id number]   Phone: [phone]  PCP: Dariela Melvin MD           Appointment Date: 10/01/24  Visit Provider: Lexx Bran MD    cc: ~    ENT Progress Note        Intake  Visit Reasons: 3 month Cancer check    HPI  History of Present Illness  Chief complaint:  Follow-up head neck cancer    History  The patient is a 64-year-old man who in June of 2021 was diagnosed with a T2 N3 M0 stage DENAE nasopharyngeal cancer.  He was treated with chemo and radiation therapy at the [facility].  In December of 2022 he underwent a salvage neck dissection on the left.  Postoperative image in suggested lingering positive lymph nodes and he was started on Keytruda.  He eventually had to stop the Keytruda because of side effects.  He has been followed conservatively since in his done beautifully.  He had a PET scan obtained couple of weeks ago hearing [place] that shows no persistent or metastatic disease.    Exam   Neck-his neck is very [place] but I can not feel any discrete or new masses  Oral cavity oropharynx-free of lesions or inflammation   Indirect nasopharyngoscopy-Clear  Indirect laryngoscopy-free of lesions or inflammation   Head and neck integument-unremarkable   General-the patient appears well and in no distress   Neuro-there are no focal cranial nerve deficits    Allergies    No Known Allergies Allergy (Verified 01/10/24 09:01)    PFSH  PFSH:     Past Medical History: (Updated 01/10/24 @ 09:02 by Josi Mccoy, Med Assist)    Diabetes  Dizziness  Tinnitus  Thyroid disorder  Hearing loss      Social History: (Updated 01/10/24 @ 09:03 by Josi Mccoy,  Med Assist)  Smoking Status:  Never smoker   alcohol intake:  former   substance use type:  does not use   marital status:       A&P  Assessment & Plan  (1) Nasopharyngeal cancer:         Status: Acute        Code(s):  C11.9 - Malignant neoplasm of nasopharynx, unspecified  We will continue every three-month follow-up visits at this time.                Lexx Bran MD    Filed: 10/02/24 1640      <Electronically signed by Lexx Bran MD> 10/02/24 1640

## 2024-10-16 ENCOUNTER — HOSPITAL ENCOUNTER (OUTPATIENT)
Dept: INFUSION THERAPY | Facility: OTHER | Age: 64
Discharge: HOME | End: 2024-10-16
Attending: INTERNAL MEDICINE | Admitting: INTERNAL MEDICINE
Payer: COMMERCIAL

## 2024-10-16 DIAGNOSIS — R79.89 LOW VITAMIN B12 LEVEL: Primary | ICD-10-CM

## 2024-10-16 PROCEDURE — 96372 THER/PROPH/DIAG INJ SC/IM: CPT | Performed by: INTERNAL MEDICINE

## 2024-10-16 PROCEDURE — 250N000011 HC RX IP 250 OP 636: Performed by: INTERNAL MEDICINE

## 2024-10-16 RX ORDER — ALBUTEROL SULFATE 0.83 MG/ML
2.5 SOLUTION RESPIRATORY (INHALATION)
Status: CANCELLED | OUTPATIENT
Start: 2024-10-17

## 2024-10-16 RX ORDER — CYANOCOBALAMIN 1000 UG/ML
1000 INJECTION, SOLUTION INTRAMUSCULAR; SUBCUTANEOUS ONCE
Status: COMPLETED | OUTPATIENT
Start: 2024-10-16 | End: 2024-10-16

## 2024-10-16 RX ORDER — CYANOCOBALAMIN 1000 UG/ML
1000 INJECTION, SOLUTION INTRAMUSCULAR; SUBCUTANEOUS ONCE
Status: CANCELLED
Start: 2024-10-17 | End: 2024-10-17

## 2024-10-16 RX ORDER — HEPARIN SODIUM (PORCINE) LOCK FLUSH IV SOLN 100 UNIT/ML 100 UNIT/ML
5 SOLUTION INTRAVENOUS
Status: CANCELLED | OUTPATIENT
Start: 2024-10-16

## 2024-10-16 RX ORDER — EPINEPHRINE 1 MG/ML
0.3 INJECTION, SOLUTION, CONCENTRATE INTRAVENOUS EVERY 5 MIN PRN
Status: CANCELLED | OUTPATIENT
Start: 2024-10-17

## 2024-10-16 RX ORDER — DIPHENHYDRAMINE HYDROCHLORIDE 50 MG/ML
50 INJECTION INTRAMUSCULAR; INTRAVENOUS
Status: CANCELLED
Start: 2024-10-17

## 2024-10-16 RX ORDER — METHYLPREDNISOLONE SODIUM SUCCINATE 125 MG/2ML
125 INJECTION INTRAMUSCULAR; INTRAVENOUS
Status: CANCELLED
Start: 2024-10-17

## 2024-10-16 RX ORDER — ALBUTEROL SULFATE 0.83 MG/ML
2.5 SOLUTION RESPIRATORY (INHALATION)
Status: CANCELLED | OUTPATIENT
Start: 2024-10-16

## 2024-10-16 RX ORDER — MEPERIDINE HYDROCHLORIDE 50 MG/ML
25 INJECTION INTRAMUSCULAR; INTRAVENOUS; SUBCUTANEOUS EVERY 30 MIN PRN
Status: CANCELLED | OUTPATIENT
Start: 2024-10-17

## 2024-10-16 RX ORDER — HEPARIN SODIUM,PORCINE 10 UNIT/ML
5-20 VIAL (ML) INTRAVENOUS DAILY PRN
Status: CANCELLED | OUTPATIENT
Start: 2024-10-16

## 2024-10-16 RX ORDER — DIPHENHYDRAMINE HYDROCHLORIDE 50 MG/ML
50 INJECTION INTRAMUSCULAR; INTRAVENOUS
Status: CANCELLED
Start: 2024-10-16

## 2024-10-16 RX ORDER — METHYLPREDNISOLONE SODIUM SUCCINATE 125 MG/2ML
125 INJECTION INTRAMUSCULAR; INTRAVENOUS
Status: CANCELLED
Start: 2024-10-16

## 2024-10-16 RX ORDER — ALBUTEROL SULFATE 90 UG/1
1-2 INHALANT RESPIRATORY (INHALATION)
Status: CANCELLED
Start: 2024-10-16

## 2024-10-16 RX ORDER — ALBUTEROL SULFATE 90 UG/1
1-2 INHALANT RESPIRATORY (INHALATION)
Status: CANCELLED
Start: 2024-10-17

## 2024-10-16 RX ORDER — MEPERIDINE HYDROCHLORIDE 50 MG/ML
25 INJECTION INTRAMUSCULAR; INTRAVENOUS; SUBCUTANEOUS EVERY 30 MIN PRN
Status: CANCELLED | OUTPATIENT
Start: 2024-10-16

## 2024-10-16 RX ORDER — EPINEPHRINE 1 MG/ML
0.3 INJECTION, SOLUTION, CONCENTRATE INTRAVENOUS EVERY 5 MIN PRN
Status: CANCELLED | OUTPATIENT
Start: 2024-10-16

## 2024-10-16 RX ADMIN — CYANOCOBALAMIN 1000 MCG: 1000 INJECTION, SOLUTION INTRAMUSCULAR; SUBCUTANEOUS at 13:49

## 2024-10-16 NOTE — NURSING NOTE
Infusion Nursing Note:  Jareth Gallardo presents today for B12.    Patient seen by provider today: No   present during visit today: Not Applicable.    Note: N/A.      Intravenous Access:  No Intravenous access/labs at this visit.    Treatment Conditions:  Not Applicable.      Post Infusion Assessment:  Patient tolerated injection without incident.  Site patent and intact, free from redness, edema or discomfort.       Discharge Plan:   AVS to patient via MYCHART.  Patient will return 10/30 for next appointment.   Patient discharged in stable condition accompanied by: wife.  Departure Mode: Ambulatory.      Jean S. Hammann, RN

## 2024-10-30 ENCOUNTER — ONCOLOGY VISIT (OUTPATIENT)
Dept: ONCOLOGY | Facility: OTHER | Age: 64
End: 2024-10-30
Attending: INTERNAL MEDICINE
Payer: COMMERCIAL

## 2024-10-30 ENCOUNTER — APPOINTMENT (OUTPATIENT)
Dept: CT IMAGING | Facility: OTHER | Age: 64
End: 2024-10-30
Payer: COMMERCIAL

## 2024-10-30 ENCOUNTER — PATIENT OUTREACH (OUTPATIENT)
Dept: ONCOLOGY | Facility: CLINIC | Age: 64
End: 2024-10-30

## 2024-10-30 ENCOUNTER — HOSPITAL ENCOUNTER (OUTPATIENT)
Dept: INFUSION THERAPY | Facility: OTHER | Age: 64
Discharge: HOME | End: 2024-10-30
Attending: INTERNAL MEDICINE
Payer: COMMERCIAL

## 2024-10-30 ENCOUNTER — HOSPITAL ENCOUNTER (EMERGENCY)
Facility: OTHER | Age: 64
Discharge: HOME | End: 2024-10-30
Payer: COMMERCIAL

## 2024-10-30 VITALS
DIASTOLIC BLOOD PRESSURE: 78 MMHG | BODY MASS INDEX: 36.4 KG/M2 | OXYGEN SATURATION: 96 % | TEMPERATURE: 98.7 F | HEART RATE: 70 BPM | HEIGHT: 71 IN | WEIGHT: 260 LBS | RESPIRATION RATE: 18 BRPM | SYSTOLIC BLOOD PRESSURE: 118 MMHG

## 2024-10-30 VITALS
OXYGEN SATURATION: 96 % | WEIGHT: 265 LBS | TEMPERATURE: 97.7 F | BODY MASS INDEX: 37.49 KG/M2 | RESPIRATION RATE: 18 BRPM | DIASTOLIC BLOOD PRESSURE: 76 MMHG | SYSTOLIC BLOOD PRESSURE: 126 MMHG | HEART RATE: 78 BPM

## 2024-10-30 DIAGNOSIS — R79.89 LOW VITAMIN B12 LEVEL: Primary | ICD-10-CM

## 2024-10-30 DIAGNOSIS — C11.9 NASOPHARYNGEAL CANCER (H): Primary | ICD-10-CM

## 2024-10-30 DIAGNOSIS — R22.1 NECK SWELLING: ICD-10-CM

## 2024-10-30 LAB
ALBUMIN SERPL BCG-MCNC: 4 G/DL (ref 3.5–5.2)
ALP SERPL-CCNC: 82 U/L (ref 40–150)
ALT SERPL W P-5'-P-CCNC: 23 U/L (ref 0–70)
ANION GAP SERPL CALCULATED.3IONS-SCNC: 9 MMOL/L (ref 7–15)
AST SERPL W P-5'-P-CCNC: 31 U/L (ref 0–45)
BASOPHILS # BLD AUTO: 0 10E3/UL (ref 0–0.2)
BASOPHILS NFR BLD AUTO: 0 %
BILIRUB SERPL-MCNC: 0.7 MG/DL
BUN SERPL-MCNC: 17.4 MG/DL (ref 8–23)
CALCIUM SERPL-MCNC: 9 MG/DL (ref 8.8–10.4)
CHLORIDE SERPL-SCNC: 103 MMOL/L (ref 98–107)
CREAT SERPL-MCNC: 0.98 MG/DL (ref 0.67–1.17)
CRP SERPL-MCNC: 5.74 MG/L
EGFRCR SERPLBLD CKD-EPI 2021: 87 ML/MIN/1.73M2
EOSINOPHIL # BLD AUTO: 0.1 10E3/UL (ref 0–0.7)
EOSINOPHIL NFR BLD AUTO: 3 %
ERYTHROCYTE [DISTWIDTH] IN BLOOD BY AUTOMATED COUNT: 14.5 % (ref 10–15)
ERYTHROCYTE [SEDIMENTATION RATE] IN BLOOD BY WESTERGREN METHOD: 24 MM/HR (ref 0–20)
GLUCOSE SERPL-MCNC: 185 MG/DL (ref 70–99)
HCO3 SERPL-SCNC: 29 MMOL/L (ref 22–29)
HCT VFR BLD AUTO: 36.3 % (ref 40–53)
HGB BLD-MCNC: 11.3 G/DL (ref 13.3–17.7)
HOLD SPECIMEN: NORMAL
IMM GRANULOCYTES # BLD: 0 10E3/UL
IMM GRANULOCYTES NFR BLD: 0 %
LDH SERPL L TO P-CCNC: 163 U/L (ref 0–250)
LYMPHOCYTES # BLD AUTO: 1.1 10E3/UL (ref 0.8–5.3)
LYMPHOCYTES NFR BLD AUTO: 23 %
MCH RBC QN AUTO: 27.8 PG (ref 26.5–33)
MCHC RBC AUTO-ENTMCNC: 31.1 G/DL (ref 31.5–36.5)
MCV RBC AUTO: 89 FL (ref 78–100)
MONOCYTES # BLD AUTO: 0.3 10E3/UL (ref 0–1.3)
MONOCYTES NFR BLD AUTO: 7 %
NEUTROPHILS # BLD AUTO: 3.2 10E3/UL (ref 1.6–8.3)
NEUTROPHILS NFR BLD AUTO: 67 %
NRBC # BLD AUTO: 0 10E3/UL
NRBC BLD AUTO-RTO: 0 /100
PLATELET # BLD AUTO: 159 10E3/UL (ref 150–450)
POTASSIUM SERPL-SCNC: 3.8 MMOL/L (ref 3.4–5.3)
PROT SERPL-MCNC: 7.5 G/DL (ref 6.4–8.3)
RBC # BLD AUTO: 4.07 10E6/UL (ref 4.4–5.9)
SODIUM SERPL-SCNC: 141 MMOL/L (ref 135–145)
WBC # BLD AUTO: 4.8 10E3/UL (ref 4–11)

## 2024-10-30 PROCEDURE — 36415 COLL VENOUS BLD VENIPUNCTURE: CPT | Mod: ZL | Performed by: INTERNAL MEDICINE

## 2024-10-30 PROCEDURE — G0463 HOSPITAL OUTPT CLINIC VISIT: HCPCS | Mod: 25,27

## 2024-10-30 PROCEDURE — 250N000009 HC RX 250

## 2024-10-30 PROCEDURE — 84155 ASSAY OF PROTEIN SERUM: CPT | Mod: ZL | Performed by: INTERNAL MEDICINE

## 2024-10-30 PROCEDURE — 85652 RBC SED RATE AUTOMATED: CPT | Mod: ZL | Performed by: INTERNAL MEDICINE

## 2024-10-30 PROCEDURE — 99417 PROLNG OP E/M EACH 15 MIN: CPT | Performed by: INTERNAL MEDICINE

## 2024-10-30 PROCEDURE — 83615 LACTATE (LD) (LDH) ENZYME: CPT | Mod: ZL | Performed by: INTERNAL MEDICINE

## 2024-10-30 PROCEDURE — 96372 THER/PROPH/DIAG INJ SC/IM: CPT | Performed by: INTERNAL MEDICINE

## 2024-10-30 PROCEDURE — 250N000011 HC RX IP 250 OP 636

## 2024-10-30 PROCEDURE — 99215 OFFICE O/P EST HI 40 MIN: CPT | Performed by: INTERNAL MEDICINE

## 2024-10-30 PROCEDURE — 250N000011 HC RX IP 250 OP 636: Performed by: INTERNAL MEDICINE

## 2024-10-30 PROCEDURE — G0463 HOSPITAL OUTPT CLINIC VISIT: HCPCS

## 2024-10-30 PROCEDURE — 99285 EMERGENCY DEPT VISIT HI MDM: CPT | Mod: 25

## 2024-10-30 PROCEDURE — 85025 COMPLETE CBC W/AUTO DIFF WBC: CPT | Mod: ZL | Performed by: INTERNAL MEDICINE

## 2024-10-30 PROCEDURE — G2211 COMPLEX E/M VISIT ADD ON: HCPCS | Performed by: INTERNAL MEDICINE

## 2024-10-30 PROCEDURE — 86140 C-REACTIVE PROTEIN: CPT | Mod: ZL | Performed by: INTERNAL MEDICINE

## 2024-10-30 PROCEDURE — 70491 CT SOFT TISSUE NECK W/DYE: CPT

## 2024-10-30 PROCEDURE — 99284 EMERGENCY DEPT VISIT MOD MDM: CPT

## 2024-10-30 RX ORDER — IOPAMIDOL 755 MG/ML
100 INJECTION, SOLUTION INTRAVASCULAR ONCE
Status: COMPLETED | OUTPATIENT
Start: 2024-10-30 | End: 2024-10-30

## 2024-10-30 RX ORDER — ALBUTEROL SULFATE 90 UG/1
1-2 INHALANT RESPIRATORY (INHALATION)
Status: CANCELLED
Start: 2024-11-13

## 2024-10-30 RX ORDER — SODIUM FLUORIDE 6.1 MG/ML
PASTE, DENTIFRICE DENTAL DAILY
COMMUNITY
Start: 2024-10-24

## 2024-10-30 RX ORDER — CYANOCOBALAMIN 1000 UG/ML
1000 INJECTION, SOLUTION INTRAMUSCULAR; SUBCUTANEOUS ONCE
Status: COMPLETED | OUTPATIENT
Start: 2024-10-30 | End: 2024-10-30

## 2024-10-30 RX ORDER — METHYLPREDNISOLONE SODIUM SUCCINATE 125 MG/2ML
125 INJECTION INTRAMUSCULAR; INTRAVENOUS
Status: CANCELLED
Start: 2024-11-13

## 2024-10-30 RX ORDER — ALBUTEROL SULFATE 0.83 MG/ML
2.5 SOLUTION RESPIRATORY (INHALATION)
Status: CANCELLED | OUTPATIENT
Start: 2024-11-13

## 2024-10-30 RX ORDER — MEPERIDINE HYDROCHLORIDE 50 MG/ML
25 INJECTION INTRAMUSCULAR; INTRAVENOUS; SUBCUTANEOUS EVERY 30 MIN PRN
Status: CANCELLED | OUTPATIENT
Start: 2024-11-13

## 2024-10-30 RX ORDER — DIPHENHYDRAMINE HYDROCHLORIDE 50 MG/ML
50 INJECTION INTRAMUSCULAR; INTRAVENOUS
Status: CANCELLED
Start: 2024-11-13

## 2024-10-30 RX ORDER — CYANOCOBALAMIN 1000 UG/ML
1000 INJECTION, SOLUTION INTRAMUSCULAR; SUBCUTANEOUS ONCE
Status: CANCELLED
Start: 2024-11-13 | End: 2024-11-13

## 2024-10-30 RX ORDER — EPINEPHRINE 1 MG/ML
0.3 INJECTION, SOLUTION, CONCENTRATE INTRAVENOUS EVERY 5 MIN PRN
Status: CANCELLED | OUTPATIENT
Start: 2024-11-13

## 2024-10-30 RX ADMIN — IOPAMIDOL 100 ML: 755 INJECTION, SOLUTION INTRAVENOUS at 16:13

## 2024-10-30 RX ADMIN — SODIUM CHLORIDE 60 ML: 9 INJECTION, SOLUTION INTRAVENOUS at 16:14

## 2024-10-30 RX ADMIN — CYANOCOBALAMIN 1000 MCG: 1000 INJECTION, SOLUTION INTRAMUSCULAR; SUBCUTANEOUS at 13:59

## 2024-10-30 ASSESSMENT — ENCOUNTER SYMPTOMS
EYES NEGATIVE: 1
RESPIRATORY NEGATIVE: 1
MUSCULOSKELETAL NEGATIVE: 1
ALLERGIC/IMMUNOLOGIC NEGATIVE: 1
HEMATOLOGIC/LYMPHATIC NEGATIVE: 1
FEVER: 1
GASTROINTESTINAL NEGATIVE: 1
NEUROLOGICAL NEGATIVE: 1
CARDIOVASCULAR NEGATIVE: 1
PSYCHIATRIC NEGATIVE: 1
ENDOCRINE NEGATIVE: 1
FACIAL SWELLING: 1

## 2024-10-30 ASSESSMENT — COLUMBIA-SUICIDE SEVERITY RATING SCALE - C-SSRS
6. HAVE YOU EVER DONE ANYTHING, STARTED TO DO ANYTHING, OR PREPARED TO DO ANYTHING TO END YOUR LIFE?: NO
1. IN THE PAST MONTH, HAVE YOU WISHED YOU WERE DEAD OR WISHED YOU COULD GO TO SLEEP AND NOT WAKE UP?: NO
2. HAVE YOU ACTUALLY HAD ANY THOUGHTS OF KILLING YOURSELF IN THE PAST MONTH?: NO

## 2024-10-30 ASSESSMENT — ACTIVITIES OF DAILY LIVING (ADL)
ADLS_ACUITY_SCORE: 0

## 2024-10-30 ASSESSMENT — PAIN SCALES - GENERAL: PAINLEVEL_OUTOF10: NO PAIN (0)

## 2024-10-30 NOTE — DISCHARGE INSTRUCTIONS
Your CT neck reveals edema (swelling) likely related to lymphedema. The common causes can include surgery (especially lymph node removal) or radiation therapy. Please monitor the swelling. Please return to the emergency room if you experience worsening swelling, difficulty swallowing, the swelling becomes red and hot, fever, chills, vomiting, or any new concerns.

## 2024-10-30 NOTE — ED TRIAGE NOTES
Pt arrives via private vehicle from Dr. Martin's office in clinic. Pt has cellulitis on right side of neck, states it feels like a collar is around his neck. Denies pain. Pt has been seen and treated once prior for this. Denies difficulty breathing or swallowing at this time.      Triage Assessment (Adult)       Row Name 10/30/24 1504          Triage Assessment    Airway WDL WDL        Respiratory WDL    Respiratory WDL WDL        Skin Circulation/Temperature WDL    Skin Circulation/Temperature WDL X        Cardiac WDL    Cardiac WDL WDL        Peripheral/Neurovascular WDL    Peripheral Neurovascular WDL WDL        Cognitive/Neuro/Behavioral WDL    Cognitive/Neuro/Behavioral WDL WDL

## 2024-10-30 NOTE — ED PROVIDER NOTES
History     Chief Complaint   Patient presents with    Cellulitis     HPI  Jareth Gallardo is a 64 year old male with PMH nasopharyngeal cancer, COPD, obesity, salvage neck dissection on the left (2022) at the [facility], who presents from Dr. Martin office with concerns for right neck cellulitis. Similar swelling to right side of neck the beginning of September. He was treated with IV and oral antibiotics at that time and swelling resolved. Labs were completed in the clinic. The patient noted the right neck swelling yesterday. He feels good. Denies fever or chills. Denies difficulty swallowing. He has a patch of psoriasis near his hair line posterior right neck. Denies dental pain or any dental concerns on the right side. Denies ear pain.     Allergies:  Allergies   Allergen Reactions    Keytruda [Pembrolizumab] Other (See Comments)     Renal failure, double vision, and high fever       Problem List:    Patient Active Problem List    Diagnosis Date Noted    Low vitamin B12 level 11/15/2023     Priority: Medium    Type 2 diabetes, HbA1c goal < 7% (H) 11/07/2023     Priority: Medium    Dehydration 10/31/2023     Priority: Medium    Sepsis without acute organ dysfunction, due to unspecified organism (H) 10/26/2023     Priority: Medium    Encounter for screening for other viral diseases 12/05/2022     Priority: Medium    Hard to intubate 12/05/2022     Priority: Medium    Morbid obesity (H) 06/30/2022     Priority: Medium    Chemotherapy-induced neutropenia (H) 06/01/2022     Priority: Medium    Nasopharyngeal cancer (H) 03/22/2022     Priority: Medium    Chronic obstructive pulmonary disease, unspecified COPD type (H) 01/24/2019     Priority: Medium     Formatting of this note might be different from the original.   Per patient history    No symptoms on breo      Last Assessment & Plan:    Formatting of this note might be different from the original.   Continue breo          Past Medical History:    Past Medical  History:   Diagnosis Date    COPD (chronic obstructive pulmonary disease) (H)     Dyslipidemia     Nasopharyngeal carcinoma (H)     BARRETT (obstructive sleep apnea)     Type 2 diabetes mellitus with diabetic nephropathy (H)        Past Surgical History:    Past Surgical History:   Procedure Laterality Date    COLONOSCOPY      DISSECTION RADICAL NECK MODIFIED Left 2022    Procedure: left modified radical neck dissection;  Surgeon: Jesus Boston MD;  Location: [facility] OR    INSERT PORT VASCULAR ACCESS Right 2022    Procedure: SINGLE LUMEN POWER PORT INSERTION, VASCULAR ACCESS;  Surgeon: Evens Aponte MD;  Location: [facility] OR    IR CHEST PORT PLACEMENT > 5 YRS OF AGE  2022    IR RENAL BIOPSY RIGHT  2023    KNEE SURGERY      LARYNGOSCOPY WITH BIOPSY(IES) N/A 2022    Procedure: LARYNGOSCOPY, WITH BIOPSY;  Surgeon: Jesus Boston MD;  Location: [facility] OR    TONSILLECTOMY         Family History:    Family History   Problem Relation Age of Onset    Cerebrovascular Disease Mother     Leukemia Father     Melanoma Sister        Social History:  Marital Status:   [2]  Social History     Tobacco Use    Smoking status: Former     Current packs/day: 0.00     Average packs/day: 4.0 packs/day for 30.0 years (120.0 ttl pk-yrs)     Types: Cigarettes     Start date:      Quit date:      Years since quittin.8     Passive exposure: Past    Smokeless tobacco: Never    Tobacco comments:     quit smoking. Passive exposure in childhood home.    Vaping Use    Vaping status: Never Used   Substance Use Topics    Alcohol use: Not Currently     Comment: Hx of 4 beer per month    Drug use: Never        Medications:    acetaminophen (TYLENOL) 500 MG tablet  atorvastatin (LIPITOR) 20 MG tablet  clobetasol propionate (CLOBEX) 0.05 % external shampoo  cyanocobalamin (CYANOCOBALAMIN) 1000 MCG/ML injection  ferrous sulfate (FEROSUL) 325 (65 Fe) MG tablet  folic acid (FOLVITE) 1 MG tablet  furosemide  "(LASIX) 20 MG tablet  levothyroxine (SYNTHROID/LEVOTHROID) 112 MCG tablet  neomycin-polymyxin-dexAMETHasone (MAXITROL) 3.5-24590-5.1 ophthalmic ointment  SODIUM FLUORIDE 5000 PPM 1.1 % GEL topical gel  vitamin B6 (PYRIDOXINE) 50 MG TABS  vitamin D3 (CHOLECALCIFEROL) 50 mcg (2000 units) tablet          Review of Systems   Musculoskeletal:         Right sided neck swelling.    All other systems reviewed and are negative.      Physical Exam   BP: 124/79  Pulse: 78  Temp: 98.7  F (37.1  C)  Resp: 16  Height: 179.1 cm (5' 10.5\")  Weight: 117.9 kg (260 lb)  SpO2: 96 %      Physical Exam  Vitals and nursing note reviewed.   Constitutional:       General: He is not in acute distress.     Appearance: Normal appearance. He is not ill-appearing.   HENT:      Head: Normocephalic.      Right Ear: Tympanic membrane normal.      Left Ear: Tympanic membrane normal.      Ears:      Comments: A 0.5 cm black hard ball of wax removed with alligator forceps.      Nose: Nose normal.      Mouth/Throat:      Mouth: Mucous membranes are moist.      Pharynx: No posterior oropharyngeal erythema.   Eyes:      Conjunctiva/sclera: Conjunctivae normal.   Cardiovascular:      Rate and Rhythm: Normal rate and regular rhythm.      Pulses: Normal pulses.      Heart sounds: Normal heart sounds.   Pulmonary:      Effort: Pulmonary effort is normal.      Breath sounds: Normal breath sounds.   Musculoskeletal:         General: Normal range of motion.   Lymphadenopathy:      Cervical: Cervical adenopathy present.      Right cervical: Superficial cervical adenopathy present.   Skin:     General: Skin is warm and dry.      Capillary Refill: Capillary refill takes less than 2 seconds.      Comments: 3 x 3 cm plaque noted to posterior right neck hair line region. No drainage. No surrounding erythema.    Neurological:      General: No focal deficit present.      Mental Status: He is alert and oriented to person, place, and time.   Psychiatric:         Mood and " "Affect: Mood normal.              No results found for this or any previous visit (from the past 24 hours).      Medications   iopamidol (ISOVUE-370) solution 100 mL (100 mLs Intravenous $Given 10/30/24 1613)       Assessments & Plan (with Medical Decision Making)  Jareth Gallardo is a 64 year old male with PMH nasopharyngeal cancer, COPD, obesity, [facility] who [facility] from Dr. Martin office with concerns for right neck cellulitis. Similar swelling to right side of neck the beginning of September. He was treated with IV and oral antibiotics at that time and swelling resolved. Labs were completed in the clinic. The patient noted the right neck swelling yesterday. He feels good. Denies fever or chills. Denies difficulty swallowing. He has a patch of psoriasis near his hair line posterior right neck. Denies dental pain or any dental concerns on the right side. Denies ear pain.   VS in the ED. /78   Pulse 70   Temp 98.7  F (37.1  C)   Resp 18   Ht 1.791 m (5' 10.5\")   Wt 117.9 kg (260 lb)   SpO2 96%   BMI 36.78 kg/m    Diagnostics:    Lab: CBC- okay. Hgb 11.3. Stable for the past 1 month.  CMP- okay. CRP- 5.74. ESR- 24. Lactate dehydrogenase- normal.     CT scan:   CT soft tissue neck w/contrast- No neck masses or lymphadenopathy. Subcutaneous edema seen  in the right side of the neck.     ED Course as of 11/01/24 0102   Wed Oct 30, 2024   1802 I consulted with Dr. JOSIAH Bran, ENT at [facility]. We discussed the case and imaging results. He suspects lymphedema from previous radiation and chemo treatments. He would be more then willing to see him for a follow up.      Jareth is a 65 y/o male evaluated today for right neck swelling concerns. DDx considered but not limited to includes cellulitis, cervical lymphadenopathy, abscess. He is afebrile. No leukocytosis.  No pain with palpation to the right neck swelling.  No redness noted.  Speech is " clear.  No difficulty swallowing.  Inflammatory markers are mildly elevated and previously elevated in the past 6 months.  CT of the neck reveals subcutaneous edema.  No neck masses or lymphadenopathy.  Less likely an infectious process given clinical exam findings and labs.  I consulted with Dr. Bran, ENT at [facility] who patient has previously seen in the past.  We discussed the case.  He suspects lymphedema from previous radiation and chemo treatments.  He would be more than willing to see him for a follow-up in the next few days.  I discussed with the patient suspect lymphedema.  He would like to go home.  He will continue to monitor his swelling at home.  Discussed return to ED precautions including fever, redness, and increased swelling. Verbalizes understanding of discharge plan.        I have reviewed the nursing notes.    I have reviewed the findings, diagnosis, plan and need for follow up with the patient.  Medical Decision Making  The patient's presentation was of low complexity (an acute and uncomplicated illness or injury).    The patient's evaluation involved:  an assessment requiring an independent historian (see separate area of note for details)  review of 3+ test result(s) ordered prior to this encounter (see separate area of note for details)  ordering and/or review of 3+ test(s) in this encounter (see separate area of note for details)  discussion of management or test interpretation with another health professional (see separate area of note for details)    The patient's management necessitated moderate risk (IV contrast administration).    Final diagnoses:   Neck swelling     10/30/2024   [facility] IslandSharda, APRN CNP  11/01/24 0107

## 2024-10-30 NOTE — NURSING NOTE
Infusion Nursing Note:  Jareth Gallardo presents today for Vitamin B12 .    Patient seen by provider today: No   present during visit today: Not Applicable.    Note: N/A.      Intravenous Access:  No Intravenous access/labs at this visit.    Treatment Conditions:  Not Applicable.      Post Infusion Assessment:  Patient tolerated injection without incident to right arm.       Discharge Plan:   Patient and/or family verbalized understanding of discharge instructions and all questions answered.  Copy of AVS reviewed with patient and/or family.  Patient will return 2 weeks for next appointment.  Patient discharged in stable condition accompanied by: self.  Departure Mode: Ambulatory.      Michelle Palma RN

## 2024-10-30 NOTE — PROGRESS NOTES
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 Jareth Brennan is seen on emergent basis for for complaints of right neck swelling.  For details of history see previous notes.  He had a recent ENT exam Dr. Perez 1 which was reportedly normal.  He comes in today stating that he has right leg swelling and redness that is warm to the touch and is similar to previous episode of right cellulitis that was treated back on September 10.  He states he is feeling somewhat fatigued and there is tenderness associated with the right neck.  He may have had a low-grade temp.  He is concerned that this may progress.  He denies any night sweats or weight loss.  Denies any dysphagia.  Denies any shortness of breath cough hemoptysis denies any night sweats or weight loss.  Denies any abdominal pain or change in bowel habits denies any bright red blood per rectum hematemesis or melena.      ______________________________________________________________________________    Past History    Past Medical History:   Diagnosis Date    COPD (chronic obstructive pulmonary disease) (H)     Dyslipidemia     Nasopharyngeal carcinoma (H)     BARRETT (obstructive sleep apnea)     Type 2 diabetes mellitus with diabetic nephropathy (H)        Past Surgical History:   Procedure Laterality Date    COLONOSCOPY      DISSECTION RADICAL NECK MODIFIED Left 12/5/2022    Procedure: left modified radical neck dissection;  Surgeon: Jesus Boston MD;  Location: [facility] OR    INSERT PORT VASCULAR ACCESS Right 03/31/2022    Procedure: SINGLE LUMEN POWER PORT  INSERTION, VASCULAR ACCESS;  Surgeon: Evens Aponte MD;  Location: [facility] OR    IR CHEST PORT PLACEMENT > 5 YRS OF AGE  03/31/2022    IR RENAL BIOPSY RIGHT  11/6/2023    KNEE SURGERY      LARYNGOSCOPY WITH BIOPSY(IES) N/A 12/5/2022    Procedure: LARYNGOSCOPY, WITH BIOPSY;  Surgeon: Jesus Boston MD;  Location: [facility] OR    TONSILLECTOMY         Review of Systems   Constitutional:  Positive for fever.   HENT:  Positive for facial swelling.         Right neck swelling   Eyes: Negative.    Respiratory: Negative.     Cardiovascular: Negative.    Gastrointestinal: Negative.    Endocrine: Negative.    Genitourinary: Negative.    Musculoskeletal: Negative.    Skin: Negative.         There is erythema involving the right neck   Allergic/Immunologic: Negative.    Neurological: Negative.    Hematological: Negative.    Psychiatric/Behavioral: Negative.     All other systems reviewed and are negative.         Physical Exam    /76 (BP Location: Right arm, Patient Position: Sitting, Cuff Size: Adult Large)   Pulse 78   Temp 97.7  F (36.5  C) (Tympanic)   Resp 18   Wt 120.2 kg (265 lb)   SpO2 96%   BMI 37.49 kg/m      Physical Exam  Vitals and nursing note reviewed.   Constitutional:       Appearance: Normal appearance. He is normal weight.   HENT:      Head: Normocephalic and atraumatic.      Nose: Nose normal.      Mouth/Throat:      Mouth: Mucous membranes are moist.      Pharynx: Oropharynx is clear.   Eyes:      Extraocular Movements: Extraocular movements intact.      Conjunctiva/sclera: Conjunctivae normal.      Pupils: Pupils are equal, round, and reactive to light.   Neck:      Comments: There is right neck swelling with erythema and palpable tenderness associated with shotty adenopathy  There is postsurgical surgical scars involving the left neck  Cardiovascular:      Rate and Rhythm: Normal rate and regular rhythm.      Pulses: Normal pulses.      Heart sounds: Normal heart sounds. No murmur  heard.  Pulmonary:      Effort: Pulmonary effort is normal.      Breath sounds: Normal breath sounds.   Abdominal:      General: Bowel sounds are normal. There is no distension.      Palpations: Abdomen is soft.      Tenderness: There is no abdominal tenderness.   Musculoskeletal:         General: Normal range of motion.      Cervical back: Normal range of motion and neck supple. Tenderness present.      Comments: There is trace bilateral ankle edema   Skin:     General: Skin is warm and dry.      Findings: Erythema present.      Comments: There is positive erythema of the right neck   Neurological:      General: No focal deficit present.      Mental Status: He is alert and oriented to person, place, and time.   Psychiatric:         Mood and Affect: Mood normal.         Behavior: Behavior normal.          Lab Results  Component      Latest Ref Rn 10/30/2024  2:36 PM   WBC      4.0 - 11.0 10e3/uL 4.8    RBC Count      4.40 - 5.90 10e6/uL 4.07 (L)    Hemoglobin      13.3 - 17.7 g/dL 11.3 (L)    Hematocrit      40.0 - 53.0 % 36.3 (L)    MCV      78 - 100 fL 89    MCH      26.5 - 33.0 pg 27.8    MCHC      31.5 - 36.5 g/dL 31.1 (L)    RDW      10.0 - 15.0 % 14.5    Platelet Count      150 - 450 10e3/uL 159    % Neutrophils      % 67    % Lymphocytes      % 23    % Monocytes      % 7    % Eosinophils      % 3    % Basophils      % 0    % Immature Granulocytes      % 0    NRBCs per 100 WBC      <1 /100 0    Absolute Neutrophils      1.6 - 8.3 10e3/uL 3.2    Absolute Lymphocytes      0.8 - 5.3 10e3/uL 1.1    Absolute Monocytes      0.0 - 1.3 10e3/uL 0.3    Absolute Eosinophils      0.0 - 0.7 10e3/uL 0.1    Absolute Basophils      0.0 - 0.2 10e3/uL 0.0    Absolute Immature Granulocytes      <=0.4 10e3/uL 0.0    Absolute NRBCs      10e3/uL 0.0    Sodium      135 - 145 mmol/L 141    Potassium      3.4 - 5.3 mmol/L 3.8    Carbon Dioxide (CO2)      22 - 29 mmol/L 29    Anion Gap      7 - 15 mmol/L 9    Urea Nitrogen      8.0 -  23.0 mg/dL 17.4    Creatinine      0.67 - 1.17 mg/dL 0.98    GFR Estimate      >60 mL/min/1.73m2 87    Calcium      8.8 - 10.4 mg/dL 9.0    Chloride      98 - 107 mmol/L 103    Glucose      70 - 99 mg/dL 185 (H)    Alkaline Phosphatase      40 - 150 U/L 82    AST      0 - 45 U/L 31    ALT      0 - 70 U/L 23    Protein Total      6.4 - 8.3 g/dL 7.5    Albumin      3.5 - 5.2 g/dL 4.0    Bilirubin Total      <=1.2 mg/dL 0.7    Lactate Dehydrogenase      0 - 250 U/L 163    CRP Inflammation      <5.00 mg/L 5.74 (H)    Sed Rate      0 - 20 mm/hr 24 (H)       Legend:      Imaging    No results found.    Assessment and Plan: #1 :.  Cellulitis of the right neck: In the setting of low-grade fever elevated CRP elevated sed rate patient would likely need IV antibiotics and a CT neck.  Contacted emergency department and spoken to Aniya Conti Nurse Practitioner who will see the patient immediately for evaluation and treatment  2.  : Stage Shannan nasopharyngeal carcinoma p16 negative: Currently in remission we will see the patient as scheduled with repeat PET scan in December as well as CBC CMP LDH ESR and CRP as well as B12, iron TIBC ferritin.  Time spent: 60 minutes was planets patient visit, spent time reviewing previous physician provider notes including er physician notes ent notes, we spent time discussing the case with emergency room provider, we performed a history and physical, we documented a history/physical, and recommended transfer to the emergency department for iv antibiotics  .The longitudinal plan of care for the diagnosis(es)/condition(s) as documented were addressed during this visit. Due to the added complexity in care, I will continue to support Jareth in the subsequent management and with ongoing continuity of care.    Cancer Staging   No matching staging information was found for the patient.        Signed by: Chriss Martin MD    CC: Chriss Martin MD

## 2024-10-30 NOTE — NURSING NOTE
"Oncology Rooming Note    October 30, 2024 2:19 PM   Jareth Gallardo is a 64 year old male who presents for:    Chief Complaint   Patient presents with    Oncology Clinic Visit     Nasopharyngeal Cancer     Initial Vitals: /76 (BP Location: Right arm, Patient Position: Sitting, Cuff Size: Adult Large)   Pulse 78   Temp 97.7  F (36.5  C) (Tympanic)   Resp 18   Wt 120.2 kg (265 lb)   SpO2 96%   BMI 37.49 kg/m   Estimated body mass index is 37.49 kg/m  as calculated from the following:    Height as of 9/10/24: 1.791 m (5' 10.5\").    Weight as of this encounter: 120.2 kg (265 lb). Body surface area is 2.45 meters squared.  No Pain (0) Comment: not hurting right now   No LMP for male patient.  Allergies reviewed: Yes  Medications reviewed: Yes    Medications: Medication refills not needed today.  Pharmacy name entered into [employer]: [facility] [id number] [place] [address]    Frailty Screening:   Is the patient here for a new oncology consult visit in cancer care? 2. No      Clinical concerns: right side of neck is swelling a bit more now. Not painful, but sometimes sore.      Renée Larios CMA ([facility]) 10/30/2024 2:19 PM  "

## 2024-11-01 ENCOUNTER — PATIENT OUTREACH (OUTPATIENT)
Dept: ONCOLOGY | Facility: OTHER | Age: 64
End: 2024-11-01
Payer: COMMERCIAL

## 2024-11-13 ENCOUNTER — HOSPITAL ENCOUNTER (OUTPATIENT)
Dept: INFUSION THERAPY | Facility: OTHER | Age: 64
Discharge: HOME | End: 2024-11-13
Attending: INTERNAL MEDICINE | Admitting: INTERNAL MEDICINE
Payer: COMMERCIAL

## 2024-11-13 DIAGNOSIS — R79.89 LOW VITAMIN B12 LEVEL: Primary | ICD-10-CM

## 2024-11-13 PROCEDURE — 250N000011 HC RX IP 250 OP 636: Performed by: INTERNAL MEDICINE

## 2024-11-13 PROCEDURE — 96372 THER/PROPH/DIAG INJ SC/IM: CPT | Performed by: INTERNAL MEDICINE

## 2024-11-13 RX ORDER — CYANOCOBALAMIN 1000 UG/ML
1000 INJECTION, SOLUTION INTRAMUSCULAR; SUBCUTANEOUS ONCE
Status: COMPLETED | OUTPATIENT
Start: 2024-11-13 | End: 2024-11-13

## 2024-11-13 RX ORDER — MEPERIDINE HYDROCHLORIDE 50 MG/ML
25 INJECTION INTRAMUSCULAR; INTRAVENOUS; SUBCUTANEOUS EVERY 30 MIN PRN
OUTPATIENT
Start: 2024-11-14

## 2024-11-13 RX ORDER — DIPHENHYDRAMINE HYDROCHLORIDE 50 MG/ML
50 INJECTION INTRAMUSCULAR; INTRAVENOUS
Start: 2024-11-14

## 2024-11-13 RX ORDER — CYANOCOBALAMIN 1000 UG/ML
1000 INJECTION, SOLUTION INTRAMUSCULAR; SUBCUTANEOUS ONCE
Start: 2024-11-14 | End: 2024-11-14

## 2024-11-13 RX ORDER — EPINEPHRINE 1 MG/ML
0.3 INJECTION, SOLUTION, CONCENTRATE INTRAVENOUS EVERY 5 MIN PRN
OUTPATIENT
Start: 2024-11-14

## 2024-11-13 RX ORDER — METHYLPREDNISOLONE SODIUM SUCCINATE 125 MG/2ML
125 INJECTION INTRAMUSCULAR; INTRAVENOUS
Start: 2024-11-14

## 2024-11-13 RX ORDER — ALBUTEROL SULFATE 90 UG/1
1-2 INHALANT RESPIRATORY (INHALATION)
Start: 2024-11-14

## 2024-11-13 RX ORDER — ALBUTEROL SULFATE 0.83 MG/ML
2.5 SOLUTION RESPIRATORY (INHALATION)
OUTPATIENT
Start: 2024-11-14

## 2024-11-13 RX ADMIN — CYANOCOBALAMIN 1000 MCG: 1000 INJECTION, SOLUTION INTRAMUSCULAR; SUBCUTANEOUS at 13:58

## 2024-11-13 NOTE — PROGRESS NOTES
[facility]: Cancer Care Follow-Up Note                                    Discussion with Patient:                                                      Jareth was seen in ED 10/30/24 for right neck cellulitis. He reports that neck is still tight on the left side and there is noticeable bulging on right neck. He reports that he is feeling better.             Dates of Treatment:                                                      Infusion given in last 28 days       None            Assessment:                                                          RNCC Short Symptom Review:     Assessment completed with:: Patient    General/Short Assessment  Does the patient have all their medications?: Yes  Is the patient experiencing any new or worsening symptoms?: No  Discussion with patient: Answered patient's questions and addressed concerns;Reviewed how and when to contact clinic;Reviewed patient's future appointments    Pain  Patient Reported Pain?: No    Patient Coping  Accepting    Clinic Utilization  Patient Aware of Next Appointment?: Yes    Other Patient Concerns  Other Patient Reported Concerns: No    Intervention/Education provided during outreach:                                                     Reach out if neck swelling worsens    Patient to follow up as scheduled at next appt  Patient to call/[employer] message with updates  Confirmed patient has clinic and triage numbers    Signature:  Sybil Zamora RN

## 2024-11-13 NOTE — NURSING NOTE
Infusion Nursing Note:  Jareth Gallardo presents today for B12.    Patient seen by provider today: no   present during visit today: Not Applicable.    Note: N/A.      Intravenous Access:  No Intravenous access/labs at this visit.    Treatment Conditions:  Not Applicable.      Post Infusion Assessment:  Patient tolerated injection without incident.       Discharge Plan:   Patient and/or family verbalized understanding of discharge instructions and all questions answered.  AVS to patient via [employer].  Patient will return 11/27 for next appointment.   Patient discharged in stable condition accompanied by: wife.  Departure Mode: Ambulatory.      Jean S. Hammann, RN

## 2024-11-14 DIAGNOSIS — E03.9 HYPOTHYROIDISM, UNSPECIFIED TYPE: ICD-10-CM

## 2024-11-14 RX ORDER — LEVOTHYROXINE SODIUM 112 UG/1
112 TABLET ORAL DAILY
Qty: 60 TABLET | Refills: 6 | Status: SHIPPED | OUTPATIENT
Start: 2024-11-14

## 2024-11-14 NOTE — TELEPHONE ENCOUNTER
Levothyroxine 112mcg tab  Last prescribing provider: Dr. Melvin  9/27/23    Last clinic visit date: 10/30/24 w/ Dr. Chriss Martin    Recommendations for requested medication (if none, N/A): NA    Any other pertinent information (if none, N/A): NA    Refilled: Y/N, if NO, why?

## 2024-11-27 ENCOUNTER — HOSPITAL ENCOUNTER (OUTPATIENT)
Dept: INFUSION THERAPY | Facility: OTHER | Age: 64
Discharge: HOME | End: 2024-11-27
Attending: INTERNAL MEDICINE
Payer: COMMERCIAL

## 2024-11-27 DIAGNOSIS — R79.89 LOW VITAMIN B12 LEVEL: Primary | ICD-10-CM

## 2024-11-27 PROCEDURE — 250N000011 HC RX IP 250 OP 636: Performed by: INTERNAL MEDICINE

## 2024-11-27 PROCEDURE — 96372 THER/PROPH/DIAG INJ SC/IM: CPT | Performed by: INTERNAL MEDICINE

## 2024-11-27 RX ORDER — CYANOCOBALAMIN 1000 UG/ML
1000 INJECTION, SOLUTION INTRAMUSCULAR; SUBCUTANEOUS ONCE
Status: COMPLETED | OUTPATIENT
Start: 2024-11-27 | End: 2024-11-27

## 2024-11-27 RX ORDER — CYANOCOBALAMIN 1000 UG/ML
1000 INJECTION, SOLUTION INTRAMUSCULAR; SUBCUTANEOUS ONCE
Start: 2024-11-28 | End: 2024-11-28

## 2024-11-27 RX ADMIN — CYANOCOBALAMIN 1000 MCG: 1000 INJECTION, SOLUTION INTRAMUSCULAR; SUBCUTANEOUS at 14:01

## 2024-11-27 NOTE — NURSING NOTE
Infusion Nursing Note:  Jareth Gallardo presents today for B12.    Patient seen by provider today: No   present during visit today: Not Applicable.    Note: N/A.      Intravenous Access:  No Intravenous access/labs at this visit.    Treatment Conditions:  Not Applicable.      Post Infusion Assessment:  Patient tolerated injection without incident.  Site patent and intact, free from redness, edema or discomfort.       Discharge Plan:   Patient and/or family verbalized understanding of discharge instructions and all questions answered.  AVS to patient via [employer].  Patient will return 12/18 for next appointment.   Patient discharged in stable condition accompanied by: wife.  Departure Mode: Ambulatory.      Jean S. Hammann, RN

## 2024-12-07 DIAGNOSIS — D89.839 CYTOKINE RELEASE SYNDROME: ICD-10-CM

## 2024-12-07 DIAGNOSIS — C11.9 NASOPHARYNGEAL CANCER (H): ICD-10-CM

## 2024-12-07 DIAGNOSIS — D50.9 IRON DEFICIENCY ANEMIA, UNSPECIFIED IRON DEFICIENCY ANEMIA TYPE: ICD-10-CM

## 2024-12-09 NOTE — TELEPHONE ENCOUNTER
[employer]      Last Written Prescription Date:  9/10/24  Last Fill Quantity: 90,   # refills: 1  Last Office Visit: 11/27/24  Future Office visit:    Next 5 appointments (look out 90 days)      Dec 19, 2024 10:00 AM  Return Visit with Chriss Martin MD  [facility] ([facility]) [address]  [place] [address]  [phone]             Routing refill request to provider for review/approval because:

## 2024-12-11 RX ORDER — FERROUS SULFATE 325(65) MG
325 TABLET ORAL
Qty: 90 TABLET | Refills: 1 | Status: SHIPPED | OUTPATIENT
Start: 2024-12-11

## 2024-12-18 ENCOUNTER — LAB (OUTPATIENT)
Dept: LAB | Facility: OTHER | Age: 64
End: 2024-12-18
Attending: INTERNAL MEDICINE
Payer: COMMERCIAL

## 2024-12-18 ENCOUNTER — HOSPITAL ENCOUNTER (OUTPATIENT)
Dept: PET IMAGING | Facility: OTHER | Age: 64
Discharge: HOME | End: 2024-12-18
Attending: INTERNAL MEDICINE
Payer: COMMERCIAL

## 2024-12-18 ENCOUNTER — HOSPITAL ENCOUNTER (OUTPATIENT)
Dept: INFUSION THERAPY | Facility: OTHER | Age: 64
Discharge: HOME | End: 2024-12-18
Attending: INTERNAL MEDICINE
Payer: COMMERCIAL

## 2024-12-18 DIAGNOSIS — C11.9 NASOPHARYNGEAL CANCER (H): ICD-10-CM

## 2024-12-18 DIAGNOSIS — R79.89 LOW VITAMIN B12 LEVEL: Primary | ICD-10-CM

## 2024-12-18 DIAGNOSIS — R91.8 PULMONARY NODULES: ICD-10-CM

## 2024-12-18 DIAGNOSIS — D89.839 CYTOKINE RELEASE SYNDROME: ICD-10-CM

## 2024-12-18 LAB
ALBUMIN SERPL BCG-MCNC: 4.2 G/DL (ref 3.5–5.2)
ALP SERPL-CCNC: 95 U/L (ref 40–150)
ALT SERPL W P-5'-P-CCNC: 29 U/L (ref 0–70)
ANION GAP SERPL CALCULATED.3IONS-SCNC: 4 MMOL/L (ref 7–15)
AST SERPL W P-5'-P-CCNC: 37 U/L (ref 0–45)
BASOPHILS # BLD AUTO: 0 10E3/UL (ref 0–0.2)
BASOPHILS NFR BLD AUTO: 0 %
BILIRUB SERPL-MCNC: 0.9 MG/DL
BUN SERPL-MCNC: 16.1 MG/DL (ref 8–23)
CALCIUM SERPL-MCNC: 9.2 MG/DL (ref 8.8–10.4)
CHLORIDE SERPL-SCNC: 103 MMOL/L (ref 98–107)
CREAT SERPL-MCNC: 0.96 MG/DL (ref 0.67–1.17)
CRP SERPL-MCNC: <3 MG/L
EGFRCR SERPLBLD CKD-EPI 2021: 89 ML/MIN/1.73M2
EOSINOPHIL # BLD AUTO: 0.1 10E3/UL (ref 0–0.7)
EOSINOPHIL NFR BLD AUTO: 1 %
ERYTHROCYTE [DISTWIDTH] IN BLOOD BY AUTOMATED COUNT: 14.4 % (ref 10–15)
ERYTHROCYTE [SEDIMENTATION RATE] IN BLOOD BY WESTERGREN METHOD: 13 MM/HR (ref 0–20)
FERRITIN SERPL-MCNC: 285 NG/ML (ref 31–409)
FOLATE SERPL-MCNC: >20 NG/ML (ref 4.6–34.8)
GLUCOSE SERPL-MCNC: 81 MG/DL (ref 70–99)
HCO3 SERPL-SCNC: 33 MMOL/L (ref 22–29)
HCT VFR BLD AUTO: 41.6 % (ref 40–53)
HGB BLD-MCNC: 13 G/DL (ref 13.3–17.7)
IMM GRANULOCYTES # BLD: 0 10E3/UL
IMM GRANULOCYTES NFR BLD: 0 %
IRON BINDING CAPACITY (ROCHE): 238 UG/DL (ref 240–430)
IRON SATN MFR SERPL: 31 % (ref 15–46)
IRON SERPL-MCNC: 73 UG/DL (ref 61–157)
LDH SERPL L TO P-CCNC: 167 U/L (ref 0–250)
LYMPHOCYTES # BLD AUTO: 1.5 10E3/UL (ref 0.8–5.3)
LYMPHOCYTES NFR BLD AUTO: 23 %
MCH RBC QN AUTO: 27.4 PG (ref 26.5–33)
MCHC RBC AUTO-ENTMCNC: 31.3 G/DL (ref 31.5–36.5)
MCV RBC AUTO: 88 FL (ref 78–100)
MONOCYTES # BLD AUTO: 0.5 10E3/UL (ref 0–1.3)
MONOCYTES NFR BLD AUTO: 7 %
NEUTROPHILS # BLD AUTO: 4.4 10E3/UL (ref 1.6–8.3)
NEUTROPHILS NFR BLD AUTO: 68 %
NRBC # BLD AUTO: 0 10E3/UL
NRBC BLD AUTO-RTO: 0 /100
PLATELET # BLD AUTO: 190 10E3/UL (ref 150–450)
POTASSIUM SERPL-SCNC: 4.3 MMOL/L (ref 3.4–5.3)
PROT SERPL-MCNC: 7.9 G/DL (ref 6.4–8.3)
RBC # BLD AUTO: 4.75 10E6/UL (ref 4.4–5.9)
SODIUM SERPL-SCNC: 140 MMOL/L (ref 135–145)
VIT B12 SERPL-MCNC: >2000 PG/ML (ref 232–1245)
WBC # BLD AUTO: 6.4 10E3/UL (ref 4–11)

## 2024-12-18 PROCEDURE — 85041 AUTOMATED RBC COUNT: CPT | Mod: ZL

## 2024-12-18 PROCEDURE — A9552 F18 FDG: HCPCS | Performed by: INTERNAL MEDICINE

## 2024-12-18 PROCEDURE — 83615 LACTATE (LD) (LDH) ENZYME: CPT | Mod: ZL

## 2024-12-18 PROCEDURE — 80053 COMPREHEN METABOLIC PANEL: CPT | Mod: ZL

## 2024-12-18 PROCEDURE — 82728 ASSAY OF FERRITIN: CPT | Mod: ZL

## 2024-12-18 PROCEDURE — 85652 RBC SED RATE AUTOMATED: CPT | Mod: ZL

## 2024-12-18 PROCEDURE — 83550 IRON BINDING TEST: CPT | Mod: ZL

## 2024-12-18 PROCEDURE — 78815 PET IMAGE W/CT SKULL-THIGH: CPT | Mod: PS

## 2024-12-18 PROCEDURE — 250N000011 HC RX IP 250 OP 636: Performed by: INTERNAL MEDICINE

## 2024-12-18 PROCEDURE — 82746 ASSAY OF FOLIC ACID SERUM: CPT | Mod: ZL

## 2024-12-18 PROCEDURE — 82607 VITAMIN B-12: CPT | Mod: ZL

## 2024-12-18 PROCEDURE — 85004 AUTOMATED DIFF WBC COUNT: CPT | Mod: ZL

## 2024-12-18 PROCEDURE — 343N000001 HC RX 343 MED OP 636: Performed by: INTERNAL MEDICINE

## 2024-12-18 PROCEDURE — 36415 COLL VENOUS BLD VENIPUNCTURE: CPT | Mod: ZL

## 2024-12-18 PROCEDURE — 96372 THER/PROPH/DIAG INJ SC/IM: CPT | Performed by: INTERNAL MEDICINE

## 2024-12-18 PROCEDURE — 86140 C-REACTIVE PROTEIN: CPT | Mod: ZL

## 2024-12-18 PROCEDURE — 83540 ASSAY OF IRON: CPT | Mod: ZL

## 2024-12-18 RX ORDER — CYANOCOBALAMIN 1000 UG/ML
1000 INJECTION, SOLUTION INTRAMUSCULAR; SUBCUTANEOUS ONCE
Status: COMPLETED | OUTPATIENT
Start: 2024-12-18 | End: 2024-12-18

## 2024-12-18 RX ORDER — FLUDEOXYGLUCOSE F 18 200 MCI/ML
10.91 INJECTION, SOLUTION INTRAVENOUS ONCE
Status: COMPLETED | OUTPATIENT
Start: 2024-12-18 | End: 2024-12-18

## 2024-12-18 RX ORDER — CYANOCOBALAMIN 1000 UG/ML
1000 INJECTION, SOLUTION INTRAMUSCULAR; SUBCUTANEOUS ONCE
Status: CANCELLED
Start: 2024-12-25 | End: 2024-12-25

## 2024-12-18 RX ORDER — FLUDEOXYGLUCOSE F 18 200 MCI/ML
10.91 INJECTION, SOLUTION INTRAVENOUS ONCE
Status: DISCONTINUED | OUTPATIENT
Start: 2024-12-18 | End: 2024-12-19 | Stop reason: HOSPADM

## 2024-12-18 RX ADMIN — CYANOCOBALAMIN 1000 MCG: 1000 INJECTION, SOLUTION INTRAMUSCULAR; SUBCUTANEOUS at 13:27

## 2024-12-18 RX ADMIN — FLUDEOXYGLUCOSE F 18 10.91 MILLICURIE: 200 INJECTION, SOLUTION INTRAVENOUS at 14:24

## 2024-12-18 NOTE — NURSING NOTE
Infusion Nursing Note:  Jareth Gallardo presents today for Vitamin B12 .    Patient seen by provider today: No   present during visit today: Not Applicable.    Note: N/A.      Intravenous Access:  No Intravenous access/labs at this visit.    Treatment Conditions:  Not Applicable.      Post Infusion Assessment:  Patient tolerated injection without incident to right arm.       Discharge Plan:   Patient and/or family verbalized understanding of discharge instructions and all questions answered.  Declined Copy of AVS reviewed with patient and/or family.  Patient will return as scheduled for follow up with ACP next appointment.  Patient discharged in stable condition accompanied by: self.  Departure Mode: Ambulatory.      Michelle Palma RN

## 2024-12-19 ENCOUNTER — ONCOLOGY VISIT (OUTPATIENT)
Dept: ONCOLOGY | Facility: OTHER | Age: 64
End: 2024-12-19
Attending: INTERNAL MEDICINE
Payer: COMMERCIAL

## 2024-12-19 VITALS
SYSTOLIC BLOOD PRESSURE: 124 MMHG | OXYGEN SATURATION: 99 % | HEART RATE: 67 BPM | BODY MASS INDEX: 36.78 KG/M2 | RESPIRATION RATE: 16 BRPM | WEIGHT: 260 LBS | DIASTOLIC BLOOD PRESSURE: 80 MMHG | TEMPERATURE: 96.8 F

## 2024-12-19 DIAGNOSIS — R79.89 LOW VITAMIN B12 LEVEL: ICD-10-CM

## 2024-12-19 DIAGNOSIS — C11.9 NASOPHARYNGEAL CANCER (H): Primary | ICD-10-CM

## 2024-12-19 DIAGNOSIS — D50.9 IRON DEFICIENCY ANEMIA, UNSPECIFIED IRON DEFICIENCY ANEMIA TYPE: ICD-10-CM

## 2024-12-19 PROCEDURE — G0463 HOSPITAL OUTPT CLINIC VISIT: HCPCS

## 2024-12-19 RX ORDER — EPINEPHRINE 1 MG/ML
0.3 INJECTION, SOLUTION, CONCENTRATE INTRAVENOUS EVERY 5 MIN PRN
OUTPATIENT
Start: 2024-12-19

## 2024-12-19 RX ORDER — ALBUTEROL SULFATE 0.83 MG/ML
2.5 SOLUTION RESPIRATORY (INHALATION)
OUTPATIENT
Start: 2024-12-19

## 2024-12-19 RX ORDER — ALBUTEROL SULFATE 90 UG/1
1-2 INHALANT RESPIRATORY (INHALATION)
Start: 2024-12-19

## 2024-12-19 RX ORDER — ALBUTEROL SULFATE 0.83 MG/ML
2.5 SOLUTION RESPIRATORY (INHALATION)
OUTPATIENT
Start: 2025-01-15

## 2024-12-19 RX ORDER — BETAMETHASONE DIPROPIONATE 0.5 MG/G
LOTION TOPICAL 2 TIMES DAILY
COMMUNITY
Start: 2024-12-06

## 2024-12-19 RX ORDER — METHYLPREDNISOLONE SODIUM SUCCINATE 125 MG/2ML
125 INJECTION INTRAMUSCULAR; INTRAVENOUS
Start: 2024-12-19

## 2024-12-19 RX ORDER — EPINEPHRINE 1 MG/ML
0.3 INJECTION, SOLUTION, CONCENTRATE INTRAVENOUS EVERY 5 MIN PRN
OUTPATIENT
Start: 2025-01-15

## 2024-12-19 RX ORDER — MEPERIDINE HYDROCHLORIDE 25 MG/ML
25 INJECTION INTRAMUSCULAR; INTRAVENOUS; SUBCUTANEOUS EVERY 30 MIN PRN
OUTPATIENT
Start: 2025-01-15

## 2024-12-19 RX ORDER — METHYLPREDNISOLONE SODIUM SUCCINATE 125 MG/2ML
125 INJECTION INTRAMUSCULAR; INTRAVENOUS
Start: 2025-01-15

## 2024-12-19 RX ORDER — ALBUTEROL SULFATE 90 UG/1
1-2 INHALANT RESPIRATORY (INHALATION)
Start: 2025-01-15

## 2024-12-19 RX ORDER — DIPHENHYDRAMINE HYDROCHLORIDE 50 MG/ML
50 INJECTION INTRAMUSCULAR; INTRAVENOUS
Start: 2024-12-19

## 2024-12-19 RX ORDER — MEPERIDINE HYDROCHLORIDE 25 MG/ML
25 INJECTION INTRAMUSCULAR; INTRAVENOUS; SUBCUTANEOUS EVERY 30 MIN PRN
OUTPATIENT
Start: 2024-12-19

## 2024-12-19 RX ORDER — CYANOCOBALAMIN 1000 UG/ML
1000 INJECTION, SOLUTION INTRAMUSCULAR; SUBCUTANEOUS ONCE
Start: 2025-01-15 | End: 2025-01-15

## 2024-12-19 RX ORDER — DIPHENHYDRAMINE HYDROCHLORIDE 50 MG/ML
50 INJECTION INTRAMUSCULAR; INTRAVENOUS
Start: 2025-01-15

## 2024-12-19 ASSESSMENT — PAIN SCALES - GENERAL: PAINLEVEL_OUTOF10: MILD PAIN (3)

## 2024-12-19 NOTE — PROGRESS NOTES
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  For details of history see previous notesPreviously the patient had presented with left neck swelling which eventually led to fluid FNA guided biopsy of left cervical node which came back consistent with p16 negative squamous cell carcinoma.  Patient was referred to Dr. Blancas of ENT at the [facility].  He reviewed the CT neck that was performed on December 9, 2021 which was read as extensive left-sided neck adenopathy with lymph nodes measuring up to 2.4 cm medial to the left sternocleidomastoid muscle just above the level of the hyoid bone.  A PET/CT on March 9, 2022 was read as exuberant soft tissue thickening within the nasopharyngeal and oropharyngeal mucosa concerning for nasopharyngeal carcinoma.  There was soft tissue nodular uptake evident within the prevertebral soft tissues and.  There was uptake within the lung parenchyma concerning for metastatic uptake of primary malignancy.  Dr. Sofia felt the patient had a nasopharyngeal carcinoma p16 negative clinical T2 N3 M0.  The case was presented to the tumor board and the feeling was that the patient will require induction chemotherapy followed by chemoradiation therapy.  It was felt that this regimen would improve recurrence free survival and overall survival as compared to chemoimmunotherapy alone.  He also recommended patient be tested for Michelle-Barr virus tumor pathology came back positive.  Tumor was also found to be PD-L1 positive at  20%.  Patient was ultimately seen by Dr. Dariela Melvin of medical oncology at the [facility] and she felt the patient would be a candidate for carboplatin/gemcitabine x 3 cycles and then restage.  He was started on carboplatin/gemcitabine he was treated between April 4, 2021 until May 16, 2022.  He required Neulasta support due to to neutropenia.  PET scan on May 31, 2022 revealed a partial response.  He subsequent underwent chemo radiotherapy with weekly carboplatin and received ration therapy at the [facility] under the direction of Dr. Skaggs.  PET scan done on November 16, 2022 revealed residual 1.5 cm level 2 lymph node with SUV of 20.7.  1 cm enhancing focus in the sternocleidomastoid muscle with SUV of 22.4 there was a level 2A lymph node on the left with decreased size.  There was decreased FDG avidity with a resolved left level 5 lymph node.  There was a new patchy groundglass opacity and nodule in the right upper lobe which was felt to be inflammatory..  The feeling was the patient likely had residual disease on PET scan recurrence was considered a potential high risk in this patient.  Given the fact he was PD-L1 positive we decided to proceed with pembrolizumab.  Patient was started on pembrolizumab beginning on March 9, 2023 under direction of Dr. Luz Maria Melvin.Cycle 3 was delayed 1 week due to parainfluenza.  Patient subsequently referred to us for ongoing treatment with pembrolizumab and received cycle 8 on August 30 at the [facility] and the plan was to start cycle 9.  [facility] beginning on September 20.  Subsequently the patient became extremely ill he had received cycle 10 of pembrolizumab on October 11 he was seen in the emergency department with complaints of shaking chills fever to 102, night sweats.  D-dimer was elevated and CTA of the chest came back negative for pulmonary embolus he was empirically treated for presumed tickborne illness initially with vancomycin/Zosyn  and was admitted to [facility].  Further testing included PCR testing for likely as well as Anaplasma which was negative Lyme testing was negative blood culture negative so clear discharged October 28 on doxycycline but still a temp of 102.3 recently patient September 25 was on doxycycline but was still having spiking temps over 102 he also had shaking chills and loose stools and appeared dehydrated.  Did note nausea and vomiting his CRP was elevated at 330.  Sed rate was elevated at 60.  He also had acute renal failure and we elected to refer the patient to emergency department for possible admission his creatinine remained elevated at 4.59 pneumocephaly transferred to the neurosurgery [place] and admitted on November 1 on admission his temp is 102.7.  He underwent renal biopsy on November 6, 2023 and the findings were that there was acute interstitial nephritis as well as focal acute tubular necrosis.  He was seen by nephrology.  It was felt that patient could have acute interstitial nephritis related to NSAIDs or vancomycin but he could not rule out checkpoint inhibitor associated acute kidney injury due to pembrolizumab particular in the setting of acute interstitial nephritis in the setting of fever and elevated inflammatory markers.  Infectious disease performed a workup but there was no evidence of infection and temperature did improve over time.  Fungal cultures were negative.  Blood cultures negative.  Respiratory panel was negative.  His neutropenia resolved as well as thrombocytopenia he was also noted to be hypotensive and was treated with normal saline his hemoglobin came back low at 7.2 and he required 1 unit packed red cells.  He required IV magnesium replacement for magnesium of 1.1..  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  His CRP and sed rate were dropping while  pembrolizumab was on hold.  He did have ongoing autoimmune arthritis and would like to start the patient on low-dose prednisone 10 mg p.o. twice daily for 2 weeks his symptoms are immediately improved including his joint symptoms.  He also has less fatigue restage the patient with a PET scan on November 22, 2023 and the findings were that there was no evidence of metastatic recurrent disease involving the head, neck, chest, abdomen/pelvis all hypermetabolism associated with the left upper neck had resolved.  He was seen by Dr. Melvin who reviewed the PET scan and felt we could monitor the patient off pembrolizumab.  The patient elected to stay with us for ongoing treatment and management patient was B12 deficient and we elected to start the patient on B12 1000 mcg.  Given acute 2-week basis.  We also recommended he see ENT and he was seen by Dr. Bran who felt there was no evidence of malignancy on exam he recommended ENT exam every 2 months to monitor for recurrence.  He also ordered an MRI of the neck was performed December 20 and the findings were that there was no acute abnormality there was no evidence of local recurrence of nasopharyngeal malignancy.  No evidence of metastatic disease.  There was postsurgical changes of the left neck cervical lymph node dissection but no pathologically enlarged lymph nodes.  MRI of the brain was negative.  He had a subsequent PET scan    14 the findings were there was nonspecific single mildly hypermetabolic normal caliber left level 2B lymph node which may be reactive versus suspicious for early leny disease recurrence.  CT of the neck revealed stable enhancing necrotic lymph node deep to the left sternocleidomastoid muscle measuring 6.5 mm and this was unchanged compared to September 13, 2023..  CT scan of the neck revealed evidence of partially occlusive thrombus involving the right internal jugular vein superior to the intraluminal catheter.  Refer the patient   Shant for evaluation of PET scan findings.  On his exam and was unable to palpate a lymph node involving the left neck he felt the PET avid lesion was very small and would be difficult to actually hit with ultrasound directed needle biopsy felt we should repeat the PET scan in 3 months.  We elected to continue treatment for 3 months with Eliquis for his right internal jugular vein thrombosis..  MRI of the soft tissue of the neck performed a second revealed stable nodular hyperenhancement measuring 6.2 x 5.8 mm no evidence of recurrent residual nasopharyngeal mass identified..  CT of the neck performed on May 8, 2024 revealed resolution of right internal jugular deep vein thrombosis.  There was a stable small enhancing left cervical node measuring 6 mm that was unchanged compared to imaging performed on September 2023.  There are posttreatment changes of the neck without evidence recurrent disease at the primary site.  He was seen by Dr. Bran of ENT on June 11 and performed ENT exam there was no evidence of recurrence on exam there is no evidence of palpable lymphadenopathy exam.  Subsequent PET scan performed on June 19 revealed at the FDG avid lymph node seen previously in the neck it was no longer identified.  There is a normal-sized left axillary lymph node demonstrating slightly increased FDG uptake of 3.9.  There was felt to be reactive if the patient had a would take in his left axilla and this was likely a reactive axillary lymph node.  Subsequently he presented to the emergency department September 10 with complaints of right neck swelling with associated chills.  His temp was 103.6.  He was noted to have right sided neck edema and erythema.  CT of the neck revealed slight asymmetric skin thickening which could represent cellulitis patient was treated IV fluids and started on IV Zosyn and IV vancomycin.  Tickborne panel was negative.  Blood cultures were negative.  Patient was treated with  doxycycline/Augmentin x 10 days for cellulitis with subsequent improvement in symptoms most recently the patient had been seen by us on October 30 with complaints of right neck swelling with associated low-grade temp we wanted to rule out cellulitis and the patient was sent to the emergency department.  CT of the neck was performed and revealed no neck masses lymphadenopathy there is subcutaneous edema seen on the right side of the neck..  The case was discussed with Dr. Bran of ENT who felt the patient likely had lymphedema for previous radiation and chemo treatments.  He was subsequently referred back to Dr. Bran for reevaluation.  Otherwise he comes in today is doing relatively well he thinks he has localized pain in his left neck and thinks he feels a lymph node that is tender.  He did have a PET scan done yesterday and the findings were that there was no evidence of hypermetabolic metastatic disease.  There was mild asymmetric activity within the left sternocleidomastoid muscle which was unchanged likely post therapeutic or positional.  Patient denies fevers, night sweats or weight loss.  He denies shortness of breath cough or hemoptysis.  Denies abdominal pain, change in bowel habits, bright red blood per rectum, hematemesis, or melena.  Denies dysphagia or odynophagia.  Denies hematuria or flank pain denies headaches or change in mental status.  Overall he is doing well.  Continues on vitamin B12 parenterally given every 2 weeks.      ______________________________________________________________________________    Past History    Past Medical History:   Diagnosis Date    COPD (chronic obstructive pulmonary disease) (H)     Dyslipidemia     Nasopharyngeal carcinoma (H)     BARRETT (obstructive sleep apnea)     Type 2 diabetes mellitus with diabetic nephropathy (H)        Past Surgical History:   Procedure Laterality Date    COLONOSCOPY      DISSECTION RADICAL NECK MODIFIED Left 12/5/2022    Procedure: left  modified radical neck dissection;  Surgeon: Jesus Boston MD;  Location: [facility] OR    INSERT PORT VASCULAR ACCESS Right 03/31/2022    Procedure: SINGLE LUMEN POWER PORT INSERTION, VASCULAR ACCESS;  Surgeon: Evens Aponte MD;  Location: [facility] OR    IR CHEST PORT PLACEMENT > 5 YRS OF AGE  03/31/2022    IR RENAL BIOPSY RIGHT  11/6/2023    KNEE SURGERY      LARYNGOSCOPY WITH BIOPSY(IES) N/A 12/5/2022    Procedure: LARYNGOSCOPY, WITH BIOPSY;  Surgeon: Jesus Boston MD;  Location: [facility] OR    TONSILLECTOMY             Review of systems.  CNS: There are no headaches, no blurred vision, no change in mental status,   ENT: There is no hearing loss.  Respiratory: There is no cough, shortness of breath or hemoptysis  Cardiac: There is no chest pain, orthopnea, PND, or ankle edema.  GI: There is no bright red blood per rectum, no hematemesis, no reflux, no diarrhea or constipation  Musculoskeletal: There is minimal left neck point tenderness  : There is no urinary frequency, hematuria.  Constitutional: There is no fevers, night sweats, weight loss.  Endocrine: There is no fatigue.  Neuro: There is no tingling or numbness in the hands or feet.  Hematologic: There is no gingival bleeding, epistaxis, or easy bruisability.  Dermatologic: There is no skin rash.  A 14 point review of systems is otherwise negative.          Physical Exam    /80 (BP Location: Right arm, Patient Position: Sitting, Cuff Size: Adult Regular)   Pulse 67   Temp 96.8  F (36  C) (Tympanic)   Resp 16   Wt 117.9 kg (260 lb)   SpO2 99%   BMI 36.78 kg/m    Vital signs reviewed.  Nursing note reviewed.    GENERAL: Alert and oriented to time place and person. Seated comfortably. In no distress.    HEAD: Atraumatic and normocephalic.    EYES: YON, EOMI.  No pallor.  No icterus.    Oral cavity: no mucosal lesion or tonsillar enlargement.    NECK: supple. JVP normal.  There are postsurgical scars involving the left neck with no obvious  palpable lymphadenopathy.  No thyroid enlargement.    LYMPH NODES: There are no palpable cervical, supraclavicular, axillary, or inguinal nodes.    LUNGS: clear to auscultation bilaterally.  Resonant to percussion throughout bilaterally.  Symmetrical breath movements bilaterally.    HEART: S1 and S2 are heard. Regular rate and rhythm.  No murmur or gallop or rub heard.  No peripheral edema.    ABDOMEN: Soft. Not tender. Not distended.  No palpable hepatomegaly or splenomegaly.  No other mass palpable.  Bowel sounds heard.    EXTREMITIES: There is no ankle edema.  SKIN: no rash, or bruising or purpura.    NEURO: Grossly non-focal.    Lab Results    Recent Results (from the past 240 hours)   Comprehensive metabolic panel    Collection Time: 12/18/24  1:39 PM   Result Value Ref Range    Sodium 140 135 - 145 mmol/L    Potassium 4.3 3.4 - 5.3 mmol/L    Carbon Dioxide (CO2) 33 (H) 22 - 29 mmol/L    Anion Gap 4 (L) 7 - 15 mmol/L    Urea Nitrogen 16.1 8.0 - 23.0 mg/dL    Creatinine 0.96 0.67 - 1.17 mg/dL    GFR Estimate 89 >60 mL/min/1.73m2    Calcium 9.2 8.8 - 10.4 mg/dL    Chloride 103 98 - 107 mmol/L    Glucose 81 70 - 99 mg/dL    Alkaline Phosphatase 95 40 - 150 U/L    AST 37 0 - 45 U/L    ALT 29 0 - 70 U/L    Protein Total 7.9 6.4 - 8.3 g/dL    Albumin 4.2 3.5 - 5.2 g/dL    Bilirubin Total 0.9 <=1.2 mg/dL   Lactate Dehydrogenase    Collection Time: 12/18/24  1:39 PM   Result Value Ref Range    Lactate Dehydrogenase 167 0 - 250 U/L   Iron & Iron Binding Capacity    Collection Time: 12/18/24  1:39 PM   Result Value Ref Range    Iron 73 61 - 157 ug/dL    Iron Binding Capacity 238 (L) 240 - 430 ug/dL    Iron Sat Index 31 15 - 46 %   Vitamin B12    Collection Time: 12/18/24  1:39 PM   Result Value Ref Range    Vitamin B12 >2,000 (H) 232 - 1,245 pg/mL   Ferritin    Collection Time: 12/18/24  1:39 PM   Result Value Ref Range    Ferritin 285 31 - 409 ng/mL   Folate    Collection Time: 12/18/24  1:39 PM   Result Value Ref Range     Folic Acid >20.0 4.6 - 34.8 ng/mL   CRP inflammation    Collection Time: 12/18/24  1:39 PM   Result Value Ref Range    CRP Inflammation <3.00 <5.00 mg/L   Erythrocyte sedimentation rate auto    Collection Time: 12/18/24  1:39 PM   Result Value Ref Range    Erythrocyte Sedimentation Rate 13 0 - 20 mm/hr   CBC with platelets and differential    Collection Time: 12/18/24  1:39 PM   Result Value Ref Range    WBC Count 6.4 4.0 - 11.0 10e3/uL    RBC Count 4.75 4.40 - 5.90 10e6/uL    Hemoglobin 13.0 (L) 13.3 - 17.7 g/dL    Hematocrit 41.6 40.0 - 53.0 %    MCV 88 78 - 100 fL    MCH 27.4 26.5 - 33.0 pg    MCHC 31.3 (L) 31.5 - 36.5 g/dL    RDW 14.4 10.0 - 15.0 %    Platelet Count 190 150 - 450 10e3/uL    % Neutrophils 68 %    % Lymphocytes 23 %    % Monocytes 7 %    % Eosinophils 1 %    % Basophils 0 %    % Immature Granulocytes 0 %    NRBCs per 100 WBC 0 <1 /100    Absolute Neutrophils 4.4 1.6 - 8.3 10e3/uL    Absolute Lymphocytes 1.5 0.8 - 5.3 10e3/uL    Absolute Monocytes 0.5 0.0 - 1.3 10e3/uL    Absolute Eosinophils 0.1 0.0 - 0.7 10e3/uL    Absolute Basophils 0.0 0.0 - 0.2 10e3/uL    Absolute Immature Granulocytes 0.0 <=0.4 10e3/uL    Absolute NRBCs 0.0 10e3/uL       Imaging    PET Oncology (Eyes to Thighs)    Result Date: 12/19/2024  Procedure: PET ONCOLOGY (EYES TO THIGHS) Subtype: Subsequent Radiopharmaceutical: F-18 FDG Dose: 10.91 mCi IV Serum Glucose: 89 mg/dl History: Nasopharyngeal cancer (H); Pulmonary nodules Comparison: 9/18/2024 Technique:  A low dose CT examination was performed for the purpose of attenuation correction and localization. Attenuation corrected PET images from the skull base to the mid thigh were acquired approximately one hour following intravenous administration of the dose, and displayed in transaxial, sagittal, and coronal projections. Uptake time: 50 minutes. Findings: Background uptake: Blood pool 3.6, liver 4.7. Neck: Mild asymmetric metabolic activity within the left  sternocleidomastoid muscle without a discrete leny mass may be positional or post therapeutic Thorax: No sites of suspicious hypermetabolism. Abdomen/Pelvis: No sites of suspicious hypermetabolism. Thighs: No sites of suspicious hypermetabolism. Bony structures: No sites of suspicious hypermetabolism.     IMPRESSION:  No evidence of hypermetabolic metastatic disease. Mild asymmetric activity within the left sternocleidomastoid muscle is unchanged, likely posttherapeutic or positional. JUAN CARLOS MALCOLM MD   SYSTEM ID:  [id number]     Assessment and Plan: #1 :.  Stage Shannan nasopharyngeal carcinoma p16 negative, EBV positive: Status post induction chemotherapy with gemcitabine/carboplatin x 3 cycles followed by chemo radiation with weekly carboplatin followed by salvage left modified radical dissection performed at the emergency [place] with residual disease noted on posttreatment PET scan with positive PD-L1: Status post pembrolizumab given under direction of Dr. Dariela Melvin I think emergency [place] : status post 7 cycles of pembrolizumab given the [place] with partial response noted on imaging status post 1 more cycle of pembrolizumab given here in [facility] with subsequent development cytokine release syndrome manifested by fever, renal failure, autoimmune arthritis, anemia, elevated CRP status post treatment with steroids with improvement in CRP levels.  PET scan indicated complete responsePatient was deemed not a candidate for further immune checkpoint inhibitor therapy.  Subsequent exams have essentially been negative for recurrence.  The plan is to continue surveillance with repeat PET scan in 3 months we will also obtain CBC CMP LDH CRP sed rate iron studies B12 folate.  2.  :.  B12 deficiency: B12 levels have improved on current regimen of parenteral B12 we will decrease his dose by having him come in monthly instead of every 2 weeks we will continue to monitor B12 levels  Time spent: 85  minutes was spent in this patient visit : we spent enormous amount of time reviewing previous physician provider notes, reviewing ENT notes, reviewing PET scan imaging, discussing PET scan results with the patient, performing history/physical, documenting history/physical, and ordering follow-up labs as well as PET scan and ordering B12 parenterally.  The longitudinal plan of care for the diagnosis(es)/condition(s) as documented were addressed during this visit. Due to the added complexity in care, I will continue to support Jareth in the subsequent management and with ongoing continuity of care.    Cancer Staging   No matching staging information was found for the patient.        Signed by: Chriss Martin MD    CC: Chriss Martin MD

## 2024-12-19 NOTE — NURSING NOTE
"Oncology Rooming Note    December 19, 2024 10:01 AM   Jareth Gallardo is a 64 year old male who presents for:    Chief Complaint   Patient presents with    Oncology Clinic Visit     Nasopharyngeal cancer     Initial Vitals: /80 (BP Location: Right arm, Patient Position: Sitting, Cuff Size: Adult Regular)   Pulse 67   Temp 96.8  F (36  C) (Tympanic)   Resp 16   Wt 117.9 kg (260 lb)   SpO2 99%   BMI 36.78 kg/m   Estimated body mass index is 36.78 kg/m  as calculated from the following:    Height as of 10/30/24: 1.791 m (5' 10.5\").    Weight as of this encounter: 117.9 kg (260 lb). Body surface area is 2.42 meters squared.  Mild Pain (3) Comment: when touching it, left side   No LMP for male patient.  Allergies reviewed: Yes  Medications reviewed: Yes    Medications: Medication refills not needed today.  Pharmacy name entered into [facility]: [id number] [place] [address]    Frailty Screening:   Is the patient here for a new oncology consult visit in cancer care? 2. No      Clinical concerns: PET scan result      Renée Larios CMA (AAMA) 12/19/2024 10:01 AM  "

## 2024-12-19 NOTE — PATIENT INSTRUCTIONS
Changed order: B12 injection monthly    Follow up with Dr Martin in 3 months.      Please come prior for lab work and imaging. Radiology scheduling will contact you to arrange the PET scan.  If you have not received a call in the next 2 weeks, please call them at [phone].

## 2024-12-22 DIAGNOSIS — D50.9 IRON DEFICIENCY ANEMIA, UNSPECIFIED IRON DEFICIENCY ANEMIA TYPE: ICD-10-CM

## 2024-12-22 DIAGNOSIS — C11.9 NASOPHARYNGEAL CANCER (H): ICD-10-CM

## 2024-12-23 RX ORDER — FOLIC ACID 1 MG/1
1000 TABLET ORAL DAILY
Qty: 90 TABLET | Refills: 0 | Status: SHIPPED | OUTPATIENT
Start: 2024-12-23

## 2024-12-23 NOTE — TELEPHONE ENCOUNTER
Folic Acid  Last Written Prescription Date: 6/25/24  Last Fill Quantity: 90 # of Refills: 1  Last Office Visit: 12/19/24

## 2025-01-03 DIAGNOSIS — C11.9 NASOPHARYNGEAL CANCER (H): ICD-10-CM

## 2025-01-03 DIAGNOSIS — D50.9 IRON DEFICIENCY ANEMIA, UNSPECIFIED IRON DEFICIENCY ANEMIA TYPE: ICD-10-CM

## 2025-01-03 NOTE — TELEPHONE ENCOUNTER
Folic acid 1 mg tab      Last Written Prescription Date:  12-23-24  Last Fill Quantity: 90,   # refills: 0  Last Office Visit: 12-19-24  Future Office visit:    Next 5 appointments (look out 90 days)      Mar 26, 2025 8:30 AM  Return Visit with Chriss Martin MD  [facility] ([facility]) [address]  [place] [id number] [phone]

## 2025-01-05 RX ORDER — FOLIC ACID 1 MG/1
1000 TABLET ORAL DAILY
Qty: 90 TABLET | Refills: 0 | Status: SHIPPED | OUTPATIENT
Start: 2025-01-05

## 2025-01-16 ENCOUNTER — HOSPITAL ENCOUNTER (OUTPATIENT)
Dept: INFUSION THERAPY | Facility: OTHER | Age: 65
End: 2025-01-16
Payer: COMMERCIAL

## 2025-01-16 DIAGNOSIS — R79.89 LOW VITAMIN B12 LEVEL: Primary | ICD-10-CM

## 2025-01-16 DIAGNOSIS — C11.9 NASOPHARYNGEAL CANCER (H): ICD-10-CM

## 2025-01-16 PROCEDURE — 250N000011 HC RX IP 250 OP 636: Performed by: INTERNAL MEDICINE

## 2025-01-16 PROCEDURE — 96372 THER/PROPH/DIAG INJ SC/IM: CPT | Performed by: INTERNAL MEDICINE

## 2025-01-16 RX ORDER — MEPERIDINE HYDROCHLORIDE 25 MG/ML
25 INJECTION INTRAMUSCULAR; INTRAVENOUS; SUBCUTANEOUS EVERY 30 MIN PRN
OUTPATIENT
Start: 2025-02-13

## 2025-01-16 RX ORDER — METHYLPREDNISOLONE SODIUM SUCCINATE 125 MG/2ML
125 INJECTION INTRAMUSCULAR; INTRAVENOUS
Start: 2025-02-13

## 2025-01-16 RX ORDER — EPINEPHRINE 1 MG/ML
0.3 INJECTION, SOLUTION, CONCENTRATE INTRAVENOUS EVERY 5 MIN PRN
OUTPATIENT
Start: 2025-02-13

## 2025-01-16 RX ORDER — CYANOCOBALAMIN 1000 UG/ML
1000 INJECTION, SOLUTION INTRAMUSCULAR; SUBCUTANEOUS ONCE
Status: COMPLETED | OUTPATIENT
Start: 2025-01-16 | End: 2025-01-16

## 2025-01-16 RX ORDER — HEPARIN SODIUM (PORCINE) LOCK FLUSH IV SOLN 100 UNIT/ML 100 UNIT/ML
5 SOLUTION INTRAVENOUS
OUTPATIENT
Start: 2025-01-16

## 2025-01-16 RX ORDER — HEPARIN SODIUM (PORCINE) LOCK FLUSH IV SOLN 100 UNIT/ML 100 UNIT/ML
5 SOLUTION INTRAVENOUS
Status: DISPENSED | OUTPATIENT
Start: 2025-01-16

## 2025-01-16 RX ORDER — ALBUTEROL SULFATE 90 UG/1
1-2 INHALANT RESPIRATORY (INHALATION)
Start: 2025-02-13

## 2025-01-16 RX ORDER — ALBUTEROL SULFATE 0.83 MG/ML
2.5 SOLUTION RESPIRATORY (INHALATION)
OUTPATIENT
Start: 2025-02-13

## 2025-01-16 RX ORDER — DIPHENHYDRAMINE HYDROCHLORIDE 50 MG/ML
50 INJECTION INTRAMUSCULAR; INTRAVENOUS
Start: 2025-02-13

## 2025-01-16 RX ORDER — CYANOCOBALAMIN 1000 UG/ML
1000 INJECTION, SOLUTION INTRAMUSCULAR; SUBCUTANEOUS ONCE
Status: CANCELLED
Start: 2025-02-13 | End: 2025-02-13

## 2025-01-16 RX ADMIN — HEPARIN 5 ML: 100 SYRINGE at 13:44

## 2025-01-16 RX ADMIN — CYANOCOBALAMIN 1000 MCG: 1000 INJECTION, SOLUTION INTRAMUSCULAR; SUBCUTANEOUS at 13:34

## 2025-01-16 NOTE — NURSING NOTE
Infusion Nursing Note:  Jareth RAE Sami presents today for B12 and Port flush.    Patient seen by provider today: No   present during visit today: Not Applicable.    Note: N/A.      Intravenous Access:  Implanted Port.    Treatment Conditions:  Not Applicable.      Post Infusion Assessment:  Patient tolerated injection without incident, into right arm  Site patent and intact, free from redness, edema or discomfort.  No evidence of extravasations.  Access discontinued per protocol.       Discharge Plan:   Patient and/or family verbalized understanding of discharge instructions and all questions answered.  AVS to patient via [employer].  Patient will return 2/13 for next appointment.   Patient discharged in stable condition accompanied by: wife.  Departure Mode: Ambulatory.      Jean S. Hammann, RN

## 2025-01-19 ENCOUNTER — HEALTH MAINTENANCE LETTER (OUTPATIENT)
Age: 65
End: 2025-01-19

## 2025-01-21 ENCOUNTER — OFFICE VISIT (OUTPATIENT)
Dept: OTOLARYNGOLOGY | Facility: OTHER | Age: 65
End: 2025-01-21
Attending: OTOLARYNGOLOGY
Payer: COMMERCIAL

## 2025-01-21 DIAGNOSIS — C11.9 NASOPHARYNGEAL CANCER (H): Primary | ICD-10-CM

## 2025-01-21 PROCEDURE — G0463 HOSPITAL OUTPT CLINIC VISIT: HCPCS

## 2025-01-21 NOTE — NURSING NOTE
Chief Complaint   Patient presents with    Ent Problem     3 month cancer check       Lisandra Buenrostro LPN

## 2025-01-31 DIAGNOSIS — E87.70 HYPERVOLEMIA, UNSPECIFIED HYPERVOLEMIA TYPE: ICD-10-CM

## 2025-01-31 NOTE — TELEPHONE ENCOUNTER
furosemide (LASIX) 20 MG tablet 135 tablet 3 4/9/2024     Last Office Visit: 09/13/2024  Future Office visit:    Next 5 appointments (look out 90 days)      Mar 26, 2025 8:30 AM  Return Visit with Chriss Martin MD  [facility] ([facility]) [address]  [place] [address]  [phone]             Routing refill request to provider for review/approval because:

## 2025-02-03 DIAGNOSIS — E87.70 HYPERVOLEMIA, UNSPECIFIED HYPERVOLEMIA TYPE: ICD-10-CM

## 2025-02-03 RX ORDER — FUROSEMIDE 20 MG/1
TABLET ORAL
Qty: 135 TABLET | Refills: 3 | OUTPATIENT
Start: 2025-02-03

## 2025-02-03 NOTE — TELEPHONE ENCOUNTER
[facility] #[id number] in [facility], [place] sent Rx request for the following:      Redundant refill request refused: Too soon:  furosemide (LASIX) 20 MG tablet 135 tablet 3 4/9/2024 -- No   Sig: Take 20mg (1 tablet) every other day and on the other days take 40mg (2 tablets)   Sent to pharmacy as: Furosemide 20 MG Oral Tablet (LASIX)   Class: E-Prescribe   Order: [id number]   E-Prescribing Status: Receipt confirmed by pharmacy (4/9/2024 10:08 AM CDT)     Printout Tracking    External Result Report     Medication Administration Instructions    Take 20mg (1 tablet) every other day and on the other days take 40mg (2 tablets)     Pharmacy    [facility] [id number] [place] [facility] - [place] - [id number] [facility]   Chelle King RN .............. 2/3/2025  11:30 AM

## 2025-02-04 ENCOUNTER — TELEPHONE (OUTPATIENT)
Dept: FAMILY MEDICINE | Facility: OTHER | Age: 65
End: 2025-02-04
Payer: COMMERCIAL

## 2025-02-04 DIAGNOSIS — E11.9 TYPE 2 DIABETES, HBA1C GOAL < 7% (H): Primary | ICD-10-CM

## 2025-02-04 NOTE — TELEPHONE ENCOUNTER
Lab orders pending, A1c, microalbumin, tsh, and lipid.   Rebeca Minaya LPN .............2/4/2025     10:46 AM

## 2025-02-04 NOTE — TELEPHONE ENCOUNTER
Wife calling stating patient received a message that he is due for his A1C lab. If appropriate, please place the order for the lab. Patient is scheduled to come in on 2/13.  Mony Meyers on 2/4/2025 at 10:29 AM

## 2025-02-05 RX ORDER — FUROSEMIDE 20 MG/1
TABLET ORAL
Qty: 135 TABLET | Refills: 3 | OUTPATIENT
Start: 2025-02-05

## 2025-02-05 NOTE — TELEPHONE ENCOUNTER
[facility] sent Rx request for the following:    This was   Refused 2 days ago (2/3/2025):   Patient has requested refill too soon     Requested Prescriptions   Pending Prescriptions Disp Refills    furosemide (LASIX) 20 MG tablet [Pharmacy Med Name: FUROSEMIDE 20 MG TABLET] 135 tablet 3     Sig: TAKE 1 TABLET BY MOUTH EVERY OTHER DAY AND ON THE OTHER DAYS TAKE 40MG (2 TABLETS)       Diuretics (Including Combos) Protocol Failed - 2/5/2025  3:44 PM        Failed - Medication indicated for associated diagnosis     Medication is associated with one or more of the following diagnoses:     Edema   Hypertension   Hypercalcemia   Heart Failure   Chronic Kidney Disease (CKD)   Cardiomyopathy   Dyspnea   Chronic Thromboembolic Pulmonary Hypertension   Pulmonary Hypertension          Passed - Most recent blood pressure under 140/90 in past 12 months     BP Readings from Last 3 Encounters:   12/19/24 124/80   10/30/24 118/78   10/30/24 126/76       No data recorded            Passed - Potassium level on file in past 12 months        Passed - Medication is active on med list        Passed - Has GFR on file in past 12 months and most recent value is normal        Passed - Recent (12 mo) or future (90 days) visit within the authorizing provider's specialty     The patient must have completed an in-person or virtual visit within the past 12 months or has a future visit scheduled within the next 90 days with the authorizing provider s specialty.  Urgent care and e-visits do not qualify as an office visit for this protocol.          Passed - Patient is age 18 or older             Request denied, too soon  Next 5 appointments (look out 90 days)      Mar 26, 2025 8:30 AM  Return Visit with Chriss Martin MD  [facility] ([facility] [address]  [place] [address]  [phone]         Miladis Albright RN on 2/5/2025 at 4:10 PM

## 2025-02-06 NOTE — PROGRESS NOTES
document embedded image                                   [facility]                                                                                                                                         Patient Name: Jareth Gallardo   Address: [address] [place] [address]    YOB: 1960   [place] [address]   MR Number: [id number]   Phone: [phone]  PCP: Dariela Melvin MD           Appointment Date: 01/21/25  Visit Provider: Lexx Bran MD    cc: ~    ENT Progress Note        Intake  Visit Reasons: 3 mo Cancer Check    HPI  History of Present Illness  CHIEF COMPLAINT:  FOLLOW-UP HEAD NECK CANCER    HISTORY  THE PATIENT IS A 64-YEAR-OLD MAN WHO WAS DIAGNOSED WITH A TUBE CANCER IN JUNE OF 2021.  HE WAS TREATED WITH CHEMOTHERAPY AND RADIATION THERAPY AT THE [facility].  HE UNDERWENT A SALVAGE NECK DISSECTION ON THE LEFT IN DECEMBER OF 2022.  HE EVENTUALLY WAS STARTED ON KEYTRUDA BECAUSE OF SOME LINGERING ADENOPATHY NOTED ON IMAGING.  HE HAD TO STOP KEYTRUDA BECAUSE OF KIDNEY SIDE EFFECTS.  HE HAS BEEN FOLLOWED CONSERVATIVELY SINCE.  HE HAD A NEGATIVE PET SCAN IN DECEMBER OF THIS PAST YEAR.  HE WAS NO NEW SYMPTOMS TO REPORT.      EXAM  ORAL CAVITY OROPHARYNX-FREE OF MUCOSAL LESIONS OR INFLAMMATION   NASAL-HE WAS VERY DRY CRUSTED MEMBRANES BUT NO EVIDENCE OF PURULENCE OR BLOODY DRAINAGE   INDIRECT NASOPHARYNGOSCOPY NASOPHARYNX   INDIRECT LARYNGOSCOPY REVEALS NEUROLOGICALLY INTACT LARYNX WITHOUT MUCOSAL LESION  NECK-NO PALPABLE MASS.  HIS NECK IS QUITE WOODY.  BIMANUAL EXAM-NO PALPABLE TONGUE BASE OR TONSILLAR FOSSA LESIONS   HEAD NECK INTEGUMENT-CLEAR  GENERAL-THE PATIENT APPEARS WELL AND IN NO DISTRESS   NEURO-THERE ARE NO FOCAL CRANIAL NERVE DEFICITS   PET CT-I REVIEWED HIS PET-CT FROM DECEMBER AND CAN FIND NO SIGNIFICANT NECK ACTIVITY AT THIS TIME    Allergies    No Known Allergies Allergy (Verified 01/10/24 09:01)    PFSH  PFSH:     Past Medical History: (Updated  01/10/24 @ 09:02 by Josi Mccoy, Med Assist)    Diabetes  Dizziness  Tinnitus  Thyroid disorder  Hearing loss      Social History: (Updated 01/10/24 @ 09:03 by Josi Mccoy, Med Assist)  Smoking Status:  Never smoker   alcohol intake:  former   substance use type:  does not use   marital status:       A&P  Assessment & Plan  (1) Nasopharyngeal cancer:         Status: Acute        Code(s):  C11.9 - Malignant neoplasm of nasopharynx, unspecified  WE WILL EXTEND FOLLOW-UP TO EVERY 6 MONTHS IN THE COMING YEAR.                Lexx Bran MD    Filed: 01/22/25 1248      <Electronically signed by Lexx Bran MD> 01/22/25 1248

## 2025-02-13 ENCOUNTER — LAB (OUTPATIENT)
Dept: LAB | Facility: OTHER | Age: 65
End: 2025-02-13
Payer: COMMERCIAL

## 2025-02-13 ENCOUNTER — HOSPITAL ENCOUNTER (OUTPATIENT)
Dept: INFUSION THERAPY | Facility: OTHER | Age: 65
End: 2025-02-13
Attending: INTERNAL MEDICINE
Payer: COMMERCIAL

## 2025-02-13 DIAGNOSIS — E11.9 TYPE 2 DIABETES, HBA1C GOAL < 7% (H): ICD-10-CM

## 2025-02-13 DIAGNOSIS — R79.89 LOW VITAMIN B12 LEVEL: Primary | ICD-10-CM

## 2025-02-13 LAB
CHOLEST SERPL-MCNC: 162 MG/DL
CREAT UR-MCNC: 25.2 MG/DL
EST. AVERAGE GLUCOSE BLD GHB EST-MCNC: 126 MG/DL
FASTING STATUS PATIENT QL REPORTED: YES
HBA1C MFR BLD: 6 %
HDLC SERPL-MCNC: 40 MG/DL
LDLC SERPL CALC-MCNC: 107 MG/DL
MICROALBUMIN UR-MCNC: <12 MG/L
MICROALBUMIN/CREAT UR: NORMAL MG/G{CREAT}
NONHDLC SERPL-MCNC: 122 MG/DL
T4 FREE SERPL-MCNC: 0.99 NG/DL (ref 0.9–1.7)
TRIGL SERPL-MCNC: 76 MG/DL
TSH SERPL DL<=0.005 MIU/L-ACNC: 4.85 UIU/ML (ref 0.3–4.2)

## 2025-02-13 PROCEDURE — 84443 ASSAY THYROID STIM HORMONE: CPT | Mod: ZL

## 2025-02-13 PROCEDURE — 83036 HEMOGLOBIN GLYCOSYLATED A1C: CPT | Mod: ZL

## 2025-02-13 PROCEDURE — 84439 ASSAY OF FREE THYROXINE: CPT | Mod: ZL

## 2025-02-13 PROCEDURE — 83718 ASSAY OF LIPOPROTEIN: CPT | Mod: ZL

## 2025-02-13 PROCEDURE — 82570 ASSAY OF URINE CREATININE: CPT | Mod: ZL

## 2025-02-13 PROCEDURE — 96372 THER/PROPH/DIAG INJ SC/IM: CPT | Performed by: NURSE PRACTITIONER

## 2025-02-13 PROCEDURE — 250N000011 HC RX IP 250 OP 636: Performed by: NURSE PRACTITIONER

## 2025-02-13 PROCEDURE — 36415 COLL VENOUS BLD VENIPUNCTURE: CPT | Mod: ZL

## 2025-02-13 RX ORDER — CYANOCOBALAMIN 1000 UG/ML
1000 INJECTION, SOLUTION INTRAMUSCULAR; SUBCUTANEOUS ONCE
Start: 2025-03-13 | End: 2025-03-13

## 2025-02-13 RX ORDER — ALBUTEROL SULFATE 0.83 MG/ML
2.5 SOLUTION RESPIRATORY (INHALATION)
OUTPATIENT
Start: 2025-03-13

## 2025-02-13 RX ORDER — MEPERIDINE HYDROCHLORIDE 25 MG/ML
25 INJECTION INTRAMUSCULAR; INTRAVENOUS; SUBCUTANEOUS EVERY 30 MIN PRN
OUTPATIENT
Start: 2025-03-13

## 2025-02-13 RX ORDER — EPINEPHRINE 1 MG/ML
0.3 INJECTION, SOLUTION, CONCENTRATE INTRAVENOUS EVERY 5 MIN PRN
OUTPATIENT
Start: 2025-03-13

## 2025-02-13 RX ORDER — ALBUTEROL SULFATE 90 UG/1
1-2 INHALANT RESPIRATORY (INHALATION)
Start: 2025-03-13

## 2025-02-13 RX ORDER — DIPHENHYDRAMINE HYDROCHLORIDE 50 MG/ML
50 INJECTION INTRAMUSCULAR; INTRAVENOUS
Start: 2025-03-13

## 2025-02-13 RX ORDER — METHYLPREDNISOLONE SODIUM SUCCINATE 125 MG/2ML
125 INJECTION INTRAMUSCULAR; INTRAVENOUS
Start: 2025-03-13

## 2025-02-13 RX ORDER — CYANOCOBALAMIN 1000 UG/ML
1000 INJECTION, SOLUTION INTRAMUSCULAR; SUBCUTANEOUS ONCE
Status: CANCELLED
Start: 2025-02-13 | End: 2025-02-13

## 2025-02-13 RX ORDER — CYANOCOBALAMIN 1000 UG/ML
1000 INJECTION, SOLUTION INTRAMUSCULAR; SUBCUTANEOUS ONCE
Status: COMPLETED | OUTPATIENT
Start: 2025-02-13 | End: 2025-02-13

## 2025-02-13 RX ADMIN — CYANOCOBALAMIN 1000 MCG: 1000 INJECTION, SOLUTION INTRAMUSCULAR; SUBCUTANEOUS at 10:27

## 2025-02-13 NOTE — NURSING NOTE
Infusion Nursing Note:  Jareth Gallardo presents today for B12.    Patient seen by provider today: No   present during visit today: Not Applicable.    Note: N/A.      Intravenous Access:  No Intravenous access/labs at this visit.    Treatment Conditions:  Not Applicable.      Post Infusion Assessment:  Patient tolerated injection without incident.  Site patent and intact, free from redness, edema or discomfort.       Discharge Plan:   Patient and/or family verbalized understanding of discharge instructions and all questions answered.  AVS to patient via [employer].  Patient will return 3/13 for next appointment.   Patient discharged in stable condition accompanied by: wife.  Departure Mode: Ambulatory.      Jean S. Hammann, RN

## 2025-02-28 ENCOUNTER — TRANSFERRED RECORDS (OUTPATIENT)
Dept: HEALTH INFORMATION MANAGEMENT | Facility: OTHER | Age: 65
End: 2025-02-28

## 2025-03-13 ENCOUNTER — LAB (OUTPATIENT)
Dept: LAB | Facility: OTHER | Age: 65
End: 2025-03-13
Payer: COMMERCIAL

## 2025-03-13 ENCOUNTER — HOSPITAL ENCOUNTER (OUTPATIENT)
Dept: INFUSION THERAPY | Facility: OTHER | Age: 65
Discharge: HOME | End: 2025-03-13
Payer: COMMERCIAL

## 2025-03-13 DIAGNOSIS — D50.9 IRON DEFICIENCY ANEMIA, UNSPECIFIED IRON DEFICIENCY ANEMIA TYPE: ICD-10-CM

## 2025-03-13 DIAGNOSIS — C11.9 NASOPHARYNGEAL CANCER (H): ICD-10-CM

## 2025-03-13 DIAGNOSIS — R79.89 LOW VITAMIN B12 LEVEL: Primary | ICD-10-CM

## 2025-03-13 LAB
ALBUMIN SERPL BCG-MCNC: 3.8 G/DL (ref 3.5–5.2)
ALP SERPL-CCNC: 87 U/L (ref 40–150)
ALT SERPL W P-5'-P-CCNC: 19 U/L (ref 0–70)
ANION GAP SERPL CALCULATED.3IONS-SCNC: 7 MMOL/L (ref 7–15)
AST SERPL W P-5'-P-CCNC: 27 U/L (ref 0–45)
BASOPHILS # BLD AUTO: 0 10E3/UL (ref 0–0.2)
BASOPHILS NFR BLD AUTO: 0 %
BILIRUB SERPL-MCNC: 0.5 MG/DL
BUN SERPL-MCNC: 14.8 MG/DL (ref 8–23)
CALCIUM SERPL-MCNC: 8.9 MG/DL (ref 8.8–10.4)
CHLORIDE SERPL-SCNC: 106 MMOL/L (ref 98–107)
CREAT SERPL-MCNC: 0.89 MG/DL (ref 0.67–1.17)
CRP SERPL-MCNC: 11.85 MG/L
EGFRCR SERPLBLD CKD-EPI 2021: >90 ML/MIN/1.73M2
EOSINOPHIL # BLD AUTO: 0.2 10E3/UL (ref 0–0.7)
EOSINOPHIL NFR BLD AUTO: 4 %
ERYTHROCYTE [DISTWIDTH] IN BLOOD BY AUTOMATED COUNT: 14.2 % (ref 10–15)
ERYTHROCYTE [SEDIMENTATION RATE] IN BLOOD BY WESTERGREN METHOD: 39 MM/HR (ref 0–20)
FERRITIN SERPL-MCNC: 222 NG/ML (ref 31–409)
FOLATE SERPL-MCNC: >20 NG/ML (ref 4.6–34.8)
GLUCOSE SERPL-MCNC: 129 MG/DL (ref 70–99)
HCO3 SERPL-SCNC: 28 MMOL/L (ref 22–29)
HCT VFR BLD AUTO: 38.7 % (ref 40–53)
HGB BLD-MCNC: 12.5 G/DL (ref 13.3–17.7)
IMM GRANULOCYTES # BLD: 0 10E3/UL
IMM GRANULOCYTES NFR BLD: 0 %
IRON BINDING CAPACITY (ROCHE): 219 UG/DL (ref 240–430)
IRON SATN MFR SERPL: 20 % (ref 15–46)
IRON SERPL-MCNC: 44 UG/DL (ref 61–157)
LDH SERPL L TO P-CCNC: 151 U/L (ref 0–250)
LYMPHOCYTES # BLD AUTO: 1.2 10E3/UL (ref 0.8–5.3)
LYMPHOCYTES NFR BLD AUTO: 23 %
MCH RBC QN AUTO: 28.4 PG (ref 26.5–33)
MCHC RBC AUTO-ENTMCNC: 32.3 G/DL (ref 31.5–36.5)
MCV RBC AUTO: 88 FL (ref 78–100)
MONOCYTES # BLD AUTO: 0.4 10E3/UL (ref 0–1.3)
MONOCYTES NFR BLD AUTO: 8 %
NEUTROPHILS # BLD AUTO: 3.4 10E3/UL (ref 1.6–8.3)
NEUTROPHILS NFR BLD AUTO: 64 %
NRBC # BLD AUTO: 0 10E3/UL
NRBC BLD AUTO-RTO: 0 /100
PLATELET # BLD AUTO: 199 10E3/UL (ref 150–450)
POTASSIUM SERPL-SCNC: 4.3 MMOL/L (ref 3.4–5.3)
PROT SERPL-MCNC: 7.3 G/DL (ref 6.4–8.3)
RBC # BLD AUTO: 4.4 10E6/UL (ref 4.4–5.9)
SODIUM SERPL-SCNC: 141 MMOL/L (ref 135–145)
VIT B12 SERPL-MCNC: 527 PG/ML (ref 232–1245)
WBC # BLD AUTO: 5.3 10E3/UL (ref 4–11)

## 2025-03-13 PROCEDURE — 82310 ASSAY OF CALCIUM: CPT | Mod: ZL

## 2025-03-13 PROCEDURE — 84155 ASSAY OF PROTEIN SERUM: CPT | Mod: ZL

## 2025-03-13 PROCEDURE — 82040 ASSAY OF SERUM ALBUMIN: CPT | Mod: ZL

## 2025-03-13 PROCEDURE — 86140 C-REACTIVE PROTEIN: CPT | Mod: ZL

## 2025-03-13 PROCEDURE — 36415 COLL VENOUS BLD VENIPUNCTURE: CPT | Mod: ZL

## 2025-03-13 PROCEDURE — 96372 THER/PROPH/DIAG INJ SC/IM: CPT | Performed by: NURSE PRACTITIONER

## 2025-03-13 PROCEDURE — 85652 RBC SED RATE AUTOMATED: CPT | Mod: ZL

## 2025-03-13 PROCEDURE — 82607 VITAMIN B-12: CPT | Mod: ZL

## 2025-03-13 PROCEDURE — 85014 HEMATOCRIT: CPT | Mod: ZL

## 2025-03-13 PROCEDURE — 82746 ASSAY OF FOLIC ACID SERUM: CPT | Mod: ZL

## 2025-03-13 PROCEDURE — 83550 IRON BINDING TEST: CPT | Mod: ZL

## 2025-03-13 PROCEDURE — 83615 LACTATE (LD) (LDH) ENZYME: CPT | Mod: ZL

## 2025-03-13 PROCEDURE — 82728 ASSAY OF FERRITIN: CPT | Mod: ZL

## 2025-03-13 PROCEDURE — 250N000011 HC RX IP 250 OP 636: Performed by: NURSE PRACTITIONER

## 2025-03-13 PROCEDURE — 83540 ASSAY OF IRON: CPT | Mod: ZL

## 2025-03-13 PROCEDURE — 85004 AUTOMATED DIFF WBC COUNT: CPT | Mod: ZL

## 2025-03-13 RX ORDER — ALBUTEROL SULFATE 0.83 MG/ML
2.5 SOLUTION RESPIRATORY (INHALATION)
OUTPATIENT
Start: 2025-04-10

## 2025-03-13 RX ORDER — HEPARIN SODIUM (PORCINE) LOCK FLUSH IV SOLN 100 UNIT/ML 100 UNIT/ML
5 SOLUTION INTRAVENOUS
OUTPATIENT
Start: 2025-03-13

## 2025-03-13 RX ORDER — EPINEPHRINE 1 MG/ML
0.3 INJECTION, SOLUTION, CONCENTRATE INTRAVENOUS EVERY 5 MIN PRN
OUTPATIENT
Start: 2025-04-10

## 2025-03-13 RX ORDER — DIPHENHYDRAMINE HYDROCHLORIDE 50 MG/ML
50 INJECTION, SOLUTION INTRAMUSCULAR; INTRAVENOUS
Start: 2025-04-10

## 2025-03-13 RX ORDER — ALBUTEROL SULFATE 90 UG/1
1-2 INHALANT RESPIRATORY (INHALATION)
Start: 2025-04-10

## 2025-03-13 RX ORDER — METHYLPREDNISOLONE SODIUM SUCCINATE 125 MG/2ML
125 INJECTION INTRAMUSCULAR; INTRAVENOUS
Start: 2025-04-10

## 2025-03-13 RX ORDER — MEPERIDINE HYDROCHLORIDE 25 MG/ML
25 INJECTION INTRAMUSCULAR; INTRAVENOUS; SUBCUTANEOUS EVERY 30 MIN PRN
OUTPATIENT
Start: 2025-04-10

## 2025-03-13 RX ORDER — CYANOCOBALAMIN 1000 UG/ML
1000 INJECTION, SOLUTION INTRAMUSCULAR; SUBCUTANEOUS ONCE
Status: COMPLETED | OUTPATIENT
Start: 2025-03-13 | End: 2025-03-13

## 2025-03-13 RX ORDER — HEPARIN SODIUM (PORCINE) LOCK FLUSH IV SOLN 100 UNIT/ML 100 UNIT/ML
5 SOLUTION INTRAVENOUS
Status: ACTIVE | OUTPATIENT
Start: 2025-03-13

## 2025-03-13 RX ORDER — CYANOCOBALAMIN 1000 UG/ML
1000 INJECTION, SOLUTION INTRAMUSCULAR; SUBCUTANEOUS ONCE
Start: 2025-04-10 | End: 2025-04-10

## 2025-03-13 RX ADMIN — CYANOCOBALAMIN 1000 MCG: 1000 INJECTION, SOLUTION INTRAMUSCULAR; SUBCUTANEOUS at 13:26

## 2025-03-13 NOTE — NURSING NOTE
Infusion Nursing Note:  Jareth Gallardo presents today for B12.    Patient seen by provider today: No   present during visit today: Not Applicable.    Note: N/A.      Intravenous Access:  No Intravenous access/labs at this visit.    Treatment Conditions:  Not Applicable.      Post Infusion Assessment:  Patient tolerated injection without incident.  Site patent and intact, free from redness, edema or discomfort.       Discharge Plan:   Patient and/or family verbalized understanding of discharge instructions and all questions answered.  AVS to patient via [employer].  Patient will return 4/10 for next appointment.   Patient discharged in stable condition accompanied by: wife.  Departure Mode: Ambulatory.      Jean S. Hammann, RN

## 2025-03-18 ENCOUNTER — HOSPITAL ENCOUNTER (OUTPATIENT)
Dept: PET IMAGING | Facility: OTHER | Age: 65
Discharge: HOME | End: 2025-03-18
Attending: INTERNAL MEDICINE | Admitting: INTERNAL MEDICINE
Payer: COMMERCIAL

## 2025-03-18 DIAGNOSIS — C11.9 NASOPHARYNGEAL CANCER (H): ICD-10-CM

## 2025-03-18 DIAGNOSIS — C11.8 MALIGNANT NEOPLASM OF OVERLAPPING SITES OF NASOPHARYNX (H): ICD-10-CM

## 2025-03-18 PROCEDURE — 78815 PET IMAGE W/CT SKULL-THIGH: CPT | Mod: PS

## 2025-03-18 PROCEDURE — A9552 F18 FDG: HCPCS | Performed by: INTERNAL MEDICINE

## 2025-03-18 PROCEDURE — 343N000001 HC RX 343 MED OP 636: Performed by: INTERNAL MEDICINE

## 2025-03-18 RX ORDER — FLUDEOXYGLUCOSE F 18 200 MCI/ML
11.96 INJECTION, SOLUTION INTRAVENOUS ONCE
Status: COMPLETED | OUTPATIENT
Start: 2025-03-18 | End: 2025-03-18

## 2025-03-18 RX ADMIN — FLUDEOXYGLUCOSE F 18 11.96 MILLICURIE: 200 INJECTION, SOLUTION INTRAVENOUS at 13:26

## 2025-03-26 ENCOUNTER — PATIENT OUTREACH (OUTPATIENT)
Dept: ONCOLOGY | Facility: OTHER | Age: 65
End: 2025-03-26
Payer: COMMERCIAL

## 2025-03-26 ENCOUNTER — ONCOLOGY VISIT (OUTPATIENT)
Dept: ONCOLOGY | Facility: OTHER | Age: 65
End: 2025-03-26
Payer: COMMERCIAL

## 2025-03-26 VITALS
HEIGHT: 71 IN | RESPIRATION RATE: 16 BRPM | HEART RATE: 74 BPM | BODY MASS INDEX: 37.91 KG/M2 | WEIGHT: 270.8 LBS | DIASTOLIC BLOOD PRESSURE: 82 MMHG | TEMPERATURE: 96.8 F | SYSTOLIC BLOOD PRESSURE: 132 MMHG | OXYGEN SATURATION: 95 %

## 2025-03-26 DIAGNOSIS — R79.89 LOW VITAMIN B12 LEVEL: ICD-10-CM

## 2025-03-26 DIAGNOSIS — E03.9 HYPOTHYROIDISM, UNSPECIFIED TYPE: ICD-10-CM

## 2025-03-26 DIAGNOSIS — D50.9 IRON DEFICIENCY ANEMIA, UNSPECIFIED IRON DEFICIENCY ANEMIA TYPE: ICD-10-CM

## 2025-03-26 DIAGNOSIS — C11.8 MALIGNANT NEOPLASM OF OVERLAPPING SITES OF NASOPHARYNX (H): Primary | ICD-10-CM

## 2025-03-26 PROCEDURE — G0463 HOSPITAL OUTPT CLINIC VISIT: HCPCS

## 2025-03-26 ASSESSMENT — PAIN SCALES - GENERAL: PAINLEVEL_OUTOF10: NO PAIN (0)

## 2025-03-26 NOTE — PROGRESS NOTES
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  For details of history see previous notes.  He is status post 10 cycles of pembrolizumab which she completed in October 2023.  He has essentially remained in remission since then without any treatment.  Continue surveillance as PET scans have been negative.  He was seen by ENT recently who felt the patient has remained in remission and recommended every 6 month ENT exams.  Patient had a repeat PET scan on March 18 and the findings were that there was no evidence of hypermetabolic metastatic disease with postop changes redemonstrated in the upper neck.  Patient otherwise is doing amazingly well.  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  He denies any dysphagia or hemoptysis.  Denies any headaches or change in mental status.  Denies shortness of breath cough hemoptysis.  Denies hematemesis melena or bright red blood per rectum,.  He denies abdominal pain.  Denies change in bowel habits.  Denies hematuria or flank pain.  He continues on monthly B12 injections.  Continues on Synthroid and folic acid.  He is also currently on oral iron or ferrous sulfate 305 mg p.o. daily.  He does have psoriasis and uses clobetasol cream.  He also has had at least 2 or 3 moles removed from his back he states that at least 2 have undergone wide excision.  We do not have the pathology  results with dermatology results.  Will need to obtain records.  Denies fevers, night sweats or weight loss overall he is doing well..  Does complain of some neuropathic symptoms involving left neck which radiates into the left shoulder as well as into the left side of his face.  Is been present since his head and neck surgery.      ______________________________________________________________________________    Past History    Past Medical History:   Diagnosis Date    COPD (chronic obstructive pulmonary disease) (H)     Dyslipidemia     Nasopharyngeal carcinoma (H)     BARRETT (obstructive sleep apnea)     Type 2 diabetes mellitus with diabetic nephropathy (H)        Past Surgical History:   Procedure Laterality Date    COLONOSCOPY      DISSECTION RADICAL NECK MODIFIED Left 12/5/2022    Procedure: left modified radical neck dissection;  Surgeon: Jesus Boston MD;  Location: [facility] OR    INSERT PORT VASCULAR ACCESS Right 03/31/2022    Procedure: SINGLE LUMEN POWER PORT INSERTION, VASCULAR ACCESS;  Surgeon: Evens Aponte MD;  Location: [facility] OR    IR CHEST PORT PLACEMENT > 5 YRS OF AGE  03/31/2022    IR RENAL BIOPSY RIGHT  11/6/2023    KNEE SURGERY      LARYNGOSCOPY WITH BIOPSY(IES) N/A 12/5/2022    Procedure: LARYNGOSCOPY, WITH BIOPSY;  Surgeon: Jesus Boston MD;  Location: [facility] OR    TONSILLECTOMY             Review of systems.  CNS: There are no headaches, no blurred vision, no change in mental status,   ENT: There is no hearing loss.  Respiratory: There is no dyspnea on exertion, cough or hemoptysis  Cardiac: There is no chest pain, orthopnea, PND, or ankle edema.  GI: There is no bright red blood per rectum, no hematemesis, no reflux, no diarrhea or constipation  Musculoskeletal: There is some neuropathic symptoms involving neck/left shoulder and left side of his face  : There is no urinary frequency, hematuria.  Constitutional: There is no fevers, night sweats, weight loss.  Endocrine: There is  "minimal fatigue  Neuro: There is no tingling or numbness in the hands or feet.  Hematologic: There is no gingival bleeding, epistaxis, or easy bruisability.  Dermatologic: There is psoriasiform rash involving the right scalp just above the right ear periauricular.  A 14 point review of systems is otherwise negative.          Physical Exam    /82 (BP Location: Right arm, Patient Position: Chair, Cuff Size: Adult Large)   Pulse 74   Temp 96.8  F (36  C) (Temporal)   Resp 16   Ht 1.791 m (5' 10.5\")   Wt 122.8 kg (270 lb 12.8 oz)   SpO2 95%   BMI 38.31 kg/m        GENERAL: Alert and oriented to time place and person. Seated comfortably. In no distress.    HEAD: Atraumatic and normocephalic.    EYES: YON, EOMI.  No pallor.  No icterus.    Oral cavity: no mucosal lesion or tonsillar enlargement.    NECK: supple. JVP normal.  Postsurgical scarring involving the left neck with no obvious palpable lymphadenopathy with limited range of motion laterally  No thyroid enlargement.    LYMPH NODES: There are no palpable cervical, supraclavicular, axillary, or inguinal nodes.    LUNGS: clear to auscultation bilaterally.  Resonant to percussion throughout bilaterally.  Symmetrical breath movements bilaterally.    HEART: S1 and S2 are heard. Regular rate and rhythm.  No murmur or gallop or rub heard.  No peripheral edema.    ABDOMEN: Obese soft. Not tender. Not distended.  No palpable hepatomegaly or splenomegaly.  No other mass palpable.  Normal active bowel sounds heard.    EXTREMITIES: There is no ankle edema.  SKIN: no rash, or bruising or purpura.    NEURO: Grossly non-focal.    Lab Results  Component      Latest Ref [facility] 3/13/2025  1:17 PM   WBC      4.0 - 11.0 10e3/uL 5.3    RBC Count      4.40 - 5.90 10e6/uL 4.40    Hemoglobin      13.3 - 17.7 g/dL 12.5 (L)    Hematocrit      40.0 - 53.0 % 38.7 (L)    MCV      78 - 100 fL 88    MCH      26.5 - 33.0 pg 28.4    MCHC      31.5 - 36.5 g/dL 32.3    RDW      10.0 - " 15.0 % 14.2    Platelet Count      150 - 450 10e3/uL 199    % Neutrophils      % 64    % Lymphocytes      % 23    % Monocytes      % 8    % Eosinophils      % 4    % Basophils      % 0    % Immature Granulocytes      % 0    NRBCs per 100 WBC      <1 /100 0    Absolute Neutrophils      1.6 - 8.3 10e3/uL 3.4    Absolute Lymphocytes      0.8 - 5.3 10e3/uL 1.2    Absolute Monocytes      0.0 - 1.3 10e3/uL 0.4    Absolute Eosinophils      0.0 - 0.7 10e3/uL 0.2    Absolute Basophils      0.0 - 0.2 10e3/uL 0.0    Absolute Immature Granulocytes      <=0.4 10e3/uL 0.0    Absolute NRBCs      10e3/uL 0.0    Sodium      135 - 145 mmol/L 141    Potassium      3.4 - 5.3 mmol/L 4.3    Carbon Dioxide (CO2)      22 - 29 mmol/L 28    Anion Gap      7 - 15 mmol/L 7    Urea Nitrogen      8.0 - 23.0 mg/dL 14.8    Creatinine      0.67 - 1.17 mg/dL 0.89    GFR Estimate      >60 mL/min/1.73m2 >90    Calcium      8.8 - 10.4 mg/dL 8.9    Chloride      98 - 107 mmol/L 106    Glucose      70 - 99 mg/dL 129 (H)    Alkaline Phosphatase      40 - 150 U/L 87    AST      0 - 45 U/L 27    ALT      0 - 70 U/L 19    Protein Total      6.4 - 8.3 g/dL 7.3    Albumin      3.5 - 5.2 g/dL 3.8    Bilirubin Total      <=1.2 mg/dL 0.5    Iron      61 - 157 ug/dL 44 (L)    Iron Binding Capacity      240 - 430 ug/dL 219 (L)    Iron Sat Index      15 - 46 % 20    CRP Inflammation      <5.00 mg/L 11.85 (H)    Lactate Dehydrogenase      0 - 250 U/L 151    Ferritin      31 - 409 ng/mL 222    Folate      4.6 - 34.8 ng/mL >20.0    Vitamin B12      232 - 1,245 pg/mL 527    Sed Rate      0 - 20 mm/hr 39 (H)       Legend:  (L) Low  (H) High  From    Imaging    PET Oncology (Eyes to Thighs)    Result Date: 3/19/2025  Procedure: PET ONCOLOGY (EYES TO THIGHS) Subtype: Subsequent Radiopharmaceutical: F-18 FDG Dose: 11.96 mCi IV Serum Glucose: 67 mg/dl History: Nasopharyngeal cancer (H); Malignant neoplasm of overlapping sites of nasopharynx (H) Comparison: 12/18/24  Technique:  A low dose CT examination was performed for the purpose of attenuation correction and localization. Attenuation corrected PET images from the skull base to the mid thigh were acquired approximately one hour following intravenous administration of the dose, and displayed in transaxial, sagittal, and coronal projections. Uptake time: 65 minutes. Findings: Background uptake: Blood pool 2.2, liver 3.2. Neck: Postoperative changes are redemonstrated in the upper neck. Consider repeat postcontrast CT neck. Thorax: Mild asymmetric activity within the left sternoclavicular joint is stable in retrospect, likely degenerative. No sites of suspicious hypermetabolism. Abdomen/Pelvis: No sites of suspicious hypermetabolism. A calcified gallstone is redemonstrated. Thighs: No sites of suspicious hypermetabolism. Bony structures: No sites of suspicious hypermetabolism.     IMPRESSION:  No evidence of hypermetabolic metastatic disease. JUAN CARLOS MALCOLM MD   SYSTEM ID:  [id number]     Assessment and Plan: #1 :.  Stage Shannan nasopharyngeal carcinoma, p16 negative, EBV positive : status post induction chemotherapy with gemcitabine/carboplatin x 3 cycles followed by chemo radiation with weekly carboplatin followed by salvage left modified radical dissection performed at the [facility] with residual disease noted on posttreatment PET scan with positive PD-L1 testing status post pembrolizumab given the direction of Dr. Dariela Melvin at the [facility].  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  PET scan indicated complete response patient has been in complete response since December 2023.  Recent  ENT exam was negative.  The plan is to see the patient 1 months obtain MRI of the brain as well as PET scan.  The PET scan continues to be consistent with complete response we will likely see the patient on a every 6 month basis with every 6 month PET scans and MRIs of the brain.  2.  :.  B12 deficiency currently on parenteral B12 with some improvement the plan is to continue current regimen of 1000 mcg B12 IM monthly.  3 :.  Iron deficiency anemia: Essentially resolved we will continue oral iron ferrous sulfate 325 mg p.o. daily and repeat iron studies in  4 months.  Time spent: 58 minutes: We spent time reviewing recent provider notes including ENT notes reviewed lab results, reviewed PET scan results discussed PET scan results with the patient, we performed a history/physical, documented history/physical, and ordered follow-up labs as well as appointments and PET scan and MRI of the brain to be performed in 4 months.  The longitudinal plan of care for the diagnosis(es)/condition(s) as documented were addressed during this visit. Due to the added complexity in care, I will continue to support Jareth in the subsequent management and with ongoing continuity of care.      Cancer Staging   No matching staging information was found for the patient.        Signed by: Chriss Martin MD    CC: Chriss Martin MD

## 2025-03-26 NOTE — PROGRESS NOTES
[facility]: Cancer Care Follow-Up Note                                    Discussion with Patient:                                                      Jareth is doing well. Discomfort left neck.             Dates of Treatment:                                                      Infusion given in last 28 days       None            Assessment:                                                          RNCC Short Symptom Review:     Assessment completed with:: Patient    General/Short Assessment  Does the patient have all their medications?: Yes  Is the patient experiencing any new or worsening symptoms?: No  Does the patient need any referrals to supportive care services?: No  Discussion with patient: Answered patient's questions and addressed concerns, Reviewed how and when to contact clinic, Reviewed patient's future appointments    Pain  Patient Reported Pain?: No (discomfort left neck)    Patient Coping  Accepting    Clinic Utilization  Patient Aware of Next Appointment?: Yes    Other Patient Concerns  Other Patient Reported Concerns: No    Intervention/Education provided during outreach:                                                           Patient to follow up as scheduled at next appt  Patient to call/[employer] message with updates  Confirmed patient has clinic and triage numbers    Signature:  Sybil Zamora RN

## 2025-03-26 NOTE — NURSING NOTE
"March 26, 2025 8:21 AM    Chief Complaint   Patient presents with    Oncology Clinic Visit     Follow Up Nasopharyngeal cancer     Initial Vitals: /82 (BP Location: Right arm, Patient Position: Chair, Cuff Size: Adult Large)   Pulse 74   Temp 96.8  F (36  C) (Temporal)   Resp 16   Ht 1.791 m (5' 10.5\")   Wt 122.8 kg (270 lb 12.8 oz)   SpO2 95%   BMI 38.31 kg/m   Estimated body mass index is 38.31 kg/m  as calculated from the following:    Height as of this encounter: 1.791 m (5' 10.5\").    Weight as of this encounter: 122.8 kg (270 lb 12.8 oz). Body surface area is 2.47 meters squared.  No Pain (0) Comment: Data Unavailable  No LMP for male patient.  Allergies reviewed: Yes  Medications reviewed: Yes    Medications: Medication refills not needed today.  Pharmacy name entered into [employer]: [id number] [place] [address]    Frailty Screening:  Is the patient here for a new oncology consult visit in cancer care? 2. No      Clinical Concerns: None Today.     Iva Jones LPN      "

## 2025-04-10 ENCOUNTER — HOSPITAL ENCOUNTER (OUTPATIENT)
Dept: INFUSION THERAPY | Facility: OTHER | Age: 65
Discharge: HOME | End: 2025-04-10
Attending: INTERNAL MEDICINE
Payer: COMMERCIAL

## 2025-04-10 DIAGNOSIS — R79.89 LOW VITAMIN B12 LEVEL: Primary | ICD-10-CM

## 2025-04-10 PROCEDURE — 96372 THER/PROPH/DIAG INJ SC/IM: CPT | Performed by: NURSE PRACTITIONER

## 2025-04-10 PROCEDURE — 250N000011 HC RX IP 250 OP 636: Performed by: NURSE PRACTITIONER

## 2025-04-10 RX ORDER — ALBUTEROL SULFATE 0.83 MG/ML
2.5 SOLUTION RESPIRATORY (INHALATION)
OUTPATIENT
Start: 2025-05-08

## 2025-04-10 RX ORDER — MEPERIDINE HYDROCHLORIDE 25 MG/ML
25 INJECTION INTRAMUSCULAR; INTRAVENOUS; SUBCUTANEOUS EVERY 30 MIN PRN
OUTPATIENT
Start: 2025-05-08

## 2025-04-10 RX ORDER — ALBUTEROL SULFATE 90 UG/1
1-2 INHALANT RESPIRATORY (INHALATION)
Start: 2025-05-08

## 2025-04-10 RX ORDER — METHYLPREDNISOLONE SODIUM SUCCINATE 125 MG/2ML
125 INJECTION INTRAMUSCULAR; INTRAVENOUS
Start: 2025-05-08

## 2025-04-10 RX ORDER — DIPHENHYDRAMINE HYDROCHLORIDE 50 MG/ML
50 INJECTION, SOLUTION INTRAMUSCULAR; INTRAVENOUS
Start: 2025-05-08

## 2025-04-10 RX ORDER — CYANOCOBALAMIN 1000 UG/ML
1000 INJECTION, SOLUTION INTRAMUSCULAR; SUBCUTANEOUS ONCE
Status: COMPLETED | OUTPATIENT
Start: 2025-04-10 | End: 2025-04-10

## 2025-04-10 RX ORDER — CYANOCOBALAMIN 1000 UG/ML
1000 INJECTION, SOLUTION INTRAMUSCULAR; SUBCUTANEOUS ONCE
Start: 2025-05-08 | End: 2025-05-08

## 2025-04-10 RX ORDER — EPINEPHRINE 1 MG/ML
0.3 INJECTION, SOLUTION, CONCENTRATE INTRAVENOUS EVERY 5 MIN PRN
OUTPATIENT
Start: 2025-05-08

## 2025-04-10 RX ADMIN — CYANOCOBALAMIN 1000 MCG: 1000 INJECTION, SOLUTION INTRAMUSCULAR; SUBCUTANEOUS at 14:37

## 2025-04-10 NOTE — NURSING NOTE
Infusion Nursing Note:  Jareth Gallardo presents today for B12.    Patient seen by provider today: No   present during visit today: Not Applicable.    Note: N/A.    Intravenous Access:  No Intravenous access/labs at this visit.    Treatment Conditions:  Not Applicable.    Post Infusion Assessment:  Patient tolerated injection without incident.     Discharge Plan:   Discharge instructions reviewed with: Patient.  Patient and/or family verbalized understanding of discharge instructions and all questions answered.  Copy of AVS declined by patient and/or family.  Patient will return 5/8/25 for next appointment.  AVS to patient via [employer].    Patient discharged in stable condition accompanied by: self.  Departure Mode: Ambulatory.      Marilynn De León RN

## 2025-04-15 ENCOUNTER — TRANSFERRED RECORDS (OUTPATIENT)
Dept: HEALTH INFORMATION MANAGEMENT | Facility: OTHER | Age: 65
End: 2025-04-15
Payer: COMMERCIAL

## 2025-05-08 ENCOUNTER — HOSPITAL ENCOUNTER (OUTPATIENT)
Dept: INFUSION THERAPY | Facility: OTHER | Age: 65
Discharge: HOME | End: 2025-05-08
Attending: INTERNAL MEDICINE
Payer: COMMERCIAL

## 2025-05-08 DIAGNOSIS — R79.89 LOW VITAMIN B12 LEVEL: Primary | ICD-10-CM

## 2025-05-08 DIAGNOSIS — C11.9 NASOPHARYNGEAL CANCER (H): ICD-10-CM

## 2025-05-08 PROCEDURE — 96372 THER/PROPH/DIAG INJ SC/IM: CPT | Performed by: NURSE PRACTITIONER

## 2025-05-08 PROCEDURE — 250N000011 HC RX IP 250 OP 636: Performed by: INTERNAL MEDICINE

## 2025-05-08 PROCEDURE — 250N000011 HC RX IP 250 OP 636: Performed by: NURSE PRACTITIONER

## 2025-05-08 RX ORDER — ALBUTEROL SULFATE 90 UG/1
1-2 INHALANT RESPIRATORY (INHALATION)
Start: 2025-06-05

## 2025-05-08 RX ORDER — HEPARIN SODIUM (PORCINE) LOCK FLUSH IV SOLN 100 UNIT/ML 100 UNIT/ML
5 SOLUTION INTRAVENOUS
OUTPATIENT
Start: 2025-05-08

## 2025-05-08 RX ORDER — CYANOCOBALAMIN 1000 UG/ML
1000 INJECTION, SOLUTION INTRAMUSCULAR; SUBCUTANEOUS ONCE
Start: 2025-06-05 | End: 2025-06-05

## 2025-05-08 RX ORDER — MEPERIDINE HYDROCHLORIDE 25 MG/ML
25 INJECTION INTRAMUSCULAR; INTRAVENOUS; SUBCUTANEOUS EVERY 30 MIN PRN
OUTPATIENT
Start: 2025-06-05

## 2025-05-08 RX ORDER — HEPARIN SODIUM (PORCINE) LOCK FLUSH IV SOLN 100 UNIT/ML 100 UNIT/ML
5 SOLUTION INTRAVENOUS
Status: DISPENSED | OUTPATIENT
Start: 2025-05-08

## 2025-05-08 RX ORDER — DIPHENHYDRAMINE HYDROCHLORIDE 50 MG/ML
50 INJECTION, SOLUTION INTRAMUSCULAR; INTRAVENOUS
Start: 2025-06-05

## 2025-05-08 RX ORDER — ALBUTEROL SULFATE 0.83 MG/ML
2.5 SOLUTION RESPIRATORY (INHALATION)
OUTPATIENT
Start: 2025-06-05

## 2025-05-08 RX ORDER — CYANOCOBALAMIN 1000 UG/ML
1000 INJECTION, SOLUTION INTRAMUSCULAR; SUBCUTANEOUS ONCE
Status: COMPLETED | OUTPATIENT
Start: 2025-05-08 | End: 2025-05-08

## 2025-05-08 RX ORDER — METHYLPREDNISOLONE SODIUM SUCCINATE 125 MG/2ML
125 INJECTION INTRAMUSCULAR; INTRAVENOUS
Start: 2025-06-05

## 2025-05-08 RX ORDER — EPINEPHRINE 1 MG/ML
0.3 INJECTION, SOLUTION, CONCENTRATE INTRAVENOUS EVERY 5 MIN PRN
OUTPATIENT
Start: 2025-06-05

## 2025-05-08 RX ADMIN — HEPARIN 5 ML: 100 SYRINGE at 13:37

## 2025-05-08 RX ADMIN — CYANOCOBALAMIN 1000 MCG: 1000 INJECTION, SOLUTION INTRAMUSCULAR; SUBCUTANEOUS at 13:39

## 2025-05-08 NOTE — NURSING NOTE
Infusion Nursing Note:  Jareth Gallardo presents today for B12/Port Flush.    Patient seen by provider today: No   present during visit today: Not Applicable.    Note: N/A.    Intravenous Access:  Implanted Port.    Treatment Conditions:  Not Applicable.    Post Infusion Assessment:  Patient tolerated injection SHANNON without incident.  Blood return noted pre and post infusion.  Site patent and intact, free from redness, edema or discomfort.  No evidence of extravasations.  Access discontinued per protocol.     Discharge Plan:   Discharge instructions reviewed with: Patient.  Patient and/or family verbalized understanding of discharge instructions and all questions answered.  Copy of AVS declined by patient and/or family.  Patient will return 6/5/25 for next appointment.  AVS to patient via MYCHART.    Patient discharged in stable condition accompanied by: self.  Departure Mode: Ambulatory.      Marilynn De León RN

## 2025-05-18 ENCOUNTER — HEALTH MAINTENANCE LETTER (OUTPATIENT)
Age: 65
End: 2025-05-18

## 2025-05-30 DIAGNOSIS — E03.9 HYPOTHYROIDISM, UNSPECIFIED TYPE: ICD-10-CM

## 2025-06-02 NOTE — TELEPHONE ENCOUNTER
[facility] [id number] [facility] [employer] [facility] [place]  [id number] [facility] [phone]  sent Rx request for the following:      Requested Prescriptions   Pending Prescriptions Disp Refills    levothyroxine (SYNTHROID/LEVOTHROID) 125 MCG tablet [Pharmacy Med Name: LEVOTHYROXINE 125 MCG TABLET] 90 tablet 0     Sig: TAKE 1 TABLET BY MOUTH EVERY MORNING (BEFORE BREAKFAST).       Thyroid Protocol Failed - 6/2/2025  3:11 PM        Failed - Medication is active on med list and the sig matches. RN to manually verify dose and sig if red X/fail.        Failed - Normal TSH on file in past 12 months     Recent Labs   Lab Test 02/13/25  1018   TSH 4.85*          Last Prescription Date:   3/5/2025  Last Fill Qty/Refills:         90, R-0    Last Office Visit:              2021 last annual   Future Office visit:           none       Unable to complete prescription refill per RN Medication Refill Policy.   Tried calling let message to return call . Will route to last asigned pcp to review and approve dont know if this patient is still getting care here.  And will route to schedule to see if a annul can be set up   Jorge Braswell RN on 6/2/2025 at 3:24 PM         Next 5 appointments (look out 90 days)      Aug 01, 2025 1:00 PM  Return Visit with Gem Carrizales MD  [facility] ([facility]) [address]  [facility] [address]  [phone]

## 2025-06-03 RX ORDER — LEVOTHYROXINE SODIUM 125 UG/1
125 TABLET ORAL
Qty: 90 TABLET | Refills: 0 | Status: SHIPPED | OUTPATIENT
Start: 2025-06-03

## 2025-06-05 ENCOUNTER — HOSPITAL ENCOUNTER (OUTPATIENT)
Dept: INFUSION THERAPY | Facility: OTHER | Age: 65
End: 2025-06-05
Attending: INTERNAL MEDICINE
Payer: COMMERCIAL

## 2025-06-05 DIAGNOSIS — R79.89 LOW VITAMIN B12 LEVEL: Primary | ICD-10-CM

## 2025-06-05 PROCEDURE — 250N000011 HC RX IP 250 OP 636: Performed by: NURSE PRACTITIONER

## 2025-06-05 PROCEDURE — 96372 THER/PROPH/DIAG INJ SC/IM: CPT | Performed by: NURSE PRACTITIONER

## 2025-06-05 RX ORDER — EPINEPHRINE 1 MG/ML
0.3 INJECTION, SOLUTION, CONCENTRATE INTRAVENOUS EVERY 5 MIN PRN
OUTPATIENT
Start: 2025-07-03

## 2025-06-05 RX ORDER — DIPHENHYDRAMINE HYDROCHLORIDE 50 MG/ML
50 INJECTION, SOLUTION INTRAMUSCULAR; INTRAVENOUS
Start: 2025-07-03

## 2025-06-05 RX ORDER — MEPERIDINE HYDROCHLORIDE 25 MG/ML
25 INJECTION INTRAMUSCULAR; INTRAVENOUS; SUBCUTANEOUS EVERY 30 MIN PRN
OUTPATIENT
Start: 2025-07-03

## 2025-06-05 RX ORDER — ALBUTEROL SULFATE 90 UG/1
1-2 INHALANT RESPIRATORY (INHALATION)
Start: 2025-07-03

## 2025-06-05 RX ORDER — CYANOCOBALAMIN 1000 UG/ML
1000 INJECTION, SOLUTION INTRAMUSCULAR; SUBCUTANEOUS ONCE
Start: 2025-07-03 | End: 2025-07-03

## 2025-06-05 RX ORDER — METHYLPREDNISOLONE SODIUM SUCCINATE 125 MG/2ML
125 INJECTION INTRAMUSCULAR; INTRAVENOUS
Start: 2025-07-03

## 2025-06-05 RX ORDER — CYANOCOBALAMIN 1000 UG/ML
1000 INJECTION, SOLUTION INTRAMUSCULAR; SUBCUTANEOUS ONCE
Status: COMPLETED | OUTPATIENT
Start: 2025-06-05 | End: 2025-06-05

## 2025-06-05 RX ORDER — ALBUTEROL SULFATE 0.83 MG/ML
2.5 SOLUTION RESPIRATORY (INHALATION)
OUTPATIENT
Start: 2025-07-03

## 2025-06-05 RX ADMIN — CYANOCOBALAMIN 1000 MCG: 1000 INJECTION, SOLUTION INTRAMUSCULAR; SUBCUTANEOUS at 13:35

## 2025-06-05 NOTE — NURSING NOTE
Infusion Nursing Note:  Jareth Gallardo presents today for B12.    Patient seen by provider today: No   present during visit today: Not Applicable.    Note: N/A.      Intravenous Access:  No Intravenous access/labs at this visit.    Treatment Conditions:  Not Applicable.      Post Infusion Assessment:  Patient tolerated injection without incident.  Site patent and intact, free from redness, edema or discomfort.       Discharge Plan:   Patient and/or family verbalized understanding of discharge instructions and all questions answered.  AVS to patient via [employer].  Patient will return 7/3 for next appointment.   Patient discharged in stable condition accompanied by: wife.      Jean S. Hammann, RN

## 2025-06-10 ENCOUNTER — OFFICE VISIT (OUTPATIENT)
Dept: OTOLARYNGOLOGY | Facility: OTHER | Age: 65
End: 2025-06-10
Attending: OTOLARYNGOLOGY
Payer: COMMERCIAL

## 2025-06-10 DIAGNOSIS — C11.9 NASOPHARYNGEAL CANCER (H): Primary | ICD-10-CM

## 2025-06-10 PROCEDURE — G0463 HOSPITAL OUTPT CLINIC VISIT: HCPCS

## 2025-06-10 NOTE — NURSING NOTE
"Chief Complaint   Patient presents with    Cancer     6 mo cancer check    Patient presents to the clinic for a 6 month cancer check up.     Initial There were no vitals taken for this visit. Estimated body mass index is 38.31 kg/m  as calculated from the following:    Height as of 3/26/25: 1.791 m (5' 10.5\").    Weight as of 3/26/25: 122.8 kg (270 lb 12.8 oz).  Medication Review: complete    The next two questions are to help us understand your food security.  If you are feeling you need any assistance in this area, we have resources available to support you today.          2/8/2024   SDOH- Food Insecurity   Within the past 12 months, did you worry that your food would run out before you got money to buy more? N   Within the past 12 months, did the food you bought just not last and you didn t have money to get more? N        Data saved with a previous flowsheet row definition         Wander Tidwell      "

## 2025-06-30 NOTE — PROGRESS NOTES
Office Visit  6/10/2025  [facility] - Ear, Nose & Throat - Lexx Garay M.D.  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  Reason for Visit     Progress Notes  Lexx Bran M.D. 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  He was treated with chemotherapy and radiation therapy at the [facility].  In December 2022, he underwent a salvage neck dissection because of persistent neck disease.  He eventually was started on Keytruda because of some lingering adenopathy noted on imaging.  He had to stop Keytruda secondary to kidney side effects.  He has been followed conservatively since.  He had a PET scan as recently as December 2024 that was negative.  No new complaints at this time      Exam  Oral cavity oropharynx-free of lesions or inflammation   Nasal-no anterior obstruction or purulence   Indirect nasopharyngoscopy-the patient gives an excellent exam and there is no evidence of nasopharyngeal mass  Indirect laryngoscopy-Clear hypopharynx and larynx   Neck-no palpable masses or adenopathy   Head and neck integument-Clear   General-the patient appears well and in no distress   Neuro-there are no focal cranial nerve deficits      Impression  History of nasopharyngeal cancer   Plan   We will continue every three-month follow-up visits until December which point we will extend to every 6 months in the coming year.  I would continue PET scans yearly until he hits his 5th year anniversary.

## 2025-07-03 ENCOUNTER — HOSPITAL ENCOUNTER (OUTPATIENT)
Dept: INFUSION THERAPY | Facility: OTHER | Age: 65
End: 2025-07-03
Payer: COMMERCIAL

## 2025-07-03 DIAGNOSIS — R79.89 LOW VITAMIN B12 LEVEL: Primary | ICD-10-CM

## 2025-07-03 DIAGNOSIS — C11.9 NASOPHARYNGEAL CANCER (H): ICD-10-CM

## 2025-07-03 PROCEDURE — 250N000011 HC RX IP 250 OP 636: Performed by: INTERNAL MEDICINE

## 2025-07-03 PROCEDURE — 250N000011 HC RX IP 250 OP 636: Performed by: NURSE PRACTITIONER

## 2025-07-03 PROCEDURE — 96372 THER/PROPH/DIAG INJ SC/IM: CPT | Performed by: NURSE PRACTITIONER

## 2025-07-03 RX ORDER — METHYLPREDNISOLONE SODIUM SUCCINATE 125 MG/2ML
125 INJECTION INTRAMUSCULAR; INTRAVENOUS
Start: 2025-07-31

## 2025-07-03 RX ORDER — CYANOCOBALAMIN 1000 UG/ML
1000 INJECTION, SOLUTION INTRAMUSCULAR; SUBCUTANEOUS ONCE
Status: COMPLETED | OUTPATIENT
Start: 2025-07-03 | End: 2025-07-03

## 2025-07-03 RX ORDER — MEPERIDINE HYDROCHLORIDE 25 MG/ML
25 INJECTION INTRAMUSCULAR; INTRAVENOUS; SUBCUTANEOUS EVERY 30 MIN PRN
OUTPATIENT
Start: 2025-07-31

## 2025-07-03 RX ORDER — HEPARIN SODIUM (PORCINE) LOCK FLUSH IV SOLN 100 UNIT/ML 100 UNIT/ML
5 SOLUTION INTRAVENOUS
OUTPATIENT
Start: 2025-07-03

## 2025-07-03 RX ORDER — CYANOCOBALAMIN 1000 UG/ML
1000 INJECTION, SOLUTION INTRAMUSCULAR; SUBCUTANEOUS ONCE
Start: 2025-07-31 | End: 2025-07-31

## 2025-07-03 RX ORDER — EPINEPHRINE 1 MG/ML
0.3 INJECTION, SOLUTION, CONCENTRATE INTRAVENOUS EVERY 5 MIN PRN
OUTPATIENT
Start: 2025-07-31

## 2025-07-03 RX ORDER — ALBUTEROL SULFATE 90 UG/1
1-2 INHALANT RESPIRATORY (INHALATION)
Start: 2025-07-31

## 2025-07-03 RX ORDER — DIPHENHYDRAMINE HYDROCHLORIDE 50 MG/ML
50 INJECTION, SOLUTION INTRAMUSCULAR; INTRAVENOUS
Start: 2025-07-31

## 2025-07-03 RX ORDER — HEPARIN SODIUM (PORCINE) LOCK FLUSH IV SOLN 100 UNIT/ML 100 UNIT/ML
5 SOLUTION INTRAVENOUS
Status: DISPENSED | OUTPATIENT
Start: 2025-07-03

## 2025-07-03 RX ORDER — ALBUTEROL SULFATE 0.83 MG/ML
2.5 SOLUTION RESPIRATORY (INHALATION)
OUTPATIENT
Start: 2025-07-31

## 2025-07-03 RX ADMIN — CYANOCOBALAMIN 1000 MCG: 1000 INJECTION, SOLUTION INTRAMUSCULAR; SUBCUTANEOUS at 13:20

## 2025-07-03 RX ADMIN — HEPARIN 5 ML: 100 SYRINGE at 13:27

## 2025-07-03 NOTE — NURSING NOTE
Infusion Nursing Note:  Jareth Gallardo presents today for Vitamin B12 injection and port flush.    Patient seen by provider today: No   present during visit today: Not Applicable.    Note: N/A.      Intravenous Access:  Implanted Port.    Treatment Conditions:  Not Applicable.      Post Infusion Assessment:  Patient tolerated port flush without incident.  Patient tolerated injection without incident to R deltoid.  Blood return noted with port flush.  Site patent and intact, free from redness, edema or discomfort.  No evidence of extravasations.  Access discontinued per protocol.       Discharge Plan:   Discharge instructions reviewed with: Patient.  Patient and/or family verbalized understanding of discharge instructions and all questions answered.  Copy of AVS reviewed with patient and/or family.  Patient will return 7-31-25 for next appointment.  Patient discharged in stable condition accompanied by: self.  Departure Mode: Ambulatory.      Jany Jenkins RN

## 2025-07-16 ENCOUNTER — DOCUMENTATION ONLY (OUTPATIENT)
Dept: ONCOLOGY | Facility: OTHER | Age: 65
End: 2025-07-16

## 2025-07-16 ENCOUNTER — HOSPITAL ENCOUNTER (OUTPATIENT)
Dept: PET IMAGING | Facility: OTHER | Age: 65
Discharge: HOME | End: 2025-07-16
Attending: INTERNAL MEDICINE
Payer: COMMERCIAL

## 2025-07-16 DIAGNOSIS — R79.89 LOW VITAMIN B12 LEVEL: ICD-10-CM

## 2025-07-16 DIAGNOSIS — E03.9 HYPOTHYROIDISM, UNSPECIFIED TYPE: ICD-10-CM

## 2025-07-16 DIAGNOSIS — D50.9 IRON DEFICIENCY ANEMIA, UNSPECIFIED IRON DEFICIENCY ANEMIA TYPE: ICD-10-CM

## 2025-07-16 DIAGNOSIS — C11.8 MALIGNANT NEOPLASM OF OVERLAPPING SITES OF NASOPHARYNX (H): Primary | ICD-10-CM

## 2025-07-16 DIAGNOSIS — D89.839 CYTOKINE RELEASE SYNDROME: ICD-10-CM

## 2025-07-16 DIAGNOSIS — C11.8 MALIGNANT NEOPLASM OF OVERLAPPING SITES OF NASOPHARYNX (H): ICD-10-CM

## 2025-07-16 PROCEDURE — 78815 PET IMAGE W/CT SKULL-THIGH: CPT | Mod: 26 | Performed by: RADIOLOGY

## 2025-07-16 PROCEDURE — A9552 F18 FDG: HCPCS | Performed by: INTERNAL MEDICINE

## 2025-07-16 PROCEDURE — 78815 PET IMAGE W/CT SKULL-THIGH: CPT | Mod: PS

## 2025-07-16 PROCEDURE — 343N000001 HC RX 343 MED OP 636: Performed by: INTERNAL MEDICINE

## 2025-07-16 RX ORDER — FLUDEOXYGLUCOSE F 18 200 MCI/ML
9.8 INJECTION, SOLUTION INTRAVENOUS ONCE
Status: COMPLETED | OUTPATIENT
Start: 2025-07-16 | End: 2025-07-16

## 2025-07-16 RX ADMIN — FLUDEOXYGLUCOSE F 18 9.8 MILLICURIE: 200 INJECTION, SOLUTION INTRAVENOUS at 14:45

## 2025-07-16 NOTE — PROGRESS NOTES
Jareth Gallardo has an upcoming lab appointment and the only orders available are Dr. Martin orders. Please review, reorder, and place future orders, as appropriate.    Marcela Solares

## 2025-07-23 ENCOUNTER — HOSPITAL ENCOUNTER (OUTPATIENT)
Dept: MRI IMAGING | Facility: OTHER | Age: 65
Discharge: HOME | End: 2025-07-23
Attending: INTERNAL MEDICINE
Payer: COMMERCIAL

## 2025-07-23 ENCOUNTER — LAB (OUTPATIENT)
Dept: LAB | Facility: OTHER | Age: 65
End: 2025-07-23
Attending: INTERNAL MEDICINE
Payer: COMMERCIAL

## 2025-07-23 DIAGNOSIS — D89.839 CYTOKINE RELEASE SYNDROME: ICD-10-CM

## 2025-07-23 DIAGNOSIS — C11.8 MALIGNANT NEOPLASM OF OVERLAPPING SITES OF NASOPHARYNX (H): ICD-10-CM

## 2025-07-23 DIAGNOSIS — R79.89 LOW VITAMIN B12 LEVEL: ICD-10-CM

## 2025-07-23 DIAGNOSIS — E03.9 HYPOTHYROIDISM, UNSPECIFIED TYPE: ICD-10-CM

## 2025-07-23 DIAGNOSIS — D50.9 IRON DEFICIENCY ANEMIA, UNSPECIFIED IRON DEFICIENCY ANEMIA TYPE: ICD-10-CM

## 2025-07-23 LAB
ALBUMIN SERPL BCG-MCNC: 3.8 G/DL (ref 3.5–5.2)
ALP SERPL-CCNC: 72 U/L (ref 40–150)
ALT SERPL W P-5'-P-CCNC: 22 U/L (ref 0–70)
ANION GAP SERPL CALCULATED.3IONS-SCNC: 11 MMOL/L (ref 7–15)
AST SERPL W P-5'-P-CCNC: 30 U/L (ref 0–45)
BASOPHILS # BLD AUTO: 0 10E3/UL (ref 0–0.2)
BASOPHILS NFR BLD AUTO: 1 %
BILIRUB SERPL-MCNC: 0.6 MG/DL
BUN SERPL-MCNC: 22.1 MG/DL (ref 8–23)
CALCIUM SERPL-MCNC: 9 MG/DL (ref 8.8–10.4)
CHLORIDE SERPL-SCNC: 106 MMOL/L (ref 98–107)
CREAT SERPL-MCNC: 0.85 MG/DL (ref 0.67–1.17)
CRP SERPL-MCNC: 4.5 MG/L
EGFRCR SERPLBLD CKD-EPI 2021: >90 ML/MIN/1.73M2
EOSINOPHIL # BLD AUTO: 0.1 10E3/UL (ref 0–0.7)
EOSINOPHIL NFR BLD AUTO: 3 %
ERYTHROCYTE [DISTWIDTH] IN BLOOD BY AUTOMATED COUNT: 14 % (ref 10–15)
ERYTHROCYTE [SEDIMENTATION RATE] IN BLOOD BY WESTERGREN METHOD: 37 MM/HR (ref 0–20)
FERRITIN SERPL-MCNC: 212 NG/ML (ref 31–409)
FOLATE SERPL-MCNC: 8.7 NG/ML (ref 4.6–34.8)
GLUCOSE SERPL-MCNC: 143 MG/DL (ref 70–99)
HCO3 SERPL-SCNC: 25 MMOL/L (ref 22–29)
HCT VFR BLD AUTO: 40.7 % (ref 40–53)
HGB BLD-MCNC: 13 G/DL (ref 13.3–17.7)
IMM GRANULOCYTES # BLD: 0 10E3/UL
IMM GRANULOCYTES NFR BLD: 0 %
IRON BINDING CAPACITY (ROCHE): 222 UG/DL (ref 240–430)
IRON SATN MFR SERPL: 25 % (ref 15–46)
IRON SERPL-MCNC: 56 UG/DL (ref 61–157)
LDH SERPL L TO P-CCNC: 136 U/L (ref 0–250)
LYMPHOCYTES # BLD AUTO: 1 10E3/UL (ref 0.8–5.3)
LYMPHOCYTES NFR BLD AUTO: 22 %
MCH RBC QN AUTO: 27.9 PG (ref 26.5–33)
MCHC RBC AUTO-ENTMCNC: 31.9 G/DL (ref 31.5–36.5)
MCV RBC AUTO: 87 FL (ref 78–100)
MONOCYTES # BLD AUTO: 0.4 10E3/UL (ref 0–1.3)
MONOCYTES NFR BLD AUTO: 8 %
NEUTROPHILS # BLD AUTO: 3 10E3/UL (ref 1.6–8.3)
NEUTROPHILS NFR BLD AUTO: 67 %
NRBC # BLD AUTO: 0 10E3/UL
NRBC BLD AUTO-RTO: 0 /100
PLATELET # BLD AUTO: 173 10E3/UL (ref 150–450)
POTASSIUM SERPL-SCNC: 4.2 MMOL/L (ref 3.4–5.3)
PROT SERPL-MCNC: 7.4 G/DL (ref 6.4–8.3)
RBC # BLD AUTO: 4.66 10E6/UL (ref 4.4–5.9)
SODIUM SERPL-SCNC: 142 MMOL/L (ref 135–145)
TSH SERPL DL<=0.005 MIU/L-ACNC: 3.31 UIU/ML (ref 0.3–4.2)
VIT B12 SERPL-MCNC: 460 PG/ML (ref 232–1245)
WBC # BLD AUTO: 4.4 10E3/UL (ref 4–11)

## 2025-07-23 PROCEDURE — 83615 LACTATE (LD) (LDH) ENZYME: CPT | Mod: ZL

## 2025-07-23 PROCEDURE — 80053 COMPREHEN METABOLIC PANEL: CPT | Mod: ZL

## 2025-07-23 PROCEDURE — 70553 MRI BRAIN STEM W/O & W/DYE: CPT

## 2025-07-23 PROCEDURE — 70553 MRI BRAIN STEM W/O & W/DYE: CPT | Mod: 26 | Performed by: RADIOLOGY

## 2025-07-23 PROCEDURE — 255N000002 HC RX 255 OP 636

## 2025-07-23 PROCEDURE — 84443 ASSAY THYROID STIM HORMONE: CPT | Mod: ZL

## 2025-07-23 PROCEDURE — 83540 ASSAY OF IRON: CPT | Mod: ZL

## 2025-07-23 PROCEDURE — A9575 INJ GADOTERATE MEGLUMI 0.1ML: HCPCS

## 2025-07-23 PROCEDURE — 82746 ASSAY OF FOLIC ACID SERUM: CPT | Mod: ZL

## 2025-07-23 PROCEDURE — 85041 AUTOMATED RBC COUNT: CPT | Mod: ZL

## 2025-07-23 PROCEDURE — 85652 RBC SED RATE AUTOMATED: CPT | Mod: ZL

## 2025-07-23 PROCEDURE — 86140 C-REACTIVE PROTEIN: CPT | Mod: ZL

## 2025-07-23 PROCEDURE — 82728 ASSAY OF FERRITIN: CPT | Mod: ZL

## 2025-07-23 PROCEDURE — 36415 COLL VENOUS BLD VENIPUNCTURE: CPT | Mod: ZL

## 2025-07-23 PROCEDURE — 82607 VITAMIN B-12: CPT | Mod: ZL

## 2025-07-23 RX ADMIN — GADOTERATE MEGLUMINE 20 ML: 376.9 INJECTION INTRAVENOUS at 09:19

## 2025-08-01 ENCOUNTER — ONCOLOGY VISIT (OUTPATIENT)
Dept: ONCOLOGY | Facility: OTHER | Age: 65
End: 2025-08-01
Attending: INTERNAL MEDICINE
Payer: COMMERCIAL

## 2025-08-01 VITALS
RESPIRATION RATE: 16 BRPM | OXYGEN SATURATION: 95 % | BODY MASS INDEX: 38.33 KG/M2 | HEART RATE: 72 BPM | TEMPERATURE: 97.8 F | SYSTOLIC BLOOD PRESSURE: 112 MMHG | WEIGHT: 271 LBS | DIASTOLIC BLOOD PRESSURE: 68 MMHG

## 2025-08-01 DIAGNOSIS — D50.9 IRON DEFICIENCY ANEMIA, UNSPECIFIED IRON DEFICIENCY ANEMIA TYPE: ICD-10-CM

## 2025-08-01 DIAGNOSIS — R79.89 LOW VITAMIN B12 LEVEL: ICD-10-CM

## 2025-08-01 DIAGNOSIS — E03.9 HYPOTHYROIDISM, UNSPECIFIED TYPE: ICD-10-CM

## 2025-08-01 DIAGNOSIS — C11.8 MALIGNANT NEOPLASM OF OVERLAPPING SITES OF NASOPHARYNX (H): ICD-10-CM

## 2025-08-01 PROCEDURE — 250N000011 HC RX IP 250 OP 636: Performed by: NURSE PRACTITIONER

## 2025-08-01 PROCEDURE — 99213 OFFICE O/P EST LOW 20 MIN: CPT | Performed by: INTERNAL MEDICINE

## 2025-08-01 PROCEDURE — 96372 THER/PROPH/DIAG INJ SC/IM: CPT | Performed by: NURSE PRACTITIONER

## 2025-08-01 RX ADMIN — CYANOCOBALAMIN 1000 MCG: 1000 INJECTION, SOLUTION INTRAMUSCULAR; SUBCUTANEOUS at 08:49

## 2025-08-01 ASSESSMENT — PAIN SCALES - GENERAL: PAINLEVEL_OUTOF10: MODERATE PAIN (4)

## 2025-08-07 ENCOUNTER — OFFICE VISIT (OUTPATIENT)
Dept: FAMILY MEDICINE | Facility: OTHER | Age: 65
End: 2025-08-07
Attending: FAMILY MEDICINE
Payer: COMMERCIAL

## 2025-08-07 VITALS
OXYGEN SATURATION: 95 % | BODY MASS INDEX: 38.82 KG/M2 | TEMPERATURE: 97.7 F | SYSTOLIC BLOOD PRESSURE: 124 MMHG | RESPIRATION RATE: 20 BRPM | HEART RATE: 77 BPM | HEIGHT: 70 IN | WEIGHT: 271.2 LBS | DIASTOLIC BLOOD PRESSURE: 78 MMHG

## 2025-08-07 DIAGNOSIS — Z00.00 MEDICARE ANNUAL WELLNESS VISIT, SUBSEQUENT: Primary | ICD-10-CM

## 2025-08-07 DIAGNOSIS — L90.5 SCAR TISSUE: ICD-10-CM

## 2025-08-07 DIAGNOSIS — E03.9 HYPOTHYROIDISM, UNSPECIFIED TYPE: ICD-10-CM

## 2025-08-07 DIAGNOSIS — E66.813 CLASS 3 SEVERE OBESITY DUE TO EXCESS CALORIES WITH SERIOUS COMORBIDITY AND BODY MASS INDEX (BMI) OF 40.0 TO 44.9 IN ADULT (H): ICD-10-CM

## 2025-08-07 DIAGNOSIS — Z12.11 COLON CANCER SCREENING: ICD-10-CM

## 2025-08-07 DIAGNOSIS — D50.9 IRON DEFICIENCY ANEMIA, UNSPECIFIED IRON DEFICIENCY ANEMIA TYPE: ICD-10-CM

## 2025-08-07 DIAGNOSIS — Z00.00 ANNUAL PHYSICAL EXAM: Primary | ICD-10-CM

## 2025-08-07 DIAGNOSIS — E78.5 HYPERLIPIDEMIA ASSOCIATED WITH TYPE 2 DIABETES MELLITUS (H): ICD-10-CM

## 2025-08-07 DIAGNOSIS — Z12.5 SCREENING FOR PROSTATE CANCER: ICD-10-CM

## 2025-08-07 DIAGNOSIS — E11.9 TYPE 2 DIABETES, HBA1C GOAL < 7% (H): Chronic | ICD-10-CM

## 2025-08-07 DIAGNOSIS — E11.69 HYPERLIPIDEMIA ASSOCIATED WITH TYPE 2 DIABETES MELLITUS (H): ICD-10-CM

## 2025-08-07 DIAGNOSIS — C11.9 NASOPHARYNGEAL CANCER (H): ICD-10-CM

## 2025-08-07 DIAGNOSIS — C77.9 SQUAMOUS CELL CARCINOMA OF LYMPH NODE (H): ICD-10-CM

## 2025-08-07 DIAGNOSIS — J44.9 CHRONIC OBSTRUCTIVE PULMONARY DISEASE, UNSPECIFIED COPD TYPE (H): ICD-10-CM

## 2025-08-07 PROBLEM — E66.01 MORBID OBESITY (H): Status: RESOLVED | Noted: 2022-06-30 | Resolved: 2025-08-07

## 2025-08-07 LAB
CHOLEST SERPL-MCNC: 174 MG/DL
EST. AVERAGE GLUCOSE BLD GHB EST-MCNC: 131 MG/DL
FASTING STATUS PATIENT QL REPORTED: NO
HBA1C MFR BLD: 6.2 %
HDLC SERPL-MCNC: 34 MG/DL
LDLC SERPL CALC-MCNC: 62 MG/DL
NONHDLC SERPL-MCNC: 140 MG/DL
PSA SERPL DL<=0.01 NG/ML-MCNC: 1.18 NG/ML (ref 0–4.5)
TRIGL SERPL-MCNC: 389 MG/DL
TSH SERPL DL<=0.005 MIU/L-ACNC: 2.6 UIU/ML (ref 0.3–4.2)

## 2025-08-07 PROCEDURE — 3074F SYST BP LT 130 MM HG: CPT | Performed by: FAMILY MEDICINE

## 2025-08-07 PROCEDURE — 36415 COLL VENOUS BLD VENIPUNCTURE: CPT | Mod: ZL | Performed by: FAMILY MEDICINE

## 2025-08-07 PROCEDURE — 1126F AMNT PAIN NOTED NONE PRSNT: CPT | Performed by: FAMILY MEDICINE

## 2025-08-07 PROCEDURE — 3078F DIAST BP <80 MM HG: CPT | Performed by: FAMILY MEDICINE

## 2025-08-07 PROCEDURE — 90677 PCV20 VACCINE IM: CPT

## 2025-08-07 PROCEDURE — 83036 HEMOGLOBIN GLYCOSYLATED A1C: CPT | Mod: ZL | Performed by: FAMILY MEDICINE

## 2025-08-07 PROCEDURE — 3044F HG A1C LEVEL LT 7.0%: CPT | Performed by: FAMILY MEDICINE

## 2025-08-07 PROCEDURE — G0103 PSA SCREENING: HCPCS | Mod: ZL | Performed by: FAMILY MEDICINE

## 2025-08-07 PROCEDURE — G0463 HOSPITAL OUTPT CLINIC VISIT: HCPCS | Mod: 25

## 2025-08-07 PROCEDURE — G0439 PPPS, SUBSEQ VISIT: HCPCS | Performed by: FAMILY MEDICINE

## 2025-08-07 PROCEDURE — 99207 PR FOOT EXAM NO CHARGE: CPT | Performed by: FAMILY MEDICINE

## 2025-08-07 PROCEDURE — 99213 OFFICE O/P EST LOW 20 MIN: CPT | Mod: 25 | Performed by: FAMILY MEDICINE

## 2025-08-07 PROCEDURE — G0463 HOSPITAL OUTPT CLINIC VISIT: HCPCS | Mod: 25,27

## 2025-08-07 PROCEDURE — 3048F LDL-C <100 MG/DL: CPT | Performed by: FAMILY MEDICINE

## 2025-08-07 PROCEDURE — 84443 ASSAY THYROID STIM HORMONE: CPT | Mod: ZL | Performed by: FAMILY MEDICINE

## 2025-08-07 PROCEDURE — 83718 ASSAY OF LIPOPROTEIN: CPT | Mod: ZL | Performed by: FAMILY MEDICINE

## 2025-08-07 RX ORDER — FOLIC ACID 1 MG/1
1000 TABLET ORAL DAILY
Qty: 90 TABLET | Refills: 0 | Status: SHIPPED | OUTPATIENT
Start: 2025-08-07

## 2025-08-07 RX ORDER — LEVOTHYROXINE SODIUM 125 UG/1
125 TABLET ORAL DAILY
Qty: 90 TABLET | Refills: 4 | Status: SHIPPED | OUTPATIENT
Start: 2025-08-07

## 2025-08-07 SDOH — HEALTH STABILITY: PHYSICAL HEALTH: ON AVERAGE, HOW MANY DAYS PER WEEK DO YOU ENGAGE IN MODERATE TO STRENUOUS EXERCISE (LIKE A BRISK WALK)?: 4 DAYS

## 2025-08-07 ASSESSMENT — SOCIAL DETERMINANTS OF HEALTH (SDOH): HOW OFTEN DO YOU GET TOGETHER WITH FRIENDS OR RELATIVES?: ONCE A WEEK

## 2025-08-07 ASSESSMENT — PAIN SCALES - GENERAL: PAINLEVEL_OUTOF10: NO PAIN (0)

## 2025-08-14 ENCOUNTER — ORDERS ONLY (AUTO-RELEASED) (OUTPATIENT)
Dept: FAMILY MEDICINE | Facility: OTHER | Age: 65
End: 2025-08-14
Payer: COMMERCIAL

## 2025-08-14 DIAGNOSIS — Z12.11 COLON CANCER SCREENING: ICD-10-CM

## 2025-08-26 LAB — NONINV COLON CA DNA+OCC BLD SCRN STL QL: NEGATIVE

## (undated) DEVICE — SU SILK 2-0 TIE 12X30" A305H

## (undated) DEVICE — PACK CENTRAL LINE INSERTION SAN32CLFCG

## (undated) DEVICE — ADH SKIN CLOSURE PREMIERPRO EXOFIN 1.0ML 3470

## (undated) DEVICE — KNIFE HANDLE W/15 BLADE 371615

## (undated) DEVICE — DRSG TELFA 3X8" 1238

## (undated) DEVICE — CONNECTOR MALE TO MALE LL

## (undated) DEVICE — BLADE KNIFE SURG 10 371110

## (undated) DEVICE — DECANTER BAG 2002S

## (undated) DEVICE — DRAIN JACKSON PRATT RESERVOIR 100ML SU130-1305

## (undated) DEVICE — ENDO TOOTH GUARD SAC2001

## (undated) DEVICE — SOL NACL 0.9% IRRIG 1000ML BOTTLE 2F7124

## (undated) DEVICE — DRAIN JACKSON PRATT 10MM FLAT 4/4 PERF SU130-1311

## (undated) DEVICE — PACK ENT ENDOSCOPY UMMC

## (undated) DEVICE — PACK NEURO MINOR UMMC SNE32MNMU4

## (undated) DEVICE — JELLY LUBRICATING SURGILUBE 2OZ TUBE

## (undated) DEVICE — SUCTION SLEEVE NEPTUNE 2 125MM 0703-005-125

## (undated) DEVICE — Device

## (undated) DEVICE — CLIP HORIZON MED BLUE 002200

## (undated) DEVICE — PREP SKIN SCRUB TRAY 4461A

## (undated) DEVICE — SOL WATER IRRIG 1000ML BOTTLE 2F7114

## (undated) DEVICE — SPONGE COTTONOID 1/2X3" 80-1407

## (undated) DEVICE — BLADE KNIFE SURG 15 371115

## (undated) DEVICE — SUCTION MANIFOLD NEPTUNE 2 SYS 4 PORT 0702-020-000

## (undated) DEVICE — VESSEL LOOPS YELLOW MAXI 31145694

## (undated) DEVICE — SU MONOCRYL 4-0 P-3 18" UND Y494G

## (undated) DEVICE — SU SILK 3-0 TIE 12X30" A304H

## (undated) DEVICE — SU ETHILON 4-0 PC-3 18" 1864G

## (undated) DEVICE — GOWN XLG DISP 9545

## (undated) DEVICE — SU VICRYL 3-0 SH 27" J316H

## (undated) DEVICE — CLIP HORIZON SM RED WIDE SLOT 001201

## (undated) DEVICE — PREP POVIDONE-IODINE 7.5% SCRUB 4OZ BOTTLE MDS093945

## (undated) DEVICE — LINEN TOWEL PACK X30 5481

## (undated) DEVICE — SU SILK 0 TIE 6X30" A306H

## (undated) DEVICE — COVER ULTRASOUND PROBE W/GEL FLEXI-FEEL 6"X58" LF  25-FF658

## (undated) DEVICE — SU SILK 2-0 SH CR 5X18" C0125

## (undated) DEVICE — SU VICRYL 3-0 SH 8X18" UND J864D

## (undated) DEVICE — ESU GROUND PAD ADULT W/CORD E7507

## (undated) DEVICE — SU ETHILON 3-0 PS-1 18" 1663H

## (undated) DEVICE — SPONGE KITTNER 30-101

## (undated) DEVICE — ESU CORD BIPOLAR AND IRR TUBING AESCULAP US355

## (undated) DEVICE — LINEN TOWEL PACK X5 5464

## (undated) DEVICE — ESU HARMONIC FOCUS SHEARS CVD 9CM HAR9F

## (undated) DEVICE — SPONGE RAY-TEC 4X8" 7318

## (undated) DEVICE — PREP POVIDONE IODINE SOLUTION 10% 4OZ BOTTLE 29906-004

## (undated) DEVICE — LINEN GOWN XLG 5407

## (undated) DEVICE — RETR ELASTIC STAYS LONE STAR BLUNT SGL PK 3350-1G

## (undated) DEVICE — GLOVE PROTEXIS POWDER FREE SMT 7.5  2D72PT75X

## (undated) DEVICE — KIT INTRODUCER FLUENT MICRO 5FRX10CM ECHO TIP KIT-038-04

## (undated) RX ORDER — VANCOMYCIN 2 GRAM/500 ML IN 0.9 % SODIUM CHLORIDE INTRAVENOUS
Status: DISPENSED
Start: 2024-09-10

## (undated) RX ORDER — OXYMETAZOLINE HYDROCHLORIDE 0.05 G/100ML
SPRAY NASAL
Status: DISPENSED
Start: 2022-12-05

## (undated) RX ORDER — GLYCOPYRROLATE 0.2 MG/ML
INJECTION, SOLUTION INTRAMUSCULAR; INTRAVENOUS
Status: DISPENSED
Start: 2022-12-05

## (undated) RX ORDER — HEPARIN SODIUM (PORCINE) LOCK FLUSH IV SOLN 100 UNIT/ML 100 UNIT/ML
SOLUTION INTRAVENOUS
Status: DISPENSED
Start: 2023-05-03

## (undated) RX ORDER — ACETAMINOPHEN 325 MG/1
TABLET ORAL
Status: DISPENSED
Start: 2022-03-31

## (undated) RX ORDER — FENTANYL CITRATE 50 UG/ML
INJECTION, SOLUTION INTRAMUSCULAR; INTRAVENOUS
Status: DISPENSED
Start: 2022-12-05

## (undated) RX ORDER — CYANOCOBALAMIN 1000 UG/ML
INJECTION, SOLUTION INTRAMUSCULAR; SUBCUTANEOUS
Status: DISPENSED
Start: 2024-06-25

## (undated) RX ORDER — CEFAZOLIN SODIUM/WATER 2 G/20 ML
SYRINGE (ML) INTRAVENOUS
Status: DISPENSED
Start: 2022-12-05

## (undated) RX ORDER — HEPARIN SODIUM (PORCINE) LOCK FLUSH IV SOLN 100 UNIT/ML 100 UNIT/ML
SOLUTION INTRAVENOUS
Status: DISPENSED
Start: 2023-10-28

## (undated) RX ORDER — ACETAMINOPHEN 500 MG
TABLET ORAL
Status: DISPENSED
Start: 2023-10-26

## (undated) RX ORDER — HEPARIN SODIUM (PORCINE) LOCK FLUSH IV SOLN 100 UNIT/ML 100 UNIT/ML
SOLUTION INTRAVENOUS
Status: DISPENSED
Start: 2024-06-12

## (undated) RX ORDER — HYDROMORPHONE HYDROCHLORIDE 1 MG/ML
INJECTION, SOLUTION INTRAMUSCULAR; INTRAVENOUS; SUBCUTANEOUS
Status: DISPENSED
Start: 2022-12-05

## (undated) RX ORDER — PIPERACILLIN SODIUM, TAZOBACTAM SODIUM 4; .5 G/20ML; G/20ML
INJECTION, POWDER, LYOPHILIZED, FOR SOLUTION INTRAVENOUS
Status: DISPENSED
Start: 2024-09-10

## (undated) RX ORDER — DEXAMETHASONE SODIUM PHOSPHATE 4 MG/ML
INJECTION, SOLUTION INTRA-ARTICULAR; INTRALESIONAL; INTRAMUSCULAR; INTRAVENOUS; SOFT TISSUE
Status: DISPENSED
Start: 2022-12-05

## (undated) RX ORDER — ACETAMINOPHEN 10 MG/ML
INJECTION, SOLUTION INTRAVENOUS
Status: DISPENSED
Start: 2024-09-10

## (undated) RX ORDER — CEFAZOLIN SODIUM 1 G/3ML
INJECTION, POWDER, FOR SOLUTION INTRAMUSCULAR; INTRAVENOUS
Status: DISPENSED
Start: 2022-03-31

## (undated) RX ORDER — FENTANYL CITRATE 50 UG/ML
INJECTION, SOLUTION INTRAMUSCULAR; INTRAVENOUS
Status: DISPENSED
Start: 2023-11-06

## (undated) RX ORDER — SODIUM CHLORIDE, SODIUM LACTATE, POTASSIUM CHLORIDE, CALCIUM CHLORIDE 600; 310; 30; 20 MG/100ML; MG/100ML; MG/100ML; MG/100ML
INJECTION, SOLUTION INTRAVENOUS
Status: DISPENSED
Start: 2022-12-05

## (undated) RX ORDER — ONDANSETRON 2 MG/ML
INJECTION INTRAMUSCULAR; INTRAVENOUS
Status: DISPENSED
Start: 2022-12-05

## (undated) RX ORDER — LIDOCAINE HYDROCHLORIDE 10 MG/ML
INJECTION, SOLUTION EPIDURAL; INFILTRATION; INTRACAUDAL; PERINEURAL
Status: DISPENSED
Start: 2023-11-06

## (undated) RX ORDER — FENTANYL CITRATE-0.9 % NACL/PF 10 MCG/ML
PLASTIC BAG, INJECTION (ML) INTRAVENOUS
Status: DISPENSED
Start: 2022-12-05

## (undated) RX ORDER — DOXYCYCLINE 100 MG/1
CAPSULE ORAL
Status: DISPENSED
Start: 2023-10-26

## (undated) RX ORDER — DOXYCYCLINE 100 MG/10ML
INJECTION, POWDER, LYOPHILIZED, FOR SOLUTION INTRAVENOUS
Status: DISPENSED
Start: 2023-10-26

## (undated) RX ORDER — HEPARIN SODIUM (PORCINE) LOCK FLUSH IV SOLN 100 UNIT/ML 100 UNIT/ML
SOLUTION INTRAVENOUS
Status: DISPENSED
Start: 2023-06-26

## (undated) RX ORDER — ACETAMINOPHEN 500 MG
TABLET ORAL
Status: DISPENSED
Start: 2023-10-31

## (undated) RX ORDER — HEPARIN SODIUM (PORCINE) LOCK FLUSH IV SOLN 100 UNIT/ML 100 UNIT/ML
SOLUTION INTRAVENOUS
Status: DISPENSED
Start: 2024-05-08

## (undated) RX ORDER — PROPOFOL 10 MG/ML
INJECTION, EMULSION INTRAVENOUS
Status: DISPENSED
Start: 2022-12-05

## (undated) RX ORDER — HEPARIN SODIUM,PORCINE 10 UNIT/ML
VIAL (ML) INTRAVENOUS
Status: DISPENSED
Start: 2023-10-26

## (undated) RX ORDER — HEPARIN SODIUM (PORCINE) LOCK FLUSH IV SOLN 100 UNIT/ML 100 UNIT/ML
SOLUTION INTRAVENOUS
Status: DISPENSED
Start: 2022-06-10

## (undated) RX ORDER — HEPARIN SODIUM,PORCINE 10 UNIT/ML
VIAL (ML) INTRAVENOUS
Status: DISPENSED
Start: 2023-11-06

## (undated) RX ORDER — HEPARIN SODIUM (PORCINE) LOCK FLUSH IV SOLN 100 UNIT/ML 100 UNIT/ML
SOLUTION INTRAVENOUS
Status: DISPENSED
Start: 2024-03-01

## (undated) RX ORDER — CYANOCOBALAMIN 1000 UG/ML
INJECTION, SOLUTION INTRAMUSCULAR; SUBCUTANEOUS
Status: DISPENSED
Start: 2024-11-13

## (undated) RX ORDER — CEFAZOLIN SODIUM 2 G/50ML
SOLUTION INTRAVENOUS
Status: DISPENSED
Start: 2022-03-31